# Patient Record
Sex: FEMALE | Race: WHITE | NOT HISPANIC OR LATINO | URBAN - METROPOLITAN AREA
[De-identification: names, ages, dates, MRNs, and addresses within clinical notes are randomized per-mention and may not be internally consistent; named-entity substitution may affect disease eponyms.]

---

## 2017-12-17 ENCOUNTER — EMERGENCY (EMERGENCY)
Facility: HOSPITAL | Age: 72
LOS: 1 days | Discharge: HOME | End: 2017-12-17
Attending: EMERGENCY MEDICINE | Admitting: EMERGENCY MEDICINE
Payer: COMMERCIAL

## 2017-12-17 VITALS
OXYGEN SATURATION: 93 % | RESPIRATION RATE: 18 BRPM | SYSTOLIC BLOOD PRESSURE: 134 MMHG | DIASTOLIC BLOOD PRESSURE: 65 MMHG | TEMPERATURE: 98 F | HEART RATE: 72 BPM

## 2017-12-17 VITALS
OXYGEN SATURATION: 98 % | RESPIRATION RATE: 18 BRPM | TEMPERATURE: 98 F | HEART RATE: 77 BPM | SYSTOLIC BLOOD PRESSURE: 123 MMHG | DIASTOLIC BLOOD PRESSURE: 58 MMHG

## 2017-12-17 DIAGNOSIS — Z90.2 ACQUIRED ABSENCE OF LUNG [PART OF]: Chronic | ICD-10-CM

## 2017-12-17 DIAGNOSIS — S49.92XA UNSPECIFIED INJURY OF LEFT SHOULDER AND UPPER ARM, INITIAL ENCOUNTER: Chronic | ICD-10-CM

## 2017-12-17 PROCEDURE — 73030 X-RAY EXAM OF SHOULDER: CPT

## 2017-12-17 PROCEDURE — 73080 X-RAY EXAM OF ELBOW: CPT

## 2017-12-17 PROCEDURE — 73030 X-RAY EXAM OF SHOULDER: CPT | Mod: 26,RT

## 2017-12-17 PROCEDURE — 73060 X-RAY EXAM OF HUMERUS: CPT

## 2017-12-17 PROCEDURE — 70450 CT HEAD/BRAIN W/O DYE: CPT | Mod: 26

## 2017-12-17 PROCEDURE — 99284 EMERGENCY DEPT VISIT MOD MDM: CPT | Mod: 25

## 2017-12-17 PROCEDURE — 73080 X-RAY EXAM OF ELBOW: CPT | Mod: 26,RT

## 2017-12-17 PROCEDURE — 70450 CT HEAD/BRAIN W/O DYE: CPT

## 2017-12-17 PROCEDURE — 96374 THER/PROPH/DIAG INJ IV PUSH: CPT

## 2017-12-17 PROCEDURE — 99285 EMERGENCY DEPT VISIT HI MDM: CPT

## 2017-12-17 PROCEDURE — 73060 X-RAY EXAM OF HUMERUS: CPT | Mod: 26,RT

## 2017-12-17 RX ORDER — MORPHINE SULFATE 50 MG/1
4 CAPSULE, EXTENDED RELEASE ORAL ONCE
Qty: 0 | Refills: 0 | Status: DISCONTINUED | OUTPATIENT
Start: 2017-12-17 | End: 2017-12-17

## 2017-12-17 RX ORDER — OXYCODONE HYDROCHLORIDE 5 MG/1
1 TABLET ORAL
Qty: 20 | Refills: 0
Start: 2017-12-17 | End: 2017-12-19

## 2017-12-17 RX ORDER — OXYCODONE HYDROCHLORIDE 5 MG/1
5 TABLET ORAL ONCE
Qty: 0 | Refills: 0 | Status: DISCONTINUED | OUTPATIENT
Start: 2017-12-17 | End: 2017-12-17

## 2017-12-17 RX ORDER — ACETAMINOPHEN 500 MG
650 TABLET ORAL ONCE
Qty: 0 | Refills: 0 | Status: COMPLETED | OUTPATIENT
Start: 2017-12-17 | End: 2017-12-17

## 2017-12-17 RX ADMIN — Medication 650 MILLIGRAM(S): at 09:56

## 2017-12-17 RX ADMIN — OXYCODONE HYDROCHLORIDE 5 MILLIGRAM(S): 5 TABLET ORAL at 13:06

## 2017-12-17 RX ADMIN — Medication 650 MILLIGRAM(S): at 13:06

## 2017-12-17 RX ADMIN — MORPHINE SULFATE 4 MILLIGRAM(S): 50 CAPSULE, EXTENDED RELEASE ORAL at 13:07

## 2017-12-17 RX ADMIN — OXYCODONE HYDROCHLORIDE 5 MILLIGRAM(S): 5 TABLET ORAL at 11:42

## 2017-12-17 RX ADMIN — OXYCODONE HYDROCHLORIDE 5 MILLIGRAM(S): 5 TABLET ORAL at 09:56

## 2017-12-17 RX ADMIN — OXYCODONE HYDROCHLORIDE 5 MILLIGRAM(S): 5 TABLET ORAL at 11:41
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
## 2017-12-17 NOTE — ED ADULT NURSE REASSESSMENT NOTE - NS ED NURSE REASSESS COMMENT FT1
Pt reports paresthesia in R lower arm. +pulses, +cap refill. RN reported to MD Fairchild. Patient transported to CT/X-ray at this time.
Patient returned from CT/Xray c/o 10/10 pain unchanged with medication. Medication provided to patient. Will continue to monitor.
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
## 2017-12-17 NOTE — ED PROVIDER NOTE - PHYSICAL EXAMINATION
Attn - alert, nad, small hematoma and abrasion r forehead at hairline, PERRl, neck/back/spine-, chest/ribs-, abdo-, pelvis-, hips-, able to SLR bilat without pain, knees-, lower leg-, left upper ext - neg. Right upper ext - clavicle -, tender R prox humerus, elbow - tender over olecranon without swelling or deformity, wrist-, hand-, + distal pulses, sensation & motor intact.  neuro - nonfocal. skin -abrasion R forehead and over L index mCP.

## 2017-12-17 NOTE — ED PROVIDER NOTE - CARE PLAN
Principal Discharge DX:	Contusion of scalp, initial encounter  Secondary Diagnosis:	Contusion of arm, right, initial encounter

## 2017-12-17 NOTE — ED ADULT NURSE NOTE - PSH
History of lobectomy of lung  L  lung  Injury of left shoulder, initial encounter  Surgical repair with plates

## 2017-12-19 PROBLEM — S42.294A OTHER CLOSED NONDISPLACED FRACTURE OF PROXIMAL END OF RIGHT HUMERUS, INITIAL ENCOUNTER: Status: ACTIVE | Noted: 2017-12-19

## 2017-12-20 ENCOUNTER — APPOINTMENT (OUTPATIENT)
Dept: ORTHOPEDIC SURGERY | Facility: CLINIC | Age: 72
End: 2017-12-20

## 2017-12-20 DIAGNOSIS — S42.294A OTHER NONDISPLACED FRACTURE OF UPPER END OF RIGHT HUMERUS, INITIAL ENCOUNTER FOR CLOSED FRACTURE: ICD-10-CM

## 2018-01-06 ENCOUNTER — EMERGENCY (EMERGENCY)
Facility: HOSPITAL | Age: 73
LOS: 1 days | Discharge: HOME | End: 2018-01-06
Attending: EMERGENCY MEDICINE | Admitting: EMERGENCY MEDICINE
Payer: COMMERCIAL

## 2018-01-06 VITALS
SYSTOLIC BLOOD PRESSURE: 122 MMHG | OXYGEN SATURATION: 97 % | RESPIRATION RATE: 18 BRPM | HEART RATE: 97 BPM | DIASTOLIC BLOOD PRESSURE: 69 MMHG | TEMPERATURE: 99 F

## 2018-01-06 VITALS
RESPIRATION RATE: 18 BRPM | HEART RATE: 105 BPM | OXYGEN SATURATION: 95 % | SYSTOLIC BLOOD PRESSURE: 118 MMHG | TEMPERATURE: 100 F | DIASTOLIC BLOOD PRESSURE: 66 MMHG

## 2018-01-06 DIAGNOSIS — Z90.2 ACQUIRED ABSENCE OF LUNG [PART OF]: Chronic | ICD-10-CM

## 2018-01-06 DIAGNOSIS — S49.92XA UNSPECIFIED INJURY OF LEFT SHOULDER AND UPPER ARM, INITIAL ENCOUNTER: Chronic | ICD-10-CM

## 2018-01-06 LAB
ALBUMIN SERPL ELPH-MCNC: 3.8 G/DL — SIGNIFICANT CHANGE UP (ref 3.3–5)
ALP SERPL-CCNC: 85 U/L — SIGNIFICANT CHANGE UP (ref 40–120)
ALT FLD-CCNC: 32 U/L RC — SIGNIFICANT CHANGE UP (ref 10–45)
ANION GAP SERPL CALC-SCNC: 12 MMOL/L — SIGNIFICANT CHANGE UP (ref 5–17)
AST SERPL-CCNC: 29 U/L — SIGNIFICANT CHANGE UP (ref 10–40)
BASOPHILS # BLD AUTO: 0.1 K/UL — SIGNIFICANT CHANGE UP (ref 0–0.2)
BASOPHILS NFR BLD AUTO: 0.7 % — SIGNIFICANT CHANGE UP (ref 0–2)
BILIRUB SERPL-MCNC: 0.5 MG/DL — SIGNIFICANT CHANGE UP (ref 0.2–1.2)
BUN SERPL-MCNC: 15 MG/DL — SIGNIFICANT CHANGE UP (ref 7–23)
CALCIUM SERPL-MCNC: 9.8 MG/DL — SIGNIFICANT CHANGE UP (ref 8.4–10.5)
CHLORIDE SERPL-SCNC: 99 MMOL/L — SIGNIFICANT CHANGE UP (ref 96–108)
CK MB CFR SERPL CALC: 1.5 NG/ML — SIGNIFICANT CHANGE UP (ref 0–3.8)
CK SERPL-CCNC: 36 U/L — SIGNIFICANT CHANGE UP (ref 25–170)
CO2 SERPL-SCNC: 27 MMOL/L — SIGNIFICANT CHANGE UP (ref 22–31)
CREAT SERPL-MCNC: 0.74 MG/DL — SIGNIFICANT CHANGE UP (ref 0.5–1.3)
EOSINOPHIL # BLD AUTO: 0.1 K/UL — SIGNIFICANT CHANGE UP (ref 0–0.5)
EOSINOPHIL NFR BLD AUTO: 0.9 % — SIGNIFICANT CHANGE UP (ref 0–6)
GAS PNL BLDV: SIGNIFICANT CHANGE UP
GLUCOSE SERPL-MCNC: 107 MG/DL — HIGH (ref 70–99)
HCT VFR BLD CALC: 36.1 % — SIGNIFICANT CHANGE UP (ref 34.5–45)
HGB BLD-MCNC: 11.9 G/DL — SIGNIFICANT CHANGE UP (ref 11.5–15.5)
LITHIUM SERPL-MCNC: 0.9 MMOL/L — SIGNIFICANT CHANGE UP (ref 0.6–1.2)
LYMPHOCYTES # BLD AUTO: 1 K/UL — SIGNIFICANT CHANGE UP (ref 1–3.3)
LYMPHOCYTES # BLD AUTO: 11 % — LOW (ref 13–44)
MCHC RBC-ENTMCNC: 32.9 GM/DL — SIGNIFICANT CHANGE UP (ref 32–36)
MCHC RBC-ENTMCNC: 33.2 PG — SIGNIFICANT CHANGE UP (ref 27–34)
MCV RBC AUTO: 101 FL — HIGH (ref 80–100)
MONOCYTES # BLD AUTO: 0.6 K/UL — SIGNIFICANT CHANGE UP (ref 0–0.9)
MONOCYTES NFR BLD AUTO: 7 % — SIGNIFICANT CHANGE UP (ref 2–14)
NEUTROPHILS # BLD AUTO: 7.1 K/UL — SIGNIFICANT CHANGE UP (ref 1.8–7.4)
NEUTROPHILS NFR BLD AUTO: 80.4 % — HIGH (ref 43–77)
NT-PROBNP SERPL-SCNC: 152 PG/ML — SIGNIFICANT CHANGE UP (ref 0–300)
PLATELET # BLD AUTO: 381 K/UL — SIGNIFICANT CHANGE UP (ref 150–400)
POTASSIUM SERPL-MCNC: 4.4 MMOL/L — SIGNIFICANT CHANGE UP (ref 3.5–5.3)
POTASSIUM SERPL-SCNC: 4.4 MMOL/L — SIGNIFICANT CHANGE UP (ref 3.5–5.3)
PROT SERPL-MCNC: 8.9 G/DL — HIGH (ref 6–8.3)
RAPID RVP RESULT: DETECTED
RBC # BLD: 3.58 M/UL — LOW (ref 3.8–5.2)
RBC # FLD: 13.4 % — SIGNIFICANT CHANGE UP (ref 10.3–14.5)
RSV RNA SPEC QL NAA+PROBE: DETECTED
SODIUM SERPL-SCNC: 138 MMOL/L — SIGNIFICANT CHANGE UP (ref 135–145)
TROPONIN T SERPL-MCNC: <0.01 NG/ML — SIGNIFICANT CHANGE UP (ref 0–0.06)
WBC # BLD: 8.9 K/UL — SIGNIFICANT CHANGE UP (ref 3.8–10.5)
WBC # FLD AUTO: 8.9 K/UL — SIGNIFICANT CHANGE UP (ref 3.8–10.5)

## 2018-01-06 PROCEDURE — 94640 AIRWAY INHALATION TREATMENT: CPT

## 2018-01-06 PROCEDURE — 85027 COMPLETE CBC AUTOMATED: CPT

## 2018-01-06 PROCEDURE — 71045 X-RAY EXAM CHEST 1 VIEW: CPT | Mod: 26

## 2018-01-06 PROCEDURE — 82803 BLOOD GASES ANY COMBINATION: CPT

## 2018-01-06 PROCEDURE — 87581 M.PNEUMON DNA AMP PROBE: CPT

## 2018-01-06 PROCEDURE — 82947 ASSAY GLUCOSE BLOOD QUANT: CPT

## 2018-01-06 PROCEDURE — 84484 ASSAY OF TROPONIN QUANT: CPT

## 2018-01-06 PROCEDURE — 84132 ASSAY OF SERUM POTASSIUM: CPT

## 2018-01-06 PROCEDURE — 82553 CREATINE MB FRACTION: CPT

## 2018-01-06 PROCEDURE — 84295 ASSAY OF SERUM SODIUM: CPT

## 2018-01-06 PROCEDURE — 83880 ASSAY OF NATRIURETIC PEPTIDE: CPT

## 2018-01-06 PROCEDURE — 82550 ASSAY OF CK (CPK): CPT

## 2018-01-06 PROCEDURE — 71046 X-RAY EXAM CHEST 2 VIEWS: CPT | Mod: 26

## 2018-01-06 PROCEDURE — 80053 COMPREHEN METABOLIC PANEL: CPT

## 2018-01-06 PROCEDURE — 71046 X-RAY EXAM CHEST 2 VIEWS: CPT

## 2018-01-06 PROCEDURE — 82330 ASSAY OF CALCIUM: CPT

## 2018-01-06 PROCEDURE — 85014 HEMATOCRIT: CPT

## 2018-01-06 PROCEDURE — 82435 ASSAY OF BLOOD CHLORIDE: CPT

## 2018-01-06 PROCEDURE — 87486 CHLMYD PNEUM DNA AMP PROBE: CPT

## 2018-01-06 PROCEDURE — 87633 RESP VIRUS 12-25 TARGETS: CPT

## 2018-01-06 PROCEDURE — 87798 DETECT AGENT NOS DNA AMP: CPT

## 2018-01-06 PROCEDURE — 71045 X-RAY EXAM CHEST 1 VIEW: CPT

## 2018-01-06 PROCEDURE — 99284 EMERGENCY DEPT VISIT MOD MDM: CPT | Mod: GC

## 2018-01-06 PROCEDURE — 99285 EMERGENCY DEPT VISIT HI MDM: CPT | Mod: 25

## 2018-01-06 PROCEDURE — 80178 ASSAY OF LITHIUM: CPT

## 2018-01-06 PROCEDURE — 83605 ASSAY OF LACTIC ACID: CPT

## 2018-01-06 PROCEDURE — 93005 ELECTROCARDIOGRAM TRACING: CPT

## 2018-01-06 RX ORDER — IPRATROPIUM/ALBUTEROL SULFATE 18-103MCG
3 AEROSOL WITH ADAPTER (GRAM) INHALATION
Qty: 0 | Refills: 0 | Status: COMPLETED | OUTPATIENT
Start: 2018-01-06 | End: 2018-01-06

## 2018-01-06 RX ADMIN — Medication 60 MILLIGRAM(S): at 12:44

## 2018-01-06 RX ADMIN — Medication 3 MILLILITER(S): at 13:06

## 2018-01-06 RX ADMIN — Medication 3 MILLILITER(S): at 13:35

## 2018-01-06 RX ADMIN — Medication 3 MILLILITER(S): at 12:44

## 2018-01-06 NOTE — ED ADULT NURSE NOTE - OBJECTIVE STATEMENT
72 year-old Female  with history of asthma,  and recent Right arm fracture due to a fall.  pt c/o shortness of breath and  clear productive cough.  Patient took Ventolin at home with minimal relief.  Pt denies recent travel, sick contacts, fevers, nausea/vomiting, chest pain, and urinary symptoms.  Respiration easy and non labored, pt appears anxious.  Right arm sling in place with ecchymosis noted to right upper arm.  (+) radial pulse and sensation intact, pt able to move her  right fingers.

## 2018-01-06 NOTE — ED PROVIDER NOTE - MEDICAL DECISION MAKING DETAILS
72 year-old F with PMH asthma who presented to the hospital with shortness of breath and cough. Will check CBC, CMP, VBG, cardiac enzymes, pro-BNP, CXR, EKG. Patient without wheezing, will not administer albuterol at this time. 72 year-old F with PMH asthma who presented to the hospital with shortness of breath and cough. Will check CBC, CMP, VBG, cardiac enzymes, pro-BNP, CXR, EKG. Patient

## 2018-01-06 NOTE — ED PROVIDER NOTE - PLAN OF CARE
Maintain treatment You were diagnosed with an asthma exacerbation, which improved. Please take Prednisone 40mg daily for 5 days and Ventolin as needed after discharge. Please follow up with your primary care doctor shortly after discharge.

## 2018-01-06 NOTE — ED PROVIDER NOTE - PROGRESS NOTE DETAILS
Patient greatly improved on re-examination. Improved air movement on auscultation. Explained to patient that she has respiratory syncytial virus which likely exacerbated her asthma. Will prescribe 40mg prednisone daily for 5 days. Explained to patient and family who verbalized understanding.

## 2018-01-06 NOTE — ED PROVIDER NOTE - CARE PLAN
Principal Discharge DX:	Asthma exacerbation  Goal:	Maintain treatment  Instructions for follow-up, activity and diet:	You were diagnosed with an asthma exacerbation, which improved. Please take Prednisone 40mg daily for 5 days and Ventolin as needed after discharge. Please follow up with your primary care doctor shortly after discharge.

## 2018-07-23 NOTE — ED PROVIDER NOTE - ATTENDING CONTRIBUTION TO CARE
July 24, 2018      Keegan Buchanan  [address] [place] [id number]        Dear ,    We are writing to inform you of your test results.    All of your labs were normal for you.     Resulted Orders   Basic metabolic panel   Result Value Ref Range    Sodium 141 133 - 144 mmol/L    Potassium 3.6 3.4 - 5.3 mmol/L    Chloride 106 94 - 109 mmol/L    Carbon Dioxide 25 20 - 32 mmol/L    Anion Gap 10 3 - 14 mmol/L    Glucose 81 70 - 99 mg/dL    Urea Nitrogen 14 7 - 30 mg/dL    Creatinine 0.96 0.66 - 1.25 mg/dL    GFR Estimate 79 >60 mL/min/1.7m2      Comment:      Non  GFR Calc    GFR Estimate If Black >90 >60 mL/min/1.7m2      Comment:       GFR Calc    Calcium 8.6 8.5 - 10.1 mg/dL       If you have any questions or concerns, please call the clinic at the number listed above.       Sincerely,        Fabio Stovall MD, MD                 Dr. Fierro : I have personally seen and examined this patient at the bedside. I have fully participated in the care of this patient. I have reviewed all pertinent clinical information, including history, physical exam, plan and the Resident's note and agree except as noted.     71 yo F with PMH of asthma, bipolar on lithium, recent hummerus fx in a sling,  L lung lobectomy due to non-hodgkins lymphona 15 years ago, notes recurrence of nodules 2 years ago - now being observed p/w cough/wheezing since yesterday. notes that feels like her typical asthma exacerbation. Patient took Ventolin at home with minimal relief.  no hx of intubations or admissions for asthma last used prednisone years ago. no hx of dvt/pe. notes that is always active. notes + chills last night. + chest tightness with wheezing. cp only when coughing  Denies f/n/v/cp//palpitations/abd.pain/d/c/dysuria/hematuria. no sick contacts/recent travel.    PE:  head; atraumatic normocephalic  eyes: perrla  Heart: rrr s1s2  lungs: poor inspiratory/expiratory effort mild diffuse wheezing  abd: soft, nt nd + bs no rebound/guarding no cva ttp  le: no swelling no calf ttp    -->most likely asthma exacerbation vs pna will fu cxr; labs ekg less likely acs - atypical presentation jeet check 1 trop--nebs steroids--reassess

## 2019-09-24 ENCOUNTER — INPATIENT (INPATIENT)
Facility: HOSPITAL | Age: 74
LOS: 9 days | Discharge: HOME | DRG: 563 | End: 2019-10-04
Attending: INTERNAL MEDICINE | Admitting: INTERNAL MEDICINE
Payer: COMMERCIAL

## 2019-09-24 VITALS
OXYGEN SATURATION: 99 % | DIASTOLIC BLOOD PRESSURE: 95 MMHG | SYSTOLIC BLOOD PRESSURE: 145 MMHG | HEART RATE: 94 BPM | RESPIRATION RATE: 19 BRPM | TEMPERATURE: 98 F

## 2019-09-24 DIAGNOSIS — Z90.2 ACQUIRED ABSENCE OF LUNG [PART OF]: Chronic | ICD-10-CM

## 2019-09-24 DIAGNOSIS — S49.92XA UNSPECIFIED INJURY OF LEFT SHOULDER AND UPPER ARM, INITIAL ENCOUNTER: Chronic | ICD-10-CM

## 2019-09-24 LAB
ALBUMIN SERPL ELPH-MCNC: 3 G/DL — LOW (ref 3.3–5)
ALP SERPL-CCNC: 58 U/L — SIGNIFICANT CHANGE UP (ref 40–120)
ALT FLD-CCNC: 10 U/L — SIGNIFICANT CHANGE UP (ref 10–45)
APPEARANCE UR: CLEAR — SIGNIFICANT CHANGE UP
APTT BLD: 35 SEC — SIGNIFICANT CHANGE UP (ref 27.5–36.3)
AST SERPL-CCNC: 14 U/L — SIGNIFICANT CHANGE UP (ref 10–40)
BACTERIA # UR AUTO: NEGATIVE — SIGNIFICANT CHANGE UP
BILIRUB SERPL-MCNC: 0.5 MG/DL — SIGNIFICANT CHANGE UP (ref 0.2–1.2)
BILIRUB UR-MCNC: NEGATIVE — SIGNIFICANT CHANGE UP
BUN SERPL-MCNC: 20 MG/DL — SIGNIFICANT CHANGE UP (ref 7–23)
CALCIUM SERPL-MCNC: 10 MG/DL — SIGNIFICANT CHANGE UP (ref 8.4–10.5)
CO2 SERPL-SCNC: 26 MMOL/L — SIGNIFICANT CHANGE UP (ref 22–31)
COLOR SPEC: SIGNIFICANT CHANGE UP
CREAT SERPL-MCNC: 0.8 MG/DL — SIGNIFICANT CHANGE UP (ref 0.5–1.3)
DIFF PNL FLD: NEGATIVE — SIGNIFICANT CHANGE UP
EOSINOPHIL NFR BLD AUTO: 1 % — SIGNIFICANT CHANGE UP (ref 0–6)
EPI CELLS # UR: 2 /HPF — SIGNIFICANT CHANGE UP
ETHANOL SERPL-MCNC: SIGNIFICANT CHANGE UP MG/DL (ref 0–10)
GLUCOSE SERPL-MCNC: 107 MG/DL — HIGH (ref 70–99)
GLUCOSE UR QL: NEGATIVE — SIGNIFICANT CHANGE UP
HCT VFR BLD CALC: 31.8 % — LOW (ref 34.5–45)
HGB BLD-MCNC: 10.1 G/DL — LOW (ref 11.5–15.5)
HYALINE CASTS # UR AUTO: 0 /LPF — SIGNIFICANT CHANGE UP (ref 0–2)
INR BLD: 1.18 RATIO — HIGH (ref 0.88–1.16)
KETONES UR-MCNC: NEGATIVE — SIGNIFICANT CHANGE UP
LACTATE BLDV-MCNC: 1.4 MMOL/L — SIGNIFICANT CHANGE UP (ref 0.7–2)
LEUKOCYTE ESTERASE UR-ACNC: ABNORMAL
LIDOCAIN IGE QN: 13 U/L — SIGNIFICANT CHANGE UP (ref 7–60)
LYMPHOCYTES # BLD AUTO: 32 % — SIGNIFICANT CHANGE UP (ref 13–44)
LYMPHOCYTES # BLD AUTO: SIGNIFICANT CHANGE UP (ref 1–3.3)
LYMPHOCYTES # SPEC AUTO: 9 % — HIGH (ref 0–0)
MCHC RBC-ENTMCNC: 30.4 PG — SIGNIFICANT CHANGE UP (ref 27–34)
MCHC RBC-ENTMCNC: 31.9 GM/DL — LOW (ref 32–36)
MCV RBC AUTO: 95.5 FL — SIGNIFICANT CHANGE UP (ref 80–100)
MONOCYTES # BLD AUTO: SIGNIFICANT CHANGE UP (ref 0–0.9)
MONOCYTES NFR BLD AUTO: 14 % — SIGNIFICANT CHANGE UP (ref 2–14)
NEUTROPHILS # BLD AUTO: SIGNIFICANT CHANGE UP (ref 1.8–7.4)
NEUTROPHILS NFR BLD AUTO: 41 % — LOW (ref 43–77)
NITRITE UR-MCNC: NEGATIVE — SIGNIFICANT CHANGE UP
PH UR: 8 — SIGNIFICANT CHANGE UP (ref 5–8)
PLAT MORPH BLD: NORMAL — SIGNIFICANT CHANGE UP
PLATELET # BLD AUTO: 252 K/UL — SIGNIFICANT CHANGE UP (ref 150–400)
PROT SERPL-MCNC: 10.6 G/DL — HIGH (ref 6–8.3)
PROT UR-MCNC: ABNORMAL
PROTHROM AB SERPL-ACNC: 13.6 SEC — HIGH (ref 10–12.9)
RBC # BLD: 3.33 M/UL — LOW (ref 3.8–5.2)
RBC # FLD: 13.5 % — SIGNIFICANT CHANGE UP (ref 10.3–14.5)
RBC BLD AUTO: NORMAL — SIGNIFICANT CHANGE UP
RBC CASTS # UR COMP ASSIST: 1 /HPF — SIGNIFICANT CHANGE UP (ref 0–4)
SP GR SPEC: 1.03 — HIGH (ref 1.01–1.02)
UROBILINOGEN FLD QL: NEGATIVE — SIGNIFICANT CHANGE UP
VARIANT LYMPHS # BLD: 3 % — SIGNIFICANT CHANGE UP (ref 0–6)
WBC # BLD: 9.9 K/UL — SIGNIFICANT CHANGE UP (ref 3.8–10.5)
WBC # FLD AUTO: 9.9 K/UL — SIGNIFICANT CHANGE UP (ref 3.8–10.5)
WBC UR QL: 5 /HPF — SIGNIFICANT CHANGE UP (ref 0–5)

## 2019-09-24 PROCEDURE — 73130 X-RAY EXAM OF HAND: CPT | Mod: 26,RT

## 2019-09-24 PROCEDURE — 99291 CRITICAL CARE FIRST HOUR: CPT

## 2019-09-24 PROCEDURE — 74177 CT ABD & PELVIS W/CONTRAST: CPT | Mod: 26

## 2019-09-24 PROCEDURE — 70450 CT HEAD/BRAIN W/O DYE: CPT | Mod: 26

## 2019-09-24 PROCEDURE — 73090 X-RAY EXAM OF FOREARM: CPT | Mod: 26,LT

## 2019-09-24 PROCEDURE — 72125 CT NECK SPINE W/O DYE: CPT | Mod: 26

## 2019-09-24 PROCEDURE — 73110 X-RAY EXAM OF WRIST: CPT | Mod: 26,RT

## 2019-09-24 PROCEDURE — 71260 CT THORAX DX C+: CPT | Mod: 26

## 2019-09-24 PROCEDURE — 73560 X-RAY EXAM OF KNEE 1 OR 2: CPT | Mod: 26,RT

## 2019-09-24 PROCEDURE — 73502 X-RAY EXAM HIP UNI 2-3 VIEWS: CPT | Mod: 26,RT

## 2019-09-24 PROCEDURE — 71045 X-RAY EXAM CHEST 1 VIEW: CPT | Mod: 26

## 2019-09-24 PROCEDURE — 93971 EXTREMITY STUDY: CPT | Mod: 26

## 2019-09-24 PROCEDURE — 73551 X-RAY EXAM OF FEMUR 1: CPT | Mod: 26,RT

## 2019-09-24 RX ORDER — SODIUM CHLORIDE 9 MG/ML
1000 INJECTION INTRAMUSCULAR; INTRAVENOUS; SUBCUTANEOUS ONCE
Refills: 0 | Status: DISCONTINUED | OUTPATIENT
Start: 2019-09-24 | End: 2019-09-24

## 2019-09-24 RX ORDER — SODIUM CHLORIDE 9 MG/ML
1000 INJECTION, SOLUTION INTRAVENOUS ONCE
Refills: 0 | Status: COMPLETED | OUTPATIENT
Start: 2019-09-24 | End: 2019-09-24

## 2019-09-24 RX ORDER — MORPHINE SULFATE 50 MG/1
2 CAPSULE, EXTENDED RELEASE ORAL ONCE
Refills: 0 | Status: DISCONTINUED | OUTPATIENT
Start: 2019-09-24 | End: 2019-09-24

## 2019-09-24 RX ORDER — OXYCODONE HYDROCHLORIDE 5 MG/1
5 TABLET ORAL ONCE
Refills: 0 | Status: DISCONTINUED | OUTPATIENT
Start: 2019-09-24 | End: 2019-09-24

## 2019-09-24 RX ADMIN — SODIUM CHLORIDE 1000 MILLILITER(S): 9 INJECTION, SOLUTION INTRAVENOUS at 19:05

## 2019-09-24 RX ADMIN — OXYCODONE HYDROCHLORIDE 5 MILLIGRAM(S): 5 TABLET ORAL at 22:19

## 2019-09-24 RX ADMIN — SODIUM CHLORIDE 1000 MILLILITER(S): 9 INJECTION, SOLUTION INTRAVENOUS at 19:13

## 2019-09-24 RX ADMIN — MORPHINE SULFATE 2 MILLIGRAM(S): 50 CAPSULE, EXTENDED RELEASE ORAL at 22:24

## 2019-09-24 RX ADMIN — MORPHINE SULFATE 2 MILLIGRAM(S): 50 CAPSULE, EXTENDED RELEASE ORAL at 21:02
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
## 2019-09-24 NOTE — ED PROVIDER NOTE - PROGRESS NOTE DETAILS
Attending note (Johnson): patient's US shows no DVT in R IJ. Ros[place] attempted to walk, unable, very unsteady; no specific pain in joint, but now unsafe discharge, will plan to admit to medicine for further management, PT/OT eval.  Additional imaging or evaluation as per inpatient team. Attending note (Johnson): patient's US shows no DVT in R IJ. Patietn attempted to walk, unable, very unsteady; no specific pain in joints of lower extremities, but now unsafe discharge, will plan to admit to medicine for further management, PT/OT eval.  Additional imaging or evaluation as per inpatient team.  Still having pain in the right hand; no fracture noted on x-ray (as per prelim read) however given pain will place in splint.
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
## 2019-09-24 NOTE — ED PROVIDER NOTE - NSFOLLOWUPCLINICS_GEN_ALL_ED_FT
[facility] General Internal Medicine  General Internal Medicine  [address] [id number]  Phone: [phone]  Fax:   Follow Up Time:
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
## 2019-09-24 NOTE — ED PROCEDURE NOTE - ATTENDING CONTRIBUTION TO CARE
I have participated in and supervised all key portions of the above procedures and agree with the above documentation. JERONIMO Ornelas MD
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
## 2019-09-24 NOTE — ED PROVIDER NOTE - PMH
Asthma    GERD (gastroesophageal reflux disease)    Hyperlipidemia    Manic depression Asthma    GERD (gastroesophageal reflux disease)    Hyperlipidemia    Lymphoma    Manic depression
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
## 2019-09-24 NOTE — ED PROVIDER NOTE - NSFOLLOWUPINSTRUCTIONS_ED_ALL_ED_FT
1- Tylenol as needed for pain  2- Follow up with your doctor or our medicine clinic [phone]  3- Ay worsening pain, numbness, weakness, or any other concerns return to ER immediately

## 2019-09-24 NOTE — ED PROVIDER NOTE - NS ED ROS FT
GENERAL: No fever or chills  EYES: no change in vision  HEENT: no trouble swallowing or speaking  CARDIAC: no chest pain or palpitations  PULMONARY: no cough or SOB  GI: no abdominal pain, nausea, vomiting, diarrhea, or constipation   : No changes in urination  SKIN: no rashes  NEURO: no headache, numbness, or weakness  MSK: right arm pain, right leg pain
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
## 2019-09-24 NOTE — ED PROVIDER NOTE - PATIENT PORTAL LINK FT
You can access the FollowMyHealth Patient Portal offered by [facility] by registering at the following website: [email or web address]. By joining [facility]’s FollowMyHealth portal, you will also be able to view your health information using other applications (apps) compatible with our system.

## 2019-09-24 NOTE — ED PROVIDER NOTE - NS ED MD DISPO DISCHARGE
Date of Service: 04/19/2017    CHIEF COMPLAINT:  The patient is a 34-year-old single male seen for problems with opiate dependence.  \"I have not relapsed.\"    CURRENT MEDICATIONS:  Suboxone 16 mg.  Seroquel 100 mg nightly.  Adderall 20 mg twice a day.    HISTORY OF PRESENT ILLNESS:  The patient was last seen about 7 weeks ago.  His pill count is unknown as he did not bring his bottle in.  His urine drug screen is positive for Buprenorphine and Amphetamine, as expected, and negative for opiates.  He states some days he is trying to get by with just 12 mg and is hoping to transition to that lower maintenance dosage in the next several weeks.  He continues to see Liza Earl every 2 weeks and feels that that is helpful, in addition to [employer] and other 12-step activities.  He is satisfied with the Seroquel and Adderall that he is taking for his ADD and mood disorder.  He thinks that the exposure to alcohol could be a challenge for him this summer as he used to be involved in a lot of sporting events that had alcohol involved.  We discussed ways he might try and reduce his exposure to high risk situations.    MENTAL STATUS EXAMINATION:  The patient is a well-developed and well nourished male who is fully oriented.  His speech is somewhat rapid, but is fluent.  His thoughts seem goal directed and his concentration and attention span are adequate.  Insight and judgment are okay and his memory for recent and remote is fine.  No SI or HI.    REVIEW OF SYSTEMS:  He denies any headaches or gastrointestinal complaints.    PSYCHOTHERAPY:  Twenty minutes of supportive therapy with motivational enhancement.  We reviewed the positive steps he is taking to stay in recovery and avoid narcotics.  Urged that he avoid high risk alcohol type situations and to consider getting a sponsor.    DIAGNOSIS:  Opiate use disorder, anxiety disorder, not otherwise specified, and attention deficit disorder.    ASSESSMENT AND PLAN:  The  patient's Suboxone seems to be helping with his cravings and blocks withdrawal symptoms, and we will continue #60, 8 mg with 1 refill.  He will also continue the Seroquel and Adderall from his primary care physician.  Return in about 2 months.      Dictated By: Virgil Alvarez MD  Signing Provider: Virgil Alvarez MD MD/eliecer ([id number])  DD: 04/19/2017 14:12:19 TD: 04/20/2017 10:29:29    Copy Sent To:    Home
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
## 2019-09-25 DIAGNOSIS — R26.81 UNSTEADINESS ON FEET: ICD-10-CM

## 2019-09-25 DIAGNOSIS — J45.909 UNSPECIFIED ASTHMA, UNCOMPLICATED: ICD-10-CM

## 2019-09-25 DIAGNOSIS — S69.91XA UNSPECIFIED INJURY OF RIGHT WRIST, HAND AND FINGER(S), INITIAL ENCOUNTER: ICD-10-CM

## 2019-09-25 DIAGNOSIS — F31.9 BIPOLAR DISORDER, UNSPECIFIED: ICD-10-CM

## 2019-09-25 PROBLEM — E78.5 HYPERLIPIDEMIA, UNSPECIFIED: Chronic | Status: ACTIVE | Noted: 2017-12-17

## 2019-09-25 PROBLEM — K21.9 GASTRO-ESOPHAGEAL REFLUX DISEASE WITHOUT ESOPHAGITIS: Chronic | Status: ACTIVE | Noted: 2017-12-17

## 2019-09-25 LAB
ANION GAP SERPL CALC-SCNC: 12 MMOL/L — SIGNIFICANT CHANGE UP (ref 5–17)
BLD GP AB SCN SERPL QL: NEGATIVE — SIGNIFICANT CHANGE UP
BUN SERPL-MCNC: 19 MG/DL — SIGNIFICANT CHANGE UP (ref 7–23)
CALCIUM SERPL-MCNC: 9.8 MG/DL — SIGNIFICANT CHANGE UP (ref 8.4–10.5)
CHLORIDE SERPL-SCNC: 103 MMOL/L — SIGNIFICANT CHANGE UP (ref 96–108)
CHLORIDE SERPL-SCNC: 99 MMOL/L — SIGNIFICANT CHANGE UP (ref 96–108)
CO2 SERPL-SCNC: 27 MMOL/L — SIGNIFICANT CHANGE UP (ref 22–31)
CREAT SERPL-MCNC: 0.82 MG/DL — SIGNIFICANT CHANGE UP (ref 0.5–1.3)
GLUCOSE SERPL-MCNC: 117 MG/DL — HIGH (ref 70–99)
HCT VFR BLD CALC: 28.1 % — LOW (ref 34.5–45)
HCT VFR BLD CALC: 29.1 % — LOW (ref 34.5–45)
HGB BLD-MCNC: 8.7 G/DL — LOW (ref 11.5–15.5)
HGB BLD-MCNC: 9.1 G/DL — LOW (ref 11.5–15.5)
MAGNESIUM SERPL-MCNC: 2.2 MG/DL — SIGNIFICANT CHANGE UP (ref 1.6–2.6)
MANUAL DIF COMMENT BLD-IMP: SIGNIFICANT CHANGE UP
MCHC RBC-ENTMCNC: 30.1 PG — SIGNIFICANT CHANGE UP (ref 27–34)
MCHC RBC-ENTMCNC: 30.1 PG — SIGNIFICANT CHANGE UP (ref 27–34)
MCHC RBC-ENTMCNC: 31 GM/DL — LOW (ref 32–36)
MCHC RBC-ENTMCNC: 31.3 GM/DL — LOW (ref 32–36)
MCV RBC AUTO: 96.2 FL — SIGNIFICANT CHANGE UP (ref 80–100)
MCV RBC AUTO: 97.2 FL — SIGNIFICANT CHANGE UP (ref 80–100)
PHOSPHATE SERPL-MCNC: 4.7 MG/DL — HIGH (ref 2.5–4.5)
PLATELET # BLD AUTO: 211 K/UL — SIGNIFICANT CHANGE UP (ref 150–400)
PLATELET # BLD AUTO: 214 K/UL — SIGNIFICANT CHANGE UP (ref 150–400)
POTASSIUM SERPL-MCNC: 4.1 MMOL/L — SIGNIFICANT CHANGE UP (ref 3.5–5.3)
POTASSIUM SERPL-MCNC: 4.2 MMOL/L — SIGNIFICANT CHANGE UP (ref 3.5–5.3)
POTASSIUM SERPL-SCNC: 4.1 MMOL/L — SIGNIFICANT CHANGE UP (ref 3.5–5.3)
POTASSIUM SERPL-SCNC: 4.2 MMOL/L — SIGNIFICANT CHANGE UP (ref 3.5–5.3)
RBC # BLD: 2.89 M/UL — LOW (ref 3.8–5.2)
RBC # BLD: 3.02 M/UL — LOW (ref 3.8–5.2)
RBC # FLD: 13.7 % — SIGNIFICANT CHANGE UP (ref 10.3–14.5)
RBC # FLD: 15.1 % — HIGH (ref 10.3–14.5)
RH IG SCN BLD-IMP: POSITIVE — SIGNIFICANT CHANGE UP
SODIUM SERPL-SCNC: 138 MMOL/L — SIGNIFICANT CHANGE UP (ref 135–145)
SODIUM SERPL-SCNC: 141 MMOL/L — SIGNIFICANT CHANGE UP (ref 135–145)
WBC # BLD: 7.8 K/UL — SIGNIFICANT CHANGE UP (ref 3.8–10.5)
WBC # BLD: 8.74 K/UL — SIGNIFICANT CHANGE UP (ref 3.8–10.5)
WBC # FLD AUTO: 7.8 K/UL — SIGNIFICANT CHANGE UP (ref 3.8–10.5)
WBC # FLD AUTO: 8.74 K/UL — SIGNIFICANT CHANGE UP (ref 3.8–10.5)

## 2019-09-25 PROCEDURE — 99223 1ST HOSP IP/OBS HIGH 75: CPT

## 2019-09-25 PROCEDURE — 76882 US LMTD JT/FCL EVL NVASC XTR: CPT | Mod: 26,RT

## 2019-09-25 RX ORDER — FLUTICASONE FUROATE AND VILANTEROL TRIFENATATE 100; 25 UG/1; UG/1
0 POWDER RESPIRATORY (INHALATION)
Qty: 0 | Refills: 0 | DISCHARGE

## 2019-09-25 RX ORDER — ACETAMINOPHEN 500 MG
650 TABLET ORAL EVERY 6 HOURS
Refills: 0 | Status: DISCONTINUED | OUTPATIENT
Start: 2019-09-25 | End: 2019-10-04

## 2019-09-25 RX ORDER — OLANZAPINE 15 MG/1
0 TABLET, FILM COATED ORAL
Qty: 0 | Refills: 0 | DISCHARGE

## 2019-09-25 RX ORDER — LITHIUM CARBONATE 300 MG/1
300 TABLET, EXTENDED RELEASE ORAL THREE TIMES A DAY
Refills: 0 | Status: DISCONTINUED | OUTPATIENT
Start: 2019-09-25 | End: 2019-10-04

## 2019-09-25 RX ORDER — OLANZAPINE 15 MG/1
2.5 TABLET, FILM COATED ORAL AT BEDTIME
Refills: 0 | Status: DISCONTINUED | OUTPATIENT
Start: 2019-09-25 | End: 2019-10-04

## 2019-09-25 RX ORDER — SODIUM CHLORIDE 9 MG/ML
500 INJECTION INTRAMUSCULAR; INTRAVENOUS; SUBCUTANEOUS ONCE
Refills: 0 | Status: COMPLETED | OUTPATIENT
Start: 2019-09-25 | End: 2019-09-25

## 2019-09-25 RX ORDER — ATORVASTATIN CALCIUM 80 MG/1
0 TABLET, FILM COATED ORAL
Qty: 0 | Refills: 0 | DISCHARGE

## 2019-09-25 RX ORDER — ALBUTEROL 90 UG/1
0 AEROSOL, METERED ORAL
Qty: 0 | Refills: 0 | DISCHARGE

## 2019-09-25 RX ORDER — DOCUSATE SODIUM 100 MG
100 CAPSULE ORAL THREE TIMES A DAY
Refills: 0 | Status: DISCONTINUED | OUTPATIENT
Start: 2019-09-25 | End: 2019-10-04

## 2019-09-25 RX ORDER — DIVALPROEX SODIUM 500 MG/1
0 TABLET, DELAYED RELEASE ORAL
Qty: 0 | Refills: 0 | DISCHARGE

## 2019-09-25 RX ORDER — FLUTICASONE PROPIONATE AND SALMETEROL 50; 250 UG/1; UG/1
0 POWDER ORAL; RESPIRATORY (INHALATION)
Qty: 0 | Refills: 0 | DISCHARGE

## 2019-09-25 RX ORDER — OXYCODONE HYDROCHLORIDE 5 MG/1
5 TABLET ORAL EVERY 6 HOURS
Refills: 0 | Status: DISCONTINUED | OUTPATIENT
Start: 2019-09-25 | End: 2019-09-30

## 2019-09-25 RX ORDER — LITHIUM CARBONATE 300 MG/1
300 TABLET, EXTENDED RELEASE ORAL
Qty: 0 | Refills: 0 | DISCHARGE

## 2019-09-25 RX ORDER — DIVALPROEX SODIUM 500 MG/1
250 TABLET, DELAYED RELEASE ORAL
Refills: 0 | Status: DISCONTINUED | OUTPATIENT
Start: 2019-09-25 | End: 2019-10-04

## 2019-09-25 RX ORDER — INFLUENZA VIRUS VACCINE 15; 15; 15; 15 UG/.5ML; UG/.5ML; UG/.5ML; UG/.5ML
0.5 SUSPENSION INTRAMUSCULAR ONCE
Refills: 0 | Status: COMPLETED | OUTPATIENT
Start: 2019-09-25 | End: 2019-09-25

## 2019-09-25 RX ORDER — SENNA PLUS 8.6 MG/1
2 TABLET ORAL AT BEDTIME
Refills: 0 | Status: DISCONTINUED | OUTPATIENT
Start: 2019-09-25 | End: 2019-09-27

## 2019-09-25 RX ORDER — ALBUTEROL 90 UG/1
2 AEROSOL, METERED ORAL EVERY 6 HOURS
Refills: 0 | Status: DISCONTINUED | OUTPATIENT
Start: 2019-09-25 | End: 2019-10-04

## 2019-09-25 RX ORDER — PANTOPRAZOLE SODIUM 20 MG/1
0 TABLET, DELAYED RELEASE ORAL
Qty: 0 | Refills: 0 | DISCHARGE

## 2019-09-25 RX ADMIN — OXYCODONE HYDROCHLORIDE 5 MILLIGRAM(S): 5 TABLET ORAL at 05:38

## 2019-09-25 RX ADMIN — Medication 100 MILLIGRAM(S): at 21:21

## 2019-09-25 RX ADMIN — DIVALPROEX SODIUM 250 MILLIGRAM(S): 500 TABLET, DELAYED RELEASE ORAL at 18:39

## 2019-09-25 RX ADMIN — LITHIUM CARBONATE 300 MILLIGRAM(S): 300 TABLET, EXTENDED RELEASE ORAL at 21:21

## 2019-09-25 RX ADMIN — Medication 100 MILLIGRAM(S): at 05:37

## 2019-09-25 RX ADMIN — LITHIUM CARBONATE 300 MILLIGRAM(S): 300 TABLET, EXTENDED RELEASE ORAL at 05:37

## 2019-09-25 RX ADMIN — LITHIUM CARBONATE 300 MILLIGRAM(S): 300 TABLET, EXTENDED RELEASE ORAL at 13:07

## 2019-09-25 RX ADMIN — OLANZAPINE 2.5 MILLIGRAM(S): 15 TABLET, FILM COATED ORAL at 21:21

## 2019-09-25 RX ADMIN — SODIUM CHLORIDE 500 MILLILITER(S): 9 INJECTION INTRAMUSCULAR; INTRAVENOUS; SUBCUTANEOUS at 13:07

## 2019-09-25 RX ADMIN — Medication 100 MILLIGRAM(S): at 13:08

## 2019-09-25 RX ADMIN — OXYCODONE HYDROCHLORIDE 5 MILLIGRAM(S): 5 TABLET ORAL at 06:08

## 2019-09-25 RX ADMIN — DIVALPROEX SODIUM 250 MILLIGRAM(S): 500 TABLET, DELAYED RELEASE ORAL at 05:37

## 2019-09-25 RX ADMIN — ALBUTEROL 2 PUFF(S): 90 AEROSOL, METERED ORAL at 05:38
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
## 2019-09-25 NOTE — H&P ADULT - PROBLEM SELECTOR PLAN 1
Patient's hand placed in splint by ED  Primary day team to f/u final reads of plain film to ensure no fraccture or dislocations  Ortho consult per primary team

## 2019-09-25 NOTE — H&P ADULT - NSICDXPASTSURGICALHX_GEN_ALL_CORE_FT
PAST SURGICAL HISTORY:  History of lobectomy of lung L  lung    Injury of left shoulder, initial encounter Surgical repair with plates
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
## 2019-09-25 NOTE — H&P ADULT - NSICDXPASTMEDICALHX_GEN_ALL_CORE_FT
PAST MEDICAL HISTORY:  Asthma     GERD (gastroesophageal reflux disease)     Hyperlipidemia     Lymphoma     Manic depression

## 2019-09-25 NOTE — H&P ADULT - ATTENDING COMMENTS
Dr. Jessica Thornton accepted patient's case from the ED and requested in house hospitalist team to complete admission. Patient was previously unknown to me. Patient was assigned to me by hospitalist in charge. My involvement in this case consisted only of the initial history, physical and management plan. Dr. Thornton/[facility] team to assume care in AM and thereafter. Case discussed in detail with overnight medicine NP/SYED Tyler [id number]

## 2019-09-25 NOTE — ED ADULT NURSE REASSESSMENT NOTE - NS ED NURSE REASSESS COMMENT FT1
Pt unable to ambulate with cane and 2 assist, pt states she feels uncomfortable bearing weight on R leg due to pain. MD Ornelas aware

## 2019-09-25 NOTE — H&P ADULT - NSHPREVIEWOFSYSTEMS_GEN_ALL_CORE
REVIEW OF SYSTEMS:  CONSTITUTIONAL: No fever, chills, sweats  EYES: No eye pain or visual disturbances  ENMT:  No tinnitus, vertigo; No sinus or throat pain  NECK: No pain or stiffness  RESPIRATORY: No cough, wheezing, or shortness of breath  CARDIOVASCULAR: No chest pain, palpitations, or dizziness  GASTROINTESTINAL: No abdominal pain. No nausea, vomiting, diarrhea or constipation. No melena or hematochezia.  GENITOURINARY: No dysuria.  NEUROLOGICAL: No headaches, loss of strength, or numbness  SKIN: See HPI   MUSCULOSKELETAL: See HPI  PSYCHIATRIC: See HPI  HEME/LYMPH: No easy bruising, or bleeding gums

## 2019-09-25 NOTE — CHART NOTE - NSCHARTNOTEFT_GEN_A_CORE
Peer Hiram radiology discrepancy noted to have right 5th metacarpal neck fx with volar angulation by 30 degrees, no dislocation. Pt admitted to hospital under Dr. Thornton's service, d/w Dr. Thornton. -Montserrat Gilliland PA-C

## 2019-09-25 NOTE — H&P ADULT - PROBLEM SELECTOR PLAN 2
Trauma consult reviewed and appreciated  CT of head/Cspine/Chest/Abd/Pelvis without report of acute injuries  Patient with right leg pain and unable to ambulate in ED 2/2 pain  Notable hematoma/ecchymosis on right mid-lateral thigh. no isolated swelling of knee or ankle nor specific joint pain.  F/U final reads of plain films: If pain persists or more isolated advanced imaging per Primary day team

## 2019-09-25 NOTE — H&P ADULT - NSHPLABSRESULTS_GEN_ALL_CORE
Personally reviewed labs and noted in detail below.    Reviewed imaging and noted in detail below: no appreciable fractures or dislocations on prelim review of xrays.    < from: CT Head No Cont (09.24.19 @ 19:20) >  IMPRESSION:     CT BRAIN:   No acute intracranial hemorrhage, mass effect or midline shift.   Age-appropriate involutional and ischemic gliotic changes without change   since 12/17/2017.    No displaced calvarial fracture or scalp hematoma.    CT CERVICAL SPINE:   No acute fracture or subluxation of the cervical spine. Chronic fracture   T2 vertebral body without bony retropulsion.  < end of copied text >    < from: CT Chest w/ IV Cont (09.24.19 @ 19:31) >  IMPRESSION:   Dense consolidation of the left lung with associated high attenuation   soft tissue in the left pleural space and mediastinum. Findings may be   secondary to lymphoma. Further evaluation with PET/CT is recommended.    Focal distention of the right internal jugular vein and questionable   filling defect although this may secondary to mixing artifact. A   dedicated ultrasound can performed for further evaluation.  < end of copied text >      < from: CT Abdomen and Pelvis w/ IV Cont (09.24.19 @ 19:31) >  ABDOMEN AND PELVIS:    LIVER: Within normal limits.  BILE DUCTS: Normal caliber.  GALLBLADDER: Within normal limits.  SPLEEN: Within normal limits.  PANCREAS: Within normal limits.  ADRENALS: Within normal limits.  KIDNEYS/URETERS: Within normal limits.    BLADDER: Within normal limits.  REPRODUCTIVE ORGANS: Uterus and adnexa within normal limits.    BOWEL: No bowel obstruction. Appendix not clearly visualized.. Moderate   hiatal hernia.  PERITONEUM: No ascites.  VESSELS: Atherosclerotic changes.  RETROPERITONEUM/LYMPH NODES: No lymphadenopathy.    ABDOMINAL WALL: Within normal limits.  BONES: Status post open reduction internal fixation of the left humerus.   Chronic fracture of the right humeral neck. Degenerative changes of the   spine.  < end of copied text >    < from: Xray Hand 3 Views, Right (09.24.19 @ 20:53) >  ******PRELIMINARY REPORT******        INTERPRETATION:  No acute fractures or dislocations.  < end of copied text >    < from: Xray Wrist 3 Views, Right (09.24.19 @ 20:53) >  ******PRELIMINARY REPORT******       INTERPRETATION:  No acute fractures or dislocations.  < end of copied text >    < from: Xray Forearm, Right (09.24.19 @ 20:54) >  ******PRELIMINARY REPORT******        INTERPRETATION: No acute fractures or dislocations.  < end of copied text >    < from: Xray Knee 1 or 2 Views, Right (09.24.19 @ 20:55) >  ******PRELIMINARY REPORT******        INTERPRETATION:  No acute fractures or dislocations.  < end of copied text >    < from: Xray Femur 1 View, Right (09.24.19 @ 20:55) >  ******PRELIMINARY REPORT******        INTERPRETATION:  No acute fractures or dislocations.  < end of copied text >    < from: Xray Hip 2-3 Views, Right (09.24.19 @ 20:54) >  ******PRELIMINARY REPORT******        INTERPRETATION:  No acute fractures or dislocations.  < end of copied text >

## 2019-09-25 NOTE — PATIENT PROFILE ADULT - DOES PATIENT HAVE ADVANCE DIRECTIVE
Thank you for letting us take care of you today. We hope all your questions were addressed. If a question was overlooked or something else comes to mind after you return home, please contact a member of your Care Team listed below. Please make sure you have a routine office visit set up to follow-up on [address]. Your Care Team at [place] [address] is Team #4  Mari Carranza MD (Faculty)  Pedro Merlin, MD (Sean Wiley MD (Resident)  Sukhi Tucker MD (Resident)  Momo Leigh MD (Resident)  Noreen Gaston MD (Resident)  Katerin Bartlett MD (Resident)  JESSI Nolen, [facility]  Abdullahi Rosairo, [facility] office)  [facility])  Abigail [facility] office)  Augustin Christopher, [address] ([address] [facility])  López Davison, Ph.D., (Behavioral Services)  Mirta Cabrera, [address] [place] [address] (Clinical Pharmacist)       Office phone number: [phone]    If you need to get in right away due to illness, please be advised we have \"Same Day\" appointments available Monday-Friday. Please call us at [phone] option #3 to schedule your \"Same Day\" appointment. no

## 2019-09-25 NOTE — CHART NOTE - NSCHARTNOTEFT_GEN_A_CORE
follow up- per d/w attending MD, pt with right 5th metacarpal neck fx - per ortho, hand surgery/plastics on call was called; Dr. Cano [phone] and per discussion, continue hand splint and OT consult for eval advised;  Jessie Noble(NP)  3 [facility], [phone]
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
## 2019-09-25 NOTE — H&P ADULT - PROBLEM SELECTOR PLAN 5
Clarify patient's regimen in AM with provider/family.    Per patient: takes Lithium 300 TID and Depakote 250 BID and Zyprexa 2.5 qHS  Called [facility] and spoke with Pharmacist Enrique Conde in June 2019 (90 day supply) was prescribed the following:  Lithium 300 2tabs BID, Depakote 500 mg 1 tab BID, and Zyprexa 5 mg 2 tabs qHS by Dr. Donis Rooney  Unclear if regimen has changed. Will continue with patient's said regimen for now

## 2019-09-25 NOTE — H&P ADULT - NSHPSOCIALHISTORY_GEN_ALL_CORE
Lives with  and son  Works in a school for special education  Denies tobacco use. Remote tobacco use when younger and second hand smoke exposure  Rare EtOH use  Denies illicit drug use

## 2019-09-26 LAB
ANION GAP SERPL CALC-SCNC: 7 MMOL/L — SIGNIFICANT CHANGE UP (ref 5–17)
ANISOCYTOSIS BLD QL: SLIGHT — SIGNIFICANT CHANGE UP
BASOPHILS # BLD AUTO: 0 K/UL — SIGNIFICANT CHANGE UP (ref 0–0.2)
BASOPHILS NFR BLD AUTO: 0 % — SIGNIFICANT CHANGE UP (ref 0–2)
BUN SERPL-MCNC: 13 MG/DL — SIGNIFICANT CHANGE UP (ref 7–23)
CALCIUM SERPL-MCNC: 10 MG/DL — SIGNIFICANT CHANGE UP (ref 8.4–10.5)
CHLORIDE SERPL-SCNC: 107 MMOL/L — SIGNIFICANT CHANGE UP (ref 96–108)
CO2 SERPL-SCNC: 27 MMOL/L — SIGNIFICANT CHANGE UP (ref 22–31)
CREAT SERPL-MCNC: 0.82 MG/DL — SIGNIFICANT CHANGE UP (ref 0.5–1.3)
CULTURE RESULTS: SIGNIFICANT CHANGE UP
EOSINOPHIL # BLD AUTO: 0.09 K/UL — SIGNIFICANT CHANGE UP (ref 0–0.5)
EOSINOPHIL NFR BLD AUTO: 1 % — SIGNIFICANT CHANGE UP (ref 0–6)
GLUCOSE SERPL-MCNC: 87 MG/DL — SIGNIFICANT CHANGE UP (ref 70–99)
HCT VFR BLD CALC: 29.3 % — LOW (ref 34.5–45)
HCV AB S/CO SERPL IA: 0.12 S/CO — SIGNIFICANT CHANGE UP (ref 0–0.99)
HCV AB SERPL-IMP: SIGNIFICANT CHANGE UP
HGB BLD-MCNC: 8.9 G/DL — LOW (ref 11.5–15.5)
HYPOCHROMIA BLD QL: SLIGHT — SIGNIFICANT CHANGE UP
LYMPHOCYTES # BLD AUTO: 2.27 K/UL — SIGNIFICANT CHANGE UP (ref 1–3.3)
LYMPHOCYTES # BLD AUTO: 26 % — SIGNIFICANT CHANGE UP (ref 13–44)
LYMPHOCYTES # SPEC AUTO: 20 % — HIGH (ref 0–0)
MANUAL SMEAR VERIFICATION: SIGNIFICANT CHANGE UP
MCHC RBC-ENTMCNC: 29.5 PG — SIGNIFICANT CHANGE UP (ref 27–34)
MCHC RBC-ENTMCNC: 30.4 GM/DL — LOW (ref 32–36)
MCV RBC AUTO: 97 FL — SIGNIFICANT CHANGE UP (ref 80–100)
MONOCYTES # BLD AUTO: 0.44 K/UL — SIGNIFICANT CHANGE UP (ref 0–0.9)
MONOCYTES NFR BLD AUTO: 5 % — SIGNIFICANT CHANGE UP (ref 2–14)
NEUTROPHILS # BLD AUTO: 4.2 K/UL — SIGNIFICANT CHANGE UP (ref 1.8–7.4)
NEUTROPHILS NFR BLD AUTO: 48 % — SIGNIFICANT CHANGE UP (ref 43–77)
PLAT MORPH BLD: NORMAL — SIGNIFICANT CHANGE UP
PLATELET # BLD AUTO: 214 K/UL — SIGNIFICANT CHANGE UP (ref 150–400)
POLYCHROMASIA BLD QL SMEAR: SLIGHT — SIGNIFICANT CHANGE UP
POTASSIUM SERPL-MCNC: 4.3 MMOL/L — SIGNIFICANT CHANGE UP (ref 3.5–5.3)
POTASSIUM SERPL-SCNC: 4.3 MMOL/L — SIGNIFICANT CHANGE UP (ref 3.5–5.3)
RBC # BLD: 3.02 M/UL — LOW (ref 3.8–5.2)
RBC # FLD: 15.2 % — HIGH (ref 10.3–14.5)
RBC BLD AUTO: ABNORMAL
ROULEAUX BLD QL SMEAR: PRESENT — SIGNIFICANT CHANGE UP
SODIUM SERPL-SCNC: 141 MMOL/L — SIGNIFICANT CHANGE UP (ref 135–145)
SPECIMEN SOURCE: SIGNIFICANT CHANGE UP
WBC # BLD: 11.3 K/UL — HIGH (ref 3.8–10.5)
WBC # FLD AUTO: 11.3 K/UL — HIGH (ref 3.8–10.5)

## 2019-09-26 RX ORDER — IPRATROPIUM/ALBUTEROL SULFATE 18-103MCG
3 AEROSOL WITH ADAPTER (GRAM) INHALATION EVERY 4 HOURS
Refills: 0 | Status: DISCONTINUED | OUTPATIENT
Start: 2019-09-26 | End: 2019-10-04

## 2019-09-26 RX ADMIN — Medication 100 MILLIGRAM(S): at 05:33

## 2019-09-26 RX ADMIN — ALBUTEROL 2 PUFF(S): 90 AEROSOL, METERED ORAL at 14:13

## 2019-09-26 RX ADMIN — Medication 100 MILLIGRAM(S): at 22:49

## 2019-09-26 RX ADMIN — OLANZAPINE 2.5 MILLIGRAM(S): 15 TABLET, FILM COATED ORAL at 22:49

## 2019-09-26 RX ADMIN — Medication 100 MILLIGRAM(S): at 13:21

## 2019-09-26 RX ADMIN — DIVALPROEX SODIUM 250 MILLIGRAM(S): 500 TABLET, DELAYED RELEASE ORAL at 18:06

## 2019-09-26 RX ADMIN — LITHIUM CARBONATE 300 MILLIGRAM(S): 300 TABLET, EXTENDED RELEASE ORAL at 22:50

## 2019-09-26 RX ADMIN — DIVALPROEX SODIUM 250 MILLIGRAM(S): 500 TABLET, DELAYED RELEASE ORAL at 05:33

## 2019-09-26 RX ADMIN — LITHIUM CARBONATE 300 MILLIGRAM(S): 300 TABLET, EXTENDED RELEASE ORAL at 13:21

## 2019-09-26 RX ADMIN — LITHIUM CARBONATE 300 MILLIGRAM(S): 300 TABLET, EXTENDED RELEASE ORAL at 05:33

## 2019-09-26 RX ADMIN — Medication 3 MILLILITER(S): at 16:30
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
## 2019-09-26 NOTE — PHYSICAL THERAPY INITIAL EVALUATION ADULT - ACTIVE RANGE OF MOTION EXAMINATION, REHAB EVAL
bilateral upper extremity Active ROM was WFL (within functional limits)/bilateral  lower extremity Active ROM was WFL (within functional limits)/R wrist N/A

## 2019-09-26 NOTE — PHYSICAL THERAPY INITIAL EVALUATION ADULT - ADDITIONAL COMMENTS
Pt lives in a house w/ steps to enter & steps to negotiate once inside. PTA pt (I) w/ all functional mobility & ADL w/o AD

## 2019-09-26 NOTE — PHYSICAL THERAPY INITIAL EVALUATION ADULT - PRECAUTIONS/LIMITATIONS, REHAB EVAL
hand surgeon recommends shart arm splint x4wks, no surgical intervention hand surgeon recommends shart arm splint x4wks, no surgical intervention/fall precautions

## 2019-09-26 NOTE — OCCUPATIONAL THERAPY INITIAL EVALUATION ADULT - ADDITIONAL COMMENTS
Pt reports that she lives in a , has 2steps then a landing and them additional few steps to enter. SHe will be staying o the first floor where there is a bathroom with a tub. PTA she was Independent in all adl;s and no dme in the home

## 2019-09-26 NOTE — OCCUPATIONAL THERAPY INITIAL EVALUATION ADULT - DIAGNOSIS, OT EVAL
Patient presents with decreased balance, strength, endurance, bed mobility and functional transfers impacting ability to perform ADLs and functional mobility

## 2019-09-26 NOTE — OCCUPATIONAL THERAPY INITIAL EVALUATION ADULT - PLANNED THERAPY INTERVENTIONS, OT EVAL
fine motor coordination training/transfer training/ADL retraining/balance training/bed mobility training

## 2019-09-26 NOTE — PHYSICAL THERAPY INITIAL EVALUATION ADULT - PLANNED THERAPY INTERVENTIONS, PT EVAL
transfer training/GOAL: pt will be able to negotiate a flight of steps (I) w/i 2wks/gait training/balance training/bed mobility training
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
## 2019-09-27 LAB
BASOPHILS # BLD AUTO: 0 K/UL — SIGNIFICANT CHANGE UP (ref 0–0.2)
BASOPHILS NFR BLD AUTO: 0 % — SIGNIFICANT CHANGE UP (ref 0–2)
EOSINOPHIL # BLD AUTO: 0.1 K/UL — SIGNIFICANT CHANGE UP (ref 0–0.5)
EOSINOPHIL NFR BLD AUTO: 0.9 % — SIGNIFICANT CHANGE UP (ref 0–6)
HCT VFR BLD CALC: 29.8 % — LOW (ref 34.5–45)
HGB BLD-MCNC: 9 G/DL — LOW (ref 11.5–15.5)
LYMPHOCYTES # BLD AUTO: 3.63 K/UL — HIGH (ref 1–3.3)
LYMPHOCYTES # BLD AUTO: 32.1 % — SIGNIFICANT CHANGE UP (ref 13–44)
LYMPHOCYTES # SPEC AUTO: 8.9 % — HIGH (ref 0–0)
MANUAL SMEAR VERIFICATION: SIGNIFICANT CHANGE UP
MCHC RBC-ENTMCNC: 29.5 PG — SIGNIFICANT CHANGE UP (ref 27–34)
MCHC RBC-ENTMCNC: 30.2 GM/DL — LOW (ref 32–36)
MCV RBC AUTO: 97.7 FL — SIGNIFICANT CHANGE UP (ref 80–100)
MONOCYTES # BLD AUTO: 0.61 K/UL — SIGNIFICANT CHANGE UP (ref 0–0.9)
MONOCYTES NFR BLD AUTO: 5.4 % — SIGNIFICANT CHANGE UP (ref 2–14)
NEUTROPHILS # BLD AUTO: 5.96 K/UL — SIGNIFICANT CHANGE UP (ref 1.8–7.4)
NEUTROPHILS NFR BLD AUTO: 52.7 % — SIGNIFICANT CHANGE UP (ref 43–77)
PLAT MORPH BLD: NORMAL — SIGNIFICANT CHANGE UP
PLATELET # BLD AUTO: 220 K/UL — SIGNIFICANT CHANGE UP (ref 150–400)
RBC # BLD: 3.05 M/UL — LOW (ref 3.8–5.2)
RBC # FLD: 15.2 % — HIGH (ref 10.3–14.5)
RBC BLD AUTO: SIGNIFICANT CHANGE UP
WBC # BLD: 11.3 K/UL — HIGH (ref 3.8–10.5)
WBC # FLD AUTO: 11.3 K/UL — HIGH (ref 3.8–10.5)

## 2019-09-27 RX ORDER — ENOXAPARIN SODIUM 100 MG/ML
40 INJECTION SUBCUTANEOUS DAILY
Refills: 0 | Status: DISCONTINUED | OUTPATIENT
Start: 2019-09-27 | End: 2019-10-04

## 2019-09-27 RX ORDER — SENNA PLUS 8.6 MG/1
2 TABLET ORAL AT BEDTIME
Refills: 0 | Status: DISCONTINUED | OUTPATIENT
Start: 2019-09-27 | End: 2019-10-04

## 2019-09-27 RX ORDER — POLYETHYLENE GLYCOL 3350 17 G/17G
17 POWDER, FOR SOLUTION ORAL DAILY
Refills: 0 | Status: DISCONTINUED | OUTPATIENT
Start: 2019-09-27 | End: 2019-10-04

## 2019-09-27 RX ADMIN — SENNA PLUS 2 TABLET(S): 8.6 TABLET ORAL at 22:05

## 2019-09-27 RX ADMIN — ENOXAPARIN SODIUM 40 MILLIGRAM(S): 100 INJECTION SUBCUTANEOUS at 17:31

## 2019-09-27 RX ADMIN — OLANZAPINE 2.5 MILLIGRAM(S): 15 TABLET, FILM COATED ORAL at 22:05

## 2019-09-27 RX ADMIN — Medication 650 MILLIGRAM(S): at 19:46

## 2019-09-27 RX ADMIN — LITHIUM CARBONATE 300 MILLIGRAM(S): 300 TABLET, EXTENDED RELEASE ORAL at 22:05

## 2019-09-27 RX ADMIN — DIVALPROEX SODIUM 250 MILLIGRAM(S): 500 TABLET, DELAYED RELEASE ORAL at 17:28

## 2019-09-27 RX ADMIN — Medication 100 MILLIGRAM(S): at 06:21

## 2019-09-27 RX ADMIN — Medication 650 MILLIGRAM(S): at 20:30

## 2019-09-27 RX ADMIN — Medication 100 MILLIGRAM(S): at 22:05

## 2019-09-27 RX ADMIN — POLYETHYLENE GLYCOL 3350 17 GRAM(S): 17 POWDER, FOR SOLUTION ORAL at 11:41

## 2019-09-27 RX ADMIN — LITHIUM CARBONATE 300 MILLIGRAM(S): 300 TABLET, EXTENDED RELEASE ORAL at 06:21

## 2019-09-27 RX ADMIN — LITHIUM CARBONATE 300 MILLIGRAM(S): 300 TABLET, EXTENDED RELEASE ORAL at 13:39

## 2019-09-27 RX ADMIN — Medication 100 MILLIGRAM(S): at 13:39

## 2019-09-27 RX ADMIN — DIVALPROEX SODIUM 250 MILLIGRAM(S): 500 TABLET, DELAYED RELEASE ORAL at 06:21

## 2019-09-27 RX ADMIN — ALBUTEROL 2 PUFF(S): 90 AEROSOL, METERED ORAL at 13:40

## 2019-09-28 LAB
ANISOCYTOSIS BLD QL: SLIGHT — SIGNIFICANT CHANGE UP
BASOPHILS # BLD AUTO: 0.09 K/UL — SIGNIFICANT CHANGE UP (ref 0–0.2)
BASOPHILS NFR BLD AUTO: 0.9 % — SIGNIFICANT CHANGE UP (ref 0–2)
EOSINOPHIL # BLD AUTO: 0.44 K/UL — SIGNIFICANT CHANGE UP (ref 0–0.5)
EOSINOPHIL NFR BLD AUTO: 4.3 % — SIGNIFICANT CHANGE UP (ref 0–6)
HCT VFR BLD CALC: 31.8 % — LOW (ref 34.5–45)
HGB BLD-MCNC: 9.3 G/DL — LOW (ref 11.5–15.5)
HYPOCHROMIA BLD QL: SLIGHT — SIGNIFICANT CHANGE UP
LYMPHOCYTES # BLD AUTO: 1.86 K/UL — SIGNIFICANT CHANGE UP (ref 1–3.3)
LYMPHOCYTES # BLD AUTO: 18.1 % — SIGNIFICANT CHANGE UP (ref 13–44)
LYMPHOCYTES # SPEC AUTO: 17.2 % — HIGH (ref 0–0)
MACROCYTES BLD QL: SLIGHT — SIGNIFICANT CHANGE UP
MANUAL SMEAR VERIFICATION: SIGNIFICANT CHANGE UP
MCHC RBC-ENTMCNC: 29.2 GM/DL — LOW (ref 32–36)
MCHC RBC-ENTMCNC: 29.5 PG — SIGNIFICANT CHANGE UP (ref 27–34)
MCV RBC AUTO: 101 FL — HIGH (ref 80–100)
MONOCYTES # BLD AUTO: 1.06 K/UL — HIGH (ref 0–0.9)
MONOCYTES NFR BLD AUTO: 10.3 % — SIGNIFICANT CHANGE UP (ref 2–14)
MYELOCYTES NFR BLD: 0.9 % — HIGH (ref 0–0)
NEUTROPHILS # BLD AUTO: 4.86 K/UL — SIGNIFICANT CHANGE UP (ref 1.8–7.4)
NEUTROPHILS NFR BLD AUTO: 47.4 % — SIGNIFICANT CHANGE UP (ref 43–77)
PLAT MORPH BLD: NORMAL — SIGNIFICANT CHANGE UP
PLATELET # BLD AUTO: 224 K/UL — SIGNIFICANT CHANGE UP (ref 150–400)
POLYCHROMASIA BLD QL SMEAR: SLIGHT — SIGNIFICANT CHANGE UP
RBC # BLD: 3.15 M/UL — LOW (ref 3.8–5.2)
RBC # FLD: 15.3 % — HIGH (ref 10.3–14.5)
RBC BLD AUTO: ABNORMAL
VARIANT LYMPHS # BLD: 0.9 % — SIGNIFICANT CHANGE UP (ref 0–6)
WBC # BLD: 10.25 K/UL — SIGNIFICANT CHANGE UP (ref 3.8–10.5)
WBC # FLD AUTO: 10.25 K/UL — SIGNIFICANT CHANGE UP (ref 3.8–10.5)

## 2019-09-28 RX ADMIN — OLANZAPINE 2.5 MILLIGRAM(S): 15 TABLET, FILM COATED ORAL at 22:11

## 2019-09-28 RX ADMIN — DIVALPROEX SODIUM 250 MILLIGRAM(S): 500 TABLET, DELAYED RELEASE ORAL at 05:17

## 2019-09-28 RX ADMIN — Medication 100 MILLIGRAM(S): at 05:17

## 2019-09-28 RX ADMIN — OXYCODONE HYDROCHLORIDE 5 MILLIGRAM(S): 5 TABLET ORAL at 22:11

## 2019-09-28 RX ADMIN — Medication 650 MILLIGRAM(S): at 18:00

## 2019-09-28 RX ADMIN — LITHIUM CARBONATE 300 MILLIGRAM(S): 300 TABLET, EXTENDED RELEASE ORAL at 13:47

## 2019-09-28 RX ADMIN — OXYCODONE HYDROCHLORIDE 5 MILLIGRAM(S): 5 TABLET ORAL at 22:50

## 2019-09-28 RX ADMIN — Medication 650 MILLIGRAM(S): at 17:24

## 2019-09-28 RX ADMIN — LITHIUM CARBONATE 300 MILLIGRAM(S): 300 TABLET, EXTENDED RELEASE ORAL at 22:11

## 2019-09-28 RX ADMIN — ENOXAPARIN SODIUM 40 MILLIGRAM(S): 100 INJECTION SUBCUTANEOUS at 12:10

## 2019-09-28 RX ADMIN — DIVALPROEX SODIUM 250 MILLIGRAM(S): 500 TABLET, DELAYED RELEASE ORAL at 17:22

## 2019-09-28 RX ADMIN — Medication 100 MILLIGRAM(S): at 22:11

## 2019-09-28 RX ADMIN — POLYETHYLENE GLYCOL 3350 17 GRAM(S): 17 POWDER, FOR SOLUTION ORAL at 12:09

## 2019-09-28 RX ADMIN — Medication 100 MILLIGRAM(S): at 13:47

## 2019-09-28 RX ADMIN — SENNA PLUS 2 TABLET(S): 8.6 TABLET ORAL at 22:11

## 2019-09-28 RX ADMIN — LITHIUM CARBONATE 300 MILLIGRAM(S): 300 TABLET, EXTENDED RELEASE ORAL at 05:17

## 2019-09-29 LAB
HCT VFR BLD CALC: 32.3 % — LOW (ref 34.5–45)
HGB BLD-MCNC: 9.3 G/DL — LOW (ref 11.5–15.5)
MCHC RBC-ENTMCNC: 28.8 GM/DL — LOW (ref 32–36)
MCHC RBC-ENTMCNC: 29 PG — SIGNIFICANT CHANGE UP (ref 27–34)
MCV RBC AUTO: 100.6 FL — HIGH (ref 80–100)
PLATELET # BLD AUTO: 231 K/UL — SIGNIFICANT CHANGE UP (ref 150–400)
RBC # BLD: 3.21 M/UL — LOW (ref 3.8–5.2)
RBC # FLD: 15 % — HIGH (ref 10.3–14.5)
WBC # BLD: 10.96 K/UL — HIGH (ref 3.8–10.5)
WBC # FLD AUTO: 10.96 K/UL — HIGH (ref 3.8–10.5)

## 2019-09-29 RX ADMIN — LITHIUM CARBONATE 300 MILLIGRAM(S): 300 TABLET, EXTENDED RELEASE ORAL at 13:04

## 2019-09-29 RX ADMIN — DIVALPROEX SODIUM 250 MILLIGRAM(S): 500 TABLET, DELAYED RELEASE ORAL at 17:27

## 2019-09-29 RX ADMIN — ENOXAPARIN SODIUM 40 MILLIGRAM(S): 100 INJECTION SUBCUTANEOUS at 11:43

## 2019-09-29 RX ADMIN — Medication 100 MILLIGRAM(S): at 13:04

## 2019-09-29 RX ADMIN — OLANZAPINE 2.5 MILLIGRAM(S): 15 TABLET, FILM COATED ORAL at 22:30

## 2019-09-29 RX ADMIN — DIVALPROEX SODIUM 250 MILLIGRAM(S): 500 TABLET, DELAYED RELEASE ORAL at 06:01

## 2019-09-29 RX ADMIN — LITHIUM CARBONATE 300 MILLIGRAM(S): 300 TABLET, EXTENDED RELEASE ORAL at 06:01

## 2019-09-29 RX ADMIN — Medication 100 MILLIGRAM(S): at 06:00

## 2019-09-29 RX ADMIN — LITHIUM CARBONATE 300 MILLIGRAM(S): 300 TABLET, EXTENDED RELEASE ORAL at 22:30

## 2019-09-29 RX ADMIN — POLYETHYLENE GLYCOL 3350 17 GRAM(S): 17 POWDER, FOR SOLUTION ORAL at 11:43

## 2019-09-30 ENCOUNTER — TRANSCRIPTION ENCOUNTER (OUTPATIENT)
Age: 74
End: 2019-09-30

## 2019-09-30 RX ORDER — OXYCODONE HYDROCHLORIDE 5 MG/1
5 TABLET ORAL EVERY 6 HOURS
Refills: 0 | Status: DISCONTINUED | OUTPATIENT
Start: 2019-09-30 | End: 2019-10-04

## 2019-09-30 RX ADMIN — Medication 3 MILLILITER(S): at 20:44

## 2019-09-30 RX ADMIN — LITHIUM CARBONATE 300 MILLIGRAM(S): 300 TABLET, EXTENDED RELEASE ORAL at 06:05

## 2019-09-30 RX ADMIN — LITHIUM CARBONATE 300 MILLIGRAM(S): 300 TABLET, EXTENDED RELEASE ORAL at 11:52

## 2019-09-30 RX ADMIN — ENOXAPARIN SODIUM 40 MILLIGRAM(S): 100 INJECTION SUBCUTANEOUS at 11:52

## 2019-09-30 RX ADMIN — LITHIUM CARBONATE 300 MILLIGRAM(S): 300 TABLET, EXTENDED RELEASE ORAL at 22:01

## 2019-09-30 RX ADMIN — Medication 650 MILLIGRAM(S): at 22:35

## 2019-09-30 RX ADMIN — POLYETHYLENE GLYCOL 3350 17 GRAM(S): 17 POWDER, FOR SOLUTION ORAL at 11:52

## 2019-09-30 RX ADMIN — DIVALPROEX SODIUM 250 MILLIGRAM(S): 500 TABLET, DELAYED RELEASE ORAL at 06:05

## 2019-09-30 RX ADMIN — Medication 100 MILLIGRAM(S): at 11:52

## 2019-09-30 RX ADMIN — Medication 650 MILLIGRAM(S): at 22:04

## 2019-09-30 RX ADMIN — Medication 3 MILLILITER(S): at 14:20

## 2019-09-30 RX ADMIN — SENNA PLUS 2 TABLET(S): 8.6 TABLET ORAL at 22:01

## 2019-09-30 RX ADMIN — DIVALPROEX SODIUM 250 MILLIGRAM(S): 500 TABLET, DELAYED RELEASE ORAL at 18:10

## 2019-09-30 RX ADMIN — OLANZAPINE 2.5 MILLIGRAM(S): 15 TABLET, FILM COATED ORAL at 22:01
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
## 2019-09-30 NOTE — DISCHARGE NOTE PROVIDER - CARE PROVIDER_API CALL
Javier Pettit)  Cardiovascular Disease; Internal Medicine  [address] [facility], [address]  [place] [address]  Phone: [phone]  Fax: [phone]  Follow Up Time:     Rubens Serrato)  Plastic Surgery  [address] [facility], [address]  [place] [address]  Phone: [phone]  Fax: [phone]  Follow Up Time: Javier Pettit)  Cardiovascular Disease; Internal Medicine  [address] [facility], [address]  [place] [address]  Phone: [phone]  Fax: [phone]  Follow Up Time:     Rubens Serrato)  Plastic Surgery  [address] [facility], [address]  [place] [address]  Phone: [phone]  Fax: [phone]  Follow Up Time: 1 month
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
## 2019-09-30 NOTE — DISCHARGE NOTE PROVIDER - PROVIDER TOKENS
PROVIDER:[TOKEN:[[id number]]],PROVIDER:[TOKEN:[[id number]]] PROVIDER:[TOKEN:[[id number]]],PROVIDER:[TOKEN:[[id number]],FOLLOWUP:[1 month]]

## 2019-10-01 RX ADMIN — LITHIUM CARBONATE 300 MILLIGRAM(S): 300 TABLET, EXTENDED RELEASE ORAL at 07:08

## 2019-10-01 RX ADMIN — ENOXAPARIN SODIUM 40 MILLIGRAM(S): 100 INJECTION SUBCUTANEOUS at 12:29

## 2019-10-01 RX ADMIN — SENNA PLUS 2 TABLET(S): 8.6 TABLET ORAL at 21:52

## 2019-10-01 RX ADMIN — Medication 100 MILLIGRAM(S): at 21:52

## 2019-10-01 RX ADMIN — DIVALPROEX SODIUM 250 MILLIGRAM(S): 500 TABLET, DELAYED RELEASE ORAL at 17:25

## 2019-10-01 RX ADMIN — LITHIUM CARBONATE 300 MILLIGRAM(S): 300 TABLET, EXTENDED RELEASE ORAL at 13:51

## 2019-10-01 RX ADMIN — Medication 100 MILLIGRAM(S): at 13:51

## 2019-10-01 RX ADMIN — OLANZAPINE 2.5 MILLIGRAM(S): 15 TABLET, FILM COATED ORAL at 21:52

## 2019-10-01 RX ADMIN — POLYETHYLENE GLYCOL 3350 17 GRAM(S): 17 POWDER, FOR SOLUTION ORAL at 12:29

## 2019-10-01 RX ADMIN — Medication 3 MILLILITER(S): at 07:14

## 2019-10-01 RX ADMIN — LITHIUM CARBONATE 300 MILLIGRAM(S): 300 TABLET, EXTENDED RELEASE ORAL at 21:52

## 2019-10-01 RX ADMIN — DIVALPROEX SODIUM 250 MILLIGRAM(S): 500 TABLET, DELAYED RELEASE ORAL at 07:09

## 2019-10-02 RX ADMIN — Medication 100 MILLIGRAM(S): at 22:03

## 2019-10-02 RX ADMIN — LITHIUM CARBONATE 300 MILLIGRAM(S): 300 TABLET, EXTENDED RELEASE ORAL at 05:28

## 2019-10-02 RX ADMIN — DIVALPROEX SODIUM 250 MILLIGRAM(S): 500 TABLET, DELAYED RELEASE ORAL at 17:43

## 2019-10-02 RX ADMIN — LITHIUM CARBONATE 300 MILLIGRAM(S): 300 TABLET, EXTENDED RELEASE ORAL at 22:03

## 2019-10-02 RX ADMIN — DIVALPROEX SODIUM 250 MILLIGRAM(S): 500 TABLET, DELAYED RELEASE ORAL at 05:28

## 2019-10-02 RX ADMIN — ENOXAPARIN SODIUM 40 MILLIGRAM(S): 100 INJECTION SUBCUTANEOUS at 12:14

## 2019-10-02 RX ADMIN — SENNA PLUS 2 TABLET(S): 8.6 TABLET ORAL at 22:03

## 2019-10-02 RX ADMIN — LITHIUM CARBONATE 300 MILLIGRAM(S): 300 TABLET, EXTENDED RELEASE ORAL at 13:19

## 2019-10-02 RX ADMIN — OLANZAPINE 2.5 MILLIGRAM(S): 15 TABLET, FILM COATED ORAL at 22:03

## 2019-10-02 RX ADMIN — Medication 100 MILLIGRAM(S): at 05:28

## 2019-10-02 RX ADMIN — Medication 100 MILLIGRAM(S): at 13:19

## 2019-10-03 RX ADMIN — POLYETHYLENE GLYCOL 3350 17 GRAM(S): 17 POWDER, FOR SOLUTION ORAL at 12:12

## 2019-10-03 RX ADMIN — Medication 3 MILLILITER(S): at 21:18

## 2019-10-03 RX ADMIN — LITHIUM CARBONATE 300 MILLIGRAM(S): 300 TABLET, EXTENDED RELEASE ORAL at 13:14

## 2019-10-03 RX ADMIN — ENOXAPARIN SODIUM 40 MILLIGRAM(S): 100 INJECTION SUBCUTANEOUS at 12:12

## 2019-10-03 RX ADMIN — DIVALPROEX SODIUM 250 MILLIGRAM(S): 500 TABLET, DELAYED RELEASE ORAL at 17:02

## 2019-10-03 RX ADMIN — OLANZAPINE 2.5 MILLIGRAM(S): 15 TABLET, FILM COATED ORAL at 21:16

## 2019-10-03 RX ADMIN — Medication 100 MILLIGRAM(S): at 05:12

## 2019-10-03 RX ADMIN — Medication 100 MILLIGRAM(S): at 13:14

## 2019-10-03 RX ADMIN — DIVALPROEX SODIUM 250 MILLIGRAM(S): 500 TABLET, DELAYED RELEASE ORAL at 05:12

## 2019-10-03 RX ADMIN — LITHIUM CARBONATE 300 MILLIGRAM(S): 300 TABLET, EXTENDED RELEASE ORAL at 21:16

## 2019-10-03 RX ADMIN — LITHIUM CARBONATE 300 MILLIGRAM(S): 300 TABLET, EXTENDED RELEASE ORAL at 05:12

## 2019-10-04 ENCOUNTER — TRANSCRIPTION ENCOUNTER (OUTPATIENT)
Age: 74
End: 2019-10-04

## 2019-10-04 VITALS
OXYGEN SATURATION: 92 % | TEMPERATURE: 98 F | RESPIRATION RATE: 18 BRPM | DIASTOLIC BLOOD PRESSURE: 74 MMHG | HEART RATE: 90 BPM | SYSTOLIC BLOOD PRESSURE: 123 MMHG

## 2019-10-04 PROCEDURE — 85730 THROMBOPLASTIN TIME PARTIAL: CPT

## 2019-10-04 PROCEDURE — 82435 ASSAY OF BLOOD CHLORIDE: CPT

## 2019-10-04 PROCEDURE — 97535 SELF CARE MNGMENT TRAINING: CPT

## 2019-10-04 PROCEDURE — 86803 HEPATITIS C AB TEST: CPT

## 2019-10-04 PROCEDURE — 81001 URINALYSIS AUTO W/SCOPE: CPT

## 2019-10-04 PROCEDURE — 86901 BLOOD TYPING SEROLOGIC RH(D): CPT

## 2019-10-04 PROCEDURE — 84132 ASSAY OF SERUM POTASSIUM: CPT

## 2019-10-04 PROCEDURE — 83690 ASSAY OF LIPASE: CPT

## 2019-10-04 PROCEDURE — 71045 X-RAY EXAM CHEST 1 VIEW: CPT

## 2019-10-04 PROCEDURE — 97166 OT EVAL MOD COMPLEX 45 MIN: CPT

## 2019-10-04 PROCEDURE — 97162 PT EVAL MOD COMPLEX 30 MIN: CPT

## 2019-10-04 PROCEDURE — 82947 ASSAY GLUCOSE BLOOD QUANT: CPT

## 2019-10-04 PROCEDURE — 83605 ASSAY OF LACTIC ACID: CPT

## 2019-10-04 PROCEDURE — 72125 CT NECK SPINE W/O DYE: CPT

## 2019-10-04 PROCEDURE — 85027 COMPLETE CBC AUTOMATED: CPT

## 2019-10-04 PROCEDURE — 85014 HEMATOCRIT: CPT

## 2019-10-04 PROCEDURE — 97116 GAIT TRAINING THERAPY: CPT

## 2019-10-04 PROCEDURE — 84295 ASSAY OF SERUM SODIUM: CPT

## 2019-10-04 PROCEDURE — 83735 ASSAY OF MAGNESIUM: CPT

## 2019-10-04 PROCEDURE — 80048 BASIC METABOLIC PNL TOTAL CA: CPT

## 2019-10-04 PROCEDURE — 94640 AIRWAY INHALATION TREATMENT: CPT

## 2019-10-04 PROCEDURE — 84100 ASSAY OF PHOSPHORUS: CPT

## 2019-10-04 PROCEDURE — 96374 THER/PROPH/DIAG INJ IV PUSH: CPT | Mod: XU

## 2019-10-04 PROCEDURE — 93971 EXTREMITY STUDY: CPT

## 2019-10-04 PROCEDURE — 86900 BLOOD TYPING SEROLOGIC ABO: CPT

## 2019-10-04 PROCEDURE — 85610 PROTHROMBIN TIME: CPT

## 2019-10-04 PROCEDURE — 71260 CT THORAX DX C+: CPT

## 2019-10-04 PROCEDURE — 74177 CT ABD & PELVIS W/CONTRAST: CPT

## 2019-10-04 PROCEDURE — 82330 ASSAY OF CALCIUM: CPT

## 2019-10-04 PROCEDURE — 73502 X-RAY EXAM HIP UNI 2-3 VIEWS: CPT

## 2019-10-04 PROCEDURE — 73090 X-RAY EXAM OF FOREARM: CPT

## 2019-10-04 PROCEDURE — 87086 URINE CULTURE/COLONY COUNT: CPT

## 2019-10-04 PROCEDURE — 73130 X-RAY EXAM OF HAND: CPT

## 2019-10-04 PROCEDURE — 97110 THERAPEUTIC EXERCISES: CPT

## 2019-10-04 PROCEDURE — 86850 RBC ANTIBODY SCREEN: CPT

## 2019-10-04 PROCEDURE — 76882 US LMTD JT/FCL EVL NVASC XTR: CPT

## 2019-10-04 PROCEDURE — 80053 COMPREHEN METABOLIC PANEL: CPT

## 2019-10-04 PROCEDURE — 73551 X-RAY EXAM OF FEMUR 1: CPT

## 2019-10-04 PROCEDURE — 70450 CT HEAD/BRAIN W/O DYE: CPT

## 2019-10-04 PROCEDURE — 80307 DRUG TEST PRSMV CHEM ANLYZR: CPT

## 2019-10-04 PROCEDURE — 99285 EMERGENCY DEPT VISIT HI MDM: CPT | Mod: 25

## 2019-10-04 PROCEDURE — 73560 X-RAY EXAM OF KNEE 1 OR 2: CPT

## 2019-10-04 PROCEDURE — 73110 X-RAY EXAM OF WRIST: CPT

## 2019-10-04 RX ORDER — DOCUSATE SODIUM 100 MG
1 CAPSULE ORAL
Qty: 0 | Refills: 0 | DISCHARGE
Start: 2019-10-04

## 2019-10-04 RX ORDER — POLYETHYLENE GLYCOL 3350 17 G/17G
17 POWDER, FOR SOLUTION ORAL
Qty: 0 | Refills: 0 | DISCHARGE
Start: 2019-10-04

## 2019-10-04 RX ORDER — OLANZAPINE 15 MG/1
1 TABLET, FILM COATED ORAL
Qty: 0 | Refills: 0 | DISCHARGE

## 2019-10-04 RX ORDER — OXYCODONE HYDROCHLORIDE 5 MG/1
1 TABLET ORAL
Qty: 0 | Refills: 0 | DISCHARGE
Start: 2019-10-04

## 2019-10-04 RX ORDER — LITHIUM CARBONATE 300 MG/1
1 TABLET, EXTENDED RELEASE ORAL
Qty: 0 | Refills: 0 | DISCHARGE

## 2019-10-04 RX ORDER — FLUTICASONE FUROATE AND VILANTEROL TRIFENATATE 100; 25 UG/1; UG/1
1 POWDER RESPIRATORY (INHALATION)
Qty: 0 | Refills: 0 | DISCHARGE

## 2019-10-04 RX ORDER — ENOXAPARIN SODIUM 100 MG/ML
30 INJECTION SUBCUTANEOUS
Qty: 0 | Refills: 0 | DISCHARGE
Start: 2019-10-04

## 2019-10-04 RX ORDER — ACETAMINOPHEN 500 MG
2 TABLET ORAL
Qty: 0 | Refills: 0 | DISCHARGE
Start: 2019-10-04

## 2019-10-04 RX ORDER — SENNA PLUS 8.6 MG/1
2 TABLET ORAL
Qty: 0 | Refills: 0 | DISCHARGE
Start: 2019-10-04

## 2019-10-04 RX ADMIN — Medication 100 MILLIGRAM(S): at 13:30

## 2019-10-04 RX ADMIN — DIVALPROEX SODIUM 250 MILLIGRAM(S): 500 TABLET, DELAYED RELEASE ORAL at 17:27

## 2019-10-04 RX ADMIN — POLYETHYLENE GLYCOL 3350 17 GRAM(S): 17 POWDER, FOR SOLUTION ORAL at 13:30

## 2019-10-04 RX ADMIN — DIVALPROEX SODIUM 250 MILLIGRAM(S): 500 TABLET, DELAYED RELEASE ORAL at 05:10

## 2019-10-04 RX ADMIN — ENOXAPARIN SODIUM 40 MILLIGRAM(S): 100 INJECTION SUBCUTANEOUS at 13:30

## 2019-10-04 RX ADMIN — LITHIUM CARBONATE 300 MILLIGRAM(S): 300 TABLET, EXTENDED RELEASE ORAL at 13:30

## 2019-10-04 RX ADMIN — LITHIUM CARBONATE 300 MILLIGRAM(S): 300 TABLET, EXTENDED RELEASE ORAL at 05:10

## 2019-10-04 NOTE — PROGRESS NOTE ADULT - PROBLEM SELECTOR PROBLEM 5
Bipolar disorder
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
## 2019-10-04 NOTE — PROGRESS NOTE ADULT - PROVIDER SPECIALTY LIST ADULT
Internal Medicine
Trauma Surgery
Trauma Surgery
Internal Medicine

## 2019-10-04 NOTE — PROGRESS NOTE ADULT - PROBLEM SELECTOR PROBLEM 1
Hand injury, right, initial encounter

## 2019-10-04 NOTE — PROGRESS NOTE ADULT - PROBLEM SELECTOR PLAN 4
Patient on Breo Ellipta (nonformulary) and Ventolin.

## 2019-10-04 NOTE — PROGRESS NOTE ADULT - PROBLEM SELECTOR PLAN 5
resume home meds
Clarify patient's regimen in AM with provider/family.    Per patient: takes Lithium 300 TID and Depakote 250 BID and Zyprexa 2.5 qHS  Called [facility] and spoke with Pharmacist Enrique Conde in June 2019 (90 day supply) was prescribed the following:  Lithium 300 2tabs BID, Depakote 500 mg 1 tab BID, and Zyprexa 5 mg 2 tabs qHS by Dr. Donis Rooney  Unclear if regimen has changed. Will continue with patient's said regimen for now
resume home meds

## 2019-10-04 NOTE — CHART NOTE - NSCHARTNOTEFT_GEN_A_CORE
follow up- pt is cleared by MD for discharge to rehab; discussed discharge medication/follow up.  Jessie Noble(NP)  [address] [facility], [phone]

## 2019-10-04 NOTE — PROGRESS NOTE ADULT - PROBLEM SELECTOR PLAN 1
Patient's hand placed in splint by ED  xray: cw fifth metacarpal fx  hand sx cs appreciated, no intervention, cont splint
Patient's hand placed in splint by ED  xray: cw fifth metacarpal fx  hand sx cs appreciated, no intervention, cont splint  Ortho consult per primary team
Patient's hand placed in splint by ED  xray: cw fifth metacarpal fx  hand sx cs appreciated, no intervention, cont splint 4 wks
Patient's hand placed in splint by ED  xray: cw fifth metacarpal fx  hand sx cs appreciated, no intervention, cont splint  Ortho consult per primary team
Patient's hand placed in splint by ED  xray: cw fifth metacarpal fx  hand sx cs appreciated, no intervention, cont splint

## 2019-10-04 NOTE — DISCHARGE NOTE NURSING/CASE MANAGEMENT/SOCIAL WORK - PATIENT PORTAL LINK FT
You can access the FollowMyHealth Patient Portal offered by [facility] by registering at the following website: [email or web address]. By joining [facility]’s FollowMyHealth portal, you will also be able to view your health information using other applications (apps) compatible with our system.

## 2019-10-04 NOTE — PROGRESS NOTE ADULT - PROBLEM SELECTOR PLAN 2
Trauma consult reviewed and appreciated  CT of head/Cspine/Chest/Abd/Pelvis without report of acute injuries  Patient with right leg pain and unable to ambulate in ED 2/2 pain  Notable hematoma/ecchymosis on right mid-lateral thigh.   monitor, h/h stable
Trauma consult reviewed and appreciated  CT of head/Cspine/Chest/Abd/Pelvis without report of acute injuries  Patient with right leg pain and unable to ambulate in ED 2/2 pain  monitor, h/h stable
Trauma consult reviewed and appreciated  CT of head/Cspine/Chest/Abd/Pelvis without report of acute injuries  Patient with right leg pain and unable to ambulate in ED 2/2 pain  Notable hematoma/ecchymosis on right mid-lateral thigh.   monitor, h/h stable
Trauma consult reviewed and appreciated  CT of head/Cspine/Chest/Abd/Pelvis without report of acute injuries  Patient with right leg pain and unable to ambulate in ED 2/2 pain  Notable hematoma/ecchymosis on right mid-lateral thigh.   monitor, h/h stable
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
## 2019-10-04 NOTE — PROGRESS NOTE ADULT - PROBLEM SELECTOR PLAN 3
Fall risk protocol  PT eval

## 2019-10-04 NOTE — PROGRESS NOTE ADULT - PROBLEM SELECTOR PROBLEM 3
Unsteady gait
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
## 2019-10-05 PROBLEM — C85.90 NON-HODGKIN LYMPHOMA, UNSPECIFIED, UNSPECIFIED SITE: Chronic | Status: ACTIVE | Noted: 2019-09-24

## 2020-01-19 ENCOUNTER — INPATIENT (INPATIENT)
Facility: HOSPITAL | Age: 75
LOS: 8 days | Discharge: HOME | DRG: 871 | End: 2020-01-28
Attending: INTERNAL MEDICINE | Admitting: INTERNAL MEDICINE
Payer: COMMERCIAL

## 2020-01-19 VITALS
SYSTOLIC BLOOD PRESSURE: 106 MMHG | RESPIRATION RATE: 28 BRPM | OXYGEN SATURATION: 80 % | WEIGHT: 154.98 LBS | DIASTOLIC BLOOD PRESSURE: 63 MMHG | TEMPERATURE: 101 F | HEART RATE: 127 BPM

## 2020-01-19 DIAGNOSIS — J18.9 PNEUMONIA, UNSPECIFIED ORGANISM: ICD-10-CM

## 2020-01-19 DIAGNOSIS — S49.92XA UNSPECIFIED INJURY OF LEFT SHOULDER AND UPPER ARM, INITIAL ENCOUNTER: Chronic | ICD-10-CM

## 2020-01-19 DIAGNOSIS — A41.9 SEPSIS, UNSPECIFIED ORGANISM: ICD-10-CM

## 2020-01-19 DIAGNOSIS — Z90.2 ACQUIRED ABSENCE OF LUNG [PART OF]: Chronic | ICD-10-CM

## 2020-01-19 DIAGNOSIS — C85.90 NON-HODGKIN LYMPHOMA, UNSPECIFIED, UNSPECIFIED SITE: ICD-10-CM

## 2020-01-19 DIAGNOSIS — R79.89 OTHER SPECIFIED ABNORMAL FINDINGS OF BLOOD CHEMISTRY: ICD-10-CM

## 2020-01-19 DIAGNOSIS — D64.9 ANEMIA, UNSPECIFIED: ICD-10-CM

## 2020-01-19 DIAGNOSIS — J96.01 ACUTE RESPIRATORY FAILURE WITH HYPOXIA: ICD-10-CM

## 2020-01-19 DIAGNOSIS — F31.9 BIPOLAR DISORDER, UNSPECIFIED: ICD-10-CM

## 2020-01-19 DIAGNOSIS — J45.909 UNSPECIFIED ASTHMA, UNCOMPLICATED: ICD-10-CM

## 2020-01-19 LAB
APTT BLD: 34.1 SEC — SIGNIFICANT CHANGE UP (ref 27.5–36.3)
BASOPHILS # BLD AUTO: 0 K/UL — SIGNIFICANT CHANGE UP (ref 0–0.2)
BASOPHILS NFR BLD AUTO: 0 % — SIGNIFICANT CHANGE UP (ref 0–2)
EOSINOPHIL # BLD AUTO: 0 K/UL — SIGNIFICANT CHANGE UP (ref 0–0.5)
EOSINOPHIL NFR BLD AUTO: 0 % — SIGNIFICANT CHANGE UP (ref 0–6)
GAS PNL BLDV: SIGNIFICANT CHANGE UP
GAS PNL BLDV: SIGNIFICANT CHANGE UP
HCT VFR BLD CALC: 32.5 % — LOW (ref 34.5–45)
HGB BLD-MCNC: 10 G/DL — LOW (ref 11.5–15.5)
INR BLD: 1.35 RATIO — HIGH (ref 0.88–1.16)
LYMPHOCYTES # BLD AUTO: 1.04 K/UL — SIGNIFICANT CHANGE UP (ref 1–3.3)
LYMPHOCYTES # BLD AUTO: 7 % — LOW (ref 13–44)
MCHC RBC-ENTMCNC: 29.3 PG — SIGNIFICANT CHANGE UP (ref 27–34)
MCHC RBC-ENTMCNC: 30.8 GM/DL — LOW (ref 32–36)
MCV RBC AUTO: 95.3 FL — SIGNIFICANT CHANGE UP (ref 80–100)
MONOCYTES # BLD AUTO: 0.89 K/UL — SIGNIFICANT CHANGE UP (ref 0–0.9)
MONOCYTES NFR BLD AUTO: 6 % — SIGNIFICANT CHANGE UP (ref 2–14)
NEUTROPHILS # BLD AUTO: 9.77 K/UL — HIGH (ref 1.8–7.4)
NEUTROPHILS NFR BLD AUTO: 58 % — SIGNIFICANT CHANGE UP (ref 43–77)
PLATELET # BLD AUTO: 192 K/UL — SIGNIFICANT CHANGE UP (ref 150–400)
PROTHROM AB SERPL-ACNC: 15.5 SEC — HIGH (ref 10–12.9)
RAPID RVP RESULT: SIGNIFICANT CHANGE UP
RAPID RVP RESULT: SIGNIFICANT CHANGE UP
RBC # BLD: 3.41 M/UL — LOW (ref 3.8–5.2)
RBC # FLD: 16.7 % — HIGH (ref 10.3–14.5)
WBC # BLD: 14.81 K/UL — HIGH (ref 3.8–10.5)
WBC # FLD AUTO: 14.81 K/UL — HIGH (ref 3.8–10.5)

## 2020-01-19 PROCEDURE — 99233 SBSQ HOSP IP/OBS HIGH 50: CPT | Mod: GC

## 2020-01-19 PROCEDURE — 71045 X-RAY EXAM CHEST 1 VIEW: CPT | Mod: 26

## 2020-01-19 PROCEDURE — 93308 TTE F-UP OR LMTD: CPT | Mod: 26

## 2020-01-19 PROCEDURE — 99223 1ST HOSP IP/OBS HIGH 75: CPT

## 2020-01-19 PROCEDURE — 99291 CRITICAL CARE FIRST HOUR: CPT

## 2020-01-19 RX ORDER — AZITHROMYCIN 500 MG/1
500 TABLET, FILM COATED ORAL ONCE
Refills: 0 | Status: COMPLETED | OUTPATIENT
Start: 2020-01-19 | End: 2020-01-19

## 2020-01-19 RX ORDER — IPRATROPIUM/ALBUTEROL SULFATE 18-103MCG
3 AEROSOL WITH ADAPTER (GRAM) INHALATION ONCE
Refills: 0 | Status: COMPLETED | OUTPATIENT
Start: 2020-01-19 | End: 2020-01-19

## 2020-01-19 RX ORDER — ACETAMINOPHEN 500 MG
650 TABLET ORAL EVERY 6 HOURS
Refills: 0 | Status: DISCONTINUED | OUTPATIENT
Start: 2020-01-19 | End: 2020-01-28

## 2020-01-19 RX ORDER — AZITHROMYCIN 500 MG/1
500 TABLET, FILM COATED ORAL EVERY 24 HOURS
Refills: 0 | Status: DISCONTINUED | OUTPATIENT
Start: 2020-01-19 | End: 2020-01-20

## 2020-01-19 RX ORDER — OLANZAPINE 15 MG/1
2.5 TABLET, FILM COATED ORAL AT BEDTIME
Refills: 0 | Status: DISCONTINUED | OUTPATIENT
Start: 2020-01-19 | End: 2020-01-28

## 2020-01-19 RX ORDER — BUDESONIDE AND FORMOTEROL FUMARATE DIHYDRATE 160; 4.5 UG/1; UG/1
2 AEROSOL RESPIRATORY (INHALATION)
Refills: 0 | Status: DISCONTINUED | OUTPATIENT
Start: 2020-01-19 | End: 2020-01-28

## 2020-01-19 RX ORDER — HYDROCORTISONE 20 MG
100 TABLET ORAL ONCE
Refills: 0 | Status: COMPLETED | OUTPATIENT
Start: 2020-01-19 | End: 2020-01-19

## 2020-01-19 RX ORDER — IPRATROPIUM/ALBUTEROL SULFATE 18-103MCG
3 AEROSOL WITH ADAPTER (GRAM) INHALATION EVERY 6 HOURS
Refills: 0 | Status: DISCONTINUED | OUTPATIENT
Start: 2020-01-19 | End: 2020-01-28

## 2020-01-19 RX ORDER — CEFTRIAXONE 500 MG/1
1000 INJECTION, POWDER, FOR SOLUTION INTRAMUSCULAR; INTRAVENOUS ONCE
Refills: 0 | Status: COMPLETED | OUTPATIENT
Start: 2020-01-19 | End: 2020-01-19

## 2020-01-19 RX ORDER — VANCOMYCIN HCL 1 G
1000 VIAL (EA) INTRAVENOUS EVERY 12 HOURS
Refills: 0 | Status: DISCONTINUED | OUTPATIENT
Start: 2020-01-19 | End: 2020-01-21

## 2020-01-19 RX ORDER — NOREPINEPHRINE BITARTRATE/D5W 8 MG/250ML
0.05 PLASTIC BAG, INJECTION (ML) INTRAVENOUS
Qty: 8 | Refills: 0 | Status: DISCONTINUED | OUTPATIENT
Start: 2020-01-19 | End: 2020-01-19

## 2020-01-19 RX ORDER — PIPERACILLIN AND TAZOBACTAM 4; .5 G/20ML; G/20ML
3.38 INJECTION, POWDER, LYOPHILIZED, FOR SOLUTION INTRAVENOUS ONCE
Refills: 0 | Status: COMPLETED | OUTPATIENT
Start: 2020-01-19 | End: 2020-01-19

## 2020-01-19 RX ORDER — ACETAMINOPHEN 500 MG
650 TABLET ORAL ONCE
Refills: 0 | Status: COMPLETED | OUTPATIENT
Start: 2020-01-19 | End: 2020-01-19

## 2020-01-19 RX ORDER — LITHIUM CARBONATE 300 MG/1
300 TABLET, EXTENDED RELEASE ORAL THREE TIMES A DAY
Refills: 0 | Status: DISCONTINUED | OUTPATIENT
Start: 2020-01-19 | End: 2020-01-28

## 2020-01-19 RX ORDER — PIPERACILLIN AND TAZOBACTAM 4; .5 G/20ML; G/20ML
3.38 INJECTION, POWDER, LYOPHILIZED, FOR SOLUTION INTRAVENOUS EVERY 8 HOURS
Refills: 0 | Status: DISCONTINUED | OUTPATIENT
Start: 2020-01-19 | End: 2020-01-27

## 2020-01-19 RX ORDER — SODIUM CHLORIDE 9 MG/ML
2000 INJECTION INTRAMUSCULAR; INTRAVENOUS; SUBCUTANEOUS ONCE
Refills: 0 | Status: COMPLETED | OUTPATIENT
Start: 2020-01-19 | End: 2020-01-19

## 2020-01-19 RX ORDER — DIVALPROEX SODIUM 500 MG/1
0 TABLET, DELAYED RELEASE ORAL
Qty: 0 | Refills: 0 | DISCHARGE

## 2020-01-19 RX ORDER — SODIUM CHLORIDE 9 MG/ML
500 INJECTION, SOLUTION INTRAVENOUS ONCE
Refills: 0 | Status: COMPLETED | OUTPATIENT
Start: 2020-01-19 | End: 2020-01-19

## 2020-01-19 RX ORDER — DIVALPROEX SODIUM 500 MG/1
500 TABLET, DELAYED RELEASE ORAL
Refills: 0 | Status: DISCONTINUED | OUTPATIENT
Start: 2020-01-19 | End: 2020-01-28

## 2020-01-19 RX ADMIN — DIVALPROEX SODIUM 500 MILLIGRAM(S): 500 TABLET, DELAYED RELEASE ORAL at 22:30

## 2020-01-19 RX ADMIN — Medication 650 MILLIGRAM(S): at 14:21

## 2020-01-19 RX ADMIN — Medication 3 MILLILITER(S): at 14:22

## 2020-01-19 RX ADMIN — LITHIUM CARBONATE 300 MILLIGRAM(S): 300 TABLET, EXTENDED RELEASE ORAL at 22:30

## 2020-01-19 RX ADMIN — SODIUM CHLORIDE 1000 MILLILITER(S): 9 INJECTION, SOLUTION INTRAVENOUS at 18:17

## 2020-01-19 RX ADMIN — OLANZAPINE 2.5 MILLIGRAM(S): 15 TABLET, FILM COATED ORAL at 22:30

## 2020-01-19 RX ADMIN — Medication 650 MILLIGRAM(S): at 21:33

## 2020-01-19 RX ADMIN — Medication 125 MILLIGRAM(S): at 16:12

## 2020-01-19 RX ADMIN — SODIUM CHLORIDE 2000 MILLILITER(S): 9 INJECTION INTRAMUSCULAR; INTRAVENOUS; SUBCUTANEOUS at 14:21

## 2020-01-19 RX ADMIN — CEFTRIAXONE 100 MILLIGRAM(S): 500 INJECTION, POWDER, FOR SOLUTION INTRAMUSCULAR; INTRAVENOUS at 16:12

## 2020-01-19 RX ADMIN — Medication 250 MILLIGRAM(S): at 21:36

## 2020-01-19 RX ADMIN — PIPERACILLIN AND TAZOBACTAM 200 GRAM(S): 4; .5 INJECTION, POWDER, LYOPHILIZED, FOR SOLUTION INTRAVENOUS at 18:17

## 2020-01-19 RX ADMIN — Medication 100 MILLIGRAM(S): at 18:18

## 2020-01-19 RX ADMIN — AZITHROMYCIN 250 MILLIGRAM(S): 500 TABLET, FILM COATED ORAL at 14:22
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
## 2020-01-19 NOTE — H&P ADULT - NSHPLABSRESULTS_GEN_ALL_CORE
Labs, imaging and EKG personally reviewed and interpreted by me. EKG sinus tachycardia 100s, , no acute ischemic changes.                           10.0   14.81 )-----------( 192      ( 19 Jan 2020 14:34 )             32.5     Blood Gas Venous - Lactate (01.19.20 @ 18:59)    Blood Gas Venous - Lactate: 2.1: Elevated lactate. Consider ordering follow-up lactate to trend. mmoL/L    Blood Gas Profile - Venous (01.19.20 @ 18:59)    pH, Venous: 7.37    pCO2, Venous: 48 mmHg    pO2, Venous: 44 mmHg    HCO3, Venous: 27 mmol/L    Base Excess, Venous: 1.7 mmol/L    Oxygen Saturation, Venous: 74 %    Total CO2, Venous: 28 mmol/L    Blood Gas Source Venous: Venous      PT/INR - ( 19 Jan 2020 14:34 )   PT: 15.5 sec;   INR: 1.35 ratio    PTT - ( 19 Jan 2020 14:34 )  PTT:34.1 sec    < from: Xray Chest 1 View AP/PA (01.19.20 @ 15:16) >  FINDINGS:   The size of the heart cannot be accurately assessed on this projection. The right lung is clear. Large left pleural effusion, superimposed pneumonia cannot be excluded. No pneumothorax. Partially visualized hardware along the left proximal humerus.  IMPRESSION:  Large left pleural effusion, superimposed pneumonia cannot be excluded.

## 2020-01-19 NOTE — ED PROVIDER NOTE - OBJECTIVE STATEMENT
73 yo F [place]Hx [place] [facility] p/w fevers, SOB x 1 day. Patient's family has been sick with viral URI, today she started feeling ill with SOB and fever. 73 yo F PMHx lymphoma (followed at [facility]) p/w fevers, SOB x 1 day. Patient's family has been sick with viral URI, today she started feeling ill with SOB and fever. She is not undergoing treatment for her lymphoma but states she has had recurrence of disease. No N/V, abd pain, dysuria.

## 2020-01-19 NOTE — H&P ADULT - PROBLEM SELECTOR PLAN 2
septic with fever, tachycardia with likely pulmonary source; lactate 2.1  s/p 2.5L IVF and ceftriaxone/azithro and zosyn   f/u culture data  c/w abx as above  f/u CT C/A/P   check urine legionella   viyals q4h  repeat lactate in AM  hold off on further IVF given B lines on POCUS febrile, tachycardic, elevated WBC and tachypneic on arrival, likely 2/2 PNA/respiratory illness   f/u urine and blood cultures  s/p 2.5 liters IVF, lactate 2.1 - f/u repeat lactate  hold off on further IVF 2/2 B lines noted on POCUS  f/u CT C/A/P febrile, tachycardic, elevated WBC and tachypneic on arrival, likely 2/2 PNA/respiratory illness   f/u urine and blood cultures; UA pending   s/p 2.5 liters IVF, lactate 2.1 - f/u repeat lactate  hold off on further IVF 2/2 B lines noted on POCUS  f/u CT C/A/P

## 2020-01-19 NOTE — CONSULT NOTE ADULT - SUBJECTIVE AND OBJECTIVE BOX
HPI:  74F with a MHx significant for lymphoma s/p pulmonary resection / lobectomy in 2000s (followed at [facility] [place] every 3- 6 months, never received chemo or RT per patient) initially presented on 1/19 with fevers, chills and SOB which she had been experiencing for 1-2 days. Patient's family has been sick with viral URI including her primary caretaker her son. She had a recent Hx of 'walking pneumonia' this past sumemr that required a 1-2 month hospital stay at [place]. 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 Hematology was consulted for a CBC that reported 20% blasts.    PAST MEDICAL & SURGICAL HISTORY:  Lymphoma  Asthma  Manic depression  Hyperlipidemia  GERD (gastroesophageal reflux disease)  Injury of left shoulder, initial encounter: Surgical repair with plates  History of lobectomy of lung: L  lung      Review of Systems:  Constitutional: [x] Fevers [ ] Weight changes [ ] Changes in appetite  HEENT: [ ] Visual Disturbances [x] Nasal congestion  CV: [ ] Chest pain [ ] Palpitations  Resp:  [ ] Cough [x] Shortness of breath  GI:  [ ] Nausea [ ] Vomiting [ ] Diarrhea [ ] Constipation [ ] Abd pain  : [ ] Dysuria [ ] Hematuria  Musculoskeletal: [ ] Myalgias / pain [x] Generalized Weakness [x] Edema  Skin: [ ] Rash [ ] Itch  Neurological:  [ ] Headache [ ] Dizziness [ ] Numbness  Psychiatric: [ ] Anxiety [ ] Depression [x] mood disorder  Hematologic/Lymphatic: [ ] Bleeding [ ] Enlarged Lymph Nodes  Allergic/Immunologic: [ ] Nasal discharge [ ] Hives  * Note all systems were reviewed as above; those not marked with an "x" are negative    MEDICATIONS  (STANDING):  hydrocortisone sodium succinate Injectable 100 milliGRAM(s) IV Push Once  lactated ringers Bolus 500 milliLiter(s) IV Bolus once  norepinephrine Infusion 0.05 MICROgram(s)/kG/Min (6.591 mL/Hr) IV Continuous <Continuous>  piperacillin/tazobactam IVPB. 3.375 Gram(s) IV Intermittent Once    MEDICATIONS  (PRN):      SOCIAL HISTORY: Lives with  and Son,  had recent stroke, Son is primary care taker  FAMILY HISTORY: Family history of gastric cancer      Vital Signs Last 24 Hrs  T(C): 37.4 (19 Jan 2020 16:10), Max: 38.7 (19 Jan 2020 14:10)  T(F): 99.4 (19 Jan 2020 16:10), Max: 101.7 (19 Jan 2020 14:10)  HR: 104 (19 Jan 2020 16:10) (104 - 127)  BP: 84/51 (19 Jan 2020 16:10) (84/51 - 123/74)  BP(mean): 62 (19 Jan 2020 16:10) (62 - 64)  RR: 26 (19 Jan 2020 16:10) (26 - 28)  SpO2: 92% (19 Jan 2020 16:10) (80% - 93%)    PHYSICAL EXAM:  Constitutional: well developed, on nasal canula O2  Eyes: Anicteric, erythema and scant discharge noted bilaterally  ENT: Oropharynx is unremarkable, no petechiae or masses  Neck: No anterior neck masses appreciated.   Pulmonary: blunted breath sounds in posterior left lung fields  Cardiac: RRR, no murmurs  Abdomen: Normoactive bowel sounds, soft and nontender, no hepatosplenomegaly or masses appreciated.  Lymphatic: No cervical, supraclavicular or axillary lymphadenopathy appreciated  MSK: mild lower extremity edema appreciated  Skin: normal appearance, no significant bruising or petechiae  Neurology: Grossly intact, AAOx3    LABS:                        10.0   14.81 )-----------( 192      ( 19 Jan 2020 14:34 )             32.5           PT/INR - ( 19 Jan 2020 14:34 )   PT: 15.5 sec;   INR: 1.35 ratio         PTT - ( 19 Jan 2020 14:34 )  PTT:34.1 sec        RADIOLOGIC Studies:  All recent studies reviewed if applicable - Relevant findings addressed in assessment.

## 2020-01-19 NOTE — PROGRESS NOTE ADULT - SUBJECTIVE AND OBJECTIVE BOX
CHIEF COMPLAINT: fevers/chills/ SOB   HPI:  74F with a MHx significant for lymphoma s/p pulmonary resection / lobectomy in 2000s (followed at [facility] [place] every 3- 6 months, never received chemo or RT per patient) initially presented on 1/19 with fevers, chills and SOB which she had been experiencing for 1-2 days. Patient's family has been sick with viral URI including her primary caretaker her son. She stated that her symptoms started this past Thursday (SOB/nausea/vomiting) and ever since then she has been progressively feeling worse.     In the ED she was found to be febrile to 101.7 and initially hypotensive to the 70s/40s. She received a 2L bolus of IVF and was given stress dose steroids as well as antibiotics. MICU consulted for hypotension and possible pressors.     PAST MEDICAL & SURGICAL HISTORY:  Lymphoma  Asthma  Manic depression  Hyperlipidemia  GERD (gastroesophageal reflux disease)  Injury of left shoulder, initial encounter: Surgical repair with plates  History of lobectomy of lung: L  lung      FAMILY HISTORY:  Family history of gastric cancer      SOCIAL HISTORY:    Allergies    latex (Rash)  No Known Drug Allergies    Intolerances        HOME MEDICATIONS:    REVIEW OF SYSTEMS:  Constitutional: +fevers/chills   HEENT: denies visual changes, hearing changes, rhinitis, odynophagia, or dysphagia  Cardiovascular: denies palpitations, chest pain, edema  Respiratory: denies SOB, wheezing  Gastrointestinal: +nausea/c emesis, decreased PO intake   : denies dysuria, hematuria  MSK: denies weakness, joint pain  Neuro: no numbness or tingling  Psych: no depression or anxiety  Skin: denies new rashes or masses    OBJECTIVE:  ICU Vital Signs Last 24 Hrs  T(C): 37.1 (19 Jan 2020 19:13), Max: 38.7 (19 Jan 2020 14:10)  T(F): 98.8 (19 Jan 2020 19:13), Max: 101.7 (19 Jan 2020 14:10)  HR: 83 (19 Jan 2020 19:13) (83 - 127)  BP: 97/64 (19 Jan 2020 19:13) (84/51 - 123/74)  BP(mean): 76 (19 Jan 2020 19:13) (62 - 76)  ABP: --  ABP(mean): --  RR: 22 (19 Jan 2020 19:13) (22 - 28)  SpO2: 93% (19 Jan 2020 19:13) (80% - 93%)        CAPILLARY BLOOD GLUCOSE          PHYSICAL EXAM:  Constitutional: well developed, on nasal canula O2  Eyes: Anicteric, erythema and scant discharge noted bilaterally  ENT: Oropharynx is unremarkable, no petechiae or masses  Neck: No anterior neck masses appreciated.   Pulmonary: blunted breath sounds in posterior left lung fields  Cardiac: RRR, no murmurs  Abdomen: Normoactive bowel sounds, soft and nontender, no hepatosplenomegaly or masses appreciated.  Lymphatic: No cervical, supraclavicular or axillary lymphadenopathy appreciated  MSK: mild lower extremity edema appreciated  Skin: normal appearance, no significant bruising or petechiae  Neurology: Grossly intact, AAOx3      LINES:     HOSPITAL MEDICATIONS:  Standing Meds:  norepinephrine Infusion 0.05 MICROgram(s)/kG/Min IV Continuous <Continuous>      PRN Meds:      LABS:                        10.0   14.81 )-----------( 192      ( 19 Jan 2020 14:34 )             32.5     Hgb Trend: 10.0<--        Creatinine Trend:   PT/INR - ( 19 Jan 2020 14:34 )   PT: 15.5 sec;   INR: 1.35 ratio         PTT - ( 19 Jan 2020 14:34 )  PTT:34.1 sec      Venous Blood Gas:  01-19 @ 18:59  7.37/48/44/27/74  VBG Lactate: 2.1      MICROBIOLOGY:     RADIOLOGY:  [ ] Reviewed and interpreted by me    EKG:

## 2020-01-19 NOTE — ED ADULT NURSE NOTE - OBJECTIVE STATEMENT
75 y/o female pmh LLL lobectomy, asthma, GERD, HLD, manic depression arrives to ED, c/o shortness of breath, nausea and vomiting since Thursday. Was feeling unwell today, reports having episode of vomiting yellow "bile-like" emesis. Arrives to ED tachycardic, tachypneic, febrile and short of breath in triage. Patient is a/ox4, room air sat in triage 80%, on 5L patient O2 sat between 91-93%. Patient febrile 101.7 orally. Patient states her son has been having same symptoms, no other sick contacts. BL IV 18g placed to AC, labs drawn, sent. Sinus tach on CM. No chest pain, abdomen pain, n/v/d. No urinary symptoms.

## 2020-01-19 NOTE — ED PROVIDER NOTE - NS ED ROS FT
GENERAL: +fevers, no chills, EYES: no change in vision, HEENT: no trouble swallowing or speaking, CARDIAC: no chest pain, PULMONARY: +cough, +SOB, GI: no abdominal pain, no nausea, no vomiting, no diarrhea or constipation, : No changes in urination, SKIN: no rashes, NEURO: no headache,  MSK: No joint pain ~Zaynab Rosario M.D. Resident

## 2020-01-19 NOTE — H&P ADULT - PROBLEM SELECTOR PLAN 4
pt with 20% blasts on CBC - heme/onc consulted, recs reviewed and appreciated  appears more like atypical lymphocytes rather than blasts  will need to obtain collateral from primary hematologist in AM (Real Crawford at [facility] [place] [place] [phone])  anemia work up - check hemolysis labs, iron studies, b12, folate pt with 20% blasts in CBC in setting of lymphoma, heme eval appreciated  appears to be atypical lymphocytes rather than blasts   will check flow cytometry from peripheral blood  monitor CBC

## 2020-01-19 NOTE — H&P ADULT - PROBLEM SELECTOR PLAN 3
pt hypoxic to 87% RA, likely 2/2 PNA/respiratory illness    now on HF NC at 40% FiO2, saturating well, not in resp distress   titrate O2 as needed   treat PNA as above   continuous pulse ox monitoring hypoxic on arrival requiring 5LNC, with worsening hypoxia now on HFNC at 40% FiO2  likely 2/2 sepsis/PNA and large effusion noted on CXR  will treat with abx as above  titrate O2 as needed, maintain sat >92%   pulmonary consult in AM for possible thoracentesis   continuous pulse ox monitoring

## 2020-01-19 NOTE — CONSULT NOTE ADULT - SUBJECTIVE AND OBJECTIVE BOX
CHIEF COMPLAINT: fevers/chills/ SOB   HPI:  74F with a MHx significant for lymphoma s/p pulmonary resection / lobectomy in 2000s (followed at [facility] [place] every 3- 6 months, never received chemo or RT per patient) initially presented on 1/19 with fevers, chills and SOB which she had been experiencing for 1-2 days. Patient's family has been sick with viral URI including her primary caretaker her son. She stated that her symptoms started this past Thursday (SOB/nausea/vomiting) and ever since then she has been progressively feeling worse.     In the ED she was found to be febrile to 101.7 and initially hypotensive to the 70s/40s. She received a 2L bolus of IVF and was given stress dose steroids as well as antibiotics. MICU consulted for hypotension and possible pressors.     PAST MEDICAL & SURGICAL HISTORY:  Lymphoma  Asthma  Manic depression  Hyperlipidemia  GERD (gastroesophageal reflux disease)  Injury of left shoulder, initial encounter: Surgical repair with plates  History of lobectomy of lung: L  lung      FAMILY HISTORY:  Family history of gastric cancer      SOCIAL HISTORY:    Allergies    latex (Rash)  No Known Drug Allergies    Intolerances        HOME MEDICATIONS:    REVIEW OF SYSTEMS:  Constitutional: +fevers/chills   HEENT: denies visual changes, hearing changes, rhinitis, odynophagia, or dysphagia  Cardiovascular: denies palpitations, chest pain, edema  Respiratory: denies SOB, wheezing  Gastrointestinal: +nausea/c emesis, decreased PO intake   : denies dysuria, hematuria  MSK: denies weakness, joint pain  Neuro: no numbness or tingling  Psych: no depression or anxiety  Skin: denies new rashes or masses    OBJECTIVE:  ICU Vital Signs Last 24 Hrs  T(C): 37.1 (19 Jan 2020 19:13), Max: 38.7 (19 Jan 2020 14:10)  T(F): 98.8 (19 Jan 2020 19:13), Max: 101.7 (19 Jan 2020 14:10)  HR: 83 (19 Jan 2020 19:13) (83 - 127)  BP: 97/64 (19 Jan 2020 19:13) (84/51 - 123/74)  BP(mean): 76 (19 Jan 2020 19:13) (62 - 76)  ABP: --  ABP(mean): --  RR: 22 (19 Jan 2020 19:13) (22 - 28)  SpO2: 93% (19 Jan 2020 19:13) (80% - 93%)        CAPILLARY BLOOD GLUCOSE          PHYSICAL EXAM:  Constitutional: well developed, on nasal canula O2  Eyes: Anicteric, erythema and scant discharge noted bilaterally  ENT: Oropharynx is unremarkable, no petechiae or masses  Neck: No anterior neck masses appreciated.   Pulmonary: blunted breath sounds in posterior left lung fields  Cardiac: RRR, no murmurs  Abdomen: Normoactive bowel sounds, soft and nontender, no hepatosplenomegaly or masses appreciated.  Lymphatic: No cervical, supraclavicular or axillary lymphadenopathy appreciated  MSK: mild lower extremity edema appreciated  Skin: normal appearance, no significant bruising or petechiae  Neurology: Grossly intact, AAOx3      LINES:     HOSPITAL MEDICATIONS:  Standing Meds:  norepinephrine Infusion 0.05 MICROgram(s)/kG/Min IV Continuous <Continuous>      PRN Meds:      LABS:                        10.0   14.81 )-----------( 192      ( 19 Jan 2020 14:34 )             32.5     Hgb Trend: 10.0<--        Creatinine Trend:   PT/INR - ( 19 Jan 2020 14:34 )   PT: 15.5 sec;   INR: 1.35 ratio         PTT - ( 19 Jan 2020 14:34 )  PTT:34.1 sec      Venous Blood Gas:  01-19 @ 18:59  7.37/48/44/27/74  VBG Lactate: 2.1      MICROBIOLOGY:     RADIOLOGY:  [ ] Reviewed and interpreted by me    EKG:        Assessment and Plan:   · Assessment		  74F with a MHx significant for lymphoma s/p pulmonary resection / lobectomy in 2000s (followed at [facility] [place] every 3- 6 months, never received chemo or RT per patient) initially presented on 1/19 with fevers, chills and SOB which she had been experiencing for 1-2 days. MICU consulted for hypotension likely in the setting of sepsis      Recommendations  s/p 2L Bolus, would hold of on any further fluids at this time, POCUS w/ B-lines although likely in the setting of known inflammation as opposed to fluid overload  Pleural effusion demonstrated on CXR expected given lumpectomy.  -continue with current management. Not a MICU candidate at this time. Please reconsult if clinical status changes

## 2020-01-19 NOTE — H&P ADULT - ATTENDING COMMENTS
Patient assigned to me by night hospitalist in charge for management and care for patient for this evening only. Care to be resumed by day hospitalist ([facility]) in the morning and thereafter.

## 2020-01-19 NOTE — H&P ADULT - PROBLEM SELECTOR PLAN 1
cough, SOB, septic in ED - likely PNA  CXR with large L pleural effusion, cannot r/u underlying PNA - f/u CT chest to further eval  treat broadly for now with vanco/zosyn, azithro - f/u vanco levels, monitor Cr closely   check urine legionella  given significant hypotension, will also treat with solumedrol 40mg IV BID for 5 days   Duoneb and supplemental O2 cough, SOB, septic in ED - likely PNA  CXR with large L pleural effusion, cannot r/u underlying PNA - f/u CT chest to further eval  treat broadly for now with vanco/zosyn, azithro - f/u vanco levels, monitor Cr closely   pulmonary consult for pleural effusion, ?empyema vs malignancy related, may require thoracentesis   check urine legionella  given significant hypotension, will also treat with solumedrol 40mg IV BID for 5 days   Duoneb and supplemental O2

## 2020-01-19 NOTE — ED ADULT TRIAGE NOTE - CHIEF COMPLAINT QUOTE
Short of breath, fever, wheezing since this am  Hx asthma (noncompliant with meds) and partial L lobectomy 10 yrs ago for Lymphoma

## 2020-01-19 NOTE — H&P ADULT - PROBLEM SELECTOR PLAN 7
c/w home medications - per pt report, on lithium 300 TID, Depakote 500 BID and zyprexa 2.5mg qhs c/w home medications - lithium 300 TID, Depakote 500 BID and zyprexa 2.5 qhs

## 2020-01-19 NOTE — ED PROVIDER NOTE - PROGRESS NOTE DETAILS
Attending Tennille Pettit: I received sign out. pt with fever increased wob. pt febrile, hypoxic. on my exam saturating 88%. will place on high flow. pt covered with abx. receiving IVF. ext wwp. no known h/o chf. Zaynab Rosario M.D. Resident: Pt with 21% blasts, hematology consulted, MICU consulted for persistent hypotension s/p 2 L IVF Attending Tennille Pettit: pt currently on high flow. SBP of 97. extremities wwp. pt seen by MICU as BP improving with MAP gtreater then 60 hold off on MICU at this time. if bp drops will need pressors

## 2020-01-19 NOTE — CONSULT NOTE ADULT - ASSESSMENT
74F with a MHx significant for lymphoma s/p pulmonary resection / lobectomy in 2000s (followed at [facility] [place] every 3- 6 months, never received chemo or RT per patient) initially presented on 1/19 with fevers, chills and SOB which she had been experiencing for 1-2 days. 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 Hematology was consulted for a CBC that reported 20% blasts.    Problem 1: Abnormal CBC; Hx of Lymphoma  - Hx of Lymphoma involving the lung, s/p resection > 10 yrs ago per hx, no other known therapy  - Presenting with URI signs and symptoms  - CBC with WBC 14.8 (ANC 9.77), Hb 10 (range 8.6-11.9 over last 2 years),   - Reviewed peripheral smear: reported blasts appear more like atypical lymphocytes rather than lymphoblasts, also not typical of the appearance of myeloblasts, rouleaux also noted  - At this time there is a low suspicion of an acute leukemia, however please send flow cytometry of peripheral blood to further characterize atypical cell population (green top tube)  - Please obtain records from primary hematologist is Real Crawford at [facility] [place] [phone] (office phone)  - Will follow      Discussed with Dr Sara Miller  Heme/Onc Fellow PGY5  [phone] 74F with a MHx significant for lymphoma s/p pulmonary resection / lobectomy in 2000s [place] [facility] [place], never received chemo or RT per patient) initially presented on 1/19 with fevers, chills and SOB which she had been experiencing for 1-2 days. 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 Hematology was consulted for a CBC that reported 20% blasts.    Problem 1: Abnormal CBC; Hx of Lymphoma  - Hx of Lymphoma involving the lung, s/p resection > 10 yrs ago per hx, no other known therapy  - Presenting with URI signs and symptoms  - CBC with WBC 14.8 (ANC 9.77), Hb 10 (range 8.6-11.9 over last 2 years),   - Reviewed peripheral smear: reported blasts appear more like atypical lymphocytes rather than lymphoblasts, also not typical of the appearance of myeloblasts, rouleaux also noted  - Please obtain records from primary hematologist is Real Crawford [place] [facility] [place] [phone] (office phone)  - At this time there is a low suspicion of an acute leukemia, however please send flow cytometry of peripheral blood to further characterize atypical cell population (green top tube)  - Source of anemia is unclear: please check LDH, iron studies, vit B12, folate, retic and haptoglobin  - Please check LDH  - Will follow      Discussed with Dr Sara Miller  Heme/Onc Fellow PGY5  [phone]

## 2020-01-19 NOTE — ED ADULT NURSE REASSESSMENT NOTE - NS ED NURSE REASSESS COMMENT FT1
Report received from MELONY Mar, Pt currently denying SOB. n/vomiting. Pt SPO2 96% on HiFlow NC. labs drawn and sent, VS stable. Vanco administered.

## 2020-01-19 NOTE — H&P ADULT - PROBLEM SELECTOR PLAN 9
1.  Name of PCP: Javier Pettit   2.  PCP Contacted on Admission: [ ] Y    [ ] N    3.  PCP contacted at Discharge: [ ] Y    [ ] N    [ ] N/A  4.  Post-Discharge Appointment Date and Location:  5.  Summary of Handoff given to PCP:

## 2020-01-19 NOTE — PROGRESS NOTE ADULT - ASSESSMENT
74F with a MHx significant for lymphoma s/p pulmonary resection / lobectomy in 2000s [place] [facility] [place], never received chemo or RT per patient) initially presented on 1/19 with fevers, chills and SOB which she had been experiencing for 1-2 days. MICU consulted for hypotension likely in the setting of sepsis      Recommendations  s/p 2L Bolus, would hold of on any further fluids at this time, POCUS w/ B-lines although likely in the setting of known inflammation as opposed to fluid overload  Pleural effusion demonstrated on CXR expected given lumpectomy.  -continue with current management. Not a MICU candidate at this time. Please reconsult if clinical status changes

## 2020-01-19 NOTE — CONSULT NOTE ADULT - ATTENDING COMMENTS
74F with a MHx significant for lymphoma s/p pulmonary resection / lobectomy in 2000s (followed at [facility] [place] every 3- 6 months, never received chemo or RT per patient) initially presented on 1/19 with fevers, chills and SOB which she had been experiencing for 1-2 days with nausea and vomiting for 3 days. UNclear etiology need but Blood pressure improved IN ER with current management.  Not  in need of pressors but given some Blines, and plump IVC would hold off further fluids.  - If bp is border line but patient is comfortable and asymptomatic would give midodrine 10 q8. But if maps decrease or she develops symptoms start pressors and reconsult.
This patient has a history of treated lymphoma of the lung; she received surgery and no chemotherapy. She has been under active care at [facility] for follow up of her blood cell counts which have been described as a lymphocytosis. She has had repeated pulmonary infections in the past and she is admitted for evaluation of new  pulmonary infection. Peripheral blood smear reviewed and it shows the presence of atypical lymphocytes. We will request peripheral blood flow cytometry

## 2020-01-19 NOTE — H&P ADULT - NSHPREVIEWOFSYSTEMS_GEN_ALL_CORE
REVIEW OF SYSTEMS:    CONSTITUTIONAL: +weakness, +fevers, no chills  EYES/ENT: No visual changes;  no throat pain   NECK: No pain or stiffness  RESPIRATORY: +cough, +wheezing, +shortness of breath  CARDIOVASCULAR: No chest pain or palpitations  GASTROINTESTINAL: +nausea, no vomiting, no abdominal pain, no BRBPR, no melena   GENITOURINARY: no hematuria, no dysuria  NEUROLOGICAL: no numbness, +headaches, no confusion   MUSCULOSKELETAL: no back pain, no weakness   SKIN: No rashes, or lesions   PSYCH: no anxiety, depression

## 2020-01-19 NOTE — ED PROVIDER NOTE - ATTENDING CONTRIBUTION TO CARE
Patient is a 75 yo F with history of hyperlipidemia, GERD, asthma, lymphoma s/p lobectomy of left lung in 2005, now with recurrent lymphoma since 9/2019 not on any treatment here for cough, shortness of breath since Thursday and fever today. She states her son is also sick. Denies chest pain, abdominal pain, nausea, vomiting, urinary symptoms.     VS noted  Gen: elderly, chronically ill  HEENT: EOMI, mmm  Lungs: rhonchi, scattered wheeze  CVS: tachycardic  Abd; Soft non tender, non distended   Ext: no edema  Skin: no rash  Neuro AAOx3 non focal clear speech  a/p: fever/ sob - concern for pneumonia vs flu vs viral syndrome - plan for sepsis work up/ cultures/ IVF/ Abx/ CXR and admit.   - Melanie BALLARD

## 2020-01-19 NOTE — H&P ADULT - NSHPPHYSICALEXAM_GEN_ALL_CORE
Vital Signs Last 24 Hrs  T(C): 37.1 (19 Jan 2020 19:13), Max: 38.7 (19 Jan 2020 14:10)  T(F): 98.8 (19 Jan 2020 19:13), Max: 101.7 (19 Jan 2020 14:10)  HR: 86 (19 Jan 2020 20:14) (83 - 127)  BP: 97/64 (19 Jan 2020 19:13) (84/51 - 123/74)  BP(mean): 76 (19 Jan 2020 19:13) (62 - 76)  RR: 24 (19 Jan 2020 20:14) (22 - 28)  SpO2: 93% (19 Jan 2020 20:14) (80% - 93%)    PHYSICAL EXAM:  GENERAL: NAD, ill appearing   HEAD:  Atraumatic, normocephalic  EYES: EOMI, conjunctiva and sclera clear  NECK: Supple, no JVD  CHEST/LUNG: crackles midway up L lung field, no wheezing  HEART: Regular rate and rhythm; no murmurs  ABDOMEN: Soft, nontender, nondistended; bowel sounds present  EXTREMITIES:  2+ Peripheral Pulses, no edema  PSYCH: calm affect, not anxious  NEUROLOGY: non-focal, AAOx3  SKIN: No rashes or lesions  MUSCULOSKELETAL: no back pain, moving all extremities

## 2020-01-19 NOTE — ED ADULT NURSE REASSESSMENT NOTE - NS ED NURSE REASSESS COMMENT FT1
MD aware of patient hypotension. No change in patient mental status. No chest pain, dizziness or lightheadedness noted. Awaiting interventions at this time, safety maintained.

## 2020-01-19 NOTE — H&P ADULT - HISTORY OF PRESENT ILLNESS
74F with PMH of lymphoma (dx 2005, s/p CARRIE lobectomy, relapsed in 2019 but not currently on chemo/RT, [place] [facility] [place] disorder, asthma p/w SOB and fever. Pt states she has been feeling unwell for the past 5 days with cough productive of yellow sputum and progressively worsening SOB and wheezing. Also endorses myalgias, mild headache and poor PO with nausea, but no vomiting, Denies nasal congestion or sore throat. On day of arrival, pt had subjective fevers for the first time and started feeling very SOB at both rest and exertion and felt like her lungs were tight, like her prior asthma attacks. Pt attempted to take nebulizers at home, but had difficulty with the equipment, and decided to come to ED. Pt denies any syncope, abdominal pain, no diarrhea, no blood in stool or urine, no dysuria, no rash, no LE swelling. 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

## 2020-01-19 NOTE — H&P ADULT - ASSESSMENT
74F with PMH of lymphoma (dx 2005, s/p CARRIE lobectomy, relapsed in 2019 but not currently on chemo/RT, being followed by heme every 3-6months at [facility] [place] bipolar disorder, asthma p/w SOB and fever, a/w sepsis 2/2 likely respiratory source.

## 2020-01-19 NOTE — ED PROVIDER NOTE - CLINICAL SUMMARY MEDICAL DECISION MAKING FREE TEXT BOX
75 yo F PMHx lymphoma (followed at [facility] [place]) p/w fevers, SOB x 1 day, febrile and tachycardic in ED, coarse breath sounds, likely PNA vs viral URI, will check labs, EKG, CXR, treat for sepsis, admit

## 2020-01-19 NOTE — H&P ADULT - PROBLEM SELECTOR PLAN 5
pt with relapsed lymphoma, unclear which type - not being actively treated at this time   heme/onc recs appreciated - will need to obtain collateral in AM unclear lymphoma type, per pt report, in relapse but not currently being treated   will need to obtain collateral from hematology

## 2020-01-19 NOTE — ED PROVIDER NOTE - PHYSICAL EXAMINATION
Gen: AAOx3, non-toxic  Head: NCAT  HEENT: EOMI, oral mucosa moist, normal conjunctiva  Lung: coarse breath sounds, inc WOB  CV: RRR, no murmurs, rubs or gallops  Abd: soft, NTND, no guarding  MSK: no visible deformities  Neuro: No focal sensory or motor deficits  Skin: Warm, well perfused, no rash  Psych: normal affect.   ~Zaynab Rosario M.D. Resident

## 2020-01-19 NOTE — H&P ADULT - PROBLEM SELECTOR PLAN 6
c/w Symbicort for now  kenyetta q6h standing Hb 10, appears chronic and at baseline, unclear etiology  normocytic currently   will check hemolysis labs, b12, folate, iron studies  c/t monitor

## 2020-01-20 DIAGNOSIS — J18.9 PNEUMONIA, UNSPECIFIED ORGANISM: ICD-10-CM

## 2020-01-20 DIAGNOSIS — J90 PLEURAL EFFUSION, NOT ELSEWHERE CLASSIFIED: ICD-10-CM

## 2020-01-20 LAB
ALBUMIN SERPL ELPH-MCNC: 2.6 G/DL — LOW (ref 3.3–5)
ALBUMIN SERPL ELPH-MCNC: 3 G/DL — LOW (ref 3.3–5)
ALP SERPL-CCNC: 48 U/L — SIGNIFICANT CHANGE UP (ref 40–120)
ALP SERPL-CCNC: 52 U/L — SIGNIFICANT CHANGE UP (ref 40–120)
ALT FLD-CCNC: 12 U/L — SIGNIFICANT CHANGE UP (ref 10–45)
ALT FLD-CCNC: 6 U/L — LOW (ref 10–45)
ANION GAP SERPL CALC-SCNC: 6 MMOL/L — SIGNIFICANT CHANGE UP (ref 5–17)
APPEARANCE UR: CLEAR — SIGNIFICANT CHANGE UP
AST SERPL-CCNC: 12 U/L — SIGNIFICANT CHANGE UP (ref 10–40)
AST SERPL-CCNC: 8 U/L — LOW (ref 10–40)
BILIRUB SERPL-MCNC: 0.5 MG/DL — SIGNIFICANT CHANGE UP (ref 0.2–1.2)
BILIRUB SERPL-MCNC: 0.6 MG/DL — SIGNIFICANT CHANGE UP (ref 0.2–1.2)
BILIRUB UR-MCNC: NEGATIVE — SIGNIFICANT CHANGE UP
BUN SERPL-MCNC: 19 MG/DL — SIGNIFICANT CHANGE UP (ref 7–23)
BUN SERPL-MCNC: 19 MG/DL — SIGNIFICANT CHANGE UP (ref 7–23)
CALCIUM SERPL-MCNC: 10.2 MG/DL — SIGNIFICANT CHANGE UP (ref 8.4–10.5)
CALCIUM SERPL-MCNC: 9.4 MG/DL — SIGNIFICANT CHANGE UP (ref 8.4–10.5)
CHLORIDE SERPL-SCNC: 103 MMOL/L — SIGNIFICANT CHANGE UP (ref 96–108)
CHLORIDE SERPL-SCNC: 108 MMOL/L — SIGNIFICANT CHANGE UP (ref 96–108)
CO2 SERPL-SCNC: 22 MMOL/L — SIGNIFICANT CHANGE UP (ref 22–31)
CO2 SERPL-SCNC: 28 MMOL/L — SIGNIFICANT CHANGE UP (ref 22–31)
COLOR SPEC: SIGNIFICANT CHANGE UP
CREAT SERPL-MCNC: 0.78 MG/DL — SIGNIFICANT CHANGE UP (ref 0.5–1.3)
CREAT SERPL-MCNC: 0.88 MG/DL — SIGNIFICANT CHANGE UP (ref 0.5–1.3)
DIFF PNL FLD: NEGATIVE — SIGNIFICANT CHANGE UP
GLUCOSE SERPL-MCNC: 193 MG/DL — HIGH (ref 70–99)
GLUCOSE SERPL-MCNC: 268 MG/DL — HIGH (ref 70–99)
GLUCOSE UR QL: NEGATIVE — SIGNIFICANT CHANGE UP
HAPTOGLOB SERPL-MCNC: 53 MG/DL — SIGNIFICANT CHANGE UP (ref 34–200)
HCT VFR BLD CALC: 30.6 % — LOW (ref 34.5–45)
HCT VFR BLD CALC: 31.5 % — LOW (ref 34.5–45)
HGB BLD-MCNC: 8.9 G/DL — LOW (ref 11.5–15.5)
HGB BLD-MCNC: 9.4 G/DL — LOW (ref 11.5–15.5)
KETONES UR-MCNC: NEGATIVE — SIGNIFICANT CHANGE UP
LACTATE BLDV-MCNC: 2.2 MMOL/L — HIGH (ref 0.7–2)
LACTATE SERPL-SCNC: 2 MMOL/L — SIGNIFICANT CHANGE UP (ref 0.7–2)
LEGIONELLA AG UR QL: NEGATIVE — SIGNIFICANT CHANGE UP
LEUKOCYTE ESTERASE UR-ACNC: NEGATIVE — SIGNIFICANT CHANGE UP
LITHIUM SERPL-MCNC: 0.6 MMOL/L — SIGNIFICANT CHANGE UP (ref 0.6–1.2)
MCHC RBC-ENTMCNC: 28.4 PG — SIGNIFICANT CHANGE UP (ref 27–34)
MCHC RBC-ENTMCNC: 28.8 PG — SIGNIFICANT CHANGE UP (ref 27–34)
MCHC RBC-ENTMCNC: 29.1 GM/DL — LOW (ref 32–36)
MCHC RBC-ENTMCNC: 29.8 GM/DL — LOW (ref 32–36)
MCV RBC AUTO: 96.6 FL — SIGNIFICANT CHANGE UP (ref 80–100)
MCV RBC AUTO: 97.8 FL — SIGNIFICANT CHANGE UP (ref 80–100)
MRSA PCR RESULT.: SIGNIFICANT CHANGE UP
NITRITE UR-MCNC: NEGATIVE — SIGNIFICANT CHANGE UP
NRBC # BLD: 0 /100 WBCS — SIGNIFICANT CHANGE UP (ref 0–0)
NRBC # BLD: 0 /100 WBCS — SIGNIFICANT CHANGE UP (ref 0–0)
PH UR: 6.5 — SIGNIFICANT CHANGE UP (ref 5–8)
PLATELET # BLD AUTO: 178 K/UL — SIGNIFICANT CHANGE UP (ref 150–400)
PLATELET # BLD AUTO: 181 K/UL — SIGNIFICANT CHANGE UP (ref 150–400)
POTASSIUM SERPL-MCNC: 4.2 MMOL/L — SIGNIFICANT CHANGE UP (ref 3.5–5.3)
POTASSIUM SERPL-MCNC: 4.3 MMOL/L — SIGNIFICANT CHANGE UP (ref 3.5–5.3)
POTASSIUM SERPL-SCNC: 4.2 MMOL/L — SIGNIFICANT CHANGE UP (ref 3.5–5.3)
POTASSIUM SERPL-SCNC: 4.3 MMOL/L — SIGNIFICANT CHANGE UP (ref 3.5–5.3)
PROT SERPL-MCNC: 12.4 G/DL — HIGH (ref 6–8.3)
PROT SERPL-MCNC: 13.8 G/DL — HIGH (ref 6–8.3)
PROT UR-MCNC: NEGATIVE — SIGNIFICANT CHANGE UP
RBC # BLD: 3.13 M/UL — LOW (ref 3.8–5.2)
RBC # BLD: 3.13 M/UL — LOW (ref 3.8–5.2)
RBC # BLD: 3.26 M/UL — LOW (ref 3.8–5.2)
RBC # FLD: 17 % — HIGH (ref 10.3–14.5)
RBC # FLD: 17.1 % — HIGH (ref 10.3–14.5)
RETICS #: 30.4 K/UL — SIGNIFICANT CHANGE UP (ref 25–125)
RETICS/RBC NFR: 1 % — SIGNIFICANT CHANGE UP (ref 0.5–2.5)
S AUREUS DNA NOSE QL NAA+PROBE: DETECTED
SODIUM SERPL-SCNC: 140 MMOL/L — SIGNIFICANT CHANGE UP (ref 135–145)
SODIUM SERPL-SCNC: 142 MMOL/L — SIGNIFICANT CHANGE UP (ref 135–145)
SP GR SPEC: 1.01 — LOW (ref 1.01–1.02)
UROBILINOGEN FLD QL: NEGATIVE — SIGNIFICANT CHANGE UP
WBC # BLD: 15.73 K/UL — HIGH (ref 3.8–10.5)
WBC # BLD: 16.44 K/UL — HIGH (ref 3.8–10.5)
WBC # FLD AUTO: 15.73 K/UL — HIGH (ref 3.8–10.5)
WBC # FLD AUTO: 16.44 K/UL — HIGH (ref 3.8–10.5)

## 2020-01-20 PROCEDURE — 71275 CT ANGIOGRAPHY CHEST: CPT | Mod: 26

## 2020-01-20 PROCEDURE — 74177 CT ABD & PELVIS W/CONTRAST: CPT | Mod: 26

## 2020-01-20 RX ORDER — SENNA PLUS 8.6 MG/1
2 TABLET ORAL AT BEDTIME
Refills: 0 | Status: COMPLETED | OUTPATIENT
Start: 2020-01-20 | End: 2020-01-20

## 2020-01-20 RX ADMIN — Medication 3 MILLILITER(S): at 23:23

## 2020-01-20 RX ADMIN — BUDESONIDE AND FORMOTEROL FUMARATE DIHYDRATE 2 PUFF(S): 160; 4.5 AEROSOL RESPIRATORY (INHALATION) at 17:22

## 2020-01-20 RX ADMIN — Medication 250 MILLIGRAM(S): at 11:26

## 2020-01-20 RX ADMIN — Medication 3 MILLILITER(S): at 17:22

## 2020-01-20 RX ADMIN — PIPERACILLIN AND TAZOBACTAM 25 GRAM(S): 4; .5 INJECTION, POWDER, LYOPHILIZED, FOR SOLUTION INTRAVENOUS at 01:12

## 2020-01-20 RX ADMIN — Medication 3 MILLILITER(S): at 01:08

## 2020-01-20 RX ADMIN — Medication 250 MILLIGRAM(S): at 21:26

## 2020-01-20 RX ADMIN — Medication 20 MILLIGRAM(S): at 16:21

## 2020-01-20 RX ADMIN — OLANZAPINE 2.5 MILLIGRAM(S): 15 TABLET, FILM COATED ORAL at 21:27

## 2020-01-20 RX ADMIN — DIVALPROEX SODIUM 500 MILLIGRAM(S): 500 TABLET, DELAYED RELEASE ORAL at 17:23

## 2020-01-20 RX ADMIN — Medication 3 MILLILITER(S): at 11:31

## 2020-01-20 RX ADMIN — DIVALPROEX SODIUM 500 MILLIGRAM(S): 500 TABLET, DELAYED RELEASE ORAL at 06:11

## 2020-01-20 RX ADMIN — Medication 20 MILLIGRAM(S): at 21:27

## 2020-01-20 RX ADMIN — LITHIUM CARBONATE 300 MILLIGRAM(S): 300 TABLET, EXTENDED RELEASE ORAL at 21:27

## 2020-01-20 RX ADMIN — Medication 40 MILLIGRAM(S): at 06:11

## 2020-01-20 RX ADMIN — SENNA PLUS 2 TABLET(S): 8.6 TABLET ORAL at 21:27

## 2020-01-20 RX ADMIN — LITHIUM CARBONATE 300 MILLIGRAM(S): 300 TABLET, EXTENDED RELEASE ORAL at 16:21

## 2020-01-20 RX ADMIN — BUDESONIDE AND FORMOTEROL FUMARATE DIHYDRATE 2 PUFF(S): 160; 4.5 AEROSOL RESPIRATORY (INHALATION) at 06:11

## 2020-01-20 RX ADMIN — LITHIUM CARBONATE 300 MILLIGRAM(S): 300 TABLET, EXTENDED RELEASE ORAL at 06:11

## 2020-01-20 RX ADMIN — PIPERACILLIN AND TAZOBACTAM 25 GRAM(S): 4; .5 INJECTION, POWDER, LYOPHILIZED, FOR SOLUTION INTRAVENOUS at 20:15

## 2020-01-20 RX ADMIN — Medication 3 MILLILITER(S): at 06:11

## 2020-01-20 RX ADMIN — PIPERACILLIN AND TAZOBACTAM 25 GRAM(S): 4; .5 INJECTION, POWDER, LYOPHILIZED, FOR SOLUTION INTRAVENOUS at 12:55

## 2020-01-20 NOTE — PHYSICAL THERAPY INITIAL EVALUATION ADULT - GENERAL OBSERVATIONS, REHAB EVAL
Pt received supine in bed, +primafit, +IVL, +highflow, A&Ox4, agreeable to physical therapy eval, [place] 40 min.

## 2020-01-20 NOTE — PHYSICAL THERAPY INITIAL EVALUATION ADULT - PLANNED THERAPY INTERVENTIONS, PT EVAL
strengthening/bed mobility training/GOAL: Pt will be able to Negotiate up/down 10 steps, independently, w/ unilateral rail/and appropriate assistive device, w/reciprocal/step-to gait pattern, in 2 weeks./transfer training/gait training

## 2020-01-20 NOTE — CONSULT NOTE ADULT - ASSESSMENT
74F with PMH of lymphoma (dx 2005, s/p CARRIE lobectomy, relapsed in 2019 but not currently on chemo/RT, being [place] by [place] every 3-6months [place] [facility] [place] bipolar disorder, asthma p/w SOB and fever and noted large left pleural effusion and concern for PNA

## 2020-01-20 NOTE — CONSULT NOTE ADULT - PROBLEM SELECTOR RECOMMENDATION 9
with neg RVP and low risk for legionella have stopped azithro in this context. Also ordered sputum and MRSA pcr to determine need for MRSA coverage as well as antipseudomonal coverage. Concur with continue Zosyn/Vanco until results available with target vanco troughs 15-20.  Noted large pleaural effusion so would involve pulm in case drainage required.  -will follow results of CT chest/abd /pelvis

## 2020-01-20 NOTE — PROGRESS NOTE ADULT - SUBJECTIVE AND OBJECTIVE BOX
INTERVAL HPI/OVERNIGHT EVENTS:  Patient S&E at bedside. No o/n events,     VITAL SIGNS:  T(F): 98.2 (20 @ 11:14)  HR: 847 (20 @ 11:14)  BP: 98/62 (20 @ 11:14)  RR: 19 (20 @ 11:14)  SpO2: 90% (20 @ 11:14)  Wt(kg): --    PHYSICAL EXAM:    Constitutional: NAD  Eyes: EOMI, sclera non-icteric  Neck: supple, no masses, no JVD  Respiratory: CTA b/l, good air entry b/l  Cardiovascular: RRR, no M/R/G  Gastrointestinal: soft, NTND, no masses palpable, + BS, no hepatosplenomegaly  Extremities: no c/c/e  Neurological: AAOx3      MEDICATIONS  (STANDING):  albuterol/ipratropium for Nebulization 3 milliLiter(s) Nebulizer every 6 hours  budesonide 160 MICROgram(s)/formoterol 4.5 MICROgram(s) Inhaler 2 Puff(s) Inhalation two times a day  diVALproex  milliGRAM(s) Oral two times a day  lithium 300 milliGRAM(s) Oral three times a day  methylPREDNISolone sodium succinate Injectable 20 milliGRAM(s) IV Push three times a day  OLANZapine 2.5 milliGRAM(s) Oral at bedtime  piperacillin/tazobactam IVPB.. 3.375 Gram(s) IV Intermittent every 8 hours  vancomycin  IVPB 1000 milliGRAM(s) IV Intermittent every 12 hours    MEDICATIONS  (PRN):  acetaminophen   Tablet .. 650 milliGRAM(s) Oral every 6 hours PRN Temp greater or equal to 38C (100.4F), Mild Pain (1 - 3)      Allergies    latex (Rash)  No Known Drug Allergies    Intolerances        LABS:                        9.4    15.73 )-----------( 181      ( 2020 11:41 )             31.5         142  |  108  |  19  ----------------------------<  193<H>  4.2   |  28  |  0.78    Ca    10.2      2020 11:41    TPro  12.4<H>  /  Alb  2.6<L>  /  TBili  0.6  /  DBili  x   /  AST  12  /  ALT  12  /  AlkPhos  48  -    PT/INR - ( 2020 14:34 )   PT: 15.5 sec;   INR: 1.35 ratio         PTT - ( 2020 14:34 )  PTT:34.1 sec  Urinalysis Basic - ( 2020 07:20 )    Color: Light Yellow / Appearance: Clear / S.009 / pH: x  Gluc: x / Ketone: Negative  / Bili: Negative / Urobili: Negative   Blood: x / Protein: Negative / Nitrite: Negative   Leuk Esterase: Negative / RBC: x / WBC x   Sq Epi: x / Non Sq Epi: x / Bacteria: x        RADIOLOGY & ADDITIONAL TESTS:  Studies reviewed.

## 2020-01-20 NOTE — PROGRESS NOTE ADULT - SUBJECTIVE AND OBJECTIVE BOX
Patient is a 74y old  Female who presents with a chief complaint of SOB, fever (2020 20:42)      SUBJECTIVE / OVERNIGHT EVENTS:    Patient seen and examined. states sob improved on hiflo. co dry cough. no sputum. no cp.      Vital Signs Last 24 Hrs  T(C): 36.2 (2020 05:46), Max: 38.7 (2020 14:10)  T(F): 97.2 (2020 05:46), Max: 101.7 (2020 14:10)  HR: 70 (2020 07:29) (70 - 127)  BP: 96/60 (2020 05:46) (84/51 - 123/74)  BP(mean): 76 (2020 19:13) (62 - 76)  RR: 18 (2020 07:29) (18 - 28)  SpO2: 95% (2020 07:29) (80% - 97%)  I&O's Summary    2020 07:01  -  2020 07:00  --------------------------------------------------------  IN: 0 mL / OUT: 200 mL / NET: -200 mL        PE:  GENERAL: NAD, AAOx3, hiflo  HEAD:  Atraumatic, Normocephalic  EYES: EOMI, PERRLA, conjunctiva and sclera clear  NECK: Supple, No JVD  CHEST/LUNG: decreased BS  HEART: Regular rate and rhythm; no murmur  ABDOMEN: Soft, Nontender, Nondistended; Bowel sounds present  EXTREMITIES:  2+ Peripheral Pulses, No clubbing, cyanosis, or edema  SKIN: No rashes or lesions  NEURO: No focal deficits    LABS:                        8.9    16.44 )-----------( 178      ( 2020 07:21 )             30.6     -19    140  |  103  |  19  ----------------------------<  268<H>  4.3   |  22  |  0.88    Ca    9.4      2020 23:10    TPro  13.8<H>  /  Alb  3.0<L>  /  TBili  0.5  /  DBili  x   /  AST  8<L>  /  ALT  6<L>  /  AlkPhos  52  -    PT/INR - ( 2020 14:34 )   PT: 15.5 sec;   INR: 1.35 ratio         PTT - ( 2020 14:34 )  PTT:34.1 sec  CAPILLARY BLOOD GLUCOSE            Urinalysis Basic - ( 2020 07:20 )    Color: Light Yellow / Appearance: Clear / S.009 / pH: x  Gluc: x / Ketone: Negative  / Bili: Negative / Urobili: Negative   Blood: x / Protein: Negative / Nitrite: Negative   Leuk Esterase: Negative / RBC: x / WBC x   Sq Epi: x / Non Sq Epi: x / Bacteria: x        RADIOLOGY & ADDITIONAL TESTS:    Imaging Personally Reviewed:  [x] YES  [ ] NO    Consultant(s) Notes Reviewed:  [x] YES  [ ] NO    MEDICATIONS  (STANDING):  albuterol/ipratropium for Nebulization 3 milliLiter(s) Nebulizer every 6 hours  azithromycin  IVPB 500 milliGRAM(s) IV Intermittent every 24 hours  budesonide 160 MICROgram(s)/formoterol 4.5 MICROgram(s) Inhaler 2 Puff(s) Inhalation two times a day  diVALproex  milliGRAM(s) Oral two times a day  lithium 300 milliGRAM(s) Oral three times a day  methylPREDNISolone sodium succinate Injectable 40 milliGRAM(s) IV Push two times a day  OLANZapine 2.5 milliGRAM(s) Oral at bedtime  piperacillin/tazobactam IVPB.. 3.375 Gram(s) IV Intermittent every 8 hours  vancomycin  IVPB 1000 milliGRAM(s) IV Intermittent every 12 hours    MEDICATIONS  (PRN):  acetaminophen   Tablet .. 650 milliGRAM(s) Oral every 6 hours PRN Temp greater or equal to 38C (100.4F), Mild Pain (1 - 3)      Care Discussed with Consultants/Other Providers [x] YES  [ ] NO    HEALTH ISSUES - PROBLEM Dx:  Asthma: Asthma  Bipolar disorder: Bipolar disorder  Anemia: Anemia  Lymphoma: Lymphoma  Abnormal CBC: Abnormal CBC  Acute respiratory failure with hypoxia: Acute respiratory failure with hypoxia  Sepsis: Sepsis  Community acquired pneumonia: Community acquired pneumonia

## 2020-01-20 NOTE — CONSULT NOTE ADULT - ASSESSMENT
Acute hypoxemic respiratory failure  Sepsis with hypotension  History of recurrent pulmonary lymphoma - path unknown at this time  CXR suggestive of large effusion with possibility of underlying atelectasis and/or consolidation. LIkely pulmonary source of sepsis - CT pending  Patient is currently not wheezing    REC    CT Chest pending  Will need therapeutic/diagnostic drainage if L effusion is confirmed  - likely pigtail US guided   Continue broad spectrum antibiotics for sepsis and pulmonary source in immunocompromised patient  Continue HiFlo cannula and titrate to sat > 89%  Will reduce dose of IV solumedrol and aim for rapid taper/DC if wheezing does not recurr  Continue duoneb qid
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
## 2020-01-20 NOTE — PROGRESS NOTE ADULT - ASSESSMENT
Problem 1 Lymphocytosis  The patient is seen in follow up from admission. She is now on 9 yane and a request for peripheral blood flow cytometry will be carried out to assess the abnormal lymphocytosis.  According to the patient her lymphoma may have returned and she is being observed by her hematologist oncologist at [facility].    Problem 2 Anemia  Possibly related to infection and prior lymphoma. She my have an anemia of chronic disease    Problem 2 Respiratory Infection  The patient has a community acquired pneumonia and she is responding to treatment as offered by Medicine team. Good oxygenation noted and she is on a tight fitting nasal canula to improve oxygenation

## 2020-01-20 NOTE — PHYSICAL THERAPY INITIAL EVALUATION ADULT - ADDITIONAL COMMENTS
Prior to admission pt reports being independent of all ADL's & functional mobility using SAC. Pt resides in house with spouse and son, 2 steps to enter, (+) UHR. Pt able to stay on first floor after d/c, family is modifying home. (+) driving. Pt owns grab bars, shower chair, commode and cane.

## 2020-01-20 NOTE — PROGRESS NOTE ADULT - PROBLEM SELECTOR PLAN 1
sepsis 2/2 pna  CXR with large L pleural effusion f/u CT chest to further eval  treat broadly for now with vanco/zosyn, azithro - f/u vanco levels, monitor Cr closely   pulmonary consult for pleural effusion, ?empyema vs malignancy related, may require thoracentesis   check urine legionella  given significant hypotension, will also treat with solumedrol 40mg IV BID for 5 days   Duoneb and supplemental O2

## 2020-01-20 NOTE — PROGRESS NOTE ADULT - ASSESSMENT
74F with PMH of lymphoma (dx 2005, s/p CARRIE lobectomy, relapsed in 2019 but not currently on chemo/RT, being followed by heme every 3-[facility]), bipolar disorder, asthma p/w SOB and fever, a/w sepsis 2/2 likely respiratory source.

## 2020-01-20 NOTE — PHYSICAL THERAPY INITIAL EVALUATION ADULT - PERTINENT HX OF CURRENT PROBLEM, REHAB EVAL
73 y/o F with PMH of lymphoma (dx 2005, s/p CARRIE lobectomy, relapsed in 2019 but not currently on chemo/RT, being followed by heme every 3-6months at [facility]), bipolar disorder, asthma. Pt p/w SOB and fever, a/w sepsis 2/2 likely respiratory source. US TTE: No significant  Pericardial Effusion. CXR: Large left pleural effusion, superimposed pneumonia cannot be excluded.

## 2020-01-21 DIAGNOSIS — D72.829 ELEVATED WHITE BLOOD CELL COUNT, UNSPECIFIED: ICD-10-CM

## 2020-01-21 LAB
ANION GAP SERPL CALC-SCNC: 6 MMOL/L — SIGNIFICANT CHANGE UP (ref 5–17)
BASOPHILS # BLD AUTO: 0.02 K/UL — SIGNIFICANT CHANGE UP (ref 0–0.2)
BASOPHILS NFR BLD AUTO: 0.1 % — SIGNIFICANT CHANGE UP (ref 0–2)
BUN SERPL-MCNC: 19 MG/DL — SIGNIFICANT CHANGE UP (ref 7–23)
CALCIUM SERPL-MCNC: 10 MG/DL — SIGNIFICANT CHANGE UP (ref 8.4–10.5)
CHLORIDE SERPL-SCNC: 107 MMOL/L — SIGNIFICANT CHANGE UP (ref 96–108)
CO2 SERPL-SCNC: 30 MMOL/L — SIGNIFICANT CHANGE UP (ref 22–31)
CREAT SERPL-MCNC: 0.76 MG/DL — SIGNIFICANT CHANGE UP (ref 0.5–1.3)
CULTURE RESULTS: NO GROWTH — SIGNIFICANT CHANGE UP
EOSINOPHIL # BLD AUTO: 0 K/UL — SIGNIFICANT CHANGE UP (ref 0–0.5)
EOSINOPHIL NFR BLD AUTO: 0 % — SIGNIFICANT CHANGE UP (ref 0–6)
GLUCOSE SERPL-MCNC: 113 MG/DL — HIGH (ref 70–99)
HCT VFR BLD CALC: 32.9 % — LOW (ref 34.5–45)
HGB BLD-MCNC: 9.4 G/DL — LOW (ref 11.5–15.5)
IMM GRANULOCYTES NFR BLD AUTO: 0.5 % — SIGNIFICANT CHANGE UP (ref 0–1.5)
LYMPHOCYTES # BLD AUTO: 13.2 % — SIGNIFICANT CHANGE UP (ref 13–44)
LYMPHOCYTES # BLD AUTO: 2.17 K/UL — SIGNIFICANT CHANGE UP (ref 1–3.3)
MCHC RBC-ENTMCNC: 28.5 PG — SIGNIFICANT CHANGE UP (ref 27–34)
MCHC RBC-ENTMCNC: 28.6 GM/DL — LOW (ref 32–36)
MCV RBC AUTO: 99.7 FL — SIGNIFICANT CHANGE UP (ref 80–100)
MONOCYTES # BLD AUTO: 3.75 K/UL — HIGH (ref 0–0.9)
MONOCYTES NFR BLD AUTO: 22.9 % — HIGH (ref 2–14)
NEUTROPHILS # BLD AUTO: 10.36 K/UL — HIGH (ref 1.8–7.4)
NEUTROPHILS NFR BLD AUTO: 63.3 % — SIGNIFICANT CHANGE UP (ref 43–77)
NRBC # BLD: 0 /100 WBCS — SIGNIFICANT CHANGE UP (ref 0–0)
PLATELET # BLD AUTO: 183 K/UL — SIGNIFICANT CHANGE UP (ref 150–400)
POTASSIUM SERPL-MCNC: 4.8 MMOL/L — SIGNIFICANT CHANGE UP (ref 3.5–5.3)
POTASSIUM SERPL-SCNC: 4.8 MMOL/L — SIGNIFICANT CHANGE UP (ref 3.5–5.3)
RBC # BLD: 3.3 M/UL — LOW (ref 3.8–5.2)
RBC # FLD: 17 % — HIGH (ref 10.3–14.5)
SODIUM SERPL-SCNC: 143 MMOL/L — SIGNIFICANT CHANGE UP (ref 135–145)
SPECIMEN SOURCE: SIGNIFICANT CHANGE UP
VANCOMYCIN TROUGH SERPL-MCNC: 21.8 UG/ML — HIGH (ref 10–20)
WBC # BLD: 16.38 K/UL — HIGH (ref 3.8–10.5)
WBC # FLD AUTO: 16.38 K/UL — HIGH (ref 3.8–10.5)

## 2020-01-21 PROCEDURE — 88189 FLOWCYTOMETRY/READ 16 & >: CPT

## 2020-01-21 RX ORDER — VANCOMYCIN HCL 1 G
750 VIAL (EA) INTRAVENOUS EVERY 12 HOURS
Refills: 0 | Status: DISCONTINUED | OUTPATIENT
Start: 2020-01-21 | End: 2020-01-21

## 2020-01-21 RX ADMIN — PIPERACILLIN AND TAZOBACTAM 25 GRAM(S): 4; .5 INJECTION, POWDER, LYOPHILIZED, FOR SOLUTION INTRAVENOUS at 22:58

## 2020-01-21 RX ADMIN — PIPERACILLIN AND TAZOBACTAM 25 GRAM(S): 4; .5 INJECTION, POWDER, LYOPHILIZED, FOR SOLUTION INTRAVENOUS at 11:22

## 2020-01-21 RX ADMIN — Medication 20 MILLIGRAM(S): at 13:13

## 2020-01-21 RX ADMIN — DIVALPROEX SODIUM 500 MILLIGRAM(S): 500 TABLET, DELAYED RELEASE ORAL at 18:30

## 2020-01-21 RX ADMIN — Medication 20 MILLIGRAM(S): at 22:58

## 2020-01-21 RX ADMIN — BUDESONIDE AND FORMOTEROL FUMARATE DIHYDRATE 2 PUFF(S): 160; 4.5 AEROSOL RESPIRATORY (INHALATION) at 05:21

## 2020-01-21 RX ADMIN — Medication 3 MILLILITER(S): at 05:21

## 2020-01-21 RX ADMIN — OLANZAPINE 2.5 MILLIGRAM(S): 15 TABLET, FILM COATED ORAL at 22:58

## 2020-01-21 RX ADMIN — PIPERACILLIN AND TAZOBACTAM 25 GRAM(S): 4; .5 INJECTION, POWDER, LYOPHILIZED, FOR SOLUTION INTRAVENOUS at 05:20

## 2020-01-21 RX ADMIN — LITHIUM CARBONATE 300 MILLIGRAM(S): 300 TABLET, EXTENDED RELEASE ORAL at 05:20

## 2020-01-21 RX ADMIN — Medication 3 MILLILITER(S): at 11:22

## 2020-01-21 RX ADMIN — Medication 20 MILLIGRAM(S): at 05:20

## 2020-01-21 RX ADMIN — LITHIUM CARBONATE 300 MILLIGRAM(S): 300 TABLET, EXTENDED RELEASE ORAL at 13:13

## 2020-01-21 RX ADMIN — Medication 3 MILLILITER(S): at 18:31

## 2020-01-21 RX ADMIN — DIVALPROEX SODIUM 500 MILLIGRAM(S): 500 TABLET, DELAYED RELEASE ORAL at 05:20

## 2020-01-21 RX ADMIN — LITHIUM CARBONATE 300 MILLIGRAM(S): 300 TABLET, EXTENDED RELEASE ORAL at 22:58

## 2020-01-21 RX ADMIN — BUDESONIDE AND FORMOTEROL FUMARATE DIHYDRATE 2 PUFF(S): 160; 4.5 AEROSOL RESPIRATORY (INHALATION) at 18:30

## 2020-01-21 NOTE — PROGRESS NOTE ADULT - SUBJECTIVE AND OBJECTIVE BOX
[facility], Division of Infectious Diseases  JAZMINE Méndez A. Lee  [phone]  Name: MANDI GASPAR  Age: 74y  Gender: Female  MRN: [id number]    Interval History--  Notes reviewed  states that she feels ok  dyspnea is not worse    Past Medical History--  Lymphoma  Asthma  Manic depression  Hyperlipidemia  GERD (gastroesophageal reflux disease)  Injury of left shoulder, initial encounter  History of lobectomy of lung      For details regarding the patient's social history, family history, and other miscellaneous elements, please refer the initial infectious diseases consultation and/or the admitting history and physical examination for this admission.    Allergies    latex (Rash)  No Known Drug Allergies    Intolerances        Medications--  Antibiotics:  piperacillin/tazobactam IVPB.. 3.375 Gram(s) IV Intermittent every 8 hours    Immunologic:    Other:  acetaminophen   Tablet .. PRN  albuterol/ipratropium for Nebulization  budesonide 160 MICROgram(s)/formoterol 4.5 MICROgram(s) Inhaler  diVALproex DR  lithium  methylPREDNISolone sodium succinate Injectable  OLANZapine      Review of Systems--  A 10-point review of systems was obtained.     Pertinent positives and negatives--  Constitutional: No fevers. No Chills. No Rigors.   Cardiovascular: No chest pain. No palpitations.  Respiratory: + shortness of breath. No cough.  Gastrointestinal: No nausea or vomiting. No diarrhea or constipation.   Psychiatric: Pleasant. Appropriate affect.    Review of systems otherwise negative except as previously noted.    Physical Examination--  Vital Signs: T(F): 98.5 (01-21-20 @ 16:48), Max: 98.5 (01-21-20 @ 16:48)  HR: 88 (01-21-20 @ 18:20)  BP: 107/67 (01-21-20 @ 16:48)  RR: 17 (01-21-20 @ 18:20)  SpO2: 95% (01-21-20 @ 18:20)  Wt(kg): --  General: Nontoxic-appearing Female in no acute distress.  HEENT: AT/NC. +High flow NC  Neck: Not rigid. No sense of mass.  Nodes: None palpable.  Lungs: Clear bilaterally without rales, wheezing or rhonchi  Heart: Regular rate and rhythm. No Murmur.  Abdomen: Bowel sounds present and normoactive. Soft. Nondistended. Nontender.   Back: No spinal tenderness. No costovertebral angle tenderness.   Extremities: No cyanosis or clubbing. trace edema.   Skin: Warm. Dry. Good turgor. No rash. No vasculitic stigmata.  Psychiatric: Appropriate affect and mood for situation.         Laboratory Studies--  CBC                        9.4    16.38 )-----------( 183      ( 21 Jan 2020 07:23 )             32.9       Chemistries  01-21    143  |  107  |  19  ----------------------------<  113<H>  4.8   |  30  |  0.76    Ca    10.0      21 Jan 2020 07:23    TPro  12.4<H>  /  Alb  2.6<L>  /  TBili  0.6  /  DBili  x   /  AST  12  /  ALT  12  /  AlkPhos  48  01-20      Culture Data    Culture - Urine (collected 20 Jan 2020 10:53)  Source: .Urine Clean Catch (Midstream)  Final Report (21 Jan 2020 08:18):    No growth    Culture - Blood (collected 19 Jan 2020 17:47)  Source: .Blood Blood-Peripheral  Preliminary Report (20 Jan 2020 18:01):    No growth to date.    Culture - Blood (collected 19 Jan 2020 17:47)  Source: .Blood Blood-Peripheral  Preliminary Report (20 Jan 2020 18:01):    No growth to date.          < from: CT Abdomen and Pelvis w/ IV Cont (01.20.20 @ 13:41) >    EXAM:  CT ABDOMEN AND PELVIS IC                            PROCEDURE DATE:  01/20/2020            INTERPRETATION:  CLINICAL INFORMATION: Abdominal distention.    COMPARISON: 9/24/2019    PROCEDURE:   CT of the Abdomen and Pelvis was performed with intravenous contrast.   Intravenous contrast: 90 ml Omnipaque 350. 10 ml discarded.  Oral contrast: None.  Sagittal and coronal reformats were performed.    FINDINGS:    LOWER CHEST: Consolidation/adenopathy within the left lung base, which is better visualized on CT chest from same day. Again it is probably extending into the left neuroforamen's of T10, T11 and T12.    LIVER: Normal in size and morphology. No focal lesions.  BILE DUCTS: Normal caliber.  GALLBLADDER: Contracted.  SPLEEN: Within normal limits.  PANCREAS: Within normal limits.  ADRENALS: Within normal limits.  KIDNEYS/URETERS: Symmetric enhancement. No hydronephrosis.    BLADDER: Distended.  REPRODUCTIVE ORGANS: Uterus and adnexa within normal limits.    BOWEL: No bowel obstruction.   PERITONEUM: No ascites.  VESSELS: Atherosclerotic changes.  RETROPERITONEUM/LYMPH NODES: No lymphadenopathy.    ABDOMINAL WALL: Within normal limits.  BONES: Degenerative changes.    IMPRESSION:     No acute CT evidence of abdominal pathology.    Consolidation/adenopathy within the left lung base is better visualized on CT chest from same day. Again it is probably extending into the left neural foramen is at T10, T11 and T12 levels. Further evaluation with MRI of the thoracic spine would beof benefit.        < end of copied text >    < from: CT Angio Chest w/ IV Cont (01.20.20 @ 13:41) >    EXAM:  CT ANGIO CHEST (W)AW IC                            PROCEDURE DATE:  01/20/2020            INTERPRETATION:  CLINICAL INFORMATION: Shortness of breath.    COMPARISON: CT chest, abdomen, and pelvis 9/24/2019.    PROCEDURE:   CT Angiography of the Chest was performed.  IV Contrast: 90 ml of Omnipaque 350 was injected intravenously. 10 ml were discarded.  Oral contrast: None.  Sagittal and coronal reformats were performed as well as 3D (MIP) reconstructions.    FINDINGS:    CHEST:     LUNGS AND LARGE AIRWAYS: The trachea is patent. The left mainstem bronchi is encased by the soft tissue hilar mass. Increasing dense consolidation within the left lingula and left lower lobe which is contiguous with soft tissue mass infiltrating the left hilum. Thick linear opacity representing atelectasis is noted in the right upper lobe.    PLEURA: Trace right pleural effusion.    VESSELS: No filling defects seen within the pulmonary vasculature.     HEART: Heart size is normal. No pericardial effusion.    MEDIASTINUM AND AUSTIN: 1.5 cm lymph node is present in the left internal mammary chain. Extensive adenopathy is noted within the left hilum which is surrounding the left pulmonary artery/left mainstem bronchus, left superior and inferior pulmonaryveins and portion of the descending thoracic aorta. It is also probably extending into the neuroforamen's of left T10, T11 and T12 levels.    CHEST WALL AND LOWER NECK: Within normal limits.    IMPRESSION:     There is no pulmonary embolus.     Largeleft hilar mass/adenopathy extending into the mediastinum and surrounding portion of the descending thoracic aorta as described above. The finding represents patient's known history of lymphoma.    The adenopathy is also probably extending into the left neuroforamen of T10, T11 and T12 levels. Further evaluation with MRI of the thoracic spine would be of benefit.    Left internal mammary lymph node.                      < end of copied text >      MRSA/MSSA PCR (01.20.20 @ 18:36)    MRSA PCR Result.: NotDetec: MRSA/MSSA PCR assay is a qualitative in vitro diagnostic test for the  direct detection and differentiation of methicillin-resistant  Staphylococcus aureus (MRSA) from Staphylococcus aureus (SA). The assay  detects DNA from nasal swabs in patients atrisk for nasal colonization.  It is not intended to diagnose MRSA or SA infections nor guide or monitor  treatment for MRSA/SA infections. A negative result does not preclude  nasal colonization. The assay is FDA-approved and its performance has  been established by [employer] [place] and the [facility], [place], [place].    Staph Aureus PCR Result: Detected

## 2020-01-21 NOTE — CHART NOTE - NSCHARTNOTEFT_GEN_A_CORE
In discussion with Dr. Kaufman (ID)- pt does not need to be on vancomyin as pt does not have MRSA.   Ben NP

## 2020-01-21 NOTE — PROGRESS NOTE ADULT - PROBLEM SELECTOR PLAN 1
hypoxic on arrival requiring 5LNC, with worsening hypoxia now on HFNC at 40% FiO2  likely 2/2 pulmonary involvement from lymphoma  appreciate pulm recs  continuous pulse ox monitoring  CT chest  Large left hilar mass/adenopathy extending into the mediastinum and surrounding portion of the descending thoracic aorta as described above. adenopathy is also probably extending into the left neuroforamen of T10, T11 and T12 levels. hypoxic on arrival requiring 5LNC, with worsening hypoxia now on HFNC at 40% FiO2  likely 2/2 pulmonary involvement from lymphoma  appreciate pulm recs  continuous pulse ox monitoring  CT chest  Large left hilar mass/adenopathy extending into the mediastinum and surrounding portion of the descending thoracic aorta as described above. adenopathy is also probably extending into the left neuroforamen of T10, T11 and T12 levels.  rapid taper solumedrol hypoxic on arrival requiring 5LNC, with worsening hypoxia now on HFNC at 40% FiO2  likely 2/2 pulmonary involvement from lymphoma  appreciate pulm recs  continuous pulse ox monitoring  CT chest  Large left hilar mass/adenopathy extending into the mediastinum and surrounding portion of the descending thoracic aorta as described above. adenopathy is also probably extending into the left neuroforamen of T10, T11 and T12 levels.  rapid taper solumedrol  discussed results of CT chest with patient - she does not want to fursue MRI thoracic spine here and does not want me to share results with her son and daughter. pt would like us to stabilize her so she can seek a second opinion at [place].

## 2020-01-21 NOTE — PROGRESS NOTE ADULT - ASSESSMENT
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 She ow has an atypical lymphocytes in peripheral blood and community acquired pneumonia. We are awaiting peripheral bllod flow cytometry for characterization of the lymphocytes which we anticipate may be monoclonal B cells.  I do ot feel that bronchoscopy or needle aspiration of mediastinal LN is necessary and may pose a risk for this frail patient with a disease characterized as having a benign clinical course. I discussed with her primary hematologist Dr Ritchie of [facility] regarding her case and his poster presentation at [place] 2019 of 116 cases of primary marginal zone lymphoma of the lung. Chemotherapy management has not been a mainstay of treatment effort. Dr Ritchie does not advise cytotoxic chemotherapy.  We would advise the use of steroid therapy in an effort to decrease lymphocyte number and activity Please begin prednisone 1mg/Kg  orally with PPI

## 2020-01-21 NOTE — PROGRESS NOTE ADULT - PROBLEM SELECTOR PLAN 2
cultures ntd  ID following  on empiric abx  given significant hypotension, will also treat with solumedrol 40mg IV BID for 5 days cultures ntd  ID following  on empiric abx

## 2020-01-21 NOTE — PROGRESS NOTE ADULT - SUBJECTIVE AND OBJECTIVE BOX
INTERVAL HPI/OVERNIGHT EVENTS:  Patient S&E at bedside. No o/n events,     VITAL SIGNS:  T(F): 98.1 (20 @ 14:33)  HR: 93 (20 @ 14:33)  BP: 112/72 (20 @ 14:33)  RR: 17 (20 @ 14:33)  SpO2: 97% (20 @ 11:42)  Wt(kg): --    PHYSICAL EXAM:    Constitutional: NAD  Eyes: EOMI, sclera non-icteric  Neck: supple, no masses, no JVD  Respiratory: CTA b/l, good air entry b/l  Cardiovascular: RRR, no M/R/G  Gastrointestinal: soft, NTND, no masses palpable, + BS, no hepatosplenomegaly  Extremities: no c/c/e  Neurological: AAOx3      MEDICATIONS  (STANDING):  albuterol/ipratropium for Nebulization 3 milliLiter(s) Nebulizer every 6 hours  budesonide 160 MICROgram(s)/formoterol 4.5 MICROgram(s) Inhaler 2 Puff(s) Inhalation two times a day  diVALproex  milliGRAM(s) Oral two times a day  lithium 300 milliGRAM(s) Oral three times a day  methylPREDNISolone sodium succinate Injectable 20 milliGRAM(s) IV Push three times a day  OLANZapine 2.5 milliGRAM(s) Oral at bedtime  piperacillin/tazobactam IVPB.. 3.375 Gram(s) IV Intermittent every 8 hours    MEDICATIONS  (PRN):  acetaminophen   Tablet .. 650 milliGRAM(s) Oral every 6 hours PRN Temp greater or equal to 38C (100.4F), Mild Pain (1 - 3)      Allergies    latex (Rash)  No Known Drug Allergies    Intolerances        LABS:                        9.4    16.38 )-----------( 183      ( 2020 07:23 )             32.9         143  |  107  |  19  ----------------------------<  113<H>  4.8   |  30  |  0.76    Ca    10.0      2020 07:23    TPro  12.4<H>  /  Alb  2.6<L>  /  TBili  0.6  /  DBili  x   /  AST  12  /  ALT  12  /  AlkPhos  48  -20      Urinalysis Basic - ( 2020 07:20 )    Color: Light Yellow / Appearance: Clear / S.009 / pH: x  Gluc: x / Ketone: Negative  / Bili: Negative / Urobili: Negative   Blood: x / Protein: Negative / Nitrite: Negative   Leuk Esterase: Negative / RBC: x / WBC x   Sq Epi: x / Non Sq Epi: x / Bacteria: x        RADIOLOGY & ADDITIONAL TESTS:  Studies reviewed.

## 2020-01-21 NOTE — PROGRESS NOTE ADULT - ASSESSMENT
74F with PMH of lymphoma (dx 2005, s/p CARRIE lobectomy, relapsed in 2019 but not currently on chemo/RT, [place] [facility] bipolar disorder, asthma p/w SOB and fever and noted large lefttumor and concern for post obstructive PNA

## 2020-01-21 NOTE — PROGRESS NOTE ADULT - ASSESSMENT
NHL - path pending [facility] contact and oncology f/u with bulky recurrence in mediastinum with extension into hemithorax: Tumor compressing L mainstem with subsequent post-obstructive pne;umonia in LLL and atelectasis. There is no pleural effusion on CT  ACute hypoxemeic repsiratory failure    REC    Continfue HiFlow and taper with transition to nasal cannula as tolerated  Antibiotics per ID: would favor coverage for post-obstructive process incl anaerobes and MSSA  Patient ideally would require urgent therapy for tumor bulk reduction to improve resp status if feasible  Discussed with Dr Ruiz, Oncology

## 2020-01-21 NOTE — PROGRESS NOTE ADULT - SUBJECTIVE AND OBJECTIVE BOX
Patient is a 74y old  Female who presents with a chief complaint of SOB, fever abnormal lymphocytes on perpheral blood smear (2020 12:43)      SUBJECTIVE / OVERNIGHT EVENTS:    Patient seen and examined.       Vital Signs Last 24 Hrs  T(C): 36.4 (2020 05:19), Max: 36.8 (2020 11:14)  T(F): 97.6 (2020 05:19), Max: 98.3 (2020 21:29)  HR: 70 (2020 05:51) (70 - 88)  BP: 103/67 (2020 05:19) (97/59 - 121/76)  BP(mean): --  RR: 18 (2020 05:51) (17 - 20)  SpO2: 94% (2020 05:51) (90% - 97%)  I&O's Summary    2020 07:01  -  2020 07:00  --------------------------------------------------------  IN: 1290 mL / OUT: 1350 mL / NET: -60 mL        PE:  GENERAL: NAD, AAOx3, hiflo  HEAD:  Atraumatic, Normocephalic  EYES: EOMI, PERRLA, conjunctiva and sclera clear  NECK: Supple, No JVD  CHEST/LUNG: decreased BS  HEART: Regular rate and rhythm; no murmur  ABDOMEN: Soft, Nontender, Nondistended; Bowel sounds present  EXTREMITIES:  2+ Peripheral Pulses, No clubbing, cyanosis, or edema  SKIN: No rashes or lesions  NEURO: No focal deficits    LABS:                        9.4    16.38 )-----------( 183      ( 2020 07:23 )             32.9     01-21    143  |  107  |  19  ----------------------------<  113<H>  4.8   |  30  |  0.76    Ca    10.0      2020 07:23    TPro  12.4<H>  /  Alb  2.6<L>  /  TBili  0.6  /  DBili  x   /  AST  12  /  ALT  12  /  AlkPhos  48  01-20    PT/INR - ( 2020 14:34 )   PT: 15.5 sec;   INR: 1.35 ratio         PTT - ( 2020 14:34 )  PTT:34.1 sec  CAPILLARY BLOOD GLUCOSE            Urinalysis Basic - ( 2020 07:20 )    Color: Light Yellow / Appearance: Clear / S.009 / pH: x  Gluc: x / Ketone: Negative  / Bili: Negative / Urobili: Negative   Blood: x / Protein: Negative / Nitrite: Negative   Leuk Esterase: Negative / RBC: x / WBC x   Sq Epi: x / Non Sq Epi: x / Bacteria: x        RADIOLOGY & ADDITIONAL TESTS:    Imaging Personally Reviewed:  [x] YES  [ ] NO    Consultant(s) Notes Reviewed:  [x] YES  [ ] NO    MEDICATIONS  (STANDING):  albuterol/ipratropium for Nebulization 3 milliLiter(s) Nebulizer every 6 hours  budesonide 160 MICROgram(s)/formoterol 4.5 MICROgram(s) Inhaler 2 Puff(s) Inhalation two times a day  diVALproex  milliGRAM(s) Oral two times a day  lithium 300 milliGRAM(s) Oral three times a day  methylPREDNISolone sodium succinate Injectable 20 milliGRAM(s) IV Push three times a day  OLANZapine 2.5 milliGRAM(s) Oral at bedtime  piperacillin/tazobactam IVPB.. 3.375 Gram(s) IV Intermittent every 8 hours  vancomycin  IVPB 1000 milliGRAM(s) IV Intermittent every 12 hours    MEDICATIONS  (PRN):  acetaminophen   Tablet .. 650 milliGRAM(s) Oral every 6 hours PRN Temp greater or equal to 38C (100.4F), Mild Pain (1 - 3)      Care Discussed with Consultants/Other Providers [x] YES  [ ] NO    HEALTH ISSUES - PROBLEM Dx:  Pleural effusion, left: Pleural effusion, left  Pneumonia: Pneumonia  Asthma: Asthma  Bipolar disorder: Bipolar disorder  Anemia: Anemia  Lymphoma: Lymphoma  Abnormal CBC: Abnormal CBC  Acute respiratory failure with hypoxia: Acute respiratory failure with hypoxia  Sepsis: Sepsis  Community acquired pneumonia: Community acquired pneumonia Patient is a 74y old  Female who presents with a chief complaint of SOB, fever abnormal lymphocytes on perpheral blood smear (2020 12:43)      SUBJECTIVE / OVERNIGHT EVENTS:    Patient seen and examined. pt states she feels like food is getting stuck in her chest. denies cp sob. still on hiflo.      Vital Signs Last 24 Hrs  T(C): 36.4 (2020 05:19), Max: 36.8 (2020 11:14)  T(F): 97.6 (2020 05:19), Max: 98.3 (2020 21:29)  HR: 70 (2020 05:51) (70 - 88)  BP: 103/67 (2020 05:19) (97/59 - 121/76)  BP(mean): --  RR: 18 (2020 05:51) (17 - 20)  SpO2: 94% (2020 05:51) (90% - 97%)  I&O's Summary    2020 07:01  -  2020 07:00  --------------------------------------------------------  IN: 1290 mL / OUT: 1350 mL / NET: -60 mL        PE:  GENERAL: NAD, AAOx3, hiflo  HEAD:  Atraumatic, Normocephalic  EYES: EOMI, PERRLA, conjunctiva and sclera clear  NECK: Supple, No JVD  CHEST/LUNG: decreased BS  HEART: Regular rate and rhythm; no murmur  ABDOMEN: Soft, Nontender, Nondistended; Bowel sounds present  EXTREMITIES:  2+ Peripheral Pulses, No clubbing, cyanosis, or edema  SKIN: No rashes or lesions  NEURO: No focal deficits    LABS:                        9.4    16.38 )-----------( 183      ( 2020 07:23 )             32.9     -    143  |  107  |  19  ----------------------------<  113<H>  4.8   |  30  |  0.76    Ca    10.0      2020 07:23    TPro  12.4<H>  /  Alb  2.6<L>  /  TBili  0.6  /  DBili  x   /  AST  12  /  ALT  12  /  AlkPhos  48  01-20    PT/INR - ( 2020 14:34 )   PT: 15.5 sec;   INR: 1.35 ratio         PTT - ( 2020 14:34 )  PTT:34.1 sec  CAPILLARY BLOOD GLUCOSE            Urinalysis Basic - ( 2020 07:20 )    Color: Light Yellow / Appearance: Clear / S.009 / pH: x  Gluc: x / Ketone: Negative  / Bili: Negative / Urobili: Negative   Blood: x / Protein: Negative / Nitrite: Negative   Leuk Esterase: Negative / RBC: x / WBC x   Sq Epi: x / Non Sq Epi: x / Bacteria: x        RADIOLOGY & ADDITIONAL TESTS:    Imaging Personally Reviewed:  [x] YES  [ ] NO    Consultant(s) Notes Reviewed:  [x] YES  [ ] NO    MEDICATIONS  (STANDING):  albuterol/ipratropium for Nebulization 3 milliLiter(s) Nebulizer every 6 hours  budesonide 160 MICROgram(s)/formoterol 4.5 MICROgram(s) Inhaler 2 Puff(s) Inhalation two times a day  diVALproex  milliGRAM(s) Oral two times a day  lithium 300 milliGRAM(s) Oral three times a day  methylPREDNISolone sodium succinate Injectable 20 milliGRAM(s) IV Push three times a day  OLANZapine 2.5 milliGRAM(s) Oral at bedtime  piperacillin/tazobactam IVPB.. 3.375 Gram(s) IV Intermittent every 8 hours  vancomycin  IVPB 1000 milliGRAM(s) IV Intermittent every 12 hours    MEDICATIONS  (PRN):  acetaminophen   Tablet .. 650 milliGRAM(s) Oral every 6 hours PRN Temp greater or equal to 38C (100.4F), Mild Pain (1 - 3)      Care Discussed with Consultants/Other Providers [x] YES  [ ] NO    HEALTH ISSUES - PROBLEM Dx:  Pleural effusion, left: Pleural effusion, left  Pneumonia: Pneumonia  Asthma: Asthma  Bipolar disorder: Bipolar disorder  Anemia: Anemia  Lymphoma: Lymphoma  Abnormal CBC: Abnormal CBC  Acute respiratory failure with hypoxia: Acute respiratory failure with hypoxia  Sepsis: Sepsis  Community acquired pneumonia: Community acquired pneumonia

## 2020-01-21 NOTE — PROGRESS NOTE ADULT - SUBJECTIVE AND OBJECTIVE BOX
Follow-up Pulm Progress Note  George Alba MD  [phone]    CT CHest reviewed: Bulky mediastinal tumor c/w lyphmona with compression of L main stem bronchus and LLL atelectasis with consolidation. Some aeration of CARRIE. NO Pleural effusion: opacity is tumor and atelectasis  Patient somewhat improved clinically: afebrile on Vanco/Zosyn  + MSSA PCR  FIO2 requirements on HiFlo decreased to 40% with O2 sat at bedside 94%  Appears to be breathing comfortably    Medications:  Vital Signs Last 24 Hrs  T(C): 36.3 (21 Jan 2020 11:42), Max: 36.8 (20 Jan 2020 21:29)  T(F): 97.4 (21 Jan 2020 11:42), Max: 98.3 (20 Jan 2020 21:29)  HR: 96 (21 Jan 2020 11:42) (70 - 96)  BP: 98/61 (21 Jan 2020 11:42) (97/59 - 121/76)  BP(mean): --  RR: 17 (21 Jan 2020 11:42) (17 - 19)  SpO2: 97% (21 Jan 2020 11:42) (92% - 97%)      01-20 @ 07:01  -  01-21 @ 07:00  --------------------------------------------------------  IN: 1290 mL / OUT: 1350 mL / NET: -60 mL        LABS:                        9.4    16.38 )-----------( 183      ( 21 Jan 2020 07:23 )             32.9     01-21    143  |  107  |  19  ----------------------------<  113<H>  4.8   |  30  |  0.76    Ca    10.0      21 Jan 2020 07:23    TPro  12.4<H>  /  Alb  2.6<L>  /  TBili  0.6  /  DBili  x   /  AST  12  /  ALT  12  /  AlkPhos  48  01-20      PT/INR - ( 19 Jan 2020 14:34 )   PT: 15.5 sec;   INR: 1.35 ratio         PTT - ( 19 Jan 2020 14:34 )  PTT:34.1 sec        CULTURES:  Culture Results:   No growth (01-20 @ 10:53)  Culture Results:   No growth to date. (01-19 @ 17:47)  Culture Results:   No growth to date. (01-19 @ 17:47)    Most recent blood culture -- 01-20 @ 10:53   -- -- .Urine Clean Catch (Midstream) 01-20 @ 10:53  Most recent blood culture -- 01-19 @ 17:47   -- -- .Blood Blood-Peripheral 01-19 @ 17:47      Physical Examination:  PULM:   CVS: Regular rate and rhythm, no murmurs, rubs, or gallops  ABD: Soft, non-tender  EXT:  No clubbing, cyanosis, or edema    RADIOLOGY REVIEWED  CXR:    CT chest:    TTE: Follow-up Pulm Progress Note  George Alba MD  [phone]    CT CHest reviewed: Bulky mediastinal tumor c/w lyphmona with compression of L main stem bronchus and LLL atelectasis with consolidation. Some aeration of CARRIE. NO Pleural effusion: opacity is tumor and atelectasis  Patient somewhat improved clinically: afebrile on Vanco/Zosyn  + MSSA PCR  FIO2 requirements on HiFlo decreased to 40% with O2 sat at bedside 94%  Appears to be breathing comfortably    Medications:  Vital Signs Last 24 Hrs  T(C): 36.3 (21 Jan 2020 11:42), Max: 36.8 (20 Jan 2020 21:29)  T(F): 97.4 (21 Jan 2020 11:42), Max: 98.3 (20 Jan 2020 21:29)  HR: 96 (21 Jan 2020 11:42) (70 - 96)  BP: 98/61 (21 Jan 2020 11:42) (97/59 - 121/76)  BP(mean): --  RR: 17 (21 Jan 2020 11:42) (17 - 19)  SpO2: 97% (21 Jan 2020 11:42) (92% - 97%)      01-20 @ 07:01  -  01-21 @ 07:00  --------------------------------------------------------  IN: 1290 mL / OUT: 1350 mL / NET: -60 mL        LABS:                        9.4    16.38 )-----------( 183      ( 21 Jan 2020 07:23 )             32.9     01-21    143  |  107  |  19  ----------------------------<  113<H>  4.8   |  30  |  0.76    Ca    10.0      21 Jan 2020 07:23    TPro  12.4<H>  /  Alb  2.6<L>  /  TBili  0.6  /  DBili  x   /  AST  12  /  ALT  12  /  AlkPhos  48  01-20      PT/INR - ( 19 Jan 2020 14:34 )   PT: 15.5 sec;   INR: 1.35 ratio         PTT - ( 19 Jan 2020 14:34 )  PTT:34.1 sec        CULTURES:  Culture Results:   No growth (01-20 @ 10:53)  Culture Results:   No growth to date. (01-19 @ 17:47)  Culture Results:   No growth to date. (01-19 @ 17:47)    Most recent blood culture -- 01-20 @ 10:53   -- -- .Urine Clean Catch (Midstream) 01-20 @ 10:53  Most recent blood culture -- 01-19 @ 17:47   -- -- .Blood Blood-Peripheral 01-19 @ 17:47      Physical Examination:  PULM: Dullness/diminshed BS L; R basilar crackles; no wheeze or rhonchi  CVS: Regular rate and rhythm, no murmurs, rubs, or gallops  ABD: Soft, non-tender  EXT:  No clubbing, cyanosis, or edema    RADIOLOGY REVIEWED  CXR:    CT chest:    TTE:

## 2020-01-21 NOTE — PROGRESS NOTE ADULT - ASSESSMENT
74F with PMH of lymphoma (dx 2005, s/p CARRIE lobectomy, relapsed in 2019 but not currently on chemo/RT, [place] [facility] [place] disorder, asthma p/w SOB and fever, a/w sepsis 2/2 likely respiratory source.

## 2020-01-22 DIAGNOSIS — K59.00 CONSTIPATION, UNSPECIFIED: ICD-10-CM

## 2020-01-22 LAB
ANION GAP SERPL CALC-SCNC: 8 MMOL/L — SIGNIFICANT CHANGE UP (ref 5–17)
BUN SERPL-MCNC: 18 MG/DL — SIGNIFICANT CHANGE UP (ref 7–23)
CALCIUM SERPL-MCNC: 10.2 MG/DL — SIGNIFICANT CHANGE UP (ref 8.4–10.5)
CHLORIDE SERPL-SCNC: 103 MMOL/L — SIGNIFICANT CHANGE UP (ref 96–108)
CO2 SERPL-SCNC: 31 MMOL/L — SIGNIFICANT CHANGE UP (ref 22–31)
CREAT SERPL-MCNC: 0.76 MG/DL — SIGNIFICANT CHANGE UP (ref 0.5–1.3)
GLUCOSE SERPL-MCNC: 103 MG/DL — HIGH (ref 70–99)
HCT VFR BLD CALC: 32.5 % — LOW (ref 34.5–45)
HGB BLD-MCNC: 9.5 G/DL — LOW (ref 11.5–15.5)
MCHC RBC-ENTMCNC: 28.9 PG — SIGNIFICANT CHANGE UP (ref 27–34)
MCHC RBC-ENTMCNC: 29.2 GM/DL — LOW (ref 32–36)
MCV RBC AUTO: 98.8 FL — SIGNIFICANT CHANGE UP (ref 80–100)
NRBC # BLD: 0 /100 WBCS — SIGNIFICANT CHANGE UP (ref 0–0)
PLATELET # BLD AUTO: 193 K/UL — SIGNIFICANT CHANGE UP (ref 150–400)
POTASSIUM SERPL-MCNC: 5 MMOL/L — SIGNIFICANT CHANGE UP (ref 3.5–5.3)
POTASSIUM SERPL-SCNC: 5 MMOL/L — SIGNIFICANT CHANGE UP (ref 3.5–5.3)
RBC # BLD: 3.29 M/UL — LOW (ref 3.8–5.2)
RBC # FLD: 16.9 % — HIGH (ref 10.3–14.5)
SODIUM SERPL-SCNC: 142 MMOL/L — SIGNIFICANT CHANGE UP (ref 135–145)
TM INTERPRETATION: SIGNIFICANT CHANGE UP
WBC # BLD: 12.26 K/UL — HIGH (ref 3.8–10.5)
WBC # FLD AUTO: 12.26 K/UL — HIGH (ref 3.8–10.5)

## 2020-01-22 PROCEDURE — 71045 X-RAY EXAM CHEST 1 VIEW: CPT | Mod: 26

## 2020-01-22 PROCEDURE — 99233 SBSQ HOSP IP/OBS HIGH 50: CPT | Mod: GC

## 2020-01-22 RX ORDER — LACTULOSE 10 G/15ML
20 SOLUTION ORAL ONCE
Refills: 0 | Status: COMPLETED | OUTPATIENT
Start: 2020-01-22 | End: 2020-01-22

## 2020-01-22 RX ADMIN — OLANZAPINE 2.5 MILLIGRAM(S): 15 TABLET, FILM COATED ORAL at 21:10

## 2020-01-22 RX ADMIN — PIPERACILLIN AND TAZOBACTAM 25 GRAM(S): 4; .5 INJECTION, POWDER, LYOPHILIZED, FOR SOLUTION INTRAVENOUS at 06:19

## 2020-01-22 RX ADMIN — Medication 3 MILLILITER(S): at 00:12

## 2020-01-22 RX ADMIN — LACTULOSE 20 GRAM(S): 10 SOLUTION ORAL at 15:58

## 2020-01-22 RX ADMIN — LITHIUM CARBONATE 300 MILLIGRAM(S): 300 TABLET, EXTENDED RELEASE ORAL at 06:19

## 2020-01-22 RX ADMIN — LITHIUM CARBONATE 300 MILLIGRAM(S): 300 TABLET, EXTENDED RELEASE ORAL at 21:10

## 2020-01-22 RX ADMIN — DIVALPROEX SODIUM 500 MILLIGRAM(S): 500 TABLET, DELAYED RELEASE ORAL at 06:19

## 2020-01-22 RX ADMIN — LITHIUM CARBONATE 300 MILLIGRAM(S): 300 TABLET, EXTENDED RELEASE ORAL at 13:34

## 2020-01-22 RX ADMIN — Medication 3 MILLILITER(S): at 06:18

## 2020-01-22 RX ADMIN — Medication 20 MILLIGRAM(S): at 21:09

## 2020-01-22 RX ADMIN — DIVALPROEX SODIUM 500 MILLIGRAM(S): 500 TABLET, DELAYED RELEASE ORAL at 17:47

## 2020-01-22 RX ADMIN — Medication 3 MILLILITER(S): at 17:47

## 2020-01-22 RX ADMIN — Medication 3 MILLILITER(S): at 11:30

## 2020-01-22 RX ADMIN — Medication 3 MILLILITER(S): at 23:42

## 2020-01-22 RX ADMIN — Medication 20 MILLIGRAM(S): at 06:19

## 2020-01-22 RX ADMIN — Medication 20 MILLIGRAM(S): at 13:34

## 2020-01-22 RX ADMIN — PIPERACILLIN AND TAZOBACTAM 25 GRAM(S): 4; .5 INJECTION, POWDER, LYOPHILIZED, FOR SOLUTION INTRAVENOUS at 21:10

## 2020-01-22 RX ADMIN — BUDESONIDE AND FORMOTEROL FUMARATE DIHYDRATE 2 PUFF(S): 160; 4.5 AEROSOL RESPIRATORY (INHALATION) at 06:19

## 2020-01-22 RX ADMIN — BUDESONIDE AND FORMOTEROL FUMARATE DIHYDRATE 2 PUFF(S): 160; 4.5 AEROSOL RESPIRATORY (INHALATION) at 17:47

## 2020-01-22 RX ADMIN — Medication 10 MILLIGRAM(S): at 00:11

## 2020-01-22 RX ADMIN — PIPERACILLIN AND TAZOBACTAM 25 GRAM(S): 4; .5 INJECTION, POWDER, LYOPHILIZED, FOR SOLUTION INTRAVENOUS at 13:34

## 2020-01-22 NOTE — PROGRESS NOTE ADULT - SUBJECTIVE AND OBJECTIVE BOX
Patient is a 74y old  Female who presents with a chief complaint of SOB, fever (22 Jan 2020 09:29)      SUBJECTIVE / OVERNIGHT EVENTS:    Patient seen and examined. states she feels better. denies cp sob. on hiflo.      Vital Signs Last 24 Hrs  T(C): 36.8 (22 Jan 2020 09:15), Max: 36.9 (21 Jan 2020 16:48)  T(F): 98.2 (22 Jan 2020 09:15), Max: 98.5 (21 Jan 2020 16:48)  HR: 74 (22 Jan 2020 09:27) (72 - 96)  BP: 101/69 (22 Jan 2020 09:15) (98/61 - 112/72)  BP(mean): --  RR: 20 (22 Jan 2020 09:27) (17 - 20)  SpO2: 95% (22 Jan 2020 09:27) (92% - 97%)  I&O's Summary    21 Jan 2020 07:01  -  22 Jan 2020 07:00  --------------------------------------------------------  IN: 1310 mL / OUT: 1000 mL / NET: 310 mL        PE:  GENERAL: NAD, AAOx3, hiflo  HEAD:  Atraumatic, Normocephalic  EYES: EOMI, PERRLA, conjunctiva and sclera clear  NECK: Supple, No JVD  CHEST/LUNG: decreased BS  HEART: Regular rate and rhythm; no murmur  ABDOMEN: Soft, Nontender, Nondistended; Bowel sounds present  EXTREMITIES:  2+ Peripheral Pulses, No clubbing, cyanosis, or edema  SKIN: No rashes or lesions  NEURO: No focal deficits    LABS:                        9.5    12.26 )-----------( 193      ( 22 Jan 2020 07:17 )             32.5     01-22    142  |  103  |  18  ----------------------------<  103<H>  5.0   |  31  |  0.76    Ca    10.2      22 Jan 2020 07:17    TPro  12.4<H>  /  Alb  2.6<L>  /  TBili  0.6  /  DBili  x   /  AST  12  /  ALT  12  /  AlkPhos  48  01-20      CAPILLARY BLOOD GLUCOSE                RADIOLOGY & ADDITIONAL TESTS:    Imaging Personally Reviewed:  [x] YES  [ ] NO    Consultant(s) Notes Reviewed:  [x] YES  [ ] NO    MEDICATIONS  (STANDING):  albuterol/ipratropium for Nebulization 3 milliLiter(s) Nebulizer every 6 hours  budesonide 160 MICROgram(s)/formoterol 4.5 MICROgram(s) Inhaler 2 Puff(s) Inhalation two times a day  diVALproex  milliGRAM(s) Oral two times a day  lithium 300 milliGRAM(s) Oral three times a day  methylPREDNISolone sodium succinate Injectable 20 milliGRAM(s) IV Push three times a day  OLANZapine 2.5 milliGRAM(s) Oral at bedtime  piperacillin/tazobactam IVPB.. 3.375 Gram(s) IV Intermittent every 8 hours    MEDICATIONS  (PRN):  acetaminophen   Tablet .. 650 milliGRAM(s) Oral every 6 hours PRN Temp greater or equal to 38C (100.4F), Mild Pain (1 - 3)      Care Discussed with Consultants/Other Providers [x] YES  [ ] NO    HEALTH ISSUES - PROBLEM Dx:  Leukocytosis: Leukocytosis  Pleural effusion, left: Pleural effusion, left  Pneumonia: Pneumonia  Asthma: Asthma  Bipolar disorder: Bipolar disorder  Anemia: Anemia  Lymphoma: Lymphoma  Abnormal CBC: Abnormal CBC  Acute respiratory failure with hypoxia: Acute respiratory failure with hypoxia  Sepsis: Sepsis  Community acquired pneumonia: Community acquired pneumonia

## 2020-01-22 NOTE — PROGRESS NOTE ADULT - SUBJECTIVE AND OBJECTIVE BOX
INTERVAL HPI/OVERNIGHT EVENTS:  Patient S&E at bedside. patient says her breathing is better and she is able to get up and sit in her chair .  VITAL SIGNS:  T(F): 98.3 (01-22-20 @ 14:00)  HR: 99 (01-22-20 @ 14:15)  BP: 108/63 (01-22-20 @ 14:00)  RR: 18 (01-22-20 @ 14:15)  SpO2: 94% (01-22-20 @ 14:15)  Wt(kg): --    PHYSICAL EXAM:    Constitutional: NAD  Eyes: EOMI, sclera non-icteric  Neck: supple, no masses, no JVD  Respiratory: wheezes in left lung.  Cardiovascular: RRR, no M/R/G  Gastrointestinal: soft, NTND, no masses palpable, + BS, no hepatosplenomegaly  Extremities: no c/c/e  Neurological: AAOx3      MEDICATIONS  (STANDING):  albuterol/ipratropium for Nebulization 3 milliLiter(s) Nebulizer every 6 hours  budesonide 160 MICROgram(s)/formoterol 4.5 MICROgram(s) Inhaler 2 Puff(s) Inhalation two times a day  diVALproex  milliGRAM(s) Oral two times a day  lithium 300 milliGRAM(s) Oral three times a day  methylPREDNISolone sodium succinate Injectable 20 milliGRAM(s) IV Push three times a day  OLANZapine 2.5 milliGRAM(s) Oral at bedtime  piperacillin/tazobactam IVPB.. 3.375 Gram(s) IV Intermittent every 8 hours    MEDICATIONS  (PRN):  acetaminophen   Tablet .. 650 milliGRAM(s) Oral every 6 hours PRN Temp greater or equal to 38C (100.4F), Mild Pain (1 - 3)      Allergies    latex (Rash)  No Known Drug Allergies    Intolerances        LABS:                        9.5    12.26 )-----------( 193      ( 22 Jan 2020 07:17 )             32.5     01-22    142  |  103  |  18  ----------------------------<  103<H>  5.0   |  31  |  0.76    Ca    10.2      22 Jan 2020 07:17            RADIOLOGY & ADDITIONAL TESTS:  Studies reviewed. INTERVAL HPI/OVERNIGHT EVENTS:  Patient S&E at bedside. patient says her breathing is better and she is able to get up and sit in her chair .  VITAL SIGNS:  T(F): 98.3 (01-22-20 @ 14:00)  HR: 99 (01-22-20 @ 14:15)  BP: 108/63 (01-22-20 @ 14:00)  RR: 18 (01-22-20 @ 14:15)  SpO2: 94% (01-22-20 @ 14:15)  Wt(kg): --    PHYSICAL EXAM:    Constitutional: NAD  Eyes: EOMI, sclera non-icteric  Neck: supple, no masses, no JVD  Respiratory: wheezes in left lung.  Cardiovascular: RRR, no M/R/G  Gastrointestinal: soft, NTND, no masses palpable, + BS, no hepatosplenomegaly  Extremities: no c/c/e  Neurological: AAOx3      MEDICATIONS  (STANDING):  albuterol/ipratropium for Nebulization 3 milliLiter(s) Nebulizer every 6 hours  budesonide 160 MICROgram(s)/formoterol 4.5 MICROgram(s) Inhaler 2 Puff(s) Inhalation two times a day  diVALproex  milliGRAM(s) Oral two times a day  lithium 300 milliGRAM(s) Oral three times a day  methylPREDNISolone sodium succinate Injectable 20 milliGRAM(s) IV Push three times a day  OLANZapine 2.5 milliGRAM(s) Oral at bedtime  piperacillin/tazobactam IVPB.. 3.375 Gram(s) IV Intermittent every 8 hours    MEDICATIONS  (PRN):  acetaminophen   Tablet .. 650 milliGRAM(s) Oral every 6 hours PRN Temp greater or equal to 38C (100.4F), Mild Pain (1 - 3)      Allergies    latex (Rash)  No Known Drug Allergies    Intolerances        LABS:                        9.5    12.26 )-----------( 193      ( 22 Jan 2020 07:17 )             32.5     01-22    142  |  103  |  18  ----------------------------<  103<H>  5.0   |  31  |  0.76    Ca    10.2      22 Jan 2020 07:17          < from: CT Angio Chest w/ IV Cont (01.20.20 @ 13:41) >  XAM:  CT ANGIO CHEST (W)AW IC                            PROCEDURE DATE:  01/20/2020            INTERPRETATION:  CLINICAL INFORMATION: Shortness of breath.    COMPARISON: CT chest, abdomen, and pelvis 9/24/2019.    PROCEDURE:   CT Angiography of the Chest was performed.  IV Contrast: 90 ml of Omnipaque 350 was injected intravenously. 10 ml were discarded.  Oral contrast: None.  Sagittal and coronal reformats were performed as well as 3D (MIP) reconstructions.    FINDINGS:    CHEST:     LUNGS AND LARGE AIRWAYS: The trachea is patent. The left mainstem bronchi is encased by the soft tissue hilar mass. Increasing dense consolidation within the left lingula and left lower lobe which is contiguous with soft tissue mass infiltrating the left hilum. Thick linear opacity representing atelectasis is noted in the right upper lobe.    PLEURA: Trace right pleural effusion.    VESSELS: No filling defects seen within the pulmonary vasculature.     HEART: Heart size is normal. No pericardial effusion.    MEDIASTINUM AND AUSTIN: 1.5 cm lymph node is present in the left internal mammary chain. Extensive adenopathy is noted within the left hilum which is surrounding the left pulmonary artery/left mainstem bronchus, left superior and inferior pulmonaryveins and portion of the descending thoracic aorta. It is also probably extending into the neuroforamen's of left T10, T11 and T12 levels.    CHEST WALL AND LOWER NECK: Within normal limits.    IMPRESSION:     There is no pulmonary embolus.     Largeleft hilar mass/adenopathy extending into the mediastinum and surrounding portion of the descending thoracic aorta as described above. The finding represents patient's known history of lymphoma.    The adenopathy is also probably extending into the left neuroforamen of T10, T11 and T12 levels. Further evaluation with MRI of the thoracic spine would be of benefit.    Left internal mammary lymph node.        < end of copied text >

## 2020-01-22 NOTE — PROGRESS NOTE ADULT - PROBLEM SELECTOR PLAN 2
On steroids as per heme for Rx of presumptive lymphoma  Need for any intervention re: elevated total protein as per Heme/Onc

## 2020-01-22 NOTE — PROGRESS NOTE ADULT - ASSESSMENT
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 ACute hypoxemeic repsiratory failure  1/22: FIO2 requirements decreased; subjectively improved    REC    Trial nasal canula: titrate to sat > 88%  Continue abx per ID  Solumedrol for airway obstruction and rx for lymphoma per Onc  ? additional chemo rx and dexamethasone - will address with oncology

## 2020-01-22 NOTE — PROGRESS NOTE ADULT - SUBJECTIVE AND OBJECTIVE BOX
Follow-up Pulm Progress Note  George Alba MD  [phone]    Clinically improved today: FIO2 decreased to 35% on HiFlo nasal cannula with O2 sat 97%  Subjectively less SOB  AFebrile of Zosyn for LLL postobstructive pneumonia    Vital Signs Last 24 Hrs  T(C): 36.8 (22 Jan 2020 14:00), Max: 36.9 (21 Jan 2020 16:48)  T(F): 98.3 (22 Jan 2020 14:00), Max: 98.5 (21 Jan 2020 16:48)  HR: 99 (22 Jan 2020 14:15) (72 - 99)  BP: 108/63 (22 Jan 2020 14:00) (99/57 - 108/73)  BP(mean): --  RR: 18 (22 Jan 2020 14:15) (17 - 20)  SpO2: 94% (22 Jan 2020 14:15) (93% - 96%)                        9.5    12.26 )-----------( 193      ( 22 Jan 2020 07:17 )             32.5     01-22    142  |  103  |  18  ----------------------------<  103<H>  5.0   |  31  |  0.76    Ca    10.2      22 Jan 2020 07:17        CULTURES:  Culture Results:   No growth (01-20 @ 10:53)  Culture Results:   No growth to date. (01-19 @ 17:47)  Culture Results:   No growth to date. (01-19 @ 17:47)    Most recent blood culture -- 01-20 @ 10:53   -- -- .Urine Clean Catch (Midstream) 01-20 @ 10:53  Most recent blood culture -- 01-19 @ 17:47   -- -- .Blood Blood-Peripheral 01-19 @ 17:47      Physical Examination:  PULM: Improved aeration left with expl roonchi/coasrse wheeze; R exlp rhonchi  CVS: Regular rate and rhythm, no murmurs, rubs, or gallops  ABD: Soft, non-tender  EXT:  No clubbing, cyanosis, or edema    RADIOLOGY REVIEWED  CXR:    CT chest:    TTE:

## 2020-01-22 NOTE — PROGRESS NOTE ADULT - ASSESSMENT
74F with PMH of lymphoma (dx 2005, s/p CARRIE lobectomy, relapsed in 2019 but not currently on chemo/RT, [place] [facility] [place] disorder, asthma p/w SOB and fever, a/w sepsis 2/2 likely respiratory source.

## 2020-01-22 NOTE — PROGRESS NOTE ADULT - PROBLEM SELECTOR PLAN 1
hypoxic on arrival requiring 5LNC, with worsening hypoxia now on HFNC at 40% FiO2  likely 2/2 pulmonary involvement from lymphoma  appreciate pulm recs  continuous pulse ox monitoring  CT chest Large left hilar mass/adenopathy extending into the mediastinum and surrounding portion of the descending thoracic aorta as described above. adenopathy is also probably extending into the left neuroforamen of T10, T11 and T12 levels.  cont high dose steroids  discussed results of CT chest with patient - she does not want to fursue MRI thoracic spine here and does not want me to share results with her son and daughter. pt would like us to stabilize her so she can seek a second opinion at [facility].  appreciate heme onc recs, no plans for biopsy or FNA, no plans for chemo

## 2020-01-22 NOTE — PROGRESS NOTE ADULT - SUBJECTIVE AND OBJECTIVE BOX
[facility], Division of Infectious Diseases  JAZMINE Méndez A. Lee  [phone]    Name: MANDI GASPAR  Age: 74y  Gender: Female  MRN: [id number]    Interval History--  Notes reviewed. Quiet night, slept well. Comfortable on high-flow O2. Ate all of her breakfast. No complaints at this time. Worried about he lymphoma diagnosis. "Is this the end of my life?"    Past Medical History--  Lymphoma  Asthma  Manic depression  Hyperlipidemia  GERD (gastroesophageal reflux disease)  Injury of left shoulder, initial encounter  History of lobectomy of lung      For details regarding the patient's social history, family history, and other miscellaneous elements, please refer the initial infectious diseases consultation and/or the admitting history and physical examination for this admission.    Allergies    latex (Rash)  No Known Drug Allergies    Intolerances        Medications--  Antibiotics:  piperacillin/tazobactam IVPB.. 3.375 Gram(s) IV Intermittent every 8 hours    Immunologic:    Other:  acetaminophen   Tablet .. PRN  albuterol/ipratropium for Nebulization  budesonide 160 MICROgram(s)/formoterol 4.5 MICROgram(s) Inhaler  diVALproex DR  lithium  methylPREDNISolone sodium succinate Injectable  OLANZapine      Review of Systems--  A 10-point review of systems was obtained.   Review of systems otherwise negative except as previously noted.    Physical Examination--  Vital Signs: T(F): 98.2 (01-22-20 @ 09:15), Max: 98.5 (01-21-20 @ 16:48)  HR: 80 (01-22-20 @ 09:15)  BP: 101/69 (01-22-20 @ 09:15)  RR: 18 (01-22-20 @ 09:15)  SpO2: 95% (01-22-20 @ 09:15)  Wt(kg): --  General: Nontoxic-appearing Female in no acute distress.  HEENT: AT/NC. Anicteric. Conjunctiva pink and moist. Oropharynx clear.  Neck: Not rigid. No sense of mass.  Nodes: None palpable.  Lungs:  Diminished breath sounds R, absent L.   Heart: Regular rate and rhythm. No Murmur. No rub. No gallop. No palpable thrill.  Abdomen: Bowel sounds present and normoactive. Soft. Nondistended. Nontender. No sense of mass. No organomegaly.  Extremities: No cyanosis or clubbing. No edema.   Skin: Warm. Dry. Good turgor. No rash. No vasculitic stigmata.  Psychiatric: Appropriate affect and mood for situation.       Laboratory Studies--  CBC                        9.5    12.26 )-----------( 193      ( 22 Jan 2020 07:17 )             32.5       Chemistries  01-22    142  |  103  |  18  ----------------------------<  103<H>  5.0   |  31  |  0.76    Ca    10.2      22 Jan 2020 07:17    TPro  12.4<H>  /  Alb  2.6<L>  /  TBili  0.6  /  DBili  x   /  AST  12  /  ALT  12  /  AlkPhos  48  01-20      Culture Data    Culture - Urine (collected 20 Jan 2020 10:53)  Source: .Urine Clean Catch (Midstream)  Final Report (21 Jan 2020 08:18):    No growth    Culture - Blood (collected 19 Jan 2020 17:47)  Source: .Blood Blood-Peripheral  Preliminary Report (20 Jan 2020 18:01):    No growth to date.    Culture - Blood (collected 19 Jan 2020 17:47)  Source: .Blood Blood-Peripheral  Preliminary Report (20 Jan 2020 18:01):    No growth to date.

## 2020-01-22 NOTE — PROGRESS NOTE ADULT - ASSESSMENT
74F with PMH of lymphoma (dx 2005, s/p CARRIE lobectomy, relapsed in 2019 but not currently on chemo/RT, being followed by heme every 3-6months at [facility] [place]), bipolar disorder, asthma p/w SOB and fever and noted large lefttumor and concern for post obstructive PNA  Now on steroids for concern of recurrent lymphoma.  Per Onc R:B of Bx not favorable at this time.  WBC down slightly  Marked elevation in protein/ A:G ratio, relation to lymphoma? No complaints concerning for hyperviscosity at present  Likely mild hypercalcemia for albumin

## 2020-01-22 NOTE — PROGRESS NOTE ADULT - ASSESSMENT
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 Real Cano . I also discussed her case with pulmonary attending, Dr. Alba. her lymphoma is bulky and causing significant respiratory compromise, with extension into left mainstem bronchus.   -please arrange transfer to [facility]  - Dr. 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 Real Cano . I also discussed her case with pulmonary attending, Dr. Alba. her lymphoma is bulky and causing significant respiratory compromise, with extension into left mainstem bronchus.   -please arrange transfer to [facility]  - please continue IV steroids  - Dr. Real Cano office phones are [phone], [phone] or [phone]     Miate Pinto DO  Hematology/Oncology Fellow  Pager [phone]  Weekdays after 5PM or weekends page hematology/oncology fellow on call

## 2020-01-22 NOTE — PROGRESS NOTE ADULT - PROBLEM SELECTOR PLAN 1
Cont zosyn  With component of obstruction may not resolve entirely until obstruction relieved. We'll see if there is improvement with steroids.

## 2020-01-23 ENCOUNTER — RESULT REVIEW (OUTPATIENT)
Age: 75
End: 2020-01-23

## 2020-01-23 LAB
BUN SERPL-MCNC: 20 MG/DL — SIGNIFICANT CHANGE UP (ref 7–23)
CALCIUM SERPL-MCNC: 10.6 MG/DL — HIGH (ref 8.4–10.5)
CHLORIDE SERPL-SCNC: 103 MMOL/L — SIGNIFICANT CHANGE UP (ref 96–108)
CO2 SERPL-SCNC: 35 MMOL/L — HIGH (ref 22–31)
CREAT SERPL-MCNC: 0.72 MG/DL — SIGNIFICANT CHANGE UP (ref 0.5–1.3)
GLUCOSE SERPL-MCNC: 107 MG/DL — HIGH (ref 70–99)
HCT VFR BLD CALC: 34.6 % — SIGNIFICANT CHANGE UP (ref 34.5–45)
HGB BLD-MCNC: 9.7 G/DL — LOW (ref 11.5–15.5)
MCHC RBC-ENTMCNC: 28 GM/DL — LOW (ref 32–36)
MCHC RBC-ENTMCNC: 28.2 PG — SIGNIFICANT CHANGE UP (ref 27–34)
MCV RBC AUTO: 100.6 FL — HIGH (ref 80–100)
NRBC # BLD: 0 /100 WBCS — SIGNIFICANT CHANGE UP (ref 0–0)
PLATELET # BLD AUTO: 200 K/UL — SIGNIFICANT CHANGE UP (ref 150–400)
POTASSIUM SERPL-MCNC: 4.7 MMOL/L — SIGNIFICANT CHANGE UP (ref 3.5–5.3)
POTASSIUM SERPL-SCNC: 4.7 MMOL/L — SIGNIFICANT CHANGE UP (ref 3.5–5.3)
RBC # BLD: 3.44 M/UL — LOW (ref 3.8–5.2)
RBC # FLD: 17 % — HIGH (ref 10.3–14.5)
SODIUM SERPL-SCNC: 140 MMOL/L — SIGNIFICANT CHANGE UP (ref 135–145)
WBC # BLD: 10.97 K/UL — HIGH (ref 3.8–10.5)
WBC # FLD AUTO: 10.97 K/UL — HIGH (ref 3.8–10.5)

## 2020-01-23 PROCEDURE — 38222 DX BONE MARROW BX & ASPIR: CPT | Mod: GC

## 2020-01-23 PROCEDURE — 88341 IMHCHEM/IMCYTCHM EA ADD ANTB: CPT | Mod: 26,59

## 2020-01-23 PROCEDURE — 88189 FLOWCYTOMETRY/READ 16 & >: CPT

## 2020-01-23 PROCEDURE — 88360 TUMOR IMMUNOHISTOCHEM/MANUAL: CPT | Mod: 26

## 2020-01-23 PROCEDURE — 88291 CYTO/MOLECULAR REPORT: CPT

## 2020-01-23 PROCEDURE — 99233 SBSQ HOSP IP/OBS HIGH 50: CPT | Mod: GC,25

## 2020-01-23 PROCEDURE — 88313 SPECIAL STAINS GROUP 2: CPT | Mod: 26

## 2020-01-23 PROCEDURE — 88342 IMHCHEM/IMCYTCHM 1ST ANTB: CPT | Mod: 26,59

## 2020-01-23 PROCEDURE — 85097 BONE MARROW INTERPRETATION: CPT

## 2020-01-23 PROCEDURE — 88305 TISSUE EXAM BY PATHOLOGIST: CPT | Mod: 26

## 2020-01-23 RX ORDER — POLYETHYLENE GLYCOL 3350 17 G/17G
17 POWDER, FOR SOLUTION ORAL EVERY 12 HOURS
Refills: 0 | Status: DISCONTINUED | OUTPATIENT
Start: 2020-01-23 | End: 2020-01-26

## 2020-01-23 RX ORDER — SENNA PLUS 8.6 MG/1
2 TABLET ORAL AT BEDTIME
Refills: 0 | Status: DISCONTINUED | OUTPATIENT
Start: 2020-01-23 | End: 2020-01-26

## 2020-01-23 RX ADMIN — Medication 20 MILLIGRAM(S): at 14:11

## 2020-01-23 RX ADMIN — SENNA PLUS 2 TABLET(S): 8.6 TABLET ORAL at 22:32

## 2020-01-23 RX ADMIN — LITHIUM CARBONATE 300 MILLIGRAM(S): 300 TABLET, EXTENDED RELEASE ORAL at 14:10

## 2020-01-23 RX ADMIN — PIPERACILLIN AND TAZOBACTAM 25 GRAM(S): 4; .5 INJECTION, POWDER, LYOPHILIZED, FOR SOLUTION INTRAVENOUS at 22:29

## 2020-01-23 RX ADMIN — LITHIUM CARBONATE 300 MILLIGRAM(S): 300 TABLET, EXTENDED RELEASE ORAL at 05:21

## 2020-01-23 RX ADMIN — PIPERACILLIN AND TAZOBACTAM 25 GRAM(S): 4; .5 INJECTION, POWDER, LYOPHILIZED, FOR SOLUTION INTRAVENOUS at 14:14

## 2020-01-23 RX ADMIN — PIPERACILLIN AND TAZOBACTAM 25 GRAM(S): 4; .5 INJECTION, POWDER, LYOPHILIZED, FOR SOLUTION INTRAVENOUS at 05:22

## 2020-01-23 RX ADMIN — DIVALPROEX SODIUM 500 MILLIGRAM(S): 500 TABLET, DELAYED RELEASE ORAL at 05:22

## 2020-01-23 RX ADMIN — Medication 20 MILLIGRAM(S): at 05:21

## 2020-01-23 RX ADMIN — BUDESONIDE AND FORMOTEROL FUMARATE DIHYDRATE 2 PUFF(S): 160; 4.5 AEROSOL RESPIRATORY (INHALATION) at 05:21

## 2020-01-23 RX ADMIN — Medication 3 MILLILITER(S): at 05:21

## 2020-01-23 RX ADMIN — OLANZAPINE 2.5 MILLIGRAM(S): 15 TABLET, FILM COATED ORAL at 22:29

## 2020-01-23 RX ADMIN — POLYETHYLENE GLYCOL 3350 17 GRAM(S): 17 POWDER, FOR SOLUTION ORAL at 17:27

## 2020-01-23 RX ADMIN — LITHIUM CARBONATE 300 MILLIGRAM(S): 300 TABLET, EXTENDED RELEASE ORAL at 22:29

## 2020-01-23 RX ADMIN — Medication 3 MILLILITER(S): at 17:27

## 2020-01-23 RX ADMIN — DIVALPROEX SODIUM 500 MILLIGRAM(S): 500 TABLET, DELAYED RELEASE ORAL at 17:27

## 2020-01-23 RX ADMIN — Medication 20 MILLIGRAM(S): at 22:30

## 2020-01-23 RX ADMIN — BUDESONIDE AND FORMOTEROL FUMARATE DIHYDRATE 2 PUFF(S): 160; 4.5 AEROSOL RESPIRATORY (INHALATION) at 17:27

## 2020-01-23 RX ADMIN — Medication 3 MILLILITER(S): at 12:13

## 2020-01-23 NOTE — CONSULT NOTE ADULT - SUBJECTIVE AND OBJECTIVE BOX
General Surgery Consult  Consulting surgical team: Thoracic Surgery  Consulting attending: Alexander Barrera    HPI:  74F with PMH of lymphoma (dx 2001, s/p CARRIE lobectomy, relapsed in 2019 but not currently on chemo/RT, being followed by dixie every 3-6months at [facility] [place]), bipolar disorder, asthma p/w SOB and fever. Pt states she has been feeling unwell for the past 5 days with cough productive of yellow sputum and progressively worsening SOB and wheezing. Also endorses myalgias, mild headache and poor PO with nausea, but no vomiting, Denies nasal congestion or sore throat. On day of arrival, pt had subjective fevers for the first time and started feeling very SOB at both rest and exertion and felt like her lungs were tight, like her prior asthma attacks. Pt attempted to take nebulizers at home, but had difficulty with the equipment, and decided to come to ED. Pt denies any syncope, abdominal pain, no diarrhea, no blood in stool or urine, no dysuria, no rash, no LE swelling. 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 (19 Jan 2020 20:42)    Patient was admitted and treated for pneumonia. A chest CT demonstrated a mediastinal mass concerning for recurrence of lymphoma. Today she underwent bone marrow biopsy and thoracic was consulted for lymph node biopsy. Currently breathing comfortably on room air. Denies SOB, swelling, swollen lymph nodes, fevers, chills.       PAST MEDICAL HISTORY:  Lymphoma  Asthma  Manic depression  Hyperlipidemia  GERD (gastroesophageal reflux disease)      PAST SURGICAL HISTORY:  Injury of left shoulder, initial encounter  History of lobectomy of lung      MEDICATIONS:  acetaminophen   Tablet .. 650 milliGRAM(s) Oral every 6 hours PRN  albuterol/ipratropium for Nebulization 3 milliLiter(s) Nebulizer every 6 hours  budesonide 160 MICROgram(s)/formoterol 4.5 MICROgram(s) Inhaler 2 Puff(s) Inhalation two times a day  diVALproex  milliGRAM(s) Oral two times a day  lithium 300 milliGRAM(s) Oral three times a day  methylPREDNISolone sodium succinate Injectable 20 milliGRAM(s) IV Push three times a day  OLANZapine 2.5 milliGRAM(s) Oral at bedtime  piperacillin/tazobactam IVPB.. 3.375 Gram(s) IV Intermittent every 8 hours  polyethylene glycol 3350 17 Gram(s) Oral every 12 hours  senna 2 Tablet(s) Oral at bedtime      ALLERGIES:  latex (Rash)  No Known Drug Allergies      VITALS & I/Os:  Vital Signs Last 24 Hrs  T(C): 36.2 (23 Jan 2020 13:41), Max: 37 (22 Jan 2020 21:27)  T(F): 97.2 (23 Jan 2020 13:41), Max: 98.6 (22 Jan 2020 21:27)  HR: 98 (23 Jan 2020 16:32) (73 - 100)  BP: 122/73 (23 Jan 2020 13:41) (101/64 - 126/70)  BP(mean): --  RR: 18 (23 Jan 2020 16:32) (17 - 18)  SpO2: 90% (23 Jan 2020 16:32) (90% - 98%)    I&O's Summary    22 Jan 2020 07:01  -  23 Jan 2020 07:00  --------------------------------------------------------  IN: 1310 mL / OUT: 1600 mL / NET: -290 mL    23 Jan 2020 07:01  -  23 Jan 2020 16:41  --------------------------------------------------------  IN: 240 mL / OUT: 300 mL / NET: -60 mL        PHYSICAL EXAM:  General: No acute distress  Respiratory: Nonlabored  Lymph nodes: No cervical or axillary lymphadenopathy  Abdominal: Soft, nondistended, nontender  Extremities: Warm    LABS:                        9.7    10.97 )-----------( 200      ( 23 Jan 2020 07:18 )             34.6     01-23    140  |  103  |  20  ----------------------------<  107<H>  4.7   |  35<H>  |  0.72    Ca    10.6<H>      23 Jan 2020 07:14      Lactate:      IMAGING:  < from: CT Angio Chest w/ IV Cont (01.20.20 @ 13:41) >  EXAM:  CT ANGIO CHEST (W)AW IC                            PROCEDURE DATE:  01/20/2020            INTERPRETATION:  CLINICAL INFORMATION: Shortness of breath.    COMPARISON: CT chest, abdomen, and pelvis 9/24/2019.    PROCEDURE:   CT Angiography of the Chest was performed.  IV Contrast: 90 ml of Omnipaque 350 was injected intravenously. 10 ml were discarded.  Oral contrast: None.  Sagittal and coronal reformats were performed as well as 3D (MIP) reconstructions.    FINDINGS:    CHEST:     LUNGS AND LARGE AIRWAYS: The trachea is patent. The left mainstem bronchi is encased by the soft tissue hilar mass. Increasing dense consolidation within the left lingula and left lower lobe which is contiguous with soft tissue mass infiltrating the left hilum. Thick linear opacity representing atelectasis is noted in the right upper lobe.    PLEURA: Trace right pleural effusion.    VESSELS: No filling defects seen within the pulmonary vasculature.     HEART: Heart size is normal. No pericardial effusion.    MEDIASTINUM AND AUSTIN: 1.5 cm lymph node is present in the left internal mammary chain. Extensive adenopathy is noted within the left hilum which is surrounding the left pulmonary artery/left mainstem bronchus, left superior and inferior pulmonaryveins and portion of the descending thoracic aorta. It is also probably extending into the neuroforamen's of left T10, T11 and T12 levels.    CHEST WALL AND LOWER NECK: Within normal limits.    IMPRESSION:     There is no pulmonary embolus.     Largeleft hilar mass/adenopathy extending into the mediastinum and surrounding portion of the descending thoracic aorta as described above. The finding represents patient's known history of lymphoma.    The adenopathy is also probably extending into the left neuroforamen of T10, T11 and T12 levels. Further evaluation with MRI of the thoracic spine would be of benefit.    Left internal mammary lymph node.    SABRA KOHLI MD,RADIOLOGY FELLOW  This document has been electronically signed.  MATT SUTHERLAND M.D., ATTENDING RADIOLOGIST  This document has been electronically signed. Jan 20 2020  2:20PM    < end of copied text >

## 2020-01-23 NOTE — PROGRESS NOTE ADULT - PROBLEM SELECTOR PLAN 1
appreciate pulm recs  continuous pulse ox monitoring  CT chest Large left hilar mass/adenopathy extending into the mediastinum and surrounding portion of the descending thoracic aorta as described above. adenopathy is also probably extending into the left neuroforamen of T10, T11 and T12 levels.  cont high dose steroids  shana Alba, no plans for transfer as dw patient's oncologist Dr. Crawford at [facility]  CTS consult for tissue sample

## 2020-01-23 NOTE — PROCEDURE NOTE - SUPERVISORY STATEMENT
I performed the bone marrow aspiration and biopsy with assistance of Dr Pinto. Good hemostasis after 15 minutes of site wound compression

## 2020-01-23 NOTE — PROGRESS NOTE ADULT - ASSESSMENT
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 ACute hypoxemeic repsiratory failure  1/22: FIO2 requirements decreased; subjectively improved    REC    Patient clinically stable on nasal oxgyen: appears stable for inter-hospital transport in EMS amulance with monitoring; howver would consider initiating chemo rx here prior to transfer in few days if feasbile. WIll address with Oncology.  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 ACute hypoxemeic repsiratory failure  1/22: FIO2 requirements decreased; subjectively improved    REC    Patient clinically stable on nasal oxgyen; however, [place] [facility] [place] free  Per una Bland of bed availability at this time - he currently favors biopsy for diagnosis and potential initiation of treatment at [facility]. Will communicate with oncology team and deferr. If biopsy dx required, would need EBUS or mediastinoscopy.  Will Continue current steroid dose and antibiotics

## 2020-01-23 NOTE — DIETITIAN INITIAL EVALUATION ADULT. - OTHER INFO
Patient seen for routine length of stay assessment.  Patient found resting in bed.  Weak, discouraged.  Patient reports that she does not have a healthy appetite and not having a taste for food.  Ordered pancakes but "doesn't like pancakes".  Reviewed food options available. Patient reports that she "cut out meat" in diet.  Also admits to a weight loss of approximately 15 pounds since September.  Reinforced importance of including foods that are calorie and protein dense to valentin off further weight loss and increase strength for healing.  Discussed calorie containing fluids and not to just drink water.  Receptive to chocolate health shakes as well as trying Ensure Enlive to supplement.  Supplements with multivitamin at home.  Denies any N/V, GI complaints.

## 2020-01-23 NOTE — DIETITIAN INITIAL EVALUATION ADULT. - PROBLEM SELECTOR PLAN 6
Hb 10, appears chronic and at baseline, unclear etiology  normocytic currently   will check hemolysis labs, b12, folate, iron studies  c/t monitor

## 2020-01-23 NOTE — DIETITIAN INITIAL EVALUATION ADULT. - PROBLEM SELECTOR PLAN 5
unclear lymphoma type, per pt report, in relapse but not currently being treated   will need to obtain collateral from hematology

## 2020-01-23 NOTE — CHART NOTE - NSCHARTNOTEFT_GEN_A_CORE
Upon Nutritional Assessment by the Registered Dietitian your patient was determined to meet criteria / has evidence of the following diagnosis/diagnoses:          [ ]  Mild Protein Calorie Malnutrition        [ ]  Moderate Protein Calorie Malnutrition        x] Severe Protein Calorie Malnutrition        [ ] Unspecified Protein Calorie Malnutrition        [ ] Underweight / BMI <19        [ ] Morbid Obesity / BMI > 40      Findings as based on:  [x ] Comprehensive nutrition assessment   [x ] Nutrition Focused Physical Exam  [x ] Other: Moderate muscle loss, eating <50% of Increased nutrient needs, wt loss 10% BW, catabolic      Nutrition Plan/Recommendations:    Regular diet  Ensure Enlive 8 ounces 2  multivitamin and Vit C        PROVIDER Section:     By signing this assessment you are acknowledging and agree with the diagnosis/diagnoses assigned by the Registered Dietitian    Comments:

## 2020-01-23 NOTE — PROGRESS NOTE ADULT - SUBJECTIVE AND OBJECTIVE BOX
INTERVAL HPI/OVERNIGHT EVENTS:  Patient S&E at bedside. patient  said her breathing is a lot better  VITAL SIGNS:  T(F): 98.5 (01-23-20 @ 17:05)  HR: 101 (01-23-20 @ 17:05)  BP: 112/70 (01-23-20 @ 17:05)  RR: 18 (01-23-20 @ 17:05)  SpO2: 94% (01-23-20 @ 17:05)  Wt(kg): --    PHYSICAL EXAM:    Constitutional: NAD  Eyes: EOMI, sclera non-icteric  Neck: supple, no masses, no JVD  Respiratory: CTA b/l, good air entry b/l  Cardiovascular: RRR, no M/R/G  Gastrointestinal: soft, NTND, no masses palpable, + BS, no hepatosplenomegaly  Extremities: no c/c/e  Neurological: AAOx3      MEDICATIONS  (STANDING):  albuterol/ipratropium for Nebulization 3 milliLiter(s) Nebulizer every 6 hours  budesonide 160 MICROgram(s)/formoterol 4.5 MICROgram(s) Inhaler 2 Puff(s) Inhalation two times a day  diVALproex  milliGRAM(s) Oral two times a day  lithium 300 milliGRAM(s) Oral three times a day  methylPREDNISolone sodium succinate Injectable 20 milliGRAM(s) IV Push three times a day  OLANZapine 2.5 milliGRAM(s) Oral at bedtime  piperacillin/tazobactam IVPB.. 3.375 Gram(s) IV Intermittent every 8 hours  polyethylene glycol 3350 17 Gram(s) Oral every 12 hours  senna 2 Tablet(s) Oral at bedtime    MEDICATIONS  (PRN):  acetaminophen   Tablet .. 650 milliGRAM(s) Oral every 6 hours PRN Temp greater or equal to 38C (100.4F), Mild Pain (1 - 3)      Allergies    latex (Rash)  No Known Drug Allergies    Intolerances        LABS:                        9.7    10.97 )-----------( 200      ( 23 Jan 2020 07:18 )             34.6     01-23    140  |  103  |  20  ----------------------------<  107<H>  4.7   |  35<H>  |  0.72    Ca    10.6<H>      23 Jan 2020 07:14          TM Interpretation:   Flow Cytometry Final Report  ________________________________________________________________________  Specimen: Peripheral Blood  Collected: 01/21/2020 6:00  Received: 01/21/2020 11:56  Processed: 01/21/2020 12:20  Reported: 01/22/2020 9:23  Accession #: [id number]  [id number]  ________________________________________________________________________  CLINICAL DATA: Rule out acute leukemia    ________________________________________________________________________  CD45/Side Scatter Differential  Granulocytes: 74 % ;    Lymphocytes: 18 % ;    Monocytes: 3 % ;    Dim CD45 and/or blast gate: 0 %  ;    Erythroid/debris: 2 %  ________________________________________________________________________  DIAGNOSIS:  Peripheral blood:       - Monotypic B-cells (12% of cells), positive for lambda, CD19, CD20 (bright), FMC-7; negative  CD5, CD10, CD23; 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  Please see interpretation.    INTERPRETATION:  MORPHOLOGY: No absolute lymphocytosis, increased medium-sized lymphocytes with small to moderate  cytoplasm, some show irregular nuclei.    IMMUNOPHENOTYPE: Monotypic B-cells (12% of cells), positive for lambda, CD19, CD20 (bright), FMC-7;  negative CD5, CD10, CD23. There is no increase of CD34, , or CD14 positive cells.    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  Suggest clinical correlation.    _____________________________________________________________________  Viability ................. 99 %    Results reported are based on an open gate.  CD45 .......... 95 %  CD2 ........... 7 %  CD3 ........... 5 %  CD5 ........... 5 %  CD7 ........... 5 %  CD4 ........... 3 %  CD8 ........... 3 %  CD16 .......... 75 %  CD56........... 2 %  CD10 .......... 77 %  CD19 .......... 13 %  CD20 .......... 12 %  CD22 .......... 14 %  kappa ......... 2 %  lambda ........ 13 %  CD14 .......... 2 %  CD64 .......... 75 %  HLA-DR ........ 16 %  CD34 .......... 0 %   ......... 0 %  CD13 .......... 76 %  CD15 .......... 72 %  CD33 .......... 73 %  CD38 .......... 85 %  CD41 .......... 1 %  CD61 .......... 1 %  Glycophorin A . 0 %   ......... 7 %      Values reported are based on the lymphocyte gate. (Bright CD45 positive; low side scatter, low  forward scatter).  19% of cells.  CD45 .......... 100 %  CD2 ........... 35 %  CD3 ........... 26 %  CD5 ........... 25 %  CD7 ........... 34 %  CD4 ........... 11 %  CD8 ........... 15 %  CD16 .......... 15 %  CD56 .......... 11 %  CD57 .......... 19 %  CD3-/CD16,56+ . 10 %  CD3+/CD16,56+ . 9 %  CD10 .......... 0 %  CD19 .......... 64 %  CD20 .......... 65 %  CD22 .......... 66 %  CD23 .......... 1 %  FMC-7 ......... 63 %  CD5/CD19 ...... 0 %  kappa ......... 8 %  lambda ........ 65 %  CD14 .......... 0 %  CD64 .......... 1 %  HLA-DR ........ 71 %  CD34 .......... 0 %   ......... 0 %  CD11b ......... 68 %  CD13 .......... 6 %  CD15 .......... 1 %  CD33 .......... 1 %  CD38 .......... 37 %   ......... 38 %          Verified By: Say Abarca M.D., M.D.  (Electronic Signature)    This test was developed and its performance characteristics determined by the Flow Cytometry  Laboratory at [facility]. It has not been cleared or approvedby the U.S. Food and Drug  Administration.  The FDA has determined that such clearance or approval is not necessary. This test is used for  clinical purposes. It should not be regarded as investigational or for research. This laboratory is  certifiedunder the Clinical Laboratory Improvement Amendment of 1988 ("CLIA") as qualified to  perform high complexity clinical testing. (01.21.20 @ 11:56)

## 2020-01-23 NOTE — PROGRESS NOTE ADULT - SUBJECTIVE AND OBJECTIVE BOX
Patient is a 74y old  Female who presents with a chief complaint of SOB, fever (23 Jan 2020 11:13)      SUBJECTIVE / OVERNIGHT EVENTS:    Patient seen and examined. denies cp sob.      Vital Signs Last 24 Hrs  T(C): 36.2 (23 Jan 2020 13:41), Max: 37 (22 Jan 2020 21:27)  T(F): 97.2 (23 Jan 2020 13:41), Max: 98.6 (22 Jan 2020 21:27)  HR: 100 (23 Jan 2020 13:41) (73 - 100)  BP: 122/73 (23 Jan 2020 13:41) (101/64 - 126/70)  BP(mean): --  RR: 17 (23 Jan 2020 13:41) (17 - 20)  SpO2: 93% (23 Jan 2020 13:41) (93% - 98%)  I&O's Summary    22 Jan 2020 07:01  -  23 Jan 2020 07:00  --------------------------------------------------------  IN: 1310 mL / OUT: 1600 mL / NET: -290 mL    23 Jan 2020 07:01  -  23 Jan 2020 14:43  --------------------------------------------------------  IN: 240 mL / OUT: 300 mL / NET: -60 mL        PE:  GENERAL: NAD, AAOx3  HEAD:  Atraumatic, Normocephalic  EYES: EOMI, PERRLA, conjunctiva and sclera clear  NECK: Supple, No JVD  CHEST/LUNG: CTABL, No wheeze  HEART: Regular rate and rhythm; + murmur  ABDOMEN: Soft, Nontender, Nondistended; Bowel sounds present  EXTREMITIES:  2+ Peripheral Pulses, No clubbing, cyanosis, or edema  SKIN: No rashes or lesions  NEURO: No focal deficits    LABS:                        9.7    10.97 )-----------( 200      ( 23 Jan 2020 07:18 )             34.6     01-23    140  |  103  |  20  ----------------------------<  107<H>  4.7   |  35<H>  |  0.72    Ca    10.6<H>      23 Jan 2020 07:14        CAPILLARY BLOOD GLUCOSE                RADIOLOGY & ADDITIONAL TESTS:    Imaging Personally Reviewed:  [x] YES  [ ] NO    Consultant(s) Notes Reviewed:  [x] YES  [ ] NO    MEDICATIONS  (STANDING):  albuterol/ipratropium for Nebulization 3 milliLiter(s) Nebulizer every 6 hours  budesonide 160 MICROgram(s)/formoterol 4.5 MICROgram(s) Inhaler 2 Puff(s) Inhalation two times a day  diVALproex  milliGRAM(s) Oral two times a day  lithium 300 milliGRAM(s) Oral three times a day  methylPREDNISolone sodium succinate Injectable 20 milliGRAM(s) IV Push three times a day  OLANZapine 2.5 milliGRAM(s) Oral at bedtime  piperacillin/tazobactam IVPB.. 3.375 Gram(s) IV Intermittent every 8 hours    MEDICATIONS  (PRN):  acetaminophen   Tablet .. 650 milliGRAM(s) Oral every 6 hours PRN Temp greater or equal to 38C (100.4F), Mild Pain (1 - 3)      Care Discussed with Consultants/Other Providers [x] YES  [ ] NO    HEALTH ISSUES - PROBLEM Dx:  Constipation, unspecified constipation type: Constipation, unspecified constipation type  Leukocytosis: Leukocytosis  Pleural effusion, left: Pleural effusion, left  Pneumonia: Pneumonia  Asthma: Asthma  Bipolar disorder: Bipolar disorder  Anemia: Anemia  Lymphoma: Lymphoma  Abnormal CBC: Abnormal CBC  Acute respiratory failure with hypoxia: Acute respiratory failure with hypoxia  Sepsis: Sepsis  Community acquired pneumonia: Community acquired pneumonia Patient is a 74y old  Female who presents with a chief complaint of SOB, fever (23 Jan 2020 11:13)      SUBJECTIVE / OVERNIGHT EVENTS:    Patient seen and examined. denies cp sob.      Vital Signs Last 24 Hrs  T(C): 36.2 (23 Jan 2020 13:41), Max: 37 (22 Jan 2020 21:27)  T(F): 97.2 (23 Jan 2020 13:41), Max: 98.6 (22 Jan 2020 21:27)  HR: 100 (23 Jan 2020 13:41) (73 - 100)  BP: 122/73 (23 Jan 2020 13:41) (101/64 - 126/70)  BP(mean): --  RR: 17 (23 Jan 2020 13:41) (17 - 20)  SpO2: 93% (23 Jan 2020 13:41) (93% - 98%)  I&O's Summary    22 Jan 2020 07:01  -  23 Jan 2020 07:00  --------------------------------------------------------  IN: 1310 mL / OUT: 1600 mL / NET: -290 mL    23 Jan 2020 07:01  -  23 Jan 2020 14:43  --------------------------------------------------------  IN: 240 mL / OUT: 300 mL / NET: -60 mL        PE:  GENERAL: NAD, AAOx3, hiflo  HEAD:  Atraumatic, Normocephalic  EYES: EOMI, PERRLA, conjunctiva and sclera clear  NECK: Supple, No JVD  CHEST/LUNG: decreased BS  HEART: Regular rate and rhythm; no murmur  ABDOMEN: Soft, Nontender, Nondistended; Bowel sounds present  EXTREMITIES:  2+ Peripheral Pulses, No clubbing, cyanosis, or edema  SKIN: No rashes or lesions  NEURO: No focal deficits    LABS:                        9.7    10.97 )-----------( 200      ( 23 Jan 2020 07:18 )             34.6     01-23    140  |  103  |  20  ----------------------------<  107<H>  4.7   |  35<H>  |  0.72    Ca    10.6<H>      23 Jan 2020 07:14        CAPILLARY BLOOD GLUCOSE                RADIOLOGY & ADDITIONAL TESTS:    Imaging Personally Reviewed:  [x] YES  [ ] NO    Consultant(s) Notes Reviewed:  [x] YES  [ ] NO    MEDICATIONS  (STANDING):  albuterol/ipratropium for Nebulization 3 milliLiter(s) Nebulizer every 6 hours  budesonide 160 MICROgram(s)/formoterol 4.5 MICROgram(s) Inhaler 2 Puff(s) Inhalation two times a day  diVALproex  milliGRAM(s) Oral two times a day  lithium 300 milliGRAM(s) Oral three times a day  methylPREDNISolone sodium succinate Injectable 20 milliGRAM(s) IV Push three times a day  OLANZapine 2.5 milliGRAM(s) Oral at bedtime  piperacillin/tazobactam IVPB.. 3.375 Gram(s) IV Intermittent every 8 hours    MEDICATIONS  (PRN):  acetaminophen   Tablet .. 650 milliGRAM(s) Oral every 6 hours PRN Temp greater or equal to 38C (100.4F), Mild Pain (1 - 3)      Care Discussed with Consultants/Other Providers [x] YES  [ ] NO    HEALTH ISSUES - PROBLEM Dx:  Constipation, unspecified constipation type: Constipation, unspecified constipation type  Leukocytosis: Leukocytosis  Pleural effusion, left: Pleural effusion, left  Pneumonia: Pneumonia  Asthma: Asthma  Bipolar disorder: Bipolar disorder  Anemia: Anemia  Lymphoma: Lymphoma  Abnormal CBC: Abnormal CBC  Acute respiratory failure with hypoxia: Acute respiratory failure with hypoxia  Sepsis: Sepsis  Community acquired pneumonia: Community acquired pneumonia

## 2020-01-23 NOTE — PROGRESS NOTE ADULT - NSHPATTENDINGPLANDISCUSS_GEN_ALL_CORE
Medical team, oncology Medical team, oncology inpatient, [facility] [place]

## 2020-01-23 NOTE — PROGRESS NOTE ADULT - ASSESSMENT
74F with PMH of lymphoma (dx 2005, s/p CARRIE lobectomy, relapsed in 2019 but not currently on chemo/RT, being followed by heme every 3-6months at [facility] [place]), bipolar disorder, asthma p/w SOB and fever, a/w sepsis 2/2 likely respiratory source.

## 2020-01-23 NOTE — DIETITIAN INITIAL EVALUATION ADULT. - PERTINENT MEDS FT
MEDICATIONS  (STANDING):  albuterol/ipratropium for Nebulization 3 milliLiter(s) Nebulizer every 6 hours  budesonide 160 MICROgram(s)/formoterol 4.5 MICROgram(s) Inhaler 2 Puff(s) Inhalation two times a day  diVALproex  milliGRAM(s) Oral two times a day  lithium 300 milliGRAM(s) Oral three times a day  methylPREDNISolone sodium succinate Injectable 20 milliGRAM(s) IV Push three times a day  OLANZapine 2.5 milliGRAM(s) Oral at bedtime  piperacillin/tazobactam IVPB.. 3.375 Gram(s) IV Intermittent every 8 hours    MEDICATIONS  (PRN):  acetaminophen   Tablet .. 650 milliGRAM(s) Oral every 6 hours PRN Temp greater or equal to 38C (100.4F), Mild Pain (1 - 3)

## 2020-01-23 NOTE — PROGRESS NOTE ADULT - SUBJECTIVE AND OBJECTIVE BOX
Follow-up Pulm Progress Note  George Alba MD  [phone]    Clinically improved: tolerating nasal oxygen at 4 liters/min  O2 sats 97% at rest  Breathing comfortably without dyspnea/tachypnea  f/u CXR 1/22 with dense L mid/basilar consolidation, clear L upper lung fields        Vital Signs Last 24 Hrs  T(C): 36.7 (23 Jan 2020 11:08), Max: 37 (22 Jan 2020 21:27)  T(F): 98.1 (23 Jan 2020 11:08), Max: 98.6 (22 Jan 2020 21:27)  HR: 97 (23 Jan 2020 11:08) (73 - 100)  BP: 125/71 (23 Jan 2020 11:08) (101/64 - 126/70)  BP(mean): --  RR: 18 (23 Jan 2020 11:08) (17 - 20)  SpO2: 94% (23 Jan 2020 11:08) (94% - 98%)                          9.7    10.97 )-----------( 200      ( 23 Jan 2020 07:18 )             34.6       01-23    140  |  103  |  20  ----------------------------<  107<H>  4.7   |  35<H>  |  0.72    Ca    10.6<H>      23 Jan 2020 07:14        CULTURES:  Culture Results:   No growth (01-20 @ 10:53)  Culture Results:   No growth to date. (01-19 @ 17:47)  Culture Results:   No growth to date. (01-19 @ 17:47)    Most recent blood culture -- 01-20 @ 10:53   -- -- .Urine Clean Catch (Midstream) 01-20 @ 10:53  Most recent blood culture -- 01-19 @ 17:47   -- -- .Blood Blood-Peripheral 01-19 @ 17:47      Physical Examination:  PULM: Coarse BS throught with improvement in rhonch/wheezing: good aeration  CVS: Regular rate and rhythm, no murmurs, rubs, or gallops  ABD: Soft, non-tender  EXT:  No clubbing, cyanosis, or edema    RADIOLOGY REVIEWED  CXR:    CT chest:    TTE:

## 2020-01-23 NOTE — DIETITIAN INITIAL EVALUATION ADULT. - ADD RECOMMEND
Regular diet, Add Ensure Enlive 8 ounces x2, multivitamin, Vit C, Monitor diet tolerance, po intake, GI tolerance, weight trends, labs, and skin integrity

## 2020-01-23 NOTE — CONSULT NOTE ADULT - ASSESSMENT
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 Cano at [facility] [place]), bipolar disorder, HLD, asthma who presented with SOB and admitted for pneumonia treatment however found to have a mediastinal mass concerning for lymphoma recurrence    - No clear target as CT scan does not clearly delineate collapsed lung vs. mediastinal mass  - Please obtain a PET CT to further guide target lymph node for biopsy  - If patient desires to be transferred to [facility] [place] [facility] for further care, would not delay transfer due to biopsy  - f/u heme/onc and pulm recs  - Care per primary team    d/w Dr. Holly Velazquez, PGY-2  Thoracic Surgery  Call [phone] with questions

## 2020-01-23 NOTE — DIETITIAN INITIAL EVALUATION ADULT. - PROBLEM SELECTOR PLAN 2
febrile, tachycardic, elevated WBC and tachypneic on arrival, likely 2/2 PNA/respiratory illness   f/u urine and blood cultures; UA pending   s/p 2.5 liters IVF, lactate 2.1 - f/u repeat lactate  hold off on further IVF 2/2 B lines noted on POCUS  f/u CT C/A/P

## 2020-01-23 NOTE — PROGRESS NOTE ADULT - ASSESSMENT
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 Course complicated by hypoxia     Marginal zone Lymphoma- with new bulky lymphadenopathy compromising respiratory status   - discussed with patient's primary hematologist at [place], Dr. Real Cano again today. per our discussion with Dr. Real Cano and Dr. Alba ,pulmonary attending, we are concerned that she could be transforming to a more aggressive large cell lymphoma.  We  decided as a group to forgo the transfer to [facility] and to obtain biopsy of lung tissue.  please consult CT surgery  s/p bone marrow biopsy done today at bedside we will try and expedite results  peripheral flow cytometry done showing:  Monotypic B-cells (12% of cells), positive for lambda, CD19, CD20 (bright), FMC-7; negative CD5, CD10, CD23; consistent with CD5 negative, CD10 negative B-cell lymphoma MORPHOLOGY: No absolute lymphocytosis, increased medium-sized lymphocytes with small to moderate cytoplasm, some show irregular nuclei. IMMUNOPHENOTYPE: Monotypic B-cells (12% of cells), positive for lambda, CD19, CD20 (bright), FMC-7; negative CD5, CD10, CD23. There is no increase of CD34, , or CD14 positive cells.  -- please continue IV steroids  - Dr. Real Cano office phones are [phone], [phone] or [phone]     Maite Pinto DO  Hematology/Oncology Fellow  Pager [phone]  Weekdays after 5PM or weekends page hematology/oncology fellow on call

## 2020-01-23 NOTE — DIETITIAN INITIAL EVALUATION ADULT. - PROBLEM SELECTOR PLAN 3
hypoxic on arrival requiring 5LNC, with worsening hypoxia now on HFNC at 40% FiO2  likely 2/2 sepsis/PNA and large effusion noted on CXR  will treat with abx as above  titrate O2 as needed, maintain sat >92%   pulmonary consult in AM for possible thoracentesis   continuous pulse ox monitoring

## 2020-01-23 NOTE — DIETITIAN INITIAL EVALUATION ADULT. - PROBLEM SELECTOR PLAN 4
pt with 20% blasts in CBC in setting of lymphoma, heme eval appreciated  appears to be atypical lymphocytes rather than blasts   will check flow cytometry from peripheral blood  monitor CBC

## 2020-01-23 NOTE — PROGRESS NOTE ADULT - ASSESSMENT
74F with PMH of lymphoma (dx 2005, s/p CARRIE lobectomy, relapsed in 2019 but not currently on chemo/RT, [place] [facility] [place] bipolar disorder, asthma p/w SOB and fever and noted large lefttumor and concern for post obstructive PNA  Now on steroids for concern of recurrent lymphoma.  Per Onc R:B of Bx not favorable at this time.  WBC down slightly  Marked elevation in protein/ A:G ratio, relation to lymphoma? No complaints concerning for hyperviscosity at present  Likely mild hypercalcemia for albumin  1/23 bone marrow bx

## 2020-01-23 NOTE — PROGRESS NOTE ADULT - SUBJECTIVE AND OBJECTIVE BOX
[facility], Division of Infectious Diseases  JAZMINE Méndez A. Lee  [phone]  Name: MANDI GASPAR  Age: 74y  Gender: Female  MRN: [id number]    Interval History--  Notes reviewed  feels better than yesterday    Past Medical History--  Lymphoma  Asthma  Manic depression  Hyperlipidemia  GERD (gastroesophageal reflux disease)  Injury of left shoulder, initial encounter  History of lobectomy of lung      For details regarding the patient's social history, family history, and other miscellaneous elements, please refer the initial infectious diseases consultation and/or the admitting history and physical examination for this admission.    Allergies    latex (Rash)  No Known Drug Allergies    Intolerances        Medications--  Antibiotics:  piperacillin/tazobactam IVPB.. 3.375 Gram(s) IV Intermittent every 8 hours    Immunologic:    Other:  acetaminophen   Tablet .. PRN  albuterol/ipratropium for Nebulization  budesonide 160 MICROgram(s)/formoterol 4.5 MICROgram(s) Inhaler  diVALproex DR  lithium  methylPREDNISolone sodium succinate Injectable  OLANZapine  polyethylene glycol 3350  senna      Review of Systems--  A 10-point review of systems was obtained.     Pertinent positives and negatives--  Constitutional: No fevers. No Chills. No Rigors.   Cardiovascular: No chest pain. No palpitations.  Respiratory: + shortness of breath. No cough.  Gastrointestinal: No nausea or vomiting. No diarrhea or constipation.   Psychiatric: Pleasant. Appropriate affect.    Review of systems otherwise negative except as previously noted.    Physical Examination--  Vital Signs: T(F): 98.5 (01-23-20 @ 17:05), Max: 98.6 (01-22-20 @ 21:27)  HR: 101 (01-23-20 @ 17:05)  BP: 112/70 (01-23-20 @ 17:05)  RR: 18 (01-23-20 @ 17:05)  SpO2: 94% (01-23-20 @ 17:05)  Wt(kg): --  General: Nontoxic-appearing Female in no acute distress.  HEENT: AT/NC. +NC  Nodes: None palpable.  Lungs: Clear bilaterally without rales, wheezing or rhonchi  Heart: Regular rate and rhythm. No Murmur. No rub.   Abdomen: Bowel sounds present and normoactive. Soft. Nondistended. Nontender.   Back: No spinal tenderness. No costovertebral angle tenderness.   Extremities: No cyanosis or clubbing. trace edema.   Skin: Warm. Dry. Good turgor. No rash. No vasculitic stigmata.  Psychiatric: Appropriate affect and mood for situation.         Laboratory Studies--  CBC                        9.7    10.97 )-----------( 200      ( 23 Jan 2020 07:18 )             34.6       Chemistries  01-23    140  |  103  |  20  ----------------------------<  107<H>  4.7   |  35<H>  |  0.72    Ca    10.6<H>      23 Jan 2020 07:14        Culture Data    Culture - Urine (collected 20 Jan 2020 10:53)  Source: .Urine Clean Catch (Midstream)  Final Report (21 Jan 2020 08:18):    No growth    Culture - Blood (collected 19 Jan 2020 17:47)  Source: .Blood Blood-Peripheral  Preliminary Report (20 Jan 2020 18:01):    No growth to date.    Culture - Blood (collected 19 Jan 2020 17:47)  Source: .Blood Blood-Peripheral  Preliminary Report (20 Jan 2020 18:01):    No growth to date.        < from: Xray Chest 1 View- PORTABLE-Urgent (01.22.20 @ 15:38) >    COMPARISON: Radiograph 1/19/2020.      FINDINGS:      Lines and Tubes: None.      Lungs: Right perihilar opacities are unchanged. The left mid and lower lung opacities are unchanged.      Pleura: Likely left pleural effusion.      Heart and Mediastinum: The heart is not well evaluated.      Skeletal: Unremarkable.        IMPRESSION:    1.  The left mid and lower lung opacities are unchanged.        < end of copied text >

## 2020-01-23 NOTE — DIETITIAN INITIAL EVALUATION ADULT. - PHYSICAL APPEARANCE
debilitated/Simple Nutrition exam completed as Patient weak but noted moderate weight loss to clavicle region and b/l temporal wasting.

## 2020-01-23 NOTE — DIETITIAN INITIAL EVALUATION ADULT. - PROBLEM SELECTOR PLAN 1
cough, SOB, septic in ED - likely PNA  CXR with large L pleural effusion, cannot r/u underlying PNA - f/u CT chest to further eval  treat broadly for now with vanco/zosyn, azithro - f/u vanco levels, monitor Cr closely   pulmonary consult for pleural effusion, ?empyema vs malignancy related, may require thoracentesis   check urine legionella  given significant hypotension, will also treat with solumedrol 40mg IV BID for 5 days   Duoneb and supplemental O2

## 2020-01-23 NOTE — DIETITIAN INITIAL EVALUATION ADULT. - ENERGY NEEDS
HT 63 inches,  pounds,  pounds, BMI 23.9,  pounds.  Dxd with CAP, Lymphoma to lung and now bulky, restrictive.  Skin intact.  No edema noted.

## 2020-01-23 NOTE — PROGRESS NOTE ADULT - PROBLEM SELECTOR PLAN 2
On steroids as per heme for Rx of presumptive lymphoma  Need for any intervention re: elevated total protein as per Heme/Onc  s/p bone marrow bx  and for lung bx

## 2020-01-24 DIAGNOSIS — R59.0 LOCALIZED ENLARGED LYMPH NODES: ICD-10-CM

## 2020-01-24 LAB
CULTURE RESULTS: SIGNIFICANT CHANGE UP
CULTURE RESULTS: SIGNIFICANT CHANGE UP
SPECIMEN SOURCE: SIGNIFICANT CHANGE UP
SPECIMEN SOURCE: SIGNIFICANT CHANGE UP
TM INTERPRETATION: SIGNIFICANT CHANGE UP

## 2020-01-24 PROCEDURE — 99232 SBSQ HOSP IP/OBS MODERATE 35: CPT

## 2020-01-24 PROCEDURE — 99232 SBSQ HOSP IP/OBS MODERATE 35: CPT | Mod: GC

## 2020-01-24 RX ADMIN — DIVALPROEX SODIUM 500 MILLIGRAM(S): 500 TABLET, DELAYED RELEASE ORAL at 05:41

## 2020-01-24 RX ADMIN — LITHIUM CARBONATE 300 MILLIGRAM(S): 300 TABLET, EXTENDED RELEASE ORAL at 05:41

## 2020-01-24 RX ADMIN — PIPERACILLIN AND TAZOBACTAM 25 GRAM(S): 4; .5 INJECTION, POWDER, LYOPHILIZED, FOR SOLUTION INTRAVENOUS at 14:52

## 2020-01-24 RX ADMIN — LITHIUM CARBONATE 300 MILLIGRAM(S): 300 TABLET, EXTENDED RELEASE ORAL at 14:52

## 2020-01-24 RX ADMIN — Medication 3 MILLILITER(S): at 11:55

## 2020-01-24 RX ADMIN — OLANZAPINE 2.5 MILLIGRAM(S): 15 TABLET, FILM COATED ORAL at 22:24

## 2020-01-24 RX ADMIN — Medication 30 MILLIGRAM(S): at 17:29

## 2020-01-24 RX ADMIN — LITHIUM CARBONATE 300 MILLIGRAM(S): 300 TABLET, EXTENDED RELEASE ORAL at 22:24

## 2020-01-24 RX ADMIN — Medication 3 MILLILITER(S): at 17:25

## 2020-01-24 RX ADMIN — BUDESONIDE AND FORMOTEROL FUMARATE DIHYDRATE 2 PUFF(S): 160; 4.5 AEROSOL RESPIRATORY (INHALATION) at 17:29

## 2020-01-24 RX ADMIN — Medication 3 MILLILITER(S): at 05:42

## 2020-01-24 RX ADMIN — POLYETHYLENE GLYCOL 3350 17 GRAM(S): 17 POWDER, FOR SOLUTION ORAL at 05:42

## 2020-01-24 RX ADMIN — Medication 20 MILLIGRAM(S): at 05:41

## 2020-01-24 RX ADMIN — Medication 3 MILLILITER(S): at 00:10

## 2020-01-24 RX ADMIN — PIPERACILLIN AND TAZOBACTAM 25 GRAM(S): 4; .5 INJECTION, POWDER, LYOPHILIZED, FOR SOLUTION INTRAVENOUS at 05:42

## 2020-01-24 RX ADMIN — DIVALPROEX SODIUM 500 MILLIGRAM(S): 500 TABLET, DELAYED RELEASE ORAL at 17:26

## 2020-01-24 RX ADMIN — PIPERACILLIN AND TAZOBACTAM 25 GRAM(S): 4; .5 INJECTION, POWDER, LYOPHILIZED, FOR SOLUTION INTRAVENOUS at 22:24

## 2020-01-24 RX ADMIN — BUDESONIDE AND FORMOTEROL FUMARATE DIHYDRATE 2 PUFF(S): 160; 4.5 AEROSOL RESPIRATORY (INHALATION) at 05:41

## 2020-01-24 RX ADMIN — Medication 3 MILLILITER(S): at 23:16

## 2020-01-24 NOTE — CHART NOTE - NSCHARTNOTEFT_GEN_A_CORE
Nutrition Follow Up Note  Patient seen for: Malnutrition follow up. Pt is a 74 year old female with marginal zone lymphoma admitted with sepsis, chart reviewed, events noted.     Source: Pt    Diet : Regular diet    Patient reports: No N+V, last BM today following laxitives     PO intake : Pt reports appetite and intake have remained suboptimal, pt stated she will try to eat but overall consuming <50% of meals, pt tried mighty shake-would prefer alternate flavor. Pt receptive to ensure enlive.         Daily Weight in k.2 (), Weight in k.2 () no new wt to trend  % Weight Change    Pertinent Medications: MEDICATIONS  (STANDING):  albuterol/ipratropium for Nebulization 3 milliLiter(s) Nebulizer every 6 hours  budesonide 160 MICROgram(s)/formoterol 4.5 MICROgram(s) Inhaler 2 Puff(s) Inhalation two times a day  diVALproex  milliGRAM(s) Oral two times a day  lithium 300 milliGRAM(s) Oral three times a day  OLANZapine 2.5 milliGRAM(s) Oral at bedtime  piperacillin/tazobactam IVPB.. 3.375 Gram(s) IV Intermittent every 8 hours  polyethylene glycol 3350 17 Gram(s) Oral every 12 hours  predniSONE   Tablet 30 milliGRAM(s) Oral two times a day  predniSONE   Tablet   Oral   senna 2 Tablet(s) Oral at bedtime    MEDICATIONS  (PRN):  acetaminophen   Tablet .. 650 milliGRAM(s) Oral every 6 hours PRN Temp greater or equal to 38C (100.4F), Mild Pain (1 - 3)    Pertinent Labs: reviewed   Finger Sticks: none to address      Skin per nursing documentation: free of pressure injury   Edema: none    Estimated Needs:   [x ] no change since previous assessment  [ ] recalculated:     Previous Nutrition Diagnosis: Severe malnutrition   Nutrition Diagnosis is: ongoing, addressed with mighty shake supplements     New Nutrition Diagnosis:  Related to:    As evidenced by:      Interventions:     Recommend  1) Continue regular diet, will continue to provide mighty shakes-increase frequency to 2 daily and will honor flavor preferences  2) Consider adding ensure enlive 1 daily (350 Kcal, 20g protein per 8 oz serving)   3) Continue to encourage po intake and obtain/honor food preferences as able     Monitoring and Evaluation:     Continue to monitor Nutritional intake, Tolerance to diet prescription, weights, labs, skin integrity    RD remains available upon request and will follow up per protocol Nutrition Follow Up Note  Patient seen for: Malnutrition follow up. Pt is a 74 year old female with marginal zone lymphoma admitted with sepsis, chart reviewed, events noted.     Source: Pt    Diet : Regular diet    Patient reports: No N+V, last BM today following laxitives     PO intake : Pt reports appetite and intake have remained suboptimal, pt stated she will try to eat but overall consuming <50% of meals, pt tried mighty shake-would prefer alternate flavor. Pt receptive to ensure enlive.         Daily Weight in k.2 (), Weight in k.2 () no new wt to trend  % Weight Change    Pertinent Medications: MEDICATIONS  (STANDING):  albuterol/ipratropium for Nebulization 3 milliLiter(s) Nebulizer every 6 hours  budesonide 160 MICROgram(s)/formoterol 4.5 MICROgram(s) Inhaler 2 Puff(s) Inhalation two times a day  diVALproex  milliGRAM(s) Oral two times a day  lithium 300 milliGRAM(s) Oral three times a day  OLANZapine 2.5 milliGRAM(s) Oral at bedtime  piperacillin/tazobactam IVPB.. 3.375 Gram(s) IV Intermittent every 8 hours  polyethylene glycol 3350 17 Gram(s) Oral every 12 hours  predniSONE   Tablet 30 milliGRAM(s) Oral two times a day  predniSONE   Tablet   Oral   senna 2 Tablet(s) Oral at bedtime    MEDICATIONS  (PRN):  acetaminophen   Tablet .. 650 milliGRAM(s) Oral every 6 hours PRN Temp greater or equal to 38C (100.4F), Mild Pain (1 - 3)    Pertinent Labs: reviewed   Finger Sticks: none to address      Skin per nursing documentation: free of pressure injury   Edema: none    Estimated Needs:   [x ] no change since previous assessment  [ ] recalculated:     Previous Nutrition Diagnosis: Severe malnutrition   Nutrition Diagnosis is: ongoing, addressed with mighty shake supplements, nutrition care plan in progress    New Nutrition Diagnosis:  Related to:    As evidenced by:      Interventions:     Recommend  1) Continue regular diet, will continue to provide mighty shakes-increase frequency to 2 daily and will honor flavor preferences  2) Consider adding ensure enlive 1 daily (350 Kcal, 20g protein per 8 oz serving)   3) Continue to encourage po intake and obtain/honor food preferences as able     Monitoring and Evaluation:     Continue to monitor Nutritional intake, Tolerance to diet prescription, weights, labs, skin integrity    RD remains available upon request and will follow up per protocol

## 2020-01-24 NOTE — PROGRESS NOTE ADULT - SUBJECTIVE AND OBJECTIVE BOX
Patient is a 74y old  Female who presents with a chief complaint of SOB, fever (2020 09:06)      Vital Signs Last 24 Hrs  T(C): 36.6 (20 @ 05:10), Max: 37.1 (20 @ 22:36)  T(F): 97.9 (20 @ 05:10), Max: 98.8 (20 @ 22:36)  HR: 77 (20 05:10) (77 - 101)  BP: 99/64 (20 @ 05:10) (93/59 - 125/71)  RR: 18 (20 @ 05:10) (17 - 18)  SpO2: 95% (20 @ 05:10) (90% - 98%)            20 @ 07:01  -  20 @ 07:00  --------------------------------------------------------  IN: 1020 mL / OUT: 720 mL / NET: 300 mL        Daily Weight in k.2 (2020 09:59)                          9.7    10.97 )-----------( 200      ( 2020 07:18 )             34.6     140  |  103  |  20  ----------------------------<  107<H>  4.7   |  35<H>  |  0.72            PHYSICAL EXAM  Neurology: A&Ox3, NAD  CV : RRR+S1S2  Lungs: Respirations non-labored, B/L BS CTA  Abdomen: Soft, NT/ND, +BSx4Q  Extremities: B/L LE warm, no edema, +PP             MEDICATIONS  acetaminophen   Tablet .. 650 milliGRAM(s) Oral every 6 hours PRN  albuterol/ipratropium for Nebulization 3 milliLiter(s) Nebulizer every 6 hours  budesonide 160 MICROgram(s)/formoterol 4.5 MICROgram(s) Inhaler 2 Puff(s) Inhalation two times a day  diVALproex  milliGRAM(s) Oral two times a day  lithium 300 milliGRAM(s) Oral three times a day  methylPREDNISolone sodium succinate Injectable 20 milliGRAM(s) IV Push three times a day  OLANZapine 2.5 milliGRAM(s) Oral at bedtime  piperacillin/tazobactam IVPB.. 3.375 Gram(s) IV Intermittent every 8 hours  polyethylene glycol 3350 17 Gram(s) Oral every 12 hours  senna 2 Tablet(s) Oral at bedtime      < from: CT Angio Chest w/ IV Cont (20 @ 13:41) >  There is no pulmonary embolus.     Largeleft hilar mass/adenopathy extending into the mediastinum and surrounding portion of the descending thoracic aorta as described above. The finding represents patient's known history of lymphoma.    The adenopathy is also probably extending into the left neuroforamen of T10, T11 and T12 levels. Further evaluation with MRI of the thoracic spine would be of benefit.    Left internal mammary lymph node.

## 2020-01-24 NOTE — PROGRESS NOTE ADULT - ASSESSMENT
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 Course complicated by hypoxia     Marginal zone Lymphoma- with new bulky lymphadenopathy compromising respiratory status   - discussed with patient's primary hematologist at [facility] [place], Dr. Real Cano again today. per our discussion with Dr. Real Cano and Dr. Alba ,pulmonary attending, we are concerned that she could be transforming to a more aggressive large cell lymphoma.   We  decided as a group to forgo the transfer to Health system and to obtain biopsy of lung tissue however  CT surgery recommending PET scan to evaluate for best possible area   s/p bone marrow biopsy done 1-23  peripheral flow cytometry done showing:  Monotypic B-cells (12% of cells), positive for lambda, CD19, CD20 (bright), FMC-7; negative CD5, CD10, CD23; consistent with CD5 negative, CD10 negative B-cell lymphoma MORPHOLOGY: No absolute lymphocytosis, increased medium-sized lymphocytes with small to moderate cytoplasm, some show irregular nuclei. IMMUNOPHENOTYPE: Monotypic B-cells (12% of cells), positive for lambda, CD19, CD20 (bright), FMC-7; negative CD5, CD10, CD23. There is no increase of CD34, , or CD14 positive cells.  -- please continue IV steroids and antibiotics  - Dr. Real Cano office phones are [phone], [phone] or [phone]     Maite Pinto DO  Hematology/Oncology Fellow  Pager [phone]  Weekdays after 5PM or weekends page hematology/oncology fellow on call

## 2020-01-24 NOTE — PROGRESS NOTE ADULT - SUBJECTIVE AND OBJECTIVE BOX
Follow-up Pulm Progress Note  George Alba MD  [phone]    Continued clinical improvement  Afebrile on Zosyn ;with near normalized WBC  Ambulating with walker in room  Patient adamently declines biopsy or further w/u or rx of lymphoma at [facility]    Vital Signs Last 24 Hrs  T(C): 36.8 (24 Jan 2020 10:21), Max: 37.1 (23 Jan 2020 22:36)  T(F): 98.2 (24 Jan 2020 10:21), Max: 98.8 (23 Jan 2020 22:36)  HR: 99 (24 Jan 2020 10:32) (77 - 103)  BP: 101/68 (24 Jan 2020 10:32) (92/52 - 122/73)  BP(mean): --  RR: 17 (24 Jan 2020 10:32) (17 - 18)  SpO2: 93% (24 Jan 2020 10:32) (90% - 95%)                       9.7    10.97 )-----------( 200      ( 23 Jan 2020 07:18 )             34.6     01-23    140  |  103  |  20  ----------------------------<  107<H>  4.7   |  35<H>  |  0.72    Ca    10.6<H>      23 Jan 2020 07:14      CULTURES:  Culture Results:   No growth (01-20 @ 10:53)  Culture Results:   No growth to date. (01-19 @ 17:47)  Culture Results:   No growth to date. (01-19 @ 17:47)    Most recent blood culture -- 01-20 @ 10:53   -- -- .Urine Clean Catch (Midstream) 01-20 @ 10:53  Most recent blood culture -- 01-19 @ 17:47   -- -- .Blood Blood-Peripheral 01-19 @ 17:47      Physical Examination:  PULM: Wheezing and rhonchi resolved; diminished L basilar BS  CVS: Regular rate and rhythm, no murmurs, rubs, or gallops  ABD: Soft, non-tender  EXT:  No clubbing, cyanosis, or edema    RADIOLOGY REVIEWED  CXR:    CT chest:    TTE:

## 2020-01-24 NOTE — PROGRESS NOTE ADULT - SUBJECTIVE AND OBJECTIVE BOX
INTERVAL HPI/OVERNIGHT EVENTS:  Patient S&E at bedside. No o/n events,     VITAL SIGNS:  T(F): 98.2 (01-24-20 @ 10:21)  HR: 99 (01-24-20 @ 10:32)  BP: 101/68 (01-24-20 @ 10:32)  RR: 17 (01-24-20 @ 10:32)  SpO2: 93% (01-24-20 @ 10:32)  Wt(kg): --    PHYSICAL EXAM:    Constitutional: NAD  Eyes: EOMI, sclera non-icteric  Neck: supple, no masses, no JVD  Respiratory: CTA b/l, good air entry b/l  Cardiovascular: RRR, no M/R/G  Gastrointestinal: soft, NTND, no masses palpable, + BS, no hepatosplenomegaly  Extremities: no c/c/e  Neurological: AAOx3      MEDICATIONS  (STANDING):  albuterol/ipratropium for Nebulization 3 milliLiter(s) Nebulizer every 6 hours  budesonide 160 MICROgram(s)/formoterol 4.5 MICROgram(s) Inhaler 2 Puff(s) Inhalation two times a day  diVALproex  milliGRAM(s) Oral two times a day  lithium 300 milliGRAM(s) Oral three times a day  OLANZapine 2.5 milliGRAM(s) Oral at bedtime  piperacillin/tazobactam IVPB.. 3.375 Gram(s) IV Intermittent every 8 hours  polyethylene glycol 3350 17 Gram(s) Oral every 12 hours  predniSONE   Tablet 30 milliGRAM(s) Oral two times a day  predniSONE   Tablet   Oral   senna 2 Tablet(s) Oral at bedtime    MEDICATIONS  (PRN):  acetaminophen   Tablet .. 650 milliGRAM(s) Oral every 6 hours PRN Temp greater or equal to 38C (100.4F), Mild Pain (1 - 3)      Allergies    latex (Rash)  No Known Drug Allergies    Intolerances        LABS:                        9.7    10.97 )-----------( 200      ( 23 Jan 2020 07:18 )             34.6     01-23    140  |  103  |  20  ----------------------------<  107<H>  4.7   |  35<H>  |  0.72    Ca    10.6<H>      23 Jan 2020 07:14            RADIOLOGY & ADDITIONAL TESTS:  Studies reviewed.

## 2020-01-24 NOTE — PROGRESS NOTE ADULT - ASSESSMENT
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 Cano at [facility]), bipolar disorder, HLD, asthma who presented with SOB and admitted for pneumonia treatment however found to have a mediastinal mass concerning for lymphoma recurrence on CTA Chest.  1/23 s/p bone marrow biopsy by Heme/Onc

## 2020-01-24 NOTE — PROGRESS NOTE ADULT - ASSESSMENT
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 ACute hypoxemeic repsiratory failure  1/22: FIO2 requirements decreased; subjectively improved    REC    Respiratory status now improved to level where transfer would be safe and feasbile  She has declined further w/u at [facility] and [place] [facility]  She said she was willing to stay thru weekend, which would be ideal, so IV antibiotics can be continued  Will need RA sats checked at rest and ambulating to assess need for home O2.  Could be transferred MON or potentially DC if outpatient office appt would be possible MON

## 2020-01-24 NOTE — PROGRESS NOTE ADULT - ASSESSMENT
74F with PMH of lymphoma (dx 2005, s/p CARRIE lobectomy, relapsed in 2019 but not currently on chemo/RT, being followed by heme every 3-6months at [facility]), bipolar disorder, asthma p/w SOB and fever, a/w sepsis 2/2 likely respiratory source.

## 2020-01-24 NOTE — PROGRESS NOTE ADULT - PROBLEM SELECTOR PLAN 1
appreciate pulm recs  continuous pulse ox monitoring  CT chest Large left hilar mass/adenopathy extending into the mediastinum and surrounding portion of the descending thoracic aorta as described above. adenopathy is also probably extending into the left neuroforamen of T10, T11 and T12 levels.  cont high dose steroids  shana Alba, no plans for transfer as shana [place] [facility]  CTS request PET scan - will try to obtain approval for PET from Dr. Remy  as discussed with patient, she does not want to pursue diagnosis and treatment here. she would like to be discharged so she can go to [place]. at this time patient remains acute and not safe for discharge. patient understands this. patient is alert and oriented and has capacity. She does not want me to speak with her family including her  and children. She does not want me to inform them about findings.

## 2020-01-24 NOTE — PROGRESS NOTE ADULT - ASSESSMENT
74F with PMH of lymphoma (dx 2005, s/p CARRIE lobectomy, relapsed in 2019 but not currently on chemo/RT, being [place] [facility] bipolar disorder, asthma p/w SOB and fever and noted large lefttumor and concern for post obstructive PNA  Now on steroids for concern of recurrent lymphoma.  WBC down slightly  Likely mild hypercalcemia for albumin  1/23 bone marrow bx

## 2020-01-24 NOTE — PROGRESS NOTE ADULT - SUBJECTIVE AND OBJECTIVE BOX
[facility], Division of Infectious Diseases  JAZMINE Méndez A. Lee  [phone]  Name: MANDI GASPAR  Age: 74y  Gender: Female  MRN: [id number]    Interval History--  Notes reviewed  no new complaints  states her breathing continues to improve    Past Medical History--  Lymphoma  Asthma  Manic depression  Hyperlipidemia  GERD (gastroesophageal reflux disease)  Injury of left shoulder, initial encounter  History of lobectomy of lung      For details regarding the patient's social history, family history, and other miscellaneous elements, please refer the initial infectious diseases consultation and/or the admitting history and physical examination for this admission.    Allergies    latex (Rash)  No Known Drug Allergies    Intolerances        Medications--  Antibiotics:  piperacillin/tazobactam IVPB.. 3.375 Gram(s) IV Intermittent every 8 hours    Immunologic:    Other:  acetaminophen   Tablet .. PRN  albuterol/ipratropium for Nebulization  budesonide 160 MICROgram(s)/formoterol 4.5 MICROgram(s) Inhaler  diVALproex DR  lithium  OLANZapine  polyethylene glycol 3350  predniSONE   Tablet  predniSONE   Tablet  senna      Review of Systems--  A 10-point review of systems was obtained.     Pertinent positives and negatives--  Constitutional: No fevers. No Chills. No Rigors.   Cardiovascular: No chest pain. No palpitations.  Respiratory: No shortness of breath. No cough.  Gastrointestinal: No nausea or vomiting. No diarrhea or constipation.   Psychiatric: no anxiety    Review of systems otherwise negative except as previously noted.    Physical Examination--  Vital Signs: T(F): 98.5 (01-24-20 @ 17:03), Max: 98.8 (01-23-20 @ 22:36)  HR: 127 (01-24-20 @ 17:23)  BP: 102/67 (01-24-20 @ 17:03)  RR: 19 (01-24-20 @ 17:23)  SpO2: 83% (01-24-20 @ 17:23)  Wt(kg): --  General: Nontoxic-appearing Female in no acute distress.  HEENT: AT/NC. . Anicteric. Conjunctiva pink and moist.   Neck: Not rigid. No sense of mass.  Nodes: None palpable.  Lungs: Clear bilaterally without rales, wheezing or rhonchi  Heart: Regular rate and rhythm. No Murmur. No rub.   Abdomen: Bowel sounds present and normoactive. Soft. Nondistended. Nontender.   Back: No spinal tenderness. No costovertebral angle tenderness.   Extremities: No cyanosis or clubbing. + edema.   Skin: Warm. Dry. Good turgor. No rash. No vasculitic stigmata.  Psychiatric: Appropriate affect and mood for situation.         Laboratory Studies--  CBC                        9.7    10.97 )-----------( 200      ( 23 Jan 2020 07:18 )             34.6       Chemistries  01-23    140  |  103  |  20  ----------------------------<  107<H>  4.7   |  35<H>  |  0.72    Ca    10.6<H>      23 Jan 2020 07:14        Culture Data    Culture - Urine (collected 20 Jan 2020 10:53)  Source: .Urine Clean Catch (Midstream)  Final Report (21 Jan 2020 08:18):    No growth    Culture - Blood (collected 19 Jan 2020 17:47)  Source: .Blood Blood-Peripheral  Preliminary Report (20 Jan 2020 18:01):    No growth to date.    Culture - Blood (collected 19 Jan 2020 17:47)  Source: .Blood Blood-Peripheral  Preliminary Report (20 Jan 2020 18:01):    No growth to date.        < from: Xray Chest 1 View- PORTABLE-Urgent (01.22.20 @ 15:38) >    EXAM:  XR CHEST PORTABLE URGENT 1V                            PROCEDURE DATE:  01/22/2020            INTERPRETATION:  XR CHEST URGENT    TECHNIQUE: One view of the chest, AP view, was obtained.    INDICATION: Lymphoma. Obstructive left mainstem bronchus. Difficulty breathing.      COMPARISON: Radiograph 1/19/2020.      FINDINGS:      Lines and Tubes: None.      Lungs: Right perihilar opacities are unchanged. The left mid and lower lung opacities are unchanged.      Pleura: Likely left pleural effusion.      Heart and Mediastinum: The heart is not well evaluated.      Skeletal: Unremarkable.        IMPRESSION:    1.  The left mid and lower lung opacities are unchanged.    < end of copied text >

## 2020-01-24 NOTE — PROGRESS NOTE ADULT - PROBLEM SELECTOR PLAN 1
-1/20 CTA Chest reviewed by Dr. Barrera - currently without clear targets for biopsy, mediastinal mass vs collapse lung  -Recommend obtaining a PET CT to further guide target lymph node for biopsy  -f/u heme/onc and pulm recs  -Continue care as per primary team -1/20 CTA Chest reviewed by Dr. Barrera - currently without clear targets for biopsy, mediastinal mass vs collapse lung  -Recommend obtaining a PET CT to further guide target lymph node for biopsy  -Patient currently requesting transfer to [facility] for further treatment with her oncologist Dr. Cano  -f/u heme/onc and pulm recs  -Continue care as per primary team

## 2020-01-24 NOTE — PROGRESS NOTE ADULT - SUBJECTIVE AND OBJECTIVE BOX
Patient is a 74y old  Female who presents with a chief complaint of SOB, fever (23 Jan 2020 19:14)      SUBJECTIVE / OVERNIGHT EVENTS:    Patient seen and examined. feels improved. doing well on 3L. no cp sob cough nvd.      Vital Signs Last 24 Hrs  T(C): 36.6 (24 Jan 2020 05:10), Max: 37.1 (23 Jan 2020 22:36)  T(F): 97.9 (24 Jan 2020 05:10), Max: 98.8 (23 Jan 2020 22:36)  HR: 77 (24 Jan 2020 05:10) (77 - 101)  BP: 99/64 (24 Jan 2020 05:10) (93/59 - 125/71)  BP(mean): --  RR: 18 (24 Jan 2020 05:10) (17 - 18)  SpO2: 95% (24 Jan 2020 05:10) (90% - 98%)  I&O's Summary    23 Jan 2020 07:01  -  24 Jan 2020 07:00  --------------------------------------------------------  IN: 1020 mL / OUT: 720 mL / NET: 300 mL    24 Jan 2020 07:01  -  24 Jan 2020 09:06  --------------------------------------------------------  IN: 100 mL / OUT: 0 mL / NET: 100 mL        PE:  GENERAL: NAD, AAOx3, frail  HEAD:  Atraumatic, Normocephalic  NECK: Supple, No JVD  CHEST/LUNG: Coarse bs  HEART: Regular rate and rhythm; no murmur  ABDOMEN: Soft, Nontender, Nondistended; Bowel sounds present  EXTREMITIES:  2+ Peripheral Pulses, No clubbing, cyanosis, or edema  SKIN: No rashes or lesions  NEURO: No focal deficits    LABS:                        9.7    10.97 )-----------( 200      ( 23 Jan 2020 07:18 )             34.6     01-23    140  |  103  |  20  ----------------------------<  107<H>  4.7   |  35<H>  |  0.72    Ca    10.6<H>      23 Jan 2020 07:14        CAPILLARY BLOOD GLUCOSE                RADIOLOGY & ADDITIONAL TESTS:    Imaging Personally Reviewed:  [x] YES  [ ] NO    Consultant(s) Notes Reviewed:  [x] YES  [ ] NO    MEDICATIONS  (STANDING):  albuterol/ipratropium for Nebulization 3 milliLiter(s) Nebulizer every 6 hours  budesonide 160 MICROgram(s)/formoterol 4.5 MICROgram(s) Inhaler 2 Puff(s) Inhalation two times a day  diVALproex  milliGRAM(s) Oral two times a day  lithium 300 milliGRAM(s) Oral three times a day  methylPREDNISolone sodium succinate Injectable 20 milliGRAM(s) IV Push three times a day  OLANZapine 2.5 milliGRAM(s) Oral at bedtime  piperacillin/tazobactam IVPB.. 3.375 Gram(s) IV Intermittent every 8 hours  polyethylene glycol 3350 17 Gram(s) Oral every 12 hours  senna 2 Tablet(s) Oral at bedtime    MEDICATIONS  (PRN):  acetaminophen   Tablet .. 650 milliGRAM(s) Oral every 6 hours PRN Temp greater or equal to 38C (100.4F), Mild Pain (1 - 3)      Care Discussed with Consultants/Other Providers [x] YES  [ ] NO    HEALTH ISSUES - PROBLEM Dx:  Constipation, unspecified constipation type: Constipation, unspecified constipation type  Leukocytosis: Leukocytosis  Pleural effusion, left: Pleural effusion, left  Pneumonia: Pneumonia  Asthma: Asthma  Bipolar disorder: Bipolar disorder  Anemia: Anemia  Lymphoma: Lymphoma  Abnormal CBC: Abnormal CBC  Acute respiratory failure with hypoxia: Acute respiratory failure with hypoxia  Sepsis: Sepsis  Community acquired pneumonia: Community acquired pneumonia

## 2020-01-25 LAB
ANION GAP SERPL CALC-SCNC: 7 MMOL/L — SIGNIFICANT CHANGE UP (ref 5–17)
BUN SERPL-MCNC: 28 MG/DL — HIGH (ref 7–23)
CALCIUM SERPL-MCNC: 11.8 MG/DL — HIGH (ref 8.4–10.5)
CHLORIDE SERPL-SCNC: 98 MMOL/L — SIGNIFICANT CHANGE UP (ref 96–108)
CO2 SERPL-SCNC: 34 MMOL/L — HIGH (ref 22–31)
CREAT SERPL-MCNC: 0.8 MG/DL — SIGNIFICANT CHANGE UP (ref 0.5–1.3)
GLUCOSE SERPL-MCNC: 103 MG/DL — HIGH (ref 70–99)
HCT VFR BLD CALC: 32 % — LOW (ref 34.5–45)
HGB BLD-MCNC: 9.3 G/DL — LOW (ref 11.5–15.5)
MCHC RBC-ENTMCNC: 28.5 PG — SIGNIFICANT CHANGE UP (ref 27–34)
MCHC RBC-ENTMCNC: 29.1 GM/DL — LOW (ref 32–36)
MCV RBC AUTO: 98.2 FL — SIGNIFICANT CHANGE UP (ref 80–100)
NRBC # BLD: 0 /100 WBCS — SIGNIFICANT CHANGE UP (ref 0–0)
PLATELET # BLD AUTO: 189 K/UL — SIGNIFICANT CHANGE UP (ref 150–400)
POTASSIUM SERPL-MCNC: 5.2 MMOL/L — SIGNIFICANT CHANGE UP (ref 3.5–5.3)
POTASSIUM SERPL-SCNC: 5.2 MMOL/L — SIGNIFICANT CHANGE UP (ref 3.5–5.3)
RBC # BLD: 3.26 M/UL — LOW (ref 3.8–5.2)
RBC # FLD: 16.7 % — HIGH (ref 10.3–14.5)
SODIUM SERPL-SCNC: 139 MMOL/L — SIGNIFICANT CHANGE UP (ref 135–145)
WBC # BLD: 11.62 K/UL — HIGH (ref 3.8–10.5)
WBC # FLD AUTO: 11.62 K/UL — HIGH (ref 3.8–10.5)

## 2020-01-25 PROCEDURE — 71045 X-RAY EXAM CHEST 1 VIEW: CPT | Mod: 26

## 2020-01-25 PROCEDURE — 93010 ELECTROCARDIOGRAM REPORT: CPT

## 2020-01-25 RX ADMIN — DIVALPROEX SODIUM 500 MILLIGRAM(S): 500 TABLET, DELAYED RELEASE ORAL at 18:18

## 2020-01-25 RX ADMIN — Medication 3 MILLILITER(S): at 12:35

## 2020-01-25 RX ADMIN — Medication 30 MILLIGRAM(S): at 18:17

## 2020-01-25 RX ADMIN — DIVALPROEX SODIUM 500 MILLIGRAM(S): 500 TABLET, DELAYED RELEASE ORAL at 05:32

## 2020-01-25 RX ADMIN — Medication 3 MILLILITER(S): at 23:04

## 2020-01-25 RX ADMIN — BUDESONIDE AND FORMOTEROL FUMARATE DIHYDRATE 2 PUFF(S): 160; 4.5 AEROSOL RESPIRATORY (INHALATION) at 05:31

## 2020-01-25 RX ADMIN — LITHIUM CARBONATE 300 MILLIGRAM(S): 300 TABLET, EXTENDED RELEASE ORAL at 21:47

## 2020-01-25 RX ADMIN — OLANZAPINE 2.5 MILLIGRAM(S): 15 TABLET, FILM COATED ORAL at 21:47

## 2020-01-25 RX ADMIN — Medication 30 MILLIGRAM(S): at 05:32

## 2020-01-25 RX ADMIN — LITHIUM CARBONATE 300 MILLIGRAM(S): 300 TABLET, EXTENDED RELEASE ORAL at 05:32

## 2020-01-25 RX ADMIN — Medication 3 MILLILITER(S): at 05:31

## 2020-01-25 RX ADMIN — BUDESONIDE AND FORMOTEROL FUMARATE DIHYDRATE 2 PUFF(S): 160; 4.5 AEROSOL RESPIRATORY (INHALATION) at 18:16

## 2020-01-25 RX ADMIN — PIPERACILLIN AND TAZOBACTAM 25 GRAM(S): 4; .5 INJECTION, POWDER, LYOPHILIZED, FOR SOLUTION INTRAVENOUS at 05:31

## 2020-01-25 RX ADMIN — Medication 3 MILLILITER(S): at 18:16

## 2020-01-25 RX ADMIN — LITHIUM CARBONATE 300 MILLIGRAM(S): 300 TABLET, EXTENDED RELEASE ORAL at 14:08

## 2020-01-25 RX ADMIN — PIPERACILLIN AND TAZOBACTAM 25 GRAM(S): 4; .5 INJECTION, POWDER, LYOPHILIZED, FOR SOLUTION INTRAVENOUS at 21:47

## 2020-01-25 RX ADMIN — PIPERACILLIN AND TAZOBACTAM 25 GRAM(S): 4; .5 INJECTION, POWDER, LYOPHILIZED, FOR SOLUTION INTRAVENOUS at 14:14

## 2020-01-25 NOTE — PROGRESS NOTE ADULT - SUBJECTIVE AND OBJECTIVE BOX
Follow-up Pulm Progress Note  George Alba MD  [phone]    AFebrile day #7 Chani  Feels well: ambulated without oxygen - post RA sat at bedside 90%  Says dyspnea resolved    Vital Signs Last 24 Hrs  T(C): 36.3 (25 Jan 2020 13:37), Max: 36.9 (24 Jan 2020 17:03)  T(F): 97.4 (25 Jan 2020 13:37), Max: 98.5 (24 Jan 2020 17:03)  HR: 117 (25 Jan 2020 14:23) (78 - 127)  BP: 101/59 (25 Jan 2020 13:37) (97/58 - 116/78)  BP(mean): --  RR: 20 (25 Jan 2020 14:23) (17 - 20)  SpO2: 89% (25 Jan 2020 14:23) (83% - 96%)                        9.3    11.62 )-----------( 189      ( 25 Jan 2020 06:55 )             32.0     01-25    139  |  98  |  28<H>  ----------------------------<  103<H>  5.2   |  34<H>  |  0.80    Ca    11.8<H>      25 Jan 2020 06:51      CULTURES:  Culture Results:   No growth (01-20 @ 10:53)  Culture Results:   No growth to date. (01-19 @ 17:47)  Culture Results:   No growth to date. (01-19 @ 17:47)    Most recent blood culture -- 01-20 @ 10:53   -- -- .Urine Clean Catch (Midstream) 01-20 @ 10:53  Most recent blood culture -- 01-19 @ 17:47   -- -- .Blood Blood-Peripheral 01-19 @ 17:47      Physical Examination:  PULM: Clear except  diminished L basilar BS  CVS: Regular rate and rhythm, no murmurs, rubs, or gallops  ABD: Soft, non-tender  EXT:  No clubbing, cyanosis, or edema    RADIOLOGY REVIEWED  CXR:    CT chest:    TTE:

## 2020-01-25 NOTE — CHART NOTE - NSCHARTNOTEFT_GEN_A_CORE
Patient is currently requesting to be discharged and pursue further treatment and workup with her oncologist Dr. Cano at [facility]. Thoracic Surgery to sign off at this time - please reconsult us ([phone]) if PET CT scan is completed as inpatient to guide target lymph node for biopsy. Otherwise patient can follow up with Dr. Barrera as outpatient at [facility] Thoracic Surgery office. Call [phone] to schedule an appointment.

## 2020-01-25 NOTE — PROGRESS NOTE ADULT - ASSESSMENT
74F with PMH of lymphoma (dx 2005, s/p CARRIE lobectomy, relapsed in 2019 but not currently on chemo/RT, being followed by heme every 3-6months [place] [facility] bipolar disorder, asthma p/w SOB and fever, a/w sepsis 2/2 likely respiratory source.

## 2020-01-25 NOTE — PROGRESS NOTE ADULT - ASSESSMENT
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 ACute hypoxemeic repsiratory failure  1/22: FIO2 requirements decreased; subjectively improved  1/25: Normal RA sat post walking. COmpleted 7 days Zosyn  REC    Complete Zosyn 1/26 - deferr to ID  DC planning for MON on short prednisone taper  Patient scheduled for outpatient f/u with Jerome at [facility] on Thurs  Monitor RA sats: patient does not appear to need home O2

## 2020-01-25 NOTE — PROGRESS NOTE ADULT - SUBJECTIVE AND OBJECTIVE BOX
Patient is a 74y old  Female who presents with a chief complaint of SOB, fever (24 Jan 2020 17:35)      SUBJECTIVE / OVERNIGHT EVENTS:    Patient seen and examined. feels okay. no cp sob. on 4L NC.      Vital Signs Last 24 Hrs  T(C): 36.7 (25 Jan 2020 05:12), Max: 37 (24 Jan 2020 13:41)  T(F): 98.1 (25 Jan 2020 05:12), Max: 98.6 (24 Jan 2020 13:41)  HR: 78 (25 Jan 2020 05:12) (78 - 127)  BP: 103/67 (25 Jan 2020 05:12) (92/52 - 116/78)  BP(mean): --  RR: 18 (25 Jan 2020 05:12) (17 - 19)  SpO2: 96% (25 Jan 2020 05:12) (83% - 96%)  I&O's Summary    24 Jan 2020 07:01  -  25 Jan 2020 07:00  --------------------------------------------------------  IN: 1300 mL / OUT: 220 mL / NET: 1080 mL        PE:  GENERAL: NAD, AAOx3, frail  HEAD:  Atraumatic, Normocephalic  NECK: Supple, No JVD  CHEST/LUNG: Coarse bs  HEART: Regular rate and rhythm; no murmur  ABDOMEN: Soft, Nontender, Nondistended; Bowel sounds present  EXTREMITIES:  2+ Peripheral Pulses, No clubbing, cyanosis, or edema  SKIN: No rashes or lesions  NEURO: No focal deficits      LABS:                        9.3    11.62 )-----------( 189      ( 25 Jan 2020 06:55 )             32.0     01-25    139  |  98  |  x   ----------------------------<  103<H>  5.2   |  x   |  x           CAPILLARY BLOOD GLUCOSE                RADIOLOGY & ADDITIONAL TESTS:    Imaging Personally Reviewed:  [x] YES  [ ] NO    Consultant(s) Notes Reviewed:  [x] YES  [ ] NO    MEDICATIONS  (STANDING):  albuterol/ipratropium for Nebulization 3 milliLiter(s) Nebulizer every 6 hours  budesonide 160 MICROgram(s)/formoterol 4.5 MICROgram(s) Inhaler 2 Puff(s) Inhalation two times a day  diVALproex  milliGRAM(s) Oral two times a day  lithium 300 milliGRAM(s) Oral three times a day  OLANZapine 2.5 milliGRAM(s) Oral at bedtime  piperacillin/tazobactam IVPB.. 3.375 Gram(s) IV Intermittent every 8 hours  polyethylene glycol 3350 17 Gram(s) Oral every 12 hours  predniSONE   Tablet 30 milliGRAM(s) Oral two times a day  predniSONE   Tablet   Oral   senna 2 Tablet(s) Oral at bedtime    MEDICATIONS  (PRN):  acetaminophen   Tablet .. 650 milliGRAM(s) Oral every 6 hours PRN Temp greater or equal to 38C (100.4F), Mild Pain (1 - 3)      Care Discussed with Consultants/Other Providers [x] YES  [ ] NO    HEALTH ISSUES - PROBLEM Dx:  Mediastinal adenopathy: Mediastinal adenopathy  Constipation, unspecified constipation type: Constipation, unspecified constipation type  Leukocytosis: Leukocytosis  Pleural effusion, left: Pleural effusion, left  Pneumonia: Pneumonia  Asthma: Asthma  Bipolar disorder: Bipolar disorder  Anemia: Anemia  Lymphoma: Lymphoma  Abnormal CBC: Abnormal CBC  Acute respiratory failure with hypoxia: Acute respiratory failure with hypoxia  Sepsis: Sepsis  Community acquired pneumonia: Community acquired pneumonia

## 2020-01-25 NOTE — PROGRESS NOTE ADULT - PROBLEM SELECTOR PLAN 1
appreciate pulm recs  continuous pulse ox monitoring  CT chest Large left hilar mass/adenopathy extending into the mediastinum likely bulky lymphoma  prednisone taper  shana Alba, no plans for transfer as shana patient's oncologist Dr. Crawford at [facility]  as discussed with patient, she does not want to pursue diagnosis and treatment here. she would like to be discharged so she can go to [place]. patient understands this. patient is alert and oriented and has capacity. She does not want me to speak with her family including her  and children. She does not want me to inform them about findings.  hopefully can taper oxygen and plan for DC early next week  fu CXR

## 2020-01-26 RX ADMIN — Medication 30 MILLIGRAM(S): at 06:13

## 2020-01-26 RX ADMIN — PIPERACILLIN AND TAZOBACTAM 25 GRAM(S): 4; .5 INJECTION, POWDER, LYOPHILIZED, FOR SOLUTION INTRAVENOUS at 21:12

## 2020-01-26 RX ADMIN — PIPERACILLIN AND TAZOBACTAM 25 GRAM(S): 4; .5 INJECTION, POWDER, LYOPHILIZED, FOR SOLUTION INTRAVENOUS at 13:26

## 2020-01-26 RX ADMIN — LITHIUM CARBONATE 300 MILLIGRAM(S): 300 TABLET, EXTENDED RELEASE ORAL at 21:12

## 2020-01-26 RX ADMIN — Medication 3 MILLILITER(S): at 06:13

## 2020-01-26 RX ADMIN — Medication 3 MILLILITER(S): at 18:29

## 2020-01-26 RX ADMIN — BUDESONIDE AND FORMOTEROL FUMARATE DIHYDRATE 2 PUFF(S): 160; 4.5 AEROSOL RESPIRATORY (INHALATION) at 06:13

## 2020-01-26 RX ADMIN — Medication 3 MILLILITER(S): at 13:22

## 2020-01-26 RX ADMIN — Medication 20 MILLIGRAM(S): at 18:29

## 2020-01-26 RX ADMIN — DIVALPROEX SODIUM 500 MILLIGRAM(S): 500 TABLET, DELAYED RELEASE ORAL at 06:13

## 2020-01-26 RX ADMIN — BUDESONIDE AND FORMOTEROL FUMARATE DIHYDRATE 2 PUFF(S): 160; 4.5 AEROSOL RESPIRATORY (INHALATION) at 18:29

## 2020-01-26 RX ADMIN — LITHIUM CARBONATE 300 MILLIGRAM(S): 300 TABLET, EXTENDED RELEASE ORAL at 06:13

## 2020-01-26 RX ADMIN — PIPERACILLIN AND TAZOBACTAM 25 GRAM(S): 4; .5 INJECTION, POWDER, LYOPHILIZED, FOR SOLUTION INTRAVENOUS at 06:14

## 2020-01-26 RX ADMIN — LITHIUM CARBONATE 300 MILLIGRAM(S): 300 TABLET, EXTENDED RELEASE ORAL at 13:22

## 2020-01-26 RX ADMIN — DIVALPROEX SODIUM 500 MILLIGRAM(S): 500 TABLET, DELAYED RELEASE ORAL at 18:28

## 2020-01-26 RX ADMIN — OLANZAPINE 2.5 MILLIGRAM(S): 15 TABLET, FILM COATED ORAL at 21:12

## 2020-01-26 NOTE — PROGRESS NOTE ADULT - SUBJECTIVE AND OBJECTIVE BOX
Follow-up Pulm Progress Note  George Alba MD  [phone]    AFebrile day #8 Zosyn  Stable clinically: feels well, OOB  Single RA sat yesterday of 85% - back on nasal O2    Vital Signs Last 24 Hrs  T(C): 36.8 (26 Jan 2020 08:43), Max: 37.3 (25 Jan 2020 14:44)  T(F): 98.2 (26 Jan 2020 08:43), Max: 99.1 (25 Jan 2020 14:44)  HR: 84 (26 Jan 2020 08:43) (84 - 117)  BP: 101/65 (26 Jan 2020 08:43) (100/63 - 112/64)  BP(mean): --  RR: 18 (26 Jan 2020 08:43) (17 - 20)  SpO2: 93% (26 Jan 2020 08:43) (85% - 99%)                        9.3    11.62 )-----------( 189      ( 25 Jan 2020 06:55 )             32.0       01-25    139  |  98  |  28<H>  ----------------------------<  103<H>  5.2   |  34<H>  |  0.80    Ca    11.8<H>      25 Jan 2020 06:51        CULTURES:  Culture Results:   No growth (01-20 @ 10:53)  Culture Results:   No growth to date. (01-19 @ 17:47)  Culture Results:   No growth to date. (01-19 @ 17:47)    Most recent blood culture -- 01-20 @ 10:53   -- -- .Urine Clean Catch (Midstream) 01-20 @ 10:53  Most recent blood culture -- 01-19 @ 17:47   -- -- .Blood Blood-Peripheral 01-19 @ 17:47      Physical Examination:  PULM: Clear except  diminished L basilar BS  CVS: Regular rate and rhythm, no murmurs, rubs, or gallops  ABD: Soft, non-tender  EXT:  No clubbing, cyanosis, or edema    RADIOLOGY REVIEWED  CXR:    CT chest:    TTE:

## 2020-01-26 NOTE — PROGRESS NOTE ADULT - SUBJECTIVE AND OBJECTIVE BOX
Patient is a 74y old  Female who presents with a chief complaint of SOB, fever (25 Jan 2020 14:43)      SUBJECTIVE / OVERNIGHT EVENTS:    Patient seen and examined. feels well. no cough. on 3L O2. ambulated in the hallway yesterday with rolling walker.      Vital Signs Last 24 Hrs  T(C): 36.7 (26 Jan 2020 06:09), Max: 37.3 (25 Jan 2020 14:44)  T(F): 98.1 (26 Jan 2020 06:09), Max: 99.1 (25 Jan 2020 14:44)  HR: 86 (26 Jan 2020 06:09) (86 - 117)  BP: 112/64 (26 Jan 2020 06:09) (97/58 - 112/64)  BP(mean): --  RR: 18 (26 Jan 2020 06:09) (17 - 20)  SpO2: 99% (26 Jan 2020 06:09) (85% - 99%)  I&O's Summary    25 Jan 2020 07:01  -  26 Jan 2020 07:00  --------------------------------------------------------  IN: 1120 mL / OUT: 0 mL / NET: 1120 mL        PE:  GENERAL: NAD, AAOx3  HEAD:  Atraumatic, Normocephalic  EYES: EOMI, PERRLA, conjunctiva and sclera clear  LUNGS: CTABL  HEART: Regular rate and rhythm no murmur  ABDOMEN: Soft, Nontender, Nondistended; Bowel sounds present  EXTREMITIES:  2+ Peripheral Pulses, No clubbing, cyanosis, or edema  SKIN: No rashes or lesions  NEURO: No focal deficits    LABS:                        9.3    11.62 )-----------( 189      ( 25 Jan 2020 06:55 )             32.0     01-25    139  |  98  |  28<H>  ----------------------------<  103<H>  5.2   |  34<H>  |  0.80    Ca    11.8<H>      25 Jan 2020 06:51        CAPILLARY BLOOD GLUCOSE                RADIOLOGY & ADDITIONAL TESTS:    Imaging Personally Reviewed:  [x] YES  [ ] NO    Consultant(s) Notes Reviewed:  [x] YES  [ ] NO    MEDICATIONS  (STANDING):  albuterol/ipratropium for Nebulization 3 milliLiter(s) Nebulizer every 6 hours  budesonide 160 MICROgram(s)/formoterol 4.5 MICROgram(s) Inhaler 2 Puff(s) Inhalation two times a day  diVALproex  milliGRAM(s) Oral two times a day  lithium 300 milliGRAM(s) Oral three times a day  OLANZapine 2.5 milliGRAM(s) Oral at bedtime  piperacillin/tazobactam IVPB.. 3.375 Gram(s) IV Intermittent every 8 hours  polyethylene glycol 3350 17 Gram(s) Oral every 12 hours  predniSONE   Tablet 20 milliGRAM(s) Oral two times a day  predniSONE   Tablet   Oral   senna 2 Tablet(s) Oral at bedtime    MEDICATIONS  (PRN):  acetaminophen   Tablet .. 650 milliGRAM(s) Oral every 6 hours PRN Temp greater or equal to 38C (100.4F), Mild Pain (1 - 3)      Care Discussed with Consultants/Other Providers [x] YES  [ ] NO    HEALTH ISSUES - PROBLEM Dx:  Mediastinal adenopathy: Mediastinal adenopathy  Constipation, unspecified constipation type: Constipation, unspecified constipation type  Leukocytosis: Leukocytosis  Pleural effusion, left: Pleural effusion, left  Pneumonia: Pneumonia  Asthma: Asthma  Bipolar disorder: Bipolar disorder  Anemia: Anemia  Lymphoma: Lymphoma  Abnormal CBC: Abnormal CBC  Acute respiratory failure with hypoxia: Acute respiratory failure with hypoxia  Sepsis: Sepsis  Community acquired pneumonia: Community acquired pneumonia Patient is a 74y old  Female who presents with a chief complaint of SOB, fever (25 Jan 2020 14:43)      SUBJECTIVE / OVERNIGHT EVENTS:    Patient seen and examined. feels well. no cough. on 3L O2. ambulated in the hallway yesterday with rolling walker. co diarrhea x 1 yesterday and today.      Vital Signs Last 24 Hrs  T(C): 36.7 (26 Jan 2020 06:09), Max: 37.3 (25 Jan 2020 14:44)  T(F): 98.1 (26 Jan 2020 06:09), Max: 99.1 (25 Jan 2020 14:44)  HR: 86 (26 Jan 2020 06:09) (86 - 117)  BP: 112/64 (26 Jan 2020 06:09) (97/58 - 112/64)  BP(mean): --  RR: 18 (26 Jan 2020 06:09) (17 - 20)  SpO2: 99% (26 Jan 2020 06:09) (85% - 99%)  I&O's Summary    25 Jan 2020 07:01  -  26 Jan 2020 07:00  --------------------------------------------------------  IN: 1120 mL / OUT: 0 mL / NET: 1120 mL        PE:  GENERAL: NAD, AAOx3  HEAD:  Atraumatic, Normocephalic  EYES: EOMI, PERRLA, conjunctiva and sclera clear  LUNGS: CTABL  HEART: Regular rate and rhythm no murmur  ABDOMEN: Soft, Nontender, Nondistended; Bowel sounds present  EXTREMITIES:  2+ Peripheral Pulses, No clubbing, cyanosis, or edema  SKIN: No rashes or lesions  NEURO: No focal deficits    LABS:                        9.3    11.62 )-----------( 189      ( 25 Jan 2020 06:55 )             32.0     01-25    139  |  98  |  28<H>  ----------------------------<  103<H>  5.2   |  34<H>  |  0.80    Ca    11.8<H>      25 Jan 2020 06:51        CAPILLARY BLOOD GLUCOSE                RADIOLOGY & ADDITIONAL TESTS:    Imaging Personally Reviewed:  [x] YES  [ ] NO    Consultant(s) Notes Reviewed:  [x] YES  [ ] NO    MEDICATIONS  (STANDING):  albuterol/ipratropium for Nebulization 3 milliLiter(s) Nebulizer every 6 hours  budesonide 160 MICROgram(s)/formoterol 4.5 MICROgram(s) Inhaler 2 Puff(s) Inhalation two times a day  diVALproex  milliGRAM(s) Oral two times a day  lithium 300 milliGRAM(s) Oral three times a day  OLANZapine 2.5 milliGRAM(s) Oral at bedtime  piperacillin/tazobactam IVPB.. 3.375 Gram(s) IV Intermittent every 8 hours  polyethylene glycol 3350 17 Gram(s) Oral every 12 hours  predniSONE   Tablet 20 milliGRAM(s) Oral two times a day  predniSONE   Tablet   Oral   senna 2 Tablet(s) Oral at bedtime    MEDICATIONS  (PRN):  acetaminophen   Tablet .. 650 milliGRAM(s) Oral every 6 hours PRN Temp greater or equal to 38C (100.4F), Mild Pain (1 - 3)      Care Discussed with Consultants/Other Providers [x] YES  [ ] NO    HEALTH ISSUES - PROBLEM Dx:  Mediastinal adenopathy: Mediastinal adenopathy  Constipation, unspecified constipation type: Constipation, unspecified constipation type  Leukocytosis: Leukocytosis  Pleural effusion, left: Pleural effusion, left  Pneumonia: Pneumonia  Asthma: Asthma  Bipolar disorder: Bipolar disorder  Anemia: Anemia  Lymphoma: Lymphoma  Abnormal CBC: Abnormal CBC  Acute respiratory failure with hypoxia: Acute respiratory failure with hypoxia  Sepsis: Sepsis  Community acquired pneumonia: Community acquired pneumonia

## 2020-01-26 NOTE — PROGRESS NOTE ADULT - ASSESSMENT
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 ACute hypoxemeic repsiratory failure  1/22: FIO2 requirements decreased; subjectively improved  1/25: Normal RA sat post walking. COmpleted 7 days Zosyn  REC    Consider DC Zosyn: Deferr to ID  DC planning for MON on prednisone taper  Patient scheduled for outpatient f/u with Jerome at [facility] on Thurs  Will re- assess need for home O2 MON am

## 2020-01-26 NOTE — PROGRESS NOTE ADULT - ASSESSMENT
74F with PMH of lymphoma (dx 2005, s/p CARRIE lobectomy, relapsed in 2019 but not currently on chemo/RT, being [place] [facility] bipolar disorder, asthma p/w SOB and fever, a/w sepsis 2/2 likely respiratory source.

## 2020-01-26 NOTE — PROGRESS NOTE ADULT - PROBLEM SELECTOR PLAN 1
appreciate pulm recs  continuous pulse ox monitoring  CT chest Large left hilar mass/adenopathy extending into the mediastinum likely bulky lymphoma  prednisone taper  pt does not want to pursue diagnosis and treatment here. she would like to be discharged and will immediately follow at [place].   CXR minimal improvement.  to check O2 sat on RA and with ambulation  anticipate DC tomorrow

## 2020-01-27 ENCOUNTER — TRANSCRIPTION ENCOUNTER (OUTPATIENT)
Age: 75
End: 2020-01-27

## 2020-01-27 RX ORDER — PAMIDRONATE DISODIUM 9 MG/ML
90 INJECTION, SOLUTION INTRAVENOUS ONCE
Refills: 0 | Status: COMPLETED | OUTPATIENT
Start: 2020-01-27 | End: 2020-01-27

## 2020-01-27 RX ADMIN — Medication 20 MILLIGRAM(S): at 05:04

## 2020-01-27 RX ADMIN — Medication 3 MILLILITER(S): at 18:12

## 2020-01-27 RX ADMIN — Medication 3 MILLILITER(S): at 23:02

## 2020-01-27 RX ADMIN — LITHIUM CARBONATE 300 MILLIGRAM(S): 300 TABLET, EXTENDED RELEASE ORAL at 21:52

## 2020-01-27 RX ADMIN — BUDESONIDE AND FORMOTEROL FUMARATE DIHYDRATE 2 PUFF(S): 160; 4.5 AEROSOL RESPIRATORY (INHALATION) at 18:13

## 2020-01-27 RX ADMIN — DIVALPROEX SODIUM 500 MILLIGRAM(S): 500 TABLET, DELAYED RELEASE ORAL at 18:11

## 2020-01-27 RX ADMIN — DIVALPROEX SODIUM 500 MILLIGRAM(S): 500 TABLET, DELAYED RELEASE ORAL at 05:04

## 2020-01-27 RX ADMIN — LITHIUM CARBONATE 300 MILLIGRAM(S): 300 TABLET, EXTENDED RELEASE ORAL at 05:04

## 2020-01-27 RX ADMIN — PIPERACILLIN AND TAZOBACTAM 25 GRAM(S): 4; .5 INJECTION, POWDER, LYOPHILIZED, FOR SOLUTION INTRAVENOUS at 05:03

## 2020-01-27 RX ADMIN — BUDESONIDE AND FORMOTEROL FUMARATE DIHYDRATE 2 PUFF(S): 160; 4.5 AEROSOL RESPIRATORY (INHALATION) at 05:04

## 2020-01-27 RX ADMIN — Medication 20 MILLIGRAM(S): at 18:11

## 2020-01-27 RX ADMIN — OLANZAPINE 2.5 MILLIGRAM(S): 15 TABLET, FILM COATED ORAL at 21:52

## 2020-01-27 RX ADMIN — Medication 3 MILLILITER(S): at 13:08

## 2020-01-27 RX ADMIN — PAMIDRONATE DISODIUM 65 MILLIGRAM(S): 9 INJECTION, SOLUTION INTRAVENOUS at 18:10

## 2020-01-27 RX ADMIN — Medication 3 MILLILITER(S): at 05:03

## 2020-01-27 NOTE — PROGRESS NOTE ADULT - SUBJECTIVE AND OBJECTIVE BOX
Follow-up Pulm Progress Note  George Alba MD  [phone]    AFebrile day #9 Zosyn  Stable clinically: feels well, OOB  RA sat at rest on RA: 92%; ambulating:       Vital Signs Last 24 Hrs  T(C): 36.3 (27 Jan 2020 09:30), Max: 36.9 (26 Jan 2020 14:24)  T(F): 97.4 (27 Jan 2020 09:30), Max: 98.5 (26 Jan 2020 14:24)  HR: 85 (27 Jan 2020 09:30) (78 - 106)  BP: 125/80 (27 Jan 2020 09:30) (101/65 - 125/80)  BP(mean): --  RR: 18 (27 Jan 2020 09:30) (17 - 18)  SpO2: 89% (27 Jan 2020 09:30) (89% - 95%)      CULTURES:  Culture Results:   No growth (01-20 @ 10:53)  Culture Results:   No growth to date. (01-19 @ 17:47)  Culture Results:   No growth to date. (01-19 @ 17:47)    Most recent blood culture -- 01-20 @ 10:53   -- -- .Urine Clean Catch (Midstream) 01-20 @ 10:53  Most recent blood culture -- 01-19 @ 17:47   -- -- .Blood Blood-Peripheral 01-19 @ 17:47      Physical Examination:  PULM: Clear except  diminished L basilar BS  CVS: Regular rate and rhythm, no murmurs, rubs, or gallops  ABD: Soft, non-tender  EXT:  No clubbing, cyanosis, or edema    RADIOLOGY REVIEWED  CXR:    CT chest:    TTE:

## 2020-01-27 NOTE — DISCHARGE NOTE PROVIDER - HOSPITAL COURSE
74F with PMH of lymphoma (dx 2005, s/p CARRIE lobectomy, relapsed in 2019 but not currently on chemo/RT, being [place] by [place] 3[place] [facility] bipolar disorder, asthma p/w SOB and fever, a/w sepsis 2/2 likely respiratory source.          Problem/Plan - 1:    ·  Problem: Acute respiratory failure with hypoxia.  Plan: appreciate pulm recs    continuous pulse ox monitoring    CT chest Large left hilar mass/adenopathy extending into the mediastinum likely bulky lymphoma    prednisone taper    pt does not want to pursue diagnosis and treatment here. she would like to be discharged and will immediately follow at [place].     CXR minimal improvement.    to check O2 sat on RA and with ambulation    anticipate DC tomorrow.          Problem/Plan - 2:    ·  Problem: Sepsis.  Plan: cultures NTD    appreciate ID recs    cont zosyn day 8.          Problem/Plan - 3:    ·  Problem: Abnormal CBC.  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          Problem/Plan - 4:    ·  Problem: Lymphoma.  Plan: primary hematologist is Real Crawford [place] [facility] [phone] (office phone)    will fu at [place] upon dc.          Problem/Plan - 5:    ·  Problem: Anemia.  Plan: cont to follow.          Problem/Plan - 6:    Problem: Bipolar disorder. Plan: c/w home medications - lithium 300 TID, Depakote 500 BID and zyprexa 2.5 qhs.         Problem/Plan - 7:    ·  Problem: Asthma.  Plan: c/w Symbicort     duonebs q6h standing.          Problem/Plan - 8:    ·  Problem: Constipation, unspecified constipation type.  Plan: now diarrhea, will stop miralax and senna.        Attending Attestation:     DISPO: anticipate DC planning monday, to decide if patient requires O2        Dr. Thornton will take over care tomorrow 1/27/2020.  Please contact with any questions or concerns [phone]. 74F with PMH of lymphoma (dx 2005, s/p CARRIE lobectomy, relapsed in 2019 but not currently on chemo/RT, being followed by heme every 3-6months at [facility]), bipolar disorder, asthma p/w SOB and fever, a/w sepsis 2/2 likely respiratory source    ·  Problem: Acute respiratory failure with hypoxia.  Plan: appreciate pulm recs    continuous pulse ox monitoring    CT chest Large left hilar mass/adenopathy extending into the mediastinum likely bulky lymphoma    prednisone taper    pt does not want to pursue diagnosis and treatment here. she would like to be discharged and will immediately follow at [place].     CXR minimal improvement.    to check O2 sat on RA and with ambulation    anticipate DC tomorrow.    Sepsis.  Plan: cultures NTD    cont zosyn day 8.      Abnormal CBC.  Plan: flow cytometry CD5 negative, CD10 negative B-cell lymphoma     BMB B cell lymphoma     Lymphoma.  f/u with primary hematologist is Real Crawford at [facility] [phone] (office phone)    will f/u at [place] upon dc.     : Anemia  monitor      Bipolar disorder. c/w home medications - lithium 300 TID, Depakote 500 BID and zyprexa 2.5 qhs.         Asthma.  c/w Symbicort     duonebs q6h standing.         Constipation, unspecified constipation resolved, stable for discharge per attending, cleared hematology. 74F with PMH of lymphoma (dx 2005, s/p CARRIE lobectomy, relapsed in 2019 but not currently on chemo/RT, being followed by heme every 3-6months at [facility]), bipolar disorder, asthma p/w SOB and fever, a/w sepsis 2/2 likely respiratory source    ·  Problem: Acute respiratory failure with hypoxia.  Plan: appreciate pulm recs    continuous pulse ox monitoring    CT chest Large left hilar mass/adenopathy extending into the mediastinum likely bulky lymphoma    prednisone taper    pt does not want to pursue diagnosis and treatment here. she would like to be discharged and will immediately follow at [place].     CXR minimal improvement.    to check O2 sat on RA and with ambulation    anticipate DC tomorrow.    Sepsis.  cultures NTD    cont zosyn day 8.      Abnormal CBC.  Plan: flow cytometry CD5 negative, CD10 negative B-cell lymphoma     BMB B cell lymphoma     Lymphoma.  f[place] with [place] [facility] [phone] (office phone)    will f/u at [place] upon dc.     : Anemia  monitor      Bipolar disorder. c/w home medications - lithium 300 TID, Depakote 500 BID and zyprexa 2.5 qhs.         Asthma.  c/w Symbicort     duonebs q6h standing.         Constipation, unspecified constipation resolved, stable for discharge per attending, cleared hematology.

## 2020-01-27 NOTE — PROGRESS NOTE ADULT - PROBLEM SELECTOR PLAN 4
primary hematologist is Real Crawford at [facility] [phone] (office phone)  will fu at [facility]
primary hematologist is Real Crawford at [facility] [phone] (office phone)
primary hematologist is Real Crawford at [facility] [phone] (office phone)
primary hematologist is Real Crawford at [facility] [phone] (office phone)  will fu at [facility]
pt with 20% blasts in CBC in setting of lymphoma, heme eval appreciated  appears to be atypical lymphocytes rather than blasts   will check flow cytometry from peripheral blood  monitor CBC
unclear lymphoma type, per pt report, in relapse but not currently being treated   fu flow cytometry  primary hematologist is Real Crawford at [facility] [phone] (office phone)
unclear lymphoma type, per pt report, in relapse but not currently being treated   fu flow cytometry  primary hematologist is Real Crawford at [facility] [phone] (office phone)
primary hematologist is Real Crawford at [facility] [phone] (office phone)

## 2020-01-27 NOTE — PROGRESS NOTE ADULT - PROBLEM SELECTOR PLAN 6
Hb 10, appears chronic and at baseline, unclear etiology  normocytic currently   will check hemolysis labs, b12, folate, iron studies  c/t monitor
c/w home medications - lithium 300 TID, Depakote 500 BID and zyprexa 2.5 qhs

## 2020-01-27 NOTE — PROGRESS NOTE ADULT - PROBLEM SELECTOR PLAN 1
appreciate pulm recs  continuous pulse ox monitoring  CT chest Large left hilar mass/adenopathy extending into the mediastinum likely bulky lymphoma  prednisone taper  pt does not want to pursue diagnosis and treatment here. she would like to be discharged and will immediately follow at [place].   CXR minimal improvement.  to check O2 sat on RA and with ambulation  anticipate DC tomorrow

## 2020-01-27 NOTE — DISCHARGE NOTE PROVIDER - NSDCMRMEDTOKEN_GEN_ALL_CORE_FT
Breo Ellipta 100 mcg-25 mcg/inh inhalation powder: 1 puff(s) inhaled once a day  Depakote 500 mg oral delayed release tablet: 1 tab(s) orally 2 times a day  lithium 300 mg oral capsule: 1 cap(s) orally 3 times a day  predniSONE 20 mg oral tablet: 1 tab(s) orally every 12 hours   Ventolin HFA 90 mcg/inh inhalation aerosol: 2 puff(s) inhaled 4 times a day, As Needed  ZyPREXA 2.5 mg oral tablet: 1 tab(s) orally once a day (at bedtime) Maricarmen Fernandez  (RN)  2018 04:56:43

## 2020-01-27 NOTE — PROGRESS NOTE ADULT - ASSESSMENT
74F with PMH of lymphoma (dx 2005, s/p CARRIE lobectomy, relapsed in 2019 but not currently on chemo/RT, being followed by heme every 3-6months at [facility]), bipolar disorder, asthma p/w SOB and fever, a/w sepsis 2/2 likely respiratory source.

## 2020-01-27 NOTE — PROGRESS NOTE ADULT - PROBLEM SELECTOR PLAN 5
Hb 10, appears chronic and at baseline  normocytic
Hb 10, appears chronic and at baseline  normocytic
cont to follow
unclear lymphoma type, per pt report, in relapse but not currently being treated   will need to obtain collateral from hematology  fu flow cytometry  primary hematologist is Real Crawford at [facility] [phone] (office phone)
Hb 10, appears chronic and at baseline  normocytic

## 2020-01-27 NOTE — PROGRESS NOTE ADULT - PROBLEM SELECTOR PROBLEM 2
Lymphoma
Sepsis

## 2020-01-27 NOTE — PROGRESS NOTE ADULT - PROBLEM SELECTOR PROBLEM 1
Acute respiratory failure with hypoxia
Community acquired pneumonia
Mediastinal adenopathy
Pneumonia
Acute respiratory failure with hypoxia

## 2020-01-27 NOTE — PROGRESS NOTE ADULT - ASSESSMENT
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 ACute hypoxemic repsiratory failure  1/22: FIO2 requirements decreased; subjectively improved  1/25: Normal RA sat post walking. COmpleted 7 days Zosyn    REC    DC antibiotics (d/w ID)  No need for home O2  Prednisone 20 mg po bid no taper until seen by outpatient oncology (Dr Cano) at [facility] on thurs

## 2020-01-27 NOTE — PROGRESS NOTE ADULT - SUBJECTIVE AND OBJECTIVE BOX
Patient is a 74y old  Female who presents with a chief complaint of SOB, fever (27 Jan 2020 13:44)      INTERVAL HPI/OVERNIGHT EVENTS:no new complaints, feels well      Vital Signs Last 24 Hrs  T(C): 36.7 (27 Jan 2020 21:17), Max: 36.9 (27 Jan 2020 16:30)  T(F): 98.1 (27 Jan 2020 21:17), Max: 98.5 (27 Jan 2020 16:30)  HR: 97 (27 Jan 2020 21:17) (78 - 97)  BP: 104/66 (27 Jan 2020 21:17) (100/64 - 125/80)  BP(mean): --  RR: 18 (27 Jan 2020 21:17) (17 - 18)  SpO2: 90% (27 Jan 2020 21:17) (89% - 95%)    acetaminophen   Tablet .. 650 milliGRAM(s) Oral every 6 hours PRN  albuterol/ipratropium for Nebulization 3 milliLiter(s) Nebulizer every 6 hours  budesonide 160 MICROgram(s)/formoterol 4.5 MICROgram(s) Inhaler 2 Puff(s) Inhalation two times a day  diVALproex  milliGRAM(s) Oral two times a day  lithium 300 milliGRAM(s) Oral three times a day  OLANZapine 2.5 milliGRAM(s) Oral at bedtime  predniSONE   Tablet 20 milliGRAM(s) Oral two times a day  predniSONE   Tablet   Oral       PHYSICAL EXAM:  GENERAL: NAD,   EYES: conjunctiva and sclera clear  ENMT: Moist mucous membranes  NECK: Supple, No JVD, Normal thyroid  NERVOUS SYSTEM:  Alert & Oriented X,   CHEST/LUNG: Clear to auscultation bilaterally; No rales, rhonchi, wheezing, or rubs  HEART: Regular rate and rhythm; No murmurs, rubs, or gallops  ABDOMEN: Soft, Nontender, Nondistended; Bowel sounds present  EXTREMITIES:  2+ Peripheral Pulses, No clubbing, cyanosis, or edema  LYMPH: No lymphadenopathy noted  SKIN: No rashes or lesions    Consultant(s) Notes Reviewed:  [x ] YES  [ ] NO  Care Discussed with Consultants/Other Providers [ x] YES  [ ] NO    LABS:              CAPILLARY BLOOD GLUCOSE                  RADIOLOGY & ADDITIONAL TESTS:    Imaging Personally Reviewed:  [x ] YES  [ ] NO

## 2020-01-27 NOTE — PROGRESS NOTE ADULT - PROBLEM SELECTOR PLAN 2
On steroids as per heme for Rx of presumptive lymphoma  s/p bone marrow bx  and for lung bx  pt requests further workup with her primary oncologist at [place]

## 2020-01-27 NOTE — PROGRESS NOTE ADULT - ASSESSMENT
74F with PMH of lymphoma (dx 2005, s/p CARRIE lobectomy, relapsed in 2019 but not currently on chemo/RT, being followed by heme every 3-6months [place] [facility] bipolar disorder, asthma p/w SOB and fever and noted large lefttumor and concern for post obstructive PNA  Now on steroids for concern of recurrent lymphoma.  WBC down slightly  Likely mild hypercalcemia for albumin  1/23 bone marrow bx

## 2020-01-27 NOTE — DISCHARGE NOTE PROVIDER - CARE PROVIDER_API CALL
Real resendiz  [address] [address]  Phone: [phone]  Fax: (   )    -  Follow Up Time: 1-3 days

## 2020-01-27 NOTE — PROGRESS NOTE ADULT - SUBJECTIVE AND OBJECTIVE BOX
[facility], Division of Infectious Diseases  JAZMINE Méndez A. Lee  [phone]  Name: MANDI GASPAR  Age: 74y  Gender: Female  MRN: [id number]    Interval History--  Notes reviewed  breathing ok.  Is a bit sad  cant describe how she feels, just wants to go home and go to a movie.    Past Medical History--  Lymphoma  Asthma  Manic depression  Hyperlipidemia  GERD (gastroesophageal reflux disease)  Injury of left shoulder, initial encounter  History of lobectomy of lung      For details regarding the patient's social history, family history, and other miscellaneous elements, please refer the initial infectious diseases consultation and/or the admitting history and physical examination for this admission.    Allergies    latex (Rash)  No Known Drug Allergies    Intolerances        Medications--  Antibiotics:  piperacillin/tazobactam IVPB.. 3.375 Gram(s) IV Intermittent every 8 hours    Immunologic:    Other:  acetaminophen   Tablet .. PRN  albuterol/ipratropium for Nebulization  budesonide 160 MICROgram(s)/formoterol 4.5 MICROgram(s) Inhaler  diVALproex DR  lithium  OLANZapine  predniSONE   Tablet  predniSONE   Tablet      Review of Systems--  A 10-point review of systems was obtained.     Pertinent positives and negatives--  Constitutional: No fevers. No Chills. No Rigors.   Cardiovascular: No chest pain. No palpitations.  Respiratory: No shortness of breath. No cough.  Gastrointestinal: No nausea or vomiting. No diarrhea or constipation.   Psychiatric: + depressed    Review of systems otherwise negative except as previously noted.    Physical Examination--  Vital Signs: T(F): 97.4 (01-27-20 @ 09:30), Max: 98.5 (01-26-20 @ 14:24)  HR: 85 (01-27-20 @ 09:30)  BP: 125/80 (01-27-20 @ 09:30)  RR: 18 (01-27-20 @ 09:30)  SpO2: 89% (01-27-20 @ 09:30)  Wt(kg): --  General: Nontoxic-appearing Female in no acute distress.  HEENT: AT/NC.. Anicteric. Conjunctiva pink and moist.   Neck: Not rigid. No sense of mass.  Nodes: None palpable.  Lungs: Clear bilaterally without rales, wheezing or rhonchi  Heart: Regular rate and rhythm. No Murmur.   Abdomen: Bowel sounds present and normoactive. Soft. Nondistended. Nontender.   Back: No spinal tenderness. No costovertebral angle tenderness.   Extremities: No cyanosis or clubbing. trace edema.   Skin: Warm. Dry. Good turgor. No rash. No vasculitic stigmata.  Psychiatric: Appropriate affect and mood for situation.         Laboratory Studies--  CBC      Chemistries          Culture Data

## 2020-01-27 NOTE — CHART NOTE - NSCHARTNOTEFT_GEN_A_CORE
HEMATOLOGY FELLOW NOTE    patient seen and examined at bedside. Maite Pinto DO  Hematology/Oncology Fellow  Pager [phone]  Weekdays after 5PM or weekends page hematology/oncology fellow on call HEMATOLOGY FELLOW NOTE    patient seen and examined at bedside. calcium up to 11.8. please repeat and if high give pamidronate x 1 and monitor tomorrow.    pending final bone marrow biopsy results as well     Maite Pinto, DO  Hematology/Oncology Fellow  Pager [phone]  Weekdays after 5PM or weekends page hematology/oncology fellow on call

## 2020-01-27 NOTE — PROGRESS NOTE ADULT - PROBLEM SELECTOR PROBLEM 6
Anemia
Bipolar disorder

## 2020-01-27 NOTE — DISCHARGE NOTE PROVIDER - NSDCCPCAREPLAN_GEN_ALL_CORE_FT
PRINCIPAL DISCHARGE DIAGNOSIS  Diagnosis: Pneumonia  Assessment and Plan of Treatment: Pneumonia resolved with antibiotic therapy  No shortness of breath and tolerating room air well  [place] [facility] [place]      SECONDARY DISCHARGE DIAGNOSES  Diagnosis: Mediastinal adenopathy  Assessment and Plan of Treatment: Mediastinal adenopathy  [place] [place]    Diagnosis: Bipolar disorder  Assessment and Plan of Treatment: Bipolar disorder  Continue Lithium, Depakote and Zyprexa    Diagnosis: Asthma  Assessment and Plan of Treatment: Asthma  continue Simbacort PRINCIPAL DISCHARGE DIAGNOSIS  Diagnosis: Pneumonia  Assessment and Plan of Treatment: Pneumonia resolved with antibiotic therapy  No shortness of breath and tolerating room air well  Pt will follow up at [facility] for oncologic care      SECONDARY DISCHARGE DIAGNOSES  Diagnosis: Mediastinal adenopathy  Assessment and Plan of Treatment: Mediastinal adenopathy  follow up at @[facility] as planned  Continue Prednisone until you see Oncologist on thursday for weaning plan    Diagnosis: Bipolar disorder  Assessment and Plan of Treatment: Bipolar disorder  Continue Lithium, Depakote and Zyprexa    Diagnosis: Asthma  Assessment and Plan of Treatment: Asthma  continue Simbacort

## 2020-01-27 NOTE — PROGRESS NOTE ADULT - PROBLEM SELECTOR PLAN 3
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
hypoxic on arrival requiring 5LNC, with worsening hypoxia now on HFNC at 40% FiO2  likely 2/2 sepsis/PNA and large effusion noted on CXR  will treat with abx as above  titrate O2 as needed, maintain sat >92%   pulmonary consult for possible thoracentesis   continuous pulse ox monitoring
monitor cbc  pt now on steroids
pt with 20% blasts in CBC in setting of lymphoma, heme eval appreciated  appears to be atypical lymphocytes rather than blasts   will check flow cytometry from peripheral blood  monitor CBC
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
monitor cbc  pt now on steroids
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

## 2020-01-27 NOTE — PROGRESS NOTE ADULT - ATTENDING COMMENTS
above note authored by attending physician
above note authored by attending physician
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 Real Cano and with [facility] pulmonary attending, Dr. Alba.   Her lymphoma is now bulky and causing significant respiratory compromise, with extension into left mainstem bronchus so after years of observation she may now need treatment. She is currently improving on antibiotics and steroids.  BM biopsy done for further evaluation of lymphoma and results are pending.  She needs a biopsy of the lung but if she continues to improve this could be done as an outpatient.  If she continues to improve she may be discharged to follow up with Dr. Cano at [facility].  Will follow with you.
Above noted. I spoke with Dr Ritchie who now has recommended a biopsy. We will proceed with bone marrow biopsy today. Clinical appearance of the patient is much improved since beginning of steroids. Overall condition improved since the day of admission. Peripheral blood flow cytometry shows a B cell clone that is compatible with circulating lymphoma cells from a low grade lymphoma
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 Real Cano and with [facility] pulmonary attending, Dr. Alba.   Her lymphoma is now bulky and causing significant respiratory compromise, with extension into left mainstem bronchus so after years of observation she may now need treatment. As per Dr. Alba she is not stable enough for discharge without improvement in her airway.  Continue IV steroids solumedrol 20 mg every 8 hours  Please arrange transfer to [facility] as per patient's wishes and her hematologist Dr. Jerome Cano office phones at [facility] are [phone], [phone] or [phone]   Dr. Mendieta at [facility] can be reached at [phone] if needed to help facilitate transfer.
Dr Kebede covering over weekend [phone]
Left message for Dr. Abarca.    Sae Kebede MD  [phone]
DISPO: anticipate DC planning monday, to decide if patient requires O2    Dr. Thornton will take over care tomorrow 1/27/2020.  Please contact with any questions or concerns [phone].
[facility]  [phone]
shana Alba

## 2020-01-27 NOTE — PROGRESS NOTE ADULT - PROBLEM SELECTOR PLAN 7
c/w Symbicort   duonebs q6h standing
c/w home medications - lithium 300 TID, Depakote 500 BID and zyprexa 2.5 qhs
c/w Symbicort   duonebs q6h standing

## 2020-01-27 NOTE — DISCHARGE NOTE PROVIDER - PROVIDER TOKENS
FREE:[LAST:[astrid],FIRST:[Real],PHONE:[phone]],FAX:[(   )    -],ADDRESS:[[address] [place] [address]],FOLLOWUP:[1-3 days]]

## 2020-01-27 NOTE — PROGRESS NOTE ADULT - PROBLEM SELECTOR PROBLEM 3
Abnormal CBC
Acute respiratory failure with hypoxia
Leukocytosis
Abnormal CBC

## 2020-01-28 ENCOUNTER — TRANSCRIPTION ENCOUNTER (OUTPATIENT)
Age: 75
End: 2020-01-28

## 2020-01-28 VITALS
DIASTOLIC BLOOD PRESSURE: 79 MMHG | RESPIRATION RATE: 18 BRPM | OXYGEN SATURATION: 93 % | HEART RATE: 96 BPM | SYSTOLIC BLOOD PRESSURE: 127 MMHG | TEMPERATURE: 98 F

## 2020-01-28 LAB
% ALBUMIN: 22.2 % — SIGNIFICANT CHANGE UP
% ALPHA 1: 2.7 % — SIGNIFICANT CHANGE UP
% ALPHA 2: 5.3 % — SIGNIFICANT CHANGE UP
% BETA: 9.5 % — SIGNIFICANT CHANGE UP
% GAMMA: 60.3 % — SIGNIFICANT CHANGE UP
% M SPIKE: 48.5 % — SIGNIFICANT CHANGE UP
ALBUMIN SERPL ELPH-MCNC: 2.6 G/DL — LOW (ref 3.6–5.5)
ALBUMIN/GLOB SERPL ELPH: 0.3 RATIO — SIGNIFICANT CHANGE UP
ALPHA1 GLOB SERPL ELPH-MCNC: 0.3 G/DL — SIGNIFICANT CHANGE UP (ref 0.1–0.4)
ALPHA2 GLOB SERPL ELPH-MCNC: 0.6 G/DL — SIGNIFICANT CHANGE UP (ref 0.5–1)
ANION GAP SERPL CALC-SCNC: 13 MMOL/L — SIGNIFICANT CHANGE UP (ref 5–17)
B-GLOBULIN SERPL ELPH-MCNC: 1.1 G/DL — HIGH (ref 0.5–1)
BUN SERPL-MCNC: 34 MG/DL — HIGH (ref 7–23)
CALCIUM SERPL-MCNC: 11.4 MG/DL — HIGH (ref 8.4–10.5)
CHLORIDE SERPL-SCNC: 96 MMOL/L — SIGNIFICANT CHANGE UP (ref 96–108)
CO2 SERPL-SCNC: 30 MMOL/L — SIGNIFICANT CHANGE UP (ref 22–31)
CREAT SERPL-MCNC: 0.84 MG/DL — SIGNIFICANT CHANGE UP (ref 0.5–1.3)
GAMMA GLOBULIN: 7.2 G/DL — HIGH (ref 0.6–1.6)
GLUCOSE SERPL-MCNC: 95 MG/DL — SIGNIFICANT CHANGE UP (ref 70–99)
HCT VFR BLD CALC: 30.4 % — LOW (ref 34.5–45)
HEMATOPATHOLOGY REPORT: SIGNIFICANT CHANGE UP
HGB BLD-MCNC: 8.8 G/DL — LOW (ref 11.5–15.5)
INTERPRETATION SERPL IFE-IMP: SIGNIFICANT CHANGE UP
M-SPIKE: 5.8 G/DL — HIGH (ref 0–0)
MCHC RBC-ENTMCNC: 28.2 PG — SIGNIFICANT CHANGE UP (ref 27–34)
MCHC RBC-ENTMCNC: 28.9 GM/DL — LOW (ref 32–36)
MCV RBC AUTO: 97.4 FL — SIGNIFICANT CHANGE UP (ref 80–100)
NRBC # BLD: 0 /100 WBCS — SIGNIFICANT CHANGE UP (ref 0–0)
PLATELET # BLD AUTO: 184 K/UL — SIGNIFICANT CHANGE UP (ref 150–400)
POTASSIUM SERPL-MCNC: 4.9 MMOL/L — SIGNIFICANT CHANGE UP (ref 3.5–5.3)
POTASSIUM SERPL-SCNC: 4.9 MMOL/L — SIGNIFICANT CHANGE UP (ref 3.5–5.3)
PROT PATTERN SERPL ELPH-IMP: SIGNIFICANT CHANGE UP
PROT SERPL-MCNC: 11.9 G/DL — HIGH (ref 6–8.3)
PROT SERPL-MCNC: 11.9 G/DL — HIGH (ref 6–8.3)
RBC # BLD: 3.12 M/UL — LOW (ref 3.8–5.2)
RBC # FLD: 16.8 % — HIGH (ref 10.3–14.5)
SODIUM SERPL-SCNC: 139 MMOL/L — SIGNIFICANT CHANGE UP (ref 135–145)
WBC # BLD: 12.69 K/UL — HIGH (ref 3.8–10.5)
WBC # FLD AUTO: 12.69 K/UL — HIGH (ref 3.8–10.5)

## 2020-01-28 PROCEDURE — 85730 THROMBOPLASTIN TIME PARTIAL: CPT

## 2020-01-28 PROCEDURE — 82310 ASSAY OF CALCIUM: CPT

## 2020-01-28 PROCEDURE — 88237 TISSUE CULTURE BONE MARROW: CPT

## 2020-01-28 PROCEDURE — 82607 VITAMIN B-12: CPT

## 2020-01-28 PROCEDURE — 97161 PT EVAL LOW COMPLEX 20 MIN: CPT

## 2020-01-28 PROCEDURE — 85610 PROTHROMBIN TIME: CPT

## 2020-01-28 PROCEDURE — 99285 EMERGENCY DEPT VISIT HI MDM: CPT | Mod: 25

## 2020-01-28 PROCEDURE — 93308 TTE F-UP OR LMTD: CPT

## 2020-01-28 PROCEDURE — 85097 BONE MARROW INTERPRETATION: CPT

## 2020-01-28 PROCEDURE — 87640 STAPH A DNA AMP PROBE: CPT

## 2020-01-28 PROCEDURE — 88305 TISSUE EXAM BY PATHOLOGIST: CPT

## 2020-01-28 PROCEDURE — 87581 M.PNEUMON DNA AMP PROBE: CPT

## 2020-01-28 PROCEDURE — 93005 ELECTROCARDIOGRAM TRACING: CPT

## 2020-01-28 PROCEDURE — 85045 AUTOMATED RETICULOCYTE COUNT: CPT

## 2020-01-28 PROCEDURE — 84165 PROTEIN E-PHORESIS SERUM: CPT

## 2020-01-28 PROCEDURE — 86334 IMMUNOFIX E-PHORESIS SERUM: CPT

## 2020-01-28 PROCEDURE — 80178 ASSAY OF LITHIUM: CPT

## 2020-01-28 PROCEDURE — 88271 CYTOGENETICS DNA PROBE: CPT

## 2020-01-28 PROCEDURE — 81003 URINALYSIS AUTO W/O SCOPE: CPT

## 2020-01-28 PROCEDURE — 82330 ASSAY OF CALCIUM: CPT

## 2020-01-28 PROCEDURE — 82803 BLOOD GASES ANY COMBINATION: CPT

## 2020-01-28 PROCEDURE — 88313 SPECIAL STAINS GROUP 2: CPT

## 2020-01-28 PROCEDURE — 88342 IMHCHEM/IMCYTCHM 1ST ANTB: CPT

## 2020-01-28 PROCEDURE — 96375 TX/PRO/DX INJ NEW DRUG ADDON: CPT | Mod: XU

## 2020-01-28 PROCEDURE — 88275 CYTOGENETICS 100-300: CPT

## 2020-01-28 PROCEDURE — 87486 CHLMYD PNEUM DNA AMP PROBE: CPT

## 2020-01-28 PROCEDURE — 84132 ASSAY OF SERUM POTASSIUM: CPT

## 2020-01-28 PROCEDURE — 80048 BASIC METABOLIC PNL TOTAL CA: CPT

## 2020-01-28 PROCEDURE — 83735 ASSAY OF MAGNESIUM: CPT

## 2020-01-28 PROCEDURE — 81261 IGH GENE REARRANGE AMP METH: CPT

## 2020-01-28 PROCEDURE — 87449 NOS EACH ORGANISM AG IA: CPT

## 2020-01-28 PROCEDURE — 88280 CHROMOSOME KARYOTYPE STUDY: CPT

## 2020-01-28 PROCEDURE — 83550 IRON BINDING TEST: CPT

## 2020-01-28 PROCEDURE — 87641 MR-STAPH DNA AMP PROBE: CPT

## 2020-01-28 PROCEDURE — 83010 ASSAY OF HAPTOGLOBIN QUANT: CPT

## 2020-01-28 PROCEDURE — 83615 LACTATE (LD) (LDH) ENZYME: CPT

## 2020-01-28 PROCEDURE — 71275 CT ANGIOGRAPHY CHEST: CPT

## 2020-01-28 PROCEDURE — 94640 AIRWAY INHALATION TREATMENT: CPT

## 2020-01-28 PROCEDURE — 82565 ASSAY OF CREATININE: CPT

## 2020-01-28 PROCEDURE — 85014 HEMATOCRIT: CPT

## 2020-01-28 PROCEDURE — 82947 ASSAY GLUCOSE BLOOD QUANT: CPT

## 2020-01-28 PROCEDURE — 88360 TUMOR IMMUNOHISTOCHEM/MANUAL: CPT

## 2020-01-28 PROCEDURE — 85027 COMPLETE CBC AUTOMATED: CPT

## 2020-01-28 PROCEDURE — 96374 THER/PROPH/DIAG INJ IV PUSH: CPT | Mod: XU

## 2020-01-28 PROCEDURE — 71045 X-RAY EXAM CHEST 1 VIEW: CPT

## 2020-01-28 PROCEDURE — 87633 RESP VIRUS 12-25 TARGETS: CPT

## 2020-01-28 PROCEDURE — 88285 CHROMOSOME COUNT ADDITIONAL: CPT

## 2020-01-28 PROCEDURE — 82784 ASSAY IGA/IGD/IGG/IGM EACH: CPT

## 2020-01-28 PROCEDURE — 88185 FLOWCYTOMETRY/TC ADD-ON: CPT

## 2020-01-28 PROCEDURE — 84100 ASSAY OF PHOSPHORUS: CPT

## 2020-01-28 PROCEDURE — 74177 CT ABD & PELVIS W/CONTRAST: CPT

## 2020-01-28 PROCEDURE — 87798 DETECT AGENT NOS DNA AMP: CPT

## 2020-01-28 PROCEDURE — 84155 ASSAY OF PROTEIN SERUM: CPT

## 2020-01-28 PROCEDURE — 36415 COLL VENOUS BLD VENIPUNCTURE: CPT

## 2020-01-28 PROCEDURE — 80202 ASSAY OF VANCOMYCIN: CPT

## 2020-01-28 PROCEDURE — 83605 ASSAY OF LACTIC ACID: CPT

## 2020-01-28 PROCEDURE — 88184 FLOWCYTOMETRY/ TC 1 MARKER: CPT

## 2020-01-28 PROCEDURE — 84295 ASSAY OF SERUM SODIUM: CPT

## 2020-01-28 PROCEDURE — 81264 IGK REARRANGEABN CLONAL POP: CPT

## 2020-01-28 PROCEDURE — 82746 ASSAY OF FOLIC ACID SERUM: CPT

## 2020-01-28 PROCEDURE — 87040 BLOOD CULTURE FOR BACTERIA: CPT

## 2020-01-28 PROCEDURE — 87086 URINE CULTURE/COLONY COUNT: CPT

## 2020-01-28 PROCEDURE — 80053 COMPREHEN METABOLIC PANEL: CPT

## 2020-01-28 PROCEDURE — 88341 IMHCHEM/IMCYTCHM EA ADD ANTB: CPT

## 2020-01-28 PROCEDURE — 83521 IG LIGHT CHAINS FREE EACH: CPT

## 2020-01-28 PROCEDURE — 82728 ASSAY OF FERRITIN: CPT

## 2020-01-28 PROCEDURE — 88264 CHROMOSOME ANALYSIS 20-25: CPT

## 2020-01-28 PROCEDURE — 87205 SMEAR GRAM STAIN: CPT

## 2020-01-28 PROCEDURE — 82435 ASSAY OF BLOOD CHLORIDE: CPT

## 2020-01-28 RX ORDER — SODIUM CHLORIDE 9 MG/ML
1000 INJECTION INTRAMUSCULAR; INTRAVENOUS; SUBCUTANEOUS
Refills: 0 | Status: DISCONTINUED | OUTPATIENT
Start: 2020-01-28 | End: 2020-01-28

## 2020-01-28 RX ADMIN — SODIUM CHLORIDE 60 MILLILITER(S): 9 INJECTION INTRAMUSCULAR; INTRAVENOUS; SUBCUTANEOUS at 11:58

## 2020-01-28 RX ADMIN — Medication 3 MILLILITER(S): at 17:45

## 2020-01-28 RX ADMIN — Medication 3 MILLILITER(S): at 11:45

## 2020-01-28 RX ADMIN — LITHIUM CARBONATE 300 MILLIGRAM(S): 300 TABLET, EXTENDED RELEASE ORAL at 05:28

## 2020-01-28 RX ADMIN — Medication 20 MILLIGRAM(S): at 17:46

## 2020-01-28 RX ADMIN — Medication 3 MILLILITER(S): at 05:28

## 2020-01-28 RX ADMIN — BUDESONIDE AND FORMOTEROL FUMARATE DIHYDRATE 2 PUFF(S): 160; 4.5 AEROSOL RESPIRATORY (INHALATION) at 05:29

## 2020-01-28 RX ADMIN — BUDESONIDE AND FORMOTEROL FUMARATE DIHYDRATE 2 PUFF(S): 160; 4.5 AEROSOL RESPIRATORY (INHALATION) at 17:45

## 2020-01-28 RX ADMIN — DIVALPROEX SODIUM 500 MILLIGRAM(S): 500 TABLET, DELAYED RELEASE ORAL at 17:46

## 2020-01-28 RX ADMIN — Medication 20 MILLIGRAM(S): at 05:28

## 2020-01-28 RX ADMIN — LITHIUM CARBONATE 300 MILLIGRAM(S): 300 TABLET, EXTENDED RELEASE ORAL at 17:44

## 2020-01-28 RX ADMIN — DIVALPROEX SODIUM 500 MILLIGRAM(S): 500 TABLET, DELAYED RELEASE ORAL at 05:28

## 2020-01-28 NOTE — PROGRESS NOTE ADULT - SUBJECTIVE AND OBJECTIVE BOX
Vital Signs Last 24 Hrs  T(C): 37.2 (28 Jan 2020 09:16), Max: 37.2 (28 Jan 2020 09:16)  T(F): 98.9 (28 Jan 2020 09:16), Max: 98.9 (28 Jan 2020 09:16)  HR: 101 (28 Jan 2020 09:16) (87 - 101)  BP: 113/74 (28 Jan 2020 09:16) (100/64 - 113/74)  BP(mean): --  RR: 18 (28 Jan 2020 09:16) (18 - 18)  SpO2: 90% (28 Jan 2020 09:16) (88% - 91%)                              8.8    12.69 )-----------( 184      ( 28 Jan 2020 07:00 )             30.4       01-28    139  |  96  |  34<H>  ----------------------------<  95  4.9   |  30  |  0.84    Ca    11.4<H>      28 Jan 2020 07:00    Follow-up Pulm Progress Note  George Alba MD  [phone]    AFebrile s/p course of Zosyn  Stable clinically: feels well, OOB  Stable respiratory status  CXR unchanged  Hypercalcemia noted          Physical Examination:  PULM: Clear except  diminished L basilar BS  CVS: Regular rate and rhythm, no murmurs, rubs, or gallops  ABD: Soft, non-tender  EXT:  No clubbing, cyanosis, or edema    RADIOLOGY REVIEWED  CXR:    CT chest:    TTE:

## 2020-01-28 NOTE — DISCHARGE NOTE NURSING/CASE MANAGEMENT/SOCIAL WORK - PATIENT PORTAL LINK FT
You can access the FollowMyHealth Patient Portal offered by [facility] by registering at the following website: [email or web address]. By joining [facility]’s FollowMyHealth portal, you will also be able to view your health information using other applications (apps) compatible with our system.

## 2020-01-28 NOTE — CHART NOTE - NSCHARTNOTEFT_GEN_A_CORE
HEMATOLOGY FELLOW NOTE    spoke with Dr. Cano, patient's hematologist at [facility] and discussed bone marrow biopsy results and SPEP/serum immunofixation. she has a IgM lambda band with M spike > 5gm. 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 she is on no oxygen, feels well, wants to go home. she has an appointment with Dr. Real Cano this thursday 1-.    please discharge her home with prednisone script, the dose she was taking here. If possible, per Dr. Cano, please send her records over to his office so he can review them as this will be helpful for her care.    Maite Pinto,   Hematology/Oncology Fellow  Pager [phone]  Weekdays after 5PM or weekends page hematology/oncology fellow on call

## 2020-01-28 NOTE — PROGRESS NOTE ADULT - ASSESSMENT
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 ACute hypoxemic repsiratory failure  1/22: FIO2 requirements decreased; subjectively improved  1/25: Normal RA sat post walking. COmpleted 7 days Zosyn  1/28: Hypercalcemia noted  REC    OBserve off antibiotics  No need for home O2  Prednisone 20 mg po bid no taper until seen by [place] oncology (Dr Cano[place] [facility] [place]  Hypercalcemia managment per primary team/heme-onc: of note patient does not want rx of lymphoma at [facility]  She is asymptomatic re Ca+

## 2020-01-28 NOTE — PROGRESS NOTE ADULT - REASON FOR ADMISSION
SOB, fever
Hypotension
SOB, fever
SOB, fever abnormal lymphocytes on perpheral blood smear
SOB, fever atypical lymphocytosis
SOB, fever

## 2020-01-29 LAB
IGA FLD-MCNC: 140 MG/DL — SIGNIFICANT CHANGE UP (ref 84–499)
IGG FLD-MCNC: 750 MG/DL — SIGNIFICANT CHANGE UP (ref 610–1660)
IGM SERPL-MCNC: HIGH MG/DL (ref 35–242)
KAPPA LC SER QL IFE: 1 MG/DL — SIGNIFICANT CHANGE UP (ref 0.33–1.94)
KAPPA/LAMBDA FREE LIGHT CHAIN RATIO, SERUM: 0.01 RATIO — LOW (ref 0.26–1.65)
LAMBDA LC SER QL IFE: 78.1 MG/DL — HIGH (ref 0.57–2.63)

## 2020-01-30 LAB — DNA PLOIDY SPEC FC-IMP: SIGNIFICANT CHANGE UP

## 2020-02-04 LAB
CHROM ANALY INTERPHASE BLD FISH-IMP: SIGNIFICANT CHANGE UP
CHROM ANALY INTERPHASE BLD FISH-IMP: SIGNIFICANT CHANGE UP
CHROM ANALY OVERALL INTERP SPEC-IMP: SIGNIFICANT CHANGE UP

## 2020-02-06 LAB — MISCELLANEOUS TEST NAME: SIGNIFICANT CHANGE UP

## 2020-12-15 NOTE — H&P ADULT - PROBLEM/PLAN-6
Outpatient Neurological Rehabilitation  MODIFIED BARIUM SWALLOW STUDY      Date: 12/15/2020     Name: Roxanne Hernandez   MRN: [id number]    Therapy Diagnosis: WFL oral and pharyngeal phases of the swallow    Physician: Yoni Hernandez MD  Physician Orders: SLP video swallow  Medical Diagnosis from Referral: Dysphagia, pharyngoesophageal phase, Aspiration of fluid causing abnormal reaction or later complication       Date of Evaluation:  12/15/2020    Time In:  1400  Time Out:  1435  Total Billable Time: 35     Procedure Min.   Swallow and Oral Function Evaluation   15   Fl Modified Barium Swallow Speech  20     Precautions: Standard    Subjective   Date of Onset: 10/16/2020    Past Medical History: Roxanne Hernandez  has a past medical history of Cervical cancer, Coronary artery disease, Diabetes mellitus, and Hypertension.  Roxanne Hernandez  has a past surgical history that includes Hysterectomy; Cholecystectomy; Coronary angiography (Left, 9/4/2019); Cardiac catheterization; Ureteroscopy (Left, 11/13/2019); Laser lithotripsy (Left, 11/13/2019); Ureteroscopic removal of ureteric calculus (Left, 11/13/2019); Cystoscopy (N/A, 11/13/2019); Joint replacement; Knee Arthroplasty (Right); A-V cardiac pacemaker insertion (N/A, 12/18/2019); Transcatheter aortic valve replacement (TAVR) (N/A, 12/17/2019); Carotid endarterectomy (Left, 7/31/2020); and Carotid endarterectomy (Right, 10/16/2020).    The patient is a 81 y.o. female who complains of changes in vocal quality since recent endarterectomy.  Pt reports no difficulty with recent meals and denies dysphagia/odynophagia with recent meals/PO meds.     The Pt gave the following history:   -Current diet at home: regular/thin  -Recommended diet from previous study: n/a  -Therapy received: n/a  -Neuro: Pt denied any neurological diagnoses.  -GI: Pt denied any GI diagnoses.    -Pulmonary: Pt denied any pulmonary diagnoses.   -Surgery: Pt reports recent endarterectomy s/p ~6 weeks ago.  "  -Cancer: Pt with hx of uterine cx - no radiation/chemo tx reported with treatment.     The following observations were made:   -Mental status: Alert and Cooperative  -Factors affecting performance: no difficulties participating in the study  -Feeding Method: independent in self-feeding  -Respiratory Status: room air    Medical Hx and Allergies:    Review of patient's allergies indicates:   Allergen Reactions    Azithromycin Swelling     All mycins    Statins-hmg-coa reductase inhibitors Other (See Comments)     Liver function  "It attacks my liver and makes me very sick"    Sulfa (sulfonamide antibiotics) Hives       Pain Scale:  0/10 on VAS currently.   Pain Location: No pain indicated t/o session    Objective     Modified Barium Swallow Study  A modified barium swallow study was ordered to objectively evaluate the safety and efficiency of the patients swallowing and to rule out aspiration.      The patient was seen in radiology seated in High Alberto's position in a video imaging chair for lateral views of the larynx and an A/P view. The study was conducted using Varibar thin liquid (IDDSI 0), Varibar nectar liquid (IDDSI 2), Varibar pudding (IDDSI 4), Peaches covered in Varibar powder (IDDSI 6/2) and solid coated in Varibar pudding (IDDSI 7). She tolerated the procedure well.     A cranial nerve examination revealed the following:  Cranial Nerve 5: Trigeminal Nerve  Motor Jaw Posture at rest: Closed  Mandible lateralization:   Teeth Clenched: WFL  Abnormal movement: absent Interpretation: WFL    Sensory Forehead: WFL  Cheek: WFL  Jaw: WFL  Facial Pain: Pain Scale: 0/10, Pain located: n/a Interpretation: WFL     Cranial Nerve 7: Facial Nerve  Motor Facial Symmetry: WFl  Wrinkle Forehead: WFL  Close eyes tightly: WFL  Labial Protrusion: WFL  Labial Retraction: WFL  Abnormal movement: absent Interpretation: WFL   Sensory Formal testing not completed. Pt denied any changes in taste      Cranial Nerves IX and X: " Glossopharyngeal and Vagus Nerves  Motor Palatal Symmetry (Rest): WNL  Palatal Symmetry (Movement): WNL  Cough: Perceptually strong  Voice Prior to PO intake: weak, breathy Interpretation: WFL     Cranial Nerve XII: Hypoglossal Nerve  Motor Tongue at rest: WNL  Lingual Protrusion: WNL  Lingual Protrusion against Resistance: WNL  Lingual Lateralization: WNL Interpretation: WFL     Other information:   Volitional Swallow: n/a   Mucosal Quality: No abnormal findings   Secretion Management: WFL   Dentition: Upper dentures and Edentulous lower      CONSISTENCIES ADMINISTERED:  Thin Liquids (IDDSI 0):   Mode and volume administered: 5ml x2, 10 ml x2, self-regulated cup sip x1, self-regulated straw sip x1, rapid consecutive cup sip x1, rapid consecutive straw sip x1   Oral Residue: none to mild    Vallecular Residue: none to trace    Pyriform Sinus Residue: none to trace    Rosenbeck's 8-Point Penetration-Aspiration Scale: (1) Material does not enter the airway   o Best: (1) Material does not enter the airway  o Worst: (2) Material enters the airway, remains above the vocal folds, and is ejected from the airway  - penetration occurred during the swallow with thin liquids via consecutive straw sips    Nectar Thick Liquids (IDDSI 2):   Mode and volume administered: 5ml x2, 10 ml x2   Oral Residue: none    Vallecular Residue: none    Pyriform Sinus Residue: none   Rosenbeck's 8-Point Penetration-Aspiration Scale: (1) Material does not enter the airway    Puree (IDDSI 4):   Mode and volume administered: 5ml x2, 10 ml x2   Oral Residue: none    Vallecular Residue: none    Pyriform Sinus Residue: none    Rosenbeck's 8-Point Penetration-Aspiration Scale: (1) Material does not enter the airway    Mixed Consistency Bolus (IDDSI 6 with IDDSI 2):   Mode and volume administered: 2 peaches in juice x2   Oral Residue: none   Vallecular Residue: none    Pyriform Sinus Residue: none    Rosenbeck's 8-Point  Penetration-Aspiration Scale: (1) Material does not enter the airway    Regular (IDDSI 7):   Mode and volume administered: 1/3 Rolanda edge in barium pudding x1   Oral Residue: none    Vallecular Residue: none   Pyriform Sinus Residue: none    Rosenbeck's 8-Point Penetration-Aspiration Scale: (1) Material does not enter the airway    Treatment   Treatment Time In: n/a  Treatment Time Out: n/a  Total Treatment Time: n/a  Pt educated regarding results and recommendations of the evaluation. See the recommendations section below.    Education: SLP role in care and Results of the study was discussed with the patient. Patient expressed understanding.     Assessment     Roxanne Hernandez is a 81 y.o. female referred for Modified Barium Swallow Study with a medical diagnosis of dysphagia, pharyngoesophageal phase, aspiration of fluid causing abnormal reaction or later complication   .     Oral Phase: Lip closure was complete with no labial escape.  Bolus prep and mastication was timely and efficient. Lingual motion was brisk for adequate bolus transport. There was MILD oral residue on self regulated cup sip which was reduced with dry swallow.. The swallow was initiated when the head of the bolus passed the ramus of the mandible.    Pharyngeal Phase:  The soft palate elevated for complete closure of the velopharyngeal port. Tongue base retraction was complete.Epiglottic inversion appeared to be complete. Anterior hyoid excursion was complete. Laryngeal elevation was adequate. There was transient penetration noted on CONSECUTIVE sips THIN liquid via STRAW.  There was no aspiration observed in this study.  Pharyngeal stripping wave appeared present and complete. The PES opening was complete.  There was no significant pharyngeal residue.    Esophageal Phase: On esophageal screen, pt noted with suspected distal retrograde were noted.    Dysphagia Outcome and Severity Scale (SAMIR): Level 6: WFL/ Modified  [place]    Impressions: WFL oral and pharyngeal phases of the swallowBoth swallow safety and swallow efficiency are preserved, Pt appears to be at low risk for aspiration related PNA 2/2 to good mobility. No further ST indicated at this time for swallow rehab.     Recommendations:      Consistency Recommendations: thin liquids (IDDSI 0) and regular consistencies (IDDSI 7).    Risk Management: use good oral hygiene , sit upright for all PO intake and increase physical mobility as tolerated   Specialist Referrals: ENT given pt with complaints of vocal quality following recent endarterectomy   Ancillary Tests: Consider N/a.   Therapy: Dysphagia therapy is not recommended at this time.   Follow-up exam: Follow up swallow study is not indicated at this time.    Please contact [facility] at [phone] if there are questions re: the above or if we can be of additional service to this patient.    Therapist's Name:   TRISTON Zacarias, CCC-SLP, CBIS  Speech-Language Pathology  12/15/2020      Date: 12/15/2020        DISPLAY PLAN FREE TEXT

## 2022-01-01 ENCOUNTER — INPATIENT (INPATIENT)
Facility: HOSPITAL | Age: 77
LOS: 29 days | End: 2022-09-28
Attending: STUDENT IN AN ORGANIZED HEALTH CARE EDUCATION/TRAINING PROGRAM | Admitting: STUDENT IN AN ORGANIZED HEALTH CARE EDUCATION/TRAINING PROGRAM
Payer: SELF-PAY

## 2022-01-01 VITALS — RESPIRATION RATE: 18 BRPM

## 2022-01-01 VITALS
SYSTOLIC BLOOD PRESSURE: 93 MMHG | HEIGHT: 63 IN | HEART RATE: 91 BPM | DIASTOLIC BLOOD PRESSURE: 60 MMHG | TEMPERATURE: 98 F | RESPIRATION RATE: 20 BRPM | OXYGEN SATURATION: 98 %

## 2022-01-01 DIAGNOSIS — M79.89 OTHER SPECIFIED SOFT TISSUE DISORDERS: ICD-10-CM

## 2022-01-01 DIAGNOSIS — S49.92XA UNSPECIFIED INJURY OF LEFT SHOULDER AND UPPER ARM, INITIAL ENCOUNTER: Chronic | ICD-10-CM

## 2022-01-01 DIAGNOSIS — C85.90 NON-HODGKIN LYMPHOMA, UNSPECIFIED, UNSPECIFIED SITE: ICD-10-CM

## 2022-01-01 DIAGNOSIS — I95.9 HYPOTENSION, UNSPECIFIED: ICD-10-CM

## 2022-01-01 DIAGNOSIS — Z87.81 PERSONAL HISTORY OF (HEALED) TRAUMATIC FRACTURE: ICD-10-CM

## 2022-01-01 DIAGNOSIS — G93.41 METABOLIC ENCEPHALOPATHY: ICD-10-CM

## 2022-01-01 DIAGNOSIS — J45.909 UNSPECIFIED ASTHMA, UNCOMPLICATED: ICD-10-CM

## 2022-01-01 DIAGNOSIS — E16.2 HYPOGLYCEMIA, UNSPECIFIED: ICD-10-CM

## 2022-01-01 DIAGNOSIS — N39.0 URINARY TRACT INFECTION, SITE NOT SPECIFIED: ICD-10-CM

## 2022-01-01 DIAGNOSIS — R41.82 ALTERED MENTAL STATUS, UNSPECIFIED: ICD-10-CM

## 2022-01-01 DIAGNOSIS — Z29.9 ENCOUNTER FOR PROPHYLACTIC MEASURES, UNSPECIFIED: ICD-10-CM

## 2022-01-01 DIAGNOSIS — E83.52 HYPERCALCEMIA: ICD-10-CM

## 2022-01-01 DIAGNOSIS — Z51.5 ENCOUNTER FOR PALLIATIVE CARE: ICD-10-CM

## 2022-01-01 DIAGNOSIS — F31.9 BIPOLAR DISORDER, UNSPECIFIED: ICD-10-CM

## 2022-01-01 DIAGNOSIS — E78.5 HYPERLIPIDEMIA, UNSPECIFIED: ICD-10-CM

## 2022-01-01 DIAGNOSIS — Z90.2 ACQUIRED ABSENCE OF LUNG [PART OF]: Chronic | ICD-10-CM

## 2022-01-01 DIAGNOSIS — N17.9 ACUTE KIDNEY FAILURE, UNSPECIFIED: ICD-10-CM

## 2022-01-01 DIAGNOSIS — T14.8XXA OTHER INJURY OF UNSPECIFIED BODY REGION, INITIAL ENCOUNTER: ICD-10-CM

## 2022-01-01 DIAGNOSIS — F32.9 MAJOR DEPRESSIVE DISORDER, SINGLE EPISODE, UNSPECIFIED: ICD-10-CM

## 2022-01-01 DIAGNOSIS — R41.0 DISORIENTATION, UNSPECIFIED: ICD-10-CM

## 2022-01-01 DIAGNOSIS — J96.02 ACUTE RESPIRATORY FAILURE WITH HYPERCAPNIA: ICD-10-CM

## 2022-01-01 LAB
% ALBUMIN: 26.3 % — SIGNIFICANT CHANGE UP
% ALBUMIN: 26.3 % — SIGNIFICANT CHANGE UP
% ALPHA 1: 3.3 % — SIGNIFICANT CHANGE UP
% ALPHA 1: 3.5 % — SIGNIFICANT CHANGE UP
% ALPHA 2: 6.6 % — SIGNIFICANT CHANGE UP
% ALPHA 2: 6.8 % — SIGNIFICANT CHANGE UP
% BETA: 7.5 % — SIGNIFICANT CHANGE UP
% BETA: 7.7 % — SIGNIFICANT CHANGE UP
% GAMMA: 56 % — SIGNIFICANT CHANGE UP
% GAMMA: 56.2 % — SIGNIFICANT CHANGE UP
% M SPIKE: 50 % — SIGNIFICANT CHANGE UP
% M SPIKE: 51.1 % — SIGNIFICANT CHANGE UP
-  AMIKACIN: SIGNIFICANT CHANGE UP
-  AMOXICILLIN/CLAVULANIC ACID: SIGNIFICANT CHANGE UP
-  AMPICILLIN/SULBACTAM: SIGNIFICANT CHANGE UP
-  AMPICILLIN: SIGNIFICANT CHANGE UP
-  AZTREONAM: SIGNIFICANT CHANGE UP
-  CEFAZOLIN: SIGNIFICANT CHANGE UP
-  CEFEPIME: SIGNIFICANT CHANGE UP
-  CEFOXITIN: SIGNIFICANT CHANGE UP
-  CEFTRIAXONE: SIGNIFICANT CHANGE UP
-  CIPROFLOXACIN: SIGNIFICANT CHANGE UP
-  ERTAPENEM: SIGNIFICANT CHANGE UP
-  GENTAMICIN: SIGNIFICANT CHANGE UP
-  IMIPENEM: SIGNIFICANT CHANGE UP
-  LEVOFLOXACIN: SIGNIFICANT CHANGE UP
-  MEROPENEM: SIGNIFICANT CHANGE UP
-  NITROFURANTOIN: SIGNIFICANT CHANGE UP
-  PIPERACILLIN/TAZOBACTAM: SIGNIFICANT CHANGE UP
-  TIGECYCLINE: SIGNIFICANT CHANGE UP
-  TOBRAMYCIN: SIGNIFICANT CHANGE UP
-  TRIMETHOPRIM/SULFAMETHOXAZOLE: SIGNIFICANT CHANGE UP
24R-OH-CALCIDIOL SERPL-MCNC: 18.4 NG/ML — LOW (ref 30–80)
A1C WITH ESTIMATED AVERAGE GLUCOSE RESULT: 4.5 % — SIGNIFICANT CHANGE UP (ref 4–5.6)
ALBUMIN SERPL ELPH-MCNC: 0.6 G/DL — LOW (ref 3.3–5)
ALBUMIN SERPL ELPH-MCNC: 0.9 G/DL — LOW (ref 3.3–5)
ALBUMIN SERPL ELPH-MCNC: 1.4 G/DL — LOW (ref 3.3–5)
ALBUMIN SERPL ELPH-MCNC: 2 G/DL — SIGNIFICANT CHANGE UP (ref 3.3–5)
ALBUMIN SERPL ELPH-MCNC: 2.6 G/DL — SIGNIFICANT CHANGE UP (ref 3.3–5)
ALBUMIN SERPL ELPH-MCNC: 2.7 G/DL — LOW (ref 3.3–5)
ALBUMIN SERPL ELPH-MCNC: 2.9 G/DL — LOW (ref 3.3–5)
ALBUMIN SERPL ELPH-MCNC: 3.1 G/DL — LOW (ref 3.3–5)
ALBUMIN SERPL ELPH-MCNC: 3.1 G/DL — LOW (ref 3.3–5)
ALBUMIN SERPL ELPH-MCNC: 3.2 G/DL — LOW (ref 3.3–5)
ALBUMIN SERPL ELPH-MCNC: 3.3 G/DL — SIGNIFICANT CHANGE UP (ref 3.3–5)
ALBUMIN SERPL ELPH-MCNC: 3.31 G/DL — SIGNIFICANT CHANGE UP (ref 3.3–4.4)
ALBUMIN SERPL ELPH-MCNC: 3.39 G/DL — SIGNIFICANT CHANGE UP (ref 3.3–4.4)
ALBUMIN SERPL ELPH-MCNC: 3.4 G/DL — SIGNIFICANT CHANGE UP (ref 3.3–5)
ALBUMIN SERPL ELPH-MCNC: 3.5 G/DL — SIGNIFICANT CHANGE UP (ref 3.3–5)
ALBUMIN SERPL ELPH-MCNC: 3.5 G/DL — SIGNIFICANT CHANGE UP (ref 3.3–5)
ALBUMIN/GLOB SERPL ELPH: 0.4 RATIO — SIGNIFICANT CHANGE UP
ALBUMIN/GLOB SERPL ELPH: 0.4 RATIO — SIGNIFICANT CHANGE UP
ALP SERPL-CCNC: 23 U/L — LOW (ref 40–120)
ALP SERPL-CCNC: 26 U/L — LOW (ref 40–120)
ALP SERPL-CCNC: 31 U/L — LOW (ref 40–120)
ALP SERPL-CCNC: 34 U/L — LOW (ref 40–120)
ALP SERPL-CCNC: 35 U/L — LOW (ref 40–120)
ALP SERPL-CCNC: 38 U/L — LOW (ref 40–120)
ALP SERPL-CCNC: 39 U/L — LOW (ref 40–120)
ALP SERPL-CCNC: 40 U/L — SIGNIFICANT CHANGE UP (ref 40–120)
ALP SERPL-CCNC: 42 U/L — SIGNIFICANT CHANGE UP (ref 40–120)
ALP SERPL-CCNC: 45 U/L — SIGNIFICANT CHANGE UP (ref 40–120)
ALP SERPL-CCNC: 50 U/L — SIGNIFICANT CHANGE UP (ref 40–120)
ALP SERPL-CCNC: 55 U/L — SIGNIFICANT CHANGE UP (ref 40–120)
ALP SERPL-CCNC: 60 U/L — SIGNIFICANT CHANGE UP (ref 40–120)
ALPHA1 GLOB SERPL ELPH-MCNC: 0.43 G/DL — HIGH (ref 0.1–0.3)
ALPHA1 GLOB SERPL ELPH-MCNC: 0.44 G/DL — HIGH (ref 0.1–0.3)
ALPHA2 GLOB SERPL ELPH-MCNC: 0.8 G/DL — SIGNIFICANT CHANGE UP (ref 0.6–1)
ALPHA2 GLOB SERPL ELPH-MCNC: 0.9 G/DL — SIGNIFICANT CHANGE UP (ref 0.6–1)
ALT FLD-CCNC: 11 U/L — SIGNIFICANT CHANGE UP (ref 4–33)
ALT FLD-CCNC: 11 U/L — SIGNIFICANT CHANGE UP (ref 4–33)
ALT FLD-CCNC: 12 U/L — SIGNIFICANT CHANGE UP (ref 4–33)
ALT FLD-CCNC: 13 U/L — SIGNIFICANT CHANGE UP (ref 4–33)
ALT FLD-CCNC: 15 U/L — SIGNIFICANT CHANGE UP (ref 4–33)
ALT FLD-CCNC: 16 U/L — SIGNIFICANT CHANGE UP (ref 4–33)
ALT FLD-CCNC: 20 U/L — SIGNIFICANT CHANGE UP (ref 4–33)
ALT FLD-CCNC: 24 U/L — SIGNIFICANT CHANGE UP (ref 4–33)
ALT FLD-CCNC: 26 U/L — SIGNIFICANT CHANGE UP (ref 4–33)
ALT FLD-CCNC: 5 U/L — SIGNIFICANT CHANGE UP (ref 4–33)
ALT FLD-CCNC: 8 U/L — SIGNIFICANT CHANGE UP (ref 4–33)
ALT FLD-CCNC: 9 U/L — SIGNIFICANT CHANGE UP (ref 4–33)
ALT FLD-CCNC: SIGNIFICANT CHANGE UP U/L (ref 4–33)
AMMONIA BLD-MCNC: 45 UMOL/L — SIGNIFICANT CHANGE UP (ref 11–55)
ANION GAP SERPL CALC-SCNC: 0 MMOL/L — LOW (ref 7–14)
ANION GAP SERPL CALC-SCNC: 10 MMOL/L — SIGNIFICANT CHANGE UP (ref 7–14)
ANION GAP SERPL CALC-SCNC: 11 MMOL/L — SIGNIFICANT CHANGE UP (ref 7–14)
ANION GAP SERPL CALC-SCNC: 12 MMOL/L — SIGNIFICANT CHANGE UP (ref 7–14)
ANION GAP SERPL CALC-SCNC: 2 MMOL/L — LOW (ref 7–14)
ANION GAP SERPL CALC-SCNC: 22 MMOL/L — HIGH (ref 7–14)
ANION GAP SERPL CALC-SCNC: 3 MMOL/L — LOW (ref 7–14)
ANION GAP SERPL CALC-SCNC: 3 MMOL/L — LOW (ref 7–14)
ANION GAP SERPL CALC-SCNC: 6 MMOL/L — LOW (ref 7–14)
ANION GAP SERPL CALC-SCNC: 7 MMOL/L — SIGNIFICANT CHANGE UP (ref 7–14)
ANION GAP SERPL CALC-SCNC: 7 MMOL/L — SIGNIFICANT CHANGE UP (ref 7–14)
ANION GAP SERPL CALC-SCNC: 8 MMOL/L — SIGNIFICANT CHANGE UP (ref 7–14)
ANION GAP SERPL CALC-SCNC: 8 MMOL/L — SIGNIFICANT CHANGE UP (ref 7–14)
ANION GAP SERPL CALC-SCNC: 9 MMOL/L — SIGNIFICANT CHANGE UP (ref 7–14)
ANISOCYTOSIS BLD QL: SIGNIFICANT CHANGE UP
ANISOCYTOSIS BLD QL: SIGNIFICANT CHANGE UP
ANISOCYTOSIS BLD QL: SLIGHT — SIGNIFICANT CHANGE UP
ANISOCYTOSIS BLD QL: SLIGHT — SIGNIFICANT CHANGE UP
APPEARANCE UR: ABNORMAL
APPEARANCE UR: CLEAR — SIGNIFICANT CHANGE UP
APTT BLD: 26.6 SEC — LOW (ref 27–36.3)
APTT BLD: 40.6 SEC — HIGH (ref 27–36.3)
APTT BLD: 46.1 SEC — HIGH (ref 27–36.3)
APTT BLD: 47.1 SEC — HIGH (ref 27–36.3)
APTT BLD: 49.5 SEC — HIGH (ref 27–36.3)
AST SERPL-CCNC: 10 U/L — SIGNIFICANT CHANGE UP (ref 4–32)
AST SERPL-CCNC: 11 U/L — SIGNIFICANT CHANGE UP (ref 4–32)
AST SERPL-CCNC: 13 U/L — SIGNIFICANT CHANGE UP (ref 4–32)
AST SERPL-CCNC: 14 U/L — SIGNIFICANT CHANGE UP (ref 4–32)
AST SERPL-CCNC: 17 U/L — SIGNIFICANT CHANGE UP (ref 4–32)
AST SERPL-CCNC: 18 U/L — SIGNIFICANT CHANGE UP (ref 4–32)
AST SERPL-CCNC: 19 U/L — SIGNIFICANT CHANGE UP (ref 4–32)
AST SERPL-CCNC: 20 U/L — SIGNIFICANT CHANGE UP (ref 4–32)
AST SERPL-CCNC: 20 U/L — SIGNIFICANT CHANGE UP (ref 4–32)
AST SERPL-CCNC: 21 U/L — SIGNIFICANT CHANGE UP (ref 4–32)
AST SERPL-CCNC: 22 U/L — SIGNIFICANT CHANGE UP (ref 4–32)
AST SERPL-CCNC: 24 U/L — SIGNIFICANT CHANGE UP (ref 4–32)
AST SERPL-CCNC: 28 U/L — SIGNIFICANT CHANGE UP (ref 4–32)
AST SERPL-CCNC: 30 U/L — SIGNIFICANT CHANGE UP (ref 4–32)
AST SERPL-CCNC: 36 U/L — HIGH (ref 4–32)
AST SERPL-CCNC: SIGNIFICANT CHANGE UP U/L (ref 4–32)
B-GLOBULIN SERPL ELPH-MCNC: 0.94 G/DL — SIGNIFICANT CHANGE UP (ref 0.6–1.1)
B-GLOBULIN SERPL ELPH-MCNC: 0.99 G/DL — SIGNIFICANT CHANGE UP (ref 0.6–1.1)
B2 MICROGLOB SERPL-MCNC: 10.4 MG/L — HIGH (ref 0.8–2.2)
BACTERIA # UR AUTO: ABNORMAL
BACTERIA # UR AUTO: NEGATIVE — SIGNIFICANT CHANGE UP
BASE EXCESS BLDA CALC-SCNC: 11 MMOL/L — HIGH (ref -2–3)
BASE EXCESS BLDA CALC-SCNC: 11.9 MMOL/L — HIGH (ref -2–3)
BASE EXCESS BLDA CALC-SCNC: 14.5 MMOL/L — HIGH (ref -2–3)
BASE EXCESS BLDA CALC-SCNC: 15.8 MMOL/L — HIGH (ref -2–3)
BASE EXCESS BLDA CALC-SCNC: 16.5 MMOL/L — HIGH (ref -2–3)
BASE EXCESS BLDA CALC-SCNC: 3.9 MMOL/L — HIGH (ref -2–3)
BASE EXCESS BLDA CALC-SCNC: 5.2 MMOL/L — HIGH (ref -2–3)
BASE EXCESS BLDA CALC-SCNC: 7.7 MMOL/L — HIGH (ref -2–3)
BASE EXCESS BLDA CALC-SCNC: 9.4 MMOL/L — HIGH (ref -2–3)
BASE EXCESS BLDV CALC-SCNC: 1.8 MMOL/L — SIGNIFICANT CHANGE UP (ref -2–3)
BASE EXCESS BLDV CALC-SCNC: 10.2 MMOL/L — HIGH (ref -2–3)
BASE EXCESS BLDV CALC-SCNC: 10.7 MMOL/L — HIGH (ref -2–3)
BASE EXCESS BLDV CALC-SCNC: 16.4 MMOL/L — HIGH (ref -2–3)
BASE EXCESS BLDV CALC-SCNC: 2.4 MMOL/L — SIGNIFICANT CHANGE UP (ref -2–3)
BASE EXCESS BLDV CALC-SCNC: 2.9 MMOL/L — SIGNIFICANT CHANGE UP (ref -2–3)
BASE EXCESS BLDV CALC-SCNC: 8.5 MMOL/L — HIGH (ref -2–3)
BASE EXCESS BLDV CALC-SCNC: 8.8 MMOL/L — HIGH (ref -2–3)
BASE EXCESS BLDV CALC-SCNC: 9 MMOL/L — HIGH (ref -2–3)
BASE EXCESS BLDV CALC-SCNC: 9.6 MMOL/L — HIGH (ref -2–3)
BASOPHILS # BLD AUTO: 0 K/UL — SIGNIFICANT CHANGE UP (ref 0–0.2)
BASOPHILS # BLD AUTO: 0.02 K/UL — SIGNIFICANT CHANGE UP (ref 0–0.2)
BASOPHILS # BLD AUTO: 0.03 K/UL — SIGNIFICANT CHANGE UP (ref 0–0.2)
BASOPHILS # BLD AUTO: 0.04 K/UL — SIGNIFICANT CHANGE UP (ref 0–0.2)
BASOPHILS # BLD AUTO: 0.06 K/UL — SIGNIFICANT CHANGE UP (ref 0–0.2)
BASOPHILS # BLD AUTO: 0.07 K/UL — SIGNIFICANT CHANGE UP (ref 0–0.2)
BASOPHILS NFR BLD AUTO: 0 % — SIGNIFICANT CHANGE UP (ref 0–2)
BASOPHILS NFR BLD AUTO: 0.3 % — SIGNIFICANT CHANGE UP (ref 0–2)
BASOPHILS NFR BLD AUTO: 0.3 % — SIGNIFICANT CHANGE UP (ref 0–2)
BASOPHILS NFR BLD AUTO: 0.4 % — SIGNIFICANT CHANGE UP (ref 0–2)
BASOPHILS NFR BLD AUTO: 0.4 % — SIGNIFICANT CHANGE UP (ref 0–2)
BASOPHILS NFR BLD AUTO: 0.5 % — SIGNIFICANT CHANGE UP (ref 0–2)
BASOPHILS NFR BLD AUTO: 0.5 % — SIGNIFICANT CHANGE UP (ref 0–2)
BASOPHILS NFR BLD AUTO: 0.7 % — SIGNIFICANT CHANGE UP (ref 0–2)
BASOPHILS NFR BLD AUTO: 0.9 % — SIGNIFICANT CHANGE UP (ref 0–2)
BASOPHILS NFR BLD AUTO: 1 % — SIGNIFICANT CHANGE UP (ref 0–2)
BILIRUB DIRECT SERPL-MCNC: 0.2 MG/DL — SIGNIFICANT CHANGE UP (ref 0–0.3)
BILIRUB DIRECT SERPL-MCNC: <0.2 MG/DL — SIGNIFICANT CHANGE UP (ref 0–0.3)
BILIRUB INDIRECT FLD-MCNC: 0.7 MG/DL — SIGNIFICANT CHANGE UP (ref 0–1)
BILIRUB SERPL-MCNC: 0.54 MG/DL — SIGNIFICANT CHANGE UP (ref 0.2–1.2)
BILIRUB SERPL-MCNC: 0.57 MG/DL — SIGNIFICANT CHANGE UP (ref 0.2–1.2)
BILIRUB SERPL-MCNC: 0.6 MG/DL — SIGNIFICANT CHANGE UP (ref 0.2–1.2)
BILIRUB SERPL-MCNC: 0.7 MG/DL — SIGNIFICANT CHANGE UP (ref 0.2–1.2)
BILIRUB SERPL-MCNC: 0.7 MG/DL — SIGNIFICANT CHANGE UP (ref 0.2–1.2)
BILIRUB SERPL-MCNC: 0.8 MG/DL — SIGNIFICANT CHANGE UP (ref 0.2–1.2)
BILIRUB SERPL-MCNC: 0.9 MG/DL — SIGNIFICANT CHANGE UP (ref 0.2–1.2)
BILIRUB SERPL-MCNC: 0.9 MG/DL — SIGNIFICANT CHANGE UP (ref 0.2–1.2)
BILIRUB SERPL-MCNC: 1.1 MG/DL — SIGNIFICANT CHANGE UP (ref 0.2–1.2)
BILIRUB SERPL-MCNC: SIGNIFICANT CHANGE UP MG/DL (ref 0.2–1.2)
BILIRUB UR-MCNC: NEGATIVE — SIGNIFICANT CHANGE UP
BILIRUB UR-MCNC: NEGATIVE — SIGNIFICANT CHANGE UP
BLD GP AB SCN SERPL QL: NEGATIVE — SIGNIFICANT CHANGE UP
BLD GP AB SCN SERPL QL: POSITIVE — SIGNIFICANT CHANGE UP
BLOCK/SPECIMEN ID: SIGNIFICANT CHANGE UP
BLOOD GAS ARTERIAL COMPREHENSIVE RESULT: SIGNIFICANT CHANGE UP
BLOOD GAS VENOUS COMPREHENSIVE RESULT: SIGNIFICANT CHANGE UP
BUN SERPL-MCNC: 11 MG/DL — SIGNIFICANT CHANGE UP (ref 7–23)
BUN SERPL-MCNC: 12 MG/DL — SIGNIFICANT CHANGE UP (ref 7–23)
BUN SERPL-MCNC: 13 MG/DL — SIGNIFICANT CHANGE UP (ref 7–23)
BUN SERPL-MCNC: 14 MG/DL — SIGNIFICANT CHANGE UP (ref 7–23)
BUN SERPL-MCNC: 15 MG/DL — SIGNIFICANT CHANGE UP (ref 7–23)
BUN SERPL-MCNC: 16 MG/DL — SIGNIFICANT CHANGE UP (ref 7–23)
BUN SERPL-MCNC: 16.4 MG/DL — SIGNIFICANT CHANGE UP (ref 7–23)
BUN SERPL-MCNC: 18 MG/DL — SIGNIFICANT CHANGE UP (ref 7–23)
BUN SERPL-MCNC: 19 MG/DL — SIGNIFICANT CHANGE UP (ref 7–23)
BUN SERPL-MCNC: 20 MG/DL — SIGNIFICANT CHANGE UP (ref 7–23)
BUN SERPL-MCNC: 20.8 MG/DL — SIGNIFICANT CHANGE UP (ref 7–23)
BUN SERPL-MCNC: 21 MG/DL — SIGNIFICANT CHANGE UP (ref 7–23)
BUN SERPL-MCNC: 22 MG/DL — SIGNIFICANT CHANGE UP (ref 7–23)
BUN SERPL-MCNC: 23 MG/DL — SIGNIFICANT CHANGE UP (ref 7–23)
BUN SERPL-MCNC: 23 MG/DL — SIGNIFICANT CHANGE UP (ref 7–23)
BUN SERPL-MCNC: 26 MG/DL — HIGH (ref 7–23)
BUN SERPL-MCNC: 26 MG/DL — HIGH (ref 7–23)
BUN SERPL-MCNC: 28 MG/DL — HIGH (ref 7–23)
BUN SERPL-MCNC: 29 MG/DL — HIGH (ref 7–23)
BUN SERPL-MCNC: 38 MG/DL — HIGH (ref 7–23)
BUN SERPL-MCNC: 42 MG/DL — HIGH (ref 7–23)
CA-I BLD-SCNC: 1.11 MMOL/L — LOW (ref 1.15–1.29)
CA-I BLD-SCNC: 1.24 MMOL/L — SIGNIFICANT CHANGE UP (ref 1.15–1.29)
CA-I BLD-SCNC: 1.43 MMOL/L — HIGH (ref 1.15–1.29)
CA-I SERPL-SCNC: 1.15 MMOL/L — SIGNIFICANT CHANGE UP (ref 1.15–1.33)
CA-I SERPL-SCNC: 1.28 MMOL/L — SIGNIFICANT CHANGE UP (ref 1.15–1.33)
CA-I SERPL-SCNC: 1.28 MMOL/L — SIGNIFICANT CHANGE UP (ref 1.15–1.33)
CA-I SERPL-SCNC: 1.31 MMOL/L — SIGNIFICANT CHANGE UP (ref 1.15–1.33)
CA-I SERPL-SCNC: 1.33 MMOL/L — SIGNIFICANT CHANGE UP (ref 1.15–1.33)
CA-I SERPL-SCNC: 1.42 MMOL/L — HIGH (ref 1.15–1.33)
CA-I SERPL-SCNC: 1.43 MMOL/L — HIGH (ref 1.15–1.33)
CALCIUM SERPL-MCNC: 10 MG/DL — SIGNIFICANT CHANGE UP (ref 8.4–10.5)
CALCIUM SERPL-MCNC: 10.1 MG/DL — SIGNIFICANT CHANGE UP (ref 8.4–10.5)
CALCIUM SERPL-MCNC: 10.2 MG/DL — SIGNIFICANT CHANGE UP (ref 8.4–10.5)
CALCIUM SERPL-MCNC: 10.3 MG/DL — SIGNIFICANT CHANGE UP (ref 8.4–10.5)
CALCIUM SERPL-MCNC: 11.1 MG/DL — HIGH (ref 8.4–10.5)
CALCIUM SERPL-MCNC: 11.7 MG/DL — HIGH (ref 8.4–10.5)
CALCIUM SERPL-MCNC: 7.2 MG/DL — LOW (ref 8.4–10.5)
CALCIUM SERPL-MCNC: 9.1 MG/DL — SIGNIFICANT CHANGE UP (ref 8.4–10.5)
CALCIUM SERPL-MCNC: 9.2 MG/DL — SIGNIFICANT CHANGE UP (ref 8.4–10.5)
CALCIUM SERPL-MCNC: 9.2 MG/DL — SIGNIFICANT CHANGE UP (ref 8.4–10.5)
CALCIUM SERPL-MCNC: 9.4 MG/DL — SIGNIFICANT CHANGE UP (ref 8.4–10.5)
CALCIUM SERPL-MCNC: 9.4 MG/DL — SIGNIFICANT CHANGE UP (ref 8.4–10.5)
CALCIUM SERPL-MCNC: 9.5 MG/DL — SIGNIFICANT CHANGE UP (ref 8.4–10.5)
CALCIUM SERPL-MCNC: 9.6 MG/DL — SIGNIFICANT CHANGE UP (ref 8.4–10.5)
CALCIUM SERPL-MCNC: 9.6 MG/DL — SIGNIFICANT CHANGE UP (ref 8.4–10.5)
CALCIUM SERPL-MCNC: 9.7 MG/DL — SIGNIFICANT CHANGE UP (ref 8.4–10.5)
CALCIUM SERPL-MCNC: 9.7 MG/DL — SIGNIFICANT CHANGE UP (ref 8.4–10.5)
CALCIUM SERPL-MCNC: 9.8 MG/DL — SIGNIFICANT CHANGE UP (ref 8.4–10.5)
CALCIUM SERPL-MCNC: 9.9 MG/DL — SIGNIFICANT CHANGE UP (ref 8.4–10.5)
CALCIUM SERPL-MCNC: SIGNIFICANT CHANGE UP MG/DL (ref 8.4–10.5)
CHLORIDE BLDV-SCNC: 100 MMOL/L — SIGNIFICANT CHANGE UP (ref 96–108)
CHLORIDE BLDV-SCNC: 101 MMOL/L — SIGNIFICANT CHANGE UP (ref 96–108)
CHLORIDE BLDV-SCNC: 102 MMOL/L — SIGNIFICANT CHANGE UP (ref 96–108)
CHLORIDE BLDV-SCNC: 104 MMOL/L — SIGNIFICANT CHANGE UP (ref 96–108)
CHLORIDE BLDV-SCNC: 105 MMOL/L — SIGNIFICANT CHANGE UP (ref 96–108)
CHLORIDE BLDV-SCNC: 96 MMOL/L — SIGNIFICANT CHANGE UP (ref 96–108)
CHLORIDE SERPL-SCNC: 100 MMOL/L — SIGNIFICANT CHANGE UP (ref 98–107)
CHLORIDE SERPL-SCNC: 101 MMOL/L — SIGNIFICANT CHANGE UP (ref 98–107)
CHLORIDE SERPL-SCNC: 101 MMOL/L — SIGNIFICANT CHANGE UP (ref 98–107)
CHLORIDE SERPL-SCNC: 102 MMOL/L — SIGNIFICANT CHANGE UP (ref 98–107)
CHLORIDE SERPL-SCNC: 103 MMOL/L — SIGNIFICANT CHANGE UP (ref 98–107)
CHLORIDE SERPL-SCNC: 104 MMOL/L — SIGNIFICANT CHANGE UP (ref 98–107)
CHLORIDE SERPL-SCNC: 105 MMOL/L — SIGNIFICANT CHANGE UP (ref 98–107)
CHLORIDE SERPL-SCNC: 105 MMOL/L — SIGNIFICANT CHANGE UP (ref 98–107)
CHLORIDE SERPL-SCNC: 106 MMOL/L — SIGNIFICANT CHANGE UP (ref 98–107)
CHLORIDE SERPL-SCNC: 106 MMOL/L — SIGNIFICANT CHANGE UP (ref 98–107)
CHLORIDE SERPL-SCNC: 88 MMOL/L — LOW (ref 98–107)
CHLORIDE SERPL-SCNC: 97 MMOL/L — LOW (ref 98–107)
CHLORIDE SERPL-SCNC: 98 MMOL/L — SIGNIFICANT CHANGE UP (ref 98–107)
CHLORIDE SERPL-SCNC: 98 MMOL/L — SIGNIFICANT CHANGE UP (ref 98–107)
CHLORIDE SERPL-SCNC: 99 MMOL/L — SIGNIFICANT CHANGE UP (ref 98–107)
CHOLEST SERPL-MCNC: 54 MG/DL — SIGNIFICANT CHANGE UP
CLINICAL INFO: SIGNIFICANT CHANGE UP
CLOSURE TME COLL+EPINEP BLD: 124 K/UL — LOW (ref 150–400)
CO2 BLDA-SCNC: 35 MMOL/L — HIGH (ref 19–24)
CO2 BLDA-SCNC: 37 MMOL/L — HIGH (ref 19–24)
CO2 BLDA-SCNC: 38 MMOL/L — HIGH (ref 19–24)
CO2 BLDA-SCNC: 39 MMOL/L — HIGH (ref 19–24)
CO2 BLDA-SCNC: 42 MMOL/L — HIGH (ref 19–24)
CO2 BLDA-SCNC: 43 MMOL/L — HIGH (ref 19–24)
CO2 BLDA-SCNC: 44 MMOL/L — HIGH (ref 19–24)
CO2 BLDA-SCNC: 44 MMOL/L — HIGH (ref 19–24)
CO2 BLDA-SCNC: 46 MMOL/L — HIGH (ref 19–24)
CO2 BLDV-SCNC: 29.7 MMOL/L — HIGH (ref 22–26)
CO2 BLDV-SCNC: 32.3 MMOL/L — HIGH (ref 22–26)
CO2 BLDV-SCNC: 34.1 MMOL/L — HIGH (ref 22–26)
CO2 BLDV-SCNC: 36.1 MMOL/L — HIGH (ref 22–26)
CO2 BLDV-SCNC: 36.9 MMOL/L — HIGH (ref 22–26)
CO2 BLDV-SCNC: 39.9 MMOL/L — HIGH (ref 22–26)
CO2 BLDV-SCNC: 39.9 MMOL/L — HIGH (ref 22–26)
CO2 BLDV-SCNC: 40.3 MMOL/L — HIGH (ref 22–26)
CO2 BLDV-SCNC: 40.5 MMOL/L — HIGH (ref 22–26)
CO2 BLDV-SCNC: 47 MMOL/L — HIGH (ref 22–26)
CO2 SERPL-SCNC: 20 MMOL/L — LOW (ref 22–31)
CO2 SERPL-SCNC: 21 MMOL/L — LOW (ref 22–31)
CO2 SERPL-SCNC: 24 MMOL/L — SIGNIFICANT CHANGE UP (ref 22–31)
CO2 SERPL-SCNC: 25 MMOL/L — SIGNIFICANT CHANGE UP (ref 22–31)
CO2 SERPL-SCNC: 27 MMOL/L — SIGNIFICANT CHANGE UP (ref 22–31)
CO2 SERPL-SCNC: 29 MMOL/L — SIGNIFICANT CHANGE UP (ref 22–31)
CO2 SERPL-SCNC: 30 MMOL/L — SIGNIFICANT CHANGE UP (ref 22–31)
CO2 SERPL-SCNC: 31 MMOL/L — SIGNIFICANT CHANGE UP (ref 22–31)
CO2 SERPL-SCNC: 32 MMOL/L — HIGH (ref 22–31)
CO2 SERPL-SCNC: 33 MMOL/L — HIGH (ref 22–31)
CO2 SERPL-SCNC: 34 MMOL/L — HIGH (ref 22–31)
CO2 SERPL-SCNC: 35 MMOL/L — HIGH (ref 22–31)
CO2 SERPL-SCNC: 37 MMOL/L — HIGH (ref 22–31)
CO2 SERPL-SCNC: 38 MMOL/L — HIGH (ref 22–31)
CO2 SERPL-SCNC: 39 MMOL/L — HIGH (ref 22–31)
COLOR SPEC: YELLOW — SIGNIFICANT CHANGE UP
COLOR SPEC: YELLOW — SIGNIFICANT CHANGE UP
CORTIS AM PEAK SERPL-MCNC: 12.4 UG/DL — SIGNIFICANT CHANGE UP (ref 6–18.4)
CORTIS AM PEAK SERPL-MCNC: 8.6 UG/DL — SIGNIFICANT CHANGE UP (ref 6–18.4)
CREAT SERPL-MCNC: 0.46 MG/DL — LOW (ref 0.5–1.3)
CREAT SERPL-MCNC: 0.49 MG/DL — LOW (ref 0.5–1.3)
CREAT SERPL-MCNC: 0.53 MG/DL — SIGNIFICANT CHANGE UP (ref 0.5–1.3)
CREAT SERPL-MCNC: 0.55 MG/DL — SIGNIFICANT CHANGE UP (ref 0.5–1.3)
CREAT SERPL-MCNC: 0.56 MG/DL — SIGNIFICANT CHANGE UP (ref 0.5–1.3)
CREAT SERPL-MCNC: 0.58 MG/DL — SIGNIFICANT CHANGE UP (ref 0.5–1.3)
CREAT SERPL-MCNC: 0.59 MG/DL — SIGNIFICANT CHANGE UP (ref 0.5–1.3)
CREAT SERPL-MCNC: 0.6 MG/DL — SIGNIFICANT CHANGE UP (ref 0.5–1.3)
CREAT SERPL-MCNC: 0.61 MG/DL — SIGNIFICANT CHANGE UP (ref 0.5–1.3)
CREAT SERPL-MCNC: 0.61 MG/DL — SIGNIFICANT CHANGE UP (ref 0.5–1.3)
CREAT SERPL-MCNC: 0.62 MG/DL — SIGNIFICANT CHANGE UP (ref 0.5–1.3)
CREAT SERPL-MCNC: 0.63 MG/DL — SIGNIFICANT CHANGE UP (ref 0.5–1.3)
CREAT SERPL-MCNC: 0.64 MG/DL — SIGNIFICANT CHANGE UP (ref 0.5–1.3)
CREAT SERPL-MCNC: 0.66 MG/DL — SIGNIFICANT CHANGE UP (ref 0.5–1.3)
CREAT SERPL-MCNC: 0.67 MG/DL — SIGNIFICANT CHANGE UP (ref 0.5–1.3)
CREAT SERPL-MCNC: 0.72 MG/DL — SIGNIFICANT CHANGE UP (ref 0.5–1.3)
CREAT SERPL-MCNC: 0.75 MG/DL — SIGNIFICANT CHANGE UP (ref 0.5–1.3)
CREAT SERPL-MCNC: 0.77 MG/DL — SIGNIFICANT CHANGE UP (ref 0.5–1.3)
CREAT SERPL-MCNC: 0.8 MG/DL — SIGNIFICANT CHANGE UP (ref 0.5–1.3)
CREAT SERPL-MCNC: 0.81 MG/DL — SIGNIFICANT CHANGE UP (ref 0.5–1.3)
CREAT SERPL-MCNC: 0.98 MG/DL — SIGNIFICANT CHANGE UP (ref 0.5–1.3)
CREAT SERPL-MCNC: 1.02 MG/DL — SIGNIFICANT CHANGE UP (ref 0.5–1.3)
CREAT SERPL-MCNC: 1.04 MG/DL — SIGNIFICANT CHANGE UP (ref 0.5–1.3)
CREAT SERPL-MCNC: 1.11 MG/DL — SIGNIFICANT CHANGE UP (ref 0.5–1.3)
CREAT SERPL-MCNC: 1.14 MG/DL — SIGNIFICANT CHANGE UP (ref 0.5–1.3)
CREAT SERPL-MCNC: 1.38 MG/DL — HIGH (ref 0.5–1.3)
CREAT SERPL-MCNC: 1.56 MG/DL — HIGH (ref 0.5–1.3)
CULTURE RESULTS: SIGNIFICANT CHANGE UP
CULTURE RESULTS: SIGNIFICANT CHANGE UP
D DIMER BLD IA.RAPID-MCNC: 190 NG/ML DDU — SIGNIFICANT CHANGE UP
D DIMER BLD IA.RAPID-MCNC: 200 NG/ML DDU — SIGNIFICANT CHANGE UP
D DIMER BLD IA.RAPID-MCNC: <150 NG/ML DDU — SIGNIFICANT CHANGE UP
DIFF PNL FLD: NEGATIVE — SIGNIFICANT CHANGE UP
DIFF PNL FLD: NEGATIVE — SIGNIFICANT CHANGE UP
EGFR: 34 ML/MIN/1.73M2 — LOW
EGFR: 40 ML/MIN/1.73M2 — LOW
EGFR: 52 ML/MIN/1.73M2 — LOW
EGFR: 56 ML/MIN/1.73M2 — LOW
EGFR: 57 ML/MIN/1.73M2 — LOW
EGFR: 60 ML/MIN/1.73M2 — SIGNIFICANT CHANGE UP
EGFR: 75 ML/MIN/1.73M2 — SIGNIFICANT CHANGE UP
EGFR: 80 ML/MIN/1.73M2 — SIGNIFICANT CHANGE UP
EGFR: 82 ML/MIN/1.73M2 — SIGNIFICANT CHANGE UP
EGFR: 87 ML/MIN/1.73M2 — SIGNIFICANT CHANGE UP
EGFR: 91 ML/MIN/1.73M2 — SIGNIFICANT CHANGE UP
EGFR: 91 ML/MIN/1.73M2 — SIGNIFICANT CHANGE UP
EGFR: 92 ML/MIN/1.73M2 — SIGNIFICANT CHANGE UP
EGFR: 93 ML/MIN/1.73M2 — SIGNIFICANT CHANGE UP
EGFR: 94 ML/MIN/1.73M2 — SIGNIFICANT CHANGE UP
EGFR: 95 ML/MIN/1.73M2 — SIGNIFICANT CHANGE UP
EGFR: 95 ML/MIN/1.73M2 — SIGNIFICANT CHANGE UP
EGFR: 96 ML/MIN/1.73M2 — SIGNIFICANT CHANGE UP
EGFR: 98 ML/MIN/1.73M2 — SIGNIFICANT CHANGE UP
EGFR: 99 ML/MIN/1.73M2 — SIGNIFICANT CHANGE UP
EOSINOPHIL # BLD AUTO: 0 K/UL — SIGNIFICANT CHANGE UP (ref 0–0.5)
EOSINOPHIL # BLD AUTO: 0.06 K/UL — SIGNIFICANT CHANGE UP (ref 0–0.5)
EOSINOPHIL # BLD AUTO: 0.08 K/UL — SIGNIFICANT CHANGE UP (ref 0–0.5)
EOSINOPHIL # BLD AUTO: 0.1 K/UL — SIGNIFICANT CHANGE UP (ref 0–0.5)
EOSINOPHIL # BLD AUTO: 0.13 K/UL — SIGNIFICANT CHANGE UP (ref 0–0.5)
EOSINOPHIL # BLD AUTO: 0.13 K/UL — SIGNIFICANT CHANGE UP (ref 0–0.5)
EOSINOPHIL # BLD AUTO: 0.14 K/UL — SIGNIFICANT CHANGE UP (ref 0–0.5)
EOSINOPHIL # BLD AUTO: 0.17 K/UL — SIGNIFICANT CHANGE UP (ref 0–0.5)
EOSINOPHIL # BLD AUTO: 0.22 K/UL — SIGNIFICANT CHANGE UP (ref 0–0.5)
EOSINOPHIL # BLD AUTO: 0.36 K/UL — SIGNIFICANT CHANGE UP (ref 0–0.5)
EOSINOPHIL NFR BLD AUTO: 0 % — SIGNIFICANT CHANGE UP (ref 0–6)
EOSINOPHIL NFR BLD AUTO: 1 % — SIGNIFICANT CHANGE UP (ref 0–6)
EOSINOPHIL NFR BLD AUTO: 1.2 % — SIGNIFICANT CHANGE UP (ref 0–6)
EOSINOPHIL NFR BLD AUTO: 1.3 % — SIGNIFICANT CHANGE UP (ref 0–6)
EOSINOPHIL NFR BLD AUTO: 1.4 % — SIGNIFICANT CHANGE UP (ref 0–6)
EOSINOPHIL NFR BLD AUTO: 1.7 % — SIGNIFICANT CHANGE UP (ref 0–6)
EOSINOPHIL NFR BLD AUTO: 2.2 % — SIGNIFICANT CHANGE UP (ref 0–6)
EOSINOPHIL NFR BLD AUTO: 2.2 % — SIGNIFICANT CHANGE UP (ref 0–6)
EOSINOPHIL NFR BLD AUTO: 2.3 % — SIGNIFICANT CHANGE UP (ref 0–6)
EOSINOPHIL NFR BLD AUTO: 2.6 % — SIGNIFICANT CHANGE UP (ref 0–6)
EOSINOPHIL NFR BLD AUTO: 2.7 % — SIGNIFICANT CHANGE UP (ref 0–6)
EOSINOPHIL NFR BLD AUTO: 3.1 % — SIGNIFICANT CHANGE UP (ref 0–6)
EPI CELLS # UR: 1 /HPF — SIGNIFICANT CHANGE UP (ref 0–5)
EPI CELLS # UR: 2 /HPF — SIGNIFICANT CHANGE UP (ref 0–5)
ESTIMATED AVERAGE GLUCOSE: 82 — SIGNIFICANT CHANGE UP
FERRITIN SERPL-MCNC: 32 NG/ML — SIGNIFICANT CHANGE UP (ref 15–150)
FIBRINOGEN AG PPP IA-MCNC: 149 MG/DL — LOW (ref 233–496)
FIBRINOGEN PPP-MCNC: 177 MG/DL — LOW (ref 330–520)
FIBRINOGEN PPP-MCNC: 183 MG/DL — LOW (ref 330–520)
FLUAV AG NPH QL: SIGNIFICANT CHANGE UP
FLUBV AG NPH QL: SIGNIFICANT CHANGE UP
FOLATE SERPL-MCNC: 4.9 NG/ML — SIGNIFICANT CHANGE UP (ref 3.1–17.5)
FOLATE SERPL-MCNC: 5.4 NG/ML — SIGNIFICANT CHANGE UP (ref 3.1–17.5)
FOLATE SERPL-MCNC: SIGNIFICANT CHANGE UP NG/ML (ref 3.1–17.5)
GAMMA GLOBULIN: 7.08 G/DL — HIGH (ref 0.7–1.7)
GAMMA GLOBULIN: 7.22 G/DL — HIGH (ref 0.7–1.7)
GAMMA INTERFERON BACKGROUND BLD IA-ACNC: 0.02 IU/ML — SIGNIFICANT CHANGE UP
GAS PNL BLDA: SIGNIFICANT CHANGE UP
GAS PNL BLDA: SIGNIFICANT CHANGE UP
GAS PNL BLDV: 131 MMOL/L — LOW (ref 136–145)
GAS PNL BLDV: 133 MMOL/L — LOW (ref 136–145)
GAS PNL BLDV: 138 MMOL/L — SIGNIFICANT CHANGE UP (ref 136–145)
GAS PNL BLDV: 139 MMOL/L — SIGNIFICANT CHANGE UP (ref 136–145)
GAS PNL BLDV: 139 MMOL/L — SIGNIFICANT CHANGE UP (ref 136–145)
GAS PNL BLDV: 140 MMOL/L — SIGNIFICANT CHANGE UP (ref 136–145)
GAS PNL BLDV: 141 MMOL/L — SIGNIFICANT CHANGE UP (ref 136–145)
GAS PNL BLDV: 144 MMOL/L — SIGNIFICANT CHANGE UP (ref 136–145)
GAS PNL BLDV: SIGNIFICANT CHANGE UP
GLUCOSE BLDC GLUCOMTR-MCNC: 100 MG/DL — HIGH (ref 70–99)
GLUCOSE BLDC GLUCOMTR-MCNC: 101 MG/DL — HIGH (ref 70–99)
GLUCOSE BLDC GLUCOMTR-MCNC: 103 MG/DL — HIGH (ref 70–99)
GLUCOSE BLDC GLUCOMTR-MCNC: 104 MG/DL — HIGH (ref 70–99)
GLUCOSE BLDC GLUCOMTR-MCNC: 104 MG/DL — HIGH (ref 70–99)
GLUCOSE BLDC GLUCOMTR-MCNC: 105 MG/DL — HIGH (ref 70–99)
GLUCOSE BLDC GLUCOMTR-MCNC: 106 MG/DL — HIGH (ref 70–99)
GLUCOSE BLDC GLUCOMTR-MCNC: 108 MG/DL — HIGH (ref 70–99)
GLUCOSE BLDC GLUCOMTR-MCNC: 108 MG/DL — HIGH (ref 70–99)
GLUCOSE BLDC GLUCOMTR-MCNC: 109 MG/DL — HIGH (ref 70–99)
GLUCOSE BLDC GLUCOMTR-MCNC: 109 MG/DL — HIGH (ref 70–99)
GLUCOSE BLDC GLUCOMTR-MCNC: 112 MG/DL — HIGH (ref 70–99)
GLUCOSE BLDC GLUCOMTR-MCNC: 115 MG/DL — HIGH (ref 70–99)
GLUCOSE BLDC GLUCOMTR-MCNC: 118 MG/DL — HIGH (ref 70–99)
GLUCOSE BLDC GLUCOMTR-MCNC: 119 MG/DL — HIGH (ref 70–99)
GLUCOSE BLDC GLUCOMTR-MCNC: 120 MG/DL — HIGH (ref 70–99)
GLUCOSE BLDC GLUCOMTR-MCNC: 122 MG/DL — HIGH (ref 70–99)
GLUCOSE BLDC GLUCOMTR-MCNC: 123 MG/DL — HIGH (ref 70–99)
GLUCOSE BLDC GLUCOMTR-MCNC: 124 MG/DL — HIGH (ref 70–99)
GLUCOSE BLDC GLUCOMTR-MCNC: 125 MG/DL — HIGH (ref 70–99)
GLUCOSE BLDC GLUCOMTR-MCNC: 126 MG/DL — HIGH (ref 70–99)
GLUCOSE BLDC GLUCOMTR-MCNC: 127 MG/DL — HIGH (ref 70–99)
GLUCOSE BLDC GLUCOMTR-MCNC: 128 MG/DL — HIGH (ref 70–99)
GLUCOSE BLDC GLUCOMTR-MCNC: 131 MG/DL — HIGH (ref 70–99)
GLUCOSE BLDC GLUCOMTR-MCNC: 131 MG/DL — HIGH (ref 70–99)
GLUCOSE BLDC GLUCOMTR-MCNC: 132 MG/DL — HIGH (ref 70–99)
GLUCOSE BLDC GLUCOMTR-MCNC: 133 MG/DL — HIGH (ref 70–99)
GLUCOSE BLDC GLUCOMTR-MCNC: 136 MG/DL — HIGH (ref 70–99)
GLUCOSE BLDC GLUCOMTR-MCNC: 136 MG/DL — HIGH (ref 70–99)
GLUCOSE BLDC GLUCOMTR-MCNC: 140 MG/DL — HIGH (ref 70–99)
GLUCOSE BLDC GLUCOMTR-MCNC: 147 MG/DL — HIGH (ref 70–99)
GLUCOSE BLDC GLUCOMTR-MCNC: 149 MG/DL — HIGH (ref 70–99)
GLUCOSE BLDC GLUCOMTR-MCNC: 151 MG/DL — HIGH (ref 70–99)
GLUCOSE BLDC GLUCOMTR-MCNC: 158 MG/DL — HIGH (ref 70–99)
GLUCOSE BLDC GLUCOMTR-MCNC: 162 MG/DL — HIGH (ref 70–99)
GLUCOSE BLDC GLUCOMTR-MCNC: 178 MG/DL — HIGH (ref 70–99)
GLUCOSE BLDC GLUCOMTR-MCNC: 191 MG/DL — HIGH (ref 70–99)
GLUCOSE BLDC GLUCOMTR-MCNC: 192 MG/DL — HIGH (ref 70–99)
GLUCOSE BLDC GLUCOMTR-MCNC: 205 MG/DL — HIGH (ref 70–99)
GLUCOSE BLDC GLUCOMTR-MCNC: 265 MG/DL — HIGH (ref 70–99)
GLUCOSE BLDC GLUCOMTR-MCNC: 266 MG/DL — HIGH (ref 70–99)
GLUCOSE BLDC GLUCOMTR-MCNC: 56 MG/DL — LOW (ref 70–99)
GLUCOSE BLDC GLUCOMTR-MCNC: 58 MG/DL — LOW (ref 70–99)
GLUCOSE BLDC GLUCOMTR-MCNC: 59 MG/DL — LOW (ref 70–99)
GLUCOSE BLDC GLUCOMTR-MCNC: 59 MG/DL — LOW (ref 70–99)
GLUCOSE BLDC GLUCOMTR-MCNC: 67 MG/DL — LOW (ref 70–99)
GLUCOSE BLDC GLUCOMTR-MCNC: 68 MG/DL — LOW (ref 70–99)
GLUCOSE BLDC GLUCOMTR-MCNC: 68 MG/DL — LOW (ref 70–99)
GLUCOSE BLDC GLUCOMTR-MCNC: 70 MG/DL — SIGNIFICANT CHANGE UP (ref 70–99)
GLUCOSE BLDC GLUCOMTR-MCNC: 72 MG/DL — SIGNIFICANT CHANGE UP (ref 70–99)
GLUCOSE BLDC GLUCOMTR-MCNC: 72 MG/DL — SIGNIFICANT CHANGE UP (ref 70–99)
GLUCOSE BLDC GLUCOMTR-MCNC: 74 MG/DL — SIGNIFICANT CHANGE UP (ref 70–99)
GLUCOSE BLDC GLUCOMTR-MCNC: 76 MG/DL — SIGNIFICANT CHANGE UP (ref 70–99)
GLUCOSE BLDC GLUCOMTR-MCNC: 76 MG/DL — SIGNIFICANT CHANGE UP (ref 70–99)
GLUCOSE BLDC GLUCOMTR-MCNC: 77 MG/DL — SIGNIFICANT CHANGE UP (ref 70–99)
GLUCOSE BLDC GLUCOMTR-MCNC: 78 MG/DL — SIGNIFICANT CHANGE UP (ref 70–99)
GLUCOSE BLDC GLUCOMTR-MCNC: 81 MG/DL — SIGNIFICANT CHANGE UP (ref 70–99)
GLUCOSE BLDC GLUCOMTR-MCNC: 81 MG/DL — SIGNIFICANT CHANGE UP (ref 70–99)
GLUCOSE BLDC GLUCOMTR-MCNC: 82 MG/DL — SIGNIFICANT CHANGE UP (ref 70–99)
GLUCOSE BLDC GLUCOMTR-MCNC: 83 MG/DL — SIGNIFICANT CHANGE UP (ref 70–99)
GLUCOSE BLDC GLUCOMTR-MCNC: 84 MG/DL — SIGNIFICANT CHANGE UP (ref 70–99)
GLUCOSE BLDC GLUCOMTR-MCNC: 85 MG/DL — SIGNIFICANT CHANGE UP (ref 70–99)
GLUCOSE BLDC GLUCOMTR-MCNC: 86 MG/DL — SIGNIFICANT CHANGE UP (ref 70–99)
GLUCOSE BLDC GLUCOMTR-MCNC: 87 MG/DL — SIGNIFICANT CHANGE UP (ref 70–99)
GLUCOSE BLDC GLUCOMTR-MCNC: 87 MG/DL — SIGNIFICANT CHANGE UP (ref 70–99)
GLUCOSE BLDC GLUCOMTR-MCNC: 88 MG/DL — SIGNIFICANT CHANGE UP (ref 70–99)
GLUCOSE BLDC GLUCOMTR-MCNC: 89 MG/DL — SIGNIFICANT CHANGE UP (ref 70–99)
GLUCOSE BLDC GLUCOMTR-MCNC: 89 MG/DL — SIGNIFICANT CHANGE UP (ref 70–99)
GLUCOSE BLDC GLUCOMTR-MCNC: 90 MG/DL — SIGNIFICANT CHANGE UP (ref 70–99)
GLUCOSE BLDC GLUCOMTR-MCNC: 90 MG/DL — SIGNIFICANT CHANGE UP (ref 70–99)
GLUCOSE BLDC GLUCOMTR-MCNC: 91 MG/DL — SIGNIFICANT CHANGE UP (ref 70–99)
GLUCOSE BLDC GLUCOMTR-MCNC: 92 MG/DL — SIGNIFICANT CHANGE UP (ref 70–99)
GLUCOSE BLDC GLUCOMTR-MCNC: 93 MG/DL — SIGNIFICANT CHANGE UP (ref 70–99)
GLUCOSE BLDC GLUCOMTR-MCNC: 94 MG/DL — SIGNIFICANT CHANGE UP (ref 70–99)
GLUCOSE BLDC GLUCOMTR-MCNC: 94 MG/DL — SIGNIFICANT CHANGE UP (ref 70–99)
GLUCOSE BLDC GLUCOMTR-MCNC: 95 MG/DL — SIGNIFICANT CHANGE UP (ref 70–99)
GLUCOSE BLDC GLUCOMTR-MCNC: 96 MG/DL — SIGNIFICANT CHANGE UP (ref 70–99)
GLUCOSE BLDC GLUCOMTR-MCNC: 96 MG/DL — SIGNIFICANT CHANGE UP (ref 70–99)
GLUCOSE BLDC GLUCOMTR-MCNC: 97 MG/DL — SIGNIFICANT CHANGE UP (ref 70–99)
GLUCOSE BLDC GLUCOMTR-MCNC: 98 MG/DL — SIGNIFICANT CHANGE UP (ref 70–99)
GLUCOSE BLDC GLUCOMTR-MCNC: 99 MG/DL — SIGNIFICANT CHANGE UP (ref 70–99)
GLUCOSE BLDV-MCNC: 101 MG/DL — HIGH (ref 70–99)
GLUCOSE BLDV-MCNC: 116 MG/DL — HIGH (ref 70–99)
GLUCOSE BLDV-MCNC: 81 MG/DL — SIGNIFICANT CHANGE UP (ref 70–99)
GLUCOSE BLDV-MCNC: 86 MG/DL — SIGNIFICANT CHANGE UP (ref 70–99)
GLUCOSE BLDV-MCNC: 89 MG/DL — SIGNIFICANT CHANGE UP (ref 70–99)
GLUCOSE BLDV-MCNC: 89 MG/DL — SIGNIFICANT CHANGE UP (ref 70–99)
GLUCOSE BLDV-MCNC: 93 MG/DL — SIGNIFICANT CHANGE UP (ref 70–99)
GLUCOSE BLDV-MCNC: >685 MG/DL — CRITICAL HIGH (ref 70–99)
GLUCOSE SERPL-MCNC: 100 MG/DL — HIGH (ref 70–99)
GLUCOSE SERPL-MCNC: 100 MG/DL — HIGH (ref 70–99)
GLUCOSE SERPL-MCNC: 104 MG/DL — HIGH (ref 70–99)
GLUCOSE SERPL-MCNC: 105 MG/DL — HIGH (ref 70–99)
GLUCOSE SERPL-MCNC: 106 MG/DL — HIGH (ref 70–99)
GLUCOSE SERPL-MCNC: 106 MG/DL — HIGH (ref 70–99)
GLUCOSE SERPL-MCNC: 107 MG/DL — HIGH (ref 70–99)
GLUCOSE SERPL-MCNC: 110 MG/DL — HIGH (ref 70–99)
GLUCOSE SERPL-MCNC: 119 MG/DL — HIGH (ref 70–99)
GLUCOSE SERPL-MCNC: 146 MG/DL — HIGH (ref 70–99)
GLUCOSE SERPL-MCNC: 71 MG/DL — SIGNIFICANT CHANGE UP (ref 70–99)
GLUCOSE SERPL-MCNC: 73 MG/DL — SIGNIFICANT CHANGE UP (ref 70–99)
GLUCOSE SERPL-MCNC: 73 MG/DL — SIGNIFICANT CHANGE UP (ref 70–99)
GLUCOSE SERPL-MCNC: 77 MG/DL — SIGNIFICANT CHANGE UP (ref 70–99)
GLUCOSE SERPL-MCNC: 79 MG/DL — SIGNIFICANT CHANGE UP (ref 70–99)
GLUCOSE SERPL-MCNC: 80 MG/DL — SIGNIFICANT CHANGE UP (ref 70–99)
GLUCOSE SERPL-MCNC: 84 MG/DL — SIGNIFICANT CHANGE UP (ref 70–99)
GLUCOSE SERPL-MCNC: 85 MG/DL — SIGNIFICANT CHANGE UP (ref 70–99)
GLUCOSE SERPL-MCNC: 85 MG/DL — SIGNIFICANT CHANGE UP (ref 70–99)
GLUCOSE SERPL-MCNC: 87 MG/DL — SIGNIFICANT CHANGE UP (ref 70–99)
GLUCOSE SERPL-MCNC: 89 MG/DL — SIGNIFICANT CHANGE UP (ref 70–99)
GLUCOSE SERPL-MCNC: 90 MG/DL — SIGNIFICANT CHANGE UP (ref 70–99)
GLUCOSE SERPL-MCNC: 92 MG/DL — SIGNIFICANT CHANGE UP (ref 70–99)
GLUCOSE SERPL-MCNC: 92 MG/DL — SIGNIFICANT CHANGE UP (ref 70–99)
GLUCOSE SERPL-MCNC: 93 MG/DL — SIGNIFICANT CHANGE UP (ref 70–99)
GLUCOSE SERPL-MCNC: 939 MG/DL — CRITICAL HIGH (ref 70–99)
GLUCOSE SERPL-MCNC: 94 MG/DL — SIGNIFICANT CHANGE UP (ref 70–99)
GLUCOSE SERPL-MCNC: 99 MG/DL — SIGNIFICANT CHANGE UP (ref 70–99)
GLUCOSE UR QL: NEGATIVE — SIGNIFICANT CHANGE UP
GLUCOSE UR QL: NEGATIVE — SIGNIFICANT CHANGE UP
HAPTOGLOB SERPL-MCNC: 21 MG/DL — LOW (ref 34–200)
HAPTOGLOB SERPL-MCNC: <20 MG/DL — LOW (ref 34–200)
HAV IGM SER-ACNC: SIGNIFICANT CHANGE UP
HBV CORE IGM SER-ACNC: SIGNIFICANT CHANGE UP
HBV SURFACE AG SER-ACNC: SIGNIFICANT CHANGE UP
HCO3 BLDA-SCNC: 33 MMOL/L — HIGH (ref 21–28)
HCO3 BLDA-SCNC: 35 MMOL/L — HIGH (ref 21–28)
HCO3 BLDA-SCNC: 36 MMOL/L — HIGH (ref 21–28)
HCO3 BLDA-SCNC: 37 MMOL/L — HIGH (ref 21–28)
HCO3 BLDA-SCNC: 40 MMOL/L — HIGH (ref 21–28)
HCO3 BLDA-SCNC: 41 MMOL/L — HIGH (ref 21–28)
HCO3 BLDA-SCNC: 42 MMOL/L — HIGH (ref 21–28)
HCO3 BLDA-SCNC: 43 MMOL/L — HIGH (ref 21–28)
HCO3 BLDA-SCNC: 44 MMOL/L — HIGH (ref 21–28)
HCO3 BLDV-SCNC: 28 MMOL/L — SIGNIFICANT CHANGE UP (ref 22–29)
HCO3 BLDV-SCNC: 30 MMOL/L — HIGH (ref 22–29)
HCO3 BLDV-SCNC: 32 MMOL/L — HIGH (ref 22–29)
HCO3 BLDV-SCNC: 34 MMOL/L — HIGH (ref 22–29)
HCO3 BLDV-SCNC: 35 MMOL/L — HIGH (ref 22–29)
HCO3 BLDV-SCNC: 38 MMOL/L — HIGH (ref 22–29)
HCO3 BLDV-SCNC: 45 MMOL/L — CRITICAL HIGH (ref 22–29)
HCT VFR BLD CALC: 25.6 % — LOW (ref 34.5–45)
HCT VFR BLD CALC: 28 % — LOW (ref 34.5–45)
HCT VFR BLD CALC: 28.2 % — LOW (ref 34.5–45)
HCT VFR BLD CALC: 28.8 % — LOW (ref 34.5–45)
HCT VFR BLD CALC: 29.2 % — LOW (ref 34.5–45)
HCT VFR BLD CALC: 29.2 % — LOW (ref 34.5–45)
HCT VFR BLD CALC: 29.4 % — LOW (ref 34.5–45)
HCT VFR BLD CALC: 29.8 % — LOW (ref 34.5–45)
HCT VFR BLD CALC: 30.1 % — LOW (ref 34.5–45)
HCT VFR BLD CALC: 30.5 % — LOW (ref 34.5–45)
HCT VFR BLD CALC: 30.6 % — LOW (ref 34.5–45)
HCT VFR BLD CALC: 30.8 % — LOW (ref 34.5–45)
HCT VFR BLD CALC: 31.2 % — LOW (ref 34.5–45)
HCT VFR BLD CALC: 31.3 % — LOW (ref 34.5–45)
HCT VFR BLD CALC: 31.4 % — LOW (ref 34.5–45)
HCT VFR BLD CALC: 31.5 % — LOW (ref 34.5–45)
HCT VFR BLD CALC: 31.8 % — LOW (ref 34.5–45)
HCT VFR BLD CALC: 31.8 % — LOW (ref 34.5–45)
HCT VFR BLD CALC: 32.1 % — LOW (ref 34.5–45)
HCT VFR BLD CALC: 32.1 % — LOW (ref 34.5–45)
HCT VFR BLD CALC: 32.4 % — LOW (ref 34.5–45)
HCT VFR BLD CALC: 32.5 % — LOW (ref 34.5–45)
HCT VFR BLD CALC: 32.5 % — LOW (ref 34.5–45)
HCT VFR BLD CALC: 32.9 % — LOW (ref 34.5–45)
HCT VFR BLD CALC: 32.9 % — LOW (ref 34.5–45)
HCT VFR BLD CALC: 33.3 % — LOW (ref 34.5–45)
HCT VFR BLD CALC: 34.8 % — SIGNIFICANT CHANGE UP (ref 34.5–45)
HCT VFR BLDA CALC: 26 % — LOW (ref 34.5–46.5)
HCT VFR BLDA CALC: 27 % — LOW (ref 34.5–46.5)
HCT VFR BLDA CALC: 27 % — LOW (ref 34.5–46.5)
HCT VFR BLDA CALC: 28 % — LOW (ref 34.5–46.5)
HCT VFR BLDA CALC: 28 % — LOW (ref 34.5–46.5)
HCT VFR BLDA CALC: 29 % — LOW (ref 34.5–46.5)
HCV AB S/CO SERPL IA: 0.05 S/CO — SIGNIFICANT CHANGE UP (ref 0–0.99)
HCV AB SERPL-IMP: SIGNIFICANT CHANGE UP
HDLC SERPL-MCNC: 21 MG/DL — LOW
HEPARIN-PF4 AB RESULT: <0.6 U/ML — SIGNIFICANT CHANGE UP (ref 0–0.9)
HGB BLD CALC-MCNC: 8.6 G/DL — LOW (ref 11.5–15.5)
HGB BLD CALC-MCNC: 9.1 G/DL — LOW (ref 11.5–15.5)
HGB BLD CALC-MCNC: 9.1 G/DL — LOW (ref 11.5–15.5)
HGB BLD CALC-MCNC: 9.2 G/DL — LOW (ref 11.5–15.5)
HGB BLD CALC-MCNC: 9.4 G/DL — LOW (ref 11.5–15.5)
HGB BLD CALC-MCNC: 9.5 G/DL — LOW (ref 11.5–15.5)
HGB BLD CALC-MCNC: 9.7 G/DL — LOW (ref 11.5–15.5)
HGB BLD CALC-MCNC: 9.7 G/DL — LOW (ref 11.5–15.5)
HGB BLD-MCNC: 10.2 G/DL — LOW (ref 11.5–15.5)
HGB BLD-MCNC: 7 G/DL — CRITICAL LOW (ref 11.5–15.5)
HGB BLD-MCNC: 8.2 G/DL — LOW (ref 11.5–15.5)
HGB BLD-MCNC: 8.3 G/DL — LOW (ref 11.5–15.5)
HGB BLD-MCNC: 8.3 G/DL — LOW (ref 11.5–15.5)
HGB BLD-MCNC: 8.4 G/DL — LOW (ref 11.5–15.5)
HGB BLD-MCNC: 8.6 G/DL — LOW (ref 11.5–15.5)
HGB BLD-MCNC: 8.7 G/DL — LOW (ref 11.5–15.5)
HGB BLD-MCNC: 8.8 G/DL — LOW (ref 11.5–15.5)
HGB BLD-MCNC: 8.9 G/DL — LOW (ref 11.5–15.5)
HGB BLD-MCNC: 9 G/DL — LOW (ref 11.5–15.5)
HGB BLD-MCNC: 9.2 G/DL — LOW (ref 11.5–15.5)
HGB BLD-MCNC: 9.3 G/DL — LOW (ref 11.5–15.5)
HGB BLD-MCNC: 9.4 G/DL — LOW (ref 11.5–15.5)
HGB BLD-MCNC: 9.4 G/DL — LOW (ref 11.5–15.5)
HGB BLD-MCNC: 9.8 G/DL — LOW (ref 11.5–15.5)
HYALINE CASTS # UR AUTO: 9 /LPF — HIGH (ref 0–7)
IANC: 12.05 K/UL — HIGH (ref 1.8–7.4)
IANC: 3.74 K/UL — SIGNIFICANT CHANGE UP (ref 1.8–7.4)
IANC: 3.89 K/UL — SIGNIFICANT CHANGE UP (ref 1.8–7.4)
IANC: 3.9 K/UL — SIGNIFICANT CHANGE UP (ref 1.8–7.4)
IANC: 4.11 K/UL — SIGNIFICANT CHANGE UP (ref 1.8–7.4)
IANC: 4.29 K/UL — SIGNIFICANT CHANGE UP (ref 1.8–7.4)
IANC: 4.56 K/UL — SIGNIFICANT CHANGE UP (ref 1.8–7.4)
IANC: 4.6 K/UL — SIGNIFICANT CHANGE UP (ref 1.8–7.4)
IANC: 4.62 K/UL — SIGNIFICANT CHANGE UP (ref 1.8–7.4)
IANC: 4.83 K/UL — SIGNIFICANT CHANGE UP (ref 1.8–7.4)
IANC: 5 K/UL — SIGNIFICANT CHANGE UP (ref 1.8–7.4)
IANC: 5.46 K/UL — SIGNIFICANT CHANGE UP (ref 1.8–7.4)
IGA FLD-MCNC: 12 MG/DL — LOW (ref 70–400)
IGA FLD-MCNC: 13 MG/DL — LOW (ref 70–400)
IGA FLD-MCNC: 15 MG/DL — LOW (ref 70–400)
IGA FLD-MCNC: 17 MG/DL — LOW (ref 70–400)
IGA FLD-MCNC: 18 MG/DL — LOW (ref 70–400)
IGA FLD-MCNC: 19 MG/DL — LOW (ref 70–400)
IGA FLD-MCNC: 23 MG/DL — LOW (ref 84–499)
IGA FLD-MCNC: <10 MG/DL — LOW (ref 70–400)
IGA FLD-MCNC: SIGNIFICANT CHANGE UP MG/DL (ref 84–499)
IGG FLD-MCNC: 109 MG/DL — LOW (ref 700–1600)
IGG FLD-MCNC: 140 MG/DL — LOW (ref 700–1600)
IGG FLD-MCNC: 164 MG/DL — LOW (ref 700–1600)
IGG FLD-MCNC: 177 MG/DL — LOW (ref 700–1600)
IGG FLD-MCNC: 181 MG/DL — LOW (ref 700–1600)
IGG FLD-MCNC: 238 MG/DL — LOW (ref 700–1600)
IGG FLD-MCNC: 307 MG/DL — LOW (ref 610–1660)
IGG FLD-MCNC: 83 MG/DL — LOW (ref 700–1600)
IGG FLD-MCNC: SIGNIFICANT CHANGE UP MG/DL (ref 610–1660)
IGM SERPL-MCNC: 2848 MG/DL — HIGH (ref 40–230)
IGM SERPL-MCNC: 4781 MG/DL — HIGH (ref 40–230)
IGM SERPL-MCNC: 5192 MG/DL — HIGH (ref 40–230)
IGM SERPL-MCNC: 7356 MG/DL — HIGH (ref 35–242)
IGM SERPL-MCNC: >5850 MG/DL — HIGH (ref 40–230)
IGM SERPL-MCNC: >5850 MG/DL — SIGNIFICANT CHANGE UP (ref 40–230)
IGM SERPL-MCNC: SIGNIFICANT CHANGE UP MG/DL (ref 35–242)
IMM GRANULOCYTES NFR BLD AUTO: 0.3 % — SIGNIFICANT CHANGE UP (ref 0–1.5)
IMM GRANULOCYTES NFR BLD AUTO: 0.4 % — SIGNIFICANT CHANGE UP (ref 0–1.5)
IMM GRANULOCYTES NFR BLD AUTO: 0.5 % — SIGNIFICANT CHANGE UP (ref 0–0.9)
IMM GRANULOCYTES NFR BLD AUTO: 0.6 % — SIGNIFICANT CHANGE UP (ref 0–1.5)
IMM GRANULOCYTES NFR BLD AUTO: 0.7 % — SIGNIFICANT CHANGE UP (ref 0–0.9)
IMM GRANULOCYTES NFR BLD AUTO: 0.9 % — SIGNIFICANT CHANGE UP (ref 0–1.5)
INR BLD: 1.27 RATIO — HIGH (ref 0.88–1.16)
INR BLD: 1.34 RATIO — HIGH (ref 0.88–1.16)
INR BLD: 1.35 RATIO — HIGH (ref 0.88–1.16)
INR BLD: 1.35 RATIO — HIGH (ref 0.88–1.16)
INR BLD: 1.55 RATIO — HIGH (ref 0.88–1.16)
INTERPRETATION SERPL IFE-IMP: SIGNIFICANT CHANGE UP
IRON SATN MFR SERPL: 17 % — SIGNIFICANT CHANGE UP (ref 14–50)
IRON SATN MFR SERPL: 42 UG/DL — SIGNIFICANT CHANGE UP (ref 30–160)
KAPPA LC SER QL IFE: 0.64 MG/DL — SIGNIFICANT CHANGE UP (ref 0.33–1.94)
KAPPA/LAMBDA FREE LIGHT CHAIN RATIO, SERUM: 0.01 RATIO — LOW (ref 0.26–1.65)
KETONES UR-MCNC: NEGATIVE — SIGNIFICANT CHANGE UP
KETONES UR-MCNC: NEGATIVE — SIGNIFICANT CHANGE UP
LACTATE BLDV-MCNC: 1.2 MMOL/L — SIGNIFICANT CHANGE UP (ref 0.5–2)
LACTATE BLDV-MCNC: 1.3 MMOL/L — SIGNIFICANT CHANGE UP (ref 0.5–2)
LACTATE BLDV-MCNC: 1.3 MMOL/L — SIGNIFICANT CHANGE UP (ref 0.5–2)
LACTATE BLDV-MCNC: 1.4 MMOL/L — SIGNIFICANT CHANGE UP (ref 0.5–2)
LACTATE BLDV-MCNC: 1.5 MMOL/L — SIGNIFICANT CHANGE UP (ref 0.5–2)
LACTATE BLDV-MCNC: 1.6 MMOL/L — SIGNIFICANT CHANGE UP (ref 0.5–2)
LAMBDA LC SER QL IFE: 62.95 MG/DL — HIGH (ref 0.57–2.63)
LAMBDA LC SER QL IFE: 70.98 MG/DL — HIGH (ref 0.57–2.63)
LAMBDA LC SER QL IFE: 70.98 MG/DL — HIGH (ref 0.57–2.63)
LDH SERPL L TO P-CCNC: 195 U/L — SIGNIFICANT CHANGE UP (ref 135–225)
LDH SERPL L TO P-CCNC: 328 U/L — HIGH (ref 135–225)
LDH SERPL L TO P-CCNC: 340 U/L — HIGH (ref 135–225)
LDH SERPL L TO P-CCNC: 446 U/L — HIGH (ref 135–225)
LEUKOCYTE ESTERASE UR-ACNC: ABNORMAL
LEUKOCYTE ESTERASE UR-ACNC: NEGATIVE — SIGNIFICANT CHANGE UP
LIPID PNL WITH DIRECT LDL SERPL: 27 MG/DL — SIGNIFICANT CHANGE UP
LITHIUM SERPL-MCNC: 1.5 MMOL/L — HIGH (ref 0.6–1.2)
LYMPHOCYTES # BLD AUTO: 0.65 K/UL — LOW (ref 1–3.3)
LYMPHOCYTES # BLD AUTO: 0.7 K/UL — LOW (ref 1–3.3)
LYMPHOCYTES # BLD AUTO: 0.72 K/UL — LOW (ref 1–3.3)
LYMPHOCYTES # BLD AUTO: 0.73 K/UL — LOW (ref 1–3.3)
LYMPHOCYTES # BLD AUTO: 0.77 K/UL — LOW (ref 1–3.3)
LYMPHOCYTES # BLD AUTO: 0.95 K/UL — LOW (ref 1–3.3)
LYMPHOCYTES # BLD AUTO: 0.96 K/UL — LOW (ref 1–3.3)
LYMPHOCYTES # BLD AUTO: 0.97 K/UL — LOW (ref 1–3.3)
LYMPHOCYTES # BLD AUTO: 1.13 K/UL — SIGNIFICANT CHANGE UP (ref 1–3.3)
LYMPHOCYTES # BLD AUTO: 1.22 K/UL — SIGNIFICANT CHANGE UP (ref 1–3.3)
LYMPHOCYTES # BLD AUTO: 1.25 K/UL — SIGNIFICANT CHANGE UP (ref 1–3.3)
LYMPHOCYTES # BLD AUTO: 1.46 K/UL — SIGNIFICANT CHANGE UP (ref 1–3.3)
LYMPHOCYTES # BLD AUTO: 10 % — LOW (ref 13–44)
LYMPHOCYTES # BLD AUTO: 11.3 % — LOW (ref 13–44)
LYMPHOCYTES # BLD AUTO: 11.6 % — LOW (ref 13–44)
LYMPHOCYTES # BLD AUTO: 12.3 % — LOW (ref 13–44)
LYMPHOCYTES # BLD AUTO: 12.5 % — LOW (ref 13–44)
LYMPHOCYTES # BLD AUTO: 15.4 % — SIGNIFICANT CHANGE UP (ref 13–44)
LYMPHOCYTES # BLD AUTO: 17 % — SIGNIFICANT CHANGE UP (ref 13–44)
LYMPHOCYTES # BLD AUTO: 19.1 % — SIGNIFICANT CHANGE UP (ref 13–44)
LYMPHOCYTES # BLD AUTO: 20.8 % — SIGNIFICANT CHANGE UP (ref 13–44)
LYMPHOCYTES # BLD AUTO: 20.9 % — SIGNIFICANT CHANGE UP (ref 13–44)
LYMPHOCYTES # BLD AUTO: 21.7 % — SIGNIFICANT CHANGE UP (ref 13–44)
LYMPHOCYTES # BLD AUTO: 7 % — LOW (ref 13–44)
Lab: SIGNIFICANT CHANGE UP
M TB IFN-G BLD-IMP: NEGATIVE — SIGNIFICANT CHANGE UP
M TB IFN-G CD4+ BCKGRND COR BLD-ACNC: 0 IU/ML — SIGNIFICANT CHANGE UP
M TB IFN-G CD4+CD8+ BCKGRND COR BLD-ACNC: 0.01 IU/ML — SIGNIFICANT CHANGE UP
M-SPIKE: 6.3 G/DL — SIGNIFICANT CHANGE UP
M-SPIKE: 6.6 G/DL — SIGNIFICANT CHANGE UP
MACROCYTES BLD QL: SIGNIFICANT CHANGE UP
MACROCYTES BLD QL: SIGNIFICANT CHANGE UP
MACROCYTES BLD QL: SLIGHT — SIGNIFICANT CHANGE UP
MACROCYTES BLD QL: SLIGHT — SIGNIFICANT CHANGE UP
MAGNESIUM SERPL-MCNC: 1.4 MG/DL — LOW (ref 1.6–2.6)
MAGNESIUM SERPL-MCNC: 1.6 MG/DL — SIGNIFICANT CHANGE UP (ref 1.6–2.6)
MAGNESIUM SERPL-MCNC: 1.7 MG/DL — SIGNIFICANT CHANGE UP (ref 1.6–2.6)
MAGNESIUM SERPL-MCNC: 1.8 MG/DL — SIGNIFICANT CHANGE UP (ref 1.6–2.6)
MAGNESIUM SERPL-MCNC: 1.9 MG/DL — SIGNIFICANT CHANGE UP (ref 1.6–2.6)
MAGNESIUM SERPL-MCNC: 2 MG/DL — SIGNIFICANT CHANGE UP (ref 1.6–2.6)
MAGNESIUM SERPL-MCNC: 2.2 MG/DL — SIGNIFICANT CHANGE UP (ref 1.6–2.6)
MAGNESIUM SERPL-MCNC: 2.4 MG/DL — SIGNIFICANT CHANGE UP (ref 1.6–2.6)
MAGNESIUM SERPL-MCNC: 2.4 MG/DL — SIGNIFICANT CHANGE UP (ref 1.6–2.6)
MAGNESIUM SERPL-MCNC: 2.7 MG/DL — HIGH (ref 1.6–2.6)
MAGNESIUM SERPL-MCNC: SIGNIFICANT CHANGE UP MG/DL (ref 1.6–2.6)
MANUAL SMEAR VERIFICATION: SIGNIFICANT CHANGE UP
MCHC RBC-ENTMCNC: 26.9 GM/DL — LOW (ref 32–36)
MCHC RBC-ENTMCNC: 27.3 GM/DL — LOW (ref 32–36)
MCHC RBC-ENTMCNC: 27.4 GM/DL — LOW (ref 32–36)
MCHC RBC-ENTMCNC: 27.4 GM/DL — LOW (ref 32–36)
MCHC RBC-ENTMCNC: 27.7 GM/DL — LOW (ref 32–36)
MCHC RBC-ENTMCNC: 27.8 GM/DL — LOW (ref 32–36)
MCHC RBC-ENTMCNC: 27.8 PG — SIGNIFICANT CHANGE UP (ref 27–34)
MCHC RBC-ENTMCNC: 28.2 GM/DL — LOW (ref 32–36)
MCHC RBC-ENTMCNC: 28.2 GM/DL — LOW (ref 32–36)
MCHC RBC-ENTMCNC: 28.3 GM/DL — LOW (ref 32–36)
MCHC RBC-ENTMCNC: 28.3 PG — SIGNIFICANT CHANGE UP (ref 27–34)
MCHC RBC-ENTMCNC: 28.3 PG — SIGNIFICANT CHANGE UP (ref 27–34)
MCHC RBC-ENTMCNC: 28.5 GM/DL — LOW (ref 32–36)
MCHC RBC-ENTMCNC: 28.5 GM/DL — LOW (ref 32–36)
MCHC RBC-ENTMCNC: 28.6 PG — SIGNIFICANT CHANGE UP (ref 27–34)
MCHC RBC-ENTMCNC: 28.6 PG — SIGNIFICANT CHANGE UP (ref 27–34)
MCHC RBC-ENTMCNC: 28.7 PG — SIGNIFICANT CHANGE UP (ref 27–34)
MCHC RBC-ENTMCNC: 28.7 PG — SIGNIFICANT CHANGE UP (ref 27–34)
MCHC RBC-ENTMCNC: 28.8 GM/DL — LOW (ref 32–36)
MCHC RBC-ENTMCNC: 28.8 GM/DL — LOW (ref 32–36)
MCHC RBC-ENTMCNC: 28.8 PG — SIGNIFICANT CHANGE UP (ref 27–34)
MCHC RBC-ENTMCNC: 28.9 GM/DL — LOW (ref 32–36)
MCHC RBC-ENTMCNC: 28.9 GM/DL — LOW (ref 32–36)
MCHC RBC-ENTMCNC: 28.9 PG — SIGNIFICANT CHANGE UP (ref 27–34)
MCHC RBC-ENTMCNC: 29 GM/DL — LOW (ref 32–36)
MCHC RBC-ENTMCNC: 29 PG — SIGNIFICANT CHANGE UP (ref 27–34)
MCHC RBC-ENTMCNC: 29.1 PG — SIGNIFICANT CHANGE UP (ref 27–34)
MCHC RBC-ENTMCNC: 29.2 GM/DL — LOW (ref 32–36)
MCHC RBC-ENTMCNC: 29.2 GM/DL — LOW (ref 32–36)
MCHC RBC-ENTMCNC: 29.2 PG — SIGNIFICANT CHANGE UP (ref 27–34)
MCHC RBC-ENTMCNC: 29.3 GM/DL — LOW (ref 32–36)
MCHC RBC-ENTMCNC: 29.3 PG — SIGNIFICANT CHANGE UP (ref 27–34)
MCHC RBC-ENTMCNC: 29.4 PG — SIGNIFICANT CHANGE UP (ref 27–34)
MCHC RBC-ENTMCNC: 29.4 PG — SIGNIFICANT CHANGE UP (ref 27–34)
MCHC RBC-ENTMCNC: 29.5 PG — SIGNIFICANT CHANGE UP (ref 27–34)
MCHC RBC-ENTMCNC: 29.5 PG — SIGNIFICANT CHANGE UP (ref 27–34)
MCHC RBC-ENTMCNC: 29.6 GM/DL — LOW (ref 32–36)
MCHC RBC-ENTMCNC: 29.6 PG — SIGNIFICANT CHANGE UP (ref 27–34)
MCHC RBC-ENTMCNC: 29.8 GM/DL — LOW (ref 32–36)
MCV RBC AUTO: 100 FL — SIGNIFICANT CHANGE UP (ref 80–100)
MCV RBC AUTO: 100.3 FL — HIGH (ref 80–100)
MCV RBC AUTO: 100.6 FL — HIGH (ref 80–100)
MCV RBC AUTO: 100.6 FL — HIGH (ref 80–100)
MCV RBC AUTO: 101 FL — HIGH (ref 80–100)
MCV RBC AUTO: 101.1 FL — HIGH (ref 80–100)
MCV RBC AUTO: 101.6 FL — HIGH (ref 80–100)
MCV RBC AUTO: 101.7 FL — HIGH (ref 80–100)
MCV RBC AUTO: 101.9 FL — HIGH (ref 80–100)
MCV RBC AUTO: 101.9 FL — HIGH (ref 80–100)
MCV RBC AUTO: 102 FL — HIGH (ref 80–100)
MCV RBC AUTO: 102 FL — HIGH (ref 80–100)
MCV RBC AUTO: 102.1 FL — HIGH (ref 80–100)
MCV RBC AUTO: 102.5 FL — HIGH (ref 80–100)
MCV RBC AUTO: 102.5 FL — HIGH (ref 80–100)
MCV RBC AUTO: 102.6 FL — HIGH (ref 80–100)
MCV RBC AUTO: 103.3 FL — HIGH (ref 80–100)
MCV RBC AUTO: 103.5 FL — HIGH (ref 80–100)
MCV RBC AUTO: 103.6 FL — HIGH (ref 80–100)
MCV RBC AUTO: 104.8 FL — HIGH (ref 80–100)
MCV RBC AUTO: 108 FL — HIGH (ref 80–100)
MCV RBC AUTO: 97.7 FL — SIGNIFICANT CHANGE UP (ref 80–100)
MCV RBC AUTO: 97.7 FL — SIGNIFICANT CHANGE UP (ref 80–100)
MCV RBC AUTO: 98.2 FL — SIGNIFICANT CHANGE UP (ref 80–100)
MCV RBC AUTO: 98.9 FL — SIGNIFICANT CHANGE UP (ref 80–100)
MCV RBC AUTO: 98.9 FL — SIGNIFICANT CHANGE UP (ref 80–100)
MCV RBC AUTO: 99.7 FL — SIGNIFICANT CHANGE UP (ref 80–100)
METAMYELOCYTES # FLD: 1.8 % — HIGH (ref 0–1)
METHOD TYPE: SIGNIFICANT CHANGE UP
MONOCYTES # BLD AUTO: 0.31 K/UL — SIGNIFICANT CHANGE UP (ref 0–0.9)
MONOCYTES # BLD AUTO: 0.36 K/UL — SIGNIFICANT CHANGE UP (ref 0–0.9)
MONOCYTES # BLD AUTO: 0.39 K/UL — SIGNIFICANT CHANGE UP (ref 0–0.9)
MONOCYTES # BLD AUTO: 0.41 K/UL — SIGNIFICANT CHANGE UP (ref 0–0.9)
MONOCYTES # BLD AUTO: 0.42 K/UL — SIGNIFICANT CHANGE UP (ref 0–0.9)
MONOCYTES # BLD AUTO: 0.44 K/UL — SIGNIFICANT CHANGE UP (ref 0–0.9)
MONOCYTES # BLD AUTO: 0.45 K/UL — SIGNIFICANT CHANGE UP (ref 0–0.9)
MONOCYTES # BLD AUTO: 0.46 K/UL — SIGNIFICANT CHANGE UP (ref 0–0.9)
MONOCYTES # BLD AUTO: 0.52 K/UL — SIGNIFICANT CHANGE UP (ref 0–0.9)
MONOCYTES # BLD AUTO: 0.52 K/UL — SIGNIFICANT CHANGE UP (ref 0–0.9)
MONOCYTES # BLD AUTO: 0.6 K/UL — SIGNIFICANT CHANGE UP (ref 0–0.9)
MONOCYTES # BLD AUTO: 0.64 K/UL — SIGNIFICANT CHANGE UP (ref 0–0.9)
MONOCYTES NFR BLD AUTO: 2.6 % — SIGNIFICANT CHANGE UP (ref 2–14)
MONOCYTES NFR BLD AUTO: 5.2 % — SIGNIFICANT CHANGE UP (ref 2–14)
MONOCYTES NFR BLD AUTO: 6.2 % — SIGNIFICANT CHANGE UP (ref 2–14)
MONOCYTES NFR BLD AUTO: 6.2 % — SIGNIFICANT CHANGE UP (ref 2–14)
MONOCYTES NFR BLD AUTO: 7 % — SIGNIFICANT CHANGE UP (ref 2–14)
MONOCYTES NFR BLD AUTO: 7.3 % — SIGNIFICANT CHANGE UP (ref 2–14)
MONOCYTES NFR BLD AUTO: 7.4 % — SIGNIFICANT CHANGE UP (ref 2–14)
MONOCYTES NFR BLD AUTO: 8.2 % — SIGNIFICANT CHANGE UP (ref 2–14)
MONOCYTES NFR BLD AUTO: 9 % — SIGNIFICANT CHANGE UP (ref 2–14)
MONOCYTES NFR BLD AUTO: 9.1 % — SIGNIFICANT CHANGE UP (ref 2–14)
MONOCYTES NFR BLD AUTO: 9.2 % — SIGNIFICANT CHANGE UP (ref 2–14)
MONOCYTES NFR BLD AUTO: 9.3 % — SIGNIFICANT CHANGE UP (ref 2–14)
MYELOCYTES NFR BLD: 0.9 % — HIGH (ref 0–0)
NARRATIVE DIAGNOSTIC REPORT-IMP: SIGNIFICANT CHANGE UP
NEUTROPHILS # BLD AUTO: 11.58 K/UL — HIGH (ref 1.8–7.4)
NEUTROPHILS # BLD AUTO: 3.74 K/UL — SIGNIFICANT CHANGE UP (ref 1.8–7.4)
NEUTROPHILS # BLD AUTO: 4.01 K/UL — SIGNIFICANT CHANGE UP (ref 1.8–7.4)
NEUTROPHILS # BLD AUTO: 4.11 K/UL — SIGNIFICANT CHANGE UP (ref 1.8–7.4)
NEUTROPHILS # BLD AUTO: 4.13 K/UL — SIGNIFICANT CHANGE UP (ref 1.8–7.4)
NEUTROPHILS # BLD AUTO: 4.29 K/UL — SIGNIFICANT CHANGE UP (ref 1.8–7.4)
NEUTROPHILS # BLD AUTO: 4.56 K/UL — SIGNIFICANT CHANGE UP (ref 1.8–7.4)
NEUTROPHILS # BLD AUTO: 4.6 K/UL — SIGNIFICANT CHANGE UP (ref 1.8–7.4)
NEUTROPHILS # BLD AUTO: 4.62 K/UL — SIGNIFICANT CHANGE UP (ref 1.8–7.4)
NEUTROPHILS # BLD AUTO: 4.71 K/UL — SIGNIFICANT CHANGE UP (ref 1.8–7.4)
NEUTROPHILS # BLD AUTO: 5 K/UL — SIGNIFICANT CHANGE UP (ref 1.8–7.4)
NEUTROPHILS # BLD AUTO: 5.46 K/UL — SIGNIFICANT CHANGE UP (ref 1.8–7.4)
NEUTROPHILS NFR BLD AUTO: 65.7 % — SIGNIFICANT CHANGE UP (ref 43–77)
NEUTROPHILS NFR BLD AUTO: 66.1 % — SIGNIFICANT CHANGE UP (ref 43–77)
NEUTROPHILS NFR BLD AUTO: 66.5 % — SIGNIFICANT CHANGE UP (ref 43–77)
NEUTROPHILS NFR BLD AUTO: 68.8 % — SIGNIFICANT CHANGE UP (ref 43–77)
NEUTROPHILS NFR BLD AUTO: 70 % — SIGNIFICANT CHANGE UP (ref 43–77)
NEUTROPHILS NFR BLD AUTO: 75 % — SIGNIFICANT CHANGE UP (ref 43–77)
NEUTROPHILS NFR BLD AUTO: 75.2 % — SIGNIFICANT CHANGE UP (ref 43–77)
NEUTROPHILS NFR BLD AUTO: 76.2 % — SIGNIFICANT CHANGE UP (ref 43–77)
NEUTROPHILS NFR BLD AUTO: 77.3 % — HIGH (ref 43–77)
NEUTROPHILS NFR BLD AUTO: 77.9 % — HIGH (ref 43–77)
NEUTROPHILS NFR BLD AUTO: 80 % — HIGH (ref 43–77)
NEUTROPHILS NFR BLD AUTO: 80.3 % — HIGH (ref 43–77)
NEUTS BAND # BLD: 4.3 % — SIGNIFICANT CHANGE UP (ref 0–6)
NITRITE UR-MCNC: NEGATIVE — SIGNIFICANT CHANGE UP
NITRITE UR-MCNC: POSITIVE
NON HDL CHOLESTEROL: 33 MG/DL — SIGNIFICANT CHANGE UP
NRBC # BLD: 0 /100 WBCS — SIGNIFICANT CHANGE UP (ref 0–0)
NRBC # BLD: 0 /100 — SIGNIFICANT CHANGE UP (ref 0–0)
NRBC # BLD: 1 /100 — HIGH (ref 0–0)
NRBC # FLD: 0 K/UL — SIGNIFICANT CHANGE UP (ref 0–0)
NT-PROBNP SERPL-SCNC: 6447 PG/ML — HIGH
ORGANISM # SPEC MICROSCOPIC CNT: SIGNIFICANT CHANGE UP
ORGANISM # SPEC MICROSCOPIC CNT: SIGNIFICANT CHANGE UP
OSMOLALITY UR: 616 MOSM/KG — SIGNIFICANT CHANGE UP (ref 50–1200)
PCO2 BLDA: 52 MMHG — HIGH (ref 32–35)
PCO2 BLDA: 60 MMHG — HIGH (ref 32–35)
PCO2 BLDA: 65 MMHG — HIGH (ref 32–35)
PCO2 BLDA: 65 MMHG — HIGH (ref 32–35)
PCO2 BLDA: 70 MMHG — CRITICAL HIGH (ref 32–35)
PCO2 BLDA: 70 MMHG — CRITICAL HIGH (ref 32–35)
PCO2 BLDA: 73 MMHG — CRITICAL HIGH (ref 32–35)
PCO2 BLDA: 74 MMHG — CRITICAL HIGH (ref 32–35)
PCO2 BLDA: 83 MMHG — CRITICAL HIGH (ref 32–35)
PCO2 BLDV: 52 MMHG — HIGH (ref 39–42)
PCO2 BLDV: 52 MMHG — HIGH (ref 39–42)
PCO2 BLDV: 58 MMHG — HIGH (ref 39–42)
PCO2 BLDV: 65 MMHG — HIGH (ref 39–42)
PCO2 BLDV: 66 MMHG — HIGH (ref 39–42)
PCO2 BLDV: 70 MMHG — HIGH (ref 39–42)
PCO2 BLDV: 76 MMHG — HIGH (ref 39–42)
PCO2 BLDV: 77 MMHG — HIGH (ref 39–42)
PCO2 BLDV: 78 MMHG — HIGH (ref 39–42)
PCO2 BLDV: 79 MMHG — HIGH (ref 39–42)
PF4 HEPARIN CMPLX AB SER-ACNC: NEGATIVE — SIGNIFICANT CHANGE UP
PH BLDA: 7.24 — LOW (ref 7.35–7.45)
PH BLDA: 7.28 — LOW (ref 7.35–7.45)
PH BLDA: 7.31 — LOW (ref 7.35–7.45)
PH BLDA: 7.35 — SIGNIFICANT CHANGE UP (ref 7.35–7.45)
PH BLDA: 7.36 — SIGNIFICANT CHANGE UP (ref 7.35–7.45)
PH BLDA: 7.42 — SIGNIFICANT CHANGE UP (ref 7.35–7.45)
PH BLDA: 7.44 — SIGNIFICANT CHANGE UP (ref 7.35–7.45)
PH BLDA: 7.44 — SIGNIFICANT CHANGE UP (ref 7.35–7.45)
PH BLDA: 7.46 — HIGH (ref 7.35–7.45)
PH BLDV: 7.23 — LOW (ref 7.32–7.43)
PH BLDV: 7.27 — LOW (ref 7.32–7.43)
PH BLDV: 7.29 — LOW (ref 7.32–7.43)
PH BLDV: 7.3 — LOW (ref 7.32–7.43)
PH BLDV: 7.34 — SIGNIFICANT CHANGE UP (ref 7.32–7.43)
PH BLDV: 7.34 — SIGNIFICANT CHANGE UP (ref 7.32–7.43)
PH BLDV: 7.36 — SIGNIFICANT CHANGE UP (ref 7.32–7.43)
PH BLDV: 7.38 — SIGNIFICANT CHANGE UP (ref 7.32–7.43)
PH BLDV: 7.39 — SIGNIFICANT CHANGE UP (ref 7.32–7.43)
PH BLDV: 7.43 — SIGNIFICANT CHANGE UP (ref 7.32–7.43)
PH UR: 6.5 — SIGNIFICANT CHANGE UP (ref 5–8)
PH UR: 8 — SIGNIFICANT CHANGE UP (ref 5–8)
PHOSPHATE SERPL-MCNC: 1.6 MG/DL — LOW (ref 2.5–4.5)
PHOSPHATE SERPL-MCNC: 1.6 MG/DL — SIGNIFICANT CHANGE UP (ref 2.5–4.5)
PHOSPHATE SERPL-MCNC: 1.7 MG/DL — LOW (ref 2.5–4.5)
PHOSPHATE SERPL-MCNC: 1.7 MG/DL — LOW (ref 2.5–4.5)
PHOSPHATE SERPL-MCNC: 2.2 MG/DL — LOW (ref 2.5–4.5)
PHOSPHATE SERPL-MCNC: 2.5 MG/DL — SIGNIFICANT CHANGE UP (ref 2.5–4.5)
PHOSPHATE SERPL-MCNC: 2.6 MG/DL — SIGNIFICANT CHANGE UP (ref 2.5–4.5)
PHOSPHATE SERPL-MCNC: 2.7 MG/DL — SIGNIFICANT CHANGE UP (ref 2.5–4.5)
PHOSPHATE SERPL-MCNC: 2.8 MG/DL — SIGNIFICANT CHANGE UP (ref 2.5–4.5)
PHOSPHATE SERPL-MCNC: 2.9 MG/DL — SIGNIFICANT CHANGE UP (ref 2.5–4.5)
PHOSPHATE SERPL-MCNC: 2.9 MG/DL — SIGNIFICANT CHANGE UP (ref 2.5–4.5)
PHOSPHATE SERPL-MCNC: 3.1 MG/DL — SIGNIFICANT CHANGE UP (ref 2.5–4.5)
PHOSPHATE SERPL-MCNC: 3.1 MG/DL — SIGNIFICANT CHANGE UP (ref 2.5–4.5)
PHOSPHATE SERPL-MCNC: 3.2 MG/DL — SIGNIFICANT CHANGE UP (ref 2.5–4.5)
PHOSPHATE SERPL-MCNC: 3.3 MG/DL — SIGNIFICANT CHANGE UP (ref 2.5–4.5)
PHOSPHATE SERPL-MCNC: 3.3 MG/DL — SIGNIFICANT CHANGE UP (ref 2.5–4.5)
PHOSPHATE SERPL-MCNC: 3.8 MG/DL — SIGNIFICANT CHANGE UP (ref 2.5–4.5)
PHOSPHATE SERPL-MCNC: 3.9 MG/DL — SIGNIFICANT CHANGE UP (ref 2.5–4.5)
PHOSPHATE SERPL-MCNC: SIGNIFICANT CHANGE UP MG/DL (ref 2.5–4.5)
PLAT MORPH BLD: NORMAL — SIGNIFICANT CHANGE UP
PLATELET # BLD AUTO: 107 K/UL — LOW (ref 150–400)
PLATELET # BLD AUTO: 114 K/UL — LOW (ref 150–400)
PLATELET # BLD AUTO: 121 K/UL — LOW (ref 150–400)
PLATELET # BLD AUTO: 122 K/UL — LOW (ref 150–400)
PLATELET # BLD AUTO: 124 K/UL — LOW (ref 150–400)
PLATELET # BLD AUTO: 126 K/UL — LOW (ref 150–400)
PLATELET # BLD AUTO: 137 K/UL — LOW (ref 150–400)
PLATELET # BLD AUTO: 143 K/UL — LOW (ref 150–400)
PLATELET # BLD AUTO: 145 K/UL — LOW (ref 150–400)
PLATELET # BLD AUTO: 145 K/UL — LOW (ref 150–400)
PLATELET # BLD AUTO: 147 K/UL — LOW (ref 150–400)
PLATELET # BLD AUTO: 150 K/UL — SIGNIFICANT CHANGE UP (ref 150–400)
PLATELET # BLD AUTO: 152 K/UL — SIGNIFICANT CHANGE UP (ref 150–400)
PLATELET # BLD AUTO: 153 K/UL — SIGNIFICANT CHANGE UP (ref 150–400)
PLATELET # BLD AUTO: 156 K/UL — SIGNIFICANT CHANGE UP (ref 150–400)
PLATELET # BLD AUTO: 156 K/UL — SIGNIFICANT CHANGE UP (ref 150–400)
PLATELET # BLD AUTO: 159 K/UL — SIGNIFICANT CHANGE UP (ref 150–400)
PLATELET # BLD AUTO: 160 K/UL — SIGNIFICANT CHANGE UP (ref 150–400)
PLATELET # BLD AUTO: 161 K/UL — SIGNIFICANT CHANGE UP (ref 150–400)
PLATELET # BLD AUTO: 162 K/UL — SIGNIFICANT CHANGE UP (ref 150–400)
PLATELET # BLD AUTO: 165 K/UL — SIGNIFICANT CHANGE UP (ref 150–400)
PLATELET # BLD AUTO: 165 K/UL — SIGNIFICANT CHANGE UP (ref 150–400)
PLATELET # BLD AUTO: 166 K/UL — SIGNIFICANT CHANGE UP (ref 150–400)
PLATELET # BLD AUTO: 42 K/UL — LOW (ref 150–400)
PLATELET # BLD AUTO: 42 K/UL — LOW (ref 150–400)
PLATELET COUNT - ESTIMATE: ABNORMAL
PLATELET COUNT - ESTIMATE: ABNORMAL
PLATELET COUNT - ESTIMATE: NORMAL — SIGNIFICANT CHANGE UP
PLATELET COUNT - ESTIMATE: NORMAL — SIGNIFICANT CHANGE UP
PO2 BLDA: 118 MMHG — HIGH (ref 83–108)
PO2 BLDA: 125 MMHG — HIGH (ref 83–108)
PO2 BLDA: 142 MMHG — HIGH (ref 83–108)
PO2 BLDA: 166 MMHG — HIGH (ref 83–108)
PO2 BLDA: 39 MMHG — CRITICAL LOW (ref 83–108)
PO2 BLDA: 62 MMHG — LOW (ref 83–108)
PO2 BLDA: 67 MMHG — LOW (ref 83–108)
PO2 BLDA: 77 MMHG — LOW (ref 83–108)
PO2 BLDA: 80 MMHG — LOW (ref 83–108)
PO2 BLDV: 111 MMHG — SIGNIFICANT CHANGE UP
PO2 BLDV: 117 MMHG — SIGNIFICANT CHANGE UP
PO2 BLDV: 149 MMHG — SIGNIFICANT CHANGE UP
PO2 BLDV: 28 MMHG — SIGNIFICANT CHANGE UP
PO2 BLDV: 32 MMHG — SIGNIFICANT CHANGE UP
PO2 BLDV: 32 MMHG — SIGNIFICANT CHANGE UP
PO2 BLDV: 36 MMHG — SIGNIFICANT CHANGE UP
PO2 BLDV: 44 MMHG — SIGNIFICANT CHANGE UP
PO2 BLDV: 57 MMHG — SIGNIFICANT CHANGE UP
PO2 BLDV: 72 MMHG — SIGNIFICANT CHANGE UP
POIKILOCYTOSIS BLD QL AUTO: SIGNIFICANT CHANGE UP
POLYCHROMASIA BLD QL SMEAR: SIGNIFICANT CHANGE UP
POLYCHROMASIA BLD QL SMEAR: SLIGHT — SIGNIFICANT CHANGE UP
POTASSIUM BLDV-SCNC: 3.3 MMOL/L — LOW (ref 3.5–5.1)
POTASSIUM BLDV-SCNC: 3.8 MMOL/L — SIGNIFICANT CHANGE UP (ref 3.5–5.1)
POTASSIUM BLDV-SCNC: 4 MMOL/L — SIGNIFICANT CHANGE UP (ref 3.5–5.1)
POTASSIUM BLDV-SCNC: 4.1 MMOL/L — SIGNIFICANT CHANGE UP (ref 3.5–5.1)
POTASSIUM BLDV-SCNC: 4.3 MMOL/L — SIGNIFICANT CHANGE UP (ref 3.5–5.1)
POTASSIUM BLDV-SCNC: 4.6 MMOL/L — SIGNIFICANT CHANGE UP (ref 3.5–5.1)
POTASSIUM BLDV-SCNC: 4.8 MMOL/L — SIGNIFICANT CHANGE UP (ref 3.5–5.1)
POTASSIUM BLDV-SCNC: SIGNIFICANT CHANGE UP MMOL/L (ref 3.5–5.1)
POTASSIUM SERPL-MCNC: 3.4 MMOL/L — LOW (ref 3.5–5.3)
POTASSIUM SERPL-MCNC: 4 MMOL/L — SIGNIFICANT CHANGE UP (ref 3.5–5.3)
POTASSIUM SERPL-MCNC: 4.1 MMOL/L — SIGNIFICANT CHANGE UP (ref 3.5–5.3)
POTASSIUM SERPL-MCNC: 4.2 MMOL/L — SIGNIFICANT CHANGE UP (ref 3.5–5.3)
POTASSIUM SERPL-MCNC: 4.3 MMOL/L — SIGNIFICANT CHANGE UP (ref 3.5–5.3)
POTASSIUM SERPL-MCNC: 4.4 MMOL/L — SIGNIFICANT CHANGE UP (ref 3.5–5.3)
POTASSIUM SERPL-MCNC: 4.4 MMOL/L — SIGNIFICANT CHANGE UP (ref 3.5–5.3)
POTASSIUM SERPL-MCNC: 4.5 MMOL/L — SIGNIFICANT CHANGE UP (ref 3.5–5.3)
POTASSIUM SERPL-MCNC: 4.6 MMOL/L — SIGNIFICANT CHANGE UP (ref 3.5–5.3)
POTASSIUM SERPL-MCNC: 4.7 MMOL/L — SIGNIFICANT CHANGE UP (ref 3.5–5.3)
POTASSIUM SERPL-MCNC: 4.8 MMOL/L — SIGNIFICANT CHANGE UP (ref 3.5–5.3)
POTASSIUM SERPL-MCNC: 4.9 MMOL/L — SIGNIFICANT CHANGE UP (ref 3.5–5.3)
POTASSIUM SERPL-MCNC: 4.9 MMOL/L — SIGNIFICANT CHANGE UP (ref 3.5–5.3)
POTASSIUM SERPL-MCNC: 5 MMOL/L — SIGNIFICANT CHANGE UP (ref 3.5–5.3)
POTASSIUM SERPL-MCNC: 5 MMOL/L — SIGNIFICANT CHANGE UP (ref 3.5–5.3)
POTASSIUM SERPL-MCNC: 5.2 MMOL/L — SIGNIFICANT CHANGE UP (ref 3.5–5.3)
POTASSIUM SERPL-MCNC: 5.4 MMOL/L — HIGH (ref 3.5–5.3)
POTASSIUM SERPL-MCNC: 5.7 MMOL/L — HIGH (ref 3.5–5.3)
POTASSIUM SERPL-SCNC: 3.4 MMOL/L — LOW (ref 3.5–5.3)
POTASSIUM SERPL-SCNC: 4 MMOL/L — SIGNIFICANT CHANGE UP (ref 3.5–5.3)
POTASSIUM SERPL-SCNC: 4.1 MMOL/L — SIGNIFICANT CHANGE UP (ref 3.5–5.3)
POTASSIUM SERPL-SCNC: 4.2 MMOL/L — SIGNIFICANT CHANGE UP (ref 3.5–5.3)
POTASSIUM SERPL-SCNC: 4.3 MMOL/L — SIGNIFICANT CHANGE UP (ref 3.5–5.3)
POTASSIUM SERPL-SCNC: 4.4 MMOL/L — SIGNIFICANT CHANGE UP (ref 3.5–5.3)
POTASSIUM SERPL-SCNC: 4.4 MMOL/L — SIGNIFICANT CHANGE UP (ref 3.5–5.3)
POTASSIUM SERPL-SCNC: 4.5 MMOL/L — SIGNIFICANT CHANGE UP (ref 3.5–5.3)
POTASSIUM SERPL-SCNC: 4.6 MMOL/L — SIGNIFICANT CHANGE UP (ref 3.5–5.3)
POTASSIUM SERPL-SCNC: 4.7 MMOL/L — SIGNIFICANT CHANGE UP (ref 3.5–5.3)
POTASSIUM SERPL-SCNC: 4.8 MMOL/L — SIGNIFICANT CHANGE UP (ref 3.5–5.3)
POTASSIUM SERPL-SCNC: 4.9 MMOL/L — SIGNIFICANT CHANGE UP (ref 3.5–5.3)
POTASSIUM SERPL-SCNC: 4.9 MMOL/L — SIGNIFICANT CHANGE UP (ref 3.5–5.3)
POTASSIUM SERPL-SCNC: 5 MMOL/L — SIGNIFICANT CHANGE UP (ref 3.5–5.3)
POTASSIUM SERPL-SCNC: 5 MMOL/L — SIGNIFICANT CHANGE UP (ref 3.5–5.3)
POTASSIUM SERPL-SCNC: 5.2 MMOL/L — SIGNIFICANT CHANGE UP (ref 3.5–5.3)
POTASSIUM SERPL-SCNC: 5.4 MMOL/L — HIGH (ref 3.5–5.3)
POTASSIUM SERPL-SCNC: 5.7 MMOL/L — HIGH (ref 3.5–5.3)
PROCALCITONIN SERPL-MCNC: 0.06 NG/ML — SIGNIFICANT CHANGE UP (ref 0.02–0.1)
PROCALCITONIN SERPL-MCNC: 0.15 NG/ML — HIGH (ref 0.02–0.1)
PROT PATTERN SERPL ELPH-IMP: SIGNIFICANT CHANGE UP
PROT PATTERN SERPL ELPH-IMP: SIGNIFICANT CHANGE UP
PROT SERPL-MCNC: 10 G/DL — HIGH (ref 6–8.3)
PROT SERPL-MCNC: 10 G/DL — HIGH (ref 6–8.3)
PROT SERPL-MCNC: 10.4 G/DL — HIGH (ref 6–8.3)
PROT SERPL-MCNC: 10.5 G/DL — HIGH (ref 6–8.3)
PROT SERPL-MCNC: 11.4 G/DL — HIGH (ref 6–8.3)
PROT SERPL-MCNC: 11.6 G/DL — HIGH (ref 6–8.3)
PROT SERPL-MCNC: 12 G/DL — SIGNIFICANT CHANGE UP (ref 6–8.3)
PROT SERPL-MCNC: 12.6 G/DL — HIGH (ref 6–8.3)
PROT SERPL-MCNC: 12.6 G/DL — SIGNIFICANT CHANGE UP
PROT SERPL-MCNC: 12.9 G/DL — HIGH (ref 6–8.3)
PROT SERPL-MCNC: 12.9 G/DL — HIGH (ref 6–8.3)
PROT SERPL-MCNC: 12.9 G/DL — SIGNIFICANT CHANGE UP
PROT SERPL-MCNC: 13.5 G/DL — HIGH (ref 6–8.3)
PROT SERPL-MCNC: 7.8 G/DL — SIGNIFICANT CHANGE UP (ref 6–8.3)
PROT SERPL-MCNC: 8.5 G/DL — HIGH (ref 6–8.3)
PROT SERPL-MCNC: 8.6 G/DL — HIGH (ref 6–8.3)
PROT SERPL-MCNC: 8.9 G/DL — HIGH (ref 6–8.3)
PROT SERPL-MCNC: 9.3 G/DL — HIGH (ref 6–8.3)
PROT SERPL-MCNC: 9.5 G/DL — HIGH (ref 6–8.3)
PROT SERPL-MCNC: 9.8 G/DL — HIGH (ref 6–8.3)
PROT SERPL-MCNC: 9.8 G/DL — HIGH (ref 6–8.3)
PROT SERPL-MCNC: SIGNIFICANT CHANGE UP G/DL (ref 6–8.3)
PROT UR-MCNC: ABNORMAL
PROT UR-MCNC: ABNORMAL
PROTHROM AB SERPL-ACNC: 14.8 SEC — HIGH (ref 10.5–13.4)
PROTHROM AB SERPL-ACNC: 15.6 SEC — HIGH (ref 10.5–13.4)
PROTHROM AB SERPL-ACNC: 15.7 SEC — HIGH (ref 10.5–13.4)
PROTHROM AB SERPL-ACNC: 15.7 SEC — HIGH (ref 10.5–13.4)
PROTHROM AB SERPL-ACNC: 18.1 SEC — HIGH (ref 10.5–13.4)
PTH RELATED PROT SERPL-MCNC: <2 PMOL/L — SIGNIFICANT CHANGE UP
PTH-INTACT FLD-MCNC: 25 PG/ML — SIGNIFICANT CHANGE UP (ref 15–65)
QUANT TB PLUS MITOGEN MINUS NIL: 4.39 IU/ML — SIGNIFICANT CHANGE UP
RBC # BLD: 2.37 M/UL — LOW (ref 3.8–5.2)
RBC # BLD: 2.83 M/UL — LOW (ref 3.8–5.2)
RBC # BLD: 2.85 M/UL — LOW (ref 3.8–5.2)
RBC # BLD: 2.85 M/UL — LOW (ref 3.8–5.2)
RBC # BLD: 2.88 M/UL — LOW (ref 3.8–5.2)
RBC # BLD: 2.89 M/UL — LOW (ref 3.8–5.2)
RBC # BLD: 2.93 M/UL — LOW (ref 3.8–5.2)
RBC # BLD: 2.99 M/UL — LOW (ref 3.8–5.2)
RBC # BLD: 3 M/UL — LOW (ref 3.8–5.2)
RBC # BLD: 3.02 M/UL — LOW (ref 3.8–5.2)
RBC # BLD: 3.04 M/UL — LOW (ref 3.8–5.2)
RBC # BLD: 3.06 M/UL — LOW (ref 3.8–5.2)
RBC # BLD: 3.07 M/UL — LOW (ref 3.8–5.2)
RBC # BLD: 3.07 M/UL — LOW (ref 3.8–5.2)
RBC # BLD: 3.08 M/UL — LOW (ref 3.8–5.2)
RBC # BLD: 3.08 M/UL — LOW (ref 3.8–5.2)
RBC # BLD: 3.09 M/UL — LOW (ref 3.8–5.2)
RBC # BLD: 3.1 M/UL — LOW (ref 3.8–5.2)
RBC # BLD: 3.14 M/UL — LOW (ref 3.8–5.2)
RBC # BLD: 3.16 M/UL — LOW (ref 3.8–5.2)
RBC # BLD: 3.16 M/UL — LOW (ref 3.8–5.2)
RBC # BLD: 3.18 M/UL — LOW (ref 3.8–5.2)
RBC # BLD: 3.19 M/UL — LOW (ref 3.8–5.2)
RBC # BLD: 3.21 M/UL — LOW (ref 3.8–5.2)
RBC # BLD: 3.25 M/UL — LOW (ref 3.8–5.2)
RBC # BLD: 3.35 M/UL — LOW (ref 3.8–5.2)
RBC # BLD: 3.49 M/UL — LOW (ref 3.8–5.2)
RBC # FLD: 19.8 % — HIGH (ref 10.3–14.5)
RBC # FLD: 19.9 % — HIGH (ref 10.3–14.5)
RBC # FLD: 20.1 % — HIGH (ref 10.3–14.5)
RBC # FLD: 20.2 % — HIGH (ref 10.3–14.5)
RBC # FLD: 20.3 % — HIGH (ref 10.3–14.5)
RBC # FLD: 20.4 % — HIGH (ref 10.3–14.5)
RBC # FLD: 20.4 % — HIGH (ref 10.3–14.5)
RBC # FLD: 20.5 % — HIGH (ref 10.3–14.5)
RBC # FLD: 20.6 % — HIGH (ref 10.3–14.5)
RBC # FLD: 21.9 % — HIGH (ref 10.3–14.5)
RBC BLD AUTO: ABNORMAL
RBC CASTS # UR COMP ASSIST: 3 /HPF — SIGNIFICANT CHANGE UP (ref 0–4)
RBC CASTS # UR COMP ASSIST: 3 /HPF — SIGNIFICANT CHANGE UP (ref 0–4)
RH IG SCN BLD-IMP: POSITIVE — SIGNIFICANT CHANGE UP
RH IG SCN BLD-IMP: POSITIVE — SIGNIFICANT CHANGE UP
ROULEAUX BLD QL SMEAR: PRESENT
RSV RNA NPH QL NAA+NON-PROBE: SIGNIFICANT CHANGE UP
SAO2 % BLDA: 69.5 % — LOW (ref 94–98)
SAO2 % BLDA: 94 % — SIGNIFICANT CHANGE UP (ref 94–98)
SAO2 % BLDA: 94.2 % — SIGNIFICANT CHANGE UP (ref 94–98)
SAO2 % BLDA: 96.6 % — SIGNIFICANT CHANGE UP (ref 94–98)
SAO2 % BLDA: 98.4 % — HIGH (ref 94–98)
SAO2 % BLDA: 99.1 % — HIGH (ref 94–98)
SAO2 % BLDA: 99.5 % — HIGH (ref 94–98)
SAO2 % BLDA: 99.6 % — HIGH (ref 94–98)
SAO2 % BLDA: 99.7 % — HIGH (ref 94–98)
SAO2 % BLDV: 37.8 % — SIGNIFICANT CHANGE UP
SAO2 % BLDV: 52.6 % — SIGNIFICANT CHANGE UP
SAO2 % BLDV: 53.8 % — SIGNIFICANT CHANGE UP
SAO2 % BLDV: 63.9 % — SIGNIFICANT CHANGE UP
SAO2 % BLDV: 72.7 % — SIGNIFICANT CHANGE UP
SAO2 % BLDV: 89.2 % — SIGNIFICANT CHANGE UP
SAO2 % BLDV: 96.7 % — SIGNIFICANT CHANGE UP
SAO2 % BLDV: 99.2 % — SIGNIFICANT CHANGE UP
SAO2 % BLDV: 99.3 % — SIGNIFICANT CHANGE UP
SAO2 % BLDV: 99.6 % — SIGNIFICANT CHANGE UP
SARS-COV-2 RNA SPEC QL NAA+PROBE: SIGNIFICANT CHANGE UP
SODIUM SERPL-SCNC: 127 MMOL/L — LOW (ref 135–145)
SODIUM SERPL-SCNC: 130 MMOL/L — LOW (ref 135–145)
SODIUM SERPL-SCNC: 136 MMOL/L — SIGNIFICANT CHANGE UP (ref 135–145)
SODIUM SERPL-SCNC: 137 MMOL/L — SIGNIFICANT CHANGE UP (ref 135–145)
SODIUM SERPL-SCNC: 138 MMOL/L — SIGNIFICANT CHANGE UP (ref 135–145)
SODIUM SERPL-SCNC: 138 MMOL/L — SIGNIFICANT CHANGE UP (ref 135–145)
SODIUM SERPL-SCNC: 139 MMOL/L — SIGNIFICANT CHANGE UP (ref 135–145)
SODIUM SERPL-SCNC: 140 MMOL/L — SIGNIFICANT CHANGE UP (ref 135–145)
SODIUM SERPL-SCNC: 141 MMOL/L — SIGNIFICANT CHANGE UP (ref 135–145)
SODIUM SERPL-SCNC: 142 MMOL/L — SIGNIFICANT CHANGE UP (ref 135–145)
SODIUM SERPL-SCNC: 142 MMOL/L — SIGNIFICANT CHANGE UP (ref 135–145)
SODIUM SERPL-SCNC: 144 MMOL/L — SIGNIFICANT CHANGE UP (ref 135–145)
SODIUM SERPL-SCNC: 145 MMOL/L — SIGNIFICANT CHANGE UP (ref 135–145)
SODIUM SERPL-SCNC: 147 MMOL/L — HIGH (ref 135–145)
SODIUM UR-SCNC: 110 MMOL/L — SIGNIFICANT CHANGE UP
SP GR SPEC: 1.01 — SIGNIFICANT CHANGE UP (ref 1.01–1.05)
SP GR SPEC: 1.02 — SIGNIFICANT CHANGE UP (ref 1.01–1.05)
SPECIMEN SOURCE.: SIGNIFICANT CHANGE UP
SPECIMEN SOURCE: SIGNIFICANT CHANGE UP
T4 FREE SERPL-MCNC: 1.1 NG/DL — SIGNIFICANT CHANGE UP (ref 0.9–1.8)
TIBC SERPL-MCNC: 245 UG/DL — SIGNIFICANT CHANGE UP (ref 220–430)
TM INTERPRETATION: SIGNIFICANT CHANGE UP
TRIGL SERPL-MCNC: 30 MG/DL — SIGNIFICANT CHANGE UP
TROPONIN T, HIGH SENSITIVITY RESULT: 35 NG/L — SIGNIFICANT CHANGE UP
TSH SERPL-MCNC: 1.42 UIU/ML — SIGNIFICANT CHANGE UP (ref 0.27–4.2)
TSH SERPL-MCNC: 3.06 UIU/ML — SIGNIFICANT CHANGE UP (ref 0.27–4.2)
UIBC SERPL-MCNC: 203 UG/DL — SIGNIFICANT CHANGE UP (ref 110–370)
URATE SERPL-MCNC: 6.1 MG/DL — SIGNIFICANT CHANGE UP (ref 2.5–7)
URATE SERPL-MCNC: 6.7 MG/DL — SIGNIFICANT CHANGE UP (ref 2.5–7)
UROBILINOGEN FLD QL: ABNORMAL
UROBILINOGEN FLD QL: SIGNIFICANT CHANGE UP
VALPROATE SERPL-MCNC: 5.2 UG/ML — LOW (ref 50–100)
VARIANT LYMPHS # BLD: 1.8 % — SIGNIFICANT CHANGE UP (ref 0–6)
VARIANT LYMPHS # BLD: 2.6 % — SIGNIFICANT CHANGE UP (ref 0–6)
VARIANT LYMPHS # BLD: 2.6 % — SIGNIFICANT CHANGE UP (ref 0–6)
VARIANT LYMPHS # BLD: 3 % — SIGNIFICANT CHANGE UP (ref 0–6)
VISC SER: 2.1 — SIGNIFICANT CHANGE UP (ref 1.4–2.1)
VISC SER: 3 — HIGH (ref 1.4–2.1)
VISC SER: 3.2 — HIGH (ref 1.4–2.1)
VISC SER: 4.9 — HIGH (ref 1.4–2.1)
VISC SER: 7.6 — HIGH (ref 1.4–2.1)
VIT B12 SERPL-MCNC: 395 PG/ML — SIGNIFICANT CHANGE UP (ref 200–900)
VIT B12 SERPL-MCNC: 797 PG/ML — SIGNIFICANT CHANGE UP (ref 200–900)
VIT B12 SERPL-MCNC: SIGNIFICANT CHANGE UP PG/ML (ref 200–900)
VIT D25+D1,25 OH+D1,25 PNL SERPL-MCNC: 37.1 PG/ML — SIGNIFICANT CHANGE UP (ref 19.9–79.3)
VIT D25+D1,25 OH+D1,25 PNL SERPL-MCNC: 49.7 PG/ML — SIGNIFICANT CHANGE UP (ref 19.9–79.3)
WBC # BLD: 13.74 K/UL — HIGH (ref 3.8–10.5)
WBC # BLD: 4.89 K/UL — SIGNIFICANT CHANGE UP (ref 3.8–10.5)
WBC # BLD: 4.99 K/UL — SIGNIFICANT CHANGE UP (ref 3.8–10.5)
WBC # BLD: 5.24 K/UL — SIGNIFICANT CHANGE UP (ref 3.8–10.5)
WBC # BLD: 5.57 K/UL — SIGNIFICANT CHANGE UP (ref 3.8–10.5)
WBC # BLD: 5.62 K/UL — SIGNIFICANT CHANGE UP (ref 3.8–10.5)
WBC # BLD: 5.63 K/UL — SIGNIFICANT CHANGE UP (ref 3.8–10.5)
WBC # BLD: 5.63 K/UL — SIGNIFICANT CHANGE UP (ref 3.8–10.5)
WBC # BLD: 5.73 K/UL — SIGNIFICANT CHANGE UP (ref 3.8–10.5)
WBC # BLD: 5.83 K/UL — SIGNIFICANT CHANGE UP (ref 3.8–10.5)
WBC # BLD: 5.86 K/UL — SIGNIFICANT CHANGE UP (ref 3.8–10.5)
WBC # BLD: 5.93 K/UL — SIGNIFICANT CHANGE UP (ref 3.8–10.5)
WBC # BLD: 5.94 K/UL — SIGNIFICANT CHANGE UP (ref 3.8–10.5)
WBC # BLD: 5.97 K/UL — SIGNIFICANT CHANGE UP (ref 3.8–10.5)
WBC # BLD: 6.07 K/UL — SIGNIFICANT CHANGE UP (ref 3.8–10.5)
WBC # BLD: 6.15 K/UL — SIGNIFICANT CHANGE UP (ref 3.8–10.5)
WBC # BLD: 6.17 K/UL — SIGNIFICANT CHANGE UP (ref 3.8–10.5)
WBC # BLD: 6.28 K/UL — SIGNIFICANT CHANGE UP (ref 3.8–10.5)
WBC # BLD: 6.34 K/UL — SIGNIFICANT CHANGE UP (ref 3.8–10.5)
WBC # BLD: 6.47 K/UL — SIGNIFICANT CHANGE UP (ref 3.8–10.5)
WBC # BLD: 6.53 K/UL — SIGNIFICANT CHANGE UP (ref 3.8–10.5)
WBC # BLD: 6.65 K/UL — SIGNIFICANT CHANGE UP (ref 3.8–10.5)
WBC # BLD: 6.68 K/UL — SIGNIFICANT CHANGE UP (ref 3.8–10.5)
WBC # BLD: 6.8 K/UL — SIGNIFICANT CHANGE UP (ref 3.8–10.5)
WBC # BLD: 7.01 K/UL — SIGNIFICANT CHANGE UP (ref 3.8–10.5)
WBC # BLD: 7.34 K/UL — SIGNIFICANT CHANGE UP (ref 3.8–10.5)
WBC # BLD: 8.29 K/UL — SIGNIFICANT CHANGE UP (ref 3.8–10.5)
WBC # FLD AUTO: 13.74 K/UL — HIGH (ref 3.8–10.5)
WBC # FLD AUTO: 4.89 K/UL — SIGNIFICANT CHANGE UP (ref 3.8–10.5)
WBC # FLD AUTO: 4.99 K/UL — SIGNIFICANT CHANGE UP (ref 3.8–10.5)
WBC # FLD AUTO: 5.24 K/UL — SIGNIFICANT CHANGE UP (ref 3.8–10.5)
WBC # FLD AUTO: 5.57 K/UL — SIGNIFICANT CHANGE UP (ref 3.8–10.5)
WBC # FLD AUTO: 5.62 K/UL — SIGNIFICANT CHANGE UP (ref 3.8–10.5)
WBC # FLD AUTO: 5.63 K/UL — SIGNIFICANT CHANGE UP (ref 3.8–10.5)
WBC # FLD AUTO: 5.63 K/UL — SIGNIFICANT CHANGE UP (ref 3.8–10.5)
WBC # FLD AUTO: 5.73 K/UL — SIGNIFICANT CHANGE UP (ref 3.8–10.5)
WBC # FLD AUTO: 5.83 K/UL — SIGNIFICANT CHANGE UP (ref 3.8–10.5)
WBC # FLD AUTO: 5.86 K/UL — SIGNIFICANT CHANGE UP (ref 3.8–10.5)
WBC # FLD AUTO: 5.93 K/UL — SIGNIFICANT CHANGE UP (ref 3.8–10.5)
WBC # FLD AUTO: 5.94 K/UL — SIGNIFICANT CHANGE UP (ref 3.8–10.5)
WBC # FLD AUTO: 5.97 K/UL — SIGNIFICANT CHANGE UP (ref 3.8–10.5)
WBC # FLD AUTO: 6.07 K/UL — SIGNIFICANT CHANGE UP (ref 3.8–10.5)
WBC # FLD AUTO: 6.15 K/UL — SIGNIFICANT CHANGE UP (ref 3.8–10.5)
WBC # FLD AUTO: 6.17 K/UL — SIGNIFICANT CHANGE UP (ref 3.8–10.5)
WBC # FLD AUTO: 6.28 K/UL — SIGNIFICANT CHANGE UP (ref 3.8–10.5)
WBC # FLD AUTO: 6.34 K/UL — SIGNIFICANT CHANGE UP (ref 3.8–10.5)
WBC # FLD AUTO: 6.47 K/UL — SIGNIFICANT CHANGE UP (ref 3.8–10.5)
WBC # FLD AUTO: 6.53 K/UL — SIGNIFICANT CHANGE UP (ref 3.8–10.5)
WBC # FLD AUTO: 6.65 K/UL — SIGNIFICANT CHANGE UP (ref 3.8–10.5)
WBC # FLD AUTO: 6.68 K/UL — SIGNIFICANT CHANGE UP (ref 3.8–10.5)
WBC # FLD AUTO: 6.8 K/UL — SIGNIFICANT CHANGE UP (ref 3.8–10.5)
WBC # FLD AUTO: 7.01 K/UL — SIGNIFICANT CHANGE UP (ref 3.8–10.5)
WBC # FLD AUTO: 7.34 K/UL — SIGNIFICANT CHANGE UP (ref 3.8–10.5)
WBC # FLD AUTO: 8.29 K/UL — SIGNIFICANT CHANGE UP (ref 3.8–10.5)
WBC UR QL: 0 /HPF — SIGNIFICANT CHANGE UP (ref 0–5)
WBC UR QL: 34 /HPF — HIGH (ref 0–5)

## 2022-01-01 PROCEDURE — 99233 SBSQ HOSP IP/OBS HIGH 50: CPT

## 2022-01-01 PROCEDURE — 99233 SBSQ HOSP IP/OBS HIGH 50: CPT | Mod: GC

## 2022-01-01 PROCEDURE — 99358 PROLONG SERVICE W/O CONTACT: CPT | Mod: NC

## 2022-01-01 PROCEDURE — 76937 US GUIDE VASCULAR ACCESS: CPT | Mod: 26

## 2022-01-01 PROCEDURE — 99497 ADVNCD CARE PLAN 30 MIN: CPT | Mod: 25

## 2022-01-01 PROCEDURE — 99232 SBSQ HOSP IP/OBS MODERATE 35: CPT | Mod: GC

## 2022-01-01 PROCEDURE — 99222 1ST HOSP IP/OBS MODERATE 55: CPT | Mod: FS

## 2022-01-01 PROCEDURE — 71045 X-RAY EXAM CHEST 1 VIEW: CPT | Mod: 26

## 2022-01-01 PROCEDURE — 99233 SBSQ HOSP IP/OBS HIGH 50: CPT | Mod: 25,GC

## 2022-01-01 PROCEDURE — 70450 CT HEAD/BRAIN W/O DYE: CPT | Mod: 26,MA

## 2022-01-01 PROCEDURE — 99239 HOSP IP/OBS DSCHRG MGMT >30: CPT

## 2022-01-01 PROCEDURE — 71275 CT ANGIOGRAPHY CHEST: CPT | Mod: 26

## 2022-01-01 PROCEDURE — 99232 SBSQ HOSP IP/OBS MODERATE 35: CPT

## 2022-01-01 PROCEDURE — 99233 SBSQ HOSP IP/OBS HIGH 50: CPT | Mod: FS

## 2022-01-01 PROCEDURE — 99252 IP/OBS CONSLTJ NEW/EST SF 35: CPT | Mod: 25

## 2022-01-01 PROCEDURE — 72125 CT NECK SPINE W/O DYE: CPT | Mod: 26,MA

## 2022-01-01 PROCEDURE — 36514 APHERESIS PLASMA: CPT

## 2022-01-01 PROCEDURE — 99285 EMERGENCY DEPT VISIT HI MDM: CPT | Mod: 25

## 2022-01-01 PROCEDURE — 73590 X-RAY EXAM OF LOWER LEG: CPT | Mod: 26,LT

## 2022-01-01 PROCEDURE — 77001 FLUOROGUIDE FOR VEIN DEVICE: CPT | Mod: 26,GC

## 2022-01-01 PROCEDURE — 93010 ELECTROCARDIOGRAM REPORT: CPT

## 2022-01-01 PROCEDURE — 86334 IMMUNOFIX E-PHORESIS SERUM: CPT | Mod: 26

## 2022-01-01 PROCEDURE — 99223 1ST HOSP IP/OBS HIGH 75: CPT | Mod: GC

## 2022-01-01 PROCEDURE — 71250 CT THORAX DX C-: CPT | Mod: 26,MA

## 2022-01-01 PROCEDURE — 73120 X-RAY EXAM OF HAND: CPT | Mod: 26,LT

## 2022-01-01 PROCEDURE — 99231 SBSQ HOSP IP/OBS SF/LOW 25: CPT | Mod: 25

## 2022-01-01 PROCEDURE — 84165 PROTEIN E-PHORESIS SERUM: CPT | Mod: 26

## 2022-01-01 PROCEDURE — 93306 TTE W/DOPPLER COMPLETE: CPT | Mod: 26

## 2022-01-01 PROCEDURE — 88189 FLOWCYTOMETRY/READ 16 & >: CPT

## 2022-01-01 PROCEDURE — 99223 1ST HOSP IP/OBS HIGH 75: CPT

## 2022-01-01 PROCEDURE — 99223 1ST HOSP IP/OBS HIGH 75: CPT | Mod: FS

## 2022-01-01 PROCEDURE — 73610 X-RAY EXAM OF ANKLE: CPT | Mod: 26,LT

## 2022-01-01 PROCEDURE — 99291 CRITICAL CARE FIRST HOUR: CPT

## 2022-01-01 PROCEDURE — 93971 EXTREMITY STUDY: CPT | Mod: 26

## 2022-01-01 PROCEDURE — 93931 UPPER EXTREMITY STUDY: CPT | Mod: 26,LT

## 2022-01-01 PROCEDURE — 36556 INSERT NON-TUNNEL CV CATH: CPT

## 2022-01-01 RX ORDER — SODIUM CHLORIDE 9 MG/ML
250 INJECTION, SOLUTION INTRAVENOUS
Refills: 0 | Status: COMPLETED | OUTPATIENT
Start: 2022-01-01 | End: 2022-01-01

## 2022-01-01 RX ORDER — ACETAZOLAMIDE 250 MG/1
250 TABLET ORAL ONCE
Refills: 0 | Status: COMPLETED | OUTPATIENT
Start: 2022-01-01 | End: 2022-01-01

## 2022-01-01 RX ORDER — DEXTROSE 50 % IN WATER 50 %
25 SYRINGE (ML) INTRAVENOUS ONCE
Refills: 0 | Status: COMPLETED | OUTPATIENT
Start: 2022-01-01 | End: 2022-01-01

## 2022-01-01 RX ORDER — MIDODRINE HYDROCHLORIDE 2.5 MG/1
10 TABLET ORAL EVERY 8 HOURS
Refills: 0 | Status: DISCONTINUED | OUTPATIENT
Start: 2022-01-01 | End: 2022-01-01

## 2022-01-01 RX ORDER — DIPHENHYDRAMINE HCL 50 MG
25 CAPSULE ORAL ONCE
Refills: 0 | Status: COMPLETED | OUTPATIENT
Start: 2022-01-01 | End: 2022-01-01

## 2022-01-01 RX ORDER — BENDAMUSTINE HYDROCHLORIDE 100 MG/20ML
113 INJECTION, POWDER, LYOPHILIZED, FOR SOLUTION INTRAVENOUS ONCE
Refills: 0 | Status: COMPLETED | OUTPATIENT
Start: 2022-01-01 | End: 2022-01-01

## 2022-01-01 RX ORDER — ALBUMIN HUMAN 25 %
2500 VIAL (ML) INTRAVENOUS ONCE
Refills: 0 | Status: DISCONTINUED | OUTPATIENT
Start: 2022-01-01 | End: 2022-01-01

## 2022-01-01 RX ORDER — AZITHROMYCIN 500 MG/1
500 TABLET, FILM COATED ORAL ONCE
Refills: 0 | Status: COMPLETED | OUTPATIENT
Start: 2022-01-01 | End: 2022-01-01

## 2022-01-01 RX ORDER — HALOPERIDOL DECANOATE 100 MG/ML
1 INJECTION INTRAMUSCULAR ONCE
Refills: 0 | Status: DISCONTINUED | OUTPATIENT
Start: 2022-01-01 | End: 2022-01-01

## 2022-01-01 RX ORDER — SODIUM CHLORIDE 9 MG/ML
1000 INJECTION, SOLUTION INTRAVENOUS
Refills: 0 | Status: DISCONTINUED | OUTPATIENT
Start: 2022-01-01 | End: 2022-01-01

## 2022-01-01 RX ORDER — DIPHENHYDRAMINE HCL 50 MG
25 CAPSULE ORAL ONCE
Refills: 0 | Status: DISCONTINUED | OUTPATIENT
Start: 2022-01-01 | End: 2022-01-01

## 2022-01-01 RX ORDER — ONDANSETRON 8 MG/1
8 TABLET, FILM COATED ORAL ONCE
Refills: 0 | Status: COMPLETED | OUTPATIENT
Start: 2022-01-01 | End: 2022-01-01

## 2022-01-01 RX ORDER — CALCIUM GLUCONATE 100 MG/ML
2 VIAL (ML) INTRAVENOUS ONCE
Refills: 0 | Status: DISCONTINUED | OUTPATIENT
Start: 2022-01-01 | End: 2022-01-01

## 2022-01-01 RX ORDER — DRONABINOL 2.5 MG
2.5 CAPSULE ORAL
Refills: 0 | Status: DISCONTINUED | OUTPATIENT
Start: 2022-01-01 | End: 2022-01-01

## 2022-01-01 RX ORDER — LITHIUM CARBONATE 300 MG/1
1 TABLET, EXTENDED RELEASE ORAL
Qty: 0 | Refills: 0 | DISCHARGE

## 2022-01-01 RX ORDER — OLANZAPINE 15 MG/1
1 TABLET, FILM COATED ORAL
Qty: 0 | Refills: 0 | DISCHARGE

## 2022-01-01 RX ORDER — SODIUM CHLORIDE 9 MG/ML
1000 INJECTION INTRAMUSCULAR; INTRAVENOUS; SUBCUTANEOUS ONCE
Refills: 0 | Status: COMPLETED | OUTPATIENT
Start: 2022-01-01 | End: 2022-01-01

## 2022-01-01 RX ORDER — MEGESTROL ACETATE 40 MG/ML
40 SUSPENSION ORAL DAILY
Refills: 0 | Status: DISCONTINUED | OUTPATIENT
Start: 2022-01-01 | End: 2022-01-01

## 2022-01-01 RX ORDER — DEXTROSE 50 % IN WATER 50 %
12.5 SYRINGE (ML) INTRAVENOUS ONCE
Refills: 0 | Status: DISCONTINUED | OUTPATIENT
Start: 2022-01-01 | End: 2022-01-01

## 2022-01-01 RX ORDER — DIVALPROEX SODIUM 500 MG/1
2 TABLET, DELAYED RELEASE ORAL
Qty: 0 | Refills: 0 | DISCHARGE

## 2022-01-01 RX ORDER — FUROSEMIDE 40 MG
40 TABLET ORAL ONCE
Refills: 0 | Status: COMPLETED | OUTPATIENT
Start: 2022-01-01 | End: 2022-01-01

## 2022-01-01 RX ORDER — SODIUM CHLORIDE 9 MG/ML
250 INJECTION, SOLUTION INTRAVENOUS
Refills: 0 | Status: DISCONTINUED | OUTPATIENT
Start: 2022-01-01 | End: 2022-01-01

## 2022-01-01 RX ORDER — FAMOTIDINE 10 MG/ML
40 INJECTION INTRAVENOUS ONCE
Refills: 0 | Status: COMPLETED | OUTPATIENT
Start: 2022-01-01 | End: 2022-01-01

## 2022-01-01 RX ORDER — MAGNESIUM SULFATE 500 MG/ML
1 VIAL (ML) INJECTION ONCE
Refills: 0 | Status: COMPLETED | OUTPATIENT
Start: 2022-01-01 | End: 2022-01-01

## 2022-01-01 RX ORDER — OLANZAPINE 15 MG/1
2.5 TABLET, FILM COATED ORAL ONCE
Refills: 0 | Status: COMPLETED | OUTPATIENT
Start: 2022-01-01 | End: 2022-01-01

## 2022-01-01 RX ORDER — DEXTROSE 50 % IN WATER 50 %
25 SYRINGE (ML) INTRAVENOUS ONCE
Refills: 0 | Status: DISCONTINUED | OUTPATIENT
Start: 2022-01-01 | End: 2022-01-01

## 2022-01-01 RX ORDER — CHLORHEXIDINE GLUCONATE 213 G/1000ML
1 SOLUTION TOPICAL DAILY
Refills: 0 | Status: DISCONTINUED | OUTPATIENT
Start: 2022-01-01 | End: 2022-01-01

## 2022-01-01 RX ORDER — DRONABINOL 2.5 MG
5 CAPSULE ORAL
Refills: 0 | Status: DISCONTINUED | OUTPATIENT
Start: 2022-01-01 | End: 2022-01-01

## 2022-01-01 RX ORDER — DEXTROSE 50 % IN WATER 50 %
12.5 SYRINGE (ML) INTRAVENOUS ONCE
Refills: 0 | Status: COMPLETED | OUTPATIENT
Start: 2022-01-01 | End: 2022-01-01

## 2022-01-01 RX ORDER — IPRATROPIUM/ALBUTEROL SULFATE 18-103MCG
3 AEROSOL WITH ADAPTER (GRAM) INHALATION ONCE
Refills: 0 | Status: COMPLETED | OUTPATIENT
Start: 2022-01-01 | End: 2022-01-01

## 2022-01-01 RX ORDER — PAMIDRONATE DISODIUM 9 MG/ML
60 INJECTION, SOLUTION INTRAVENOUS ONCE
Refills: 0 | Status: DISCONTINUED | OUTPATIENT
Start: 2022-01-01 | End: 2022-01-01

## 2022-01-01 RX ORDER — TETANUS TOXOID, REDUCED DIPHTHERIA TOXOID AND ACELLULAR PERTUSSIS VACCINE, ADSORBED 5; 2.5; 8; 8; 2.5 [IU]/.5ML; [IU]/.5ML; UG/.5ML; UG/.5ML; UG/.5ML
0.5 SUSPENSION INTRAMUSCULAR ONCE
Refills: 0 | Status: COMPLETED | OUTPATIENT
Start: 2022-01-01 | End: 2022-01-01

## 2022-01-01 RX ORDER — HALOPERIDOL DECANOATE 100 MG/ML
2 INJECTION INTRAMUSCULAR ONCE
Refills: 0 | Status: COMPLETED | OUTPATIENT
Start: 2022-01-01 | End: 2022-01-01

## 2022-01-01 RX ORDER — CALCITONIN SALMON 200 [IU]/ML
240 INJECTION, SOLUTION INTRAMUSCULAR EVERY 12 HOURS
Refills: 0 | Status: COMPLETED | OUTPATIENT
Start: 2022-01-01 | End: 2022-01-01

## 2022-01-01 RX ORDER — ERGOCALCIFEROL 1.25 MG/1
50000 CAPSULE ORAL
Refills: 0 | Status: DISCONTINUED | OUTPATIENT
Start: 2022-01-01 | End: 2022-01-01

## 2022-01-01 RX ORDER — CHOLECALCIFEROL (VITAMIN D3) 125 MCG
1000 CAPSULE ORAL DAILY
Refills: 0 | Status: DISCONTINUED | OUTPATIENT
Start: 2022-01-01 | End: 2022-01-01

## 2022-01-01 RX ORDER — SODIUM CHLORIDE 9 MG/ML
500 INJECTION INTRAMUSCULAR; INTRAVENOUS; SUBCUTANEOUS ONCE
Refills: 0 | Status: COMPLETED | OUTPATIENT
Start: 2022-01-01 | End: 2022-01-01

## 2022-01-01 RX ORDER — DEXTROSE 50 % IN WATER 50 %
50 SYRINGE (ML) INTRAVENOUS ONCE
Refills: 0 | Status: COMPLETED | OUTPATIENT
Start: 2022-01-01 | End: 2022-01-01

## 2022-01-01 RX ORDER — IPRATROPIUM/ALBUTEROL SULFATE 18-103MCG
3 AEROSOL WITH ADAPTER (GRAM) INHALATION EVERY 12 HOURS
Refills: 0 | Status: DISCONTINUED | OUTPATIENT
Start: 2022-01-01 | End: 2022-01-01

## 2022-01-01 RX ORDER — MIRTAZAPINE 45 MG/1
7.5 TABLET, ORALLY DISINTEGRATING ORAL
Refills: 0 | Status: DISCONTINUED | OUTPATIENT
Start: 2022-01-01 | End: 2022-01-01

## 2022-01-01 RX ORDER — SODIUM,POTASSIUM PHOSPHATES 278-250MG
1 POWDER IN PACKET (EA) ORAL ONCE
Refills: 0 | Status: COMPLETED | OUTPATIENT
Start: 2022-01-01 | End: 2022-01-01

## 2022-01-01 RX ORDER — ACYCLOVIR SODIUM 500 MG
400 VIAL (EA) INTRAVENOUS
Refills: 0 | Status: DISCONTINUED | OUTPATIENT
Start: 2022-01-01 | End: 2022-01-01

## 2022-01-01 RX ORDER — SODIUM CHLORIDE 9 MG/ML
1000 INJECTION INTRAMUSCULAR; INTRAVENOUS; SUBCUTANEOUS
Refills: 0 | Status: DISCONTINUED | OUTPATIENT
Start: 2022-01-01 | End: 2022-01-01

## 2022-01-01 RX ORDER — THIAMINE MONONITRATE (VIT B1) 100 MG
500 TABLET ORAL THREE TIMES A DAY
Refills: 0 | Status: DISCONTINUED | OUTPATIENT
Start: 2022-01-01 | End: 2022-01-01

## 2022-01-01 RX ORDER — CEFTRIAXONE 500 MG/1
1000 INJECTION, POWDER, FOR SOLUTION INTRAMUSCULAR; INTRAVENOUS ONCE
Refills: 0 | Status: COMPLETED | OUTPATIENT
Start: 2022-01-01 | End: 2022-01-01

## 2022-01-01 RX ORDER — PIPERACILLIN AND TAZOBACTAM 4; .5 G/20ML; G/20ML
3.38 INJECTION, POWDER, LYOPHILIZED, FOR SOLUTION INTRAVENOUS EVERY 8 HOURS
Refills: 0 | Status: DISCONTINUED | OUTPATIENT
Start: 2022-01-01 | End: 2022-01-01

## 2022-01-01 RX ORDER — SODIUM CHLORIDE 9 MG/ML
250 INJECTION, SOLUTION INTRAVENOUS ONCE
Refills: 0 | Status: COMPLETED | OUTPATIENT
Start: 2022-01-01 | End: 2022-01-01

## 2022-01-01 RX ORDER — CALCITONIN SALMON 200 [IU]/ML
240 INJECTION, SOLUTION INTRAMUSCULAR ONCE
Refills: 0 | Status: DISCONTINUED | OUTPATIENT
Start: 2022-01-01 | End: 2022-01-01

## 2022-01-01 RX ORDER — LEVALBUTEROL 1.25 MG/.5ML
0.63 SOLUTION, CONCENTRATE RESPIRATORY (INHALATION) EVERY 12 HOURS
Refills: 0 | Status: DISCONTINUED | OUTPATIENT
Start: 2022-01-01 | End: 2022-01-01

## 2022-01-01 RX ORDER — SODIUM CHLORIDE 9 MG/ML
250 INJECTION INTRAMUSCULAR; INTRAVENOUS; SUBCUTANEOUS ONCE
Refills: 0 | Status: COMPLETED | OUTPATIENT
Start: 2022-01-01 | End: 2022-01-01

## 2022-01-01 RX ORDER — DIPHENHYDRAMINE HCL 50 MG
50 CAPSULE ORAL ONCE
Refills: 0 | Status: COMPLETED | OUTPATIENT
Start: 2022-01-01 | End: 2022-01-01

## 2022-01-01 RX ORDER — SODIUM CHLORIDE 9 MG/ML
1000 INJECTION, SOLUTION INTRAVENOUS ONCE
Refills: 0 | Status: COMPLETED | OUTPATIENT
Start: 2022-01-01 | End: 2022-01-01

## 2022-01-01 RX ORDER — HALOPERIDOL DECANOATE 100 MG/ML
1 INJECTION INTRAMUSCULAR ONCE
Refills: 0 | Status: COMPLETED | OUTPATIENT
Start: 2022-01-01 | End: 2022-01-01

## 2022-01-01 RX ORDER — FUROSEMIDE 40 MG
40 TABLET ORAL DAILY
Refills: 0 | Status: DISCONTINUED | OUTPATIENT
Start: 2022-01-01 | End: 2022-01-01

## 2022-01-01 RX ORDER — LANOLIN ALCOHOL/MO/W.PET/CERES
3 CREAM (GRAM) TOPICAL AT BEDTIME
Refills: 0 | Status: DISCONTINUED | OUTPATIENT
Start: 2022-01-01 | End: 2022-01-01

## 2022-01-01 RX ORDER — FAMOTIDINE 10 MG/ML
40 INJECTION INTRAVENOUS ONCE
Refills: 0 | Status: DISCONTINUED | OUTPATIENT
Start: 2022-01-01 | End: 2022-01-01

## 2022-01-01 RX ORDER — FILGRASTIM 480MCG/1.6
300 VIAL (ML) INJECTION DAILY
Refills: 0 | Status: COMPLETED | OUTPATIENT
Start: 2022-01-01 | End: 2022-01-01

## 2022-01-01 RX ORDER — ONDANSETRON 8 MG/1
4 TABLET, FILM COATED ORAL EVERY 8 HOURS
Refills: 0 | Status: DISCONTINUED | OUTPATIENT
Start: 2022-01-01 | End: 2022-01-01

## 2022-01-01 RX ORDER — POTASSIUM PHOSPHATE, MONOBASIC POTASSIUM PHOSPHATE, DIBASIC 236; 224 MG/ML; MG/ML
15 INJECTION, SOLUTION INTRAVENOUS ONCE
Refills: 0 | Status: COMPLETED | OUTPATIENT
Start: 2022-01-01 | End: 2022-01-01

## 2022-01-01 RX ORDER — FUROSEMIDE 40 MG
40 TABLET ORAL ONCE
Refills: 0 | Status: DISCONTINUED | OUTPATIENT
Start: 2022-01-01 | End: 2022-01-01

## 2022-01-01 RX ORDER — ROBINUL 0.2 MG/ML
0.4 INJECTION INTRAMUSCULAR; INTRAVENOUS
Refills: 0 | Status: DISCONTINUED | OUTPATIENT
Start: 2022-01-01 | End: 2022-01-01

## 2022-01-01 RX ORDER — MAGNESIUM OXIDE 400 MG ORAL TABLET 241.3 MG
400 TABLET ORAL
Refills: 0 | Status: COMPLETED | OUTPATIENT
Start: 2022-01-01 | End: 2022-01-01

## 2022-01-01 RX ORDER — DEXAMETHASONE 0.5 MG/5ML
8 ELIXIR ORAL ONCE
Refills: 0 | Status: COMPLETED | OUTPATIENT
Start: 2022-01-01 | End: 2022-01-01

## 2022-01-01 RX ORDER — POTASSIUM PHOSPHATE, MONOBASIC POTASSIUM PHOSPHATE, DIBASIC 236; 224 MG/ML; MG/ML
30 INJECTION, SOLUTION INTRAVENOUS ONCE
Refills: 0 | Status: COMPLETED | OUTPATIENT
Start: 2022-01-01 | End: 2022-01-01

## 2022-01-01 RX ORDER — ALBUTEROL 90 UG/1
2 AEROSOL, METERED ORAL EVERY 6 HOURS
Refills: 0 | Status: DISCONTINUED | OUTPATIENT
Start: 2022-01-01 | End: 2022-01-01

## 2022-01-01 RX ORDER — BUDESONIDE AND FORMOTEROL FUMARATE DIHYDRATE 160; 4.5 UG/1; UG/1
2 AEROSOL RESPIRATORY (INHALATION)
Refills: 0 | Status: DISCONTINUED | OUTPATIENT
Start: 2022-01-01 | End: 2022-01-01

## 2022-01-01 RX ORDER — ACETAMINOPHEN 500 MG
650 TABLET ORAL EVERY 6 HOURS
Refills: 0 | Status: DISCONTINUED | OUTPATIENT
Start: 2022-01-01 | End: 2022-01-01

## 2022-01-01 RX ORDER — OLANZAPINE 15 MG/1
2.5 TABLET, FILM COATED ORAL AT BEDTIME
Refills: 0 | Status: DISCONTINUED | OUTPATIENT
Start: 2022-01-01 | End: 2022-01-01

## 2022-01-01 RX ORDER — ONDANSETRON 8 MG/1
8 TABLET, FILM COATED ORAL ONCE
Refills: 0 | Status: DISCONTINUED | OUTPATIENT
Start: 2022-01-01 | End: 2022-01-01

## 2022-01-01 RX ORDER — DEXTROSE 50 % IN WATER 50 %
15 SYRINGE (ML) INTRAVENOUS ONCE
Refills: 0 | Status: DISCONTINUED | OUTPATIENT
Start: 2022-01-01 | End: 2022-01-01

## 2022-01-01 RX ORDER — ALBUMIN HUMAN 25 %
250 VIAL (ML) INTRAVENOUS ONCE
Refills: 0 | Status: COMPLETED | OUTPATIENT
Start: 2022-01-01 | End: 2022-01-01

## 2022-01-01 RX ORDER — SODIUM,POTASSIUM PHOSPHATES 278-250MG
1 POWDER IN PACKET (EA) ORAL THREE TIMES A DAY
Refills: 0 | Status: COMPLETED | OUTPATIENT
Start: 2022-01-01 | End: 2022-01-01

## 2022-01-01 RX ORDER — BENDAMUSTINE HYDROCHLORIDE 100 MG/20ML
113 INJECTION, POWDER, LYOPHILIZED, FOR SOLUTION INTRAVENOUS ONCE
Refills: 0 | Status: DISCONTINUED | OUTPATIENT
Start: 2022-01-01 | End: 2022-01-01

## 2022-01-01 RX ORDER — IPRATROPIUM/ALBUTEROL SULFATE 18-103MCG
3 AEROSOL WITH ADAPTER (GRAM) INHALATION EVERY 6 HOURS
Refills: 0 | Status: COMPLETED | OUTPATIENT
Start: 2022-01-01 | End: 2022-01-01

## 2022-01-01 RX ORDER — FUROSEMIDE 40 MG
20 TABLET ORAL ONCE
Refills: 0 | Status: COMPLETED | OUTPATIENT
Start: 2022-01-01 | End: 2022-01-01

## 2022-01-01 RX ORDER — ALBUTEROL 90 UG/1
2 AEROSOL, METERED ORAL
Qty: 0 | Refills: 0 | DISCHARGE

## 2022-01-01 RX ORDER — CEFTRIAXONE 500 MG/1
1000 INJECTION, POWDER, FOR SOLUTION INTRAMUSCULAR; INTRAVENOUS EVERY 24 HOURS
Refills: 0 | Status: COMPLETED | OUTPATIENT
Start: 2022-01-01 | End: 2022-01-01

## 2022-01-01 RX ORDER — MIDODRINE HYDROCHLORIDE 2.5 MG/1
5 TABLET ORAL ONCE
Refills: 0 | Status: COMPLETED | OUTPATIENT
Start: 2022-01-01 | End: 2022-01-01

## 2022-01-01 RX ORDER — OLANZAPINE 15 MG/1
2.5 TABLET, FILM COATED ORAL EVERY 6 HOURS
Refills: 0 | Status: DISCONTINUED | OUTPATIENT
Start: 2022-01-01 | End: 2022-01-01

## 2022-01-01 RX ORDER — DIVALPROEX SODIUM 500 MG/1
1 TABLET, DELAYED RELEASE ORAL
Qty: 0 | Refills: 0 | DISCHARGE

## 2022-01-01 RX ORDER — OLANZAPINE 15 MG/1
3.75 TABLET, FILM COATED ORAL AT BEDTIME
Refills: 0 | Status: DISCONTINUED | OUTPATIENT
Start: 2022-01-01 | End: 2022-01-01

## 2022-01-01 RX ORDER — OLANZAPINE 15 MG/1
1.25 TABLET, FILM COATED ORAL ONCE
Refills: 0 | Status: COMPLETED | OUTPATIENT
Start: 2022-01-01 | End: 2022-01-01

## 2022-01-01 RX ORDER — HALOPERIDOL DECANOATE 100 MG/ML
1 INJECTION INTRAMUSCULAR
Refills: 0 | Status: DISCONTINUED | OUTPATIENT
Start: 2022-01-01 | End: 2022-01-01

## 2022-01-01 RX ORDER — PAMIDRONATE DISODIUM 9 MG/ML
60 INJECTION, SOLUTION INTRAVENOUS ONCE
Refills: 0 | Status: COMPLETED | OUTPATIENT
Start: 2022-01-01 | End: 2022-01-01

## 2022-01-01 RX ORDER — FLUTICASONE FUROATE AND VILANTEROL TRIFENATATE 100; 25 UG/1; UG/1
1 POWDER RESPIRATORY (INHALATION)
Qty: 0 | Refills: 0 | DISCHARGE

## 2022-01-01 RX ORDER — PIPERACILLIN AND TAZOBACTAM 4; .5 G/20ML; G/20ML
3.38 INJECTION, POWDER, LYOPHILIZED, FOR SOLUTION INTRAVENOUS ONCE
Refills: 0 | Status: COMPLETED | OUTPATIENT
Start: 2022-01-01 | End: 2022-01-01

## 2022-01-01 RX ORDER — GLUCAGON INJECTION, SOLUTION 0.5 MG/.1ML
1 INJECTION, SOLUTION SUBCUTANEOUS ONCE
Refills: 0 | Status: DISCONTINUED | OUTPATIENT
Start: 2022-01-01 | End: 2022-01-01

## 2022-01-01 RX ORDER — HYDROMORPHONE HYDROCHLORIDE 2 MG/ML
0.2 INJECTION INTRAMUSCULAR; INTRAVENOUS; SUBCUTANEOUS EVERY 4 HOURS
Refills: 0 | Status: DISCONTINUED | OUTPATIENT
Start: 2022-01-01 | End: 2022-01-01

## 2022-01-01 RX ORDER — HEPARIN SODIUM 5000 [USP'U]/ML
5000 INJECTION INTRAVENOUS; SUBCUTANEOUS EVERY 12 HOURS
Refills: 0 | Status: DISCONTINUED | OUTPATIENT
Start: 2022-01-01 | End: 2022-01-01

## 2022-01-01 RX ORDER — DEXAMETHASONE 0.5 MG/5ML
8 ELIXIR ORAL ONCE
Refills: 0 | Status: DISCONTINUED | OUTPATIENT
Start: 2022-01-01 | End: 2022-01-01

## 2022-01-01 RX ORDER — FILGRASTIM 480MCG/1.6
480 VIAL (ML) INJECTION EVERY 24 HOURS
Refills: 0 | Status: DISCONTINUED | OUTPATIENT
Start: 2022-01-01 | End: 2022-01-01

## 2022-01-01 RX ORDER — PIPERACILLIN AND TAZOBACTAM 4; .5 G/20ML; G/20ML
3.38 INJECTION, POWDER, LYOPHILIZED, FOR SOLUTION INTRAVENOUS ONCE
Refills: 0 | Status: DISCONTINUED | OUTPATIENT
Start: 2022-01-01 | End: 2022-01-01

## 2022-01-01 RX ADMIN — Medication 105 MILLIGRAM(S): at 13:24

## 2022-01-01 RX ADMIN — Medication 105 MILLIGRAM(S): at 21:39

## 2022-01-01 RX ADMIN — Medication 3 MILLILITER(S): at 08:38

## 2022-01-01 RX ADMIN — BUDESONIDE AND FORMOTEROL FUMARATE DIHYDRATE 2 PUFF(S): 160; 4.5 AEROSOL RESPIRATORY (INHALATION) at 08:14

## 2022-01-01 RX ADMIN — LEVALBUTEROL 0.63 MILLIGRAM(S): 1.25 SOLUTION, CONCENTRATE RESPIRATORY (INHALATION) at 09:00

## 2022-01-01 RX ADMIN — Medication 3 MILLILITER(S): at 19:56

## 2022-01-01 RX ADMIN — Medication 25 MILLILITER(S): at 12:06

## 2022-01-01 RX ADMIN — BENDAMUSTINE HYDROCHLORIDE 113 MILLIGRAM(S): 100 INJECTION, POWDER, LYOPHILIZED, FOR SOLUTION INTRAVENOUS at 12:36

## 2022-01-01 RX ADMIN — HEPARIN SODIUM 5000 UNIT(S): 5000 INJECTION INTRAVENOUS; SUBCUTANEOUS at 05:58

## 2022-01-01 RX ADMIN — SODIUM CHLORIDE 1000 MILLILITER(S): 9 INJECTION, SOLUTION INTRAVENOUS at 18:13

## 2022-01-01 RX ADMIN — HEPARIN SODIUM 5000 UNIT(S): 5000 INJECTION INTRAVENOUS; SUBCUTANEOUS at 18:36

## 2022-01-01 RX ADMIN — OLANZAPINE 2.5 MILLIGRAM(S): 15 TABLET, FILM COATED ORAL at 21:40

## 2022-01-01 RX ADMIN — MIRTAZAPINE 7.5 MILLIGRAM(S): 45 TABLET, ORALLY DISINTEGRATING ORAL at 21:57

## 2022-01-01 RX ADMIN — MIDODRINE HYDROCHLORIDE 10 MILLIGRAM(S): 2.5 TABLET ORAL at 20:14

## 2022-01-01 RX ADMIN — HEPARIN SODIUM 5000 UNIT(S): 5000 INJECTION INTRAVENOUS; SUBCUTANEOUS at 05:08

## 2022-01-01 RX ADMIN — ACETAZOLAMIDE 250 MILLIGRAM(S): 250 TABLET ORAL at 00:42

## 2022-01-01 RX ADMIN — Medication 3 MILLILITER(S): at 21:14

## 2022-01-01 RX ADMIN — BUDESONIDE AND FORMOTEROL FUMARATE DIHYDRATE 2 PUFF(S): 160; 4.5 AEROSOL RESPIRATORY (INHALATION) at 20:59

## 2022-01-01 RX ADMIN — Medication 105 MILLIGRAM(S): at 12:50

## 2022-01-01 RX ADMIN — SODIUM CHLORIDE 60 MILLILITER(S): 9 INJECTION, SOLUTION INTRAVENOUS at 12:39

## 2022-01-01 RX ADMIN — Medication 105 MILLIGRAM(S): at 05:19

## 2022-01-01 RX ADMIN — SODIUM CHLORIDE 80 MILLILITER(S): 9 INJECTION, SOLUTION INTRAVENOUS at 09:32

## 2022-01-01 RX ADMIN — LEVALBUTEROL 0.63 MILLIGRAM(S): 1.25 SOLUTION, CONCENTRATE RESPIRATORY (INHALATION) at 23:46

## 2022-01-01 RX ADMIN — BUDESONIDE AND FORMOTEROL FUMARATE DIHYDRATE 2 PUFF(S): 160; 4.5 AEROSOL RESPIRATORY (INHALATION) at 09:11

## 2022-01-01 RX ADMIN — HEPARIN SODIUM 5000 UNIT(S): 5000 INJECTION INTRAVENOUS; SUBCUTANEOUS at 18:00

## 2022-01-01 RX ADMIN — HALOPERIDOL DECANOATE 1 MILLIGRAM(S): 100 INJECTION INTRAMUSCULAR at 11:45

## 2022-01-01 RX ADMIN — MIDODRINE HYDROCHLORIDE 10 MILLIGRAM(S): 2.5 TABLET ORAL at 13:02

## 2022-01-01 RX ADMIN — BUDESONIDE AND FORMOTEROL FUMARATE DIHYDRATE 2 PUFF(S): 160; 4.5 AEROSOL RESPIRATORY (INHALATION) at 10:09

## 2022-01-01 RX ADMIN — HEPARIN SODIUM 5000 UNIT(S): 5000 INJECTION INTRAVENOUS; SUBCUTANEOUS at 05:12

## 2022-01-01 RX ADMIN — HEPARIN SODIUM 5000 UNIT(S): 5000 INJECTION INTRAVENOUS; SUBCUTANEOUS at 05:21

## 2022-01-01 RX ADMIN — Medication 3 MILLILITER(S): at 08:02

## 2022-01-01 RX ADMIN — HEPARIN SODIUM 5000 UNIT(S): 5000 INJECTION INTRAVENOUS; SUBCUTANEOUS at 06:33

## 2022-01-01 RX ADMIN — Medication 1 TABLET(S): at 12:24

## 2022-01-01 RX ADMIN — HEPARIN SODIUM 5000 UNIT(S): 5000 INJECTION INTRAVENOUS; SUBCUTANEOUS at 17:50

## 2022-01-01 RX ADMIN — LEVALBUTEROL 0.63 MILLIGRAM(S): 1.25 SOLUTION, CONCENTRATE RESPIRATORY (INHALATION) at 21:01

## 2022-01-01 RX ADMIN — SODIUM CHLORIDE 75 MILLILITER(S): 9 INJECTION, SOLUTION INTRAVENOUS at 10:24

## 2022-01-01 RX ADMIN — Medication 3 MILLILITER(S): at 19:47

## 2022-01-01 RX ADMIN — Medication 3 MILLILITER(S): at 07:36

## 2022-01-01 RX ADMIN — CHLORHEXIDINE GLUCONATE 1 APPLICATION(S): 213 SOLUTION TOPICAL at 15:17

## 2022-01-01 RX ADMIN — HEPARIN SODIUM 5000 UNIT(S): 5000 INJECTION INTRAVENOUS; SUBCUTANEOUS at 06:00

## 2022-01-01 RX ADMIN — Medication 400 MILLIGRAM(S): at 21:58

## 2022-01-01 RX ADMIN — SODIUM CHLORIDE 60 MILLILITER(S): 9 INJECTION, SOLUTION INTRAVENOUS at 01:47

## 2022-01-01 RX ADMIN — BUDESONIDE AND FORMOTEROL FUMARATE DIHYDRATE 2 PUFF(S): 160; 4.5 AEROSOL RESPIRATORY (INHALATION) at 21:54

## 2022-01-01 RX ADMIN — ONDANSETRON 8 MILLIGRAM(S): 8 TABLET, FILM COATED ORAL at 11:36

## 2022-01-01 RX ADMIN — SODIUM CHLORIDE 250 MILLILITER(S): 9 INJECTION INTRAMUSCULAR; INTRAVENOUS; SUBCUTANEOUS at 20:49

## 2022-01-01 RX ADMIN — MIRTAZAPINE 7.5 MILLIGRAM(S): 45 TABLET, ORALLY DISINTEGRATING ORAL at 22:41

## 2022-01-01 RX ADMIN — SODIUM CHLORIDE 60 MILLILITER(S): 9 INJECTION, SOLUTION INTRAVENOUS at 08:42

## 2022-01-01 RX ADMIN — POTASSIUM PHOSPHATE, MONOBASIC POTASSIUM PHOSPHATE, DIBASIC 83.33 MILLIMOLE(S): 236; 224 INJECTION, SOLUTION INTRAVENOUS at 09:28

## 2022-01-01 RX ADMIN — MIDODRINE HYDROCHLORIDE 10 MILLIGRAM(S): 2.5 TABLET ORAL at 08:54

## 2022-01-01 RX ADMIN — CEFTRIAXONE 100 MILLIGRAM(S): 500 INJECTION, POWDER, FOR SOLUTION INTRAMUSCULAR; INTRAVENOUS at 13:55

## 2022-01-01 RX ADMIN — Medication 3 MILLILITER(S): at 10:47

## 2022-01-01 RX ADMIN — MIDODRINE HYDROCHLORIDE 10 MILLIGRAM(S): 2.5 TABLET ORAL at 05:59

## 2022-01-01 RX ADMIN — Medication 3 MILLILITER(S): at 21:06

## 2022-01-01 RX ADMIN — Medication 3 MILLILITER(S): at 07:34

## 2022-01-01 RX ADMIN — Medication 105 MILLIGRAM(S): at 13:37

## 2022-01-01 RX ADMIN — HYDROMORPHONE HYDROCHLORIDE 0.2 MILLIGRAM(S): 2 INJECTION INTRAMUSCULAR; INTRAVENOUS; SUBCUTANEOUS at 12:39

## 2022-01-01 RX ADMIN — BUDESONIDE AND FORMOTEROL FUMARATE DIHYDRATE 2 PUFF(S): 160; 4.5 AEROSOL RESPIRATORY (INHALATION) at 21:00

## 2022-01-01 RX ADMIN — Medication 3 MILLIGRAM(S): at 21:43

## 2022-01-01 RX ADMIN — MIRTAZAPINE 7.5 MILLIGRAM(S): 45 TABLET, ORALLY DISINTEGRATING ORAL at 21:32

## 2022-01-01 RX ADMIN — Medication 1 TABLET(S): at 16:32

## 2022-01-01 RX ADMIN — HYDROMORPHONE HYDROCHLORIDE 0.2 MILLIGRAM(S): 2 INJECTION INTRAMUSCULAR; INTRAVENOUS; SUBCUTANEOUS at 06:23

## 2022-01-01 RX ADMIN — Medication 400 MILLIGRAM(S): at 17:21

## 2022-01-01 RX ADMIN — BUDESONIDE AND FORMOTEROL FUMARATE DIHYDRATE 2 PUFF(S): 160; 4.5 AEROSOL RESPIRATORY (INHALATION) at 11:59

## 2022-01-01 RX ADMIN — Medication 105 MILLIGRAM(S): at 23:35

## 2022-01-01 RX ADMIN — Medication 1000 UNIT(S): at 12:23

## 2022-01-01 RX ADMIN — BUDESONIDE AND FORMOTEROL FUMARATE DIHYDRATE 2 PUFF(S): 160; 4.5 AEROSOL RESPIRATORY (INHALATION) at 09:53

## 2022-01-01 RX ADMIN — Medication 1000 UNIT(S): at 12:40

## 2022-01-01 RX ADMIN — Medication 1000 UNIT(S): at 12:56

## 2022-01-01 RX ADMIN — HYDROMORPHONE HYDROCHLORIDE 0.2 MILLIGRAM(S): 2 INJECTION INTRAMUSCULAR; INTRAVENOUS; SUBCUTANEOUS at 21:30

## 2022-01-01 RX ADMIN — HEPARIN SODIUM 5000 UNIT(S): 5000 INJECTION INTRAVENOUS; SUBCUTANEOUS at 17:13

## 2022-01-01 RX ADMIN — CHLORHEXIDINE GLUCONATE 1 APPLICATION(S): 213 SOLUTION TOPICAL at 10:48

## 2022-01-01 RX ADMIN — Medication 300 MICROGRAM(S): at 21:09

## 2022-01-01 RX ADMIN — PIPERACILLIN AND TAZOBACTAM 200 GRAM(S): 4; .5 INJECTION, POWDER, LYOPHILIZED, FOR SOLUTION INTRAVENOUS at 18:12

## 2022-01-01 RX ADMIN — Medication 1000 UNIT(S): at 11:59

## 2022-01-01 RX ADMIN — HEPARIN SODIUM 5000 UNIT(S): 5000 INJECTION INTRAVENOUS; SUBCUTANEOUS at 17:35

## 2022-01-01 RX ADMIN — SODIUM CHLORIDE 60 MILLILITER(S): 9 INJECTION, SOLUTION INTRAVENOUS at 05:55

## 2022-01-01 RX ADMIN — Medication 400 MILLIGRAM(S): at 17:44

## 2022-01-01 RX ADMIN — HYDROMORPHONE HYDROCHLORIDE 0.2 MILLIGRAM(S): 2 INJECTION INTRAMUSCULAR; INTRAVENOUS; SUBCUTANEOUS at 21:45

## 2022-01-01 RX ADMIN — Medication 400 MILLIGRAM(S): at 05:13

## 2022-01-01 RX ADMIN — BUDESONIDE AND FORMOTEROL FUMARATE DIHYDRATE 2 PUFF(S): 160; 4.5 AEROSOL RESPIRATORY (INHALATION) at 10:48

## 2022-01-01 RX ADMIN — Medication 8 MILLIGRAM(S): at 17:48

## 2022-01-01 RX ADMIN — SODIUM CHLORIDE 250 MILLILITER(S): 9 INJECTION, SOLUTION INTRAVENOUS at 09:58

## 2022-01-01 RX ADMIN — BUDESONIDE AND FORMOTEROL FUMARATE DIHYDRATE 2 PUFF(S): 160; 4.5 AEROSOL RESPIRATORY (INHALATION) at 21:31

## 2022-01-01 RX ADMIN — MIDODRINE HYDROCHLORIDE 10 MILLIGRAM(S): 2.5 TABLET ORAL at 21:12

## 2022-01-01 RX ADMIN — MIRTAZAPINE 7.5 MILLIGRAM(S): 45 TABLET, ORALLY DISINTEGRATING ORAL at 22:31

## 2022-01-01 RX ADMIN — SODIUM CHLORIDE 80 MILLILITER(S): 9 INJECTION, SOLUTION INTRAVENOUS at 17:59

## 2022-01-01 RX ADMIN — Medication 105 MILLIGRAM(S): at 21:45

## 2022-01-01 RX ADMIN — Medication 3 MILLILITER(S): at 16:02

## 2022-01-01 RX ADMIN — CHLORHEXIDINE GLUCONATE 1 APPLICATION(S): 213 SOLUTION TOPICAL at 11:41

## 2022-01-01 RX ADMIN — HEPARIN SODIUM 5000 UNIT(S): 5000 INJECTION INTRAVENOUS; SUBCUTANEOUS at 05:49

## 2022-01-01 RX ADMIN — BUDESONIDE AND FORMOTEROL FUMARATE DIHYDRATE 2 PUFF(S): 160; 4.5 AEROSOL RESPIRATORY (INHALATION) at 21:30

## 2022-01-01 RX ADMIN — HEPARIN SODIUM 5000 UNIT(S): 5000 INJECTION INTRAVENOUS; SUBCUTANEOUS at 06:21

## 2022-01-01 RX ADMIN — SODIUM CHLORIDE 80 MILLILITER(S): 9 INJECTION, SOLUTION INTRAVENOUS at 21:12

## 2022-01-01 RX ADMIN — BUDESONIDE AND FORMOTEROL FUMARATE DIHYDRATE 2 PUFF(S): 160; 4.5 AEROSOL RESPIRATORY (INHALATION) at 21:46

## 2022-01-01 RX ADMIN — Medication 3 MILLILITER(S): at 22:55

## 2022-01-01 RX ADMIN — SODIUM CHLORIDE 1000 MILLILITER(S): 9 INJECTION, SOLUTION INTRAVENOUS at 23:58

## 2022-01-01 RX ADMIN — SODIUM CHLORIDE 500 MILLILITER(S): 9 INJECTION, SOLUTION INTRAVENOUS at 23:18

## 2022-01-01 RX ADMIN — Medication 300 MICROGRAM(S): at 12:39

## 2022-01-01 RX ADMIN — Medication 3 MILLILITER(S): at 10:03

## 2022-01-01 RX ADMIN — Medication 40 MILLIGRAM(S): at 16:19

## 2022-01-01 RX ADMIN — MIDODRINE HYDROCHLORIDE 10 MILLIGRAM(S): 2.5 TABLET ORAL at 05:42

## 2022-01-01 RX ADMIN — HYDROMORPHONE HYDROCHLORIDE 0.2 MILLIGRAM(S): 2 INJECTION INTRAMUSCULAR; INTRAVENOUS; SUBCUTANEOUS at 16:45

## 2022-01-01 RX ADMIN — FAMOTIDINE 40 MILLIGRAM(S): 10 INJECTION INTRAVENOUS at 11:37

## 2022-01-01 RX ADMIN — LEVALBUTEROL 0.63 MILLIGRAM(S): 1.25 SOLUTION, CONCENTRATE RESPIRATORY (INHALATION) at 21:31

## 2022-01-01 RX ADMIN — SODIUM CHLORIDE 60 MILLILITER(S): 9 INJECTION, SOLUTION INTRAVENOUS at 23:03

## 2022-01-01 RX ADMIN — Medication 1000 UNIT(S): at 17:47

## 2022-01-01 RX ADMIN — Medication 3 MILLILITER(S): at 20:50

## 2022-01-01 RX ADMIN — BUDESONIDE AND FORMOTEROL FUMARATE DIHYDRATE 2 PUFF(S): 160; 4.5 AEROSOL RESPIRATORY (INHALATION) at 09:33

## 2022-01-01 RX ADMIN — ROBINUL 0.4 MILLIGRAM(S): 0.2 INJECTION INTRAMUSCULAR; INTRAVENOUS at 12:26

## 2022-01-01 RX ADMIN — SODIUM CHLORIDE 500 MILLILITER(S): 9 INJECTION INTRAMUSCULAR; INTRAVENOUS; SUBCUTANEOUS at 00:58

## 2022-01-01 RX ADMIN — Medication 105 MILLIGRAM(S): at 21:11

## 2022-01-01 RX ADMIN — BUDESONIDE AND FORMOTEROL FUMARATE DIHYDRATE 2 PUFF(S): 160; 4.5 AEROSOL RESPIRATORY (INHALATION) at 21:12

## 2022-01-01 RX ADMIN — Medication 400 MILLIGRAM(S): at 06:33

## 2022-01-01 RX ADMIN — Medication 400 MILLIGRAM(S): at 06:00

## 2022-01-01 RX ADMIN — Medication 3 MILLILITER(S): at 09:09

## 2022-01-01 RX ADMIN — Medication 1 TABLET(S): at 12:23

## 2022-01-01 RX ADMIN — Medication 1000 UNIT(S): at 12:13

## 2022-01-01 RX ADMIN — Medication 1 TABLET(S): at 12:20

## 2022-01-01 RX ADMIN — LEVALBUTEROL 0.63 MILLIGRAM(S): 1.25 SOLUTION, CONCENTRATE RESPIRATORY (INHALATION) at 08:51

## 2022-01-01 RX ADMIN — Medication 5 MILLIGRAM(S): at 05:19

## 2022-01-01 RX ADMIN — LEVALBUTEROL 0.63 MILLIGRAM(S): 1.25 SOLUTION, CONCENTRATE RESPIRATORY (INHALATION) at 09:33

## 2022-01-01 RX ADMIN — BUDESONIDE AND FORMOTEROL FUMARATE DIHYDRATE 2 PUFF(S): 160; 4.5 AEROSOL RESPIRATORY (INHALATION) at 09:07

## 2022-01-01 RX ADMIN — HEPARIN SODIUM 5000 UNIT(S): 5000 INJECTION INTRAVENOUS; SUBCUTANEOUS at 17:47

## 2022-01-01 RX ADMIN — Medication 105 MILLIGRAM(S): at 05:37

## 2022-01-01 RX ADMIN — Medication 105 MILLIGRAM(S): at 13:42

## 2022-01-01 RX ADMIN — CEFTRIAXONE 100 MILLIGRAM(S): 500 INJECTION, POWDER, FOR SOLUTION INTRAMUSCULAR; INTRAVENOUS at 00:39

## 2022-01-01 RX ADMIN — HEPARIN SODIUM 5000 UNIT(S): 5000 INJECTION INTRAVENOUS; SUBCUTANEOUS at 18:04

## 2022-01-01 RX ADMIN — CHLORHEXIDINE GLUCONATE 1 APPLICATION(S): 213 SOLUTION TOPICAL at 12:54

## 2022-01-01 RX ADMIN — Medication 105 MILLIGRAM(S): at 05:20

## 2022-01-01 RX ADMIN — Medication 105 MILLIGRAM(S): at 21:43

## 2022-01-01 RX ADMIN — SODIUM CHLORIDE 1000 MILLILITER(S): 9 INJECTION INTRAMUSCULAR; INTRAVENOUS; SUBCUTANEOUS at 09:30

## 2022-01-01 RX ADMIN — Medication 1 TABLET(S): at 12:56

## 2022-01-01 RX ADMIN — LEVALBUTEROL 0.63 MILLIGRAM(S): 1.25 SOLUTION, CONCENTRATE RESPIRATORY (INHALATION) at 10:19

## 2022-01-01 RX ADMIN — LEVALBUTEROL 0.63 MILLIGRAM(S): 1.25 SOLUTION, CONCENTRATE RESPIRATORY (INHALATION) at 20:02

## 2022-01-01 RX ADMIN — Medication 3 MILLILITER(S): at 19:57

## 2022-01-01 RX ADMIN — Medication 20 MILLIGRAM(S): at 18:03

## 2022-01-01 RX ADMIN — Medication 100 GRAM(S): at 11:58

## 2022-01-01 RX ADMIN — HEPARIN SODIUM 5000 UNIT(S): 5000 INJECTION INTRAVENOUS; SUBCUTANEOUS at 05:13

## 2022-01-01 RX ADMIN — HEPARIN SODIUM 5000 UNIT(S): 5000 INJECTION INTRAVENOUS; SUBCUTANEOUS at 05:42

## 2022-01-01 RX ADMIN — Medication 25 MILLIGRAM(S): at 11:38

## 2022-01-01 RX ADMIN — HEPARIN SODIUM 5000 UNIT(S): 5000 INJECTION INTRAVENOUS; SUBCUTANEOUS at 05:19

## 2022-01-01 RX ADMIN — CALCITONIN SALMON 240 INTERNATIONAL UNIT(S): 200 INJECTION, SOLUTION INTRAMUSCULAR at 06:45

## 2022-01-01 RX ADMIN — BUDESONIDE AND FORMOTEROL FUMARATE DIHYDRATE 2 PUFF(S): 160; 4.5 AEROSOL RESPIRATORY (INHALATION) at 21:40

## 2022-01-01 RX ADMIN — Medication 3 MILLILITER(S): at 09:58

## 2022-01-01 RX ADMIN — Medication 105 MILLIGRAM(S): at 05:48

## 2022-01-01 RX ADMIN — PIPERACILLIN AND TAZOBACTAM 25 GRAM(S): 4; .5 INJECTION, POWDER, LYOPHILIZED, FOR SOLUTION INTRAVENOUS at 21:12

## 2022-01-01 RX ADMIN — Medication 3 MILLILITER(S): at 21:02

## 2022-01-01 RX ADMIN — Medication 20 MILLIGRAM(S): at 05:48

## 2022-01-01 RX ADMIN — MIDODRINE HYDROCHLORIDE 10 MILLIGRAM(S): 2.5 TABLET ORAL at 13:20

## 2022-01-01 RX ADMIN — Medication 50 MILLILITER(S): at 22:29

## 2022-01-01 RX ADMIN — CHLORHEXIDINE GLUCONATE 1 APPLICATION(S): 213 SOLUTION TOPICAL at 12:13

## 2022-01-01 RX ADMIN — Medication 1 PACKET(S): at 21:44

## 2022-01-01 RX ADMIN — HEPARIN SODIUM 5000 UNIT(S): 5000 INJECTION INTRAVENOUS; SUBCUTANEOUS at 17:34

## 2022-01-01 RX ADMIN — Medication 1 TABLET(S): at 17:46

## 2022-01-01 RX ADMIN — Medication 3 MILLILITER(S): at 09:22

## 2022-01-01 RX ADMIN — BUDESONIDE AND FORMOTEROL FUMARATE DIHYDRATE 2 PUFF(S): 160; 4.5 AEROSOL RESPIRATORY (INHALATION) at 10:03

## 2022-01-01 RX ADMIN — Medication 3 MILLIGRAM(S): at 22:32

## 2022-01-01 RX ADMIN — HALOPERIDOL DECANOATE 2 MILLIGRAM(S): 100 INJECTION INTRAMUSCULAR at 12:40

## 2022-01-01 RX ADMIN — MAGNESIUM OXIDE 400 MG ORAL TABLET 400 MILLIGRAM(S): 241.3 TABLET ORAL at 09:07

## 2022-01-01 RX ADMIN — Medication 1 TABLET(S): at 11:59

## 2022-01-01 RX ADMIN — MIDODRINE HYDROCHLORIDE 10 MILLIGRAM(S): 2.5 TABLET ORAL at 23:03

## 2022-01-01 RX ADMIN — Medication 3 MILLILITER(S): at 04:04

## 2022-01-01 RX ADMIN — HYDROMORPHONE HYDROCHLORIDE 0.2 MILLIGRAM(S): 2 INJECTION INTRAMUSCULAR; INTRAVENOUS; SUBCUTANEOUS at 00:15

## 2022-01-01 RX ADMIN — Medication 400 MILLIGRAM(S): at 18:35

## 2022-01-01 RX ADMIN — MIDODRINE HYDROCHLORIDE 10 MILLIGRAM(S): 2.5 TABLET ORAL at 22:39

## 2022-01-01 RX ADMIN — Medication 3 MILLILITER(S): at 08:06

## 2022-01-01 RX ADMIN — SODIUM CHLORIDE 1000 MILLILITER(S): 9 INJECTION INTRAMUSCULAR; INTRAVENOUS; SUBCUTANEOUS at 05:45

## 2022-01-01 RX ADMIN — HEPARIN SODIUM 5000 UNIT(S): 5000 INJECTION INTRAVENOUS; SUBCUTANEOUS at 05:36

## 2022-01-01 RX ADMIN — MIRTAZAPINE 7.5 MILLIGRAM(S): 45 TABLET, ORALLY DISINTEGRATING ORAL at 20:59

## 2022-01-01 RX ADMIN — Medication 105 MILLIGRAM(S): at 13:26

## 2022-01-01 RX ADMIN — Medication 3 MILLILITER(S): at 22:05

## 2022-01-01 RX ADMIN — CHLORHEXIDINE GLUCONATE 1 APPLICATION(S): 213 SOLUTION TOPICAL at 17:47

## 2022-01-01 RX ADMIN — SODIUM CHLORIDE 1000 MILLILITER(S): 9 INJECTION, SOLUTION INTRAVENOUS at 23:11

## 2022-01-01 RX ADMIN — LEVALBUTEROL 0.63 MILLIGRAM(S): 1.25 SOLUTION, CONCENTRATE RESPIRATORY (INHALATION) at 08:03

## 2022-01-01 RX ADMIN — Medication 300 MICROGRAM(S): at 13:52

## 2022-01-01 RX ADMIN — Medication 3 MILLILITER(S): at 08:17

## 2022-01-01 RX ADMIN — Medication 105 MILLIGRAM(S): at 13:13

## 2022-01-01 RX ADMIN — Medication 1000 UNIT(S): at 17:22

## 2022-01-01 RX ADMIN — BUDESONIDE AND FORMOTEROL FUMARATE DIHYDRATE 2 PUFF(S): 160; 4.5 AEROSOL RESPIRATORY (INHALATION) at 11:28

## 2022-01-01 RX ADMIN — Medication 650 MILLIGRAM(S): at 17:17

## 2022-01-01 RX ADMIN — SODIUM CHLORIDE 250 MILLILITER(S): 9 INJECTION, SOLUTION INTRAVENOUS at 14:06

## 2022-01-01 RX ADMIN — LEVALBUTEROL 0.63 MILLIGRAM(S): 1.25 SOLUTION, CONCENTRATE RESPIRATORY (INHALATION) at 20:11

## 2022-01-01 RX ADMIN — Medication 50 MILLILITER(S): at 08:03

## 2022-01-01 RX ADMIN — SODIUM CHLORIDE 60 MILLILITER(S): 9 INJECTION, SOLUTION INTRAVENOUS at 22:45

## 2022-01-01 RX ADMIN — LEVALBUTEROL 0.63 MILLIGRAM(S): 1.25 SOLUTION, CONCENTRATE RESPIRATORY (INHALATION) at 21:20

## 2022-01-01 RX ADMIN — HYDROMORPHONE HYDROCHLORIDE 0.2 MILLIGRAM(S): 2 INJECTION INTRAMUSCULAR; INTRAVENOUS; SUBCUTANEOUS at 06:00

## 2022-01-01 RX ADMIN — HEPARIN SODIUM 5000 UNIT(S): 5000 INJECTION INTRAVENOUS; SUBCUTANEOUS at 17:07

## 2022-01-01 RX ADMIN — Medication 1000 UNIT(S): at 11:58

## 2022-01-01 RX ADMIN — Medication 3 MILLILITER(S): at 08:14

## 2022-01-01 RX ADMIN — SODIUM CHLORIDE 60 MILLILITER(S): 9 INJECTION, SOLUTION INTRAVENOUS at 08:05

## 2022-01-01 RX ADMIN — OLANZAPINE 1.25 MILLIGRAM(S): 15 TABLET, FILM COATED ORAL at 17:44

## 2022-01-01 RX ADMIN — HEPARIN SODIUM 5000 UNIT(S): 5000 INJECTION INTRAVENOUS; SUBCUTANEOUS at 06:59

## 2022-01-01 RX ADMIN — Medication 105 MILLIGRAM(S): at 06:00

## 2022-01-01 RX ADMIN — Medication 650 MILLIGRAM(S): at 16:17

## 2022-01-01 RX ADMIN — Medication 105 MILLIGRAM(S): at 22:42

## 2022-01-01 RX ADMIN — BUDESONIDE AND FORMOTEROL FUMARATE DIHYDRATE 2 PUFF(S): 160; 4.5 AEROSOL RESPIRATORY (INHALATION) at 00:37

## 2022-01-01 RX ADMIN — BUDESONIDE AND FORMOTEROL FUMARATE DIHYDRATE 2 PUFF(S): 160; 4.5 AEROSOL RESPIRATORY (INHALATION) at 12:11

## 2022-01-01 RX ADMIN — Medication 105 MILLIGRAM(S): at 21:49

## 2022-01-01 RX ADMIN — LEVALBUTEROL 0.63 MILLIGRAM(S): 1.25 SOLUTION, CONCENTRATE RESPIRATORY (INHALATION) at 23:18

## 2022-01-01 RX ADMIN — BUDESONIDE AND FORMOTEROL FUMARATE DIHYDRATE 2 PUFF(S): 160; 4.5 AEROSOL RESPIRATORY (INHALATION) at 23:15

## 2022-01-01 RX ADMIN — Medication 1 TABLET(S): at 21:40

## 2022-01-01 RX ADMIN — MIRTAZAPINE 7.5 MILLIGRAM(S): 45 TABLET, ORALLY DISINTEGRATING ORAL at 00:37

## 2022-01-01 RX ADMIN — Medication 1 TABLET(S): at 12:12

## 2022-01-01 RX ADMIN — HYDROMORPHONE HYDROCHLORIDE 0.2 MILLIGRAM(S): 2 INJECTION INTRAMUSCULAR; INTRAVENOUS; SUBCUTANEOUS at 05:47

## 2022-01-01 RX ADMIN — Medication 50 MILLILITER(S): at 16:42

## 2022-01-01 RX ADMIN — Medication 3 MILLILITER(S): at 03:49

## 2022-01-01 RX ADMIN — MIRTAZAPINE 7.5 MILLIGRAM(S): 45 TABLET, ORALLY DISINTEGRATING ORAL at 21:05

## 2022-01-01 RX ADMIN — CHLORHEXIDINE GLUCONATE 1 APPLICATION(S): 213 SOLUTION TOPICAL at 12:18

## 2022-01-01 RX ADMIN — BUDESONIDE AND FORMOTEROL FUMARATE DIHYDRATE 2 PUFF(S): 160; 4.5 AEROSOL RESPIRATORY (INHALATION) at 21:04

## 2022-01-01 RX ADMIN — HEPARIN SODIUM 5000 UNIT(S): 5000 INJECTION INTRAVENOUS; SUBCUTANEOUS at 16:49

## 2022-01-01 RX ADMIN — Medication 105 MILLIGRAM(S): at 21:32

## 2022-01-01 RX ADMIN — Medication 1 TABLET(S): at 13:21

## 2022-01-01 RX ADMIN — MIDODRINE HYDROCHLORIDE 10 MILLIGRAM(S): 2.5 TABLET ORAL at 06:34

## 2022-01-01 RX ADMIN — SODIUM CHLORIDE 1000 MILLILITER(S): 9 INJECTION INTRAMUSCULAR; INTRAVENOUS; SUBCUTANEOUS at 22:04

## 2022-01-01 RX ADMIN — HEPARIN SODIUM 5000 UNIT(S): 5000 INJECTION INTRAVENOUS; SUBCUTANEOUS at 06:57

## 2022-01-01 RX ADMIN — Medication 1 PACKET(S): at 13:22

## 2022-01-01 RX ADMIN — HEPARIN SODIUM 5000 UNIT(S): 5000 INJECTION INTRAVENOUS; SUBCUTANEOUS at 17:36

## 2022-01-01 RX ADMIN — OLANZAPINE 2.5 MILLIGRAM(S): 15 TABLET, FILM COATED ORAL at 04:50

## 2022-01-01 RX ADMIN — BUDESONIDE AND FORMOTEROL FUMARATE DIHYDRATE 2 PUFF(S): 160; 4.5 AEROSOL RESPIRATORY (INHALATION) at 23:00

## 2022-01-01 RX ADMIN — HEPARIN SODIUM 5000 UNIT(S): 5000 INJECTION INTRAVENOUS; SUBCUTANEOUS at 17:56

## 2022-01-01 RX ADMIN — CEFTRIAXONE 100 MILLIGRAM(S): 500 INJECTION, POWDER, FOR SOLUTION INTRAMUSCULAR; INTRAVENOUS at 12:49

## 2022-01-01 RX ADMIN — MIDODRINE HYDROCHLORIDE 10 MILLIGRAM(S): 2.5 TABLET ORAL at 22:31

## 2022-01-01 RX ADMIN — OLANZAPINE 2.5 MILLIGRAM(S): 15 TABLET, FILM COATED ORAL at 21:46

## 2022-01-01 RX ADMIN — Medication 105 MILLIGRAM(S): at 05:55

## 2022-01-01 RX ADMIN — Medication 125 MILLILITER(S): at 06:15

## 2022-01-01 RX ADMIN — OLANZAPINE 2.5 MILLIGRAM(S): 15 TABLET, FILM COATED ORAL at 21:43

## 2022-01-01 RX ADMIN — Medication 0.5 MILLIGRAM(S): at 18:42

## 2022-01-01 RX ADMIN — BUDESONIDE AND FORMOTEROL FUMARATE DIHYDRATE 2 PUFF(S): 160; 4.5 AEROSOL RESPIRATORY (INHALATION) at 09:02

## 2022-01-01 RX ADMIN — BUDESONIDE AND FORMOTEROL FUMARATE DIHYDRATE 2 PUFF(S): 160; 4.5 AEROSOL RESPIRATORY (INHALATION) at 09:14

## 2022-01-01 RX ADMIN — Medication 1 TABLET(S): at 17:22

## 2022-01-01 RX ADMIN — BENDAMUSTINE HYDROCHLORIDE 113 MILLIGRAM(S): 100 INJECTION, POWDER, LYOPHILIZED, FOR SOLUTION INTRAVENOUS at 18:05

## 2022-01-01 RX ADMIN — AZITHROMYCIN 255 MILLIGRAM(S): 500 TABLET, FILM COATED ORAL at 01:48

## 2022-01-01 RX ADMIN — OLANZAPINE 2.5 MILLIGRAM(S): 15 TABLET, FILM COATED ORAL at 21:12

## 2022-01-01 RX ADMIN — Medication 3 MILLILITER(S): at 21:01

## 2022-01-01 RX ADMIN — LEVALBUTEROL 0.63 MILLIGRAM(S): 1.25 SOLUTION, CONCENTRATE RESPIRATORY (INHALATION) at 11:10

## 2022-01-01 RX ADMIN — Medication 1000 UNIT(S): at 12:20

## 2022-01-01 RX ADMIN — HYDROMORPHONE HYDROCHLORIDE 0.2 MILLIGRAM(S): 2 INJECTION INTRAMUSCULAR; INTRAVENOUS; SUBCUTANEOUS at 14:51

## 2022-01-01 RX ADMIN — Medication 105 MILLIGRAM(S): at 15:17

## 2022-01-01 RX ADMIN — HEPARIN SODIUM 5000 UNIT(S): 5000 INJECTION INTRAVENOUS; SUBCUTANEOUS at 17:22

## 2022-01-01 RX ADMIN — HEPARIN SODIUM 5000 UNIT(S): 5000 INJECTION INTRAVENOUS; SUBCUTANEOUS at 18:10

## 2022-01-01 RX ADMIN — LEVALBUTEROL 0.63 MILLIGRAM(S): 1.25 SOLUTION, CONCENTRATE RESPIRATORY (INHALATION) at 09:31

## 2022-01-01 RX ADMIN — LEVALBUTEROL 0.63 MILLIGRAM(S): 1.25 SOLUTION, CONCENTRATE RESPIRATORY (INHALATION) at 10:59

## 2022-01-01 RX ADMIN — CHLORHEXIDINE GLUCONATE 1 APPLICATION(S): 213 SOLUTION TOPICAL at 12:12

## 2022-01-01 RX ADMIN — HEPARIN SODIUM 5000 UNIT(S): 5000 INJECTION INTRAVENOUS; SUBCUTANEOUS at 05:55

## 2022-01-01 RX ADMIN — HEPARIN SODIUM 5000 UNIT(S): 5000 INJECTION INTRAVENOUS; SUBCUTANEOUS at 17:14

## 2022-01-01 RX ADMIN — Medication 400 MILLIGRAM(S): at 17:13

## 2022-01-01 RX ADMIN — HEPARIN SODIUM 5000 UNIT(S): 5000 INJECTION INTRAVENOUS; SUBCUTANEOUS at 06:24

## 2022-01-01 RX ADMIN — Medication 3 MILLILITER(S): at 23:25

## 2022-01-01 RX ADMIN — PIPERACILLIN AND TAZOBACTAM 25 GRAM(S): 4; .5 INJECTION, POWDER, LYOPHILIZED, FOR SOLUTION INTRAVENOUS at 22:46

## 2022-01-01 RX ADMIN — BUDESONIDE AND FORMOTEROL FUMARATE DIHYDRATE 2 PUFF(S): 160; 4.5 AEROSOL RESPIRATORY (INHALATION) at 09:41

## 2022-01-01 RX ADMIN — SODIUM CHLORIDE 80 MILLILITER(S): 9 INJECTION, SOLUTION INTRAVENOUS at 16:42

## 2022-01-01 RX ADMIN — MIDODRINE HYDROCHLORIDE 5 MILLIGRAM(S): 2.5 TABLET ORAL at 11:21

## 2022-01-01 RX ADMIN — CHLORHEXIDINE GLUCONATE 1 APPLICATION(S): 213 SOLUTION TOPICAL at 12:53

## 2022-01-01 RX ADMIN — FAMOTIDINE 40 MILLIGRAM(S): 10 INJECTION INTRAVENOUS at 17:48

## 2022-01-01 RX ADMIN — BUDESONIDE AND FORMOTEROL FUMARATE DIHYDRATE 2 PUFF(S): 160; 4.5 AEROSOL RESPIRATORY (INHALATION) at 10:34

## 2022-01-01 RX ADMIN — Medication 1 TABLET(S): at 12:40

## 2022-01-01 RX ADMIN — Medication 105 MILLIGRAM(S): at 13:21

## 2022-01-01 RX ADMIN — OLANZAPINE 2.5 MILLIGRAM(S): 15 TABLET, FILM COATED ORAL at 21:38

## 2022-01-01 RX ADMIN — Medication 400 MILLIGRAM(S): at 05:42

## 2022-01-01 RX ADMIN — HEPARIN SODIUM 5000 UNIT(S): 5000 INJECTION INTRAVENOUS; SUBCUTANEOUS at 05:17

## 2022-01-01 RX ADMIN — SODIUM CHLORIDE 75 MILLILITER(S): 9 INJECTION, SOLUTION INTRAVENOUS at 10:55

## 2022-01-01 RX ADMIN — Medication 1 PACKET(S): at 06:05

## 2022-01-01 RX ADMIN — Medication 3 MILLILITER(S): at 08:05

## 2022-01-01 RX ADMIN — SODIUM CHLORIDE 60 MILLILITER(S): 9 INJECTION, SOLUTION INTRAVENOUS at 03:49

## 2022-01-01 RX ADMIN — Medication 300 MICROGRAM(S): at 23:30

## 2022-01-01 RX ADMIN — MIRTAZAPINE 7.5 MILLIGRAM(S): 45 TABLET, ORALLY DISINTEGRATING ORAL at 19:58

## 2022-01-01 RX ADMIN — CALCITONIN SALMON 240 INTERNATIONAL UNIT(S): 200 INJECTION, SOLUTION INTRAMUSCULAR at 17:33

## 2022-01-01 RX ADMIN — BUDESONIDE AND FORMOTEROL FUMARATE DIHYDRATE 2 PUFF(S): 160; 4.5 AEROSOL RESPIRATORY (INHALATION) at 09:21

## 2022-01-01 RX ADMIN — SODIUM CHLORIDE 75 MILLILITER(S): 9 INJECTION INTRAMUSCULAR; INTRAVENOUS; SUBCUTANEOUS at 13:55

## 2022-01-01 RX ADMIN — HEPARIN SODIUM 5000 UNIT(S): 5000 INJECTION INTRAVENOUS; SUBCUTANEOUS at 16:32

## 2022-01-01 RX ADMIN — Medication 1 TABLET(S): at 12:07

## 2022-01-01 RX ADMIN — MIRTAZAPINE 7.5 MILLIGRAM(S): 45 TABLET, ORALLY DISINTEGRATING ORAL at 20:40

## 2022-01-01 RX ADMIN — Medication 400 MILLIGRAM(S): at 18:00

## 2022-01-01 RX ADMIN — Medication 3 MILLILITER(S): at 22:32

## 2022-01-01 RX ADMIN — MIRTAZAPINE 7.5 MILLIGRAM(S): 45 TABLET, ORALLY DISINTEGRATING ORAL at 23:03

## 2022-01-01 RX ADMIN — LEVALBUTEROL 0.63 MILLIGRAM(S): 1.25 SOLUTION, CONCENTRATE RESPIRATORY (INHALATION) at 08:59

## 2022-01-01 RX ADMIN — ONDANSETRON 8 MILLIGRAM(S): 8 TABLET, FILM COATED ORAL at 17:47

## 2022-01-01 RX ADMIN — HALOPERIDOL DECANOATE 1 MILLIGRAM(S): 100 INJECTION INTRAMUSCULAR at 16:26

## 2022-01-01 RX ADMIN — CHLORHEXIDINE GLUCONATE 1 APPLICATION(S): 213 SOLUTION TOPICAL at 12:16

## 2022-01-01 RX ADMIN — BUDESONIDE AND FORMOTEROL FUMARATE DIHYDRATE 2 PUFF(S): 160; 4.5 AEROSOL RESPIRATORY (INHALATION) at 09:12

## 2022-01-01 RX ADMIN — Medication 400 MILLIGRAM(S): at 07:05

## 2022-01-01 RX ADMIN — MIDODRINE HYDROCHLORIDE 10 MILLIGRAM(S): 2.5 TABLET ORAL at 13:33

## 2022-01-01 RX ADMIN — Medication 3 MILLILITER(S): at 09:49

## 2022-01-01 RX ADMIN — HYDROMORPHONE HYDROCHLORIDE 0.2 MILLIGRAM(S): 2 INJECTION INTRAMUSCULAR; INTRAVENOUS; SUBCUTANEOUS at 12:23

## 2022-01-01 RX ADMIN — CEFTRIAXONE 100 MILLIGRAM(S): 500 INJECTION, POWDER, FOR SOLUTION INTRAMUSCULAR; INTRAVENOUS at 11:43

## 2022-01-01 RX ADMIN — SODIUM CHLORIDE 75 MILLILITER(S): 9 INJECTION, SOLUTION INTRAVENOUS at 17:16

## 2022-01-01 RX ADMIN — MIDODRINE HYDROCHLORIDE 10 MILLIGRAM(S): 2.5 TABLET ORAL at 21:00

## 2022-01-01 RX ADMIN — MIDODRINE HYDROCHLORIDE 10 MILLIGRAM(S): 2.5 TABLET ORAL at 14:35

## 2022-01-01 RX ADMIN — SODIUM CHLORIDE 500 MILLILITER(S): 9 INJECTION INTRAMUSCULAR; INTRAVENOUS; SUBCUTANEOUS at 23:15

## 2022-01-01 RX ADMIN — Medication 3 MILLILITER(S): at 07:06

## 2022-01-01 RX ADMIN — HEPARIN SODIUM 5000 UNIT(S): 5000 INJECTION INTRAVENOUS; SUBCUTANEOUS at 17:21

## 2022-01-01 RX ADMIN — CHLORHEXIDINE GLUCONATE 1 APPLICATION(S): 213 SOLUTION TOPICAL at 12:24

## 2022-01-01 RX ADMIN — Medication 1000 UNIT(S): at 12:08

## 2022-01-01 RX ADMIN — MIDODRINE HYDROCHLORIDE 10 MILLIGRAM(S): 2.5 TABLET ORAL at 17:07

## 2022-01-01 RX ADMIN — Medication 1000 UNIT(S): at 13:21

## 2022-01-01 RX ADMIN — Medication 3 MILLIGRAM(S): at 21:46

## 2022-01-01 RX ADMIN — OLANZAPINE 2.5 MILLIGRAM(S): 15 TABLET, FILM COATED ORAL at 20:51

## 2022-01-01 RX ADMIN — Medication 1000 UNIT(S): at 12:24

## 2022-01-01 RX ADMIN — HEPARIN SODIUM 5000 UNIT(S): 5000 INJECTION INTRAVENOUS; SUBCUTANEOUS at 17:01

## 2022-01-01 RX ADMIN — Medication 3 MILLILITER(S): at 20:10

## 2022-01-01 RX ADMIN — Medication 2.5 MILLIGRAM(S): at 05:12

## 2022-01-01 RX ADMIN — Medication 2.5 MILLIGRAM(S): at 16:32

## 2022-01-01 RX ADMIN — PAMIDRONATE DISODIUM 64.17 MILLIGRAM(S): 9 INJECTION, SOLUTION INTRAVENOUS at 17:36

## 2022-01-01 RX ADMIN — HYDROMORPHONE HYDROCHLORIDE 0.2 MILLIGRAM(S): 2 INJECTION INTRAMUSCULAR; INTRAVENOUS; SUBCUTANEOUS at 19:43

## 2022-01-01 RX ADMIN — TETANUS TOXOID, REDUCED DIPHTHERIA TOXOID AND ACELLULAR PERTUSSIS VACCINE, ADSORBED 0.5 MILLILITER(S): 5; 2.5; 8; 8; 2.5 SUSPENSION INTRAMUSCULAR at 23:10

## 2022-01-01 RX ADMIN — MAGNESIUM OXIDE 400 MG ORAL TABLET 400 MILLIGRAM(S): 241.3 TABLET ORAL at 12:08

## 2022-01-01 RX ADMIN — Medication 3 MILLILITER(S): at 09:06

## 2022-01-01 RX ADMIN — HEPARIN SODIUM 5000 UNIT(S): 5000 INJECTION INTRAVENOUS; SUBCUTANEOUS at 18:31

## 2022-01-01 RX ADMIN — HEPARIN SODIUM 5000 UNIT(S): 5000 INJECTION INTRAVENOUS; SUBCUTANEOUS at 18:18

## 2022-01-01 RX ADMIN — Medication 400 MILLIGRAM(S): at 08:54

## 2022-01-01 RX ADMIN — MIRTAZAPINE 7.5 MILLIGRAM(S): 45 TABLET, ORALLY DISINTEGRATING ORAL at 21:12

## 2022-01-01 RX ADMIN — HYDROMORPHONE HYDROCHLORIDE 0.2 MILLIGRAM(S): 2 INJECTION INTRAMUSCULAR; INTRAVENOUS; SUBCUTANEOUS at 23:00

## 2022-01-01 RX ADMIN — Medication 1000 UNIT(S): at 12:12

## 2022-01-01 RX ADMIN — LEVALBUTEROL 0.63 MILLIGRAM(S): 1.25 SOLUTION, CONCENTRATE RESPIRATORY (INHALATION) at 22:34

## 2022-01-01 RX ADMIN — MIDODRINE HYDROCHLORIDE 10 MILLIGRAM(S): 2.5 TABLET ORAL at 13:16

## 2022-01-01 RX ADMIN — MIDODRINE HYDROCHLORIDE 10 MILLIGRAM(S): 2.5 TABLET ORAL at 05:40

## 2022-01-01 RX ADMIN — Medication 105 MILLIGRAM(S): at 15:24

## 2022-01-01 RX ADMIN — Medication 105 MILLIGRAM(S): at 22:17

## 2022-01-01 RX ADMIN — Medication 400 MILLIGRAM(S): at 17:10

## 2022-01-01 RX ADMIN — CALCITONIN SALMON 240 INTERNATIONAL UNIT(S): 200 INJECTION, SOLUTION INTRAMUSCULAR at 22:17

## 2022-01-01 RX ADMIN — HEPARIN SODIUM 5000 UNIT(S): 5000 INJECTION INTRAVENOUS; SUBCUTANEOUS at 04:54

## 2022-01-01 RX ADMIN — CHLORHEXIDINE GLUCONATE 1 APPLICATION(S): 213 SOLUTION TOPICAL at 13:26

## 2022-01-01 RX ADMIN — HEPARIN SODIUM 5000 UNIT(S): 5000 INJECTION INTRAVENOUS; SUBCUTANEOUS at 07:05

## 2022-01-01 RX ADMIN — OLANZAPINE 1.25 MILLIGRAM(S): 15 TABLET, FILM COATED ORAL at 13:40

## 2022-01-01 RX ADMIN — HEPARIN SODIUM 5000 UNIT(S): 5000 INJECTION INTRAVENOUS; SUBCUTANEOUS at 17:17

## 2022-01-01 RX ADMIN — CHLORHEXIDINE GLUCONATE 1 APPLICATION(S): 213 SOLUTION TOPICAL at 11:28

## 2022-01-01 RX ADMIN — HYDROMORPHONE HYDROCHLORIDE 0.2 MILLIGRAM(S): 2 INJECTION INTRAMUSCULAR; INTRAVENOUS; SUBCUTANEOUS at 20:36

## 2022-01-01 RX ADMIN — HEPARIN SODIUM 5000 UNIT(S): 5000 INJECTION INTRAVENOUS; SUBCUTANEOUS at 17:10

## 2022-01-01 RX ADMIN — MIRTAZAPINE 7.5 MILLIGRAM(S): 45 TABLET, ORALLY DISINTEGRATING ORAL at 20:14

## 2022-01-01 RX ADMIN — CHLORHEXIDINE GLUCONATE 1 APPLICATION(S): 213 SOLUTION TOPICAL at 12:27

## 2022-01-01 RX ADMIN — Medication 400 MILLIGRAM(S): at 17:07

## 2022-01-01 RX ADMIN — PIPERACILLIN AND TAZOBACTAM 25 GRAM(S): 4; .5 INJECTION, POWDER, LYOPHILIZED, FOR SOLUTION INTRAVENOUS at 14:35

## 2022-01-01 RX ADMIN — BUDESONIDE AND FORMOTEROL FUMARATE DIHYDRATE 2 PUFF(S): 160; 4.5 AEROSOL RESPIRATORY (INHALATION) at 09:46

## 2022-01-01 RX ADMIN — Medication 3 MILLILITER(S): at 09:23

## 2022-01-01 RX ADMIN — BUDESONIDE AND FORMOTEROL FUMARATE DIHYDRATE 2 PUFF(S): 160; 4.5 AEROSOL RESPIRATORY (INHALATION) at 21:49

## 2022-01-01 RX ADMIN — SODIUM CHLORIDE 80 MILLILITER(S): 9 INJECTION, SOLUTION INTRAVENOUS at 20:00

## 2022-01-01 RX ADMIN — BUDESONIDE AND FORMOTEROL FUMARATE DIHYDRATE 2 PUFF(S): 160; 4.5 AEROSOL RESPIRATORY (INHALATION) at 09:38

## 2022-01-01 RX ADMIN — Medication 400 MILLIGRAM(S): at 07:01

## 2022-01-01 RX ADMIN — POTASSIUM PHOSPHATE, MONOBASIC POTASSIUM PHOSPHATE, DIBASIC 62.5 MILLIMOLE(S): 236; 224 INJECTION, SOLUTION INTRAVENOUS at 12:36

## 2022-01-01 RX ADMIN — HEPARIN SODIUM 5000 UNIT(S): 5000 INJECTION INTRAVENOUS; SUBCUTANEOUS at 17:44

## 2022-01-01 RX ADMIN — BUDESONIDE AND FORMOTEROL FUMARATE DIHYDRATE 2 PUFF(S): 160; 4.5 AEROSOL RESPIRATORY (INHALATION) at 22:37

## 2022-01-01 RX ADMIN — CALCITONIN SALMON 240 INTERNATIONAL UNIT(S): 200 INJECTION, SOLUTION INTRAMUSCULAR at 05:20

## 2022-01-01 RX ADMIN — CHLORHEXIDINE GLUCONATE 1 APPLICATION(S): 213 SOLUTION TOPICAL at 12:39

## 2022-01-01 RX ADMIN — Medication 105 MILLIGRAM(S): at 05:12

## 2022-01-01 RX ADMIN — Medication 8 MILLIGRAM(S): at 11:37

## 2022-01-01 RX ADMIN — SODIUM CHLORIDE 80 MILLILITER(S): 9 INJECTION, SOLUTION INTRAVENOUS at 21:58

## 2022-01-01 RX ADMIN — HYDROMORPHONE HYDROCHLORIDE 0.2 MILLIGRAM(S): 2 INJECTION INTRAMUSCULAR; INTRAVENOUS; SUBCUTANEOUS at 16:31

## 2022-01-01 RX ADMIN — HEPARIN SODIUM 5000 UNIT(S): 5000 INJECTION INTRAVENOUS; SUBCUTANEOUS at 05:15

## 2022-01-01 RX ADMIN — SODIUM CHLORIDE 75 MILLILITER(S): 9 INJECTION, SOLUTION INTRAVENOUS at 11:58

## 2022-01-01 RX ADMIN — CHLORHEXIDINE GLUCONATE 1 APPLICATION(S): 213 SOLUTION TOPICAL at 12:23

## 2022-01-01 RX ADMIN — Medication 50 MILLIGRAM(S): at 17:51

## 2022-01-01 RX ADMIN — Medication 12.5 GRAM(S): at 02:23

## 2022-08-29 NOTE — ED PROVIDER NOTE - NS ED ROS FT
CONST: no fevers, no chills  ENT: no sore throat  CV: no chest pain, no leg swelling  RESP: no shortness of breath, no cough  ABD: no abdominal pain, no nausea, no vomiting, no diarrhea  : no dysuria, no flank pain, no hematuria  MSK: no back pain, +extremity pain  SKIN:  no rash

## 2022-08-29 NOTE — ED ADULT NURSE NOTE - NSIMPLEMENTINTERV_GEN_ALL_ED
Implemented All Fall Risk Interventions:  [place] to call system. Call bell, personal items and telephone within reach. Instruct patient to call for assistance. Room bathroom lighting operational. Non-slip footwear when patient is off stretcher. Physically safe environment: no spills, clutter or unnecessary equipment. Stretcher in lowest position, wheels locked, appropriate side rails in place. Provide visual cue, wrist band, yellow gown, etc. Monitor gait and stability. Monitor for mental status changes and reorient to person, place, and time. Review medications for side effects contributing to fall risk. Reinforce activity limits and safety measures with patient and family.

## 2022-08-29 NOTE — ED PROVIDER NOTE - CLINICAL SUMMARY MEDICAL DECISION MAKING FREE TEXT BOX
77F PMH lymphoma (dx 2005, s/p CARRIE lobectomy, relapsed in 2019 but not currently on chemo/RT, being [place] every 3[place] [facility] [place] bipolar disorder, asthma (on O2 PRN at home, unknown how many L) p/w hallucinations and fatigue. Hypoxic on RA, rest of VSS in ED. Rhonchi b/l, abd NT, abrasions noticed on LLE  Will eval for infectious vs. cardiac vs. metabolic etiology of symptoms. Will also eval for traumatic sequelae with CT's and xrays  Plan: cardiac labs, CT head/c-spine, cxr, xr LLE, reassess symptoms

## 2022-08-29 NOTE — ED PROVIDER NOTE - PROGRESS NOTE DETAILS
Clifton BALLARD (PGY-3)  cxr showing opacities and possible R humerus head fx--pt's daughter states pt had fx there 5 yrs ago. Pt has FROM of R shoulder and has no TTP at that joint. ordered for CT chest to eval opacities Clifton BALLARD (PGY-3)  CT showing slight progression of her lymphoma. pt resting comfrotably on 3L NC. s/p dose of abx. spoke with hospitlaist who accepted admission; requesting tele bed at this time pending rpt bmp for K. if K is nml on rpt bmp can take off tele bed. hosptialist does not want continuous pulse ox bed at this time Clifton BALLARD (PGY-3)  bmp resulted w/ normal K. per hospitalist's recs, will change from tele to medicine bed

## 2022-08-29 NOTE — ED ADULT TRIAGE NOTE - CHIEF COMPLAINT QUOTE
pt brought in by daughter for auditory hallucinations X 3 days and fall yesterday. Denies LOC, head trauma, AC use. Spo2 84% on RA, placed on NC. Denies chest pain. Per daughter pt has been depressed lately. Pmhx: Bipolar, Lymphoma, asthma pt brought in by daughter for auditory hallucinations and AMS X 3 days, with fall yesterday(A&Ox4 currently). Denies LOC, head trauma, AC use. Spo2 84% on RA, placed on NC. Denies chest pain. Per daughter pt has been depressed lately. Pmhx: Bipolar, Lymphoma, asthma

## 2022-08-29 NOTE — ED ADULT NURSE NOTE - OBJECTIVE STATEMENT
pt received in room 23. pt is AxOx2 and ambulatory at baseline. pt comes in with daughter, daughter states pt had an unwitnessed fall yesterday, pt denies LOC and no signs of head trauma. pt has PMH of bipolar and depression and daughter states pt stopped taking medications and has been hallucinating at home and has been getting worse. pt has abrasions to LLE, bilateral 1+ pitting edema, blood in left nostril. pt lives with  and son. pt denies chest pain, sob, n/v/d. pt is on 3L NC and sating 100%, RR are even and unlabored. pt is NSR on cardiac monitor. 20g in the right AC, labs drawn and sent. medicated as ordered. daughter at bedside. Will continue to monitor.

## 2022-08-29 NOTE — ED PROVIDER NOTE - OBJECTIVE STATEMENT
77F PMH lymphoma (dx 2005, s/p CARRIE lobectomy, relapsed in 2019 but not currently on chemo/RT, being followed by heme every 3-6months at [facility] [place]), bipolar disorder, asthma (on O2 PRN at home, unknown how many L) brought to ED by daughter who states pt has been hallucinating, being more depressed. Pt lives with  and son; they are hoarders and there is concern for their well being at home per daughter. Pt was noted ot have O2  sat 80's in triage, placed on 4L NC, currently on 3L. Has abrasions to LLE distally and pt states she may have fallen at home; she does not remember. No f/c, cp, SOB, abd pain. Does have distal LLE pain. Stopped taking her psych meds (lithium and divalproex) few months ago; daughter states pt has been depressed and getting worse for few months now

## 2022-08-29 NOTE — ED PROVIDER NOTE - PHYSICAL EXAMINATION
Physical Exam:  Gen: A&Ox2, awake alert   HEENT: normal conjunctiva, oral mucosa moist  Lung: rhonchi b/l, on 3L NC, no resp distress, speaking in full sentences  CV: RRR  Abd: soft, NT, ND, no guarding, no rigidity, no rebound tenderness  MSK: no visible deformities  Skin: Warm, well perfused, abrasions to distal LLE, no leg swelling  Psych: normal affect, calm  ~Jasper Lazo MD (PGY-3)

## 2022-08-29 NOTE — ED PROVIDER NOTE - ATTENDING CONTRIBUTION TO CARE
I have personally performed a face to face medical and diagnostic evaluation of the patient. I have discussed with and reviewed the Resident's note and agree with the History, ROS, Physical Exam and MDM unless otherwise indicated. A brief summary of my personal evaluation and impression can be found below.    77y F w/ PMHx lymphoma (dx 2005, s/p CARRIE lobectomy, relapsed in 2019 but not currently on chemo/RT, [place] [facility] [place] bipolar disorder, asthma (on O2 PRN at home, unknown how many L) brought to ED by daughter who states pt has been hallucinating, being more depressed. Pt lives with  and son; they are hoarders and there is concern for their well being at home per daughter. Pt was noted ot have O2  sat 80's in triage, placed on 4L NC, currently on 3L. Has abrasions to LLE distally and pt states she may have fallen at home; she does not remember. No f/c, cp, SOB, abd pain. Does have distal LLE pain. Stopped taking her psych meds (lithium and divalproex) few months ago; daughter states pt has been depressed and getting worse for few months now.     On exam: decreased lung sounds to L mid and lower field, no respiratory distress, head NCAT, no facial bony tenderness, EOMI, no chest wall tenderness, no crepitus, abd soft, NT, no midline spinal tenderness, +abrasion and ecchymosis to LLE, FROM of all extremities, A&Ox4 on exam, neuro intact. Unknown last tetanus.    Xrs, CTs, labs, tetanus, placed on NC, TBA for FTT pending CTs/Xrs for findings of fxs, will require geriatric psych on floor after admission.

## 2022-08-29 NOTE — ED ADULT NURSE NOTE - CHIEF COMPLAINT QUOTE
pt brought in by daughter for auditory hallucinations and AMS X 3 days, with fall yesterday(A&Ox4 currently). Denies LOC, head trauma, AC use. Spo2 84% on RA, placed on NC. Denies chest pain. Per daughter pt has been depressed lately. Pmhx: Bipolar, Lymphoma, asthma
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
## 2022-08-30 NOTE — BH CONSULTATION LIAISON ASSESSMENT NOTE - HPI (INCLUDE ILLNESS QUALITY, SEVERITY, DURATION, TIMING, CONTEXT, MODIFYING FACTORS, ASSOCIATED SIGNS AND SYMPTOMS)
77 year old female with PPH of bipolar d/o, PMH of lymphoma dx 2005, s/p CARRIE lobectomy, relapsed in 2019 but not currently on chemo/RT, asthma admitted for FTT, depression, confusion. Psych c/s for the same.     Patient calm, difficult to understand due to incomplete phonation, AOx2-3 (gets date slightly wrong), poor understanding of why she is here though admits to depression and anhedonia. Denies suicidality/intent or plan and denies homicidality/intent or plan. Poor attention. Denies AH/VH, delusions or paranoia. Denies decreased need for sleep, increased energy, racing thoughts, irritability euphoria or grandiosity currently.     Spoke to son - concern for worsenign depression since Feb 2022, has been n/c with meds (zyprexa, depakote, lithium), had psych admission 40y ago but not recently, has had 30lb weight loss in past 6mo. Denies acute safety concerns for SI/HI but feels patient has not been able to care for herself.     Awaiting callback from daughter.

## 2022-08-30 NOTE — BH CONSULTATION LIAISON ASSESSMENT NOTE - MSE UNSTRUCTURED FT
Mental Status Exam:  German: well groomed, fair hygiene     Behavior: calm, cooperative, no psychomotor retardation/agitation  Motor: no tremors, EPS, or rigidity  Gait: did not assess, pt in bed  Speech: mumbling, incomplete phonation at times,   Mood: depressed  Affect: depressed, congruent  Thought process: perseverative  Thought Content: denies paranoia, delusions   Perception: denies AH/VH  SI: denies  HI: denies  Insight: poor  Judgment: poor    Cognitive Exam:  Orientation: AOx3  Recall: poor  Attention: poor  Abstraction: poor

## 2022-08-30 NOTE — H&P ADULT - COMMENTS
Patient is confused and only answers appropriately at times. She denies pain, shortness of breath or chest pain.  She is not a reliable historian and repeatedly asking to leave stating that she "just came here to visit" and promises she will "come back tomorrow"

## 2022-08-30 NOTE — CONSULT NOTE ADULT - SUBJECTIVE AND OBJECTIVE BOX
DO NOT FOLLOW FINALIZED RECOMMENDATIONS UNTIL ATTENDING ATTESTATION/SIGNATURE    NOTE IN PROGRESS/INCOMPLETE****      HPI:  77 year old female with PMH of lymphoma (dx 2005, s/p CARRIE lobectomy, relapsed in 2019 but not currently on chemo/RT, being followed by heme every 3-6months at [facility] [place] bipolar disorder, asthma (on O2 PRN at home, unknown how many L) brought to ED by daughter who states pt has been hallucinating, being more depressed.  Patient unable to provide much history, daughter Alissa states that patient was able to care for herself, manage home finances and attend doctor appointments independently until recently, she states that she believes her mom stopped taking her psychiatric medications in February of 2022 and has not followed up with any of her physicians. She has noticed recent weight loss (unsure how much) and change in voice.  Patient denies chest pain, shortness of breath or any discomfort, she is pleasantly confused. Daughter states that within the last year the patient has received treatment for her lymphoma however is unsure of the specifics, the patient was supposed to follow up in February however has not. (30 Aug 2022 10:47)    Corrected calcium level of 13.6. Pt received 1L LR bolus yesterday night in the ED. Pt currently on 75 cc/hr of NS. PTH, ionized calcium, Vitamin D ordered for the AM. Serum Pro BNP of 6447.       PAST MEDICAL & SURGICAL HISTORY:  GERD (gastroesophageal reflux disease)      Hyperlipidemia      Manic depression      Asthma      Lymphoma      History of lobectomy of lung  L  lung      Injury of left shoulder, initial encounter  Surgical repair with plates          FAMILY HISTORY:  Family history of gastric cancer        Social History:    Breo Ellipta 100 mcg-25 mcg/inh inhalation powder: 1 puff(s) inhaled once a day  Depakote 500 mg oral delayed release tablet: 1 tab(s) orally 2 times a day  lithium 300 mg oral capsule: 1 cap(s) orally 3 times a day  predniSONE 20 mg oral tablet: 1 tab(s) orally every 12 hours   Ventolin HFA 90 mcg/inh inhalation aerosol: 2 puff(s) inhaled 4 times a day, As Needed  ZyPREXA 2.5 mg oral tablet: 1 tab(s) orally once a day (at bedtime)      MEDICATIONS  (STANDING):  haloperidol    Injectable 1 milliGRAM(s) IV Push once  heparin   Injectable 5000 Unit(s) SubCutaneous every 12 hours  pamidronate IVPB 60 milliGRAM(s) IV Intermittent once  sodium chloride 0.9%. 1000 milliLiter(s) (75 mL/Hr) IV Continuous <Continuous>    MEDICATIONS  (PRN):  acetaminophen     Tablet .. 650 milliGRAM(s) Oral every 6 hours PRN Temp greater or equal to 38C (100.4F), Mild Pain (1 - 3)  aluminum hydroxide/magnesium hydroxide/simethicone Suspension 30 milliLiter(s) Oral every 4 hours PRN Dyspepsia  melatonin 3 milliGRAM(s) Oral at bedtime PRN Insomnia  ondansetron Injectable 4 milliGRAM(s) IV Push every 8 hours PRN Nausea and/or Vomiting      Allergies    latex (Rash)  No Known Drug Allergies    Intolerances      Review of Systems:  Constitutional: No fever  Eyes: No blurry vision  Neuro: No tremors  HEENT: No pain  Cardiovascular: No chest pain, palpitations  Respiratory: No SOB, no cough  GI: No nausea, vomiting, abdominal pain  : No dysuria  Skin: no rash  Psych: no depression  Endocrine: no polyuria, polydipsia  Hem/lymph: no swelling  Osteoporosis: no fractures    ALL OTHER SYSTEMS REVIEWED AND NEGATIVE    UNABLE TO OBTAIN    PHYSICAL EXAM:  VITALS: T(C): 36.2 (08-30-22 @ 12:27)  T(F): 97.2 (08-30-22 @ 12:27), Max: 98.7 (08-29-22 @ 23:28)  HR: 82 (08-30-22 @ 12:27) (73 - 97)  BP: 115/60 (08-30-22 @ 12:27) (93/60 - 125/80)  RR:  (19 - 22)  SpO2:  (94% - 100%)  Wt(kg): --  GENERAL: NAD, well-groomed, well-developed  EYES: No proptosis, no lid lag, anicteric  HEENT:  Atraumatic, Normocephalic, moist mucous membranes  THYROID: Normal size, no palpable nodules  RESPIRATORY: Clear to auscultation bilaterally; No rales, rhonchi, wheezing  CARDIOVASCULAR: Regular rate and rhythm; No murmurs; no peripheral edema  GI: Soft, nontender, non distended, normal bowel sounds  SKIN: Dry, intact, No rashes or lesions  MUSCULOSKELETAL: Full range of motion, normal strength  NEURO: sensation intact, extraocular movements intact, no tremor  PSYCH: Alert and oriented x 3, normal affect, normal mood  CUSHING'S SIGNS: no striae      CAPILLARY BLOOD GLUCOSE                                8.8    5.73  )-----------( 153      ( 29 Aug 2022 23:08 )             30.1       08-30    138  |  102  |  38<H>  ----------------------------<  107<H>  5.2   |  27  |  1.38<H>    eGFR: 40<L>    Ca    11.1<H>      08-30  Mg     2.70     08-29  Phos  3.9     08-29    TPro  13.5<H>  /  Alb  0.9<L>  /  TBili  0.57  /  DBili  x   /  AST  36<H>  /  ALT  24  /  AlkPhos  45  08-29      Thyroid Function Tests:  08-29 @ 22:52 TSH 3.06 FreeT4 -- T3 -- Anti TPO -- Anti Thyroglobulin Ab -- TSI --              Radiology:              DO NOT FOLLOW FINALIZED RECOMMENDATIONS UNTIL ATTENDING ATTESTATION/SIGNATURE    NOTE IN PROGRESS/INCOMPLETE****      HPI:  77 year old female with PMH of lymphoma (dx 2005, s/p CARRIE lobectomy, relapsed in 2019 but not currently on chemo/RT, being [place] every 3[place] [facility] [place] bipolar disorder, asthma (on O2 PRN at home, unknown how many L) brought to ED by daughter who states pt has been hallucinating, being more depressed.  Patient unable to provide much history, daughter Alissa states that patient was able to care for herself, manage home finances and attend doctor appointments independently until recently, she states that she believes her mom stopped taking her psychiatric medications in February of 2022 and has not followed up with any of her physicians. She has noticed recent weight loss (unsure how much) and change in voice.  Patient denies chest pain, shortness of breath or any discomfort, she is pleasantly confused. Daughter states that within the last year the patient has received treatment for her lymphoma however is unsure of the specifics, the patient was supposed to follow up in February however has not. (30 Aug 2022 10:47)    Corrected calcium level of 13.6. Pt received 1L LR bolus yesterday night in the ED. Pt currently on 75 cc/hr of NS. PTH, ionized calcium, Vitamin D ordered for the AM. Serum Pro BNP of 6447.       PAST MEDICAL & SURGICAL HISTORY:  GERD (gastroesophageal reflux disease)      Hyperlipidemia      Manic depression      Asthma      Lymphoma      History of lobectomy of lung  L  lung      Injury of left shoulder, initial encounter  Surgical repair with plates          FAMILY HISTORY:  Family history of gastric cancer        Social History:    Breo Ellipta 100 mcg-25 mcg/inh inhalation powder: 1 puff(s) inhaled once a day  Depakote 500 mg oral delayed release tablet: 1 tab(s) orally 2 times a day  lithium 300 mg oral capsule: 1 cap(s) orally 3 times a day  predniSONE 20 mg oral tablet: 1 tab(s) orally every 12 hours   Ventolin HFA 90 mcg/inh inhalation aerosol: 2 puff(s) inhaled 4 times a day, As Needed  ZyPREXA 2.5 mg oral tablet: 1 tab(s) orally once a day (at bedtime)      MEDICATIONS  (STANDING):  haloperidol    Injectable 1 milliGRAM(s) IV Push once  heparin   Injectable 5000 Unit(s) SubCutaneous every 12 hours  pamidronate IVPB 60 milliGRAM(s) IV Intermittent once  sodium chloride 0.9%. 1000 milliLiter(s) (75 mL/Hr) IV Continuous <Continuous>    MEDICATIONS  (PRN):  acetaminophen     Tablet .. 650 milliGRAM(s) Oral every 6 hours PRN Temp greater or equal to 38C (100.4F), Mild Pain (1 - 3)  aluminum hydroxide/magnesium hydroxide/simethicone Suspension 30 milliLiter(s) Oral every 4 hours PRN Dyspepsia  melatonin 3 milliGRAM(s) Oral at bedtime PRN Insomnia  ondansetron Injectable 4 milliGRAM(s) IV Push every 8 hours PRN Nausea and/or Vomiting      Allergies    latex (Rash)  No Known Drug Allergies    Intolerances      Review of Systems: Limited at this time due to patient's mental status.   Constitutional: No fever  Eyes: No blurry vision  Neuro: No tremors  HEENT: No pain  Cardiovascular: No chest pain, palpitations  Respiratory: No SOB, no cough  GI: No nausea, vomiting, abdominal pain  : No dysuria  Skin: no rash  Psych: + depression, hallucinations per history.   Endocrine: no polyuria, polydipsia  Hem/lymph: no swelling  Osteoporosis: no fractures    ALL OTHER SYSTEMS REVIEWED AND NEGATIVE    UNABLE TO OBTAIN    PHYSICAL EXAM:  VITALS: T(C): 36.2 (08-30-22 @ 12:27)  T(F): 97.2 (08-30-22 @ 12:27), Max: 98.7 (08-29-22 @ 23:28)  HR: 82 (08-30-22 @ 12:27) (73 - 97)  BP: 115/60 (08-30-22 @ 12:27) (93/60 - 125/80)  RR:  (19 - 22)  SpO2:  (94% - 100%)  Wt(kg): --  GENERAL: NAD, talkative and responsive, but difficult to understand.   EYES: No proptosis, no lid lag, anicteric  HEENT:  Atraumatic, Normocephalic, moist mucous membranes  THYROID: Normal size, no palpable nodules, no neck masses.   RESPIRATORY: Clear to auscultation bilaterally; No rales, rhonchi, wheezing  CARDIOVASCULAR: Regular rate and rhythm; No murmurs; no peripheral edema  GI: Soft, nontender, non distended, normal bowel sounds  SKIN: Dry, intact, No rashes or lesions. Diffuse senile purpura, ecchymosis in the b/l lower extremities.   MUSCULOSKELETAL: Full range of motion, normal strength. Exam limited due to mental status.   NEURO: sensation intact, extraocular movements intact, no tremor  PSYCH: Alert and oriented x 2, confused mood, bizarre affect.   CUSHING'S SIGNS: no striae      CAPILLARY BLOOD GLUCOSE                                8.8    5.73  )-----------( 153      ( 29 Aug 2022 23:08 )             30.1       08-30    138  |  102  |  38<H>  ----------------------------<  107<H>  5.2   |  27  |  1.38<H>    eGFR: 40<L>    Ca    11.1<H>      08-30  Mg     2.70     08-29  Phos  3.9     08-29    TPro  13.5<H>  /  Alb  0.9<L>  /  TBili  0.57  /  DBili  x   /  AST  36<H>  /  ALT  24  /  AlkPhos  45  08-29      Thyroid Function Tests:  08-29 @ 22:52 TSH 3.06 FreeT4 -- T3 -- Anti TPO -- Anti Thyroglobulin Ab -- TSI --              Radiology:              DO NOT FOLLOW FINALIZED RECOMMENDATIONS UNTIL ATTENDING ATTESTATION/SIGNATURE    NOTE IN PROGRESS/INCOMPLETE****      HPI:  77 year old female with PMH of lymphoma (dx 2005, s/p CARRIE lobectomy, relapsed in 2019 but not currently on chemo/RT, being followed by heme every 3-6months at [facility] [place]), bipolar disorder, asthma (on O2 PRN at home, unknown how many L) brought to ED by daughter who states pt has been hallucinating, being more depressed.  Patient unable to provide much history, daughter Alissa states that patient was able to care for herself, manage home finances and attend doctor appointments independently until recently, she states that she believes her mom stopped taking her psychiatric medications in February of 2022 and has not followed up with any of her physicians. She has noticed recent weight loss (unsure how much) and change in voice.  Patient denies chest pain, shortness of breath or any discomfort, she is pleasantly confused. Daughter states that within the last year the patient has received treatment for her lymphoma however is unsure of the specifics, the patient was supposed to follow up in February however has not. (30 Aug 2022 10:47)    Corrected calcium level of 13.6. Pt received 1L LR bolus yesterday night in the ED. Pt currently on 75 cc/hr of NS. PTH, ionized calcium, Vitamin D ordered for the AM. Serum Pro BNP of 6447.       PAST MEDICAL & SURGICAL HISTORY:  GERD (gastroesophageal reflux disease)      Hyperlipidemia      Manic depression      Asthma      Lymphoma      History of lobectomy of lung  L  lung      Injury of left shoulder, initial encounter  Surgical repair with plates          FAMILY HISTORY:  Family history of gastric cancer        Social History:    Breo Ellipta 100 mcg-25 mcg/inh inhalation powder: 1 puff(s) inhaled once a day  Depakote 500 mg oral delayed release tablet: 1 tab(s) orally 2 times a day  lithium 300 mg oral capsule: 1 cap(s) orally 3 times a day  predniSONE 20 mg oral tablet: 1 tab(s) orally every 12 hours   Ventolin HFA 90 mcg/inh inhalation aerosol: 2 puff(s) inhaled 4 times a day, As Needed  ZyPREXA 2.5 mg oral tablet: 1 tab(s) orally once a day (at bedtime)      MEDICATIONS  (STANDING):  haloperidol    Injectable 1 milliGRAM(s) IV Push once  heparin   Injectable 5000 Unit(s) SubCutaneous every 12 hours  pamidronate IVPB 60 milliGRAM(s) IV Intermittent once  sodium chloride 0.9%. 1000 milliLiter(s) (75 mL/Hr) IV Continuous <Continuous>    MEDICATIONS  (PRN):  acetaminophen     Tablet .. 650 milliGRAM(s) Oral every 6 hours PRN Temp greater or equal to 38C (100.4F), Mild Pain (1 - 3)  aluminum hydroxide/magnesium hydroxide/simethicone Suspension 30 milliLiter(s) Oral every 4 hours PRN Dyspepsia  melatonin 3 milliGRAM(s) Oral at bedtime PRN Insomnia  ondansetron Injectable 4 milliGRAM(s) IV Push every 8 hours PRN Nausea and/or Vomiting      Allergies    latex (Rash)  No Known Drug Allergies    Intolerances      Review of Systems: Limited at this time due to patient's mental status.   Constitutional: No fever  Eyes: No blurry vision  Neuro: +tremors   HEENT: No pain  Cardiovascular: No chest pain, palpitations  Respiratory: No SOB, no cough  GI: No nausea, vomiting, abdominal pain  : No dysuria  Skin: no rash  Psych: + depression, hallucinations per history.   Endocrine: no polyuria, polydipsia  Hem/lymph: no swelling  Osteoporosis: no fractures    ALL OTHER SYSTEMS REVIEWED AND NEGATIVE    UNABLE TO OBTAIN    PHYSICAL EXAM:  VITALS: T(C): 36.2 (08-30-22 @ 12:27)  T(F): 97.2 (08-30-22 @ 12:27), Max: 98.7 (08-29-22 @ 23:28)  HR: 82 (08-30-22 @ 12:27) (73 - 97)  BP: 115/60 (08-30-22 @ 12:27) (93/60 - 125/80)  RR:  (19 - 22)  SpO2:  (94% - 100%)  Wt(kg): --  GENERAL: NAD, talkative and responsive, but difficult to understand.   EYES: No proptosis, no lid lag, anicteric  HEENT:  Atraumatic, Normocephalic, moist mucous membranes  THYROID: Normal size, no palpable nodules, no neck masses.   RESPIRATORY: Clear to auscultation bilaterally; No rales, rhonchi, wheezing  CARDIOVASCULAR: Regular rate and rhythm; No murmurs; no peripheral edema  GI: Soft, nontender, non distended, normal bowel sounds  SKIN: Dry, intact, No rashes or lesions. Diffuse senile purpura, ecchymosis in the b/l lower extremities.   MUSCULOSKELETAL: Full range of motion, normal strength. Exam limited due to mental status.   NEURO: sensation intact, extraocular movements intact, mild tremors with motion in the b/l upper extremity.   PSYCH: Alert and oriented x 2, confused mood, bizarre affect.   CUSHING'S SIGNS: no striae      CAPILLARY BLOOD GLUCOSE                                8.8    5.73  )-----------( 153      ( 29 Aug 2022 23:08 )             30.1       08-30    138  |  102  |  38<H>  ----------------------------<  107<H>  5.2   |  27  |  1.38<H>    eGFR: 40<L>    Ca    11.1<H>      08-30  Mg     2.70     08-29  Phos  3.9     08-29    TPro  13.5<H>  /  Alb  0.9<L>  /  TBili  0.57  /  DBili  x   /  AST  36<H>  /  ALT  24  /  AlkPhos  45  08-29      Thyroid Function Tests:  08-29 @ 22:52 TSH 3.06 FreeT4 -- T3 -- Anti TPO -- Anti Thyroglobulin Ab -- TSI --              Radiology:              DO NOT FOLLOW FINALIZED RECOMMENDATIONS UNTIL ATTENDING ATTESTATION/SIGNATURE    NOTE IN PROGRESS/INCOMPLETE****      HPI:  77 year old female with PMH of lymphoma (dx 2005, s/p CARRIE lobectomy, relapsed in 2019 but not currently on chemo/RT, being followed by [place] every 3-6months [place] [facility] [place] bipolar disorder, asthma (on O2 PRN at home, unknown how many L) brought to ED by daughter who states pt has been hallucinating, being more depressed.  Patient unable to provide much history, daughter Alissa states that patient was able to care for herself, manage home finances and attend doctor appointments independently until recently, she states that she believes her mom stopped taking her psychiatric medications in February of 2022 and has not followed up with any of her physicians. She has noticed recent weight loss (unsure how much) and change in voice.  Patient denies chest pain, shortness of breath or any discomfort, she is pleasantly confused. Daughter states that within the last year the patient has received treatment for her lymphoma however is unsure of the specifics, the patient was supposed to follow up in February however has not. (30 Aug 2022 10:47)   Patient of Dr. 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 Pt also had labs during admission at [facility] 01/2020 showing IgM lambda monoclonal gammopathy, with biopsy of a lung mass that revealed progression of lymphoma, treated with Rituxan-Bendamustine, completed 08/27/2020. Her last visit at [facility] [place] was 07/08/2021 and was under surveillance with recommended follow-ups every 4 months, but has not returned since.   Endocrinology consulted for hypercalcemia with Corrected calcium level of 13.6, pt's with albumin of 0.9 on admission. Pt received 1L LR bolus yesterday (8/29) night in the ED. Pt currently on 75 cc/hr of NS, on 4L NC maintaining saturations of 94-95%.   Pt also with Serum Pro BNP of 6447.       PAST MEDICAL & SURGICAL HISTORY:  GERD (gastroesophageal reflux disease)      Hyperlipidemia      Manic depression      Asthma      Lymphoma      History of lobectomy of lung  L  lung      Injury of left shoulder, initial encounter  Surgical repair with plates          FAMILY HISTORY:  Family history of gastric cancer        Social History:    Breo Ellipta 100 mcg-25 mcg/inh inhalation powder: 1 puff(s) inhaled once a day  Depakote 500 mg oral delayed release tablet: 1 tab(s) orally 2 times a day  lithium 300 mg oral capsule: 1 cap(s) orally 3 times a day  predniSONE 20 mg oral tablet: 1 tab(s) orally every 12 hours   Ventolin HFA 90 mcg/inh inhalation aerosol: 2 puff(s) inhaled 4 times a day, As Needed  ZyPREXA 2.5 mg oral tablet: 1 tab(s) orally once a day (at bedtime)      MEDICATIONS  (STANDING):  haloperidol    Injectable 1 milliGRAM(s) IV Push once  heparin   Injectable 5000 Unit(s) SubCutaneous every 12 hours  pamidronate IVPB 60 milliGRAM(s) IV Intermittent once  sodium chloride 0.9%. 1000 milliLiter(s) (75 mL/Hr) IV Continuous <Continuous>    MEDICATIONS  (PRN):  acetaminophen     Tablet .. 650 milliGRAM(s) Oral every 6 hours PRN Temp greater or equal to 38C (100.4F), Mild Pain (1 - 3)  aluminum hydroxide/magnesium hydroxide/simethicone Suspension 30 milliLiter(s) Oral every 4 hours PRN Dyspepsia  melatonin 3 milliGRAM(s) Oral at bedtime PRN Insomnia  ondansetron Injectable 4 milliGRAM(s) IV Push every 8 hours PRN Nausea and/or Vomiting      Allergies    latex (Rash)  No Known Drug Allergies    Intolerances      Review of Systems: Limited at this time due to patient's mental status.   Constitutional: No fever  Eyes: No blurry vision  Neuro: +tremors   HEENT: No pain  Cardiovascular: No chest pain, palpitations  Respiratory: No SOB, no cough  GI: No nausea, vomiting, abdominal pain  : No dysuria  Skin: no rash  Psych: + depression, hallucinations per history.   Endocrine: no polyuria, polydipsia  Hem/lymph: no swelling  Osteoporosis: no fractures    ALL OTHER SYSTEMS REVIEWED AND NEGATIVE    UNABLE TO OBTAIN    PHYSICAL EXAM:  VITALS: T(C): 36.2 (08-30-22 @ 12:27)  T(F): 97.2 (08-30-22 @ 12:27), Max: 98.7 (08-29-22 @ 23:28)  HR: 82 (08-30-22 @ 12:27) (73 - 97)  BP: 115/60 (08-30-22 @ 12:27) (93/60 - 125/80)  RR:  (19 - 22)  SpO2:  (94% - 100%)  Wt(kg): --  GENERAL: NAD, talkative and responsive, but difficult to understand.   EYES: No proptosis, no lid lag, anicteric  HEENT:  Atraumatic, Normocephalic, moist mucous membranes  THYROID: Normal size, no palpable nodules, no neck masses.   RESPIRATORY: Clear to auscultation bilaterally; No rales, rhonchi, wheezing  CARDIOVASCULAR: Regular rate and rhythm; No murmurs; no peripheral edema  GI: Soft, nontender, non distended, normal bowel sounds  SKIN: Dry, intact, No rashes or lesions. Diffuse senile purpura, ecchymosis in the b/l lower extremities.   MUSCULOSKELETAL: Full range of motion, normal strength. Exam limited due to mental status.   NEURO: sensation intact, extraocular movements intact, mild tremors with motion in the b/l upper extremity.   PSYCH: Alert and oriented x 2, confused mood, bizarre affect.   CUSHING'S SIGNS: no striae      CAPILLARY BLOOD GLUCOSE                                8.8    5.73  )-----------( 153      ( 29 Aug 2022 23:08 )             30.1       08-30    138  |  102  |  38<H>  ----------------------------<  107<H>  5.2   |  27  |  1.38<H>    eGFR: 40<L>    Ca    11.1<H>      08-30  Mg     2.70     08-29  Phos  3.9     08-29    TPro  13.5<H>  /  Alb  0.9<L>  /  TBili  0.57  /  DBili  x   /  AST  36<H>  /  ALT  24  /  AlkPhos  45  08-29      Thyroid Function Tests:  08-29 @ 22:52 TSH 3.06 FreeT4 -- T3 -- Anti TPO -- Anti Thyroglobulin Ab -- TSI --              Radiology:                HPI:  77 year old female with PMH of lymphoma (dx 2005, s/p CARRIE lobectomy, relapsed in 2019 but not currently on chemo/RT, being followed by heme every 3-6months at [facility] [place]), bipolar disorder, asthma (on O2 PRN at home, unknown how many L) brought to ED by daughter who states pt has been hallucinating, being more depressed.  Patient unable to provide much history, daughter Alissa states that patient was able to care for herself, manage home finances and attend doctor appointments independently until recently, she states that she believes her mom stopped taking her psychiatric medications in February of 2022 and has not followed up with any of her physicians. She has noticed recent weight loss (unsure how much) and change in voice.  Patient denies chest pain, shortness of breath or any discomfort, she is pleasantly confused. Daughter states that within the last year the patient has received treatment for her lymphoma however is unsure of the specifics, the patient was supposed to follow up in February however has not. (30 Aug 2022 10:47)   [place] [facility] [place] lymphoma of the lung s/p resection in 2005. Pt also had labs during admission at [facility] 01/2020 showing IgM lambda monoclonal gammopathy, with biopsy of a lung mass that revealed progression of lymphoma, treated with Rituxan-Bendamustine, completed 08/27/2020. [place] [facility] [place] and was under surveillance with recommended follow-ups every 4 months, but has not returned since.   Endocrinology consulted for hypercalcemia with Corrected calcium level of 14.2, pt's with albumin of 0.9 on admission. Pt received 1L LR bolus yesterday (8/29) night in the ED. Pt currently on 75 cc/hr of NS, on 4L NC maintaining saturations of 94-95%.   Pt also with Serum Pro BNP of 6447.     ROS limited at this time due to patient's mental status but listed below.       PAST MEDICAL & SURGICAL HISTORY:  GERD (gastroesophageal reflux disease)      Hyperlipidemia      Manic depression      Asthma      Lymphoma      History of lobectomy of lung  L  lung      Injury of left shoulder, initial encounter  Surgical repair with plates          FAMILY HISTORY:  Family history of gastric cancer        Social History: Lives with  who has dementia and adult son, daughter involved in care who feels that the home living situation is unsafe and that her parents are hoarders. Daughter has notified [employer]PS of unsafe living conditions.  Never smoker, no ETOH use, no illicit drug use.     Outpatient Medications:   Breo Ellipta 100 mcg-25 mcg/inh inhalation powder: 1 puff(s) inhaled once a day  Depakote 500 mg oral delayed release tablet: 1 tab(s) orally 2 times a day  lithium 300 mg oral capsule: 1 cap(s) orally 3 times a day  Ventolin HFA 90 mcg/inh inhalation aerosol: 2 puff(s) inhaled 4 times a day, As Needed  ZyPREXA 2.5 mg oral tablet: 1 tab(s) orally once a day (at bedtime)      MEDICATIONS  (STANDING):  haloperidol    Injectable 1 milliGRAM(s) IV Push once  heparin   Injectable 5000 Unit(s) SubCutaneous every 12 hours  pamidronate IVPB 60 milliGRAM(s) IV Intermittent once  sodium chloride 0.9%. 1000 milliLiter(s) (75 mL/Hr) IV Continuous <Continuous>    MEDICATIONS  (PRN):  acetaminophen     Tablet .. 650 milliGRAM(s) Oral every 6 hours PRN Temp greater or equal to 38C (100.4F), Mild Pain (1 - 3)  aluminum hydroxide/magnesium hydroxide/simethicone Suspension 30 milliLiter(s) Oral every 4 hours PRN Dyspepsia  melatonin 3 milliGRAM(s) Oral at bedtime PRN Insomnia  ondansetron Injectable 4 milliGRAM(s) IV Push every 8 hours PRN Nausea and/or Vomiting      Allergies    latex (Rash)  No Known Drug Allergies    Intolerances      Review of Systems: Limited at this time due to patient's mental status.   Constitutional: + weight loss, no fevers, chills,   Eyes: No blurry vision, changes to vision, floaters   Neuro: +tremors   HEENT: No pain, no sore throat  Cardiovascular: No chest pain, palpitations  Respiratory: No SOB, no cough  GI: No nausea, vomiting, abdominal pain, no constipation   : No dysuria, no incontinence, no urinary urgency   Skin: no rash, + bruising, no open skin lesions, no dried skin   Psych: + depression, hallucinations per history.   Endocrine: no polyuria, polydipsia, no heat or cold intolerance   Hem/lymph: no swelling, no excessive bleeding,   Osteoporosis: +fracture    ALL OTHER SYSTEMS REVIEWED AND NEGATIVE        PHYSICAL EXAM:  VITALS: T(C): 36.2 (08-30-22 @ 12:27)  T(F): 97.2 (08-30-22 @ 12:27), Max: 98.7 (08-29-22 @ 23:28)  HR: 82 (08-30-22 @ 12:27) (73 - 97)  BP: 115/60 (08-30-22 @ 12:27) (93/60 - 125/80)  RR:  (19 - 22)  SpO2:  (94% - 100%)  Wt(kg): --  GENERAL: NAD, talkative and responsive, but difficult to understand, pt with poor insight into situation and health.   EYES: No proptosis, no lid lag, anicteric  HEENT:  Atraumatic, Normocephalic, moist mucous membranes  THYROID: Normal size, no palpable nodules, no neck masses.   RESPIRATORY: Clear to auscultation bilaterally; No rales, rhonchi, wheezing  CARDIOVASCULAR: Regular rate and rhythm; No murmurs; no peripheral edema  GI: Soft, nontender, non distended, normal bowel sounds  SKIN: Dry, intact, No rashes or lesions. Diffuse senile purpura, ecchymosis in the b/l lower extremities.   MUSCULOSKELETAL: Exam limited due to mental status.   NEURO: sensation intact, extraocular movements intact, mild tremors with motion in the b/l upper extremity.   PSYCH: Alert and oriented x 1, confused mood, bizarre affect.   CUSHING'S SIGNS: no striae, no dorsocervical fat pad, no moon facies.       CAPILLARY BLOOD GLUCOSE                                8.8    5.73  )-----------( 153      ( 29 Aug 2022 23:08 )             30.1       08-30    138  |  102  |  38<H>  ----------------------------<  107<H>  5.2   |  27  |  1.38<H>    eGFR: 40<L>    Ca    11.1<H>      08-30  Mg     2.70     08-29  Phos  3.9     08-29    TPro  13.5<H>  /  Alb  0.9<L>  /  TBili  0.57  /  DBili  x   /  AST  36<H>  /  ALT  24  /  AlkPhos  45  08-29      Thyroid Function Tests:  08-29 @ 22:52 TSH 3.06 FreeT4 -- T3 -- Anti TPO -- Anti Thyroglobulin Ab -- TSI --              Radiology:                HPI:  77 year old female with PMH of lymphoma (dx 2005, s/p CARRIE lobectomy, relapsed in 2019 but not currently on chemo/RT, [place] [facility] [place] bipolar disorder, asthma (on O2 PRN at home, unknown how many L) brought to ED by daughter who states pt has been hallucinating, being more depressed.  Patient unable to provide much history, daughter Alissa states that patient was able to care for herself, manage home finances and attend doctor appointments independently until recently, she states that she believes her mom stopped taking her psychiatric medications in February of 2022 and has not followed up with any of her physicians. She has noticed recent weight loss (unsure how much) and change in voice.  Patient denies chest pain, shortness of breath or any discomfort, she is pleasantly confused. Daughter states that within the last year the patient has received treatment for her lymphoma however is unsure of the specifics, the patient was supposed to follow up in February however has not. (30 Aug 2022 10:47)   Patient of Dr. 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 Pt also had labs during admission at [facility] 01/2020 showing IgM lambda monoclonal gammopathy, with biopsy of a lung mass that revealed progression of lymphoma, treated with Rituxan-Bendamustine, completed 08/27/2020. Her last visit at [facility] was 07/08/2021 and was under surveillance with recommended follow-ups every 4 months, but has not returned since.   Pt denies history of constipation, abdominal pains, kidney stones. Patient with perseverations and bizarre affect,  but denying depression and hallucinations. Pt denying any recent falls or fracture but demonstrating poor insight into health and situation at this time.   Endocrinology consulted for hypercalcemia with Corrected calcium level of 14.2, pt's with albumin of 0.9 on admission. Pt received 1L LR bolus yesterday (8/29) night in the ED. Pt currently on 75 cc/hr of NS, on 4L NC maintaining saturations of 94-95%.   Pt also with Serum Pro BNP of 6447.       ROS limited at this time due to patient's mental status but listed below.       PAST MEDICAL & SURGICAL HISTORY:  GERD (gastroesophageal reflux disease)      Hyperlipidemia      Manic depression      Asthma      Lymphoma      History of lobectomy of lung  L  lung      Injury of left shoulder, initial encounter  Surgical repair with plates          FAMILY HISTORY:  Family history of gastric cancer        Social History: Lives with  who has dementia and adult son, daughter involved in care who feels that the home living situation is unsafe and that her parents are hoarders. Daughter has notified APS of unsafe living conditions.  Never smoker, no ETOH use, no illicit drug use.     Outpatient Medications:   Breo Ellipta 100 mcg-25 mcg/inh inhalation powder: 1 puff(s) inhaled once a day  Depakote 500 mg oral delayed release tablet: 1 tab(s) orally 2 times a day  lithium 300 mg oral capsule: 1 cap(s) orally 3 times a day  Ventolin HFA 90 mcg/inh inhalation aerosol: 2 puff(s) inhaled 4 times a day, As Needed  ZyPREXA 2.5 mg oral tablet: 1 tab(s) orally once a day (at bedtime)      MEDICATIONS  (STANDING):  haloperidol    Injectable 1 milliGRAM(s) IV Push once  heparin   Injectable 5000 Unit(s) SubCutaneous every 12 hours  pamidronate IVPB 60 milliGRAM(s) IV Intermittent once  sodium chloride 0.9%. 1000 milliLiter(s) (75 mL/Hr) IV Continuous <Continuous>    MEDICATIONS  (PRN):  acetaminophen     Tablet .. 650 milliGRAM(s) Oral every 6 hours PRN Temp greater or equal to 38C (100.4F), Mild Pain (1 - 3)  aluminum hydroxide/magnesium hydroxide/simethicone Suspension 30 milliLiter(s) Oral every 4 hours PRN Dyspepsia  melatonin 3 milliGRAM(s) Oral at bedtime PRN Insomnia  ondansetron Injectable 4 milliGRAM(s) IV Push every 8 hours PRN Nausea and/or Vomiting      Allergies    latex (Rash)  No Known Drug Allergies    Intolerances      Review of Systems: Limited at this time due to patient's mental status.   Constitutional: + weight loss, no fevers, chills,   Eyes: No blurry vision, changes to vision, floaters   Neuro: +tremors   HEENT: No pain, no sore throat  Cardiovascular: No chest pain, palpitations  Respiratory: No SOB, no cough  GI: No nausea, vomiting, abdominal pain, no constipation   : No dysuria, no incontinence, no urinary urgency   Skin: no rash, + bruising, no open skin lesions, no dried skin   Psych: + depression, hallucinations per history.   Endocrine: no polyuria, polydipsia, no heat or cold intolerance   Hem/lymph: no swelling, no excessive bleeding,   Osteoporosis: +fracture    ALL OTHER SYSTEMS REVIEWED AND NEGATIVE        PHYSICAL EXAM:  VITALS: T(C): 36.2 (08-30-22 @ 12:27)  T(F): 97.2 (08-30-22 @ 12:27), Max: 98.7 (08-29-22 @ 23:28)  HR: 82 (08-30-22 @ 12:27) (73 - 97)  BP: 115/60 (08-30-22 @ 12:27) (93/60 - 125/80)  RR:  (19 - 22)  SpO2:  (94% - 100%)  Wt(kg): --  GENERAL: NAD, talkative and responsive, but difficult to understand, pt with poor insight into situation and health.   EYES: No proptosis, no lid lag, anicteric  HEENT:  Atraumatic, Normocephalic, moist mucous membranes  THYROID: Normal size, no palpable nodules, no neck masses.   RESPIRATORY: Clear to auscultation bilaterally; No rales, rhonchi, wheezing  CARDIOVASCULAR: Regular rate and rhythm; No murmurs; no peripheral edema  GI: Soft, nontender, non distended, normal bowel sounds  SKIN: Dry, intact, No rashes or lesions. Diffuse senile purpura, ecchymosis in the b/l lower extremities.   MUSCULOSKELETAL: Exam limited due to mental status.   NEURO: sensation intact, extraocular movements intact, mild tremors with motion in the b/l upper extremity.   PSYCH: Alert and oriented x 1, confused mood, bizarre affect.   CUSHING'S SIGNS: no striae, no dorsocervical fat pad, no moon facies.       CAPILLARY BLOOD GLUCOSE                                8.8    5.73  )-----------( 153      ( 29 Aug 2022 23:08 )             30.1       08-30    138  |  102  |  38<H>  ----------------------------<  107<H>  5.2   |  27  |  1.38<H>    eGFR: 40<L>    Ca    11.1<H>      08-30  Mg     2.70     08-29  Phos  3.9     08-29    TPro  13.5<H>  /  Alb  0.9<L>  /  TBili  0.57  /  DBili  x   /  AST  36<H>  /  ALT  24  /  AlkPhos  45  08-29      Thyroid Function Tests:  08-29 @ 22:52 TSH 3.06 FreeT4 -- T3 -- Anti TPO -- Anti Thyroglobulin Ab -- TSI --              Radiology:

## 2022-08-30 NOTE — CONSULT NOTE ADULT - ASSESSMENT
77 year old female with PMH of HLD, lymphoma (dx 2005, s/p CARRIE lobectomy, relapsed in 2019 but not currently on chemo/RT, [place] [facility] [place] disorder, asthma, found to have hypercalcemia.     #Hypercalcemia  - c/w fluids, obtain PTH, ionized calcium, Vitamin D levels  - calcitonin initial dose of 4 units/kg for an initial dose of 240 units, followed by repeat serum calcium in 4-6 hours. If pt responds to calcitonin, calcitonin therapy can be repeated q12 hours for a total duration of 24 to 48 hours. If unsatisfactory response to inital dose, dose may be increased to 8 units/kg every 6 to 12 hours (total duration of therapy 24 to 48 hours).   - pamidronate 60 mg IV over 24 hours (given recent EMILY).     #Hyperlipidemia  - history of hyperlipidemia per EMR  - check lipid panel   77 year old female with PMH of HLD, lymphoma (dx 2005, s/p CARRIE lobectomy, relapsed in 2019 but not currently on chemo/RT, being followed by [place] every 3-6months [place] [facility] [place] bipolar disorder, asthma, found to have hypercalcemia.     #Hypercalcemia  - c/w fluids, in the absence of edema and can titrate up NS to maintain urine output of 100/150 ml/hour, consider light diuresis.   - obtain PTH, ionized calcium, Vitamin D levels  - calcitonin initial dose of 4 units/kg for an initial dose of 240 units, followed by repeat serum calcium in 4-6 hours. If pt responds to calcitonin, calcitonin therapy can be repeated q12 hours for a total duration of 24 to 48 hours. If unsatisfactory response to initial  dose, dose may be increased to 8 units/kg every 6 to 12 hours (total duration of therapy 24 to 48 hours).   - pamidronate 60 mg IV over 24 hours (given recent EMILY).     #Hyperlipidemia  - history of hyperlipidemia per EMR  - check lipid panel   77 year old female with PMH of HLD, lymphoma (dx 2005, s/p CARRIE lobectomy, relapsed in 2019 but not currently on chemo/RT, [place] [facility] [place] disorder, asthma, found to have hypercalcemia.     #Hypercalcemia  - c/w fluids, in the absence of edema and can titrate up NS to maintain urine output of 100/150 ml/hour, consider light diuresis. Continue to hold Lithium,   - obtain PTH, Parathyroid related peptide, ionized calcium, Vitamin D levels  - calcitonin initial dose of 4 units/kg for an initial dose of 240 units, followed by repeat serum calcium in 4-6 hours. If pt responds to calcitonin, calcitonin therapy can be repeated q12 hours for a total duration of 24 to 48 hours. If unsatisfactory response to initial  dose, dose may be increased to 8 units/kg every 6 to 12 hours (total duration of therapy 24 to 48 hours).   - pamidronate 60 mg IV over 24 hours (given recent EMILY), can consider denosumab therapy if pt remains refractory.     #Hyperlipidemia  - history of hyperlipidemia per EMR  - check lipid panel, home medication of zyprexa.    77 year old female with PMH of HLD, lymphoma (dx 2005, s/p CARRIE lobectomy, relapsed in 2019 but not currently on chemo/RT, being followed by heme every 3-6months at [facility] [place] bipolar disorder, asthma, who presented to the hospital for AMS, found to have hypercalcemia.     #Hypercalcemia  - c/w fluids, in the absence of edema and can titrate up NS to maintain urine output of 100/150 ml/hour, consider light diuresis. Continue to hold Lithium. Encourage PO intake if possible. Avoid medications or supplements that will increase serum Ca.   - obtain PTH, Parathyroid related peptide, ionized calcium, Vitamin D levels  - calcitonin initial dose of 4 units/kg for an initial dose of 240 units, followed by repeat serum calcium in 4-6 hours. If pt responds to calcitonin, calcitonin therapy can be repeated q12 hours for a total duration of 24 to 48 hours. If limited response  to initial  dose, dose may be increased to 8 units/kg every 6 to 12 hours (total duration of therapy 24 to 48 hours).   - pamidronate 60 mg IV over 24 hours (given recent EMILY), can consider denosumab therapy if pt remains refractory.   - repeat EKG.     #Hyperlipidemia  - history of hyperlipidemia per EMR  - check lipid panel, home medication of zyprexa.     - rest of care per primary team.    77 year old female with PMH of HLD, lymphoma (dx 2005, s/p CARRIE lobectomy, relapsed in 2019 but not currently on chemo/RT, being followed by heme every 3-6months at [facility] [place] bipolar disorder, asthma, who presented to the hospital for AMS, found to have hypercalcemia.     #Hypercalcemia, likely malignancy-related vs medication induced vs in the setting of potential multiple myeloma transformation   - c/w NS 75 cc/hr, can uptitrate fluid rate as patient tolerates. Continue to hold Lithium. Encourage PO intake if possible. Avoid medications or supplements that will increase serum Ca.   - obtain PTH, Parathyroid related peptide, Vitamin D levels (25-hydroxy, and 1,25-hydroxy levels), serum immunofixation assay for MM, continue to trend serum Ca and albumin via CMP.   - calcitonin 240 units, 4 doses q12h. follow with repeat serum Calcium levels.   - pamidronate 60 mg IV over 24 hours (given recent EMILY),     #history of fracture in the setting of likely osteoporosis;.   - Imaging findings of Old right humeral neck fracture. Left proximal humeral fracture s/p ORIF  - f/u outpatient bone-density testing.       #Hyperlipidemia  - history of hyperlipidemia   - check lipid panel, home medication history of zyprexa.     - rest of care per primary team.    77 year old female with PMH of HLD, lymphoma (dx 2005, s/p CARRIE lobectomy, relapsed in 2019 but not currently on chemo/RT, being [place] by [place] every 3[place] [facility] [place] bipolar disorder, asthma, who presented to the hospital for AMS, found to have hypercalcemia.     #Hypercalcemia, likely malignancy-related vs medication induced vs in the setting of potential multiple myeloma transformation   - c/w NS 75 cc/hr, can uptitrate fluid rate as patient tolerates. Continue to hold Lithium. Encourage PO intake if possible. Avoid medications or supplements that will increase serum Ca.   - obtain PTH, Parathyroid related peptide, Vitamin D levels (25-hydroxy, and 1,25-hydroxy levels), serum immunofixation assay for MM, continue to trend serum Ca and albumin via CMP.   - calcitonin 240 units, 4 doses q12h. follow with repeat serum Calcium levels.   - pamidronate 60 mg IV over 4 hours (given recent EMILY),     #history of fracture in the setting of likely osteoporosis;.   - Imaging findings of Old right humeral neck fracture. Left proximal humeral fracture s/p ORIF  - f/u outpatient bone-density testing.       #Hyperlipidemia  - history of hyperlipidemia   - check lipid panel, home medication history of zyprexa.     - rest of care per primary team.    77 year old female with PMH of HLD, lymphoma (dx 2005, s/p CARRIE lobectomy, relapsed in 2019 but not currently on chemo/RT, being followed by [place] every 3-6months [place] [facility] [place] bipolar disorder, asthma, who presented to the hospital for AMS, found to have hypercalcemia.     #Hypercalcemia, likely malignancy-related vs medication induced vs in the setting of potential multiple myeloma transformation   - c/w NS 75 cc/hr, can uptitrate fluid rate as patient tolerates. Continue to hold Lithium. Encourage PO intake if possible. Avoid medications or supplements that will increase serum Ca.   - obtain PTH, Parathyroid related peptide, Vitamin D levels (25-hydroxy, and 1,25-hydroxy levels), serum immunofixation assay for MM, continue to trend serum Ca and albumin via CMP.   - calcitonin 240 units, 4 doses q12h. follow with repeat serum Calcium levels.   - pamidronate 60 mg IV (given recent EMILY),     #history of fracture in the setting of likely osteoporosis;.   - Imaging findings of Old right humeral neck fracture. Left proximal humeral fracture s/p ORIF  - f/u outpatient bone-density testing.       #Hyperlipidemia  - history of hyperlipidemia   - check lipid panel, home medication history of zyprexa.     - rest of care per primary team.    77 year old female with PMH of HLD, lymphoma (dx 2005, s/p CARRIE lobectomy, relapsed in 2019 but not currently on chemo/RT, [place] [facility] [place] disorder, asthma, who presented to the hospital for AMS, found to have hypercalcemia.     #Hypercalcemia, likely malignancy-related vs medication induced vs in the setting of potential multiple myeloma transformation   - c/w NS 75 cc/hr, can uptitrate fluid rate as patient tolerates. Continue to hold Lithium. Encourage PO intake if possible. Avoid medications or supplements that will increase serum Ca.   - obtain PTH, Parathyroid related peptide, Vitamin D levels (25-hydroxy, and 1,25-hydroxy levels), serum immunofixation assay for MM, continue to trend serum Ca and albumin via CMP.   - calcitonin 240 units, 4 doses q12h. follow with repeat serum Calcium levels.   - pamidronate 60 mg IV x 1 (given recent EMILY),     #history of fracture in the setting of likely osteoporosis;.   - Imaging findings of Old right humeral neck fracture. Left proximal humeral fracture s/p ORIF  - f/u outpatient bone-density testing.       #Hyperlipidemia  - history of hyperlipidemia   - check lipid panel, home medication history of zyprexa.     - rest of care per primary team.

## 2022-08-30 NOTE — CONSULT NOTE ADULT - NS ATTEND AMEND GEN_ALL_CORE FT
pt following up with [facility] [place] for lymphoma of lung with no recent follow up. appears getting worse. will ask [facility] [place] to compare [place] and make further reccs. additionally protein is very high. will do myeloma work up

## 2022-08-30 NOTE — H&P ADULT - HISTORY OF PRESENT ILLNESS
77 year old female with PMH of lymphoma (dx 2005, s/p CARRIE lobectomy, relapsed in 2019 but not currently on chemo/RT, being [place] by [place] every 3-[place]months [place] [facility] [place] bipolar disorder, asthma (on O2 PRN at home, unknown how many L) brought to ED by daughter who states pt has been hallucinating, being more depressed.  Patient unable to provide much history, daughter Alissa states that patient was able to care for herself, manage home finances and attend doctor appointments independently until recently, she states that she believes her mom stopped taking her psychiatric medications in February of 2022 and has not followed up with any of her physicians. She has noticed recent weight loss (unsure how much) and change in voice.  Patient denies chest pain, shortness of breath or any discomfort, she is pleasantly confused. Daughter states that within the last year the patient has received treatment for her lymphoma however is unsure of the specifics, the patient was supposed to follow up in February however has not.

## 2022-08-30 NOTE — H&P ADULT - NS ATTEND AMEND GEN_ALL_CORE FT
Briefly this is a 78 y/o F with hx of lymphoma (s/p CARRIE lobectomy, not currently on chemo/RT), bipolar d/o (reportedly not on medications), asthma (on PRN oxygen at home), presenting to the ED due to hallucinations and worsening depression per family.     Patient seen and examined at bedside. Currently cooperative and pleasant however confused. Patient states that she feels “better.” Currently unaware of her home medications but states that she does use oxygen at home but is unclear how much. Exam notable for grossly diminished air movement, but no wheezing or crackles. Ecchymosis present over LLE.     Labs notable for elevated K to 5.7 (hemolyzed) now down to 5.2 on repeat. Patient also with hypercalcemia to 11.1 (however corrected to 13.6 due to low albumin). Also with elevated Cr to 1.38 (previous value of 0.8 noted back in 2020, however more recent collateral may be required if Cr plateaus). Patient also with grossly positive UA with large LE, positive nitrite and elevated WBCs. Lithium level also noted to be elevated to 1.5. CTH/C-spine without any acute findings. CT chest with slightly increased, small pericardial effusion. Xray of tib/fib without any acute fracture.     #UTI  Currently patient mainly presenting with confusion and encephalopathy. Most likely due to ongoing UTI vs hyperCa now s/p cef/azithro in the ED. Will c/w ceftriaxone for now and follow up urine cultures. Will monitor for improvement of MS with treatment of infection.  #Hypercalcemia  Check PTH, ionized calcium, Vitamin D levels, and begin bisphosphonate. Begin NS at 75 cc/hr however caution as patient with elevated pBNP. D/C fluids and utilize Lasix if patient with worsening respiratory status. Will require endocrinology evaluation.  #EMILY  Cr improved from 1.56 initially to 1.38 after 1L LR. Will c/w NS for now and encourage PO intake. Most likely with hypovolemia in setting of ongoing infection and poor nutritional status (albumin 0.9).  #Bipolar D/O  Hold home lithium for now as levels appear to be elevated. Will consult  for medication management.  #Hypoxia  Will c/w O2 supplementation, wean as tolerated. Low suspicion, however, if patient does not have improvement in O2, may require further evaluation for CTA to r/o PE.    Otherwise, patient also with APS case ongoing due to unsanitary conditions at home. Will require SW/CM for safe placement once medically stable for d/c. Patient currently without capacity and with poor insight into her medical conditions.     Case discussed with ACP

## 2022-08-30 NOTE — H&P ADULT - NSHPSOCIALHISTORY_GEN_ALL_CORE
Lives with  who has dementia and adult son, daughter involved in care who feels that the home living situation is unsafe and that her parents are hoarders.  Daughter has notified APS of unsafe living conditions.

## 2022-08-30 NOTE — CHART NOTE - NSCHARTNOTEFT_GEN_A_CORE
ACP NIGHT MEDICINE COVERAGE.    Notified by RN, patient desatted to 86-88% while on 4LNC, now increased to 5LNC and maintaining at 92%. Per RN, whenever patient pulls off NC she desats (lowest to 74%) however as soon as NC put back in place she responds well and has SpO2 of 92-95%. Pt seen and examined, patient pleasantly confused, A&Ox2 (name and place) sitting up in bed, NAD and on 5LNC. Bedside O2 92-94%. Pulmonary exam: Right Lung Field- CTAB, Left Lung field-- diminished breath sounds, no wheezes/crackles/rhonchi. Pt with hx of Lymphoma and confluent soft tissue seen on CT Chest (8/30) encasing Left Lung Field. Will continue with 5LNC and monitor O2 demand and respiratory status.     Vital Signs Last 24 Hrs  T(C): 36.6 (30 Aug 2022 16:28), Max: 37.1 (29 Aug 2022 23:28)  T(F): 97.8 (30 Aug 2022 16:28), Max: 98.7 (29 Aug 2022 23:28)  HR: 86 (30 Aug 2022 16:28) (73 - 97)  BP: 117/84 (30 Aug 2022 16:28) (93/60 - 125/80)  BP(mean): 96 (30 Aug 2022 06:28) (96 - 96)  RR: 19 (30 Aug 2022 16:28) (19 - 22)  SpO2: 92% (30 Aug 2022 16:28) (92% - 100%)    Parameters below as of 30 Aug 2022 16:28  Patient On (Oxygen Delivery Method): nasal cannula  O2 Flow (L/min): 4    Leandra Amador PA  Medicine [id number]

## 2022-08-30 NOTE — SWALLOW BEDSIDE ASSESSMENT ADULT - ASR SWALLOW RECOMMEND DIAG
objective testing is NOT indicated given no overt s/sx of impaired airway protection and recent chest imaging indicative of lymphoma only

## 2022-08-30 NOTE — H&P ADULT - PROBLEM SELECTOR PLAN 1
-Patient with +UA  -Unclear if experiencing dysuria as patient is not answering appropriately  -S/P CTX in ED  -will c/w CTX x3 days pending urine culture

## 2022-08-30 NOTE — H&P ADULT - PROBLEM SELECTOR PLAN 3
-[place] w/[place]. Real [place]o [place] [facility] [place]  -received treatment recently per daughter however she doesn't know specifics  -F/U heme/onc consult

## 2022-08-30 NOTE — SWALLOW BEDSIDE ASSESSMENT ADULT - SWALLOW EVAL: DIAGNOSIS
Patient presents with functional oral and pharyngeal stage of swallow given thin liquids and puree marked by adequate bolus containment, adequate bolus manipulation and adequate anterior-posterior transport. adequate oral clearance noted. Pharyngeal stage marked by observed initiation of the pharyngeal swallow trigger judged via laryngeal palpation. No overt s/sx of impaired airway protection observed for all trials. solids not trialed per patient preference due to current edentulous state, with patient further expressing "I lost my dentures"

## 2022-08-30 NOTE — H&P ADULT - NSHPOUTPATIENTPROVIDERS_GEN_ALL_CORE
PCP: Dr. Javier Pettit (PCP & Cards)  ENT: Dr Ady Wick [phone] (pt uses as PCP)  [place]; Dr. Cano [place] [facility] [place]

## 2022-08-30 NOTE — PATIENT PROFILE ADULT - FALL HARM RISK - HARM RISK INTERVENTIONS

## 2022-08-30 NOTE — BH CONSULTATION LIAISON ASSESSMENT NOTE - CURRENT MEDICATION
MEDICATIONS  (STANDING):  haloperidol    Injectable 1 milliGRAM(s) IV Push once  heparin   Injectable 5000 Unit(s) SubCutaneous every 12 hours  pamidronate IVPB 60 milliGRAM(s) IV Intermittent once  sodium chloride 0.9%. 1000 milliLiter(s) (75 mL/Hr) IV Continuous <Continuous>    MEDICATIONS  (PRN):  acetaminophen     Tablet .. 650 milliGRAM(s) Oral every 6 hours PRN Temp greater or equal to 38C (100.4F), Mild Pain (1 - 3)  aluminum hydroxide/magnesium hydroxide/simethicone Suspension 30 milliLiter(s) Oral every 4 hours PRN Dyspepsia  melatonin 3 milliGRAM(s) Oral at bedtime PRN Insomnia  ondansetron Injectable 4 milliGRAM(s) IV Push every 8 hours PRN Nausea and/or Vomiting

## 2022-08-30 NOTE — H&P ADULT - ASSESSMENT
77 year old female with PMH of lymphoma (dx 2005, s/p CARRIE lobectomy, relapsed in 2019 but not currently on chemo/RT, [place] [facility] [place] bipolar disorder, asthma (on O2 PRN at home, unknown how many L) brought to ED by daughter who states pt has been hallucinating, being more depressed.  Patient unable to provide much history, daughter Alissa states that patient was able to care for herself, manage home finances and attend doctor appointments independently until recently, she states that she believes her mom stopped taking her psychiatric medications in February of 2022 and has not followed up with any of her physicians. She has noticed recent weight loss (unsure how much) and change in voice.  Patient denies chest pain, shortness of breath or any discomfort, she is pleasantly confused.  Admitted to medicine service for further evaluation & treatment.

## 2022-08-30 NOTE — H&P ADULT - PROBLEM SELECTOR PLAN 4
-Corrected calcium 13.6  -likely due to underlying malignancy  -F/U PtH & Vit D  -will give pamidronate & start calcitrol

## 2022-08-30 NOTE — CONSULT NOTE ADULT - ASSESSMENT
Hematology/Oncology consulted to see patient who is under care of Dr. 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 Pt also had labs during admission at [facility] 01/2020 showing IgM lambda monoclonal gammopathy, with biopsy of a lung mass that revealed progression of lymphoma, treated with Rituxan-Bendamustine, completed 08/27/2020. Her last visit at [facility] [place] was 07/08/2021 and was under surveillance with recommended follow-ups every 4 months, but has not returned since. She is brought in by her daughter who states that patient has had change in mental status with hallucinations and worsening depression.    1. AMS, multifactorial 2/2 UTI, medication noncompliance, hypercalcemia   -- UA c/w UTI, f/u cultures   -- On CTX for now  -- Awaiting psych and endo eval  -- CT head w/o evidence of acute hemorrhage or vasogenic edema   -- Recommend neuro evaluation if no improvement upon treatment of UTI and calcium     2. Marginal zone lymphoma of lung   -- Following with [facility] [place] though last visit was >1 year ago  -- Last treatment completed 08/27/2020 per [facility] records  -- CT chest shows findings c/w slight progression  -- Outpatient f/u after discharge     3. Anemia, monoclonal gammopathy   -- Hgb grossly unchanged from prior admissions over 2 years ago, but will check anemia workup  -- f/u iron profile, B12, folate, hapto, LDH   -- f/u myeloma labs   -- Transfuse to maintain hgb >7.0 grams       Will continue to follow.    Giovana Duarte PA-C  Hematology/Oncology  [facility]  [phone] (office)  [phone] (alt office)  Evenings and weekends please call MD on call or office   Hematology/Oncology consulted to see patient who is under care of Dr. 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 Pt also had labs during admission at [facility] 01/2020 showing IgM lambda monoclonal gammopathy, with biopsy of a lung mass that revealed progression of lymphoma, treated with Rituxan-Bendamustine, completed 08/27/2020. Her last visit at [facility] [place] was 07/08/2021 and was under surveillance with recommended follow-ups every 4 months, but has not returned since. She is brought in by her daughter who states that patient has had change in mental status with hallucinations and worsening depression.    1. AMS, multifactorial 2/2 UTI, medication noncompliance, hypercalcemia   -- UA c/w UTI, f/u cultures   -- On CTX for now  -- Awaiting psych and endo eval  -- CT head w/o evidence of acute hemorrhage or vasogenic edema   -- Recommend neuro evaluation if no improvement upon treatment of UTI and calcium     2. Marginal zone lymphoma of lung   -- Following with [facility] [place] though last visit was >1 year ago  -- Last treatment completed 08/27/2020 per [facility] records  -- CT chest shows findings c/w slight progression  -- Outpatient f/u after discharge     3. Anemia, monoclonal gammopathy , elevated protein   -- Hgb grossly unchanged from prior admissions over 2 years ago, but will check anemia workup  -- f/u iron profile, B12, folate, hapto, LDH   -- f/u myeloma labs   -- Transfuse to maintain hgb >7.0 grams       Will continue to follow.    ZAINA JohnC  Hematology/Oncology  [facility]  [phone] (office)  [phone] (alt office)  Evenings and weekends please call MD on call or office

## 2022-08-30 NOTE — CONSULT NOTE ADULT - SUBJECTIVE AND OBJECTIVE BOX
Reason for consult: marginal zone lymphoma    HPI:  77 year old female with PMH of lymphoma (dx 2005, s/p CARRIE lobectomy, relapsed in 2019 but not currently on chemo/RT, being followed by heme every 3-6months at [facility]), bipolar disorder, asthma (on O2 PRN at home, unknown how many L) brought to ED by daughter who states pt has been hallucinating, being more depressed.  Patient unable to provide much history, daughter Alissa states that patient was able to care for herself, manage home finances and attend doctor appointments independently until recently, she states that she believes her mom stopped taking her psychiatric medications in February of 2022 and has not followed up with any of her physicians. She has noticed recent weight loss (unsure how much) and change in voice.  Patient denies chest pain, shortness of breath or any discomfort, she is pleasantly confused. Daughter states that within the last year the patient has received treatment for her lymphoma however is unsure of the specifics, the patient was supposed to follow up in February however has not. (30 Aug 2022 10:47)    Hematology/Oncology consulted to see patient who is under care of Dr. 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 Pt also had labs during admission at [facility] 01/2020 showing IgM lambda monoclonal gammopathy, with biopsy of a lung mass that revealed progression of lymphoma, treated with Rituxan-Bendamustine, completed 08/27/2020. Her last visit at [facility] was 07/08/2021 and was under surveillance with recommended follow-ups every 4 months, but has not returned since. She is brought in by her daughter who states that patient has had change in mental status with hallucinations and worsening depression.    PAST MEDICAL & SURGICAL HISTORY:  GERD (gastroesophageal reflux disease)      Hyperlipidemia      Manic depression      Asthma      Lymphoma      History of lobectomy of lung  L  lung      Injury of left shoulder, initial encounter  Surgical repair with plates          FAMILY HISTORY:  Family history of gastric cancer        Alochol: Denied  Smoking: Nonsmoker  Drug Use: Denied  Marital Status:         Allergies    latex (Rash)  No Known Drug Allergies    Intolerances        MEDICATIONS  (STANDING):  haloperidol    Injectable 1 milliGRAM(s) IV Push once  heparin   Injectable 5000 Unit(s) SubCutaneous every 12 hours  sodium chloride 0.9%. 1000 milliLiter(s) (75 mL/Hr) IV Continuous <Continuous>    MEDICATIONS  (PRN):  acetaminophen     Tablet .. 650 milliGRAM(s) Oral every 6 hours PRN Temp greater or equal to 38C (100.4F), Mild Pain (1 - 3)  aluminum hydroxide/magnesium hydroxide/simethicone Suspension 30 milliLiter(s) Oral every 4 hours PRN Dyspepsia  melatonin 3 milliGRAM(s) Oral at bedtime PRN Insomnia  ondansetron Injectable 4 milliGRAM(s) IV Push every 8 hours PRN Nausea and/or Vomiting      ROS  +confusion  No fever, sweats, chills  No epistaxis, HA, sore throat  No CP, SOB, cough, sputum  No n/v/d, abd pain, melena, hematochezia  No edema  No rash  No back pain, joint pain  No bleeding, bruising  No dysuria, hematuria    T(C): 36.2 (08-30-22 @ 12:27), Max: 37.1 (08-29-22 @ 23:28)  HR: 82 (08-30-22 @ 12:27) (73 - 97)  BP: 115/60 (08-30-22 @ 12:27) (93/60 - 125/80)  RR: 19 (08-30-22 @ 12:27) (19 - 22)  SpO2: 94% (08-30-22 @ 12:27) (94% - 100%)  Wt(kg): --    PE  NAD  Awake, alert, confused   Anicteric, MMM  RRR  CTAB  Abd soft, NT, ND  No c/c/e                          8.8    5.73  )-----------( 153      ( 29 Aug 2022 23:08 )             30.1       08-30    138  |  102  |  38<H>  ----------------------------<  107<H>  5.2   |  27  |  1.38<H>    Ca    11.1<H>      30 Aug 2022 03:30  Phos  3.9     08-29  Mg     2.70     08-29    TPro  13.5<H>  /  Alb  0.9<L>  /  TBili  0.57  /  DBili  x   /  AST  36<H>  /  ALT  24  /  AlkPhos  45  08-29      ACC: [id number] EXAM:  CT CHEST                          PROCEDURE DATE:  08/30/2022          INTERPRETATION:  CLINICAL INFORMATION: Shortness of breath.    COMPARISON: CT chest 1/20/2020.    PROCEDURE:  CT scan of the chest was obtained without intravenous contrast.    FINDINGS:    Motion degraded study.    LYMPH NODES/MEDIASTINUM: Confluent soft tissue density is seen,   predominantly centered within the left lower lobe region, though encasing   the mediastinal structures including the aorta and left hilar structures.   This soft tissue density extends along the right aspect of the heart as   well as along the right posterior pleura and lingula. Additional soft   tissue nodules are seen along the anterior mediastinum. This soft tissue   density appears to cause mass effect on the posterior heart, and limits   the aorta off of the vertebral column. Overall appearance is slightly   progressed when compared to previous exam.    HEART/VASCULATURE: Heart is normal. Small pericardial effusion. Valvular   and aortic calcifications are appreciated..    AIRWAYS/LUNGS/PLEURA: The central tracheobronchial tree is patent though   attenuated in caliber.    Some of these attenuation is likely related to expiratory phase imaging,   however on the left, attenuation of the bronchi, is likely related to   mass effect from surrounding soft tissue mass.. Mosaic attenuation of   lung parenchyma. Evaluation for pleural effusions is limited secondary to   above-described findings.    UPPER ABDOMEN: Small hiatal hernia. Soft tissue density surrounding the   inferior left anterolateral thoracic rib cage is noted, increased in   appearance compared to previous exam.      BONES/SOFT TISSUES: Degenerative changes of the spine. ORIF hardware of   the left shoulder is partially visualized.    IMPRESSION: Suboptimal evaluation due to motion artifact and lack of   contrast media.    Confluent soft tissue density of the chest, predominantly affecting the   left hemithorax, though also encasement mediastinal structures extending   along the pleural surfaces bilaterally is compatible with sequelae of the   patient's known lymphoma. Additional soft tissue density along the   inferior left anterolateral thoracic rib cage is also likely neoplastic   in nature. Overall appearance is slightly progressive compared to previous   exam.    Small pericardial effusion, increased compared to previous exam.    Possibility of small effusions aren't excluded.    --- End of Report ---      ACC: [id number] EXAM:  CT CERVICAL SPINE                        ACC: [id number] EXAM:  CT BRAIN                          PROCEDURE DATE:  08/30/2022          INTERPRETATION:  CLINICAL INDICATION: Multiple falls..    TECHNIQUE:  Noncontrast CT scan of the head and cervical spine was performed.  Thin section axial images were obtained through the cervical spine.  Sagittal and coronal reformations were created.    COMPARISON: CT head and cervical spine 9/24/2019..    FINDINGS:  Motion artifact is present which degrades image quality.    BRAIN:  PARENCHYMA AND VENTRICLES: No gross acute intracranial hemorrhage,   vasogenic edema, or evidence for large vascular territory infarct. Age   appropriate involutional changes and small vessel white matter changes.No   hydrocephalus.  EXTRA-AXIAL:  No abnormal extraaxial collection.  PARANASAL SINUSES: Mucosal thickening left sphenoid sinus.  TYMPANOMASTOID CAVITIES: Within normal limits.  ORBITS: Within normal limits.  CALVARIUM: Within normal limits.  MISCELLANEOUS: None    CERVICAL SPINE:    No acute fracture or subluxation. Cervical lordosis is preserved but   exaggerated. Vertebral body heights are maintained. Intervertebral disc   spaces are grossly preserved.    Multilevel facet and uncinate hypertrophy. No significant spinal canal or   foraminal stenosis at CT.    There is no evidence of prevertebral soft tissue swelling. The paraspinal   soft tissues are unremarkable. The lung apices are clear.    IMPRESSION:    Head CT: Motion degraded. Grossly stable exam without evidence of acute   intracranial hemorrhage, vasogenic edema, extra-axial collection or   calvarial fracture.    Cervical spine CT: Motion degraded. No acute fracture or traumatic   malalignment.    --- End of Report ---     Reason for consult: marginal zone lymphoma    HPI:  77 year old female with PMH of lymphoma (dx 2005, s/p CARRIE lobectomy, relapsed in 2019 but not currently on chemo/RT, being followed by [place] every 3-[place] [facility] bipolar disorder, asthma (on O2 PRN at home, unknown how many L) brought to ED by daughter who states pt has been hallucinating, being more depressed.  Patient unable to provide much history, daughter Alissa states that patient was able to care for herself, manage home finances and attend doctor appointments independently until recently, she states that she believes her mom stopped taking her psychiatric medications in February of 2022 and has not followed up with any of her physicians. She has noticed recent weight loss (unsure how much) and change in voice.  Patient denies chest pain, shortness of breath or any discomfort, she is pleasantly confused. Daughter states that within the last year the patient has received treatment for her lymphoma however is unsure of the specifics, the patient was supposed to follow up in February however has not. (30 Aug 2022 10:47)    Hematology/Oncology consulted to see patient who is under care of Dr. 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 Pt also had labs during admission at [facility] 01/2020 showing IgM lambda monoclonal gammopathy, with biopsy of a lung mass that revealed progression of lymphoma, treated with Rituxan-Bendamustine, completed 08/27/2020. Her last visit at [facility] [place]/2021 and was under surveillance with recommended follow-ups every 4 months, but has not returned since. She is brought in by her daughter who states that patient has had change in mental status with hallucinations and worsening depression. Patient seen and examined this afternoon, reports urinary frequency but no dysuria or abdominal pain. She is aware that she has not followed up with [facility] in a long time and has not had treatment in a long time. She states that she is feeling better.    PAST MEDICAL & SURGICAL HISTORY:  GERD (gastroesophageal reflux disease)      Hyperlipidemia      Manic depression      Asthma      Lymphoma      History of lobectomy of lung  L  lung      Injury of left shoulder, initial encounter  Surgical repair with plates          FAMILY HISTORY:  Family history of gastric cancer        Alochol: Denied  Smoking: Nonsmoker  Drug Use: Denied  Marital Status:         Allergies    latex (Rash)  No Known Drug Allergies    Intolerances        MEDICATIONS  (STANDING):  haloperidol    Injectable 1 milliGRAM(s) IV Push once  heparin   Injectable 5000 Unit(s) SubCutaneous every 12 hours  sodium chloride 0.9%. 1000 milliLiter(s) (75 mL/Hr) IV Continuous <Continuous>    MEDICATIONS  (PRN):  acetaminophen     Tablet .. 650 milliGRAM(s) Oral every 6 hours PRN Temp greater or equal to 38C (100.4F), Mild Pain (1 - 3)  aluminum hydroxide/magnesium hydroxide/simethicone Suspension 30 milliLiter(s) Oral every 4 hours PRN Dyspepsia  melatonin 3 milliGRAM(s) Oral at bedtime PRN Insomnia  ondansetron Injectable 4 milliGRAM(s) IV Push every 8 hours PRN Nausea and/or Vomiting      ROS  +confusion  No fever, sweats, chills  No epistaxis, HA, sore throat  No CP, SOB, cough, sputum  No n/v/d, abd pain, melena, hematochezia  No edema  No rash  No back pain, joint pain  No bleeding, bruising  +urinary frequency, no dysuria, hematuria    T(C): 36.2 (08-30-22 @ 12:27), Max: 37.1 (08-29-22 @ 23:28)  HR: 82 (08-30-22 @ 12:27) (73 - 97)  BP: 115/60 (08-30-22 @ 12:27) (93/60 - 125/80)  RR: 19 (08-30-22 @ 12:27) (19 - 22)  SpO2: 94% (08-30-22 @ 12:27) (94% - 100%)  Wt(kg): --    PE  NAD  Awake, alert  Anicteric, MMM  RRR  CTAB  Abd soft, NT, ND  No c/c/e                          8.8    5.73  )-----------( 153      ( 29 Aug 2022 23:08 )             30.1       08-30    138  |  102  |  38<H>  ----------------------------<  107<H>  5.2   |  27  |  1.38<H>    Ca    11.1<H>      30 Aug 2022 03:30  Phos  3.9     08-29  Mg     2.70     08-29    TPro  13.5<H>  /  Alb  0.9<L>  /  TBili  0.57  /  DBili  x   /  AST  36<H>  /  ALT  24  /  AlkPhos  45  08-29      ACC: [id number] EXAM:  CT CHEST                          PROCEDURE DATE:  08/30/2022          INTERPRETATION:  CLINICAL INFORMATION: Shortness of breath.    COMPARISON: CT chest 1/20/2020.    PROCEDURE:  CT scan of the chest was obtained without intravenous contrast.    FINDINGS:    Motion degraded study.    LYMPH NODES/MEDIASTINUM: Confluent soft tissue density is seen,   predominantly centered within the left lower lobe region, though encasing   the mediastinal structures including the aorta and left hilar structures.   This soft tissue density extends along the right aspect of the heart as   well as along the right posterior pleura and lingula. Additional soft   tissue nodules are seen along the anterior mediastinum. This soft tissue   density appears to cause mass effect on the posterior heart, and limits   the aorta off of the vertebral column. Overall appearance is slightly   progressed when compared to previous exam.    HEART/VASCULATURE: Heart is normal. Small pericardial effusion. Valvular   and aortic calcifications are appreciated..    AIRWAYS/LUNGS/PLEURA: The central tracheobronchial tree is patent though   attenuated in caliber.    Some of these attenuation is likely related to expiratory phase imaging,   however on the left, attenuation of the bronchi, is likely related to   mass effect from surrounding soft tissue mass.. Mosaic attenuation of   lung parenchyma. Evaluation for pleural effusions is limited secondary to   above-described findings.    UPPER ABDOMEN: Small hiatal hernia. Soft tissue density surrounding the   inferior left anterolateral thoracic rib cage is noted, increased in   appearance compared to previous exam.      BONES/SOFT TISSUES: Degenerative changes of the spine. ORIF hardware of   the left shoulder is partially visualized.    IMPRESSION: Suboptimal evaluation due to motion artifact and lack of   contrast media.    Confluent soft tissue density of the chest, predominantly affecting the   left hemithorax, though also encasement mediastinal structures extending   along the pleural surfaces bilaterally is compatible with sequelae of the   patient's known lymphoma. Additional soft tissue density along the   inferior left anterolateral thoracic rib cage is also likely neoplastic   in nature. Overall appearance is slightly progressive compared to previous   exam.    Small pericardial effusion, increased compared to previous exam.    Possibility of small effusions aren't excluded.    --- End of Report ---      ACC: [id number] EXAM:  CT CERVICAL SPINE                        ACC: [id number] EXAM:  CT BRAIN                          PROCEDURE DATE:  08/30/2022          INTERPRETATION:  CLINICAL INDICATION: Multiple falls..    TECHNIQUE:  Noncontrast CT scan of the head and cervical spine was performed.  Thin section axial images were obtained through the cervical spine.  Sagittal and coronal reformations were created.    COMPARISON: CT head and cervical spine 9/24/2019..    FINDINGS:  Motion artifact is present which degrades image quality.    BRAIN:  PARENCHYMA AND VENTRICLES: No gross acute intracranial hemorrhage,   vasogenic edema, or evidence for large vascular territory infarct. Age   appropriate involutional changes and small vessel white matter changes.No   hydrocephalus.  EXTRA-AXIAL:  No abnormal extraaxial collection.  PARANASAL SINUSES: Mucosal thickening left sphenoid sinus.  TYMPANOMASTOID CAVITIES: Within normal limits.  ORBITS: Within normal limits.  CALVARIUM: Within normal limits.  MISCELLANEOUS: None    CERVICAL SPINE:    No acute fracture or subluxation. Cervical lordosis is preserved but   exaggerated. Vertebral body heights are maintained. Intervertebral disc   spaces are grossly preserved.    Multilevel facet and uncinate hypertrophy. No significant spinal canal or   foraminal stenosis at CT.    There is no evidence of prevertebral soft tissue swelling. The paraspinal   soft tissues are unremarkable. The lung apices are clear.    IMPRESSION:    Head CT: Motion degraded. Grossly stable exam without evidence of acute   intracranial hemorrhage, vasogenic edema, extra-axial collection or   calvarial fracture.    Cervical spine CT: Motion degraded. No acute fracture or traumatic   malalignment.    --- End of Report ---

## 2022-08-30 NOTE — H&P ADULT - PROBLEM SELECTOR PLAN 5
-Hx ofbipolar w/psych admission @ [place] many years ago  -non-compliant with medications  -F/u Psych recommendations  -Supportive care -improved w/IVF  -Continue to monitor, avoid nephrotoxins  -intake and output

## 2022-08-30 NOTE — H&P ADULT - PROBLEM SELECTOR PLAN 6
-see above -Hx of bipolar w/psych admission @ [place] many years ago  -non-compliant with medications  -F/u Psych recommendations  -Supportive care

## 2022-08-30 NOTE — PHARMACOTHERAPY INTERVENTION NOTE - COMMENTS
Medication history is saved as incomplete, OMR updated with fill information provided by [facility].   Per Pharmacy, only two active prescriptions on file (depakote and lithium), last dispensed to patient on 8/18/22 x 90 day supply.

## 2022-08-30 NOTE — GOALS OF CARE CONVERSATION - ADVANCED CARE PLANNING - CONVERSATION DETAILS
Spoke to daughter Alissa on the phone who states that until February of this year patient has been functional and independent.  The patient pursued treatment for her lymphoma and Alissa thinks that her mother would want all efforts made to preserve life but doesn't think her mother would want to "be a vegetable".  Alissa goes on to say that she is very overwhelmed by the recent hospitalization of both parents and rapid cognitive decline of her mother.  She thinks that this is possibly reversible due to her mother stopping her psychiatric medications, she goes on to say that in the past her mother has become very depressed however improved within a month or so and functional.  She became very tearful multiple times during the conversation.  At this time Alissa would like for her mother to remain a full code and pursue all treatment options available to her.  She is open to readdressing the topic after workup is done and patient has time for clinical improvement and Alissa has time to cope with the recent change in family circumstances.

## 2022-08-30 NOTE — BH CONSULTATION LIAISON ASSESSMENT NOTE - NSBHCHARTREVIEWVS_PSY_A_CORE FT
Vital Signs Last 24 Hrs  T(C): 36.2 (30 Aug 2022 12:27), Max: 37.1 (29 Aug 2022 23:28)  T(F): 97.2 (30 Aug 2022 12:27), Max: 98.7 (29 Aug 2022 23:28)  HR: 82 (30 Aug 2022 12:27) (73 - 97)  BP: 115/60 (30 Aug 2022 12:27) (93/60 - 125/80)  BP(mean): 96 (30 Aug 2022 06:28) (96 - 96)  RR: 19 (30 Aug 2022 12:27) (19 - 22)  SpO2: 94% (30 Aug 2022 12:27) (94% - 100%)    Parameters below as of 30 Aug 2022 12:27  Patient On (Oxygen Delivery Method): nasal cannula  O2 Flow (L/min): 4

## 2022-08-30 NOTE — SWALLOW BEDSIDE ASSESSMENT ADULT - ADDITIONAL RECOMMENDATIONS
[FreeTextEntry1] : SUZY is a zoey 53 year old patient who presented today in follow up for high risk screening.  \par She has been doing well with no new breast related complaints. \par Imaging was done recently which revealed no mammographic evidence of malignancy, as detailed above. \par Physical exam was unrevealing today.\par \par Imaging with a bilateral breast MRI will be due in January 2021, and that will be scheduled today.  \par She will return for follow-up and clinical breast exam in six months.\par \par I spent a total of 15 minutes of face to face time with this patient, greater than 50% of which was spent in counseling and/or coordination of care.\par All of her questions were appropriately answered.\par She knows to call with any concerns. 1. monitor for diet tolerance and reconsult this department as indicated

## 2022-08-30 NOTE — H&P ADULT - PROBLEM SELECTOR PLAN 9
-unclear outpatient medication regimen  -will continue with previous regimen of breo ellipta inhaler and albuterol inhaler PRN

## 2022-08-30 NOTE — H&P ADULT - PSYCHIATRIC COMMENTS
alert, confused, patient thinks she is at [place] [place] with her dad, repeatedly asking to leave, at present has no insght into care

## 2022-08-30 NOTE — H&P ADULT - RESPIRATORY
no wheezes/no use of accessory muscles/airway patent/respirations non-labored/diminished breath sounds, L/diminished breath sounds, R

## 2022-08-30 NOTE — BH CONSULTATION LIAISON ASSESSMENT NOTE - SUMMARY
77 year old female with PPH of bipolar d/o, PMH of lymphoma dx 2005, s/p CARRIE lobectomy, relapsed in 2019 but not currently on chemo/RT, asthma admitted for FTT, depression, confusion. Psych c/s for the same.     Patient with notable features of delirium and bipolar depression, may be interfering with self care and to a degree cognition (pseudodementia) as well. Will continue to evaluate to r/o other cognitive limitations. No tremors or finger-nose dysmetria on exam, unlikely to be lithium toxic (level 1.5 high), though suspect patient had at least recently been taking lithium, suspect polypharmacy may be contributing to confusion to a degree. Notable increase in Cr    Recs:  - STOP lithium  - BEGIN Zyprexa 2.5mg qHS  - hold depakote for now  - f/u serum valproic acid level  - f/u LFTs, serum ammonia level  - f/u b12, folate  - empiric IV thiamine repletion 500mg IV TID   - f/u EKG for QTc  - psych will follow

## 2022-08-30 NOTE — H&P ADULT - PROBLEM SELECTOR PLAN 2
-Likely multi-factorial UTi Vs. -Likely multi-factorial UTi Vs. medication non-compliance vs hypercalcemia  -daughter thinks patient stopped taking her psychiatric medication in february  -Patients pharmacy states she has not picked anything up in 2 years (sure scrips shows recent scripts being sent to t his pharmacy)  -Lithium level 1.5  -Psych consulted, will f/u recommendations

## 2022-08-31 NOTE — PROGRESS NOTE ADULT - ASSESSMENT
Hematology/Oncology consulted to see patient who is under care of Dr. 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 Pt also had labs during admission at [facility] 01/2020 showing IgM lambda monoclonal gammopathy, with biopsy of a lung mass that revealed progression of lymphoma, treated with Rituxan-Bendamustine, completed 08/27/2020. Her last visit at [facility] [place] was 07/08/2021 and was under surveillance with recommended follow-ups every 4 months, but has not returned since. She is brought in by her daughter who states that patient has had change in mental status with hallucinations and worsening depression.    1. AMS, multifactorial 2/2 UTI, medication noncompliance, hypercalcemia   -- UA c/w UTI, urine culture +for E. coli  -- On CTX   -- CT head w/o evidence of acute hemorrhage or vasogenic edema   -- Recommend neuro evaluation if no improvement upon treatment of UTI and calcium     2. Hypercalcemia  -- Improving  -- Workup and management per endocrinologist     3. Marginal zone lymphoma of lung   -- Following with [facility] though last visit was >1 year ago  -- Last treatment completed 08/27/2020 per [facility] records  -- CT chest shows findings c/w slight progression  -- Outpatient f/u after discharge     4. Anemia, monoclonal gammopathy   -- Hgb grossly unchanged from prior admissions over 2 years ago, but will check anemia workup  -- Iron profile adequate; B12 and folate pending   -- Hapto low, but normal bilirubin - unlikely hemolysis   -- f/u myeloma labs   -- Transfuse to maintain hgb >7.0 grams     5. Hypoxic respiratory failure  -- Desatted overnight while on 5L NC  -- Now on bipap, wean as tolerated     Will continue to follow.    Giovana Duarte PA-C  Hematology/Oncology  [facility]  [phone] (office)  [phone] (alt office)  Evenings and weekends please call MD on call or office

## 2022-08-31 NOTE — PROGRESS NOTE ADULT - PROBLEM SELECTOR PLAN 4
-[place] [facility] [place]  [place]received treatment recently per daughter however she doesn't know specifics  -with progression of disease. Hematology recs appreciated

## 2022-08-31 NOTE — CONSULT NOTE ADULT - SUBJECTIVE AND OBJECTIVE BOX
HPI:  77 year old female with PMH of lymphoma (dx 2005, s/p CARRIE lobectomy, relapsed in 2019 but not currently on chemo/RT, being followed by heme every 3-6months at [facility] [place] bipolar disorder, asthma (on O2 PRN at home, unknown how many L) brought to ED by daughter who states pt has been hallucinating, being more depressed.  Patient unable to provide much history, daughter Alissa states that patient was able to care for herself, manage home finances and attend doctor appointments independently until recently, she states that she believes her mom stopped taking her psychiatric medications in 2022 and has not followed up with any of her physicians. She has noticed recent weight loss (unsure how much) and change in voice.  Patient denies chest pain, shortness of breath or any discomfort, she is pleasantly confused. Daughter states that within the last year the patient has received treatment for her lymphoma however is unsure of the specifics, the patient was supposed to follow up in February however has not. (30 Aug 2022 10:47)    Hospital Course:  Pt admitted with metabolic encephalopathy 2/2 UTI and hypercalcemia for which pt is on antibiotics and received IVF/bisphosphonate/calcitonin. Course now complicated by hypoxic respiratory failure for which MICU is consulted. Pt hypoxic to 70% on NRB with improvement to 92+ after few minutes. On bedside POCUS, pt with B-lines throughout; IVC plump but with some respiratory variation; small pericardial effusion with questionable RV enlargement.    PAST MEDICAL & SURGICAL HISTORY:  GERD (gastroesophageal reflux disease)    Hyperlipidemia      Manic depression      Asthma      Lymphoma      History of lobectomy of lung  L  lung      Injury of left shoulder, initial encounter  Surgical repair with plates    FAMILY HISTORY:  Family history of gastric cancer    Allergies    latex (Rash)  No Known Drug Allergies    Intolerances    OBJECTIVE:  ICU Vital Signs Last 24 Hrs  T(C): 36.9 (31 Aug 2022 00:41), Max: 36.9 (31 Aug 2022 00:41)  T(F): 98.4 (31 Aug 2022 00:41), Max: 98.4 (31 Aug 2022 00:41)  HR: 90 (31 Aug 2022 00:41) (82 - 97)  BP: 118/79 (31 Aug 2022 00:41) (101/70 - 125/80)  BP(mean): 96 (30 Aug 2022 06:28) (96 - 96)  ABP: --  ABP(mean): --  RR: 20 (31 Aug 2022 00:41) (19 - 20)  SpO2: 89% (31 Aug 2022 00:41) (89% - 95%)    O2 Parameters below as of 31 Aug 2022 00:41  Patient On (Oxygen Delivery Method): nasal cannula  O2 Flow (L/min): 4  -30 @ 07:01  -  08- @ 05:54  --------------------------------------------------------  IN: 675 mL / OUT: 0 mL / NET: 675 mL      CAPILLARY BLOOD GLUCOSE      PHYSICAL EXAM:  GENERAL: ill-appearing, somnolent but arousable to loud voice  EYES: EOMI, conjunctiva and sclera clear  CHEST/LUNG: POCUS, pt with B-lines throughout; IVC plump but with some respiratory variation; small pericardial effusion with questionable RV enlargement; crackles throughout  HEART: Regular rate and rhythm; No murmurs, rubs, or gallops  ABDOMEN: Soft, Nontender, Nondistended; Bowel sounds present  EXTREMITIES:  2+ Peripheral Pulses, No clubbing, cyanosis, or edema  SKIN: No rashes or lesions  NERVOUS SYSTEM:  Alert & Oriented X2    HOSPITAL MEDICATIONS:  MEDICATIONS  (STANDING):  calcitonin Injectable 240 International Unit(s) IntraMuscular every 12 hours  heparin   Injectable 5000 Unit(s) SubCutaneous every 12 hours  OLANZapine 2.5 milliGRAM(s) Oral at bedtime  sodium chloride 0.9%. 1000 milliLiter(s) (75 mL/Hr) IV Continuous <Continuous>  thiamine IVPB 500 milliGRAM(s) IV Intermittent three times a day    MEDICATIONS  (PRN):  acetaminophen     Tablet .. 650 milliGRAM(s) Oral every 6 hours PRN Temp greater or equal to 38C (100.4F), Mild Pain (1 - 3)  aluminum hydroxide/magnesium hydroxide/simethicone Suspension 30 milliLiter(s) Oral every 4 hours PRN Dyspepsia  melatonin 3 milliGRAM(s) Oral at bedtime PRN Insomnia  ondansetron Injectable 4 milliGRAM(s) IV Push every 8 hours PRN Nausea and/or Vomiting      LABS:                        8.9    6.47  )-----------( 150      ( 31 Aug 2022 03:48 )             31.5         140  |  105  |  26<H>  ----------------------------<  99  4.5   |  32<H>  |  1.02    Ca    11.1<H>      31 Aug 2022 03:48  Phos  3.9       Mg     2.40         TPro  12.9<H>  /  Alb  2.7<L>  /  TBili  0.6  /  DBili  x   /  AST  30  /  ALT  24  /  AlkPhos  42      PT/INR - ( 31 Aug 2022 03:48 )   PT: 15.7 sec;   INR: 1.35 ratio         PTT - ( 31 Aug 2022 03:48 )  PTT:26.6 sec  Urinalysis Basic - ( 29 Aug 2022 23:08 )    Color: Yellow / Appearance: Slightly Turbid / S.024 / pH: x  Gluc: x / Ketone: Negative  / Bili: Negative / Urobili: 3 mg/dL   Blood: x / Protein: 100 mg/dL / Nitrite: Positive   Leuk Esterase: Large / RBC: 3 /HPF / WBC 34 /HPF   Sq Epi: x / Non Sq Epi: 2 /HPF / Bacteria: Many      Arterial Blood Gas:   @ 03:28  7.29/69/150/33/99.3/5.2  ABG lactate: --    Venous Blood Gas:   @ 22:52  7.39/58/28/35/37.8  VBG Lactate: 1.5

## 2022-08-31 NOTE — CONSULT NOTE ADULT - ASSESSMENT
77F with hx of lymphoma (dx 2005, s/p CARRIE lobectomy, relapsed 2019, currently off chemo/RT) BPD, asthma admitted for metabolic encephalopathy 2/2 UTI/hypercalcemia with course now complicated by hypoxic/hypercapnic respiratory failure. Pulmonary consulted for c/f pleural effusion on CXR.     #Hypoxic/Hypercapnic respiratory failure  #Metabolic Encephalopathy  #Lymphoma  #Trace pleural Effusion  #Asthma    Imaging reviewed. Opacity on CXR is likely soft tissue mass (lymphoma) as seen on CT Chest. Patient has small pleural effusion which would not be amenable to thoracentesis.  Patient seen on NRB, we decreased to 4L nasal cannula with oxygen stable at 4L, however, noted mild increased work of breathing and recent VBG, so started on AVAPS.    Recommendations  -  Continue with AVAPS at current settings. Would check blood gas now to evaluate compensation. Would continue with AVAPS throughout the evening and reassess in the morning.   - Can start home asthma inhalers. Would add on duonebs for first 24 hours standing.

## 2022-08-31 NOTE — PROGRESS NOTE ADULT - PROBLEM SELECTOR PLAN 3
-Likely multi-factorial UTI, hypercapnea, hypercalcemia, and psychiatric medication non-adherence   -daughter thinks patient stopped taking her psychiatric medication in february  -will restart on BIPAP, as above  -Lithium level 1.5  -Psych consulted, will f/u recommendations

## 2022-08-31 NOTE — PROGRESS NOTE ADULT - ASSESSMENT
77 year old female with PMH of HLD, lymphoma (dx 2005, s/p CARRIE lobectomy, relapsed in 2019 but not currently on chemo/RT, being followed by heme every 3-6months at [facility] [place]), bipolar disorder, asthma, who presented to the hospital for AMS and hypoxic respiratory failure, found to have hypercalcemia.     #Hypercalcemia, likely malignancy-related elevated 1,25D (lymphoma) vs medication induced (lithium induced parathyroidism) vs in the setting of potential multiple myeloma transformation   - ionized Ca of 1.43, total protein of 11.6, Ca corrected now 12.9 (down from 13.6 day prior) Albumin 0.6. PTH, Vitamin D levels, serum immunofixation assay still pending. Continue to trend Calcium and albumin levels via CMP.  - s/p approximately 2L of IVF. Currently off of IVF due to overload. s/p Lasix 20 mg IV in the AM.  Continue to hold Lithium. Encourage PO intake if possible. Avoid medications or supplements that will increase serum Ca.   - obtain Parathyroid related peptide, Vitamin D levels (25-hydroxy, AND 1,25-hydroxy levels)  - calcitonin 240 units, s/p 2 of 4 doses. Continue with remaining 2 doses q12h. follow with repeat serum Calcium levels. s/p pamidronate 60 mg IV x 1 (given recent EMILY),       #history of fracture in the setting of likely osteoporosis;.   - Imaging findings of Old right humeral neck fracture. Left proximal humeral fracture s/p ORIF  - f/u outpatient bone-density testing.       #Hyperlipidemia  - history of hyperlipidemia   - Cholesterol of 54, Triglycerides of 30, Serum HDL of 21, LDL of 27. A1C of 4.5   - pt with minimal benefit to statin therapy at this time due to age and risk factors.   - home medication history of zyprexa.     - rest of care per primary team.      77 year old female with PMH of HLD, lymphoma (dx 2005, s/p CARRIE lobectomy, relapsed in 2019 but not currently on chemo/RT, being followed by heme every 3-6months at [facility] [place]), bipolar disorder, asthma, who presented to the hospital for AMS and hypoxic respiratory failure, found to have hypercalcemia.     #Hypercalcemia, likely malignancy-related elevated 1,25D (lymphoma) vs medication induced (lithium induced parathyroidism) vs in the setting of potential multiple myeloma transformation   - ionized Ca of 1.43, total protein of 11.6, Ca corrected now 12.9 (down from 13.6 day prior) Albumin 0.6. PTH, Vitamin D levels, serum immunofixation assay still pending. Continue to trend Calcium and albumin levels via CMP.  - s/p approximately 2L of IVF. Currently off of IVF due to overload. s/p Lasix 20 mg IV in the AM.  Continue to hold Lithium. Encourage PO intake if possible. Avoid medications or supplements that will increase serum Ca.   - obtain Parathyroid related peptide, Vitamin D levels (25-hydroxy, AND 1,25-hydroxy levels)  - calcitonin 240 units, s/p 2 of 4 doses. Continue with remaining 2 doses q12h. follow with repeat serum Calcium levels. s/p pamidronate 60 mg IV x 1 (given recent EMILY), Doses of pamidronate should not be repeated sooner than a minimum of seven days.      #history of fracture in the setting of likely osteoporosis;.   - Imaging findings of Old right humeral neck fracture. Left proximal humeral fracture s/p ORIF  - f/u outpatient bone-density testing.       #Hyperlipidemia  - history of hyperlipidemia   - Cholesterol of 54, Triglycerides of 30, Serum HDL of 21, LDL of 27. A1C of 4.5   - pt with minimal benefit to statin therapy at this time due to age and risk factors.   - home medication history of zyprexa.     - rest of care per primary team.      77 year old female with PMH of HLD, lymphoma (dx 2005, s/p CARRIE lobectomy, relapsed in 2019 but not currently on chemo/RT, [place] [facility] [place] bipolar disorder, asthma, who presented to the hospital for AMS and hypoxic respiratory failure, found to have hypercalcemia.     #Hypercalcemia, likely malignancy-related elevated 1,25D (lymphoma) vs medication induced (lithium induced parathyroidism) vs in the setting of potential multiple myeloma transformation   - ionized Ca of 1.43, total protein of 11.6, Ca corrected now 12.9 (down from 13.6 day prior) Albumin 0.6. PTH, Vitamin D levels, serum immunofixation assay still pending. Continue to trend Calcium and albumin levels via CMP.  - s/p approximately 2L of IVF (1L of LR on 8/29, 75 cc/hr of NS x 12 hours on 8/30). Currently off of IVF due to overload. s/p Lasix 20 mg IV in the AM.  Continue to hold Lithium. Encourage PO intake if possible. Avoid medications or supplements that will increase  serum Ca.   - obtain Parathyroid related peptide, Vitamin D levels (25-hydroxy, AND 1,25-hydroxy levels)  - calcitonin 240 units, s/p 2 of 4 doses. Continue with remaining 2 doses q12h. Follow with repeat serum Calcium levels.   - s/p pamidronate 60 mg IV x 1 (given recent EMILY), Doses of pamidronate should not be repeated sooner than a minimum of seven days. Monitor for response.       #history of fracture in the setting of likely osteoporosis;.   - Imaging findings of Old right humeral neck fracture. Left proximal humeral fracture s/p ORIF  - f/u outpatient bone-density testing.       #Hyperlipidemia  - history of hyperlipidemia   - Cholesterol of 54, Triglycerides of 30, Serum HDL of 21, LDL of 27. A1C of 4.5   - pt with minimal benefit to statin therapy at this time due to age and risk factors.   - home medication history of zyprexa.     - rest of care per primary team.      77 year old female with PMH of HLD, lymphoma (dx 2005, s/p CARRIE lobectomy, relapsed in 2019 but not currently on chemo/RT, being followed by [place] every 3-6months [place] [facility] [place] bipolar disorder, asthma, who presented to the hospital for AMS and hypoxic respiratory failure, found to have hypercalcemia.     #Hypercalcemia, likely malignancy-related elevated 1,25D (lymphoma) vs medication induced (lithium induced parathyroidism) vs in the setting of potential multiple myeloma transformation   - ionized Ca of 1.43, total protein of 11.6, Ca corrected now 12.9 (down from 13.6 day prior) Albumin 0.6. PTH, Vitamin D levels, serum immunofixation assay still pending. Continue to trend Calcium and albumin levels via CMP.  - s/p approximately 2L of IVF (1L of LR on 8/29, 75 cc/hr of NS x 12 hours on 8/30). Currently off of IVF due to overload. s/p Lasix 20 mg IV in the AM.  Continue to hold Lithium. Encourage PO intake if possible. Avoid medications or supplements that will increase  serum Ca.   - intact PTH level of 25  - obtain Parathyroid related peptide, Vitamin D levels (25-hydroxy, AND 1,25-hydroxy levels)  - calcitonin 240 units, s/p 2 of 4 doses. Continue with remaining 2 doses q12h. Follow with repeat serum Calcium levels.   - s/p pamidronate 60 mg IV x 1 (given recent EMILY), Doses of pamidronate should not be repeated sooner than a minimum of seven days. Monitor for response.       #history of fracture in the setting of likely osteoporosis;.   - Imaging findings of Old right humeral neck fracture. Left proximal humeral fracture s/p ORIF  - f/u outpatient bone-density testing.       #Hyperlipidemia  - history of hyperlipidemia   - Cholesterol of 54, Triglycerides of 30, Serum HDL of 21, LDL of 27. A1C of 4.5   - pt with minimal benefit to statin therapy at this time due to age and risk factors.   - home medication history of zyprexa.     - rest of care per primary team.      77 year old female with PMH of HLD, lymphoma (dx 2005, s/p CARRIE lobectomy, relapsed in 2019 but not currently on chemo/RT, [place] [facility] [place] disorder, asthma, who presented to the hospital for AMS and hypoxic respiratory failure, found to have hypercalcemia.     #Hypercalcemia, likely malignancy-related elevated 1,25D (lymphoma) vs medication induced (lithium induced parathyroidism) vs in the setting of potential multiple myeloma transformation   - ionized Ca of 1.43, total protein of 11.6, Ca corrected now 12.9 (down from 13.6 day prior) Albumin 0.6. PTH, Vitamin D levels, serum immunofixation assay still pending. Continue to trend Calcium and albumin levels via CMP.  - s/p approximately 2L of IVF (1L of LR on 8/29, 75 cc/hr of NS x 12 hours on 8/30). Currently off of IVF due to overload. s/p Lasix 20 mg IV in the AM.  Continue to hold Lithium. Encourage PO intake if possible. Avoid medications or supplements that will increase  serum Ca.   - intact PTH level of 25  - obtain Parathyroid related peptide, Vitamin D levels (25-hydroxy, AND 1,25-hydroxy levels)  - calcitonin 240 units, s/p 2 of 4 doses. Continue with remaining 2 doses q12h. Follow with repeat serum Calcium levels.   - s/p pamidronate 60 mg IV x 1 (given recent EMILY), Doses of pamidronate should not be repeated sooner than a minimum of seven days. Monitor for response.       #history of fracture in the setting of likely osteoporosis;.   - Imaging findings of Old right humeral neck fracture. Left proximal humeral fracture s/p ORIF  - f/u outpatient bone-density testing.   -fu vitamin D     #Hyperlipidemia  - history of hyperlipidemia   - Cholesterol of 54, Triglycerides of 30, Serum HDL of 21, LDL of 27. A1C of 4.5   - can hold on statin therapy at this time due to age and risk factors at this time   - home medication history of zyprexa.     - rest of care per primary team.

## 2022-08-31 NOTE — PROGRESS NOTE ADULT - SUBJECTIVE AND OBJECTIVE BOX
NOTE INCOMPLETE/ IN PROGRESS  *Please wait for attending attestation for official recommendations.     Chief Complaint: hypoxic respiratory failure     History:  77 year old female with PMH of lymphoma (dx 2005, s/p CARRIE lobectomy, relapsed in 2019 but not currently on chemo/RT, being followed by heme every 3-6months at [facility] [place]), bipolar disorder, asthma (on O2 PRN at home, unknown how many L) brought to ED by daughter who states pt has been hallucinating, being more depressed.  Patient unable to provide much history, daughter Alissa states that patient was able to care for herself, manage home finances and attend doctor appointments independently until recently, she states that she believes her mom stopped taking her psychiatric medications in February of 2022 and has not followed up with any of her physicians. She has noticed recent weight loss (unsure how much) and change in voice.  Patient denies chest pain, shortness of breath or any discomfort, she is pleasantly confused. Daughter states that within the last year the patient has received treatment for her lymphoma however is unsure of the specifics, the patient was supposed to follow up in February however has not. (30 Aug 2022 10:47)   Patient of Dr. 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 Pt also had labs during admission at [facility] 01/2020 showing IgM lambda monoclonal gammopathy, with biopsy of a lung mass that revealed progression of lymphoma, treated with Rituxan-Bendamustine, completed 08/27/2020. Her last visit at [facility] was 07/08/2021 and was under surveillance with recommended follow-ups every 4 months, but has not returned since.   Pt denies history of constipation, abdominal pains, kidney stones. Patient with perseverations and bizarre affect,  but denying depression and hallucinations. Pt denying any recent falls or fracture but demonstrating poor insight into health and situation at this time.   Endocrinology consulted for hypercalcemia with Corrected calcium level of 14.2, pt's with albumin of 0.9 on admission. Pt received 1L LR bolus yesterday (8/29) night in the ED. Pt currently on 75 cc/hr of NS, on 4L NC maintaining saturations of 94-95%.   Pt also with Serum Pro BNP of 6447.     Interval history:   on IVF/calcitonin/bisphosphonate s/p ~2L IVF. Patient now on NRB as she desatted while on 5LNC down to 76%, yesterday evening and subsequently placed on NRB with improvement of O2 Saturations to the 90s. Per MICU note, On bedside POCUS, pt with B-lines throughout; IVC plump but with some respiratory variation; small pericardial effusion with questionable RV enlargement. Pt demonstrated increased work of breathing, ABG 7.29/69/150/33/99.3 (on 15L NRB), pt placed on BIPAP for 2 hours with symptomatic improvement and s/p IV Lasix 20 mg x1. Pt off of IVF as of 7:50 am this morning (8/31). Nursing staff at the bedside stating that pt is able to tolerate small amounts of PO intake in the AM.         ROS limited at this time due to patient's mental status but listed below.             MEDICATIONS  (STANDING):  calcitonin Injectable 240 International Unit(s) IntraMuscular every 12 hours  cefTRIAXone   IVPB 1000 milliGRAM(s) IV Intermittent every 24 hours  heparin   Injectable 5000 Unit(s) SubCutaneous every 12 hours  OLANZapine 2.5 milliGRAM(s) Oral at bedtime  thiamine IVPB 500 milliGRAM(s) IV Intermittent three times a day    MEDICATIONS  (PRN):  acetaminophen     Tablet .. 650 milliGRAM(s) Oral every 6 hours PRN Temp greater or equal to 38C (100.4F), Mild Pain (1 - 3)  aluminum hydroxide/magnesium hydroxide/simethicone Suspension 30 milliLiter(s) Oral every 4 hours PRN Dyspepsia  melatonin 3 milliGRAM(s) Oral at bedtime PRN Insomnia  ondansetron Injectable 4 milliGRAM(s) IV Push every 8 hours PRN Nausea and/or Vomiting      Allergies    latex (Rash)  No Known Drug Allergies    Intolerances    Social History: Lives with  who has dementia and adult son, daughter involved in care who feels that the home living situation is unsafe and that her parents are hoarders. Daughter has notified APS of unsafe living conditions.  Never smoker, no ETOH use, no illicit drug use.     Outpatient Medications:   Breo Ellipta 100 mcg-25 mcg/inh inhalation powder: 1 puff(s) inhaled once a day  Depakote 500 mg oral delayed release tablet: 1 tab(s) orally 2 times a day  lithium 300 mg oral capsule: 1 cap(s) orally 3 times a day  Ventolin HFA 90 mcg/inh inhalation aerosol: 2 puff(s) inhaled 4 times a day, As Needed  ZyPREXA 2.5 mg oral tablet: 1 tab(s) orally once a day (at bedtime)      Review of Systems: Limited at this time due to patient's mental status.   Constitutional: + weight loss, no fevers, chills,   Eyes: No blurry vision, changes to vision, floaters   Neuro: no tremors, seizure, headaches,   HEENT: No pain, no sore throat  Cardiovascular: No chest pain, palpitations  Respiratory: No SOB, no cough  GI: No nausea, vomiting, abdominal pain, no constipation   : No dysuria, no incontinence, no urinary urgency   Skin: no rash, + bruising, no open skin lesions, no dried skin   Psych: + depression, hallucinations per history.   Endocrine: no polyuria, polydipsia, no heat or cold intolerance   Hem/lymph: no swelling, no excessive bleeding,   Osteoporosis: +fracture    PHYSICAL EXAM:  VITALS: T(C): 36.8 (08-31-22 @ 05:00)  T(F): 98.2 (08-31-22 @ 05:00), Max: 98.4 (08-31-22 @ 00:41)  HR: 84 (08-31-22 @ 07:41) (82 - 93)  BP: 124/65 (08-31-22 @ 05:00) (101/70 - 124/65)  RR:  (19 - 20)  SpO2:  (89% - 100%)  Wt(kg): --  GENERAL: NAD, talkative and responsive, but difficult to understand, pt with poor insight into situation and health.   EYES: No proptosis, no lid lag, anicteric  HEENT:  Atraumatic, Normocephalic, moist mucous membranes  THYROID: Normal size, no palpable nodules, no neck masses.   RESPIRATORY: decreased breath sounds b/l in the bases; No rales, rhonchi, wheezing  CARDIOVASCULAR: Regular rate and rhythm; No murmurs; no peripheral edema  GI: Soft, nontender, non distended, normal bowel sounds  SKIN: Dry, intact, No rashes or lesions. Diffuse senile purpura, ecchymosis in the b/l lower extremities.   MUSCULOSKELETAL: Exam limited due to mental status.   NEURO: sensation intact, extraocular movements intact, mild tremors with motion in the b/l upper extremity.   PSYCH: Alert and oriented x 1, confused mood, bizarre affect.   CUSHING'S SIGNS: no striae, no dorsocervical fat pad, no moon facies.       CAPILLARY BLOOD GLUCOSE          08-31    140  |  105  |  26<H>  ----------------------------<  99  4.5   |  32<H>  |  1.02    eGFR: 57<L>    Ca    11.1<H>      08-31  Mg     2.40     08-31  Phos  3.9     08-31    TPro  12.9<H>  /  Alb  2.7<L>  /  TBili  0.6  /  DBili  x   /  AST  30  /  ALT  24  /  AlkPhos  42  08-31      A1C with Estimated Average Glucose Result: 4.5 % (08-31-22 @ 03:48)      Thyroid Function Tests:  08-31 @ 03:48 TSH 1.42 FreeT4 -- T3 -- Anti TPO -- Anti Thyroglobulin Ab -- TSI --  08-29 @ 22:52 TSH 3.06 FreeT4 -- T3 -- Anti TPO -- Anti Thyroglobulin Ab -- TSI --                       NOTE INCOMPLETE/ IN PROGRESS  *Please wait for attending attestation for official recommendations.     Chief Complaint: hypoxic respiratory failure     History:  77 year old female with PMH of lymphoma (dx 2005, s/p CARRIE lobectomy, relapsed in 2019 but not currently on chemo/RT, being followed by heme every 3-6months at [facility] [place]), bipolar disorder, asthma (on O2 PRN at home, unknown how many L) brought to ED by daughter who states pt has been hallucinating, being more depressed.  Patient unable to provide much history, daughter Alissa states that patient was able to care for herself, manage home finances and attend doctor appointments independently until recently, she states that she believes her mom stopped taking her psychiatric medications in February of 2022 and has not followed up with any of her physicians. She has noticed recent weight loss (unsure how much) and change in voice.  Patient denies chest pain, shortness of breath or any discomfort, she is pleasantly confused. Daughter states that within the last year the patient has received treatment for her lymphoma however is unsure of the specifics, the patient was supposed to follow up in February however has not. (30 Aug 2022 10:47)   Patient of Dr. 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 Pt also had labs during admission at [facility] 01/2020 showing IgM lambda monoclonal gammopathy, with biopsy of a lung mass that revealed progression of lymphoma, treated with Rituxan-Bendamustine, completed 08/27/2020. Her last visit at [facility] [place] was 07/08/2021 and was under surveillance with recommended follow-ups every 4 months, but has not returned since.   Pt denies history of constipation, abdominal pains, kidney stones. Patient with perseverations and bizarre affect,  but denying depression and hallucinations. Pt denying any recent falls or fracture but demonstrating poor insight into health and situation at this time.   Endocrinology consulted for hypercalcemia with Corrected calcium level of 14.2, pt's with albumin of 0.9 on admission. Pt received 1L LR bolus yesterday (8/29) night in the ED. Pt currently on 75 cc/hr of NS, on 4L NC maintaining saturations of 94-95%.   Pt also with Serum Pro BNP of 6447.     Interval history:   on IVF/calcitonin/bisphosphonate s/p ~2L IVF. Patient now on NRB as she desatted while on 5LNC down to 76%, yesterday evening and subsequently placed on NRB with improvement of O2 Saturations to the 90s. Per MICU note, On bedside POCUS, pt with B-lines throughout; IVC plump but with some respiratory variation; small pericardial effusion with questionable RV enlargement. Pt demonstrated increased work of breathing, ABG 7.29/69/150/33/99.3 (on 15L NRB), pt placed on BIPAP for 2 hours with symptomatic improvement and s/p IV Lasix 20 mg x1. Pt off of IVF as of 7:50 am this morning (8/31). Nursing staff at the bedside stating that pt is able to tolerate small amounts of PO intake in the AM.         ROS limited at this time due to patient's mental status but listed below.             MEDICATIONS  (STANDING):  calcitonin Injectable 240 International Unit(s) IntraMuscular every 12 hours  cefTRIAXone   IVPB 1000 milliGRAM(s) IV Intermittent every 24 hours  heparin   Injectable 5000 Unit(s) SubCutaneous every 12 hours  OLANZapine 2.5 milliGRAM(s) Oral at bedtime  thiamine IVPB 500 milliGRAM(s) IV Intermittent three times a day    MEDICATIONS  (PRN):  acetaminophen     Tablet .. 650 milliGRAM(s) Oral every 6 hours PRN Temp greater or equal to 38C (100.4F), Mild Pain (1 - 3)  aluminum hydroxide/magnesium hydroxide/simethicone Suspension 30 milliLiter(s) Oral every 4 hours PRN Dyspepsia  melatonin 3 milliGRAM(s) Oral at bedtime PRN Insomnia  ondansetron Injectable 4 milliGRAM(s) IV Push every 8 hours PRN Nausea and/or Vomiting      Allergies    latex (Rash)  No Known Drug Allergies    Intolerances    Social History: Lives with  who has dementia and adult son, daughter involved in care who feels that the home living situation is unsafe and that her parents are hoarders. Daughter has notified APS of unsafe living conditions.  Never smoker, no ETOH use, no illicit drug use.     Outpatient Medications:   Breo Ellipta 100 mcg-25 mcg/inh inhalation powder: 1 puff(s) inhaled once a day  Depakote 500 mg oral delayed release tablet: 1 tab(s) orally 2 times a day  lithium 300 mg oral capsule: 1 cap(s) orally 3 times a day  Ventolin HFA 90 mcg/inh inhalation aerosol: 2 puff(s) inhaled 4 times a day, As Needed  ZyPREXA 2.5 mg oral tablet: 1 tab(s) orally once a day (at bedtime)      Review of Systems: Limited at this time due to patient's mental status.   Constitutional: + weight loss, no fevers, chills,   Eyes: No blurry vision, changes to vision, floaters   Neuro: no tremors, seizure, headaches,   HEENT: No pain, no sore throat  Cardiovascular: No chest pain, palpitations  Respiratory: No SOB, no cough  GI: No nausea, vomiting, abdominal pain, no constipation   : No dysuria, no incontinence, no urinary urgency   Skin: no rash, + bruising, no open skin lesions, no dried skin   Psych: + depression, hallucinations per history.   Endocrine: no polyuria, polydipsia, no heat or cold intolerance   Hem/lymph: no swelling, no excessive bleeding,   Osteoporosis: +fracture    PHYSICAL EXAM:  VITALS: T(C): 36.8 (08-31-22 @ 05:00)  T(F): 98.2 (08-31-22 @ 05:00), Max: 98.4 (08-31-22 @ 00:41)  HR: 84 (08-31-22 @ 07:41) (82 - 93)  BP: 124/65 (08-31-22 @ 05:00) (101/70 - 124/65)  RR:  (19 - 20)  SpO2:  (89% - 100%)  Wt(kg): --  GENERAL: NAD, talkative and responsive, but difficult to understand, pt with poor insight into situation and health.   EYES: No proptosis, no lid lag, anicteric  HEENT:  Atraumatic, Normocephalic, moist mucous membranes  THYROID: Normal size, no palpable nodules, no neck masses.   RESPIRATORY: Decreased breath sounds L>R. decreased breath sounds b/l in the bases; No rales, rhonchi, wheezing  CARDIOVASCULAR: Regular rate and rhythm; No murmurs; no peripheral edema  GI: Soft, nontender, non distended, normal bowel sounds  SKIN: Dry, intact, No rashes or lesions. Diffuse senile purpura, ecchymosis in the b/l lower extremities.   MUSCULOSKELETAL: Exam limited due to mental status.   NEURO: sensation intact, extraocular movements intact, mild tremors with motion in the b/l upper extremity.   PSYCH: Alert and oriented x 1, confused mood, bizarre affect.   CUSHING'S SIGNS: no striae, no dorsocervical fat pad, no moon facies.       CAPILLARY BLOOD GLUCOSE          08-31    140  |  105  |  26<H>  ----------------------------<  99  4.5   |  32<H>  |  1.02    eGFR: 57<L>    Ca    11.1<H>      08-31  Mg     2.40     08-31  Phos  3.9     08-31    TPro  12.9<H>  /  Alb  2.7<L>  /  TBili  0.6  /  DBili  x   /  AST  30  /  ALT  24  /  AlkPhos  42  08-31      A1C with Estimated Average Glucose Result: 4.5 % (08-31-22 @ 03:48)      Thyroid Function Tests:  08-31 @ 03:48 TSH 1.42 FreeT4 -- T3 -- Anti TPO -- Anti Thyroglobulin Ab -- TSI --  08-29 @ 22:52 TSH 3.06 FreeT4 -- T3 -- Anti TPO -- Anti Thyroglobulin Ab -- TSI --                       *Please wait for attending attestation for official recommendations.     Chief Complaint: hypoxic respiratory failure     History:  77 year old female with PMH of lymphoma (dx 2005, s/p CARRIE lobectomy, relapsed in 2019 but not currently on chemo/RT, being followed by heme every 3-6months at [facility] [place]), bipolar disorder, asthma (on O2 PRN at home, unknown how many L) brought to ED by daughter who states pt has been hallucinating, being more depressed.  Patient unable to provide much history, daughter Alissa states that patient was able to care for herself, manage home finances and attend doctor appointments independently until recently, she states that she believes her mom stopped taking her psychiatric medications in February of 2022 and has not followed up with any of her physicians. She has noticed recent weight loss (unsure how much) and change in voice.  Patient denies chest pain, shortness of breath or any discomfort, she is pleasantly confused. Daughter states that within the last year the patient has received treatment for her lymphoma however is unsure of the specifics, the patient was supposed to follow up in February however has not. (30 Aug 2022 10:47)   Patient of Dr. 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 Pt also had labs during admission at [facility] 01/2020 showing IgM lambda monoclonal gammopathy, with biopsy of a lung mass that revealed progression of lymphoma, treated with Rituxan-Bendamustine, completed 08/27/2020. Her last visit at [facility] was 07/08/2021 and was under surveillance with recommended follow-ups every 4 months, but has not returned since.   Pt denies history of constipation, abdominal pains, kidney stones. Patient with perseverations and bizarre affect,  but denying depression and hallucinations. Pt denying any recent falls or fracture but demonstrating poor insight into health and situation at this time.   Endocrinology consulted for hypercalcemia with Corrected calcium level of 14.2, pt's with albumin of 0.9 on admission. Pt received 1L LR bolus yesterday (8/29) night in the ED. Pt currently on 75 cc/hr of NS, on 4L NC maintaining saturations of 94-95%.   Pt also with Serum Pro BNP of 6447.     Interval history:   on IVF/calcitonin/bisphosphonate s/p ~2L IVF. Patient now on NRB as she desatted while on 5LNC down to 76%, yesterday evening and subsequently placed on NRB with improvement of O2 Saturations to the 90s. Per MICU note, On bedside POCUS, pt with B-lines throughout; IVC plump but with some respiratory variation; small pericardial effusion with questionable RV enlargement. Pt demonstrated increased work of breathing, ABG 7.29/69/150/33/99.3 (on 15L NRB), pt placed on BIPAP for 2 hours with symptomatic improvement and s/p IV Lasix 20 mg x1. Pt off of IVF as of 7:50 am this morning (8/31). Nursing staff at the bedside stating that pt is able to tolerate small amounts of PO intake in the AM.         ROS limited at this time due to patient's mental status but listed below.             MEDICATIONS  (STANDING):  calcitonin Injectable 240 International Unit(s) IntraMuscular every 12 hours  cefTRIAXone   IVPB 1000 milliGRAM(s) IV Intermittent every 24 hours  heparin   Injectable 5000 Unit(s) SubCutaneous every 12 hours  OLANZapine 2.5 milliGRAM(s) Oral at bedtime  thiamine IVPB 500 milliGRAM(s) IV Intermittent three times a day    MEDICATIONS  (PRN):  acetaminophen     Tablet .. 650 milliGRAM(s) Oral every 6 hours PRN Temp greater or equal to 38C (100.4F), Mild Pain (1 - 3)  aluminum hydroxide/magnesium hydroxide/simethicone Suspension 30 milliLiter(s) Oral every 4 hours PRN Dyspepsia  melatonin 3 milliGRAM(s) Oral at bedtime PRN Insomnia  ondansetron Injectable 4 milliGRAM(s) IV Push every 8 hours PRN Nausea and/or Vomiting      Allergies    latex (Rash)  No Known Drug Allergies    Intolerances    Social History: Lives with  who has dementia and adult son, daughter involved in care who feels that the home living situation is unsafe and that her parents are hoarders. Daughter has notified APS of unsafe living conditions.  Never smoker, no ETOH use, no illicit drug use.     Outpatient Medications:   Breo Ellipta 100 mcg-25 mcg/inh inhalation powder: 1 puff(s) inhaled once a day  Depakote 500 mg oral delayed release tablet: 1 tab(s) orally 2 times a day  lithium 300 mg oral capsule: 1 cap(s) orally 3 times a day  Ventolin HFA 90 mcg/inh inhalation aerosol: 2 puff(s) inhaled 4 times a day, As Needed  ZyPREXA 2.5 mg oral tablet: 1 tab(s) orally once a day (at bedtime)      Review of Systems: Limited at this time due to patient's mental status.   Constitutional: + weight loss, no fevers, chills,   Eyes: No blurry vision, changes to vision, floaters   Neuro: no tremors, seizure, headaches,   HEENT: No pain, no sore throat  Cardiovascular: No chest pain, palpitations  Respiratory: No SOB, no cough  GI: No nausea, vomiting, abdominal pain, no constipation   : No dysuria, no incontinence, no urinary urgency   Skin: no rash, + bruising, no open skin lesions, no dried skin   Psych: + depression, hallucinations per history.   Endocrine: no polyuria, polydipsia, no heat or cold intolerance   Hem/lymph: no swelling, no excessive bleeding,   Osteoporosis: +fracture    PHYSICAL EXAM:  VITALS: T(C): 36.8 (08-31-22 @ 05:00)  T(F): 98.2 (08-31-22 @ 05:00), Max: 98.4 (08-31-22 @ 00:41)  HR: 84 (08-31-22 @ 07:41) (82 - 93)  BP: 124/65 (08-31-22 @ 05:00) (101/70 - 124/65)  RR:  (19 - 20)  SpO2:  (89% - 100%)  Wt(kg): --  GENERAL: NAD, talkative and responsive, but difficult to understand, pt with poor insight into situation and health.   EYES: No proptosis, no lid lag, anicteric  HEENT:  Atraumatic, Normocephalic, moist mucous membranes  THYROID: Normal size, no palpable nodules, no neck masses.   RESPIRATORY: Decreased breath sounds L>R. decreased breath sounds b/l in the bases; No rales, rhonchi, wheezing  CARDIOVASCULAR: Regular rate and rhythm; No murmurs; no peripheral edema  GI: Soft, nontender, non distended, normal bowel sounds  SKIN: Dry, intact, No rashes or lesions. Diffuse senile purpura, ecchymosis in the b/l lower extremities.   MUSCULOSKELETAL: Exam limited due to mental status.   NEURO: sensation intact, extraocular movements intact, mild tremors with motion in the b/l upper extremity.   PSYCH: Alert and oriented x 1, confused mood, bizarre affect.   CUSHING'S SIGNS: no striae, no dorsocervical fat pad, no moon facies.       CAPILLARY BLOOD GLUCOSE          08-31    140  |  105  |  26<H>  ----------------------------<  99  4.5   |  32<H>  |  1.02    eGFR: 57<L>    Ca    11.1<H>      08-31  Mg     2.40     08-31  Phos  3.9     08-31    TPro  12.9<H>  /  Alb  2.7<L>  /  TBili  0.6  /  DBili  x   /  AST  30  /  ALT  24  /  AlkPhos  42  08-31      A1C with Estimated Average Glucose Result: 4.5 % (08-31-22 @ 03:48)      Thyroid Function Tests:  08-31 @ 03:48 TSH 1.42 FreeT4 -- T3 -- Anti TPO -- Anti Thyroglobulin Ab -- TSI --  08-29 @ 22:52 TSH 3.06 FreeT4 -- T3 -- Anti TPO -- Anti Thyroglobulin Ab -- TSI --

## 2022-08-31 NOTE — PROGRESS NOTE ADULT - PROBLEM SELECTOR PLAN 11
-Heparin SQ    -Dispo issues:  -Patient with APS case ongoing due to unsanitary conditions at home. Will require SW/CM for safe placement once medically stable for d/c. Patient currently without capacity and with poor insight into her medical conditions.

## 2022-08-31 NOTE — PHYSICAL THERAPY INITIAL EVALUATION ADULT - MANUAL MUSCLE TESTING RESULTS, REHAB EVAL
Upper and lower extremities grossly assessed.  Bilaterally, pt presents with a 3/5 on MMT score./grossly assessed due to

## 2022-08-31 NOTE — CHART NOTE - NSCHARTNOTEFT_GEN_A_CORE
Patient with large left pleural effusion on CXR. Patient previously seen by Dr. Alba on prior admission. Per discussion with Dr. Alba, he no longer rounds at [facility]. House pulmonology consulted for diagnostic/therapeutic thoracentesis.

## 2022-08-31 NOTE — PROGRESS NOTE ADULT - PROBLEM SELECTOR PLAN 1
-also with hypoxemic respiratory failure  -pt with worsening encephalopathy, pCO2 increased to 77, although on VBG instead of ABG, as prior. Per chart review, pt usually is chronic retainer with pCO2s of 50s  -given worsening encephalopathy and increasing bicarb, will re-initiate BIPAP  -also with L L sided pleural effusion; pulm consulted for possible thoracentesis  -IVF d/jhon ON due to concern for pulmonary edema leading to hypoxia  -repeat VBG in 6 hours after BIPAP. Will likely require to keep BIPAP ON 18 Discharged

## 2022-08-31 NOTE — PROGRESS NOTE ADULT - ATTENDING COMMENTS
77F with hypercalcemia, history of lithium use, lymphoma, history of humeral fracture on x ray, very low albumin, corrected calcium 13.6 today. Send workup as outlined differential includes elevated 1,25D (lymphoma), multiple myeloma, primary hyperparathyroid from lithium, other malignancy mechanisms.    s/p  pamidronate 60mg IV x1, complete calcitonin total 4 doses.   unable to give IVF due to fluid overload and resp status   please send labs as outlined in resident note to help assess etiology

## 2022-08-31 NOTE — PROGRESS NOTE ADULT - SUBJECTIVE AND OBJECTIVE BOX
PROGRESS NOTE:   Authored by Dr. Nakita Hartman MD, pager [phone]     Patient is a 77y old  Female who presents with a chief complaint of Hypoxic respiratory failure (31 Aug 2022 12:01)      SUBJECTIVE / OVERNIGHT EVENTS:  Patient is lying in bed. Currently on NRB. Awake and mumbles unintelligibly. Cooperates with exam. Is able to follow basic commands.     ADDITIONAL REVIEW OF SYSTEMS:  Unable to obtain due to encephalopathy    MEDICATIONS  (STANDING):  calcitonin Injectable 240 International Unit(s) IntraMuscular every 12 hours  cefTRIAXone   IVPB 1000 milliGRAM(s) IV Intermittent every 24 hours  heparin   Injectable 5000 Unit(s) SubCutaneous every 12 hours  OLANZapine 2.5 milliGRAM(s) Oral at bedtime  thiamine IVPB 500 milliGRAM(s) IV Intermittent three times a day    MEDICATIONS  (PRN):  acetaminophen     Tablet .. 650 milliGRAM(s) Oral every 6 hours PRN Temp greater or equal to 38C (100.4F), Mild Pain (1 - 3)  aluminum hydroxide/magnesium hydroxide/simethicone Suspension 30 milliLiter(s) Oral every 4 hours PRN Dyspepsia  melatonin 3 milliGRAM(s) Oral at bedtime PRN Insomnia  ondansetron Injectable 4 milliGRAM(s) IV Push every 8 hours PRN Nausea and/or Vomiting      CAPILLARY BLOOD GLUCOSE        I&O's Summary    30 Aug 2022 07:01  -  31 Aug 2022 07:00  --------------------------------------------------------  IN: 675 mL / OUT: 0 mL / NET: 675 mL        PHYSICAL EXAM:  Vital Signs Last 24 Hrs  T(C): 36.6 (31 Aug 2022 13:00), Max: 36.9 (31 Aug 2022 00:41)  T(F): 97.9 (31 Aug 2022 13:00), Max: 98.4 (31 Aug 2022 00:41)  HR: 91 (31 Aug 2022 13:00) (84 - 93)  BP: 100/65 (31 Aug 2022 13:00) (100/65 - 124/65)  BP(mean): --  RR: 20 (31 Aug 2022 13:00) (19 - 20)  SpO2: 100% (31 Aug 2022 13:00) (89% - 100%)    Parameters below as of 31 Aug 2022 13:00  Patient On (Oxygen Delivery Method): mask, nonrebreather        CONSTITUTIONAL: Unkempt elderly woman in NAD  RESPIRATORY: Normal respiratory effort; diminished breath sounds over L lung fields  CARDIOVASCULAR: Regular rate and rhythm, normal S1 and S2, no murmur/rub/gallop; No lower extremity edema; Peripheral pulses are 2+ bilaterally  ABDOMEN: Nontender to palpation, normoactive bowel sounds, no rebound/guarding; No hepatosplenomegaly  MUSCULOSKELETAL: no clubbing or cyanosis of digits; no joint swelling or tenderness to palpation  PSYCH: Somnolent but easily arousable    LABS:                        8.3    6.34  )-----------( 161      ( 31 Aug 2022 14:20 )             29.4     08-31    141  |  105  |  x   ----------------------------<  104<H>  4.5   |  34<H>  |  x     Ca    10.2      31 Aug 2022 05:52  Phos  2.6     08-31  Mg     2.20     08-31    TPro  11.6<H>  /  Alb  0.6<L>  /  TBili  0.6  /  DBili  x   /  AST  24  /  ALT  26  /  AlkPhos  40  08-31    PT/INR - ( 31 Aug 2022 03:48 )   PT: 15.7 sec;   INR: 1.35 ratio         PTT - ( 31 Aug 2022 03:48 )  PTT:26.6 sec      Urinalysis Basic - ( 29 Aug 2022 23:08 )    Color: Yellow / Appearance: Slightly Turbid / S.024 / pH: x  Gluc: x / Ketone: Negative  / Bili: Negative / Urobili: 3 mg/dL   Blood: x / Protein: 100 mg/dL / Nitrite: Positive   Leuk Esterase: Large / RBC: 3 /HPF / WBC 34 /HPF   Sq Epi: x / Non Sq Epi: 2 /HPF / Bacteria: Many        Culture - Urine (collected 29 Aug 2022 23:08)  Source: Clean Catch Clean Catch (Midstream)  Preliminary Report (31 Aug 2022 00:14):    >100,000 CFU/ml Escherichia coli    c    RADIOLOGY & ADDITIONAL TESTS:  Results Reviewed:   Imaging Personally Reviewed:  Electrocardiogram Personally Reviewed:    CXR   FINDINGS:  Near complete opacification of left lung with sparing of the apex at   least in part due to large left pleural effusion in the setting of   lymphoma.  No focal consolidation right lung. Decreased vascular markings throughout   right lung.  The heart size is not well evaluated on this projection.  Redemonstration of old right humeral neck fracture. Status post left   humeral head ORIF.    IMPRESSION:  Near complete opacification of leftlung with sparing of the apex at   least in part due to large left pleural effusion in the setting of   lymphoma.      COORDINATION OF CARE:  Care Discussed with Consultants/Other Providers [Y/N]:  Prior or Outpatient Records Reviewed [Y/N]:

## 2022-08-31 NOTE — PROGRESS NOTE ADULT - ASSESSMENT
77 year old female with PMH of lymphoma (dx 2005, s/p CARRIE lobectomy, relapsed in 2019 but not currently on chemo/RT, being followed by heme every 3-6months at [facility] bipolar disorder, asthma (on O2 PRN at home, unknown how many L) brought to ED by daughter who states pt has been hallucinating, being more depressed.  Patient unable to provide much history, daughter Alissa states that patient was able to care for herself, manage home finances and attend doctor appointments independently until recently, she states that she believes her mom stopped taking her psychiatric medications in February of 2022 and has not followed up with any of her physicians. She has noticed recent weight loss (unsure how much) and change in voice.   Admitted to medicine service for further evaluation & treatment. Course c/b acute hypercapnic and hypoxic respiratory failure requiring BIPAP. CXR with large L sided pleural effusion.

## 2022-08-31 NOTE — CHART NOTE - NSCHARTNOTEFT_GEN_A_CORE
[facility] [employer].    Notified by RN, patient now on NRB as she desatted while on 5LNC down to 76% and lingered for a few minutes despite increasing NC from 5L to 6L.   Pt seen and examined at bedside, upon arrival patient seen laying down hunched forward with NRB in place at 10LPM, bedside  with SpO2 of 93%. Pt seen with increased WOB and accessory muscle use. Pt alert and oriented to name and place (same as prior exam earlier in shift), although asking if she could leave and noted to continue trying to pull at mask. Pulmonary exam with diminished breath sounds in left lung field AND now right lung field with limited air movement, noted some mild wheezing bilaterally. Cardiovascular exam RRR, +s1+s2, no m/g/r. Extremities warm to touch. Radial Pulses 2+ bilaterally. Lower extremities without any pitting edema. Per chart review CT Chest performed yesterday (8/30) showing opacification of Left lung field suspect 2/2 Lymphoma and small pericardial effusion. Echo also performed showing normal LVSF (EF 65%) confirm small trace pericardial effusion. Repeat CXR ordered. Duoneb ordered and given. ABG and AM labs performed at bedside with assistance of RN and sent down.     Vital Signs Last 24 Hrs  T(C): 36.7 (30 Aug 2022 20:34), Max: 36.7 (30 Aug 2022 06:28)  T(F): 98.1 (30 Aug 2022 20:34), Max: 98.1 (30 Aug 2022 20:34)  HR: 93 (30 Aug 2022 20:34) (82 - 97)  BP: 112/74 (30 Aug 2022 20:41) (101/70 - 125/80)  RR: 20 (30 Aug 2022 20:34) (19 - 20)  SpO2: 98% (31 Aug 2022 04:18 ) (92% - 95%)    Parameters below as of 31 Aug 2022 04:18  Patient On (Oxygen Delivery Method): NRB  O2 Flow (L/min): 15L      [ ] CXR pending   [ ] BiPAP ordered for increased WOB  [ ] MICU c/s, awaiting recs   [ ] f/u ABG and AM Labs (CBC, PT/PTT/INR, BMP, NH3 (sent on ice), Valproic Acid Level, Gold tubes)    Leandra Amador PA  Medicine [id number] [facility] [employer]OVER[employer].    Notified by RN, patient now on NRB as she desatted while on 5LNC down to 76% and lingered for a few minutes despite increasing NC from 5L to 6L.   Pt seen and examined at bedside, upon arrival patient seen laying down hunched forward with NRB in place at 10LPM, bedside  with SpO2 of 93%. Pt seen with increased WOB and accessory muscle use. Pt alert and oriented to name and place (same as prior exam earlier in shift), although asking if she could leave and noted to continue trying to pull at mask. Pulmonary exam with diminished breath sounds in left lung field AND now right lung field with limited air movement, noted some mild wheezing bilaterally. Cardiovascular exam RRR, +s1+s2, no m/g/r. Extremities warm to touch. Radial Pulses 2+ bilaterally. Lower extremities without any pitting edema. Per chart review CT Chest performed yesterday (8/30) showing opacification of Left lung field suspect 2/2 Lymphoma and small pericardial effusion. Echo also performed showing normal LVSF (EF 65%) confirm small trace pericardial effusion. Repeat CXR ordered. Duoneb ordered and given. ABG and AM labs performed at bedside with assistance of RN and sent down.     Vital Signs Last 24 Hrs  T(C): 36.7 (30 Aug 2022 20:34), Max: 36.7 (30 Aug 2022 06:28)  T(F): 98.1 (30 Aug 2022 20:34), Max: 98.1 (30 Aug 2022 20:34)  HR: 93 (30 Aug 2022 20:34) (82 - 97)  BP: 112/74 (30 Aug 2022 20:41) (101/70 - 125/80)  RR: 20 (30 Aug 2022 20:34) (19 - 20)  SpO2: 98% (31 Aug 2022 04:18 ) (92% - 95%)    Parameters below as of 31 Aug 2022 04:18  Patient On (Oxygen Delivery Method): NRB  O2 Flow (L/min): 15L      [ ] CXR pending   [ ] BiPAP ordered for increased WOB  [ ] MICU c/s, awaiting recs.  [ ] f/u ABG and AM Labs (CBC, PT/PTT/INR, BMP, NH3 (sent on ice), Valproic Acid Level, Gold tubes)    Leandra Amador PA  Medicine [id number] [facility] N[employer] MEDICINE COVERAGE.    Notified by RN, patient now on NRB as she desatted while on 5LNC down to 76% and lingered for a few minutes despite increasing NC from 5L to 6L.   Pt seen and examined at bedside, upon arrival patient seen laying down hunched forward with NRB in place at 10LPM, bedside  with SpO2 of 93%. Pt seen with increased WOB and accessory muscle use. Pt alert and oriented to name and place (same as prior exam earlier in shift), although asking if she could leave and noted to continue trying to pull at mask. Pulmonary exam with diminished breath sounds in left lung field AND now right lung field with limited air movement, noted some mild wheezing bilaterally. Cardiovascular exam RRR, +s1+s2, no m/g/r. Extremities warm to touch. Radial Pulses 2+ bilaterally. Lower extremities without any pitting edema. Per chart review CT Chest performed yesterday (8/30) showing opacification of Left lung field suspect 2/2 Lymphoma and small pericardial effusion. Echo also performed showing normal LVSF (EF 65%) confirm small trace pericardial effusion. Repeat CXR ordered. Duoneb ordered and given. ABG and AM labs performed at bedside with assistance of RN and sent down.     Vital Signs Last 24 Hrs  T(C): 36.7 (30 Aug 2022 20:34), Max: 36.7 (30 Aug 2022 06:28)  T(F): 98.1 (30 Aug 2022 20:34), Max: 98.1 (30 Aug 2022 20:34)  HR: 93 (30 Aug 2022 20:34) (82 - 97)  BP: 112/74 (30 Aug 2022 20:41) (101/70 - 125/80)  RR: 20 (30 Aug 2022 20:34) (19 - 20)  SpO2: 98% (31 Aug 2022 04:18 ) (92% - 95%)    Parameters below as of 31 Aug 2022 04:18  Patient On (Oxygen Delivery Method): NRB  O2 Flow (L/min): 15L      [ ] CXR pending   [ ] BiPAP ordered for increased WOB  [ ] MICU c/s, awaiting recs.  [ ] f/u ABG and AM Labs (CBC, PT/PTT/INR, BMP, NH3 (sent on ice), Valproic Acid Level, Gold tubes)    Leandra LYNCH  [facility] [id number]    ADDENDUM 08/31/2022 04:45  MICU seen at bedside with patient, agrees with plan above. Recommends 20mg IVP Lasix (RN aware) and to withhold further IVF at this time. AM Labs and ABG personally reviewed. BiPAP placed by RT at this time. Attempted to call daughter, Alissa Abernathy @  [phone]. Voicemail left, will try to call back in a few minutes to give update.                           8.9    6.47  )-----------( 150      ( 31 Aug 2022 03:48 )             31.5    **CHEM**    PT/INR - ( 31 Aug 2022 03:48 )   PT: 15.7 sec;   INR: 1.35 ratio    PTT - ( 31 Aug 2022 03:48 )  PTT:26.6 sec    VBG - ( 29 Aug 2022 22:52 )  pH: 7.39  /  pCO2: 58    /  pO2: 28    / HCO3: 35    / Base Excess: 8.8   /  SvO2: 37.8  / Lactate: 1.5      ABG - ( 31 Aug 2022 03:28 )  pH: 7.29  /  pCO2: 69    /  pO2: 150   / HCO3: 33    / Base Excess: 5.2   /  SaO2: 99.3  / Lactate: x        Leandra LYNCH  [facility] [id number] [facility] [employer] MEDIC[employer] COVER[employer].    Notified by RN, patient now on NRB as she desatted while on 5LNC down to 76% and lingered for a few minutes despite increasing NC from 5L to 6L.   Pt seen and examined at bedside, upon arrival patient seen laying down hunched forward with NRB in place at 10LPM, bedside  with SpO2 of 93%. Pt seen with increased WOB and accessory muscle use. Pt alert and oriented to name and place (same as prior exam earlier in shift), although asking if she could leave and noted to continue trying to pull at mask. Pulmonary exam with diminished breath sounds in left lung field AND now right lung field with limited air movement, noted some mild wheezing bilaterally. Cardiovascular exam RRR, +s1+s2, no m/g/r. Extremities warm to touch. Radial Pulses 2+ bilaterally. Lower extremities without any pitting edema. Per chart review CT Chest performed yesterday (8/30) showing opacification of Left lung field suspect 2/2 Lymphoma and small pericardial effusion. Echo also performed showing normal LVSF (EF 65%) confirm small trace pericardial effusion. Repeat CXR ordered. Duoneb ordered and given. ABG and AM labs performed at bedside with assistance of RN and sent down.     Vital Signs Last 24 Hrs  T(C): 36.7 (30 Aug 2022 20:34), Max: 36.7 (30 Aug 2022 06:28)  T(F): 98.1 (30 Aug 2022 20:34), Max: 98.1 (30 Aug 2022 20:34)  HR: 93 (30 Aug 2022 20:34) (82 - 97)  BP: 112/74 (30 Aug 2022 20:41) (101/70 - 125/80)  RR: 20 (30 Aug 2022 20:34) (19 - 20)  SpO2: 98% (31 Aug 2022 04:18 ) (92% - 95%)    Parameters below as of 31 Aug 2022 04:18  Patient On (Oxygen Delivery Method): NRB  O2 Flow (L/min): 15L      [ ] CXR pending   [ ] BiPAP ordered for increased WOB  [ ] MICU c/s, awaiting recs.  [ ] f/u ABG and AM Labs (CBC, PT/PTT/INR, BMP, NH3 (sent on ice), Valproic Acid Level, Gold tubes)    Leandra LYNCH  Medicine [id number]    ADDENDUM 08/31/2022 04:45  MICU seen at bedside with patient, agrees with plan above. Recommends 20mg IVP Lasix (RN aware) and to withhold further IVF at this time. AM Labs and ABG personally reviewed. BiPAP placed by RT at this time. Attempted to call daughter, Alissa Abernathy @  [phone] x2 to give update on mothers condition. Voicemail left x2, will try again.                           8.9    6.47  )-----------( 150      ( 31 Aug 2022 03:48 )             31.5    **CHEM**    PT/INR - ( 31 Aug 2022 03:48 )   PT: 15.7 sec;   INR: 1.35 ratio    PTT - ( 31 Aug 2022 03:48 )  PTT:26.6 sec    VBG - ( 29 Aug 2022 22:52 )  pH: 7.39  /  pCO2: 58    /  pO2: 28    / HCO3: 35    / Base Excess: 8.8   /  SvO2: 37.8  / Lactate: 1.5      ABG - ( 31 Aug 2022 03:28 )  pH: 7.29  /  pCO2: 69    /  pO2: 150   / HCO3: 33    / Base Excess: 5.2   /  SaO2: 99.3  / Lactate: x        Leandra LYNCH  [facility] [id number] [facility] [employer] MEDICINE COVERAGE.    Notified by RN, patient now on NRB as she desatted while on 5LNC down to 76% and lingered for a few minutes despite increasing NC from 5L to 6L.   Pt seen and examined at bedside, upon arrival patient seen laying down hunched forward with NRB in place at 10LPM, bedside  with SpO2 of 93%. Pt seen with increased WOB and accessory muscle use. Pt alert and oriented to name and place (same as prior exam earlier in shift), although asking if she could leave and noted to continue trying to pull at mask. Pulmonary exam with diminished breath sounds in left lung field AND now right lung field with limited air movement, noted some mild wheezing bilaterally. Cardiovascular exam RRR, +s1+s2, no m/g/r. Extremities warm to touch. Radial Pulses 2+ bilaterally. Lower extremities without any pitting edema. Per chart review CT Chest performed yesterday (8/30) showing opacification of Left lung field suspect 2/2 Lymphoma and small pericardial effusion. Echo also performed showing normal LVSF (EF 65%) confirm small trace pericardial effusion. Repeat CXR ordered. Duoneb ordered and given. ABG and AM labs performed at bedside with assistance of RN and sent down.     Vital Signs Last 24 Hrs  T(C): 36.7 (30 Aug 2022 20:34), Max: 36.7 (30 Aug 2022 06:28)  T(F): 98.1 (30 Aug 2022 20:34), Max: 98.1 (30 Aug 2022 20:34)  HR: 93 (30 Aug 2022 20:34) (82 - 97)  BP: 112/74 (30 Aug 2022 20:41) (101/70 - 125/80)  RR: 20 (30 Aug 2022 20:34) (19 - 20)  SpO2: 98% (31 Aug 2022 04:18 ) (92% - 95%)    Parameters below as of 31 Aug 2022 04:18  Patient On (Oxygen Delivery Method): NRB  O2 Flow (L/min): 15L      [ ] CXR pending   [ ] BiPAP ordered for increased WOB  [ ] MICU c/s, awaiting recs.  [ ] f/u ABG and AM Labs (CBC, PT/PTT/INR, BMP, NH3 (sent on ice), Valproic Acid Level, Gold tubes)    Leandra LYNCH  [facility] [id number]    ADDENDUM 08/31/2022 04:45  MICU seen at bedside with patient, agrees with plan above. Recommends 20mg IVP Lasix (RN aware) and to withhold further IVF at this time. AM Labs and ABG personally reviewed. BiPAP placed by RT at this time. Attempted to call Alissa rios @  [phone] x2 and at [phone] x2 to give update on mothers condition, however no answer. Voicemail left x2 at cell number, will try again. Pts soniGuseppe was NOT* called in setting that Alissa rios was concerned for parents living situation (both live with son) and filed an APS report recently. Will clarify HCP in AM.                           8.9    6.47  )-----------( 150      ( 31 Aug 2022 03:48 )             31.5    **CHEM**    PT/INR - ( 31 Aug 2022 03:48 )   PT: 15.7 sec;   INR: 1.35 ratio    PTT - ( 31 Aug 2022 03:48 )  PTT:26.6 sec    VBG - ( 29 Aug 2022 22:52 )  pH: 7.39  /  pCO2: 58    /  pO2: 28    / HCO3: 35    / Base Excess: 8.8   /  SvO2: 37.8  / Lactate: 1.5      ABG - ( 31 Aug 2022 03:28 )  pH: 7.29  /  pCO2: 69    /  pO2: 150   / HCO3: 33    / Base Excess: 5.2   /  SaO2: 99.3  / Lactate: x        [ ] c/w BiPAP  [ ] f/u rpt ABG 1hr post biPAP  [ ] will attempt to call Alissa rios again to give update and have GOC discussion    Leandra Amador [facility] [id number]

## 2022-08-31 NOTE — PROGRESS NOTE ADULT - SUBJECTIVE AND OBJECTIVE BOX
Patient is a 77y old  Female who presents with a chief complaint of Hypoxic respiratory failure (31 Aug 2022 09:50)    Patient seen this morning. Desatted overnight on 5L NC, now on bipap.     MEDICATIONS  (STANDING):  calcitonin Injectable 240 International Unit(s) IntraMuscular every 12 hours  cefTRIAXone   IVPB 1000 milliGRAM(s) IV Intermittent every 24 hours  heparin   Injectable 5000 Unit(s) SubCutaneous every 12 hours  OLANZapine 2.5 milliGRAM(s) Oral at bedtime  thiamine IVPB 500 milliGRAM(s) IV Intermittent three times a day    MEDICATIONS  (PRN):  acetaminophen     Tablet .. 650 milliGRAM(s) Oral every 6 hours PRN Temp greater or equal to 38C (100.4F), Mild Pain (1 - 3)  aluminum hydroxide/magnesium hydroxide/simethicone Suspension 30 milliLiter(s) Oral every 4 hours PRN Dyspepsia  melatonin 3 milliGRAM(s) Oral at bedtime PRN Insomnia  ondansetron Injectable 4 milliGRAM(s) IV Push every 8 hours PRN Nausea and/or Vomiting        Vital Signs Last 24 Hrs  T(C): 36.8 (31 Aug 2022 05:00), Max: 36.9 (31 Aug 2022 00:41)  T(F): 98.2 (31 Aug 2022 05:00), Max: 98.4 (31 Aug 2022 00:41)  HR: 84 (31 Aug 2022 07:41) (82 - 93)  BP: 124/65 (31 Aug 2022 05:00) (101/70 - 124/65)  BP(mean): --  RR: 20 (31 Aug 2022 05:00) (19 - 20)  SpO2: 100% (31 Aug 2022 07:41) (89% - 100%)    Parameters below as of 31 Aug 2022 05:00  Patient On (Oxygen Delivery Method): BiPAP/CPAP        PE  Pt appears anxious   Awake, alert  Anicteric, MMM  RRR  CTAB  Abd soft, NT, ND  No c/c/e  No rash grossly                          8.9    6.47  )-----------( 150      ( 31 Aug 2022 03:48 )             31.5       08-31    130<L>  |  88<L>  |  23  ----------------------------<  84  4.4   |  20<L>  |  0.98    Ca    10.2      31 Aug 2022 05:52  Phos  2.6     08-31  Mg     2.20     08-31    TPro  11.6<H>  /  Alb  0.6<L>  /  TBili  0.6  /  DBili  x   /  AST  24  /  ALT  26  /  AlkPhos  40  08-31      Patient name: MANDI GASPAR  YOB: 1945   Age: 77 (F)   MR#: [id number]  Study Date: 8/30/2022  Location: [id number]: Lauren Finkelstein, [facility]  Study quality: Technically Fair  Referring Physician: Giana Mejia MD  Blood Pressure: 115/60 mmHg  Height: 160 cm  Weight: 60 kg  BSA: 1.6 m2  ------------------------------------------------------------------------  PROCEDURE: Transthoracic echocardiogram with 2-D, M-Mode  and complete spectral and color flow Doppler.  INDICATION: Dyspnea, unspecified (R06.00), Pericardial  effusion (noninflammatory) (I31.3)  ------------------------------------------------------------------------  DIMENSIONS:  Dimensions:     Normal Values:  LA:     2.5 cm    2.0 - 4.0 cm  Ao:     3.0 cm    2.0 - 3.8 cm  SEPTUM: 0.7 cm    0.6 - 1.2 cm  PWT:    0.7 cm    0.6 - 1.1 cm  LVIDd:  3.7 cm    3.0 - 5.6 cm  LVIDs:  2.4 cm    1.8 - 4.0 cm  Derived Variables:  LVMI: 42 g/m2  RWT: 0.37  Fractional short: 35 %  Ejection Fraction (Modified Albrecht Rule): 65 %  ------------------------------------------------------------------------  OBSERVATIONS:  Mitral Valve: Mitral annular calcification, otherwise  normal mitral valve. Minimal mitral regurgitation.  Aortic Root: Normal aortic root.  Aortic Valve: Calcified trileaflet aortic valve with normal  opening.  Left Atrium: Normal left atrium.  LA volume index = 25  cc/m2.  Left Ventricle: Normal left ventricular systolic function.  No segmental wall motion abnormalities. Normal left  ventricular internal dimensions and wall thicknesses. Mild  diastolic dysfunction (Stage I).  Right Heart: Normal right atrium. Normal right ventricular  size and function. Normal tricuspid valve.  Mild tricuspid  regurgitation. Normal pulmonic valve.  Minimal pulmonic  regurgitation.  Pericardium/PleuraSmall circumferential pericardial  effusion.  Hemodynamic: Estimated right ventricular systolic pressure  equals 52 mm Hg, assuming right atrial pressure equals 10  mm Hg, consistent with moderate pulmonary hypertension.  ------------------------------------------------------------------------  CONCLUSIONS:  1. Mitral annular calcification, otherwise normal mitral  valve. Minimal mitral regurgitation.  2. Normal left ventricular internal dimensions and wall  thicknesses.  3. Normal left ventricular systolic function. No segmental  wall motion abnormalities.  4. Mild diastolic dysfunction (Stage I).  5. Normal right ventricular size and function.  6. Estimated right ventricular systolic pressure equals 52  mm Hg, assuming right atrial pressure equals 10 mm Hg,  consistent with moderate pulmonary hypertension.  7. Small circumferential pericardial effusion.  ------------------------------------------------------------------------

## 2022-08-31 NOTE — CONSULT NOTE ADULT - ASSESSMENT
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  - exam: somnolent but arousable to voice; increased work of breathing  - ABG 7.29/69/150/33/99.3 (on 15L NRB)  - serum bicarb slowly rising 32 from 24 on admission  #hypercalcemia on IVF/calcitonin/bisphosphonate  - s/p ~2L IVF  #UTI with metabolic encephalopathy  - on ceftriaxone    Recommendations  - lasix 20mg IVP  - start BiPAP for hypercapnia; wean as able  - stop standing IVF  - low threshold for CTA Chest if no improvement given RV enlargement  - will reassess after BiPAP and diuresis    Update: pt reassessed ~2 hours after BiPAP and lasix; pt more awake and breathing more comfortably.    Pt does not require MICU level of intervention or monitoring at this time. Please re-consult as clinically indicated.

## 2022-08-31 NOTE — PHYSICAL THERAPY INITIAL EVALUATION ADULT - ADDITIONAL COMMENTS
Pt giving limited hx in regards to prior level of function.  Pt states she lives in a house and ambulates with a walker.  Per H&P, " Lives with  who has dementia and adult son, daughter involved in care who feels that the home living situation is unsafe and that her parents are hoarders.  Daughter has notified APS of unsafe living conditions."

## 2022-08-31 NOTE — CONSULT NOTE ADULT - SUBJECTIVE AND OBJECTIVE BOX
HPI:    77 year old female with PMH of lymphoma (dx , s/p CARRIE lobectomy, relapsed in 2019 but not currently on chemo/RT, being followed by heme every 3-6months at [facility] [place]), bipolar disorder, asthma (on O2 PRN at home, unknown how many L) brought to ED by daughter who states pt has been hallucinating, being more depressed.  Patient unable to provide much history, daughter Alissa states that patient was able to care for herself, manage home finances and attend doctor appointments independently until recently, she states that she believes her mom stopped taking her psychiatric medications in 2022 and has not followed up with any of her physicians. She has noticed recent weight loss (unsure how much) and change in voice.  Patient denies chest pain, shortness of breath or any discomfort, she is pleasantly confused. Daughter states that within the last year the patient has received treatment for her lymphoma however is unsure of the specifics, the patient was supposed to follow up in February however has not. (30 Aug 2022 10:47)    Hospital Course:  Pt admitted with metabolic encephalopathy 2/2 UTI and hypercalcemia for which pt is on antibiotics and received IVF/bisphosphonate/calcitonin. Course now complicated by hypoxic respiratory failure for which MICU was consulted.    Pulmonary called because patient received CXR that appeared to be a pleural effusion. Patient seen and examined at bedside, not responding, breathing quickly.   PAST MEDICAL & SURGICAL HISTORY:  GERD (gastroesophageal reflux disease)      Hyperlipidemia      Manic depression      Asthma      Lymphoma      History of lobectomy of lung  L  lung      Injury of left shoulder, initial encounter  Surgical repair with plates          FAMILY HISTORY:  Family history of gastric cancer        SOCIAL HISTORY:  Smoking: __ packs x ___ years  EtOH Use:  Marital Status:  Occupation:  Exposures:  Recent Travel:    Allergies    latex (Rash)  No Known Drug Allergies    Intolerances        HOME MEDICATIONS:    REVIEW OF SYSTEMS:  unable to assess   [ ] All other systems negative  [ ] Unable to assess ROS because ________    OBJECTIVE:  ICU Vital Signs Last 24 Hrs  T(C): 36.6 (31 Aug 2022 13:00), Max: 36.9 (31 Aug 2022 00:41)  T(F): 97.9 (31 Aug 2022 13:00), Max: 98.4 (31 Aug 2022 00:41)  HR: 86 (31 Aug 2022 15:39) (84 - 93)  BP: 100/65 (31 Aug 2022 13:00) (100/65 - 124/65)  BP(mean): --  ABP: --  ABP(mean): --  RR: 20 (31 Aug 2022 13:00) (20 - 20)  SpO2: 98% (31 Aug 2022 15:39) (89% - 100%)    O2 Parameters below as of 31 Aug 2022 13:00  Patient On (Oxygen Delivery Method): mask, nonrebreather              - @ 07:01  -   @ 07:00  --------------------------------------------------------  IN: 675 mL / OUT: 0 mL / NET: 675 mL      CAPILLARY BLOOD GLUCOSE      POCT Blood Glucose.: 128 mg/dL (31 Aug 2022 16:17)      PHYSICAL EXAM:  General: Awake, alert, oriented X 0   HEENT: Atraumatic, normocephalic.                  No tonsillar or pharyngeal exudates.  Lymph Nodes: No palpable lymphadenopathy  Neck: No JVD. No carotid bruit.   Respiratory: decreased breath sounds L > R, tachypneic   Cardiovascular: S1 S2 normal. No murmurs, rubs or gallops.   Abdomen: Soft, non-tender, non-distended. No organomegaly.  Extremities: Warm to touch. Peripheral pulse palpable. No pedal edema.   Skin: No rashes or skin lesions  Neurological: Non-focal  Psychiatry: Appropriate mood and affect.    HOSPITAL MEDICATIONS:  MEDICATIONS  (STANDING):  calcitonin Injectable 240 International Unit(s) IntraMuscular every 12 hours  cefTRIAXone   IVPB 1000 milliGRAM(s) IV Intermittent every 24 hours  heparin   Injectable 5000 Unit(s) SubCutaneous every 12 hours  OLANZapine 2.5 milliGRAM(s) Oral at bedtime  thiamine IVPB 500 milliGRAM(s) IV Intermittent three times a day    MEDICATIONS  (PRN):  acetaminophen     Tablet .. 650 milliGRAM(s) Oral every 6 hours PRN Temp greater or equal to 38C (100.4F), Mild Pain (1 - 3)  aluminum hydroxide/magnesium hydroxide/simethicone Suspension 30 milliLiter(s) Oral every 4 hours PRN Dyspepsia  melatonin 3 milliGRAM(s) Oral at bedtime PRN Insomnia  ondansetron Injectable 4 milliGRAM(s) IV Push every 8 hours PRN Nausea and/or Vomiting      LABS:                        8.3    6.34  )-----------( 161      ( 31 Aug 2022 14:20 )             29.4         141  |  105  |  x   ----------------------------<  104<H>  4.5   |  34<H>  |  x     Ca    10.2      31 Aug 2022 05:52  Phos  2.6       Mg     2.20         TPro  x   /  Alb  x   /  TBili  x   /  DBili  x   /  AST  x   /  ALT  x   /  AlkPhos  34<L>      PT/INR - ( 31 Aug 2022 03:48 )   PT: 15.7 sec;   INR: 1.35 ratio         PTT - ( 31 Aug 2022 03:48 )  PTT:26.6 sec  Urinalysis Basic - ( 29 Aug 2022 23:08 )    Color: Yellow / Appearance: Slightly Turbid / S.024 / pH: x  Gluc: x / Ketone: Negative  / Bili: Negative / Urobili: 3 mg/dL   Blood: x / Protein: 100 mg/dL / Nitrite: Positive   Leuk Esterase: Large / RBC: 3 /HPF / WBC 34 /HPF   Sq Epi: x / Non Sq Epi: 2 /HPF / Bacteria: Many      Arterial Blood Gas:   @ 05:52  7.35/59/142/33/99.6/5.9  ABG lactate: --  Arterial Blood Gas:   @ 03:28  7.29/69/150/33/99.3/5.2  ABG lactate: --    Venous Blood Gas:   @ 13:40  7.30/77/117/38/99.6  VBG Lactate: 1.3  Venous Blood Gas:   @ 22:52  7.39/58/28/35/37.8  VBG Lactate: 1.5      MICROBIOLOGY:     RADIOLOGY:  [ ] Reviewed and interpreted by me    Point of Care Ultrasound Findings;    PFT:    EKG:    < from: CT Chest No Cont (22 @ 03:23) >  IMPRESSION: Suboptimal evaluation due to motion artifact and lack of   contrast media.    Confluent soft tissue density of the chest, predominantly affecting the   left hemithorax, though also encasement mediastinal structures extending   along the pleural surfaces bilaterally is compatible with sequelae of the   patient's known lymphoma. Additional soft tissue density along the   inferior left anterolateral thoracic rib cage is also likely neoplastic   in nature.Overall appearance is slightly progressive compared to previous   exam.    Small pericardial effusion, increased compared to previous exam.    Possibility of small effusions aren't excluded.    < end of copied text >

## 2022-08-31 NOTE — PROGRESS NOTE ADULT - PROBLEM SELECTOR PLAN 7
-Hx of bipolar w/psych admission @ [facility] many years ago  -non-compliant with medications  -F/u Psych recommendations  -Supportive care

## 2022-08-31 NOTE — PHYSICAL THERAPY INITIAL EVALUATION ADULT - PERTINENT HX OF CURRENT PROBLEM, REHAB EVAL
77 year old female with PMH of lymphoma (dx 2005, s/p CARRIE lobectomy, relapsed in 2019 but not currently on chemo/RT, being followed by heme every 3-6months at [facility]), bipolar disorder, asthma (on O2 PRN at home, unknown how many L) brought to ED by daughter who states pt has been hallucinating, being more depressed.  Patient unable to provide much history,

## 2022-08-31 NOTE — PROGRESS NOTE ADULT - PROBLEM SELECTOR PLAN 5
-Corrected calcium 13.6 upon admission  -likely due to underlying malignancy, lymphoma vs new MM. F/u vitamin D 25 and 1, 25, PTHRP, MM labs. PTH is inappropriately normal  -s/p pamidronate on 8/30; will monitor for approximately 72 hour to evaluate for improvement  -calcitonin x 4 doses  -s/p IVF; d/john ON given concern for volume overload and worsening hypoxia

## 2022-08-31 NOTE — PROGRESS NOTE ADULT - PROBLEM SELECTOR PLAN 2
-Patient with +UA. Urine cx growing E coli. Sensitivities pending  -Unclear if experiencing dysuria as patient is not answering appropriately  -S/P CTX in ED  -will c/w CTX x3 days for treatment of acute cystitis

## 2022-09-01 NOTE — PROGRESS NOTE ADULT - PROBLEM SELECTOR PLAN 1
-Likely multi-factorial: UTI, hypercapnia, hypercalcemia, psychiatric disorder,  and now with greater concern for hyperviscosity syndrome given inability to even obtain immunoglobulin panel due to degree of hyperviscosity  -serum viscosity level sent and pending  -spoke to hematology; they recommend MICU eval for emergent apheresis. Recs to follow  -daughter thinks patient stopped taking her psychiatric medication in february  -continue of AVAPS, as below

## 2022-09-01 NOTE — CHART NOTE - NSCHARTNOTEFT_GEN_A_CORE
[employer] ACP COVERAGE    S/w MICU - case was discussed between ICU and heme/onc attg - no emergent need for plasmapheresis overnight unless any significant change in clinical status overnight  Will optimize for likely tomorrow  IR consult placed for Shiley access   T&S and Coags added to AM labs  Will need to contact [facility] blood bank [phone] for consult for plasmapheresis    Will monitor closely  MENDEZ Lawrence PA-C  [id number]

## 2022-09-01 NOTE — CONSULT NOTE ADULT - SUBJECTIVE AND OBJECTIVE BOX
CHIEF COMPLAINT:    HPI:77 year old female with PMH of lymphoma (dx 2005, s/p CARRIE lobectomy, relapsed in 2019 but not currently on chemo/RT, being followed by heme every 3-6months [place] [facility] [place] bipolar disorder, asthma (on O2 PRN at home, unknown how many L) brought to ED by daughter who states pt has been hallucinating, being more depressed.  Patient unable to provide much history, daughter Alissa states that patient was able to care for herself, manage home finances and attend doctor appointments independently until recently, she states that she believes her mom stopped taking her psychiatric medications in February of 2022 and has not followed up with any of her physicians. She has noticed recent weight loss (unsure how much) and change in voice.  Patient denies chest pain, shortness of breath or any discomfort, she is pleasantly confused. Daughter states that within the last year the patient has received treatment for her lymphoma however is unsure of the specifics, the patient was supposed to follow up in February however has not.      MICU consulted for evaluation for hyperviscosity syndrome. Patient during interview awakens to verbal and physical stimuli. Patient states that she is having some respiratory distress but otherwise denies any pain, nausea, or vomiting. Denies any suprapubic pain or flank pain.     PAST MEDICAL & SURGICAL HISTORY:  GERD (gastroesophageal reflux disease)      Hyperlipidemia      Manic depression      Asthma      Lymphoma      History of lobectomy of lung  L  lung      Injury of left shoulder, initial encounter  Surgical repair with plates          FAMILY HISTORY:  Family history of gastric cancer        Allergies    latex (Rash)  No Known Drug Allergies    Intolerances        HOME MEDICATIONS:    Unable to fully assess ROS due to AMS.     OBJECTIVE:  ICU Vital Signs Last 24 Hrs  T(C): 37.1 (01 Sep 2022 16:30), Max: 37.1 (01 Sep 2022 16:30)  T(F): 98.7 (01 Sep 2022 16:30), Max: 98.7 (01 Sep 2022 16:30)  HR: 93 (01 Sep 2022 16:30) (87 - 103)  BP: 121/63 (01 Sep 2022 16:30) (91/53 - 121/63)  BP(mean): --  ABP: --  ABP(mean): --  RR: 20 (01 Sep 2022 16:30) (20 - 20)  SpO2: 100% (01 Sep 2022 16:30) (95% - 100%)    O2 Parameters below as of 01 Sep 2022 16:30  Patient On (Oxygen Delivery Method): mask, nonrebreather          Mode: standby    CAPILLARY BLOOD GLUCOSE      POCT Blood Glucose.: 93 mg/dL (01 Sep 2022 17:07)      PHYSICAL EXAM:  General: lethargic, awakens to physical and verbal stimuli.   HEENT: PERRLA  Neck: Supple  Respiratory: CTAB  Cardiovascular: regular rate and rhythm   Abdomen: soft, non-tender, no suprapubic pain.   Extremities: no pitting edema.   Skin: no obvious rashes  Neurological: unable to assess due to lethargy.       HOSPITAL MEDICATIONS:  MEDICATIONS  (STANDING):  budesonide  80 MICROgram(s)/formoterol 4.5 MICROgram(s) Inhaler 2 Puff(s) Inhalation two times a day  cefTRIAXone   IVPB 1000 milliGRAM(s) IV Intermittent every 24 hours  heparin   Injectable 5000 Unit(s) SubCutaneous every 12 hours  OLANZapine 2.5 milliGRAM(s) Oral at bedtime  thiamine IVPB 500 milliGRAM(s) IV Intermittent three times a day    MEDICATIONS  (PRN):  acetaminophen     Tablet .. 650 milliGRAM(s) Oral every 6 hours PRN Temp greater or equal to 38C (100.4F), Mild Pain (1 - 3)  ALBUTerol    90 MICROgram(s) HFA Inhaler 2 Puff(s) Inhalation every 6 hours PRN Shortness of Breath and/or Wheezing  aluminum hydroxide/magnesium hydroxide/simethicone Suspension 30 milliLiter(s) Oral every 4 hours PRN Dyspepsia  melatonin 3 milliGRAM(s) Oral at bedtime PRN Insomnia  ondansetron Injectable 4 milliGRAM(s) IV Push every 8 hours PRN Nausea and/or Vomiting      LABS:                        8.4    6.65  )-----------( 156      ( 01 Sep 2022 05:36 )             31.2     09-01    141  |  106  |  18  ----------------------------<  87  4.7   |  35<H>  |  1.04    Ca    9.8      01 Sep 2022 05:36  Phos  2.8     09-01  Mg     2.00     09-01    TPro  11.4<H>  /  Alb  1.4<L>  /  TBili  0.6  /  DBili  x   /  AST  22  /  ALT  24  /  AlkPhos  40  09-01    PT/INR - ( 31 Aug 2022 03:48 )   PT: 15.7 sec;   INR: 1.35 ratio         PTT - ( 31 Aug 2022 03:48 )  PTT:26.6 sec    Arterial Blood Gas:  09-01 @ 11:12  7.35/69/128/38/98.7/10.8  ABG lactate: --  Arterial Blood Gas:  08-31 @ 17:41  7.36/66/151/37/99.4/10.2  ABG lactate: --  Arterial Blood Gas:  08-31 @ 05:52  7.35/59/142/33/99.6/5.9  ABG lactate: --  Arterial Blood Gas:  08-31 @ 03:28  7.29/69/150/33/99.3/5.2  ABG lactate: --    Venous Blood Gas:  09-01 @ 05:36  7.29/78/57/38/89.2  VBG Lactate: 1.4  Venous Blood Gas:  08-31 @ 13:40  7.30/77/117/38/99.6  VBG Lactate: 1.3      MICROBIOLOGY:

## 2022-09-01 NOTE — PROGRESS NOTE ADULT - SUBJECTIVE AND OBJECTIVE BOX
CHIEF COMPLAINT  Respiratory Failure    HISTORY OF PRESENT ILLNESS  MANDI GASPAR is a 77y Female who presents with a chief complaint of respiratory failure.    No acute events.     REVIEW OF SYSTEMS  Unable - clinical status    PHYSICAL EXAM  T(C): 36.7 (09-01-22 @ 12:30), Max: 36.8 (08-31-22 @ 20:16)  HR: 97 (09-01-22 @ 12:30) (87 - 99)  BP: 95/57 (09-01-22 @ 12:30) (91/53 - 101/68)  RR: 20 (09-01-22 @ 12:30) (20 - 20)  SpO2: 98% (09-01-22 @ 12:30) (97% - 100%)  Constitutional: awake, in no acute distress  Eyes: PERRL, EOMI  HEENT: normocephalic, atraumatic  Neck: supple, non-tender  Cardiovascular: normal perfusion, tachycardic  Respiratory: normal respiratory efforts; no increased use of accessory muscles  Gastrointestinal: soft, non-tender  Musculoskeletal: normal range of motion, no deformities noted  Skin: warm, dry    LABORATORY DATA                        8.4    6.65  )-----------( 156      ( 01 Sep 2022 05:36 )             31.2     09-01    141  |  106  |  18  ----------------------------<  87  4.7   |  35<H>  |  1.04    Ca    9.8      01 Sep 2022 05:36  Phos  2.8     09-01  Mg     2.00     09-01    TPro  11.4<H>  /  Alb  1.4<L>  /  TBili  0.6  /  DBili  x   /  AST  22  /  ALT  24  /  AlkPhos  40  09-01    RADIOLOGY REVIEW  IMPRESSION: Suboptimal evaluation due to motion artifact and lack of   contrast media.    Confluent soft tissue density of the chest, predominantly affecting the   left hemithorax, though also encasement mediastinal structures extending   along the pleural surfaces bilaterally is compatible with sequelae of the   patient's known lymphoma. Additional soft tissue density along the   inferior left anterolateral thoracic rib cage is also likely neoplastic   in nature.Overall appearance is slightly progressive compared to previous   exam.    Small pericardial effusion, increased compared to previous exam.    Possibility of small effusions aren't excluded.     CHIEF COMPLAINT  Respiratory Failure    HISTORY OF PRESENT ILLNESS  MANDI GASPAR is a 77y Female who presents with a chief complaint of respiratory failure.    No acute events.     REVIEW OF SYSTEMS  Unable - clinical status    PHYSICAL EXAM  T(C): 36.7 (09-01-22 @ 12:30), Max: 36.8 (08-31-22 @ 20:16)  HR: 97 (09-01-22 @ 12:30) (87 - 99)  BP: 95/57 (09-01-22 @ 12:30) (91/53 - 101/68)  RR: 20 (09-01-22 @ 12:30) (20 - 20)  SpO2: 98% (09-01-22 @ 12:30) (97% - 100%)  Constitutional: somnolent, in no acute distress  Eyes: PERRL, EOMI  HEENT: normocephalic, atraumatic  Neck: supple, non-tender  Cardiovascular: normal perfusion, tachycardic  Respiratory: normal respiratory efforts; no increased use of accessory muscles  Gastrointestinal: soft, non-tender  Musculoskeletal: normal range of motion, no deformities noted  Skin: warm, dry    LABORATORY DATA                        8.4    6.65  )-----------( 156      ( 01 Sep 2022 05:36 )             31.2     09-01    141  |  106  |  18  ----------------------------<  87  4.7   |  35<H>  |  1.04    Ca    9.8      01 Sep 2022 05:36  Phos  2.8     09-01  Mg     2.00     09-01    TPro  11.4<H>  /  Alb  1.4<L>  /  TBili  0.6  /  DBili  x   /  AST  22  /  ALT  24  /  AlkPhos  40  09-01    RADIOLOGY REVIEW  IMPRESSION: Suboptimal evaluation due to motion artifact and lack of   contrast media.    Confluent soft tissue density of the chest, predominantly affecting the   left hemithorax, though also encasement mediastinal structures extending   along the pleural surfaces bilaterally is compatible with sequelae of the   patient's known lymphoma. Additional soft tissue density along the   inferior left anterolateral thoracic rib cage is also likely neoplastic   in nature.Overall appearance is slightly progressive compared to previous   exam.    Small pericardial effusion, increased compared to previous exam.    Possibility of small effusions aren't excluded.

## 2022-09-01 NOTE — PROGRESS NOTE ADULT - SUBJECTIVE AND OBJECTIVE BOX
NOTE INCOMPLETE/ IN PROGRESS  *Please wait for attending attestation for official recommendations.     Chief Complaint: hypoxic respiratory failure     History:  77 year old female with PMH of lymphoma (dx 2005, s/p CARRIE lobectomy, relapsed in 2019 but not currently on chemo/RT, being followed by heme every 3-6months at [facility] [place]), bipolar disorder, asthma (on O2 PRN at home, unknown how many L) brought to ED by daughter who states pt has been hallucinating, being more depressed.  Patient unable to provide much history, daughter Alissa states that patient was able to care for herself, manage home finances and attend doctor appointments independently until recently, she states that she believes her mom stopped taking her psychiatric medications in February of 2022 and has not followed up with any of her physicians. She has noticed recent weight loss (unsure how much) and change in voice.  Patient denies chest pain, shortness of breath or any discomfort, she is pleasantly confused. Daughter states that within the last year the patient has received treatment for her lymphoma however is unsure of the specifics, the patient was supposed to follow up in February however has not. (30 Aug 2022 10:47)   Patient of Dr. 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 Pt also had labs during admission at [facility] 01/2020 showing IgM lambda monoclonal gammopathy, with biopsy of a lung mass that revealed progression of lymphoma, treated with Rituxan-Bendamustine, completed 08/27/2020. Her last visit at [facility] was 07/08/2021 and was under surveillance with recommended follow-ups every 4 months, but has not returned since.   Pt denies history of constipation, abdominal pains, kidney stones. Patient with perseverations and bizarre affect,  but denying depression and hallucinations. Pt denying any recent falls or fracture but demonstrating poor insight into health and situation at this time.   Endocrinology consulted for hypercalcemia with Corrected calcium level of 14.2, pt's with albumin of 0.9 on admission. Pt received 1L LR bolus yesterday (8/29) night in the ED. Pt currently on 75 cc/hr of NS, on 4L NC maintaining saturations of 94-95%.   Pt also with Serum Pro BNP of 6447.     Interval history:   on IVF/calcitonin/bisphosphonate s/p ~2L IVF. Patient now on NRB as she desatted while on 5LNC down to 76%, yesterday evening and subsequently placed on NRB with improvement of O2 Saturations to the 90s. Per MICU note, On bedside POCUS, pt with B-lines throughout; IVC plump but with some respiratory variation; small pericardial effusion with questionable RV enlargement. Pt demonstrated increased work of breathing, ABG 7.29/69/150/33/99.3 (on 15L NRB), pt placed on BIPAP for 2 hours with symptomatic improvement and s/p IV Lasix 20 mg x1. Pt off of IVF as of 7:50 am this morning (8/31). Nursing staff at the bedside stating that pt is able to tolerate small amounts of PO intake in the AM.     As of 9/1/22, Patient's Calcium corrected is 11.9, down from Ca corrected of 12.9 on 8/31/22, and a Ca corrected of 13.6 on 8/30/22. Pt is s/p IV pamidronate 60 mg x 1 and IV calcitonin 240 units x 4. Patient is currently off of IV fluids due to concerns of overload but is s/p approximately 1.9L of IVF throughout current hospital course.         ROS limited at this time due to patient's mental status but listed below.       MEDICATIONS  (STANDING):  albuterol/ipratropium for Nebulization 3 milliLiter(s) Nebulizer every 6 hours  budesonide  80 MICROgram(s)/formoterol 4.5 MICROgram(s) Inhaler 2 Puff(s) Inhalation two times a day  cefTRIAXone   IVPB 1000 milliGRAM(s) IV Intermittent every 24 hours  heparin   Injectable 5000 Unit(s) SubCutaneous every 12 hours  OLANZapine 2.5 milliGRAM(s) Oral at bedtime  thiamine IVPB 500 milliGRAM(s) IV Intermittent three times a day    MEDICATIONS  (PRN):  acetaminophen     Tablet .. 650 milliGRAM(s) Oral every 6 hours PRN Temp greater or equal to 38C (100.4F), Mild Pain (1 - 3)  ALBUTerol    90 MICROgram(s) HFA Inhaler 2 Puff(s) Inhalation every 6 hours PRN Shortness of Breath and/or Wheezing  aluminum hydroxide/magnesium hydroxide/simethicone Suspension 30 milliLiter(s) Oral every 4 hours PRN Dyspepsia  melatonin 3 milliGRAM(s) Oral at bedtime PRN Insomnia  ondansetron Injectable 4 milliGRAM(s) IV Push every 8 hours PRN Nausea and/or Vomiting      Allergies    latex (Rash)  No Known Drug Allergies    Intolerances      Review of Systems: Limited at this time due to patient's mental status.   Constitutional: + weight loss, no fevers, chills,   Eyes: No blurry vision, changes to vision, floaters   Neuro: no tremors, seizure, headaches,   HEENT: No pain, no sore throat  Cardiovascular: No chest pain, palpitations  Respiratory: No SOB, no cough  GI: No nausea, vomiting, abdominal pain, no constipation   : No dysuria, no incontinence, no urinary urgency   Skin: no rash, + bruising, no open skin lesions, no dried skin   Psych: + depression, hallucinations per history.   Endocrine: no polyuria, polydipsia, no heat or cold intolerance   Hem/lymph: no swelling, no excessive bleeding,   Osteoporosis: +fracture per imaging       ALL OTHER SYSTEMS REVIEWED AND NEGATIVE        PHYSICAL EXAM:  VITALS: T(C): 36.4 (09-01-22 @ 08:30)  T(F): 97.6 (09-01-22 @ 08:30), Max: 98.3 (08-31-22 @ 20:16)  HR: 96 (09-01-22 @ 09:15) (86 - 99)  BP: 91/53 (09-01-22 @ 08:30) (91/53 - 101/68)  RR:  (20 - 20)  SpO2:  (97% - 100%)  Wt(kg): --  GENERAL: NAD, talkative and responsive, but difficult to understand, pt with poor insight into situation and health.   EYES: No proptosis, no lid lag, anicteric  HEENT:  Atraumatic, Normocephalic, moist mucous membranes  THYROID: Normal size, no palpable nodules, no neck masses.   RESPIRATORY: Decreased breath sounds L>R. decreased breath sounds b/l in the bases; No rales, rhonchi, wheezing  CARDIOVASCULAR: Regular rate and rhythm; No murmurs; no peripheral edema  GI: Soft, nontender, non distended, normal bowel sounds  SKIN: Dry, intact, No rashes or lesions. Diffuse senile purpura, ecchymosis in the b/l lower extremities.   MUSCULOSKELETAL: Exam limited due to mental status.   NEURO: sensation intact, extraocular movements intact, mild tremors with motion in the b/l upper extremity.   PSYCH: Alert and oriented x 1, confused mood, bizarre affect.   CUSHING'S SIGNS: no striae, no dorsocervical fat pad, no moon facies.               CAPILLARY BLOOD GLUCOSE      POCT Blood Glucose.: 97 mg/dL (01 Sep 2022 05:36)  POCT Blood Glucose.: 133 mg/dL (31 Aug 2022 22:42)  POCT Blood Glucose.: 128 mg/dL (31 Aug 2022 16:17)      09-01    141  |  106  |  18  ----------------------------<  87  4.7   |  35<H>  |  1.04    eGFR: 56<L>    Ca    9.8      09-01  Mg     2.00     09-01  Phos  2.8     09-01    TPro  11.4<H>  /  Alb  1.4<L>  /  TBili  0.6  /  DBili  x   /  AST  22  /  ALT  24  /  AlkPhos  40  09-01      A1C with Estimated Average Glucose Result: 4.5 % (08-31-22 @ 03:48)      Thyroid Function Tests:  08-31 @ 03:48 TSH 1.42 FreeT4 -- T3 -- Anti TPO -- Anti Thyroglobulin Ab -- TSI --  08-29 @ 22:52 TSH 3.06 FreeT4 -- T3 -- Anti TPO -- Anti Thyroglobulin Ab -- TSI --                       *Please wait for attending attestation for official recommendations.     Chief Complaint: hypoxic respiratory failure     History:  77 year old female with PMH of lymphoma (dx 2005, s/p CARRIE lobectomy, relapsed in 2019 but not currently on chemo/RT, being followed by heme every 3-[facility]), bipolar disorder, asthma (on O2 PRN at home, unknown how many L) brought to ED by daughter who states pt has been hallucinating, being more depressed.  Patient unable to provide much history, daughter Alissa states that patient was able to care for herself, manage home finances and attend doctor appointments independently until recently, she states that she believes her mom stopped taking her psychiatric medications in February of 2022 and has not followed up with any of her physicians. She has noticed recent weight loss (unsure how much) and change in voice.  Patient denies chest pain, shortness of breath or any discomfort, she is pleasantly confused. Daughter states that within the last year the patient has received treatment for her lymphoma however is unsure of the specifics, the patient was supposed to follow up in February however has not. (30 Aug 2022 10:47)   Patient of Dr. 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 Pt also had labs during admission at [facility] 01/2020 showing IgM lambda monoclonal gammopathy, with biopsy of a lung mass that revealed progression of lymphoma, treated with Rituxan-Bendamustine, completed 08/27/2020. Her last visit at [facility] was 07/08/2021 and was under surveillance with recommended follow-ups every 4 months, but has not returned since.   Pt denies history of constipation, abdominal pains, kidney stones. Patient with perseverations and bizarre affect,  but denying depression and hallucinations. Pt denying any recent falls or fracture but demonstrating poor insight into health and situation at this time.   Endocrinology consulted for hypercalcemia with Corrected calcium level of 14.2, pt's with albumin of 0.9 on admission. Pt received 1L LR bolus yesterday (8/29) night in the ED. Pt currently on 75 cc/hr of NS, on 4L NC maintaining saturations of 94-95%.   Pt also with Serum Pro BNP of 6447.     Interval history:   on IVF/calcitonin/bisphosphonate s/p ~2L IVF. Patient now on NRB as she desatted while on 5LNC down to 76%, yesterday evening and subsequently placed on NRB with improvement of O2 Saturations to the 90s. Per MICU note, On bedside POCUS, pt with B-lines throughout; IVC plump but with some respiratory variation; small pericardial effusion with questionable RV enlargement. Pt demonstrated increased work of breathing, ABG 7.29/69/150/33/99.3 (on 15L NRB), pt placed on BIPAP for 2 hours with symptomatic improvement and s/p IV Lasix 20 mg x1. Pt off of IVF as of 7:50 am this morning (8/31). Nursing staff at the bedside stating that pt is able to tolerate small amounts of PO intake in the AM.     As of 9/1/22, Patient's Calcium corrected is 11.9 (9/1/22), down from Ca corrected of 12.9 on 8/31/22, and a Ca corrected of 13.6 on 8/30/22. Pt is s/p IV pamidronate 60 mg x 1 and IV calcitonin 240 units x 4. Patient is currently off of IV fluids due to concerns of overload but is s/p approximately 1.9L of IVF throughout current hospital course.         ROS limited at this time due to patient's mental status but listed below.       MEDICATIONS  (STANDING):  albuterol/ipratropium for Nebulization 3 milliLiter(s) Nebulizer every 6 hours  budesonide  80 MICROgram(s)/formoterol 4.5 MICROgram(s) Inhaler 2 Puff(s) Inhalation two times a day  cefTRIAXone   IVPB 1000 milliGRAM(s) IV Intermittent every 24 hours  heparin   Injectable 5000 Unit(s) SubCutaneous every 12 hours  OLANZapine 2.5 milliGRAM(s) Oral at bedtime  thiamine IVPB 500 milliGRAM(s) IV Intermittent three times a day    MEDICATIONS  (PRN):  acetaminophen     Tablet .. 650 milliGRAM(s) Oral every 6 hours PRN Temp greater or equal to 38C (100.4F), Mild Pain (1 - 3)  ALBUTerol    90 MICROgram(s) HFA Inhaler 2 Puff(s) Inhalation every 6 hours PRN Shortness of Breath and/or Wheezing  aluminum hydroxide/magnesium hydroxide/simethicone Suspension 30 milliLiter(s) Oral every 4 hours PRN Dyspepsia  melatonin 3 milliGRAM(s) Oral at bedtime PRN Insomnia  ondansetron Injectable 4 milliGRAM(s) IV Push every 8 hours PRN Nausea and/or Vomiting      Allergies    latex (Rash)  No Known Drug Allergies    Intolerances      Review of Systems: Limited at this time due to patient's mental status.   Constitutional: + weight loss, no fevers, chills,   Eyes: No blurry vision, changes to vision, floaters   Neuro: no tremors, seizure, headaches,   HEENT: No pain, no sore throat, no pain with swallowing   Cardiovascular: No chest pain, palpitations  Respiratory: No SOB, no cough, no increased sputum production   GI: No nausea, vomiting, abdominal pain, no constipation   : No dysuria, no incontinence, no urinary urgency   Skin: no rash, + bruising, no open skin lesions, no dried skin   Psych: + depression, hallucinations per history.   Endocrine: no polyuria, polydipsia, no heat or cold intolerance   Hem/lymph: no swelling, no excessive bleeding,   Osteoporosis: +fracture per imaging       ALL OTHER SYSTEMS REVIEWED AND NEGATIVE        PHYSICAL EXAM:  VITALS: T(C): 36.4 (09-01-22 @ 08:30)  T(F): 97.6 (09-01-22 @ 08:30), Max: 98.3 (08-31-22 @ 20:16)  HR: 96 (09-01-22 @ 09:15) (86 - 99)  BP: 91/53 (09-01-22 @ 08:30) (91/53 - 101/68)  RR:  (20 - 20)  SpO2:  (97% - 100%)  Wt(kg): --  GENERAL: NAD, talkative and responsive, but difficult to understand, pt with poor insight into situation and health.   EYES: No proptosis, no lid lag, anicteric  HEENT:  Atraumatic, Normocephalic, moist mucous membranes  THYROID: Normal size, no palpable nodules, no neck masses.   RESPIRATORY: Decreased breath sounds L>R. decreased breath sounds b/l in the bases; No rales, rhonchi, wheezing  CARDIOVASCULAR: Regular rate and rhythm; No murmurs; no peripheral edema  GI: Soft, nontender, non distended, normal bowel sounds  SKIN: Dry, intact, No rashes or lesions. Diffuse senile purpura, ecchymosis in the b/l lower extremities.   MUSCULOSKELETAL: Exam limited due to mental status.   NEURO: sensation intact, extraocular movements intact, mild tremors with motion in the b/l upper extremity.   PSYCH: Alert and oriented x 1, confused mood, bizarre affect.   CUSHING'S SIGNS: no striae, no dorsocervical fat pad, no moon facies.               CAPILLARY BLOOD GLUCOSE      POCT Blood Glucose.: 97 mg/dL (01 Sep 2022 05:36)  POCT Blood Glucose.: 133 mg/dL (31 Aug 2022 22:42)  POCT Blood Glucose.: 128 mg/dL (31 Aug 2022 16:17)      09-01    141  |  106  |  18  ----------------------------<  87  4.7   |  35<H>  |  1.04    eGFR: 56<L>    Ca    9.8      09-01  Mg     2.00     09-01  Phos  2.8     09-01    TPro  11.4<H>  /  Alb  1.4<L>  /  TBili  0.6  /  DBili  x   /  AST  22  /  ALT  24  /  AlkPhos  40  09-01      A1C with Estimated Average Glucose Result: 4.5 % (08-31-22 @ 03:48)      Thyroid Function Tests:  08-31 @ 03:48 TSH 1.42 FreeT4 -- T3 -- Anti TPO -- Anti Thyroglobulin Ab -- TSI --  08-29 @ 22:52 TSH 3.06 FreeT4 -- T3 -- Anti TPO -- Anti Thyroglobulin Ab -- TSI --

## 2022-09-01 NOTE — PROGRESS NOTE ADULT - PROBLEM SELECTOR PLAN 5
-[place] w/[place]. Real [place]o [place] [facility] [place]  -received treatment recently per daughter however she doesn't know specifics  -with progression of disease  -hematology to speak to [facility] regarding if further treatment is suggested

## 2022-09-01 NOTE — PROGRESS NOTE ADULT - ATTENDING COMMENTS
77 year old female with history of lymphoma with acute on chronic hypoxic/hypercapnic respiratory failure.   Patient has intermittent periods of worsening hypercapnia.  Latest ABG shows compensated hypercapnia.   Can give breaks off bilevel but will need to address the metabolic encephalopathy which I don't think can be explained by her pCO2.  Continue bilevel at night and as needed during day.  Prognosis guarded.

## 2022-09-01 NOTE — PROGRESS NOTE ADULT - SUBJECTIVE AND OBJECTIVE BOX
PROGRESS NOTE:   Authored by Dr. Nakita Hartman MD, pager [phone]     Patient is a 77y old  Female who presents with a chief complaint of Hypoxic respiratory failure (01 Sep 2022 16:03)      SUBJECTIVE / OVERNIGHT EVENTS:  Patient lethargic. Withdraws to pain and attempts to pull off AVAPS mask, but does not otherwise follow commands or open her eyes.     ADDITIONAL REVIEW OF SYSTEMS:  Unable to obtain due to lethargy    MEDICATIONS  (STANDING):  albuterol/ipratropium for Nebulization 3 milliLiter(s) Nebulizer every 6 hours  budesonide  80 MICROgram(s)/formoterol 4.5 MICROgram(s) Inhaler 2 Puff(s) Inhalation two times a day  cefTRIAXone   IVPB 1000 milliGRAM(s) IV Intermittent every 24 hours  heparin   Injectable 5000 Unit(s) SubCutaneous every 12 hours  OLANZapine 2.5 milliGRAM(s) Oral at bedtime  thiamine IVPB 500 milliGRAM(s) IV Intermittent three times a day    MEDICATIONS  (PRN):  acetaminophen     Tablet .. 650 milliGRAM(s) Oral every 6 hours PRN Temp greater or equal to 38C (100.4F), Mild Pain (1 - 3)  ALBUTerol    90 MICROgram(s) HFA Inhaler 2 Puff(s) Inhalation every 6 hours PRN Shortness of Breath and/or Wheezing  aluminum hydroxide/magnesium hydroxide/simethicone Suspension 30 milliLiter(s) Oral every 4 hours PRN Dyspepsia  melatonin 3 milliGRAM(s) Oral at bedtime PRN Insomnia  ondansetron Injectable 4 milliGRAM(s) IV Push every 8 hours PRN Nausea and/or Vomiting      CAPILLARY BLOOD GLUCOSE      POCT Blood Glucose.: 84 mg/dL (01 Sep 2022 11:39)  POCT Blood Glucose.: 97 mg/dL (01 Sep 2022 05:36)  POCT Blood Glucose.: 133 mg/dL (31 Aug 2022 22:42)    I&O's Summary      PHYSICAL EXAM:  Vital Signs Last 24 Hrs  T(C): 36.7 (01 Sep 2022 12:30), Max: 36.8 (31 Aug 2022 20:16)  T(F): 98 (01 Sep 2022 12:30), Max: 98.3 (31 Aug 2022 20:16)  HR: 99 (01 Sep 2022 16:14) (87 - 103)  BP: 95/57 (01 Sep 2022 12:30) (91/53 - 101/68)  BP(mean): --  RR: 20 (01 Sep 2022 12:30) (20 - 20)  SpO2: 98% (01 Sep 2022 16:14) (95% - 100%)    Parameters below as of 01 Sep 2022 16:13  Patient On (Oxygen Delivery Method): mask, nonrebreather        CONSTITUTIONAL: Frail, elderly woman. Unkempt  RESPIRATORY: Normal respiratory effort; diminished breath sounds over L lung  CARDIOVASCULAR: Regular rate and rhythm, normal S1 and S2, no murmur/rub/gallop; No lower extremity edema; Peripheral pulses are 2+ bilaterally  ABDOMEN: Nontender to palpation, normoactive bowel sounds, no rebound/guarding; No hepatosplenomegaly  MUSCULOSKELETAL: no clubbing or cyanosis of digits; no joint swelling or tenderness to palpation  PSYCH: Lethargic    LABS:                        8.4    6.65  )-----------( 156      ( 01 Sep 2022 05:36 )             31.2     09-01    141  |  106  |  18  ----------------------------<  87  4.7   |  35<H>  |  1.04    Ca    9.8      01 Sep 2022 05:36  Phos  2.8     09-01  Mg     2.00     09-01    TPro  11.4<H>  /  Alb  1.4<L>  /  TBili  0.6  /  DBili  x   /  AST  22  /  ALT  24  /  AlkPhos  40  09-01    PT/INR - ( 31 Aug 2022 03:48 )   PT: 15.7 sec;   INR: 1.35 ratio         PTT - ( 31 Aug 2022 03:48 )  PTT:26.6 sec          Culture - Urine (collected 29 Aug 2022 23:08)  Source: Clean Catch Clean Catch (Midstream)  Final Report (31 Aug 2022 23:48):    >100,000 CFU/ml Escherichia coli  Organism: Escherichia coli (31 Aug 2022 23:48)  Organism: Escherichia coli (31 Aug 2022 23:48)        RADIOLOGY & ADDITIONAL TESTS:  Results Reviewed:   Imaging Personally Reviewed:  Electrocardiogram Personally Reviewed:    COORDINATION OF CARE:  Care Discussed with Consultants/Other Providers [Y/N]:  Prior or Outpatient Records Reviewed [Y/N]:

## 2022-09-01 NOTE — PROGRESS NOTE ADULT - SUBJECTIVE AND OBJECTIVE BOX
CHIEF COMPLAINT:    Interval Events:    [facility]       OBJECTIVE:  ICU Vital Signs Last 24 Hrs  T(C): 36.8 (01 Sep 2022 00:20), Max: 36.8 (31 Aug 2022 20:16)  T(F): 98.2 (01 Sep 2022 00:20), Max: 98.3 (31 Aug 2022 20:16)  HR: 98 (01 Sep 2022 04:04) (84 - 99)  BP: 98/67 (01 Sep 2022 00:20) (94/69 - 111/66)  BP(mean): --  ABP: --  ABP(mean): --  RR: 20 (01 Sep 2022 00:20) (20 - 20)  SpO2: 99% (01 Sep 2022 04:04) (97% - 100%)    O2 Parameters below as of 01 Sep 2022 04:04  Patient On (Oxygen Delivery Method): BiPAP/CPAP              CAPILLARY BLOOD GLUCOSE      POCT Blood Glucose.: 97 mg/dL (01 Sep 2022 05:36)      General: Awake, alert, oriented X 0   HEENT: Atraumatic, normocephalic.                  No tonsillar or pharyngeal exudates.  Lymph Nodes: No palpable lymphadenopathy  Neck: No JVD. No carotid bruit.   Respiratory: decreased breath sounds L > R, tachypneic   Cardiovascular: S1 S2 normal. No murmurs, rubs or gallops.   Abdomen: Soft, non-tender, non-distended. No organomegaly.  Extremities: Warm to touch. Peripheral pulse palpable. No pedal edema.   Skin: No rashes or skin lesions  Neurological: Non-focal  Psychiatry: Appropriate mood and affect.    HOSPITAL MEDICATIONS:  MEDICATIONS  (STANDING):  albuterol/ipratropium for Nebulization 3 milliLiter(s) Nebulizer every 6 hours  budesonide  80 MICROgram(s)/formoterol 4.5 MICROgram(s) Inhaler 2 Puff(s) Inhalation two times a day  cefTRIAXone   IVPB 1000 milliGRAM(s) IV Intermittent every 24 hours  heparin   Injectable 5000 Unit(s) SubCutaneous every 12 hours  OLANZapine 2.5 milliGRAM(s) Oral at bedtime  thiamine IVPB 500 milliGRAM(s) IV Intermittent three times a day    MEDICATIONS  (PRN):  acetaminophen     Tablet .. 650 milliGRAM(s) Oral every 6 hours PRN Temp greater or equal to 38C (100.4F), Mild Pain (1 - 3)  ALBUTerol    90 MICROgram(s) HFA Inhaler 2 Puff(s) Inhalation every 6 hours PRN Shortness of Breath and/or Wheezing  aluminum hydroxide/magnesium hydroxide/simethicone Suspension 30 milliLiter(s) Oral every 4 hours PRN Dyspepsia  melatonin 3 milliGRAM(s) Oral at bedtime PRN Insomnia  ondansetron Injectable 4 milliGRAM(s) IV Push every 8 hours PRN Nausea and/or Vomiting      LABS:                        8.4    6.65  )-----------( 156      ( 01 Sep 2022 05:36 )             31.2     08-31    141  |  105  |  20.8  ----------------------------<  104<H>  4.5   |  34<H>  |  1.14    Ca    TNP      31 Aug 2022 14:20  Phos  TNP     08-31  Mg     TNP     08-31    TPro  12.6<H>  /  Alb  2.6  /  TBili  TNP  /  DBili  x   /  AST  TNP  /  ALT  TNP  /  AlkPhos  34<L>  08-31    PT/INR - ( 31 Aug 2022 03:48 )   PT: 15.7 sec;   INR: 1.35 ratio         PTT - ( 31 Aug 2022 03:48 )  PTT:26.6 sec    Arterial Blood Gas:  08-31 @ 17:41  7.36/66/151/37/99.4/10.2  ABG lactate: --  Arterial Blood Gas:  08-31 @ 05:52  7.35/59/142/33/99.6/5.9  ABG lactate: --  Arterial Blood Gas:  08-31 @ 03:28  7.29/69/150/33/99.3/5.2  ABG lactate: --    Venous Blood Gas:  09-01 @ 05:36  7.29/78/57/38/89.2  VBG Lactate: 1.4  Venous Blood Gas:  08-31 @ 13:40  7.30/77/117/38/99.6  VBG Lactate: 1.3      MICROBIOLOGY:     RADIOLOGY:  [ ] Reviewed and interpreted by me    Point of Care Ultrasound Findings:    PFT:    EKG:

## 2022-09-01 NOTE — PROGRESS NOTE ADULT - PROBLEM SELECTOR PLAN 4
-Corrected calcium 13.6 upon admission  -likely due to underlying malignancy, lymphoma vs new MM. F/u vitamin D 25 and 1, 25, PTHRP, MM labs. PTH is inappropriately normal  -s/p pamidronate on 8/30; will monitor for approximately 72 hour to evaluate for improvement  -calcitonin x 4 doses  -unfortunately, will hold off fluids for now given pulmonary edema that developed with IVF upon admission, which later required need for Lasix IV and AVAPS

## 2022-09-01 NOTE — PROGRESS NOTE ADULT - ATTENDING COMMENTS
77F with hypercalcemia, history of lithium use, lymphoma, history of humeral fracture on x ray, low albumin. Follow up workup as outlined differential includes elevated 1,25D (lymphoma), multiple myeloma, primary hyperparathyroid from lithium, other malignancy mechanisms. PTH resulted 25 unlikely primary hyperpara. S/p pamidronate 60mg IV x1 8/30 may take 72 hours for full effect. Off IVF now. Requiring Avaps. Will follow.    Thais Tovar MD  Division of Endocrinology  Pager: [phone]    If after 6PM or before 9AM, or on weekends/holidays, please call endocrine answering service for assistance ([phone]).  For nonurgent matters email [email or web address] for [email or web address].

## 2022-09-01 NOTE — PROGRESS NOTE ADULT - ASSESSMENT
MANDI GASPAR is a 77y Female who presents with a chief complaint of respiratory failure.    Marginal Zone Lymphoma  - Patient treated previously at [facility]; was on rituximab + bendamustine last in August 2020, and since then has been on surveillance, but lost to follow-up since July 2021.  - CT chest concerning for slight progression of disease.  - Unable to obtain IgM level due to hyperviscosity. Noted elevated lambda light chain compared to kappa similar to values in early 2020. Consider evaluation for plasmapheresis given altered mental status which can be due to hyperviscosity.     Altered Mental Status  - Likely multifactorial with hyperviscosity of blood, hypercalcemia, urinary tract infection.  - On ceftriaxone.   - Endocrinology following; pamidronate and calcitonin administered. Continue to monitor calcium levels closely.    Will continue to follow.    Alexander Peraza MD  Hematology/Oncology  O: [phone] MANDI GASPAR is a 77y Female who presents with a chief complaint of respiratory failure.    Marginal Zone Lymphoma  - Patient treated previously at [facility]; was on rituximab + bendamustine last in August 2020, and since then has been on surveillance, but lost to follow-up since July 2021.  - CT chest concerning for slight progression of disease.  - Unable to obtain IgM level due to hyperviscosity. Noted elevated lambda light chain compared to kappa similar to values in early 2020. Recommend evaluation for plasmapheresis given altered mental status which can be due to hyperviscosity.   - Will continue discussion with [facility].   - Depending on goals of care as well, may need additional therapy such as repeat rituximab in addition to plasmapheresis if patient has improvement on plasmapheresis.    Altered Mental Status  - Likely multifactorial with hyperviscosity of blood, hypercalcemia, urinary tract infection.  - On ceftriaxone.   - Endocrinology following; pamidronate and calcitonin administered. Continue to monitor calcium levels closely.    Will continue to follow.    Alexander Peraza MD  Hematology/Oncology  O: [phone]

## 2022-09-01 NOTE — PROGRESS NOTE ADULT - ASSESSMENT
77 year old female with PMH of lymphoma (dx 2005, s/p CARRIE lobectomy, relapsed in 2019 but not currently on chemo/RT, being followed by heme every 3-6months at [facility] [place]), bipolar disorder, asthma (on O2 PRN at home, unknown how many L) brought to ED by daughter who states pt has been hallucinating, being more depressed.  Patient unable to provide much history, daughter Alissa states that patient was able to care for herself, manage home finances and attend doctor appointments independently until recently, she states that she believes her mom stopped taking her psychiatric medications in February of 2022 and has not followed up with any of her physicians. She has noticed recent weight loss (unsure how much) and change in voice.   Admitted to medicine service for further evaluation & treatment. Course c/b acute hypercapnic and hypoxic respiratory failure requiring BIPAP and AVAPS, on which she remains. Pt is persistently lethargic; now with concern for hyperviscosity syndrome given inability to even obtain immunoglobulin panel due to serum viscosity.

## 2022-09-01 NOTE — PROGRESS NOTE ADULT - PROBLEM SELECTOR PLAN 9
-Heparin SQ    -Dispo issues:  -Patient with APS case ongoing due to unsanitary conditions at home. Will require SW/CM for safe placement once medically stable for d/c. Patient currently without capacity and with poor insight into her medical conditions.    Will need MICU evaluation for emergent apheresis.
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
## 2022-09-01 NOTE — CONSULT NOTE ADULT - ASSESSMENT
77 year old female with PMH of lymphoma (dx 2005, s/p CARRIE lobectomy, relapsed in 2019 but not currently on chemo/RT, being followed by heme every 3-6months at [facility] [place]), bipolar disorder, asthma (on O2 PRN at home, unknown how many L) brought to ED by daughter who states pt has been hallucinating, being more depressed.  Patient unable to provide much history, daughter Alissa states that patient was able to care for herself, manage home finances and attend doctor appointments independently until recently, she states that she believes her mom stopped taking her psychiatric medications in February of 2022 and has not followed up with any of her physicians. She has noticed recent weight loss (unsure how much) and change in voice.   Admitted to medicine service for further evaluation & treatment. Course c/b acute hypercapnic and hypoxic respiratory failure requiring BIPAP and AVAPS, on which she remains. Pt is persistently lethargic; now with concern for hyperviscosity syndrome given inability to even obtain immunoglobulin panel due to serum viscosity.       #Evaluation for Hyperviscosity syndrome:  - Agree with serum viscosity, serum IgG, SPEP for evaluation.   - Discussed with hematology attending Dr. Peraza regarding patient's case. Would recommend primary team discuss with blood bank regarding need for urgent apheresis.  - If needing urgent apheresis would recommend reconsulting MICU.     #Respiratory distress:  - Agree with pulmonary consult and evaluation.   - Consider transitioning patient to venti mask/NC from NRB as SpO2 goal for patient should be from 88-93%, avoid hyperoxia i/s/o hypercapnic respiratory distress.   - Consider switching patient's MDI inhalers from PRN to standing BID so patient does not have to ask for them as frequently.   - Currently protecting airway on NRB and bilevel ventilation.       Currently patient without any ICU needs. Please reconsult PRN for any change in symptoms.

## 2022-09-01 NOTE — PROGRESS NOTE ADULT - PROBLEM SELECTOR PLAN 7
-Hx of bipolar w/psych admission @ [place] many years ago  -non-compliant with medications  -F/u Psych recommendations  -Supportive care

## 2022-09-01 NOTE — PROGRESS NOTE ADULT - PROBLEM SELECTOR PLAN 2
-also with hypoxemic respiratory failure  -pCO2 once again improved to 66; yet no improvement in mental status  -continue on AVAPS  -CXR with concern for L pleural effusion, however, as per CT, changes are largely attributed to pt's lymphoma. No need for thora, as per pulm  -IVF d/john ON due to concern for pulmonary edema leading to hypoxia

## 2022-09-01 NOTE — PROGRESS NOTE ADULT - ASSESSMENT
77 year old female with PMH of HLD, lymphoma (dx 2005, s/p CARRIE lobectomy, relapsed in 2019 but not currently on chemo/RT, being followed by heme every 3-6months at [facility] [place]), bipolar disorder, asthma, who presented to the hospital for AMS and hypoxic respiratory failure, found to have hypercalcemia.     #Hypercalcemia, likely malignancy-related elevated 1,25D (lymphoma) vs medication induced (lithium induced parathyroidism) vs in the setting of potential multiple myeloma transformation   - total protein of 11.4, Ca corrected now 11.9 (down from 12.9 day prior) Albumin 1.4. Continue to trend Calcium and albumin levels via CMP.  - s/p approximately 1.9 L of IVF (1L of LR on 8/29, 75 cc/hr of NS x 12 hours on 8/30). Currently off of IVF due to overload. s/p Lasix 20 mg IV in the AM (8/31/22).  Continue to hold Lithium. Encourage PO intake if possible. Avoid medications or supplements that will increase serum Ca.   - intact PTH level of 25, Vitamin D 25-hydroxy of 18.4 (low). Still waiting for results on PTH-related peptide, Vitamin D 1,25-hydroxy level, serum immunofixation assay.   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   - s/p 4 doses of IV Calcitonin. Follow with repeat serum Calcium levels.   - s/p pamidronate 60 mg IV x 1 (given recent EMILY), Doses of pamidronate should not be repeated sooner than a minimum of seven days. Monitor for response.       #history of fracture in the setting of likely osteoporosis;.   - Imaging findings of Old right humeral neck fracture. Left proximal humeral fracture s/p ORIF  - f/u outpatient bone-density testing.   -fu vitamin D     #Hyperlipidemia  - history of hyperlipidemia   - Cholesterol of 54, Triglycerides of 30, Serum HDL of 21, LDL of 27. A1C of 4.5   - can hold on statin therapy at this time due to age and risk factors at this time   - home medication history of zyprexa.     - rest of care per primary team.  77 year old female with PMH of HLD, lymphoma (dx 2005, s/p CARRIE lobectomy, relapsed in 2019 but not currently on chemo/RT, being followed by heme every 3-6months at [facility] [place] [place]), bipolar disorder, asthma, who presented to the hospital for AMS and hypoxic respiratory failure, found to have hypercalcemia.     #Hypercalcemia, likely malignancy-related elevated 1,25D (lymphoma) vs medication induced (lithium induced parathyroidism) vs in the setting of potential multiple myeloma transformation   - total protein of 11.4, Ca corrected now 11.9 (down from 12.9 day prior) Albumin 1.4. Continue to trend Calcium and albumin levels via CMP.  - s/p approximately 1.9 L of IVF (1L of LR on 8/29, 75 cc/hr of NS x 12 hours on 8/30). Currently off of IVF due to overload. s/p Lasix 20 mg IV in the AM (8/31/22).  Continue to hold Lithium. Encourage PO intake if possible. Avoid medications or supplements that will increase serum Ca.   - intact PTH level of 25, Vitamin D 25-hydroxy of 18.4 (low). Still waiting for results on PTH-related peptide, Vitamin D 1,25-hydroxy level, serum immunofixation assay.   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   - s/p 4 doses of IV Calcitonin. Follow with repeat serum Calcium levels.   - s/p pamidronate 60 mg IV x 1 (given recent EMILY), Doses of pamidronate should not be repeated sooner than a minimum of seven days. Monitor for response.       #history of fracture in the setting of likely osteoporosis;.   - Imaging findings of Old right humeral neck fracture. Left proximal humeral fracture s/p ORIF  - f/u outpatient bone-density testing.   - 25-hydroxy Vitamin D level of 18.4 (low), f/u 1,25 hydroxy level. If hypercalcemia resolves and is monitored closely on outpatient basis, patient may benefit from future Vitamin D supplementation.     #Hyperlipidemia  - history of hyperlipidemia   - Cholesterol of 54, Triglycerides of 30, Serum HDL of 21, LDL of 27. A1C of 4.5   - can hold on statin therapy at this time due to age and risk factors at this time   - home medication history of zyprexa.     - rest of care per primary team.  77 year old female with PMH of HLD, lymphoma (dx 2005, s/p CARRIE lobectomy, relapsed in 2019 but not currently on chemo/RT, being followed by heme every 3-6months at [facility] [place]), bipolar disorder, asthma, who presented to the hospital for AMS and hypoxic respiratory failure, found to have hypercalcemia.     #Hypercalcemia, likely malignancy-related elevated 1,25D (lymphoma) vs medication induced (lithium induced parathyroidism) vs in the setting of potential multiple myeloma transformation   - total protein of 11.4, Ca corrected now 11.9 (down from 12.9 day prior) Albumin 1.4. Continue to trend Calcium and albumin levels via CMP.  - s/p approximately 1.9 L of IVF (1L of LR on 8/29, 75 cc/hr of NS x 12 hours on 8/30). Currently off of IVF due to overload. s/p Lasix 20 mg IV in the AM (8/31/22).  Continue to hold Lithium. Encourage PO intake if possible. Avoid medications or supplements that will increase serum Ca.   - intact PTH level of 25, Vitamin D 25-hydroxy of 18.4 (low). Still waiting for results on PTH-related peptide, Vitamin D 1,25-hydroxy level, serum immunofixation assay.   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   - s/p 4 doses of IV Calcitonin. Follow with repeat serum Calcium levels.   - s/p pamidronate 60 mg IV x 1 (given recent EMILY), Doses of pamidronate should not be repeated sooner than a minimum of seven days. Monitor for response (peak effect of pamidronate may be seen at the 72 hour jose).        #history of fracture in the setting of likely osteoporosis;.   - Imaging findings of Old right humeral neck fracture. Left proximal humeral fracture s/p ORIF  - f/u outpatient bone-density testing.   - 25-hydroxy Vitamin D level of 18.4 (low), f/u 1,25 hydroxy level. Patient may benefit from future Vitamin D supplementation pending results from the rest of workup.     #Hyperlipidemia  - history of hyperlipidemia   - Cholesterol of 54, Triglycerides of 30, Serum HDL of 21, LDL of 27. A1C of 4.5   - can hold on statin therapy at this time due to age and risk factors at this time   - home medication history of zyprexa.     - rest of care per primary team.

## 2022-09-02 NOTE — CONSULT NOTE ADULT - ASSESSMENT
Interventional Radiology    Evaluate for Procedure: Non-tunneled central catheter    HPI:   77 year old female with PMH of lymphoma (dx 2005, s/p CARRIE lobectomy, relapsed in 2019 but not currently on chemo/RT, [place] [facility] [place] [facility] [place] disorder, asthma (on O2 PRN) brought to ED for encephalopathy. Course c/b acute hypercapnic and hypoxic respiratory failure requiring BIPAP and AVAPS. Pt is persistently lethargic; now with concern for hyperviscosity syndrome given inability to even obtain immunoglobulin panel due to serum viscosity. IR consulted for non-tunneled central catheter placement for plasmaphoresis.       Allergies: latex (Rash)    Medications (Abx/Cardiac/Anticoagulation/Blood Products)    cefTRIAXone   IVPB: 100 mL/Hr IV Intermittent (09-01 @ 11:43)  heparin   Injectable: 5000 Unit(s) SubCutaneous (09-02 @ 05:19)    Data:    T(C): 36.9  HR: 104  BP: 119/68  RR: 19  SpO2: 99%    -WBC 5.93 / HgB 8.7 / Hct 31.3 / Plt 166  -Na 141 / Cl 104 / BUN -- / Glucose 80  -K 4.5 / CO2 34 / Cr --  -ALT -- / Alk Phos -- / T.Bili --  -INR 1.27 / PTT 40.6      Radiology:   Imaging reviewed     Assessment/Plan:   77 year old female with PMH of lymphoma (dx 2005, s/p CARRIE lobectomy, relapsed in 2019 but not currently on chemo/RT, [place] [facility] [place] [facility] [place] disorder, asthma (on O2 PRN) brought to ED for encephalopathy. Course c/b acute hypercapnic and hypoxic respiratory failure requiring BIPAP and AVAPS. Pt is persistently lethargic; now with concern for hyperviscosity syndrome given inability to even obtain immunoglobulin panel due to serum viscosity. IR consulted for non-tunneled central catheter placement for plasmaphoresis.     -- IR will plan to perform non-tunneled central catheter placement for plasmaphoresis 9/2  -- Keep patient NPO  -- hold a.m. anticoagulation on 9/2  -- please place IR procedure request order under Dr. Garrett

## 2022-09-02 NOTE — PROGRESS NOTE ADULT - PROBLEM SELECTOR PLAN 4
-Corrected calcium 13.6 upon admission  -likely due to underlying malignancy, lymphoma vs new MM. F/u vitamin D 25 and 1, 25, PTHRP, MM labs. PTH is inappropriately normal  -s/p pamidronate on 8/30; now s/p 72 hours with Ca improved to approximately 11.8.   -calcitonin x 4 doses  -unfortunately, will hold off fluids for now given pulmonary edema that developed with IVF upon admission, which later required need for Lasix IV and AVAPS  -will speak to endocrine regarding need for Xgeva given ongoing hypercalcemia

## 2022-09-02 NOTE — PROGRESS NOTE ADULT - ASSESSMENT
77F with hx of lymphoma (dx 2005, s/p CARRIE lobectomy, relapsed 2019, currently off chemo/RT) BPD, asthma admitted for metabolic encephalopathy 2/2 UTI/hypercalcemia with course now complicated by hypoxic/hypercapnic respiratory failure. Pulmonary consulted for c/f pleural effusion on CXR.     #Hypoxic/Hypercapnic respiratory failure  #Metabolic Encephalopathy  #Lymphoma  #MGUS   #Trace pleural Effusion  #Asthma  #Hyperviscosity Syndrome     Imaging reviewed. Opacity on CXR is likely soft tissue mass (lymphoma) as seen on CT Chest. Patient has small pleural effusion which would not be amenable to thoracentesis.    #Recommendations  - Can continue with AVAPS, and give breaks as tolerated. When off AVAPs can continue with oxygen nasal cannula. Wean oxygen to >89%   - Can continue with standing nebulizer treatments.   - Hematology recommendations noted, plan for plasmapharesis.  77F with hx of lymphoma (dx 2005, s/p CARRIE lobectomy, relapsed 2019, currently off chemo/RT) BPD, asthma admitted for metabolic encephalopathy 2/2 UTI/hypercalcemia with course now complicated by hypoxic/hypercapnic respiratory failure. Pulmonary consulted for c/f pleural effusion on CXR.     #Hypoxic/Hypercapnic respiratory failure  #Metabolic Encephalopathy  #Lymphoma  #MGUS   #Trace pleural Effusion  #Asthma  #Hyperviscosity Syndrome     Imaging reviewed. Opacity on CXR is likely soft tissue mass (lymphoma) as seen on CT Chest. Patient has small pleural effusion which would not be amenable to thoracentesis.    #Recommendations  - Can continue with AVAPS at night When off AVAPs can continue with oxygen NC or If saturating <92% on 6L NC, can trial HFNC  - Can continue with standing nebulizer treatments.   - Hematology recommendations noted, plan for plasmapharesis.

## 2022-09-02 NOTE — CONSULT NOTE ADULT - SUBJECTIVE AND OBJECTIVE BOX
Patient is a 77y old  Female who presents with a chief complaint of Hypoxic respiratory failure (02 Sep 2022 14:28)    HPI:  77 year old female with PMH of lymphoma (dx 2005, s/p CARRIE lobectomy, relapsed in 2019 but not currently on chemo/RT, being followed by heme every 3-6months at [facility] [place] bipolar disorder, asthma (on O2 PRN at home, unknown how many L) brought to ED by daughter who states pt has been hallucinating, being more depressed.   Currently on O2 NC for shortness of breath. Worsening of mental status concerning for hyperviscosity (increased Lambda and IgM couldnt be tested due to hyperviscus specimen)  CXR pleural effusion + and no acute changes in head CT.  Blood bank consulted for PLEX trial.      (02 Sep 2022 11:39)        PAST MEDICAL & SURGICAL HISTORY:  GERD (gastroesophageal reflux disease)  Hyperlipidemia  Asthma  Injury of left shoulder, initial encounter  Surgical repair with plates    MEDICATIONS  (STANDING): Reviewed, no redosing needed with PLEX    Vital Signs Last 24 Hrs  T(C): 37 (02 Sep 2022 20:01), Max: 37 (01 Sep 2022 22:05)  T(F): 98.6 (02 Sep 2022 20:01), Max: 98.6 (01 Sep 2022 22:05)  HR: 97 (02 Sep 2022 20:01) (82 - 105)  BP: 78/42 (02 Sep 2022 20:01) (78/42 - 151/89)  BP(mean): 91 (02 Sep 2022 12:15) (88 - 91)  RR: 17 (02 Sep 2022 20:01) (14 - 20)  SpO2: 100% (02 Sep 2022 20:01) (97% - 100%)    Parameters below as of 02 Sep 2022 20:01  Patient On (Oxygen Delivery Method): nasal cannula  O2 Flow (L/min): 6      Daily Height in cm: 160 (02 Sep 2022 11:25)                            8.7    5.93  )-----------( 166      ( 02 Sep 2022 07:01 )             31.3       Hematocrit: 31.3 % (09-02 @ 07:01)  Hematocrit: 31.2 % (09-01 @ 05:36)  Hematocrit: 29.4 % (08-31 @ 14:20)  Hematocrit: 31.5 % (08-31 @ 03:48)    09-02    141  |  104  |  16.4  ----------------------------<  80  4.5   |  34<H>  |  0.8    Ca    10.2      02 Sep 2022 07:01  Phos  1.6     09-02  Mg     1.8     09-02    TPro  12  /  Alb  2.0  /  TBili  0.54  /  DBili  <0.2  /  AST  22  /  ALT  20  /  AlkPhos  40  09-02      Bilirubin Direct, Serum: <0.2 mg/dL (09-02 @ 07:01)  Lactate Dehydrogenase, Serum: 195 U/L (08-31 @ 03:48)  Haptoglobin, Serum: 21 mg/dL (08-31 @ 03:48)  Ferritin, Serum: 32 ng/mL (08-31 @ 03:48)      PT/INR - ( 02 Sep 2022 07:01 )   PT: 14.8 sec;   INR: 1.27 ratio    PTT - ( 02 Sep 2022 07:01 )  PTT:40.6 sec

## 2022-09-02 NOTE — PROGRESS NOTE ADULT - ATTENDING COMMENTS
77 year old female with history of lymphoma with acute on chronic hypoxic/hypercapnic respiratory failure.   Significantly improved mental status.  Continue AVAPS during night and during periods of sleep and oxygen supplementation during the day, can consider HFNC.  Medical management per primary team.

## 2022-09-02 NOTE — PROGRESS NOTE ADULT - SUBJECTIVE AND OBJECTIVE BOX
CHIEF COMPLAINT:    Interval Events:    Pt seen and examined at bedside. Evaluated by MICU overnight for hyperviscosity syndrome     REVIEW OF SYSTEMS:  unable to assess     OBJECTIVE:  ICU Vital Signs Last 24 Hrs  T(C): 37 (01 Sep 2022 22:05), Max: 37.1 (01 Sep 2022 16:30)  T(F): 98.6 (01 Sep 2022 22:05), Max: 98.7 (01 Sep 2022 16:30)  HR: 87 (02 Sep 2022 03:59) (83 - 103)  BP: 120/68 (01 Sep 2022 22:05) (91/53 - 121/63)  BP(mean): --  ABP: --  ABP(mean): --  RR: 19 (01 Sep 2022 22:05) (19 - 20)  SpO2: 98% (02 Sep 2022 03:59) (95% - 100%)    O2 Parameters below as of 01 Sep 2022 22:05  Patient On (Oxygen Delivery Method): mask, nonrebreather          Mode: standby    CAPILLARY BLOOD GLUCOSE      POCT Blood Glucose.: 98 mg/dL (02 Sep 2022 01:20)        General: Awake, alert, oriented X 0   HEENT: Atraumatic, normocephalic.                  No tonsillar or pharyngeal exudates.  Lymph Nodes: No palpable lymphadenopathy  Neck: No JVD. No carotid bruit.   Respiratory: decreased breath sounds L > R,   Cardiovascular: S1 S2 normal. No murmurs, rubs or gallops.   Abdomen: Soft, non-tender, non-distended. No organomegaly.  Extremities: Warm to touch. Peripheral pulse palpable. No pedal edema.   Neurological: Non-focal      HOSPITAL MEDICATIONS:  MEDICATIONS  (STANDING):  albuterol/ipratropium for Nebulization 3 milliLiter(s) Nebulizer every 12 hours  budesonide  80 MICROgram(s)/formoterol 4.5 MICROgram(s) Inhaler 2 Puff(s) Inhalation two times a day  cefTRIAXone   IVPB 1000 milliGRAM(s) IV Intermittent every 24 hours  heparin   Injectable 5000 Unit(s) SubCutaneous every 12 hours  OLANZapine 2.5 milliGRAM(s) Oral at bedtime  thiamine IVPB 500 milliGRAM(s) IV Intermittent three times a day    MEDICATIONS  (PRN):  acetaminophen     Tablet .. 650 milliGRAM(s) Oral every 6 hours PRN Temp greater or equal to 38C (100.4F), Mild Pain (1 - 3)  ALBUTerol    90 MICROgram(s) HFA Inhaler 2 Puff(s) Inhalation every 6 hours PRN Shortness of Breath and/or Wheezing  aluminum hydroxide/magnesium hydroxide/simethicone Suspension 30 milliLiter(s) Oral every 4 hours PRN Dyspepsia  melatonin 3 milliGRAM(s) Oral at bedtime PRN Insomnia  ondansetron Injectable 4 milliGRAM(s) IV Push every 8 hours PRN Nausea and/or Vomiting      LABS:                        8.4    6.65  )-----------( 156      ( 01 Sep 2022 05:36 )             31.2     09-01    141  |  106  |  18  ----------------------------<  87  4.7   |  35<H>  |  1.04    Ca    9.8      01 Sep 2022 05:36  Phos  2.8     09-01  Mg     2.00     09-01    TPro  11.4<H>  /  Alb  1.4<L>  /  TBili  0.6  /  DBili  x   /  AST  22  /  ALT  24  /  AlkPhos  40  09-01        Arterial Blood Gas:  09-01 @ 11:12  7.35/69/128/38/98.7/10.8  ABG lactate: --  Arterial Blood Gas:  08-31 @ 17:41  7.36/66/151/37/99.4/10.2  ABG lactate: --    Venous Blood Gas:  09-01 @ 05:36  7.29/78/57/38/89.2  VBG Lactate: 1.4  Venous Blood Gas:  08-31 @ 13:40  7.30/77/117/38/99.6  VBG Lactate: 1.3      MICROBIOLOGY:     RADIOLOGY:  [ ] Reviewed and interpreted by me    Point of Care Ultrasound Findings:    PFT:    EKG:

## 2022-09-02 NOTE — PRE PROCEDURE NOTE - HISTORY OF PRESENT ILLNESS
Interventional Radiology  Pre-Procedure Note    This is a 77y  Female  presenting for central venous catheter insertion    HPI:  77 year old female with PMH of lymphoma (dx 2005, s/p CARRIE lobectomy, relapsed in 2019 but not currently on chemo/RT, [place] [facility] [place] bipolar disorder, asthma (on O2 PRN at home, unknown how many L) brought to ED by daughter who states pt has been hallucinating, being more depressed.  Patient unable to provide much history, daughter Alissa states that patient was able to care for herself, manage home finances and attend doctor appointments independently until recently, she states that she believes her mom stopped taking her psychiatric medications in February of 2022 and has not followed up with any of her physicians. She has noticed recent weight loss (unsure how much) and change in voice.  Patient denies chest pain, shortness of breath or any discomfort, she is pleasantly confused. Daughter states that within the last year the patient has received treatment for her lymphoma however is unsure of the specifics, the patient was supposed to follow up in February however has not. (30 Aug 2022 10:47)      PAST MEDICAL & SURGICAL HISTORY:  GERD (gastroesophageal reflux disease)      Hyperlipidemia      Manic depression      Asthma      Lymphoma      History of lobectomy of lung  L  lung      Injury of left shoulder, initial encounter  Surgical repair with plates          Social History:     FAMILY HISTORY:  Family history of gastric cancer        Allergies: latex (Rash)  No Known Drug Allergies      Current Medications: acetaminophen     Tablet .. 650 milliGRAM(s) Oral every 6 hours PRN  ALBUTerol    90 MICROgram(s) HFA Inhaler 2 Puff(s) Inhalation every 6 hours PRN  albuterol/ipratropium for Nebulization 3 milliLiter(s) Nebulizer every 12 hours  aluminum hydroxide/magnesium hydroxide/simethicone Suspension 30 milliLiter(s) Oral every 4 hours PRN  budesonide  80 MICROgram(s)/formoterol 4.5 MICROgram(s) Inhaler 2 Puff(s) Inhalation two times a day  cefTRIAXone   IVPB 1000 milliGRAM(s) IV Intermittent every 24 hours  heparin   Injectable 5000 Unit(s) SubCutaneous every 12 hours  melatonin 3 milliGRAM(s) Oral at bedtime PRN  OLANZapine 2.5 milliGRAM(s) Oral at bedtime  ondansetron Injectable 4 milliGRAM(s) IV Push every 8 hours PRN  thiamine IVPB 500 milliGRAM(s) IV Intermittent three times a day      Labs:                         8.7    5.93  )-----------( 166      ( 02 Sep 2022 07:01 )             31.3       09-02    141  |  104  |  16.4  ----------------------------<  80  4.5   |  34<H>  |  0.8    Ca    10.2      02 Sep 2022 07:01  Phos  1.6     09-02  Mg     1.8     09-02    TPro  12  /  Alb  2.0  /  TBili  0.54  /  DBili  <0.2  /  AST  22  /  ALT  20  /  AlkPhos  40  09-02      HCG:       Assessment/Plan:   This is a 77y Female  presents with hyperviscosity  Patient presents to IR for non tunneled central venous catheter insertion for plasmaphoresis.  Procedure/ risks/ benefits/ goals/ alternatives were explained. All questions answered. Informed content obtained from patient. Consent placed in chart.

## 2022-09-02 NOTE — CHART NOTE - NSCHARTNOTEFT_GEN_A_CORE
PRE-INTERVENTIONAL RADIOLOGY PROCEDURE NOTE      Patient Age: 77    Patient Gender: F    Procedure: shiley catheter    Diagnosis/Indication:  Hyperviscosity syndrome    Interventional Radiology Attending Physician:  Dr Garrett    Ordering Attending Physician:  Dr Majano    Pertinent Medical History:    Pertinent labs:                      8.7    5.93  )-----------( 166      ( 02 Sep 2022 07:01 )             31.3       09-02    141  |  104  |  16.4  ----------------------------<  80  4.5   |  34<H>  |  0.8    Ca    10.2      02 Sep 2022 07:01  Phos  1.6     09-02  Mg     1.8     09-02    TPro  12  /  Alb  2.0  /  TBili  0.54  /  DBili  x   /  AST  11  /  ALT  20  /  AlkPhos  40  09-02      PT/INR - ( 02 Sep 2022 07:01 )   PT: 14.8 sec;   INR: 1.27 ratio         PTT - ( 02 Sep 2022 07:01 )  PTT:40.6 sec        Patient and Family Aware ? Yes    Jenny Saenz

## 2022-09-02 NOTE — PROGRESS NOTE ADULT - ASSESSMENT
77 year old female with PMH of lymphoma (dx 2005, s/p CARRIE lobectomy, relapsed in 2019 but not currently on chemo/RT, being followed by heme every 3-6months at [facility] [place] bipolar disorder, asthma (on O2 PRN at home, unknown how many L) brought to ED by daughter who states pt has been hallucinating, being more depressed.  Patient unable to provide much history, daughter Alissa states that patient was able to care for herself, manage home finances and attend doctor appointments independently until recently, she states that she believes her mom stopped taking her psychiatric medications in February of 2022 and has not followed up with any of her physicians. She has noticed recent weight loss (unsure how much) and change in voice.   Admitted to medicine service for further evaluation & treatment. Course c/b acute hypercapnic and hypoxic respiratory failure requiring BIPAP and AVAPS, on which she remains.  Now with concern for hyperviscosity syndrome given inability to even obtain immunoglobulin panel due to serum viscosity. Mental status is improving; pt is now alert although still confused.

## 2022-09-02 NOTE — PROGRESS NOTE ADULT - PROBLEM SELECTOR PLAN 2
-also with hypoxemic respiratory failure  -pCO2 improving  -will place on AVAPS intermittently as needed; currently saturating well on 6L NC  -CXR with concern for L pleural effusion, however, as per CT, changes are largely attributed to pt's lymphoma. No need for thora, as per pulm  -IVF d/john ON due to concern for pulmonary edema leading to hypoxia

## 2022-09-02 NOTE — PROGRESS NOTE ADULT - ASSESSMENT
MANDI GASPAR is a 77y Female who presents with a chief complaint of respiratory failure.    Marginal Zone Lymphoma  - Patient treated previously at [facility]; was on rituximab + bendamustine last in August 2020, and since then has been on surveillance, but lost to follow-up since July 2021.  - CT chest concerning for slight progression of disease.  - Unable to obtain IgM level due to hyperviscosity. Noted elevated lambda light chain compared to kappa similar to values in early 2020.  - Will continue discussion with [facility].   - Depending on goals of care as well, may need additional therapy such as repeat rituximab in addition to plasmapheresis if patient has improvement on plasmapheresis.    Altered Mental Status  - Likely multifactorial with hyperviscosity of blood, hypercalcemia, urinary tract infection.  - Recommending plasmapheresis for hyperviscosity. IR for Shiley placement today   - F/u immunoglobulins panel, serum viscosity   - On ceftriaxone for UTI  - Endocrinology following; pamidronate and calcitonin administered. Continue to monitor calcium levels closely.    Hypoxia  - On AVAPS with breaks using nasal cannula as tolerated  - Pulmonology following     Will continue to follow.    Giovana Duarte PA-C  Hematology/Oncology  [facility]  [phone] (office)  [phone] (alt office)  Evenings and weekends please call MD on call or office   MANDI GASPAR is a 77y Female who presents with a chief complaint of respiratory failure.    Marginal Zone Lymphoma  - Patient treated previously at [facility]; was on rituximab + bendamustine last in August 2020, and since then has been on surveillance, but lost to follow-up since July 2021.  - CT chest concerning for slight progression of disease.  - Unable to obtain IgM level due to hyperviscosity. Noted elevated lambda light chain compared to kappa similar to values in early 2020.  - Immunofixation with IgM lambda monoclonal protein as seen previously during admission in 2020. M-spike was elevated at the time, but bone marrow biopsy did not show multiple myeloma; may consider repeating to assess for progression. SPEP pending.   - Will continue discussion with [facility].   - Depending on goals of care as well, may need additional therapy such as repeat rituximab in addition to plasmapheresis if patient has improvement on plasmapheresis.    Altered Mental Status  - Likely multifactorial with hyperviscosity of blood, hypercalcemia, urinary tract infection.  - Recommending plasmapheresis for hyperviscosity. IR for Shiley placement today   - F/u immunoglobulins panel, serum viscosity   - On ceftriaxone for UTI  - Endocrinology following; pamidronate and calcitonin administered. Continue to monitor calcium levels closely.    Hypoxia  - On AVAPS with breaks using nasal cannula as tolerated  - Pulmonology following     Will continue to follow.    Giovana Duarte PA-C  Hematology/Oncology  [facility]  [phone] (office)  [phone] (alt office)  Evenings and weekends please call MD on call or office   MANDI GASPAR is a 77y Female who presents with a chief complaint of respiratory failure.    Marginal Zone Lymphoma  - Patient treated previously at [facility]; was on rituximab + bendamustine last in August 2020, and since then has been on surveillance, but lost to follow-up since July 2021.  - CT chest concerning for slight progression of disease.  - Unable to obtain IgM level due to hyperviscosity. Noted elevated lambda light chain compared to kappa similar to values in early 2020.  - Immunofixation with IgM lambda monoclonal protein as seen previously during admission in 2020. M-spike was elevated at the time, but bone marrow biopsy did not show multiple myeloma; may consider repeating after plasmapheresis to assess for progression. SPEP pending.   - Will continue discussion with [facility].   - Depending on goals of care as well, may need additional therapy such as repeat rituximab in addition to plasmapheresis if patient has improvement on plasmapheresis.    Altered Mental Status  - Likely multifactorial with hyperviscosity of blood, hypercalcemia, urinary tract infection.  - Recommending plasmapheresis for hyperviscosity. IR for Shiley placement today   - F/u immunoglobulins panel, serum viscosity   - On ceftriaxone for UTI  - Endocrinology following; pamidronate and calcitonin administered. Continue to monitor calcium levels closely.    Hypoxia  - On AVAPS with breaks using nasal cannula as tolerated  - Pulmonology following     Will continue to follow.    Giovana Duarte PA-C  Hematology/Oncology  [facility]  [phone] (office)  [phone] (alt office)  Evenings and weekends please call MD on call or office

## 2022-09-02 NOTE — PROGRESS NOTE ADULT - ATTENDING COMMENTS
77F with hypercalcemia, history of lithium use, lymphoma, history of humeral fracture on x ray, low albumin. Follow up workup as outlined differential includes elevated 1,25D (lymphoma), multiple myeloma, primary hyperparathyroid from lithium, other malignancy mechanisms. PTH resulted 25 unlikely primary hyperpara. S/p pamidronate 60mg IV x1 8/30 may take 72 hours for full effect. Gentle IVF if appropriate per primary team.  Hypercalcemia is now mild (corrected 11.8) can observe, encourage oral hydration and follow up remaining workup.    Thais Tovar MD  Division of Endocrinology  Pager: [phone]    If after 6PM or before 9AM, or on weekends/holidays, please call endocrine answering service for assistance ([phone]).  For nonurgent matters email [email or web address] for assistance.

## 2022-09-02 NOTE — CONSULT NOTE ADULT - ASSESSMENT
78 y/o F with Lymphoma and Bipolar disorder presented with symptoms of hallucinations and worsening depression. But noted to have new SOB with CXR+ pleural effusion. CT head neg.   Elevated Lambda and IgM levels untested due to viscous specimen.     Blood bank consulted for PLEX for concern of hyperviscosity. Agreed with heme, a trial of PLEX can be beneficial.     Procedure/ risks/ benefits/ goals/ alternatives were explained. All questions answered. Informed content obtained from Daughter Alissa Abernathy  Ching d/w apheresis RN, procedure with 2500ml 5% albumin as replacement fluid. Procedure well tolerated.     Please call [phone] with questions or additional procedures.

## 2022-09-02 NOTE — PROGRESS NOTE ADULT - SUBJECTIVE AND OBJECTIVE BOX
Patient is a 77y old  Female who presents with a chief complaint of Hypoxic respiratory failure (02 Sep 2022 11:43)    Patient seen this morning, on nasal cannula for now. Appears to be breathing more comfortably than yesterday.     MEDICATIONS  (STANDING):  albumin human  5% IVPB 2500 milliLiter(s) IV Intermittent once  albuterol/ipratropium for Nebulization 3 milliLiter(s) Nebulizer every 12 hours  budesonide  80 MICROgram(s)/formoterol 4.5 MICROgram(s) Inhaler 2 Puff(s) Inhalation two times a day  calcium gluconate IVPB 2 Gram(s) IV Intermittent once  heparin   Injectable 5000 Unit(s) SubCutaneous every 12 hours  OLANZapine 2.5 milliGRAM(s) Oral at bedtime  thiamine IVPB 500 milliGRAM(s) IV Intermittent three times a day    MEDICATIONS  (PRN):  acetaminophen     Tablet .. 650 milliGRAM(s) Oral every 6 hours PRN Temp greater or equal to 38C (100.4F), Mild Pain (1 - 3)  ALBUTerol    90 MICROgram(s) HFA Inhaler 2 Puff(s) Inhalation every 6 hours PRN Shortness of Breath and/or Wheezing  aluminum hydroxide/magnesium hydroxide/simethicone Suspension 30 milliLiter(s) Oral every 4 hours PRN Dyspepsia  melatonin 3 milliGRAM(s) Oral at bedtime PRN Insomnia  ondansetron Injectable 4 milliGRAM(s) IV Push every 8 hours PRN Nausea and/or Vomiting        Vital Signs Last 24 Hrs  T(C): 36.5 (02 Sep 2022 13:00), Max: 37.1 (01 Sep 2022 16:30)  T(F): 97.7 (02 Sep 2022 13:00), Max: 98.7 (01 Sep 2022 16:30)  HR: 95 (02 Sep 2022 13:00) (82 - 105)  BP: 151/89 (02 Sep 2022 13:00) (107/70 - 151/89)  BP(mean): 91 (02 Sep 2022 12:15) (88 - 91)  RR: 16 (02 Sep 2022 13:00) (14 - 20)  SpO2: 100% (02 Sep 2022 13:00) (95% - 100%)    Parameters below as of 02 Sep 2022 13:00  Patient On (Oxygen Delivery Method): nasal cannula  O2 Flow (L/min): 6      PE  Appears anxious   Awake, alert  Anicteric, MMM  RRR  CTAB  No c/c/e  No rash grossly                          8.7    5.93  )-----------( 166      ( 02 Sep 2022 07:01 )             31.3       09-02    141  |  104  |  16.4  ----------------------------<  80  4.5   |  34<H>  |  0.8    Ca    10.2      02 Sep 2022 07:01  Phos  1.6     09-02  Mg     1.8     09-02    TPro  12  /  Alb  2.0  /  TBili  0.54  /  DBili  <0.2  /  AST  22  /  ALT  20  /  AlkPhos  40  09-02

## 2022-09-02 NOTE — PROGRESS NOTE ADULT - PROBLEM SELECTOR PLAN 3
-Patient with +UA. Urine cx growing E coli, pan-sensitive  -Unclear if experiencing dysuria as patient is not answering appropriately  -S/P CTX in ED  - CTX x3 days for treatment of acute cystitis

## 2022-09-02 NOTE — PROGRESS NOTE ADULT - PROBLEM SELECTOR PLAN 5
-Follows w/Dr. Real Cano @ [facility] [place]  -received treatment recently per daughter however she doesn't know specifics  -with progression of disease  -hematology to speak to [facility] regarding if further treatment is suggested

## 2022-09-02 NOTE — PROGRESS NOTE ADULT - ASSESSMENT
77 year old female with PMH of HLD, lymphoma (dx 2005, s/p CARRIE lobectomy, relapsed in 2019 but not currently on chemo/RT, being followed by heme every 3-6months at [facility] [place]), bipolar disorder, asthma, who presented to the hospital for AMS and hypoxic respiratory failure, found to have hypercalcemia.     #Hypercalcemia, likely malignancy-related elevated 1,25D (lymphoma) vs medication induced (lithium induced parathyroidism) vs in the setting of potential multiple myeloma transformation   - total protein of 11.4, Ca corrected now 11.9 (down from 12.9 day prior) Albumin 1.4. Continue to trend Calcium and albumin levels via CMP.  - s/p approximately 1.9 L of IVF (1L of LR on 8/29, 75 cc/hr of NS x 12 hours on 8/30). Currently off of IVF due to overload. s/p Lasix 20 mg IV in the AM (8/31/22).  Continue to hold Lithium. Encourage PO intake if possible. Avoid medications or supplements that will increase serum Ca.   - intact PTH level of 25, Vitamin D 25-hydroxy of 18.4 (low). Still waiting for results on PTH-related peptide, Vitamin D 1,25-hydroxy level, serum immunofixation assay.   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   - s/p 4 doses of IV Calcitonin. Follow with repeat serum Calcium levels.   - s/p pamidronate 60 mg IV x 1 (given recent EMILY), Doses of pamidronate should not be repeated sooner than a minimum of seven days. Monitor for response (peak effect of pamidronate may be seen at the 72 hour jose).        #history of fracture in the setting of likely osteoporosis;.   - Imaging findings of Old right humeral neck fracture. Left proximal humeral fracture s/p ORIF  - f/u outpatient bone-density testing.   - 25-hydroxy Vitamin D level of 18.4 (low), f/u 1,25 hydroxy level. Patient may benefit from future Vitamin D supplementation pending results from the rest of workup.     #Hyperlipidemia  - history of hyperlipidemia   - Cholesterol of 54, Triglycerides of 30, Serum HDL of 21, LDL of 27. A1C of 4.5   - can hold on statin therapy at this time due to age and risk factors at this time   - home medication history of zyprexa.     - rest of care per primary team.  77 year old female with PMH of HLD, lymphoma (dx 2005, s/p CARRIE lobectomy, relapsed in 2019 but not currently on chemo/RT, being followed by heme every 3-6months at [facility]), bipolar disorder, asthma, who presented to the hospital for AMS and hypoxic respiratory failure, found to have hypercalcemia.     #Hypercalcemia, likely malignancy-related elevated 1,25D (lymphoma) vs medication induced (lithium induced parathyroidism) vs in the setting of potential multiple myeloma transformation   - total protein of 12.0, Ca corrected now 11.8 (down from 11.9 day prior) Albumin 2.0. Continue to trend Calcium and albumin levels via CMP.  - s/p approximately 1.9 L of IVF (1L of LR on 8/29, 75 cc/hr of NS x 12 hours on 8/30). Currently off of IVF due to overload. s/p Lasix 20 mg IV in the AM (8/31/22).  Continue to hold Lithium. Encourage PO intake if possible. Avoid medications or supplements that will increase serum Ca.   - intact PTH level of 25, Vitamin D 25-hydroxy of 18.4 (low). Still waiting for results on PTH-related peptide, Vitamin D 1,25-hydroxy level, serum immunofixation assay.   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   - s/p 4 doses of IV Calcitonin. Follow with repeat serum Calcium levels.   - s/p pamidronate 60 mg IV x 1 (given recent EMILY), Doses of pamidronate should not be repeated sooner than a minimum of seven days. Monitor for response (peak effect of pamidronate may be seen at the 72 hour jose).        #history of fracture in the setting of likely osteoporosis;.   - Imaging findings of Old right humeral neck fracture. Left proximal humeral fracture s/p ORIF  - f/u outpatient bone-density testing.   - 25-hydroxy Vitamin D level of 18.4 (low), f/u 1,25 hydroxy level. Patient may benefit from future Vitamin D supplementation pending results from the rest of workup.     #Hyperlipidemia  - history of hyperlipidemia   - Cholesterol of 54, Triglycerides of 30, Serum HDL of 21, LDL of 27. A1C of 4.5   - can hold on statin therapy at this time due to age and risk factors at this time   - home medication history of zyprexa.     - rest of care per primary team.  77 year old female with PMH of HLD, lymphoma (dx 2005, s/p CARRIE lobectomy, relapsed in 2019 but not currently on chemo/RT, being followed by heme every 3-6months at [facility] [place]), bipolar disorder, asthma, who presented to the hospital for AMS and hypoxic respiratory failure, found to have hypercalcemia.     #Hypercalcemia, likely malignancy-related elevated 1,25D (lymphoma) vs medication induced (lithium induced parathyroidism) vs in the setting of potential multiple myeloma transformation   - total protein of 12.0, Ca corrected now 11.8 (down from 11.9 day prior) Albumin 2.0. Continue to trend Calcium and albumin levels via CMP.  - s/p approximately 1.9 L of IVF (1L of LR on 8/29, 75 cc/hr of NS x 12 hours on 8/30). Currently off of IVF due to overload. s/p Lasix 20 mg IV in the AM (8/31/22).  Continue to hold Lithium. Encourage PO intake if possible. Avoid medications or supplements that will increase serum Ca.   - intact PTH level of 25, Vitamin D 25-hydroxy of 18.4 (low). Still waiting for results on PTH-related peptide, Vitamin D 1,25-hydroxy level, serum immunofixation assay.   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   - s/p 4 doses of IV Calcitonin. Follow with repeat serum Calcium levels.   - s/p pamidronate 60 mg IV x 1 (given recent EMILY), Doses of pamidronate should not be repeated sooner than a minimum of seven days. Monitor for response (peak effect of pamidronate may be seen at the 72 hour jose).   - pt now with mild hypercalcemia, recommend oral intake and monitoring Ca and albumin levels. Light intravenous hydration per primary team as patient tolerates, no need for additional pharmacological intervention at this time.   - F/u rest of remaining lab workup, (1,25-hydroxy), Serum immunofixation assay, PTH-related peptide levels etc.       #history of fracture in the setting of likely osteoporosis;.   - Imaging findings of Old right humeral neck fracture. Left proximal humeral fracture s/p ORIF  - f/u outpatient bone-density testing.   - 25-hydroxy Vitamin D level of 18.4 (low), f/u 1,25 hydroxy level. Patient may benefit from future Vitamin D supplementation pending results from the rest of workup.     #Hyperlipidemia  - history of hyperlipidemia   - Cholesterol of 54, Triglycerides of 30, Serum HDL of 21, LDL of 27. A1C of 4.5   - can hold on statin therapy at this time due to age and risk factors at this time   - home medication history of zyprexa.     - rest of care per primary team.

## 2022-09-02 NOTE — PROGRESS NOTE ADULT - PROBLEM SELECTOR PLAN 1
-Likely multi-factorial: UTI, hypercapnia, hypercalcemia, psychiatric disorder,  and now with greater concern for hyperviscosity syndrome given inability to even obtain immunoglobulin panel due to degree of hyperviscosity  -serum viscosity level sent and pending  -spoke to hematology; recommend apheresis due to concern for hypervisocosity. Shiley to be placed and blood bank to be called  -daughter thinks patient stopped taking her psychiatric medication in february

## 2022-09-02 NOTE — PROGRESS NOTE ADULT - SUBJECTIVE AND OBJECTIVE BOX
PROGRESS NOTE:   Authored by Dr. Nakita Hartman MD, pager [phone]     Patient is a 77y old  Female who presents with a chief complaint of Hypoxic respiratory failure (02 Sep 2022 09:18)      SUBJECTIVE / OVERNIGHT EVENTS:  Pt is more alert this morning. Her voice is hoarse and her speech is virtually completely unintelligible. States "I dont' know" when asked about  her breathing. Weaned to 6L NC ON.     ADDITIONAL REVIEW OF SYSTEMS:  Unable to obtain due to confusion and unintelligible speech.     MEDICATIONS  (STANDING):  albuterol/ipratropium for Nebulization 3 milliLiter(s) Nebulizer every 12 hours  budesonide  80 MICROgram(s)/formoterol 4.5 MICROgram(s) Inhaler 2 Puff(s) Inhalation two times a day  cefTRIAXone   IVPB 1000 milliGRAM(s) IV Intermittent every 24 hours  heparin   Injectable 5000 Unit(s) SubCutaneous every 12 hours  OLANZapine 2.5 milliGRAM(s) Oral at bedtime  thiamine IVPB 500 milliGRAM(s) IV Intermittent three times a day    MEDICATIONS  (PRN):  acetaminophen     Tablet .. 650 milliGRAM(s) Oral every 6 hours PRN Temp greater or equal to 38C (100.4F), Mild Pain (1 - 3)  ALBUTerol    90 MICROgram(s) HFA Inhaler 2 Puff(s) Inhalation every 6 hours PRN Shortness of Breath and/or Wheezing  aluminum hydroxide/magnesium hydroxide/simethicone Suspension 30 milliLiter(s) Oral every 4 hours PRN Dyspepsia  melatonin 3 milliGRAM(s) Oral at bedtime PRN Insomnia  ondansetron Injectable 4 milliGRAM(s) IV Push every 8 hours PRN Nausea and/or Vomiting      CAPILLARY BLOOD GLUCOSE      POCT Blood Glucose.: 84 mg/dL (02 Sep 2022 08:27)  POCT Blood Glucose.: 98 mg/dL (02 Sep 2022 01:20)  POCT Blood Glucose.: 93 mg/dL (01 Sep 2022 17:07)    I&O's Summary      PHYSICAL EXAM:  Vital Signs Last 24 Hrs  T(C): 36.4 (02 Sep 2022 11:39), Max: 37.1 (01 Sep 2022 16:30)  T(F): 97.5 (02 Sep 2022 11:25), Max: 98.7 (01 Sep 2022 16:30)  HR: 105 (02 Sep 2022 11:39) (82 - 105)  BP: 107/70 (02 Sep 2022 11:39) (95/57 - 122/64)  BP(mean): --  RR: 18 (02 Sep 2022 11:39) (18 - 20)  SpO2: 98% (02 Sep 2022 11:25) (95% - 100%)    Parameters below as of 02 Sep 2022 09:30  Patient On (Oxygen Delivery Method): nasal cannula        CONSTITUTIONAL: Frail, elderly woman in NAD  RESPIRATORY: Normal respiratory effort; lungs are clear to auscultation bilaterally  CARDIOVASCULAR: Regular rate and rhythm, normal S1 and S2, no murmur/rub/gallop; No lower extremity edema; Peripheral pulses are 2+ bilaterally  ABDOMEN: Nontender to palpation, normoactive bowel sounds, no rebound/guarding; No hepatosplenomegaly  MUSCULOSKELETAL:  no joint swelling or tenderness to palpation  PSYCH: Alert and cooperative    LABS:                        8.7    5.93  )-----------( 166      ( 02 Sep 2022 07:01 )             31.3     09-02    141  |  104  |  16.4  ----------------------------<  80  4.5   |  34<H>  |  0.8    Ca    10.2      02 Sep 2022 07:01  Phos  1.6     09-02  Mg     1.8     09-02    TPro  12  /  Alb  2.0  /  TBili  0.54  /  DBili  <0.2  /  AST  22  /  ALT  20  /  AlkPhos  40  09-02    PT/INR - ( 02 Sep 2022 07:01 )   PT: 14.8 sec;   INR: 1.27 ratio         PTT - ( 02 Sep 2022 07:01 )  PTT:40.6 sec            RADIOLOGY & ADDITIONAL TESTS:  Results Reviewed:   Imaging Personally Reviewed:  Electrocardiogram Personally Reviewed:    COORDINATION OF CARE:  Care Discussed with Consultants/Other Providers [Y/N]:  Prior or Outpatient Records Reviewed [Y/N]:

## 2022-09-02 NOTE — PROGRESS NOTE ADULT - SUBJECTIVE AND OBJECTIVE BOX
NOTE INCOMPLETE/ IN PROGRESS  *Please wait for attending attestation for official recommendations.     Chief Complaint: hypoxic respiratory failure     History:  77 year old female with PMH of lymphoma (dx 2005, s/p CARRIE lobectomy, relapsed in 2019 but not currently on chemo/RT, being [place] by [place] every 3[place] [facility] [place] bipolar disorder, asthma (on O2 PRN at home, unknown how many L) brought to ED by daughter who states pt has been hallucinating, being more depressed.  Patient unable to provide much history, daughter Alissa states that patient was able to care for herself, manage home finances and attend doctor appointments independently until recently, she states that she believes her mom stopped taking her psychiatric medications in February of 2022 and has not followed up with any of her physicians. She has noticed recent weight loss (unsure how much) and change in voice.  Patient denies chest pain, shortness of breath or any discomfort, she is pleasantly confused. Daughter states that within the last year the patient has received treatment for her lymphoma however is unsure of the specifics, the patient was supposed to follow up in February however has not. (30 Aug 2022 10:47)   Patient of Dr. 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 Pt also had labs during admission at [facility] 01/2020 showing IgM lambda monoclonal gammopathy, with biopsy of a lung mass that revealed progression of lymphoma, treated with Rituxan-Bendamustine, completed 08/27/2020. Her last visit at [facility] was 07/08/2021 and was under surveillance with recommended follow-ups every 4 months, but has not returned since.   Pt denies history of constipation, abdominal pains, kidney stones. Patient with perseverations and bizarre affect,  but denying depression and hallucinations. Pt denying any recent falls or fracture but demonstrating poor insight into health and situation at this time.   Endocrinology consulted for hypercalcemia with Corrected calcium level of 14.2, pt's with albumin of 0.9 on admission. Pt received 1L LR bolus yesterday (8/29) night in the ED. Pt currently on 75 cc/hr of NS, on 4L NC maintaining saturations of 94-95%.   Pt also with Serum Pro BNP of 6447.     Interval history:   on IVF/calcitonin/bisphosphonate s/p ~2L IVF. Patient now on NRB as she desatted while on 5LNC down to 76%, yesterday evening and subsequently placed on NRB with improvement of O2 Saturations to the 90s. Per MICU note, On bedside POCUS, pt with B-lines throughout; IVC plump but with some respiratory variation; small pericardial effusion with questionable RV enlargement. Pt demonstrated increased work of breathing, ABG 7.29/69/150/33/99.3 (on 15L NRB), pt placed on BIPAP for 2 hours with symptomatic improvement and s/p IV Lasix 20 mg x1. Pt off of IVF as of 7:50 am this morning (8/31). Nursing staff at the bedside stating that pt is able to tolerate small amounts of PO intake in the AM.     As of 9/1/22, Patient's Calcium corrected is 11.9 (9/1/22), down from Ca corrected of 12.9 on 8/31/22, and a Ca corrected of 13.6 on 8/30/22. Pt is s/p IV pamidronate 60 mg x 1 and IV calcitonin 240 units x 4. Patient is currently off of IV fluids due to concerns of overload but is s/p approximately 1.9L of IVF throughout current hospital course.     Unable to obtain IgM level due to hyperviscosity. Noted elevated lambda light chain compared to kappa similar to values in early 2020.    ROS limited at this time due to patient's mental status but listed below.       MEDICATIONS  (STANDING):  albuterol/ipratropium for Nebulization 3 milliLiter(s) Nebulizer every 12 hours  budesonide  80 MICROgram(s)/formoterol 4.5 MICROgram(s) Inhaler 2 Puff(s) Inhalation two times a day  cefTRIAXone   IVPB 1000 milliGRAM(s) IV Intermittent every 24 hours  heparin   Injectable 5000 Unit(s) SubCutaneous every 12 hours  OLANZapine 2.5 milliGRAM(s) Oral at bedtime  thiamine IVPB 500 milliGRAM(s) IV Intermittent three times a day    MEDICATIONS  (PRN):  acetaminophen     Tablet .. 650 milliGRAM(s) Oral every 6 hours PRN Temp greater or equal to 38C (100.4F), Mild Pain (1 - 3)  ALBUTerol    90 MICROgram(s) HFA Inhaler 2 Puff(s) Inhalation every 6 hours PRN Shortness of Breath and/or Wheezing  aluminum hydroxide/magnesium hydroxide/simethicone Suspension 30 milliLiter(s) Oral every 4 hours PRN Dyspepsia  melatonin 3 milliGRAM(s) Oral at bedtime PRN Insomnia  ondansetron Injectable 4 milliGRAM(s) IV Push every 8 hours PRN Nausea and/or Vomiting      Allergies    latex (Rash)  No Known Drug Allergies    Intolerances      Review of Systems:  Constitutional: No fever  Eyes: No blurry vision  Neuro: No tremors  HEENT: No pain  Cardiovascular: No chest pain, palpitations  Respiratory: No SOB, no cough  GI: No nausea, vomiting, abdominal pain  : No dysuria  Skin: no rash  Psych: no depression  Endocrine: no polyuria, polydipsia  Hem/lymph: no swelling  Osteoporosis: no fractures    ALL OTHER SYSTEMS REVIEWED AND NEGATIVE    UNABLE TO OBTAIN    PHYSICAL EXAM:  VITALS: T(C): 36.9 (09-02-22 @ 05:10)  T(F): 98.4 (09-02-22 @ 05:10), Max: 98.7 (09-01-22 @ 16:30)  HR: 104 (09-02-22 @ 07:56) (82 - 104)  BP: 119/68 (09-02-22 @ 05:10) (95/57 - 122/64)  RR:  (19 - 20)  SpO2:  (95% - 100%)  Wt(kg): --  GENERAL: NAD, well-groomed, well-developed  EYES: No proptosis, no lid lag, anicteric  HEENT:  Atraumatic, Normocephalic, moist mucous membranes  THYROID: Normal size, no palpable nodules  RESPIRATORY: Clear to auscultation bilaterally; No rales, rhonchi, wheezing  CARDIOVASCULAR: Regular rate and rhythm; No murmurs; no peripheral edema  GI: Soft, nontender, non distended  SKIN: Dry, intact, No rashes or lesions  MUSCULOSKELETAL: Full range of motion, normal strength  NEURO: extraocular movements intact, no tremor  PSYCH: Alert and oriented x 3, normal affect, normal mood    CAPILLARY BLOOD GLUCOSE      POCT Blood Glucose.: 84 mg/dL (02 Sep 2022 08:27)  POCT Blood Glucose.: 98 mg/dL (02 Sep 2022 01:20)  POCT Blood Glucose.: 93 mg/dL (01 Sep 2022 17:07)  POCT Blood Glucose.: 84 mg/dL (01 Sep 2022 11:39)      09-01    141  |  106  |  18  ----------------------------<  87  4.7   |  35<H>  |  1.04    eGFR: 56<L>    Ca    9.8      09-01  Mg     2.00     09-01  Phos  2.8     09-01    TPro  11.4<H>  /  Alb  1.4<L>  /  TBili  0.6  /  DBili  x   /  AST  22  /  ALT  24  /  AlkPhos  40  09-01      A1C with Estimated Average Glucose Result: 4.5 % (08-31-22 @ 03:48)      Thyroid Function Tests:  08-31 @ 03:48 TSH 1.42 FreeT4 -- T3 -- Anti TPO -- Anti Thyroglobulin Ab -- TSI --  08-29 @ 22:52 TSH 3.06 FreeT4 -- T3 -- Anti TPO -- Anti Thyroglobulin Ab -- TSI --                       NOTE INCOMPLETE/ IN PROGRESS  *Please wait for attending attestation for official recommendations.     Chief Complaint: hypoxic respiratory failure     History:  77 year old female with PMH of lymphoma (dx 2005, s/p CARRIE lobectomy, relapsed in 2019 but not currently on chemo/RT, being followed by heme every 3-6months at [facility] bipolar disorder, asthma (on O2 PRN at home, unknown how many L) brought to ED by daughter who states pt has been hallucinating, being more depressed.  Patient unable to provide much history, daughter Alissa states that patient was able to care for herself, manage home finances and attend doctor appointments independently until recently, she states that she believes her mom stopped taking her psychiatric medications in February of 2022 and has not followed up with any of her physicians. She has noticed recent weight loss (unsure how much) and change in voice.  Patient denies chest pain, shortness of breath or any discomfort, she is pleasantly confused. Daughter states that within the last year the patient has received treatment for her lymphoma however is unsure of the specifics, the patient was supposed to follow up in February however has not. (30 Aug 2022 10:47)   [place] Real [place] [facility] [place] lymphoma of the lung s/p resection in 2005. Pt also had labs during admission at [facility] 01/2020 showing IgM lambda monoclonal gammopathy, with biopsy of a lung mass that revealed progression of lymphoma, treated with Rituxan-Bendamustine, completed 08/27/2020. [place] [facility] [place] and was under surveillance with recommended follow-ups every 4 months, but has not returned since.   Pt denies history of constipation, abdominal pains, kidney stones. Patient with perseverations and bizarre affect,  but denying depression and hallucinations. Pt denying any recent falls or fracture but demonstrating poor insight into health and situation at this time.   Endocrinology consulted for hypercalcemia with Corrected calcium level of 14.2, pt's with albumin of 0.9 on admission. Pt received 1L LR bolus yesterday (8/29) night in the ED. Pt currently on 75 cc/hr of NS, on 4L NC maintaining saturations of 94-95%.   Pt also with Serum Pro BNP of 6447.     Interval history:   on IVF/calcitonin/bisphosphonate s/p ~2L IVF. Patient now on NRB as she desatted while on 5LNC down to 76%, yesterday evening and subsequently placed on NRB with improvement of O2 Saturations to the 90s. Per MICU note, On bedside POCUS, pt with B-lines throughout; IVC plump but with some respiratory variation; small pericardial effusion with questionable RV enlargement. Pt demonstrated increased work of breathing, ABG 7.29/69/150/33/99.3 (on 15L NRB), pt placed on BIPAP for 2 hours with symptomatic improvement and s/p IV Lasix 20 mg x1. Pt off of IVF as of 7:50 am this morning (8/31). Nursing staff at the bedside stating that pt is able to tolerate small amounts of PO intake in the AM.     As of 9/1/22, Patient's Calcium corrected is 11.9 (9/1/22), down from Ca corrected of 12.9 on 8/31/22, and a Ca corrected of 13.6 on 8/30/22. Pt is s/p IV pamidronate 60 mg x 1 and IV calcitonin 240 units x 4. Patient is currently off of IV fluids due to concerns of overload but is s/p approximately 1.9L of IVF throughout current hospital course.     Unable to obtain IgM level due to hyperviscosity. Noted elevated lambda light chain compared to kappa similar to values in early 2020.    ROS limited at this time due to patient's mental status but listed below.       MEDICATIONS  (STANDING):  albuterol/ipratropium for Nebulization 3 milliLiter(s) Nebulizer every 12 hours  budesonide  80 MICROgram(s)/formoterol 4.5 MICROgram(s) Inhaler 2 Puff(s) Inhalation two times a day  cefTRIAXone   IVPB 1000 milliGRAM(s) IV Intermittent every 24 hours  heparin   Injectable 5000 Unit(s) SubCutaneous every 12 hours  OLANZapine 2.5 milliGRAM(s) Oral at bedtime  thiamine IVPB 500 milliGRAM(s) IV Intermittent three times a day    MEDICATIONS  (PRN):  acetaminophen     Tablet .. 650 milliGRAM(s) Oral every 6 hours PRN Temp greater or equal to 38C (100.4F), Mild Pain (1 - 3)  ALBUTerol    90 MICROgram(s) HFA Inhaler 2 Puff(s) Inhalation every 6 hours PRN Shortness of Breath and/or Wheezing  aluminum hydroxide/magnesium hydroxide/simethicone Suspension 30 milliLiter(s) Oral every 4 hours PRN Dyspepsia  melatonin 3 milliGRAM(s) Oral at bedtime PRN Insomnia  ondansetron Injectable 4 milliGRAM(s) IV Push every 8 hours PRN Nausea and/or Vomiting      Allergies    latex (Rash)  No Known Drug Allergies    Intolerances      Review of Systems: Limited at this time due to patient's mental status.   Constitutional: + weight loss, no fevers, chills,   Eyes: No blurry vision, changes to vision, floaters   Neuro: no tremors, seizure, headaches,   HEENT: No pain, no sore throat, no pain with swallowing   Cardiovascular: No chest pain, palpitations  Respiratory: No SOB, no cough, no increased sputum production   GI: No nausea, vomiting, abdominal pain, no constipation   : No dysuria, no incontinence, no urinary urgency   Skin: no rash, + bruising, no open skin lesions, no dried skin   Psych: + depression, hallucinations per history.   Endocrine: no polyuria, polydipsia, no heat or cold intolerance   Hem/lymph: no swelling, no excessive bleeding,   Osteoporosis: +fracture per imaging   Endocrine: no polyuria, polydipsia  Hem/lymph: no swelling  Osteoporosis: no fractures    ALL OTHER SYSTEMS REVIEWED AND NEGATIVE    UNABLE TO OBTAIN    PHYSICAL EXAM:  VITALS: T(C): 36.9 (09-02-22 @ 05:10)  T(F): 98.4 (09-02-22 @ 05:10), Max: 98.7 (09-01-22 @ 16:30)  HR: 104 (09-02-22 @ 07:56) (82 - 104)  BP: 119/68 (09-02-22 @ 05:10) (95/57 - 122/64)  RR:  (19 - 20)  SpO2:  (95% - 100%)  Wt(kg): --  GENERAL: NAD, talkative and responsive, but difficult to understand, pt with poor insight into situation and health.   EYES: No proptosis, no lid lag, anicteric  HEENT:  Atraumatic, Normocephalic, moist mucous membranes  THYROID: Normal size, no palpable nodules, no neck masses.   RESPIRATORY: Decreased breath sounds L>R. decreased breath sounds b/l in the bases; No rales, rhonchi, wheezing  CARDIOVASCULAR: Regular rate and rhythm; No murmurs; no peripheral edema  GI: Soft, nontender, non distended, normal bowel sounds  SKIN: Dry, intact, No rashes or lesions. Diffuse senile purpura, ecchymosis in the b/l lower extremities.   MUSCULOSKELETAL: Exam limited due to mental status.   NEURO: sensation intact, extraocular movements intact, mild tremors with motion in the b/l upper extremity.   PSYCH: Alert and oriented x 1, confused mood, bizarre affect.   CUSHING'S SIGNS: no striae, no dorsocervical fat pad, no moon facies.     CAPILLARY BLOOD GLUCOSE      POCT Blood Glucose.: 84 mg/dL (02 Sep 2022 08:27)  POCT Blood Glucose.: 98 mg/dL (02 Sep 2022 01:20)  POCT Blood Glucose.: 93 mg/dL (01 Sep 2022 17:07)  POCT Blood Glucose.: 84 mg/dL (01 Sep 2022 11:39)      09-01    141  |  106  |  18  ----------------------------<  87  4.7   |  35<H>  |  1.04    eGFR: 56<L>    Ca    9.8      09-01  Mg     2.00     09-01  Phos  2.8     09-01    TPro  11.4<H>  /  Alb  1.4<L>  /  TBili  0.6  /  DBili  x   /  AST  22  /  ALT  24  /  AlkPhos  40  09-01      A1C with Estimated Average Glucose Result: 4.5 % (08-31-22 @ 03:48)      Thyroid Function Tests:  08-31 @ 03:48 TSH 1.42 FreeT4 -- T3 -- Anti TPO -- Anti Thyroglobulin Ab -- TSI --  08-29 @ 22:52 TSH 3.06 FreeT4 -- T3 -- Anti TPO -- Anti Thyroglobulin Ab -- TSI --                       NOTE INCOMPLETE/ IN PROGRESS  *Please wait for attending attestation for official recommendations.     Chief Complaint: hypoxic respiratory failure     History:  77 year old female with PMH of lymphoma (dx 2005, s/p CARRIE lobectomy, relapsed in 2019 but not currently on chemo/RT, being followed by heme every 3-6months at [facility] [place]), bipolar disorder, asthma (on O2 PRN at home, unknown how many L) brought to ED by daughter who states pt has been hallucinating, being more depressed.  Patient unable to provide much history, daughter Alissa states that patient was able to care for herself, manage home finances and attend doctor appointments independently until recently, she states that she believes her mom stopped taking her psychiatric medications in February of 2022 and has not followed up with any of her physicians. She has noticed recent weight loss (unsure how much) and change in voice.  Patient denies chest pain, shortness of breath or any discomfort, she is pleasantly confused. Daughter states that within the last year the patient has received treatment for her lymphoma however is unsure of the specifics, the patient was supposed to follow up in February however has not. (30 Aug 2022 10:47)   [place] [facility] [place] zone lymphoma of the lung s/p resection in 2005. Pt also had labs during admission at [facility] 01/2020 showing IgM lambda monoclonal gammopathy, with biopsy of a lung mass that revealed progression of lymphoma, treated with Rituxan-Bendamustine, completed 08/27/2020. [place] [facility] [place] and was under surveillance with recommended follow-ups every 4 months, but has not returned since.   Pt denies history of constipation, abdominal pains, kidney stones. Patient with perseverations and bizarre affect,  but denying depression and hallucinations. Pt denying any recent falls or fracture but demonstrating poor insight into health and situation at this time.   Endocrinology consulted for hypercalcemia with Corrected calcium level of 14.2, pt's with albumin of 0.9 on admission. Pt received 1L LR bolus yesterday (8/29) night in the ED. Pt currently on 75 cc/hr of NS, on 4L NC maintaining saturations of 94-95%.   Pt also with Serum Pro BNP of 6447.     Interval history:   on IVF/calcitonin/bisphosphonate s/p ~2L IVF. Patient now on NRB as she desatted while on 5LNC down to 76%, yesterday evening and subsequently placed on NRB with improvement of O2 Saturations to the 90s. Per MICU note, On bedside POCUS, pt with B-lines throughout; IVC plump but with some respiratory variation; small pericardial effusion with questionable RV enlargement. Pt demonstrated increased work of breathing, ABG 7.29/69/150/33/99.3 (on 15L NRB), pt placed on BIPAP for 2 hours with symptomatic improvement and s/p IV Lasix 20 mg x1. Pt off of IVF as of 7:50 am this morning (8/31). Nursing staff at the bedside stating that pt is able to tolerate small amounts of PO intake in the AM.     As of 9/1/22, Patient's Calcium corrected is 11.9 (9/1/22), down from Ca corrected of 12.9 on 8/31/22, and a Ca corrected of 13.6 on 8/30/22. Pt is s/p IV pamidronate 60 mg x 1 and IV calcitonin 240 units x 4. Patient is currently off of IV fluids due to concerns of overload but is s/p approximately 1.9L of IVF throughout current hospital course.     Unable to obtain IgM level due to hyperviscosity. Noted elevated lambda light chain compared to kappa similar to values in early 2020. Remainder of lab workup still pending (1,25 hydroxy Vitamin D).     ROS limited at this time due to patient's mental status but listed below.       MEDICATIONS  (STANDING):  albuterol/ipratropium for Nebulization 3 milliLiter(s) Nebulizer every 12 hours  budesonide  80 MICROgram(s)/formoterol 4.5 MICROgram(s) Inhaler 2 Puff(s) Inhalation two times a day  cefTRIAXone   IVPB 1000 milliGRAM(s) IV Intermittent every 24 hours  heparin   Injectable 5000 Unit(s) SubCutaneous every 12 hours  OLANZapine 2.5 milliGRAM(s) Oral at bedtime  thiamine IVPB 500 milliGRAM(s) IV Intermittent three times a day    MEDICATIONS  (PRN):  acetaminophen     Tablet .. 650 milliGRAM(s) Oral every 6 hours PRN Temp greater or equal to 38C (100.4F), Mild Pain (1 - 3)  ALBUTerol    90 MICROgram(s) HFA Inhaler 2 Puff(s) Inhalation every 6 hours PRN Shortness of Breath and/or Wheezing  aluminum hydroxide/magnesium hydroxide/simethicone Suspension 30 milliLiter(s) Oral every 4 hours PRN Dyspepsia  melatonin 3 milliGRAM(s) Oral at bedtime PRN Insomnia  ondansetron Injectable 4 milliGRAM(s) IV Push every 8 hours PRN Nausea and/or Vomiting      Allergies    latex (Rash)  No Known Drug Allergies    Intolerances      Review of Systems: Limited at this time due to patient's mental status.   Constitutional: + weight loss, no fevers, chills,   Eyes: No blurry vision, changes to vision, floaters   Neuro: no tremors, seizure, headaches,   HEENT: No pain, no sore throat, no pain with swallowing   Cardiovascular: No chest pain, palpitations  Respiratory: No SOB, no cough, no increased sputum production   GI: No nausea, vomiting, abdominal pain, no constipation   : No dysuria, no incontinence, no urinary urgency   Skin: no rash, + bruising, no open skin lesions, no dried skin   Psych: + depression, hallucinations per history.   Endocrine: no polyuria, polydipsia, no heat or cold intolerance   Hem/lymph: no swelling, no excessive bleeding,   Osteoporosis: +fracture per imaging   Endocrine: no polyuria, polydipsia  Hem/lymph: no swelling  Osteoporosis: no fractures    ALL OTHER SYSTEMS REVIEWED AND NEGATIVE    UNABLE TO OBTAIN    PHYSICAL EXAM:  VITALS: T(C): 36.9 (09-02-22 @ 05:10)  T(F): 98.4 (09-02-22 @ 05:10), Max: 98.7 (09-01-22 @ 16:30)  HR: 104 (09-02-22 @ 07:56) (82 - 104)  BP: 119/68 (09-02-22 @ 05:10) (95/57 - 122/64)  RR:  (19 - 20)  SpO2:  (95% - 100%)  Wt(kg): --  GENERAL: NAD, talkative and responsive, pt alert to self but not place or situation.   EYES: No proptosis, no lid lag, anicteric  HEENT:  Atraumatic, Normocephalic, moist mucous membranes  THYROID: Normal size, no palpable nodules, no neck masses.   RESPIRATORY: Decreased breath sounds b/l in the bases; No rales, rhonchi, wheezing  CARDIOVASCULAR: Regular rate and rhythm; No murmurs; no peripheral edema  GI: Soft, nontender, non distended, normal bowel sounds  SKIN: Dry, intact, No rashes or lesions. Diffuse senile purpura, ecchymosis in the b/l lower extremities.   MUSCULOSKELETAL: Exam limited due to mental status.   NEURO: sensation intact, extraocular movements intact, mild tremors with motion in the b/l upper extremity.   PSYCH: Alert and oriented x 2, confused mood, bizarre affect.   CUSHING'S SIGNS: no striae, no dorsocervical fat pad, no moon facies.     CAPILLARY BLOOD GLUCOSE      POCT Blood Glucose.: 84 mg/dL (02 Sep 2022 08:27)  POCT Blood Glucose.: 98 mg/dL (02 Sep 2022 01:20)  POCT Blood Glucose.: 93 mg/dL (01 Sep 2022 17:07)  POCT Blood Glucose.: 84 mg/dL (01 Sep 2022 11:39)      09-01    141  |  106  |  18  ----------------------------<  87  4.7   |  35<H>  |  1.04    eGFR: 56<L>    Ca    9.8      09-01  Mg     2.00     09-01  Phos  2.8     09-01    TPro  11.4<H>  /  Alb  1.4<L>  /  TBili  0.6  /  DBili  x   /  AST  22  /  ALT  24  /  AlkPhos  40  09-01      A1C with Estimated Average Glucose Result: 4.5 % (08-31-22 @ 03:48)      Thyroid Function Tests:  08-31 @ 03:48 TSH 1.42 FreeT4 -- T3 -- Anti TPO -- Anti Thyroglobulin Ab -- TSI --  08-29 @ 22:52 TSH 3.06 FreeT4 -- T3 -- Anti TPO -- Anti Thyroglobulin Ab -- TSI --                       NOTE INCOMPLETE/ IN PROGRESS  *Please wait for attending attestation for official recommendations.     Chief Complaint: hypoxic respiratory failure     History:  77 year old female with PMH of lymphoma (dx 2005, s/p CARRIE lobectomy, relapsed in 2019 but not currently on chemo/RT, [place] [facility] [place] disorder, asthma (on O2 PRN at home, unknown how many L) brought to ED by daughter who states pt has been hallucinating, being more depressed.  Patient unable to provide much history, daughter Alissa states that patient was able to care for herself, manage home finances and attend doctor appointments independently until recently, she states that she believes her mom stopped taking her psychiatric medications in February of 2022 and has not followed up with any of her physicians. She has noticed recent weight loss (unsure how much) and change in voice.  Patient denies chest pain, shortness of breath or any discomfort, she is pleasantly confused. Daughter states that within the last year the patient has received treatment for her lymphoma however is unsure of the specifics, the patient was supposed to follow up in February however has not. (30 Aug 2022 10:47)   Patient of Dr. 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 Pt also had labs during admission at [facility] 01/2020 showing IgM lambda monoclonal gammopathy, with biopsy of a lung mass that revealed progression of lymphoma, treated with Rituxan-Bendamustine, completed 08/27/2020. Her last visit at [facility] was 07/08/2021 and was under surveillance with recommended follow-ups every 4 months, but has not returned since.   Pt denies history of constipation, abdominal pains, kidney stones. Patient with perseverations and bizarre affect,  but denying depression and hallucinations. Pt denying any recent falls or fracture but demonstrating poor insight into health and situation at this time.   Endocrinology consulted for hypercalcemia with Corrected calcium level of 14.2, pt's with albumin of 0.9 on admission. Pt received 1L LR bolus yesterday (8/29) night in the ED. Pt currently on 75 cc/hr of NS, on 4L NC maintaining saturations of 94-95%.   Pt also with Serum Pro BNP of 6447.     Interval history:   on IVF/calcitonin/bisphosphonate s/p ~2L IVF. Patient now on NRB as she desatted while on 5LNC down to 76%, yesterday evening and subsequently placed on NRB with improvement of O2 Saturations to the 90s. Per MICU note, On bedside POCUS, pt with B-lines throughout; IVC plump but with some respiratory variation; small pericardial effusion with questionable RV enlargement. Pt demonstrated increased work of breathing, ABG 7.29/69/150/33/99.3 (on 15L NRB), pt placed on BIPAP for 2 hours with symptomatic improvement and s/p IV Lasix 20 mg x1. Pt off of IVF as of 7:50 am this morning (8/31). Nursing staff at the bedside stating that pt is able to tolerate small amounts of PO intake in the AM.     As of 9/1/22, Patient's Calcium corrected is 11.9 (9/1/22), down from Ca corrected of 12.9 on 8/31/22, and a Ca corrected of 13.6 on 8/30/22. Pt is s/p IV pamidronate 60 mg x 1 and IV calcitonin 240 units x 4. Patient is currently off of IV fluids due to concerns of overload but is s/p approximately 1.9L of IVF throughout current hospital course.     9/2/22: Patient at the bedside resting comfortably on 6L NC.     Unable to obtain IgM level due to hyperviscosity. Noted elevated lambda light chain compared to kappa similar to values in early 2020. Remainder of lab workup still pending (1,25 hydroxy Vitamin D).     ROS limited at this time due to patient's mental status but listed below.       MEDICATIONS  (STANDING):  albuterol/ipratropium for Nebulization 3 milliLiter(s) Nebulizer every 12 hours  budesonide  80 MICROgram(s)/formoterol 4.5 MICROgram(s) Inhaler 2 Puff(s) Inhalation two times a day  cefTRIAXone   IVPB 1000 milliGRAM(s) IV Intermittent every 24 hours  heparin   Injectable 5000 Unit(s) SubCutaneous every 12 hours  OLANZapine 2.5 milliGRAM(s) Oral at bedtime  thiamine IVPB 500 milliGRAM(s) IV Intermittent three times a day    MEDICATIONS  (PRN):  acetaminophen     Tablet .. 650 milliGRAM(s) Oral every 6 hours PRN Temp greater or equal to 38C (100.4F), Mild Pain (1 - 3)  ALBUTerol    90 MICROgram(s) HFA Inhaler 2 Puff(s) Inhalation every 6 hours PRN Shortness of Breath and/or Wheezing  aluminum hydroxide/magnesium hydroxide/simethicone Suspension 30 milliLiter(s) Oral every 4 hours PRN Dyspepsia  melatonin 3 milliGRAM(s) Oral at bedtime PRN Insomnia  ondansetron Injectable 4 milliGRAM(s) IV Push every 8 hours PRN Nausea and/or Vomiting      Allergies    latex (Rash)  No Known Drug Allergies    Intolerances      Review of Systems: Limited at this time due to patient's mental status.   Constitutional: + weight loss, no fevers, chills,   Eyes: No blurry vision, changes to vision, floaters   Neuro: no tremors, seizure, headaches,   HEENT: No pain, no sore throat, no pain with swallowing   Cardiovascular: No chest pain, palpitations  Respiratory: No SOB, no cough, no increased sputum production   GI: No nausea, vomiting, abdominal pain, no constipation   : No dysuria, no incontinence, no urinary urgency   Skin: no rash, + bruising, no open skin lesions, no dried skin   Psych: + depression, hallucinations per history.   Endocrine: no polyuria, polydipsia, no heat or cold intolerance   Hem/lymph: no swelling, no excessive bleeding,   Osteoporosis: +fracture per imaging   Endocrine: no polyuria, polydipsia  Hem/lymph: no swelling  Osteoporosis: no fractures    ALL OTHER SYSTEMS REVIEWED AND NEGATIVE    UNABLE TO OBTAIN    PHYSICAL EXAM:  VITALS: T(C): 36.9 (09-02-22 @ 05:10)  T(F): 98.4 (09-02-22 @ 05:10), Max: 98.7 (09-01-22 @ 16:30)  HR: 104 (09-02-22 @ 07:56) (82 - 104)  BP: 119/68 (09-02-22 @ 05:10) (95/57 - 122/64)  RR:  (19 - 20)  SpO2:  (95% - 100%)  Wt(kg): --  GENERAL: NAD, talkative and responsive, pt alert to self but not place or situation.   EYES: No proptosis, no lid lag, anicteric  HEENT:  Atraumatic, Normocephalic, moist mucous membranes  THYROID: Normal size, no palpable nodules, no neck masses.   RESPIRATORY: Decreased breath sounds b/l in the bases; No rales, rhonchi, wheezing  CARDIOVASCULAR: Regular rate and rhythm; No murmurs; no peripheral edema  GI: Soft, nontender, non distended, normal bowel sounds  SKIN: Dry, intact, No rashes or lesions. Diffuse senile purpura, ecchymosis in the b/l lower extremities.   MUSCULOSKELETAL: Exam limited due to mental status.   NEURO: sensation intact, extraocular movements intact, mild tremors with motion in the b/l upper extremity.   PSYCH: Alert and oriented x 2, confused mood, bizarre affect.   CUSHING'S SIGNS: no striae, no dorsocervical fat pad, no moon facies.     CAPILLARY BLOOD GLUCOSE      POCT Blood Glucose.: 84 mg/dL (02 Sep 2022 08:27)  POCT Blood Glucose.: 98 mg/dL (02 Sep 2022 01:20)  POCT Blood Glucose.: 93 mg/dL (01 Sep 2022 17:07)  POCT Blood Glucose.: 84 mg/dL (01 Sep 2022 11:39)      09-01    141  |  106  |  18  ----------------------------<  87  4.7   |  35<H>  |  1.04    eGFR: 56<L>    Ca    9.8      09-01  Mg     2.00     09-01  Phos  2.8     09-01    TPro  11.4<H>  /  Alb  1.4<L>  /  TBili  0.6  /  DBili  x   /  AST  22  /  ALT  24  /  AlkPhos  40  09-01      A1C with Estimated Average Glucose Result: 4.5 % (08-31-22 @ 03:48)      Thyroid Function Tests:  08-31 @ 03:48 TSH 1.42 FreeT4 -- T3 -- Anti TPO -- Anti Thyroglobulin Ab -- TSI --  08-29 @ 22:52 TSH 3.06 FreeT4 -- T3 -- Anti TPO -- Anti Thyroglobulin Ab -- TSI --                       Chief Complaint: hypoxic respiratory failure     History:  77 year old female with PMH of lymphoma (dx 2005, s/p CARRIE lobectomy, relapsed in 2019 but not currently on chemo/RT, being followed by heme every 3-6months at [facility]), bipolar disorder, asthma (on O2 PRN at home, unknown how many L) brought to ED by daughter who states pt has been hallucinating, being more depressed.  Patient unable to provide much history, daughter Alissa states that patient was able to care for herself, manage home finances and attend doctor appointments independently until recently, she states that she believes her mom stopped taking her psychiatric medications in February of 2022 and has not followed up with any of her physicians. She has noticed recent weight loss (unsure how much) and change in voice.  Patient denies chest pain, shortness of breath or any discomfort, she is pleasantly confused. Daughter states that within the last year the patient has received treatment for her lymphoma however is unsure of the specifics, the patient was supposed to follow up in February however has not. (30 Aug 2022 10:47)   Patient of Dr. 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 Pt also had labs during admission at [facility] 01/2020 showing IgM lambda monoclonal gammopathy, with biopsy of a lung mass that revealed progression of lymphoma, treated with Rituxan-Bendamustine, completed 08/27/2020. Her last visit at [facility] was 07/08/2021 and was under surveillance with recommended follow-ups every 4 months, but has not returned since.   Pt denies history of constipation, abdominal pains, kidney stones. Patient with perseverations and bizarre affect,  but denying depression and hallucinations. Pt denying any recent falls or fracture but demonstrating poor insight into health and situation at this time.   Endocrinology consulted for hypercalcemia with Corrected calcium level of 14.2, pt's with albumin of 0.9 on admission. Pt received 1L LR bolus yesterday (8/29) night in the ED. Pt currently on 75 cc/hr of NS, on 4L NC maintaining saturations of 94-95%.   Pt also with Serum Pro BNP of 6447.     Interval history:   on IVF/calcitonin/bisphosphonate s/p ~2L IVF. Patient now on NRB as she desatted while on 5LNC down to 76%, yesterday evening and subsequently placed on NRB with improvement of O2 Saturations to the 90s. Per MICU note, On bedside POCUS, pt with B-lines throughout; IVC plump but with some respiratory variation; small pericardial effusion with questionable RV enlargement. Pt demonstrated increased work of breathing, ABG 7.29/69/150/33/99.3 (on 15L NRB), pt placed on BIPAP for 2 hours with symptomatic improvement and s/p IV Lasix 20 mg x1. Pt off of IVF as of 7:50 am this morning (8/31). Nursing staff at the bedside stating that pt is able to tolerate small amounts of PO intake in the AM.     As of 9/1/22, Patient's Calcium corrected is 11.9 (9/1/22), down from Ca corrected of 12.9 on 8/31/22, and a Ca corrected of 13.6 on 8/30/22. Pt is s/p IV pamidronate 60 mg x 1 and IV calcitonin 240 units x 4. Patient is currently off of IV fluids due to concerns of overload but is s/p approximately 1.9L of IVF throughout current hospital course.     9/2/22: Patient at the bedside resting comfortably on 6L NC.     Unable to obtain IgM level due to hyperviscosity. Noted elevated lambda light chain compared to kappa similar to values in early 2020. Remainder of lab workup still pending (1,25 hydroxy Vitamin D).     ROS limited at this time due to patient's mental status but listed below.       MEDICATIONS  (STANDING):  albuterol/ipratropium for Nebulization 3 milliLiter(s) Nebulizer every 12 hours  budesonide  80 MICROgram(s)/formoterol 4.5 MICROgram(s) Inhaler 2 Puff(s) Inhalation two times a day  cefTRIAXone   IVPB 1000 milliGRAM(s) IV Intermittent every 24 hours  heparin   Injectable 5000 Unit(s) SubCutaneous every 12 hours  OLANZapine 2.5 milliGRAM(s) Oral at bedtime  thiamine IVPB 500 milliGRAM(s) IV Intermittent three times a day    MEDICATIONS  (PRN):  acetaminophen     Tablet .. 650 milliGRAM(s) Oral every 6 hours PRN Temp greater or equal to 38C (100.4F), Mild Pain (1 - 3)  ALBUTerol    90 MICROgram(s) HFA Inhaler 2 Puff(s) Inhalation every 6 hours PRN Shortness of Breath and/or Wheezing  aluminum hydroxide/magnesium hydroxide/simethicone Suspension 30 milliLiter(s) Oral every 4 hours PRN Dyspepsia  melatonin 3 milliGRAM(s) Oral at bedtime PRN Insomnia  ondansetron Injectable 4 milliGRAM(s) IV Push every 8 hours PRN Nausea and/or Vomiting      Allergies    latex (Rash)  No Known Drug Allergies    Intolerances      Review of Systems: Limited at this time due to patient's mental status.   Constitutional: + weight loss, no fevers, chills,   Eyes: No blurry vision, changes to vision, floaters   Neuro: no tremors, seizure, headaches,   HEENT: No pain, no sore throat, no pain with swallowing   Cardiovascular: No chest pain, palpitations  Respiratory: No SOB, no cough, no increased sputum production   GI: No nausea, vomiting, abdominal pain, no constipation   : No dysuria, no incontinence, no urinary urgency   Skin: no rash, + bruising, no open skin lesions, no dried skin   Psych: + depression, hallucinations per history.   Endocrine: no polyuria, polydipsia, no heat or cold intolerance   Hem/lymph: no swelling, no excessive bleeding,   Osteoporosis: +fracture per imaging   Endocrine: no polyuria, polydipsia  Hem/lymph: no swelling  Osteoporosis: no fractures    ALL OTHER SYSTEMS REVIEWED AND NEGATIVE    UNABLE TO OBTAIN    PHYSICAL EXAM:  VITALS: T(C): 36.9 (09-02-22 @ 05:10)  T(F): 98.4 (09-02-22 @ 05:10), Max: 98.7 (09-01-22 @ 16:30)  HR: 104 (09-02-22 @ 07:56) (82 - 104)  BP: 119/68 (09-02-22 @ 05:10) (95/57 - 122/64)  RR:  (19 - 20)  SpO2:  (95% - 100%)  Wt(kg): --  GENERAL: NAD, talkative and responsive, pt alert to self but not place or situation.   EYES: No proptosis, no lid lag, anicteric  HEENT:  Atraumatic, Normocephalic, moist mucous membranes  THYROID: Normal size, no palpable nodules, no neck masses.   RESPIRATORY: Decreased breath sounds b/l in the bases; No rales, rhonchi, wheezing  CARDIOVASCULAR: Regular rate and rhythm; No murmurs; no peripheral edema  GI: Soft, nontender, non distended, normal bowel sounds  SKIN: Dry, intact, No rashes or lesions. Diffuse senile purpura, ecchymosis in the b/l lower extremities.   MUSCULOSKELETAL: Exam limited due to mental status.   NEURO: sensation intact, extraocular movements intact, mild tremors with motion in the b/l upper extremity.   PSYCH: Alert and oriented x 2, confused mood, bizarre affect.   CUSHING'S SIGNS: no striae, no dorsocervical fat pad, no moon facies.     CAPILLARY BLOOD GLUCOSE      POCT Blood Glucose.: 84 mg/dL (02 Sep 2022 08:27)  POCT Blood Glucose.: 98 mg/dL (02 Sep 2022 01:20)  POCT Blood Glucose.: 93 mg/dL (01 Sep 2022 17:07)  POCT Blood Glucose.: 84 mg/dL (01 Sep 2022 11:39)      09-01    141  |  106  |  18  ----------------------------<  87  4.7   |  35<H>  |  1.04    eGFR: 56<L>    Ca    9.8      09-01  Mg     2.00     09-01  Phos  2.8     09-01    TPro  11.4<H>  /  Alb  1.4<L>  /  TBili  0.6  /  DBili  x   /  AST  22  /  ALT  24  /  AlkPhos  40  09-01      A1C with Estimated Average Glucose Result: 4.5 % (08-31-22 @ 03:48)      Thyroid Function Tests:  08-31 @ 03:48 TSH 1.42 FreeT4 -- T3 -- Anti TPO -- Anti Thyroglobulin Ab -- TSI --  08-29 @ 22:52 TSH 3.06 FreeT4 -- T3 -- Anti TPO -- Anti Thyroglobulin Ab -- TSI --

## 2022-09-02 NOTE — PROGRESS NOTE ADULT - PROBLEM SELECTOR PLAN 7
-Hx of bipolar w/psych admission @ [place] many years ago  -non-compliant with medications  -F/u Psych recommendations  -Supportive care

## 2022-09-03 NOTE — PROGRESS NOTE ADULT - PROBLEM SELECTOR PLAN 2
-also with hypoxemic respiratory failure  -pCO2 improving  -will place on AVAPS intermittently as needed; currently saturating well on 6L NC --> 4 lts/NC  -CXR with concern for L pleural effusion, however, as per CT, changes are largely attributed to pt's lymphoma. No need for thora, as per pulm  -IVF d/john ON due to concern for pulmonary edema leading to hypoxia

## 2022-09-03 NOTE — CHART NOTE - NSCHARTNOTEFT_GEN_A_CORE
77 year old with a PMHx of HLD, lymphoma, bipolar who presents with AMS and hypoxia found to have hypercalcemia likely related to malignancy. Endocrinology following for hypercalcemia.    - corrected calcium 9.6 today  - continue to encourage PO intake  - f/u vitamin D 1,25 and PTHrP  - will follow along    Beltran Cooper MD  Endocrinology Fellow

## 2022-09-03 NOTE — PROGRESS NOTE ADULT - PROBLEM SELECTOR PLAN 7
-Hx of bipolar w/psych admission @ [place] many years ago  -non-compliant with medications  -F/u Psych recommendations  -Supportive care

## 2022-09-03 NOTE — PROGRESS NOTE ADULT - PROBLEM SELECTOR PLAN 5
-[place] [facility] [place]received treatment recently per daughter however she doesn't know specifics  -with progression of disease  -hematology to speak to [facility] regarding if further treatment is suggested

## 2022-09-03 NOTE — PROGRESS NOTE ADULT - PROBLEM SELECTOR PLAN 1
-Likely multi-factorial: UTI, hypercapnia, hypercalcemia, psychiatric disorder,  and now with greater concern for hyperviscosity syndrome given inability to even obtain immunoglobulin panel due to degree of hyperviscosity  -serum viscosity level sent and pending  -previous attending spoke to hematology; recommend apheresis due to concern for hyperviscosity. S/p shiley placement and plasmapheresis done last night 9/2  -daughter thinks patient stopped taking her psychiatric medication in february

## 2022-09-03 NOTE — PROGRESS NOTE ADULT - ASSESSMENT
MANDI GASPAR is a 77y Female who presents with a chief complaint of respiratory failure.    Marginal Zone Lymphoma  - Patient treated previously at [facility]; was on rituximab + bendamustine last in August 2020, and since then has been on surveillance, but lost to follow-up since July 2021.  - CT chest concerning for slight progression of disease.  - Unable to obtain IgM level due to hyperviscosity. Noted elevated lambda light chain compared to kappa similar to values in early 2020.  - Immunofixation with IgM lambda monoclonal protein as seen previously during admission in 2020. M-spike was elevated at the time, but bone marrow biopsy did not show multiple myeloma; may consider repeating after plasmapheresis to assess for progression. SPEP pending.   - Will continue discussion with [facility].   - Depending on goals of care as well, may need additional therapy such as repeat rituximab in addition to plasmapheresis if patient has improvement on plasmapheresis.  post plasmapharesis procedure yest    Altered Mental Status  - Likely multifactorial with hyperviscosity of blood, hypercalcemia, urinary tract infection.  - Recommending plasmapheresis for hyperviscosity. IR for Shiley placement today   - F/u immunoglobulins panel, serum viscosity   - On ceftriaxone for UTI  - Endocrinology following; pamidronate and calcitonin administered. Continue to monitor calcium levels closely.    Hypoxia  - On AVAPS with breaks using nasal cannula as tolerated  - Pulmonology following     Will continue to follow.      Chidi Tabares MD  [facility]  Cell: [phone]

## 2022-09-03 NOTE — CHART NOTE - NSCHARTNOTEFT_GEN_A_CORE
RN alerted medicine team to hypotension on routine vitals (86/50 mmHg, manually)    Chart reviewed, pt assessed at bedside, lethargic (baseline for this admission), aroused by verbal stimuli. Unable to obtain Hx and ROS 2/2 decreased level of consciousness. S/P IJ placement and PLEX today.     Vital Signs Last 24 Hrs  T(C): 36.9 (02 Sep 2022 22:15), Max: 37 (02 Sep 2022 20:01)  T(F): 98.5 (02 Sep 2022 22:15), Max: 98.6 (02 Sep 2022 20:01)  HR: 99 (03 Sep 2022 02:05) (82 - 105)  BP: 110/62 (03 Sep 2022 02:05) (78/42 - 151/89)  BP(mean): 91 (02 Sep 2022 12:15) (88 - 91)  RR: 17 (02 Sep 2022 22:15) (14 - 20)  SpO2: 99% (02 Sep 2022 22:33) (96% - 100%)    Parameters below as of 02 Sep 2022 22:33  Patient On (Oxygen Delivery Method): nasal cannula    Physical Exam  Gen: NAD, lethargic AOx0  Heart: S1 S2 no MRC  Lungs: CTABL no wheezing rales or rhonchi  Abdomen: soft, nontender, nondistended, normal active bowel sounds  Extremities: warm, nontender, no pitting edema, capillary refill <2 seconds     Plan:  -500cc NS IV bolus   -Reassess blood pressure s/p fluid bolus   -STAT CBC + CMP   -Will continue to monitor     -------------------------------------------------------------------------------------------------------------------    -Blood pressure improved s/p 2x 500cc NS boluses   -STAT CBC + CMP --> No gross deviation from baseline     Arnie Oakley PA-C  Department of Internal Medicine  In-House beeper number [phone] RN alerted medicine team to hypotension on routine vitals (86/50 mmHg, manually)    Chart reviewed, pt assessed at bedside, lethargic (baseline for this admission), aroused by verbal stimuli. Unable to obtain Hx and ROS 2/2 decreased level of consciousness. S/P IJ placement and PLEX today.     Vital Signs Last 24 Hrs  T(C): 36.9 (02 Sep 2022 22:15), Max: 37 (02 Sep 2022 20:01)  T(F): 98.5 (02 Sep 2022 22:15), Max: 98.6 (02 Sep 2022 20:01)  HR: 99 (03 Sep 2022 02:05) (82 - 105)  BP: 110/62 (03 Sep 2022 02:05) (78/42 - 151/89)  BP(mean): 91 (02 Sep 2022 12:15) (88 - 91)  RR: 17 (02 Sep 2022 22:15) (14 - 20)  SpO2: 99% (02 Sep 2022 22:33) (96% - 100%)    Parameters below as of 02 Sep 2022 22:33  Patient On (Oxygen Delivery Method): nasal cannula    Physical Exam  Gen: NAD, lethargic AOx0  Heart: S1 S2 no MRC  Lungs: CTABL no wheezing rales or rhonchi  Abdomen: soft, nontender, nondistended, normal active bowel sounds  Extremities: warm, nontender, no pitting edema, capillary refill <2 seconds     Plan:  -500cc NS IV bolus   -Reassess blood pressure s/p fluid bolus   -STAT CBC + CMP   -Will continue to monitor     -------------------------------------------------------------------------------------------------------------------    -Blood pressure and mentation improved s/p 2x 500cc NS boluses   -STAT CBC + CMP --> No gross deviation from previous labs    Arnie Oakley PA-C  Department of Internal Medicine  In-House beeper number [phone]

## 2022-09-03 NOTE — PROGRESS NOTE ADULT - SUBJECTIVE AND OBJECTIVE BOX
Patient seen, no complaints, poor historian    MEDICATIONS  (STANDING):  albumin human  5% IVPB 2500 milliLiter(s) IV Intermittent once  albuterol/ipratropium for Nebulization 3 milliLiter(s) Nebulizer every 12 hours  budesonide  80 MICROgram(s)/formoterol 4.5 MICROgram(s) Inhaler 2 Puff(s) Inhalation two times a day  calcium gluconate IVPB 2 Gram(s) IV Intermittent once  heparin   Injectable 5000 Unit(s) SubCutaneous every 12 hours  OLANZapine 2.5 milliGRAM(s) Oral at bedtime  thiamine IVPB 500 milliGRAM(s) IV Intermittent three times a day    MEDICATIONS  (PRN):  acetaminophen     Tablet .. 650 milliGRAM(s) Oral every 6 hours PRN Temp greater or equal to 38C (100.4F), Mild Pain (1 - 3)  ALBUTerol    90 MICROgram(s) HFA Inhaler 2 Puff(s) Inhalation every 6 hours PRN Shortness of Breath and/or Wheezing  aluminum hydroxide/magnesium hydroxide/simethicone Suspension 30 milliLiter(s) Oral every 4 hours PRN Dyspepsia  melatonin 3 milliGRAM(s) Oral at bedtime PRN Insomnia  ondansetron Injectable 4 milliGRAM(s) IV Push every 8 hours PRN Nausea and/or Vomiting      ROS  No fever, sweats, chills  No epistaxis, HA, sore throat  No CP, SOB, cough, sputum  No n/v/d, abd pain, melena, hematochezia  No edema  No rash  No anxiety  No back pain, joint pain  No bleeding, bruising  No dysuria, hematuria    Vital Signs Last 24 Hrs  T(C): 36.7 (03 Sep 2022 13:05), Max: 37 (02 Sep 2022 20:01)  T(F): 98 (03 Sep 2022 13:05), Max: 98.6 (02 Sep 2022 20:01)  HR: 99 (03 Sep 2022 13:05) (85 - 99)  BP: 100/62 (03 Sep 2022 13:05) (78/42 - 111/55)  BP(mean): --  RR: 18 (03 Sep 2022 13:05) (17 - 18)  SpO2: 100% (03 Sep 2022 13:05) (96% - 100%)    Parameters below as of 03 Sep 2022 13:05  Patient On (Oxygen Delivery Method): nasal cannula        PE  alert  RRR  CTAB  Abd soft, NT, ND  No c/c/e  No rash grossly  FROM                          8.8    5.62  )-----------( 126      ( 03 Sep 2022 06:20 )             30.6       09-03    141  |  101  |  14  ----------------------------<  100<H>  4.2   |  30  |  0.77    Ca    9.2      03 Sep 2022 06:20  Phos  1.6     09-03  Mg     1.80     09-03    TPro  9.3<H>  /  Alb  3.5  /  TBili  0.6  /  DBili  x   /  AST  13  /  ALT  13  /  AlkPhos  26<L>  09-03

## 2022-09-03 NOTE — PROGRESS NOTE ADULT - SUBJECTIVE AND OBJECTIVE BOX
Patient is a 77y old  Female who presents with a chief complaint of Hypoxic respiratory failure (03 Sep 2022 13:09)      INTERVAL HPI/OVERNIGHT EVENTS:  Seen by me this afternoon, resting comfortably in bed, confused. Decreased PO intake per aide in room. Had plasmapheresis last night. Hypotensive o/n that responded to IVF.    Review of Systems: 12 point review of systems otherwise negative    MEDICATIONS  (STANDING):  albumin human  5% IVPB 2500 milliLiter(s) IV Intermittent once  albuterol/ipratropium for Nebulization 3 milliLiter(s) Nebulizer every 12 hours  budesonide  80 MICROgram(s)/formoterol 4.5 MICROgram(s) Inhaler 2 Puff(s) Inhalation two times a day  calcium gluconate IVPB 2 Gram(s) IV Intermittent once  heparin   Injectable 5000 Unit(s) SubCutaneous every 12 hours  OLANZapine 2.5 milliGRAM(s) Oral at bedtime  thiamine IVPB 500 milliGRAM(s) IV Intermittent three times a day    MEDICATIONS  (PRN):  acetaminophen     Tablet .. 650 milliGRAM(s) Oral every 6 hours PRN Temp greater or equal to 38C (100.4F), Mild Pain (1 - 3)  ALBUTerol    90 MICROgram(s) HFA Inhaler 2 Puff(s) Inhalation every 6 hours PRN Shortness of Breath and/or Wheezing  aluminum hydroxide/magnesium hydroxide/simethicone Suspension 30 milliLiter(s) Oral every 4 hours PRN Dyspepsia  melatonin 3 milliGRAM(s) Oral at bedtime PRN Insomnia  ondansetron Injectable 4 milliGRAM(s) IV Push every 8 hours PRN Nausea and/or Vomiting      Allergies    latex (Rash)  No Known Drug Allergies    Intolerances          Vital Signs Last 24 Hrs  T(C): 36.6 (03 Sep 2022 19:28), Max: 37 (02 Sep 2022 20:01)  T(F): 97.9 (03 Sep 2022 19:28), Max: 98.6 (02 Sep 2022 20:01)  HR: 102 (03 Sep 2022 19:28) (85 - 102)  BP: 120/70 (03 Sep 2022 19:28) (78/42 - 120/70)  BP(mean): --  RR: 17 (03 Sep 2022 19:28) (17 - 18)  SpO2: 96% (03 Sep 2022 19:28) (96% - 100%)    Parameters below as of 03 Sep 2022 19:28  Patient On (Oxygen Delivery Method): nasal cannula  O2 Flow (L/min): 6    CAPILLARY BLOOD GLUCOSE      POCT Blood Glucose.: 94 mg/dL (03 Sep 2022 16:52)  POCT Blood Glucose.: 91 mg/dL (03 Sep 2022 11:23)  POCT Blood Glucose.: 91 mg/dL (03 Sep 2022 07:49)        Physical Exam:    Daily     Daily   General:  Well appearing, NAD, not cachetic, on 4 lts/NC  HEENT:  Nonicteric, PERRLA  CV:  RRR, no murmur, no JVD, R ant chest cath in place  Lungs:  CTA B/L, no wheezes, rales, rhonchi  Abdomen:  Soft, non-tender, no distended, positive BS, no hepatosplenomegaly  Extremities:  2+ pulses, no c/c, no edema  Skin:  Warm and dry, no rashes  :  No hoffman  Neuro:  AAOx3, non-focal, CN II-XII grossly intact  No Restraints    LABS:                        8.8    5.62  )-----------( 126      ( 03 Sep 2022 06:20 )             30.6     09-03    141  |  101  |  14  ----------------------------<  100<H>  4.2   |  30  |  0.77    Ca    9.2      03 Sep 2022 06:20  Phos  1.6     09-03  Mg     1.80     09-03    TPro  9.3<H>  /  Alb  3.5  /  TBili  0.6  /  DBili  x   /  AST  13  /  ALT  13  /  AlkPhos  26<L>  09-03    PT/INR - ( 02 Sep 2022 07:01 )   PT: 14.8 sec;   INR: 1.27 ratio         PTT - ( 02 Sep 2022 07:01 )  PTT:40.6 sec        RADIOLOGY & ADDITIONAL TESTS:  Reviewed by me

## 2022-09-03 NOTE — PROGRESS NOTE ADULT - ASSESSMENT
77 year old female with PMH of lymphoma (dx 2005, s/p CARRIE lobectomy, relapsed in 2019 but not currently on chemo/RT, being followed by heme every 3-6months at [facility] [place]), bipolar disorder, asthma (on O2 PRN at home, unknown how many L) brought to ED by daughter who states pt has been hallucinating, being more depressed.  Patient unable to provide much history, daughter Alissa states that patient was able to care for herself, manage home finances and attend doctor appointments independently until recently, she states that she believes her mom stopped taking her psychiatric medications in February of 2022 and has not followed up with any of her physicians. She has noticed recent weight loss (unsure how much) and change in voice.   Admitted to medicine service for further evaluation & treatment. Course c/b acute hypercapnic and hypoxic respiratory failure requiring BIPAP and AVAPS, on which she remains.  Now with concern for hyperviscosity syndrome given inability to even obtain immunoglobulin panel due to serum viscosity. Mental status is improving; pt is now alert although still confused.

## 2022-09-03 NOTE — PROGRESS NOTE ADULT - PROBLEM SELECTOR PLAN 4
-Corrected calcium 13.6 upon admission  -likely due to underlying malignancy, lymphoma vs new MM. F/u vitamin D 25 and 1, 25, PTHRP, MM labs. PTH is inappropriately normal  -s/p pamidronate on 8/30; now s/p 72 hours with Ca improved to approximately 11.8.   -calcitonin x 4 doses  -unfortunately, will hold off fluids for now given pulmonary edema that developed with IVF upon admission, which later required need for Lasix IV and AVAPS  -will speak to endocrine regarding need for Xgeva given ongoing hypercalcemia  -Apprec Endocrine recs-corrected calcium 9.6 today  - continue to encourage PO intake  - f/u vitamin D 1,25 and PTHrP

## 2022-09-04 NOTE — PROGRESS NOTE ADULT - SUBJECTIVE AND OBJECTIVE BOX
she has no complaints  received NS IV bolus this AM for hypotension    acetaminophen     Tablet .. 650 milliGRAM(s) Oral every 6 hours PRN  albumin human  5% IVPB 2500 milliLiter(s) IV Intermittent once  ALBUTerol    90 MICROgram(s) HFA Inhaler 2 Puff(s) Inhalation every 6 hours PRN  albuterol/ipratropium for Nebulization 3 milliLiter(s) Nebulizer every 12 hours  aluminum hydroxide/magnesium hydroxide/simethicone Suspension 30 milliLiter(s) Oral every 4 hours PRN  budesonide  80 MICROgram(s)/formoterol 4.5 MICROgram(s) Inhaler 2 Puff(s) Inhalation two times a day  calcium gluconate IVPB 2 Gram(s) IV Intermittent once  chlorhexidine 2% Cloths 1 Application(s) Topical daily  heparin   Injectable 5000 Unit(s) SubCutaneous every 12 hours  melatonin 3 milliGRAM(s) Oral at bedtime PRN  OLANZapine 2.5 milliGRAM(s) Oral at bedtime  ondansetron Injectable 4 milliGRAM(s) IV Push every 8 hours PRN  potassium phosphate / sodium phosphate Powder (PHOS-NaK) 1 Packet(s) Oral three times a day  thiamine IVPB 500 milliGRAM(s) IV Intermittent three times a day      latex (Rash)  No Known Drug Allergies      ROS otherwise negative     T(C): 36.7 (09-04-22 @ 10:45), Max: 36.8 (09-04-22 @ 05:05)  HR: 106 (09-04-22 @ 10:45) (88 - 106)  BP: 108/62 (09-04-22 @ 10:45) (80/42 - 120/70)  RR: 18 (09-04-22 @ 10:45) (17 - 18)  SpO2: 98% (09-04-22 @ 10:45) (96% - 100%)  PHYSICAL EXAM  Gen:  laying in bed, nad  H:  anicteric, eomi                          8.8    4.99  )-----------( 121      ( 04 Sep 2022 07:09 )             31.8   09-04    141  |  100  |  11  ----------------------------<  92  4.1   |  29  |  0.72    Ca    9.5      04 Sep 2022 07:09  Phos  1.7     09-04  Mg     1.90     09-04    TPro  9.3<H>  /  Alb  3.5  /  TBili  0.6  /  DBili  x   /  AST  13  /  ALT  13  /  AlkPhos  26<L>  09-03

## 2022-09-04 NOTE — PROGRESS NOTE ADULT - PROBLEM SELECTOR PLAN 7
-Hx of bipolar w/psych admission @ [facility] many years ago  -non-compliant with medications  -F/u Psych recommendations  -Supportive care

## 2022-09-04 NOTE — PROGRESS NOTE ADULT - ASSESSMENT
MANDI GASPAR is a 77y Female who presents with a chief complaint of respiratory failure.    Marginal Zone Lymphoma  - Patient treated previously at [facility]; was on rituximab + bendamustine last in August 2020, and since then has been on surveillance, but lost to follow-up since July 2021.  - CT chest concerning for slight progression of disease.  - Unable to obtain IgM level due to hyperviscosity. Noted elevated lambda light chain compared to kappa similar to values in early 2020.  - Immunofixation with IgM lambda monoclonal protein as seen previously during admission in 2020. M-spike was elevated at the time, but bone marrow biopsy did not show multiple myeloma; may consider repeating after plasmapheresis to assess for progression. SPEP pending.   - Will continue discussion with [facility].   - Depending on goals of care as well, may need additional therapy such as repeat rituximab in addition to plasmapheresis if patient has improvement on plasmapheresis.  post plasmapharesis procedure 9/2    Altered Mental Status  - Likely multifactorial with hyperviscosity of blood, hypercalcemia, urinary tract infection.  - Recommending plasmapheresis for hyperviscosity. IR for Shiley placement today   - F/u immunoglobulins panel, serum viscosity   - On ceftriaxone for UTI  - Endocrinology following; pamidronate and calcitonin administered. Continue to monitor calcium levels closely.    Hypoxia  - On AVAPS with breaks using nasal cannula as tolerated  - Pulmonology following     Hypotenision   -improved with fluid bolus    We will be happy to answer any onc questions on behalf of [facility] and will be in touch with them.    Azael Beltran MD  Hematology/Oncology  Weekends and nights please call [phone] for MD on call  Cell:  [phone]  Office Phone: [phone]  Office Fax:  [phone]  [address] [place] [address]

## 2022-09-04 NOTE — PROGRESS NOTE ADULT - SUBJECTIVE AND OBJECTIVE BOX
Patient is a 77y old  Female who presents with a chief complaint of Hypoxic respiratory failure (04 Sep 2022 11:51)      INTERVAL HPI/OVERNIGHT EVENTS:  Seen by me this afternoon, confused, no complaints. Events noted-hypotensive that responded to IVF's. Minimal PO intake per aide in room.    Review of Systems: 12 point review of systems otherwise negative    MEDICATIONS  (STANDING):  albumin human  5% IVPB 2500 milliLiter(s) IV Intermittent once  albuterol/ipratropium for Nebulization 3 milliLiter(s) Nebulizer every 12 hours  budesonide  80 MICROgram(s)/formoterol 4.5 MICROgram(s) Inhaler 2 Puff(s) Inhalation two times a day  calcium gluconate IVPB 2 Gram(s) IV Intermittent once  chlorhexidine 2% Cloths 1 Application(s) Topical daily  heparin   Injectable 5000 Unit(s) SubCutaneous every 12 hours  OLANZapine 2.5 milliGRAM(s) Oral at bedtime  potassium phosphate / sodium phosphate Powder (PHOS-NaK) 1 Packet(s) Oral three times a day  thiamine IVPB 500 milliGRAM(s) IV Intermittent three times a day    MEDICATIONS  (PRN):  acetaminophen     Tablet .. 650 milliGRAM(s) Oral every 6 hours PRN Temp greater or equal to 38C (100.4F), Mild Pain (1 - 3)  ALBUTerol    90 MICROgram(s) HFA Inhaler 2 Puff(s) Inhalation every 6 hours PRN Shortness of Breath and/or Wheezing  aluminum hydroxide/magnesium hydroxide/simethicone Suspension 30 milliLiter(s) Oral every 4 hours PRN Dyspepsia  melatonin 3 milliGRAM(s) Oral at bedtime PRN Insomnia  ondansetron Injectable 4 milliGRAM(s) IV Push every 8 hours PRN Nausea and/or Vomiting      Allergies    latex (Rash)  No Known Drug Allergies    Intolerances          Vital Signs Last 24 Hrs  T(C): 36.6 (04 Sep 2022 17:38), Max: 36.8 (04 Sep 2022 05:05)  T(F): 97.9 (04 Sep 2022 17:38), Max: 98.3 (04 Sep 2022 05:05)  HR: 105 (04 Sep 2022 17:38) (88 - 106)  BP: 105/76 (04 Sep 2022 17:38) (80/42 - 122/83)  BP(mean): --  RR: 18 (04 Sep 2022 17:38) (17 - 18)  SpO2: 96% (04 Sep 2022 17:38) (96% - 99%)    Parameters below as of 04 Sep 2022 17:38  Patient On (Oxygen Delivery Method): nasal cannula  O2 Flow (L/min): 4    CAPILLARY BLOOD GLUCOSE      POCT Blood Glucose.: 87 mg/dL (04 Sep 2022 17:02)  POCT Blood Glucose.: 112 mg/dL (04 Sep 2022 11:31)  POCT Blood Glucose.: 91 mg/dL (04 Sep 2022 08:06)        Physical Exam:    Daily     Daily   General:  Well appearing, NAD, not cachetic, on NC 6 lts/min  HEENT:  Nonicteric, PERRLA  CV:  RRR, no murmur, no JVD  Lungs:  CTA B/L, no wheezes, rales, rhonchi  Abdomen:  Soft, non-tender, no distended, positive BS, no hepatosplenomegaly  Extremities:  2+ pulses, no c/c, no edema  Skin:  Warm and dry, no rashes  :  No hoffman  Neuro:  Alert, non-focal, CN II-XII grossly intact  No Restraints    LABS:                        8.8    4.99  )-----------( 121      ( 04 Sep 2022 07:09 )             31.8     09-04    141  |  100  |  11  ----------------------------<  92  4.1   |  29  |  0.72    Ca    9.5      04 Sep 2022 07:09  Phos  1.7     09-04  Mg     1.90     09-04    TPro  9.3<H>  /  Alb  3.5  /  TBili  0.6  /  DBili  x   /  AST  13  /  ALT  13  /  AlkPhos  26<L>  09-03            RADIOLOGY & ADDITIONAL TESTS:  Reviewed by me

## 2022-09-04 NOTE — PROGRESS NOTE ADULT - PROBLEM SELECTOR PLAN 6
-improved w/IVF, now discontinued  -Continue to monitor, avoid nephrotoxins  -Encourage increased PO intake  -intake and output

## 2022-09-04 NOTE — PROGRESS NOTE ADULT - ASSESSMENT
77 year old female with PMH of HLD, lymphoma (dx 2005, s/p CARRIE lobectomy, relapsed in 2019 but not currently on chemo/RT, being followed by heme every 3-6months at [facility] [place]), bipolar disorder, asthma, who presented to the hospital for AMS and hypoxic respiratory failure, found to have hypercalcemia.     #Hypercalcemia, likely malignancy-related elevated 1,25D (lymphoma) vs medication induced (lithium induced parathyroidism) vs in the setting of potential multiple myeloma transformation   - total protein of 12.0, Ca corrected now 11.8 (down from 11.9 day prior) Albumin 2.0. Continue to trend Calcium and albumin levels via CMP.  - s/p approximately 1.9 L of IVF (1L of LR on 8/29, 75 cc/hr of NS x 12 hours on 8/30). Currently off of IVF due to overload. s/p Lasix 20 mg IV in the AM (8/31/22).  Continue to hold Lithium. Encourage PO intake if possible. Avoid medications or supplements that will increase serum Ca.   - intact PTH level of 25, Vitamin D 25-hydroxy of 18.4 (low). Still waiting for results on PTH-related peptide, Vitamin D 1,25-hydroxy level, serum immunofixation assay.   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   - s/p 4 doses of IV Calcitonin. Follow with repeat serum Calcium levels.   - s/p pamidronate 60 mg IV x 1 (given recent EMILY), Doses of pamidronate should not be repeated sooner than a minimum of seven days. Monitor for response (peak effect of pamidronate may be seen at the 72 hour jose).   - 9/4 Tiana Ca 9.9  PLAN  - pt now with mild hypercalcemia, recommend oral intake and monitoring Ca and albumin levels. Light intravenous hydration per primary team as patient tolerates, no need for additional pharmacological intervention at this time.   - F/u rest of remaining lab workup, (1,25-hydroxy), Serum immunofixation assay, PTH-related peptide levels etc.     #history of fracture in the setting of likely osteoporosis;.   - Imaging findings of Old right humeral neck fracture. Left proximal humeral fracture s/p ORIF  - f/u outpatient bone-density testing.   - 25-hydroxy Vitamin D level of 18.4 (low), f/u 1,25 hydroxy level. Patient may benefit from future Vitamin D supplementation pending results from the rest of workup.     #Hyperlipidemia  - history of hyperlipidemia   - Cholesterol of 54, Triglycerides of 30, Serum HDL of 21, LDL of 27. A1C of 4.5   - can hold on statin therapy at this time due to age and risk factors at this time   - home medication history of zyprexa.     Discussed with primary team.     Thank you for the interesting consult.    Beltran Cooper MD, Endocrinology Fellow  Pager [phone] from 9am to 5pm. After hours and on weekends, please call [phone].

## 2022-09-04 NOTE — PROGRESS NOTE ADULT - PROBLEM SELECTOR PLAN 9
-Heparin SQ    -Dispo issues:  -Patient with APS case ongoing due to unsanitary conditions at home. Will require SW/CM for safe placement once medically stable for d/c. Patient currently without capacity and with poor insight into her medical conditions.    D/w ACP

## 2022-09-04 NOTE — PROGRESS NOTE ADULT - PROBLEM SELECTOR PLAN 5
-[place] w/Dr. Real Cano [place] [facility] [place]  -received treatment recently per daughter however she doesn't know specifics  -with progression of disease  -hematology to speak to [facility] regarding if further treatment is suggested

## 2022-09-04 NOTE — PROGRESS NOTE ADULT - PROBLEM SELECTOR PLAN 2
-also with hypoxemic respiratory failure  -pCO2 improving  -will place on AVAPS intermittently as needed; currently saturating well on 6L NC, to be weaned off O2  -CXR with concern for L pleural effusion, however, as per CT, changes are largely attributed to pt's lymphoma. No need for thora, as per pulm  -IVF d/john due to concern for pulmonary edema leading to hypoxia

## 2022-09-04 NOTE — PROGRESS NOTE ADULT - SUBJECTIVE AND OBJECTIVE BOX
ENDOCRINE FOLLOW UP     Chief Complaint: hypercalcemia    History:   The patient reports feeling very dry but endorses that she is able to drink juice nad water. She endorses right arm pain. She declined physical exam.    MEDICATIONS  (STANDING):  albumin human  5% IVPB 2500 milliLiter(s) IV Intermittent once  albuterol/ipratropium for Nebulization 3 milliLiter(s) Nebulizer every 12 hours  budesonide  80 MICROgram(s)/formoterol 4.5 MICROgram(s) Inhaler 2 Puff(s) Inhalation two times a day  calcium gluconate IVPB 2 Gram(s) IV Intermittent once  chlorhexidine 2% Cloths 1 Application(s) Topical daily  heparin   Injectable 5000 Unit(s) SubCutaneous every 12 hours  OLANZapine 2.5 milliGRAM(s) Oral at bedtime  potassium phosphate / sodium phosphate Powder (PHOS-NaK) 1 Packet(s) Oral three times a day  thiamine IVPB 500 milliGRAM(s) IV Intermittent three times a day    MEDICATIONS  (PRN):  acetaminophen     Tablet .. 650 milliGRAM(s) Oral every 6 hours PRN Temp greater or equal to 38C (100.4F), Mild Pain (1 - 3)  ALBUTerol    90 MICROgram(s) HFA Inhaler 2 Puff(s) Inhalation every 6 hours PRN Shortness of Breath and/or Wheezing  aluminum hydroxide/magnesium hydroxide/simethicone Suspension 30 milliLiter(s) Oral every 4 hours PRN Dyspepsia  melatonin 3 milliGRAM(s) Oral at bedtime PRN Insomnia  ondansetron Injectable 4 milliGRAM(s) IV Push every 8 hours PRN Nausea and/or Vomiting      Allergies    latex (Rash)  No Known Drug Allergies    Intolerances        ROS: All other systems reviewed and negative    PHYSICAL EXAM:  VITALS: T(C): 36.7 (09-04-22 @ 10:45)  T(F): 98 (09-04-22 @ 10:45), Max: 98.3 (09-04-22 @ 05:05)  HR: 106 (09-04-22 @ 10:45) (88 - 106)  BP: 108/62 (09-04-22 @ 10:45) (80/42 - 120/70)  RR:  (17 - 18)  SpO2:  (96% - 100%)  Wt(kg): --    GENERAL: Ill-appearing, lying in bed  EYES: No proptosis,  anicteric  HEENT:  Atraumatic, Normocephalic, dry mucous membranes  RESPIRATORY: Nonlabored respirations on room air, normal rate/effort   CARDIOVASCULAR: No LE swelling visualized, did not appear cyanotic  GI: Did not appear distended, no masses visualized  NEURO/[facility]/SKIN: multiple bruises over body, right arm bandaged, moves all extremities spontaneously   PSYCH:  flat affect, depressed mood    POCT Blood Glucose.: 112 mg/dL (09-04-22 @ 11:31)  POCT Blood Glucose.: 91 mg/dL (09-04-22 @ 08:06)  POCT Blood Glucose.: 94 mg/dL (09-03-22 @ 16:52)  POCT Blood Glucose.: 91 mg/dL (09-03-22 @ 11:23)  POCT Blood Glucose.: 91 mg/dL (09-03-22 @ 07:49)  POCT Blood Glucose.: 122 mg/dL (09-02-22 @ 13:59)  POCT Blood Glucose.: 84 mg/dL (09-02-22 @ 08:27)  POCT Blood Glucose.: 98 mg/dL (09-02-22 @ 01:20)  POCT Blood Glucose.: 93 mg/dL (09-01-22 @ 17:07)      09-04    141  |  100  |  11  ----------------------------<  92  4.1   |  29  |  0.72    eGFR: 87    Ca    9.5      09-04  Mg     1.90     09-04  Phos  1.7     09-04    TPro  9.3<H>  /  Alb  3.5  /  TBili  0.6  /  DBili  x   /  AST  13  /  ALT  13  /  AlkPhos  26<L>  09-03      A1C with Estimated Average Glucose Result: 4.5 % (08-31-22 @ 03:48)      Thyroid Stimulating Hormone, Serum: 1.42 uIU/mL (08-31-22 @ 03:48)  Thyroid Stimulating Hormone, Serum: 3.06 uIU/mL (08-29-22 @ 22:52)

## 2022-09-04 NOTE — CHART NOTE - NSCHARTNOTEFT_GEN_A_CORE
RN reported hypotension on routine vital signs assessment.      Chart reviewed, pt assessed at bedside, NAD, AOx2, lethargic, unable to answer questions. Similar appearance as previous hypotensive episode. Lungs CTABL, no increased WOB, heart s1 s2 no MRC RRR, extremities warm acyanotic +2 radial and DP pulses BL capillary refill <2 seconds.    Vital Signs Last 24 Hrs  T(C): 36.8 (04 Sep 2022 05:05), Max: 36.8 (03 Sep 2022 09:08)  T(F): 98.3 (04 Sep 2022 05:05), Max: 98.3 (03 Sep 2022 09:08)  HR: 95 (04 Sep 2022 05:10) (90 - 102)  BP: 80/42 (04 Sep 2022 05:10) (80/42 - 120/70)  BP(mean): --  RR: 17 (04 Sep 2022 05:10) (17 - 18)  SpO2: 98% (04 Sep 2022 05:10) (96% - 100%)    Parameters below as of 04 Sep 2022 05:10  Patient On (Oxygen Delivery Method): nasal cannula  O2 Flow (L/min): 6      -1000cc NS bolus, RN will reassess vitals and contact covering provider with results.  -AM labs STAT   -Will continue to monitor     Arnie Oakley PA-C  Department of Internal Medicine  In-House beeper number [phone]

## 2022-09-04 NOTE — PROGRESS NOTE ADULT - ASSESSMENT
77 year old female with PMH of lymphoma (dx 2005, s/p CARRIE lobectomy, relapsed in 2019 but not currently on chemo/RT, being followed by heme every 3-6months at [facility] [place]), bipolar disorder, asthma (on O2 PRN at home, unknown how many L) brought to ED by daughter who states pt has been hallucinating, being more depressed.  Patient unable to provide much history, daughter Alissa states that patient was able to care for herself, manage home finances and attend doctor appointments independently until recently, she states that she believes her mom stopped taking her psychiatric medications in February of 2022 and has not followed up with any of her physicians. She has noticed recent weight loss (unsure how much) and change in voice.   Admitted to medicine service for further evaluation & treatment. Course c/b acute hypercapnic and hypoxic respiratory failure requiring BIPAP and AVAPS, on which she remains.  Now with concern for hyperviscosity syndrome given inability to even obtain immunoglobulin panel due to serum viscosity. Mental status is improving; pt is now alert although still confused.

## 2022-09-05 NOTE — PROGRESS NOTE ADULT - SUBJECTIVE AND OBJECTIVE BOX
Pt seen, comfortable    MEDICATIONS  (STANDING):  albumin human  5% IVPB 2500 milliLiter(s) IV Intermittent once  albuterol/ipratropium for Nebulization 3 milliLiter(s) Nebulizer every 12 hours  budesonide  80 MICROgram(s)/formoterol 4.5 MICROgram(s) Inhaler 2 Puff(s) Inhalation two times a day  calcium gluconate IVPB 2 Gram(s) IV Intermittent once  chlorhexidine 2% Cloths 1 Application(s) Topical daily  heparin   Injectable 5000 Unit(s) SubCutaneous every 12 hours  OLANZapine 2.5 milliGRAM(s) Oral at bedtime  thiamine IVPB 500 milliGRAM(s) IV Intermittent three times a day    MEDICATIONS  (PRN):  acetaminophen     Tablet .. 650 milliGRAM(s) Oral every 6 hours PRN Temp greater or equal to 38C (100.4F), Mild Pain (1 - 3)  ALBUTerol    90 MICROgram(s) HFA Inhaler 2 Puff(s) Inhalation every 6 hours PRN Shortness of Breath and/or Wheezing  aluminum hydroxide/magnesium hydroxide/simethicone Suspension 30 milliLiter(s) Oral every 4 hours PRN Dyspepsia  melatonin 3 milliGRAM(s) Oral at bedtime PRN Insomnia  ondansetron Injectable 4 milliGRAM(s) IV Push every 8 hours PRN Nausea and/or Vomiting      ROS  No fever, sweats, chills  No epistaxis, HA, sore throat  No CP, SOB, cough, sputum  No n/v/d, abd pain, melena, hematochezia  No edema  No rash  No anxiety  No back pain, joint pain  No bleeding, bruising  No dysuria, hematuria    Vital Signs Last 24 Hrs  T(C): 37.1 (05 Sep 2022 13:17), Max: 37.1 (05 Sep 2022 13:17)  T(F): 98.7 (05 Sep 2022 13:17), Max: 98.7 (05 Sep 2022 13:17)  HR: 95 (05 Sep 2022 13:17) (95 - 114)  BP: 104/56 (05 Sep 2022 13:17) (104/56 - 116/68)  BP(mean): --  RR: 18 (05 Sep 2022 13:17) (18 - 19)  SpO2: 98% (05 Sep 2022 13:17) (96% - 100%)    Parameters below as of 05 Sep 2022 13:17  Patient On (Oxygen Delivery Method): nasal cannula  O2 Flow (L/min): 4      PE  NAD  Awake, alert  Anicteric, MMM  RRR  CTAB  Abd soft, NT, ND  No c/c/e  No rash grossly  FROM                          9.3    5.86  )-----------( 122      ( 05 Sep 2022 06:30 )             32.1       09-05    142  |  102  |  12  ----------------------------<  90  4.9   |  31  |  0.67    Ca    9.4      05 Sep 2022 06:30  Phos  2.7     09-05  Mg     2.00     09-05    TPro  9.8<H>  /  Alb  3.4  /  TBili  0.7  /  DBili  x   /  AST  19  /  ALT  9   /  AlkPhos  31<L>  09-05

## 2022-09-05 NOTE — PROGRESS NOTE ADULT - PROBLEM SELECTOR PLAN 5
-Follows w/Dr. Real Cano @ [facility] [place]  -received treatment recently per daughter however she doesn't know specifics  -with progression of disease  -hematology to speak to [facility] regarding if further treatment is suggested  -Hem recs appreciated

## 2022-09-05 NOTE — PROGRESS NOTE ADULT - SUBJECTIVE AND OBJECTIVE BOX
Patient is a 77y old  Female who presents with a chief complaint of Hypoxic respiratory failure (05 Sep 2022 13:25)      INTERVAL HPI/OVERNIGHT EVENTS:  Seen by me this afternoon, confused, aide at bedside. Minimal PO intake. Looks comfortable, no SOB.    Review of Systems: 12 point review of systems otherwise negative    MEDICATIONS  (STANDING):  albumin human  5% IVPB 2500 milliLiter(s) IV Intermittent once  albuterol/ipratropium for Nebulization 3 milliLiter(s) Nebulizer every 12 hours  budesonide  80 MICROgram(s)/formoterol 4.5 MICROgram(s) Inhaler 2 Puff(s) Inhalation two times a day  calcium gluconate IVPB 2 Gram(s) IV Intermittent once  chlorhexidine 2% Cloths 1 Application(s) Topical daily  heparin   Injectable 5000 Unit(s) SubCutaneous every 12 hours  OLANZapine 2.5 milliGRAM(s) Oral at bedtime  thiamine IVPB 500 milliGRAM(s) IV Intermittent three times a day    MEDICATIONS  (PRN):  acetaminophen     Tablet .. 650 milliGRAM(s) Oral every 6 hours PRN Temp greater or equal to 38C (100.4F), Mild Pain (1 - 3)  ALBUTerol    90 MICROgram(s) HFA Inhaler 2 Puff(s) Inhalation every 6 hours PRN Shortness of Breath and/or Wheezing  aluminum hydroxide/magnesium hydroxide/simethicone Suspension 30 milliLiter(s) Oral every 4 hours PRN Dyspepsia  melatonin 3 milliGRAM(s) Oral at bedtime PRN Insomnia  ondansetron Injectable 4 milliGRAM(s) IV Push every 8 hours PRN Nausea and/or Vomiting      Allergies    latex (Rash)  No Known Drug Allergies    Intolerances          Vital Signs Last 24 Hrs  T(C): 37.1 (05 Sep 2022 13:17), Max: 37.1 (05 Sep 2022 13:17)  T(F): 98.7 (05 Sep 2022 13:17), Max: 98.7 (05 Sep 2022 13:17)  HR: 95 (05 Sep 2022 13:17) (95 - 103)  BP: 104/56 (05 Sep 2022 13:17) (104/56 - 116/68)  BP(mean): --  RR: 18 (05 Sep 2022 13:17) (18 - 19)  SpO2: 98% (05 Sep 2022 13:17) (97% - 100%)    Parameters below as of 05 Sep 2022 13:17  Patient On (Oxygen Delivery Method): nasal cannula  O2 Flow (L/min): 4    CAPILLARY BLOOD GLUCOSE      POCT Blood Glucose.: 101 mg/dL (05 Sep 2022 16:45)  POCT Blood Glucose.: 91 mg/dL (05 Sep 2022 06:13)  POCT Blood Glucose.: 97 mg/dL (05 Sep 2022 00:41)        Physical Exam:    Daily     Daily   General:  Well appearing, NAD, cachetic  HEENT:  Nonicteric, PERRLA  CV:  RRR, no murmur, no JVD, R ant cath in place  Lungs:  CTA B/L, no wheezes, rales, rhonchi  Abdomen:  Soft, non-tender, no distended, positive BS, no hepatosplenomegaly  Extremities:  2+ pulses, no c/c, no edema  Skin:  Warm and dry, no rashes  :  No hoffman  Neuro:  Alert, non-focal, CN II-XII grossly intact  No Restraints    LABS:                        9.3    5.86  )-----------( 122      ( 05 Sep 2022 06:30 )             32.1     09-05    142  |  102  |  12  ----------------------------<  90  4.9   |  31  |  0.67    Ca    9.4      05 Sep 2022 06:30  Phos  2.7     09-05  Mg     2.00     09-05    TPro  9.8<H>  /  Alb  3.4  /  TBili  0.7  /  DBili  x   /  AST  19  /  ALT  9   /  AlkPhos  31<L>  09-05            RADIOLOGY & ADDITIONAL TESTS:  Reviewed by me

## 2022-09-05 NOTE — CHART NOTE - NSCHARTNOTEFT_GEN_A_CORE
Endocrinology consulted for hypercalcemia. Corrected Ca 9.9 today.    Will f/u 1,25 vitamin D and PTHrP.    Beltran Cooper MD  Endocrinology fellow

## 2022-09-05 NOTE — PROGRESS NOTE ADULT - SUBJECTIVE AND OBJECTIVE BOX
ENDOCRINE FOLLOW UP     Chief Complaint: hypercalcemia    History:     MEDICATIONS  (STANDING):  albumin human  5% IVPB 2500 milliLiter(s) IV Intermittent once  albuterol/ipratropium for Nebulization 3 milliLiter(s) Nebulizer every 12 hours  budesonide  80 MICROgram(s)/formoterol 4.5 MICROgram(s) Inhaler 2 Puff(s) Inhalation two times a day  calcium gluconate IVPB 2 Gram(s) IV Intermittent once  chlorhexidine 2% Cloths 1 Application(s) Topical daily  heparin   Injectable 5000 Unit(s) SubCutaneous every 12 hours  OLANZapine 2.5 milliGRAM(s) Oral at bedtime  thiamine IVPB 500 milliGRAM(s) IV Intermittent three times a day    MEDICATIONS  (PRN):  acetaminophen     Tablet .. 650 milliGRAM(s) Oral every 6 hours PRN Temp greater or equal to 38C (100.4F), Mild Pain (1 - 3)  ALBUTerol    90 MICROgram(s) HFA Inhaler 2 Puff(s) Inhalation every 6 hours PRN Shortness of Breath and/or Wheezing  aluminum hydroxide/magnesium hydroxide/simethicone Suspension 30 milliLiter(s) Oral every 4 hours PRN Dyspepsia  melatonin 3 milliGRAM(s) Oral at bedtime PRN Insomnia  ondansetron Injectable 4 milliGRAM(s) IV Push every 8 hours PRN Nausea and/or Vomiting      Allergies    latex (Rash)  No Known Drug Allergies    Intolerances        ROS: All other systems reviewed and negative    PHYSICAL EXAM:  VITALS: T(C): 36.8 (09-05-22 @ 05:42)  T(F): 98.2 (09-05-22 @ 05:42), Max: 98.2 (09-04-22 @ 21:38)  HR: 103 (09-05-22 @ 05:42) (98 - 114)  BP: 112/66 (09-05-22 @ 05:42) (105/76 - 122/83)  RR:  (18 - 19)  SpO2:  (96% - 100%)  Wt(kg): --  GENERAL: NAD, resting comfortably   EYES: No proptosis,  anicteric  HEENT:  Atraumatic, Normocephalic, moist mucous membranes  RESPIRATORY: Nonlabored respirations on room air, normal rate/effort   CARDIOVASCULAR: Regular rate and rhythm; No murmurs  GI: Soft, nontender, non distended, normal bowel sounds  NEURO: AOx3, moves all extremities spontaenuously   PSYCH:  reactive affect, euthymic mood    POCT Blood Glucose.: 91 mg/dL (09-05-22 @ 06:13)  POCT Blood Glucose.: 97 mg/dL (09-05-22 @ 00:41)  POCT Blood Glucose.: 87 mg/dL (09-04-22 @ 17:02)  POCT Blood Glucose.: 112 mg/dL (09-04-22 @ 11:31)  POCT Blood Glucose.: 91 mg/dL (09-04-22 @ 08:06)  POCT Blood Glucose.: 94 mg/dL (09-03-22 @ 16:52)  POCT Blood Glucose.: 91 mg/dL (09-03-22 @ 11:23)  POCT Blood Glucose.: 91 mg/dL (09-03-22 @ 07:49)  POCT Blood Glucose.: 122 mg/dL (09-02-22 @ 13:59)      09-05    142  |  102  |  12  ----------------------------<  90  4.9   |  31  |  0.67    eGFR: 91    Ca    9.4      09-05  Mg     2.00     09-05  Phos  2.7     09-05    TPro  9.8<H>  /  Alb  3.4  /  TBili  0.7  /  DBili  x   /  AST  19  /  ALT  9   /  AlkPhos  31<L>  09-05      A1C with Estimated Average Glucose Result: 4.5 % (08-31-22 @ 03:48)      Thyroid Stimulating Hormone, Serum: 1.42 uIU/mL (08-31-22 @ 03:48)  Thyroid Stimulating Hormone, Serum: 3.06 uIU/mL (08-29-22 @ 22:52)

## 2022-09-05 NOTE — PROGRESS NOTE ADULT - PROBLEM SELECTOR PLAN 2
-also with hypoxemic respiratory failure  -pCO2 improving  -will place on AVAPS intermittently as needed; currently saturating well on 4L NC, being weaned off O2  -CXR with concern for L pleural effusion, however, as per CT, changes are largely attributed to pt's lymphoma. No need for thora, as per pulm  -IVF d/john due to concern for pulmonary edema leading to hypoxia

## 2022-09-05 NOTE — PROGRESS NOTE ADULT - PROBLEM SELECTOR PLAN 7
-Hx of bipolar w/psych admission @ [place] many years ago  -non-compliant with medications  -F/u Psych recommendations  -Supportive care

## 2022-09-05 NOTE — PROGRESS NOTE ADULT - ASSESSMENT
77 year old female with PMH of HLD, lymphoma (dx 2005, s/p CARRIE lobectomy, relapsed in 2019 but not currently on chemo/RT, [place] [facility] [place] bipolar disorder, asthma, who presented to the hospital for AMS and hypoxic respiratory failure, found to have hypercalcemia.     #Hypercalcemia, likely malignancy-related elevated 1,25D (lymphoma) vs medication induced (lithium induced parathyroidism) vs in the setting of potential multiple myeloma transformation   - total protein of 12.0, Ca corrected now 11.8 (down from 11.9 day prior) Albumin 2.0. Continue to trend Calcium and albumin levels via CMP.  - s/p approximately 1.9 L of IVF (1L of LR on 8/29, 75 cc/hr of NS x 12 hours on 8/30). Currently off of IVF due to overload. s/p Lasix 20 mg IV in the AM (8/31/22).  Continue to hold Lithium. Encourage PO intake if possible. Avoid medications or supplements that will increase serum Ca.   - intact PTH level of 25, Vitamin D 25-hydroxy of 18.4 (low). Still waiting for results on PTH-related peptide, Vitamin D 1,25-hydroxy level, serum immunofixation assay.   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   - s/p 4 doses of IV Calcitonin. Follow with repeat serum Calcium levels.   - s/p pamidronate 60 mg IV x 1 (given recent EMILY), Doses of pamidronate should not be repeated sooner than a minimum of seven days. Monitor for response (peak effect of pamidronate may be seen at the 72 hour jose).   - 9/5 Tiana Ca 9.9  PLAN  - pt now with mild hypercalcemia, recommend oral intake and monitoring Ca and albumin levels. Light intravenous hydration per primary team as patient tolerates, no need for additional pharmacological intervention at this time.   - F/u rest of remaining lab workup, (1,25-hydroxy), Serum immunofixation assay, PTH-related peptide levels etc.     #history of fracture in the setting of likely osteoporosis;.   - Imaging findings of Old right humeral neck fracture. Left proximal humeral fracture s/p ORIF  - f/u outpatient bone-density testing.   - 25-hydroxy Vitamin D level of 18.4 (low), f/u 1,25 hydroxy level. Patient may benefit from future Vitamin D supplementation pending results from the rest of workup.     #Hyperlipidemia  - history of hyperlipidemia   - Cholesterol of 54, Triglycerides of 30, Serum HDL of 21, LDL of 27. A1C of 4.5   - can hold on statin therapy at this time due to age and risk factors at this time   - home medication history of zyprexa.     Discussed with primary team.     Thank you for the interesting consult.    Beltran Cooper MD, Endocrinology Fellow  Pager [phone] from 9am to 5pm. After hours and on weekends, please call [phone].

## 2022-09-05 NOTE — PROGRESS NOTE ADULT - ASSESSMENT
77 year old female with PMH of lymphoma (dx 2005, s/p CARRIE lobectomy, relapsed in 2019 but not currently on chemo/RT, being followed by heme every 3-6months at [facility] [place]), bipolar disorder, asthma (on O2 PRN at home, unknown how many L) brought to ED by daughter who states pt has been hallucinating, being more depressed.  Patient unable to provide much history, daughter Alissa states that patient was able to care for herself, manage home finances and attend doctor appointments independently until recently, she states that she believes her mom stopped taking her psychiatric medications in February of 2022 and has not followed up with any of her physicians. She has noticed recent weight loss (unsure how much) and change in voice.   Admitted to medicine service for further evaluation & treatment. Course c/b acute hypercapnic and hypoxic respiratory failure requiring BIPAP and AVAPS, on which she remains.  Now with concern for hyperviscosity syndrome given inability to even obtain immunoglobulin panel due to serum viscosity. Mental status is improving; pt is now alert although still confused.

## 2022-09-05 NOTE — PROGRESS NOTE ADULT - PROBLEM SELECTOR PLAN 1
-Likely multi-factorial: UTI, hypercapnia, hypercalcemia, psychiatric disorder,  and now with greater concern for hyperviscosity syndrome given inability to even obtain immunoglobulin panel due to degree of hyperviscosity  -serum viscosity level sent and pending  -previous attending spoke to hematology; recommend apheresis due to concern for hyperviscosity. S/p Shiley placement and plasmapheresis done on 9/2  - Hem recs appreciated  -daughter thinks patient stopped taking her psychiatric medication in february

## 2022-09-05 NOTE — PROGRESS NOTE ADULT - PROBLEM SELECTOR PLAN 4
-Corrected calcium 13.6 upon admission  -likely due to underlying malignancy, lymphoma vs new MM. F/u vitamin D 25 and 1, 25, PTHRP, MM labs. PTH is inappropriately normal  -s/p pamidronate on 8/30; now s/p 72 hours with Ca improved to approximately 11.8.   -calcitonin x 4 doses  -unfortunately, will hold off fluids for now given pulmonary edema that developed with IVF upon admission, which later required need for Lasix IV and AVAPS  -will speak to endocrine regarding need for Xgeva given ongoing hypercalcemia  -Apprec Endocrine recs-corrected calcium 9.9 today  - continue to encourage PO intake  - f/u vitamin D 1,25 and PTHrP

## 2022-09-05 NOTE — PROGRESS NOTE ADULT - ASSESSMENT
MANDI GASPAR is a 77y Female who presents with a chief complaint of respiratory failure.    Marginal Zone Lymphoma  - Patient treated previously at [facility]; was on rituximab + bendamustine last in August 2020, and since then has been on surveillance, but lost to follow-up since July 2021.  - CT chest concerning for slight progression of disease.  - Unable to obtain IgM level due to hyperviscosity. Noted elevated lambda light chain compared to kappa similar to values in early 2020.  - Immunofixation with IgM lambda monoclonal protein as seen previously during admission in 2020. M-spike was elevated at the time, but bone marrow biopsy did not show multiple myeloma; may consider repeating after plasmapheresis to assess for progression. SPEP pending.   - Will continue discussion with [facility].   - Depending on goals of care as well, may need additional therapy such as repeat rituximab in addition to plasmapheresis if patient has improvement on plasmapheresis.  post plasmapharesis procedure 9/2    Altered Mental Status  - Likely multifactorial with hyperviscosity of blood, hypercalcemia, urinary tract infection.  - Recommending plasmapheresis for hyperviscosity. IR for Shiley placement today   - F/u immunoglobulins panel, serum viscosity   - On ceftriaxone for UTI  - Endocrinology following; pamidronate and calcitonin administered. Continue to monitor calcium levels closely.    Hypoxia  - On AVAPS with breaks using nasal cannula as tolerated  - Pulmonology following     Hypotenision   -improved with fluid bolus    Chidi Tabares MD  [facility]  Cell: [phone]      We will be happy to answer any onc questions on behalf of [facility] and will be in touch with them.

## 2022-09-06 NOTE — PROGRESS NOTE ADULT - ASSESSMENT
77 year old female with PMH of lymphoma (dx 2005, s/p CARRIE lobectomy, relapsed in 2019 but not currently on chemo/RT, being followed by heme every 3-6months at [facility] [place] bipolar disorder, asthma (on O2 PRN at home, unknown how many L) brought to ED by daughter who states pt has been hallucinating, being more depressed.  Patient unable to provide much history, daughter Alissa states that patient was able to care for herself, manage home finances and attend doctor appointments independently until recently, she states that she believes her mom stopped taking her psychiatric medications in February of 2022 and has not followed up with any of her physicians. She has noticed recent weight loss (unsure how much) and change in voice.   Admitted to medicine service for further evaluation & treatment. Course c/b acute hypercapnic and hypoxic respiratory failure requiring BIPAP and AVAPS.  Now with concern for hyperviscosity syndrome given inability to even obtain immunoglobulin panel due to serum viscosity. Mental status is improving; pt is now alert although still confused.

## 2022-09-06 NOTE — PROGRESS NOTE ADULT - ASSESSMENT
77 year old female with PMH of HLD, lymphoma (dx 2005, s/p CARRIE lobectomy, relapsed in 2019 but not currently on chemo/RT, being [place] [facility] [place] bipolar disorder, asthma, who presented to the hospital for AMS and hypoxic respiratory failure, found to have hypercalcemia.     #Hypercalcemia, likely malignancy-related elevated 1,25D (lymphoma) vs medication induced (lithium induced parathyroidism) vs in the setting of potential multiple myeloma transformation   - total protein of 12.0, intact PTH level of 25, Vitamin D 25-hydroxy of 18.4 (low).   serum immunofixation assay - monoclonal IgM lambda protein, hematology service is following.  - s/p 4 doses of IV Calcitonin. Follow with repeat serum Calcium levels.   - s/p pamidronate 60 mg IV x 1 (given recent EMILY), Doses of pamidronate should not be repeated sooner than a minimum of seven days.  - Given PTH level, lithium-induced hyperparathyroidism less likely etiology of hypercalcemia at this moment.   -Corrected calcium 10.3 today upper normal range, continue to monitor without additional therapy. Optimize oral hydration.  -Still waiting for results on PTH-related peptide, Vitamin D 1,25-hydroxy level      #history of fracture in the setting of likely osteoporosis;.   - Imaging findings of Old right humeral neck fracture. Left proximal humeral fracture s/p ORIF  Thais Tovar MD  Division of Endocrinology  Pager: [phone]    If after 6PM or before 9AM, or on weekends/holidays, please call endocrine answering service for assistance ([phone]).  For nonurgent matters email [email or web address] for assistance.- f/u outpatient bone-density testing.   - 25-hydroxy Vitamin D level of 18.4 (low), f/u 1,25 hydroxy level. Patient may benefit from future Vitamin D supplementation pending results from the rest of workup.     #Hyperlipidemia  - history of hyperlipidemia   - Cholesterol of 54, Triglycerides of 30, Serum HDL of 21, LDL of 27. A1C of 4.5   - can hold on statin therapy at this time due to age and risk factors at this time   - home medication history of zyprexa.     #Hypoglycemia - unclear if meeting whipple's triad  glucose 68 today  likely poor po intake   Appreciate RD consult  trend glucose  encourage po intake/assistance

## 2022-09-06 NOTE — PROGRESS NOTE ADULT - PROBLEM SELECTOR PLAN 3
-Patient with +UA. Urine cx growing E coli, pan-sensitive  -Unclear if experiencing dysuria as patient is not answering appropriately  -S/P CTX in ED  - CTX x3 days (8/31-9/2) for treatment of acute cystitis

## 2022-09-06 NOTE — PROGRESS NOTE ADULT - SUBJECTIVE AND OBJECTIVE BOX
CHIEF COMPLAINT:    Interval Events:    Pt seen and examined at bedside. REceived plasmapharesis over the weekend.       OBJECTIVE:  ICU Vital Signs Last 24 Hrs  T(C): 36.9 (06 Sep 2022 05:15), Max: 37.1 (05 Sep 2022 13:17)  T(F): 98.4 (06 Sep 2022 05:15), Max: 98.7 (05 Sep 2022 13:17)  HR: 89 (06 Sep 2022 05:15) (89 - 97)  BP: 110/71 (06 Sep 2022 05:15) (104/56 - 112/62)  BP(mean): --  ABP: --  ABP(mean): --  RR: 18 (06 Sep 2022 05:15) (18 - 18)  SpO2: 98% (06 Sep 2022 05:15) (98% - 99%)    O2 Parameters below as of 06 Sep 2022 05:15  Patient On (Oxygen Delivery Method): nasal cannula  O2 Flow (L/min): 4            CAPILLARY BLOOD GLUCOSE      POCT Blood Glucose.: 82 mg/dL (06 Sep 2022 05:52)    General: Awake, alert, oriented X 0   HEENT: Atraumatic, normocephalic.                  No tonsillar or pharyngeal exudates.  Lymph Nodes: No palpable lymphadenopathy  Neck: No JVD. No carotid bruit.   Respiratory: decreased breath sounds L > R,   Cardiovascular: S1 S2 normal. No murmurs, rubs or gallops.   Abdomen: Soft, non-tender, non-distended. No organomegaly.  Extremities: Warm to touch. Peripheral pulse palpable. No pedal edema.   Neurological: Non-focal    HOSPITAL MEDICATIONS:  MEDICATIONS  (STANDING):  albumin human  5% IVPB 2500 milliLiter(s) IV Intermittent once  albuterol/ipratropium for Nebulization 3 milliLiter(s) Nebulizer every 12 hours  budesonide  80 MICROgram(s)/formoterol 4.5 MICROgram(s) Inhaler 2 Puff(s) Inhalation two times a day  calcium gluconate IVPB 2 Gram(s) IV Intermittent once  chlorhexidine 2% Cloths 1 Application(s) Topical daily  heparin   Injectable 5000 Unit(s) SubCutaneous every 12 hours  OLANZapine 2.5 milliGRAM(s) Oral at bedtime  thiamine IVPB 500 milliGRAM(s) IV Intermittent three times a day    MEDICATIONS  (PRN):  acetaminophen     Tablet .. 650 milliGRAM(s) Oral every 6 hours PRN Temp greater or equal to 38C (100.4F), Mild Pain (1 - 3)  ALBUTerol    90 MICROgram(s) HFA Inhaler 2 Puff(s) Inhalation every 6 hours PRN Shortness of Breath and/or Wheezing  aluminum hydroxide/magnesium hydroxide/simethicone Suspension 30 milliLiter(s) Oral every 4 hours PRN Dyspepsia  melatonin 3 milliGRAM(s) Oral at bedtime PRN Insomnia  ondansetron Injectable 4 milliGRAM(s) IV Push every 8 hours PRN Nausea and/or Vomiting      LABS:                        9.3    5.86  )-----------( 122      ( 05 Sep 2022 06:30 )             32.1     09-05    142  |  102  |  12  ----------------------------<  90  4.9   |  31  |  0.67    Ca    9.4      05 Sep 2022 06:30  Phos  2.7     09-05  Mg     2.00     09-05    TPro  9.8<H>  /  Alb  3.4  /  TBili  0.7  /  DBili  x   /  AST  19  /  ALT  9   /  AlkPhos  31<L>  09-05              MICROBIOLOGY:     RADIOLOGY:  [ ] Reviewed and interpreted by me    Point of Care Ultrasound Findings:    PFT:    EKG:

## 2022-09-06 NOTE — PROGRESS NOTE ADULT - ASSESSMENT
77F with hx of lymphoma (dx 2005, s/p CARRIE lobectomy, relapsed 2019, currently off chemo/RT) BPD, asthma admitted for metabolic encephalopathy 2/2 UTI/hypercalcemia with course now complicated by hypoxic/hypercapnic respiratory failure. Pulmonary consulted for c/f pleural effusion on CXR.     #Hypoxic/Hypercapnic respiratory failure  #Metabolic Encephalopathy  #Lymphoma  #MGUS   #Trace pleural Effusion  #Asthma  #Hyperviscosity Syndrome     Imaging reviewed. Opacity on CXR is likely soft tissue mass (lymphoma) as seen on CT Chest. Patient has small pleural effusion which would not be amenable to thoracentesis.     INCOMPLETE NOTE  77F with hx of lymphoma (dx 2005, s/p CARRIE lobectomy, relapsed 2019, currently off chemo/RT) BPD, asthma admitted for metabolic encephalopathy 2/2 UTI/hypercalcemia with course now complicated by hypoxic/hypercapnic respiratory failure. Pulmonary consulted for c/f pleural effusion on CXR.     #Hypoxic/Hypercapnic respiratory failure  #Metabolic Encephalopathy  #Lymphoma  #MGUS   #Trace pleural Effusion  #Asthma  #Hyperviscosity Syndrome     Imaging reviewed. Opacity on CXR is likely soft tissue mass (lymphoma) as seen on CT Chest. Patient has small pleural effusion which would not be amenable to thoracentesis.    - Patient currently off AVAPS from the weekend. Improved mental status. Please check ABG in the morning if continuing off AVAPS.

## 2022-09-06 NOTE — DIETITIAN INITIAL EVALUATION ADULT - PERTINENT LABORATORY DATA
09-06    141  |  100  |  14  ----------------------------<  71  5.0   |  31  |  0.63    Ca    9.8      06 Sep 2022 07:32  Phos  2.2     09-06  Mg     2.00     09-06    TPro  10.5<H>  /  Alb  3.4  /  TBili  0.8  /  DBili  x   /  AST  20  /  ALT  15  /  AlkPhos  40  09-06  POCT Blood Glucose.: 136 mg/dL (09-06-22 @ 12:18)  A1C with Estimated Average Glucose Result: 4.5 % (08-31-22 @ 03:48)

## 2022-09-06 NOTE — PROGRESS NOTE ADULT - PROBLEM SELECTOR PLAN 7
-Hx of bipolar w/psych admission @ [place] many years ago  -non-compliant with medications  -F/u Psych recommendations  -Supportive care

## 2022-09-06 NOTE — PROGRESS NOTE ADULT - SUBJECTIVE AND OBJECTIVE BOX
Chief Complaint: Hypercalcemia    History: low glucose 68 noted today, per PCA at bedside patient was given treatment for this.  Patient minimally answering questions  Does not report any hypoglycemia symptoms but poor historian.    MEDICATIONS  (STANDING):  albumin human  5% IVPB 2500 milliLiter(s) IV Intermittent once  albuterol/ipratropium for Nebulization 3 milliLiter(s) Nebulizer every 12 hours  budesonide  80 MICROgram(s)/formoterol 4.5 MICROgram(s) Inhaler 2 Puff(s) Inhalation two times a day  calcium gluconate IVPB 2 Gram(s) IV Intermittent once  chlorhexidine 2% Cloths 1 Application(s) Topical daily  heparin   Injectable 5000 Unit(s) SubCutaneous every 12 hours  megestrol 40 milliGRAM(s) Oral daily  multivitamin 1 Tablet(s) Oral daily  OLANZapine 2.5 milliGRAM(s) Oral at bedtime  potassium phosphate / sodium phosphate Powder (PHOS-NaK) 1 Packet(s) Oral once  thiamine IVPB 500 milliGRAM(s) IV Intermittent three times a day    MEDICATIONS  (PRN):  acetaminophen     Tablet .. 650 milliGRAM(s) Oral every 6 hours PRN Temp greater or equal to 38C (100.4F), Mild Pain (1 - 3)  ALBUTerol    90 MICROgram(s) HFA Inhaler 2 Puff(s) Inhalation every 6 hours PRN Shortness of Breath and/or Wheezing  aluminum hydroxide/magnesium hydroxide/simethicone Suspension 30 milliLiter(s) Oral every 4 hours PRN Dyspepsia  melatonin 3 milliGRAM(s) Oral at bedtime PRN Insomnia  ondansetron Injectable 4 milliGRAM(s) IV Push every 8 hours PRN Nausea and/or Vomiting      Allergies    latex (Rash)  No Known Drug Allergies    Intolerances      Review of Systems:  UNABLE TO OBTAIN    PHYSICAL EXAM:  VITALS: T(C): 36.4 (09-06-22 @ 13:15)  T(F): 97.6 (09-06-22 @ 13:15), Max: 98.6 (09-05-22 @ 20:42)  HR: 105 (09-06-22 @ 13:15) (89 - 105)  BP: 106/65 (09-06-22 @ 13:15) (101/61 - 112/62)  RR:  (16 - 18)  SpO2:  (89% - 99%)  Wt(kg): --  GENERAL: NAD, arousable but minimally answering questions  EYES: No proptosis,  anicteric  HEENT:  Atraumatic, Normocephalic, dry mucous membranes  RESPIRATORY: nonlabored respirations, no wheezing    CAPILLARY BLOOD GLUCOSE      POCT Blood Glucose.: 120 mg/dL (06 Sep 2022 16:31)  POCT Blood Glucose.: 136 mg/dL (06 Sep 2022 12:18)  POCT Blood Glucose.: 94 mg/dL (06 Sep 2022 12:02)  POCT Blood Glucose.: 93 mg/dL (06 Sep 2022 11:46)  POCT Blood Glucose.: 89 mg/dL (06 Sep 2022 11:33)  POCT Blood Glucose.: 68 mg/dL (06 Sep 2022 11:19)  POCT Blood Glucose.: 68 mg/dL (06 Sep 2022 11:14)  POCT Blood Glucose.: 82 mg/dL (06 Sep 2022 05:52)  POCT Blood Glucose.: 98 mg/dL (05 Sep 2022 22:11)      09-06    141  |  100  |  14  ----------------------------<  71  5.0   |  31  |  0.63    eGFR: 92    Ca    9.8      09-06  Mg     2.00     09-06  Phos  2.2     09-06    TPro  10.5<H>  /  Alb  3.4  /  TBili  0.8  /  DBili  x   /  AST  20  /  ALT  15  /  AlkPhos  40  09-06      A1C with Estimated Average Glucose Result: 4.5 % (08-31-22 @ 03:48)      Thyroid Function Tests:  08-31 @ 03:48 TSH 1.42 FreeT4 -- T3 -- Anti TPO -- Anti Thyroglobulin Ab -- TSI --  08-29 @ 22:52 TSH 3.06 FreeT4 -- T3 -- Anti TPO -- Anti Thyroglobulin Ab -- TSI --

## 2022-09-06 NOTE — PROGRESS NOTE ADULT - SUBJECTIVE AND OBJECTIVE BOX
Patient is a 77y old  Female who presents with a chief complaint of Hypoxic respiratory failure (06 Sep 2022 08:14)    Patient seen and examined at bedside.    MEDICATIONS  (STANDING):  albumin human  5% IVPB 2500 milliLiter(s) IV Intermittent once  albuterol/ipratropium for Nebulization 3 milliLiter(s) Nebulizer every 12 hours  budesonide  80 MICROgram(s)/formoterol 4.5 MICROgram(s) Inhaler 2 Puff(s) Inhalation two times a day  calcium gluconate IVPB 2 Gram(s) IV Intermittent once  chlorhexidine 2% Cloths 1 Application(s) Topical daily  heparin   Injectable 5000 Unit(s) SubCutaneous every 12 hours  OLANZapine 2.5 milliGRAM(s) Oral at bedtime  potassium phosphate / sodium phosphate Powder (PHOS-NaK) 1 Packet(s) Oral once  thiamine IVPB 500 milliGRAM(s) IV Intermittent three times a day    MEDICATIONS  (PRN):  acetaminophen     Tablet .. 650 milliGRAM(s) Oral every 6 hours PRN Temp greater or equal to 38C (100.4F), Mild Pain (1 - 3)  ALBUTerol    90 MICROgram(s) HFA Inhaler 2 Puff(s) Inhalation every 6 hours PRN Shortness of Breath and/or Wheezing  aluminum hydroxide/magnesium hydroxide/simethicone Suspension 30 milliLiter(s) Oral every 4 hours PRN Dyspepsia  melatonin 3 milliGRAM(s) Oral at bedtime PRN Insomnia  ondansetron Injectable 4 milliGRAM(s) IV Push every 8 hours PRN Nausea and/or Vomiting      Vital Signs Last 24 Hrs  T(C): 36.4 (06 Sep 2022 09:15), Max: 37 (05 Sep 2022 20:42)  T(F): 97.5 (06 Sep 2022 09:15), Max: 98.6 (05 Sep 2022 20:42)  HR: 98 (06 Sep 2022 09:15) (89 - 98)  BP: 101/61 (06 Sep 2022 09:15) (101/61 - 112/62)  BP(mean): --  RR: 16 (06 Sep 2022 09:15) (16 - 18)  SpO2: 98% (06 Sep 2022 09:15) (89% - 99%)    Parameters below as of 06 Sep 2022 09:15  Patient On (Oxygen Delivery Method): nasal cannula  O2 Flow (L/min): 4      PE  NAD  Awake, alert  Anicteric, MMM  No c/c/e  No rash grossly  FROM                          9.3    5.86  )-----------( 122      ( 05 Sep 2022 06:30 )             32.1       09-06    141  |  100  |  14  ----------------------------<  71  5.0   |  31  |  0.63    Ca    9.8      06 Sep 2022 07:32  Phos  2.2     09-06  Mg     2.00     09-06    TPro  10.5<H>  /  Alb  3.4  /  TBili  0.8  /  DBili  x   /  AST  20  /  ALT  15  /  AlkPhos  40  09-06

## 2022-09-06 NOTE — PROGRESS NOTE ADULT - PROBLEM SELECTOR PLAN 1
-Likely multi-factorial: UTI, hypercapnia, hypercalcemia, psychiatric disorder,  and now with greater concern for hyperviscosity syndrome given inability to even obtain immunoglobulin panel due to degree of hyperviscosity  -serum viscosity level sent and pending  -previous attending spoke to hematology; recommend apheresis due to concern for hyperviscosity. S/p Shiley placement and plasmapheresis done on 9/2  - Hem recs appreciated  -daughter thinks patient stopped taking her psychiatric medication in february  Poor PO intake- nutrition consulted , ensure added to diet

## 2022-09-06 NOTE — PROGRESS NOTE ADULT - ASSESSMENT
MANDI GASPAR is a 77y Female who presents with a chief complaint of respiratory failure.    Marginal Zone Lymphoma  - Patient treated previously at [facility]; was on rituximab + bendamustine last in August 2020, and since then has been on surveillance, but lost to follow-up since July 2021.  - CT chest concerning for slight progression of disease.  - Unable to obtain IgM level due to hyperviscosity. Noted elevated lambda light chain compared to kappa similar to values in early 2020.  - Immunofixation with IgM lambda monoclonal protein as seen previously during admission in 2020. M-spike was elevated at the time, but bone marrow biopsy did not show multiple myeloma; may consider repeating after plasmapheresis to assess for progression.  - Will continue discussion with [facility].   - IgA 23, IgM 7356, IgG 307  - Depending on goals of care as well, may need additional therapy such as repeat rituximab in addition to plasmapheresis if patient has improvement on plasmapheresis. S/p plasmapheresis procedure 9/2    Altered Mental Status  - Likely multifactorial with hyperviscosity of blood, hypercalcemia, urinary tract infection.  - S/p Shiley placement  - Completed antibiotics  - Endocrinology following. Continue to monitor calcium levels closely.    Hypoxia  - Currently on nasal cannula   - Pulmonology following     Hypotension   - improved with fluid bolus    Bari Landry PA-C  Hematology/Oncology  [facility]  [phone] (office)  [phone] (alt office)  Evenings and weekends please call MD on call or office

## 2022-09-06 NOTE — PROGRESS NOTE ADULT - SUBJECTIVE AND OBJECTIVE BOX
[facility] Division of Hospital Medicine  Barbie Brooks MD  Hospitalist   Pager [phone]  Avaliable via MS teams     77y old  Female who presents with a chief complaint of Hypoxic respiratory failure (05 Sep 2022 13:25)      INTERVAL HPI/OVERNIGHT EVENTS:  Pt seen and examined. Aox1 , sleepy but answering questions with verbal stimuli. Episode of hypoglycemia this morning- improved with juice.     SUBJECTIVE / OVERNIGHT EVENTS:    ADDITIONAL REVIEW OF SYSTEMS:    MEDICATIONS  (STANDING):  albumin human  5% IVPB 2500 milliLiter(s) IV Intermittent once  albuterol/ipratropium for Nebulization 3 milliLiter(s) Nebulizer every 12 hours  budesonide  80 MICROgram(s)/formoterol 4.5 MICROgram(s) Inhaler 2 Puff(s) Inhalation two times a day  calcium gluconate IVPB 2 Gram(s) IV Intermittent once  chlorhexidine 2% Cloths 1 Application(s) Topical daily  heparin   Injectable 5000 Unit(s) SubCutaneous every 12 hours  OLANZapine 2.5 milliGRAM(s) Oral at bedtime  potassium phosphate / sodium phosphate Powder (PHOS-NaK) 1 Packet(s) Oral once  thiamine IVPB 500 milliGRAM(s) IV Intermittent three times a day    MEDICATIONS  (PRN):  acetaminophen     Tablet .. 650 milliGRAM(s) Oral every 6 hours PRN Temp greater or equal to 38C (100.4F), Mild Pain (1 - 3)  ALBUTerol    90 MICROgram(s) HFA Inhaler 2 Puff(s) Inhalation every 6 hours PRN Shortness of Breath and/or Wheezing  aluminum hydroxide/magnesium hydroxide/simethicone Suspension 30 milliLiter(s) Oral every 4 hours PRN Dyspepsia  melatonin 3 milliGRAM(s) Oral at bedtime PRN Insomnia  ondansetron Injectable 4 milliGRAM(s) IV Push every 8 hours PRN Nausea and/or Vomiting      I&O's Summary      PHYSICAL EXAM:  Vital Signs Last 24 Hrs  T(C): 36.4 (06 Sep 2022 09:15), Max: 37 (05 Sep 2022 20:42)  T(F): 97.5 (06 Sep 2022 09:15), Max: 98.6 (05 Sep 2022 20:42)  HR: 98 (06 Sep 2022 09:15) (89 - 98)  BP: 101/61 (06 Sep 2022 09:15) (101/61 - 112/62)  BP(mean): --  RR: 16 (06 Sep 2022 09:15) (16 - 18)  SpO2: 98% (06 Sep 2022 09:15) (89% - 99%)    Parameters below as of 06 Sep 2022 09:15  Patient On (Oxygen Delivery Method): nasal cannula  O2 Flow (L/min): 4    CONSTITUTIONAL: NAD, well-developed  EYES: PERRLA; conjunctiva and sclera clear  ENMT: Moist oral mucosa, no pharyngeal injection or exudates; normal dentition  NECK: Supple, no palpable masses; no thyromegaly  RESPIRATORY: Normal respiratory effort; lungs are clear to auscultation bilaterally  CARDIOVASCULAR: Regular rate and rhythm, normal S1 and S2, no murmur/rub/gallop;  ABDOMEN: Nontender to palpation, normoactive bowel sounds, no rebound/guarding; No hepatosplenomegaly  MUSCULOSKELETAL:  Normal gait; no clubbing or cyanosis of digits; no joint swelling or tenderness to palpation  PSYCH: A+O x1  NEUROLOGY: CN 2-12 are intact and symmetric; no gross sensory deficits   SKIN: No rashes; no palpable lesions    LABS:                        9.3    5.86  )-----------( 122      ( 05 Sep 2022 06:30 )             32.1     09-06    141  |  100  |  14  ----------------------------<  71  5.0   |  31  |  0.63    Ca    9.8      06 Sep 2022 07:32  Phos  2.2     09-06  Mg     2.00     09-06    TPro  10.5<H>  /  Alb  3.4  /  TBili  0.8  /  DBili  x   /  AST  20  /  ALT  15  /  AlkPhos  40  09-06          COVID-19 PCR: NotDetec (05 Sep 2022 06:43)

## 2022-09-06 NOTE — PROGRESS NOTE ADULT - ATTENDING COMMENTS
77 year old female with history of lymphoma with acute on chronic hypoxic/hypercapnic respiratory failure.     AVAPS was discontinued over the weekend. Appears to be doing well. At this time would suggest getting an ABG in the morning to ensure that the patient does not continue to have persistent hypercapnic respiratory failure. Continue supplemental oxygen as needed.     Medical management per primary team.

## 2022-09-06 NOTE — DIETITIAN INITIAL EVALUATION ADULT - NS FNS DIET ORDER
Pureed: DASH/TLC {Sodium & Cholesterol Restricted} (DASH)  Supplement Feeding Modality:  Oral Ensure Enlive Cans or Servings Per Day:  1       Frequency:  Two Times a day (09-06-22 @ 10:01)

## 2022-09-06 NOTE — DIETITIAN INITIAL EVALUATION ADULT - ADD RECOMMEND
1. Encourage & assist Pt with meals; Monitor PO diet tolerance;  2. Add appetite stimulant to boost Pt's PO intake/appetite;  3. Add Multivitamins 1 tab daily for micronutrient coverage;   4. Monitor labs, hydration status;

## 2022-09-06 NOTE — PROGRESS NOTE ADULT - PROBLEM SELECTOR PLAN 5
-Follows w/Dr. Real Cano @ [facility]  -received treatment recently per daughter however she doesn't know specifics  -with progression of disease  -hematology to speak to [facility] regarding if further treatment is suggested  -Hem recs appreciated

## 2022-09-06 NOTE — DIETITIAN INITIAL EVALUATION ADULT - OTHER INFO
Pt 80 yo female with PMHx of lymphoma (dx 2005, s/p CARRIE lobectomy, being followed by heme every 3-6months at [facility]), bipolar disorder, asthma presented by her daughter 2/2 Pt hallucinating & more depressed.    At time of visit, Pt asleep; untouched breakfast tray at bed side. Nurse not sure about Pt's appetite/PO intake at present. Nurse will try to fed later when Pt wakes up reported. No report of nausea, vomiting or diarrhea @ this time. No report of pressure injury. Of note, Pt passed Swallow Bedside Assessment Adult, SLP rec: Pureed (Pt's preferences) with thin liquids (8/30). Case discussed with nurse, RDN remains available, nurse made aware.

## 2022-09-06 NOTE — PROGRESS NOTE ADULT - PROBLEM SELECTOR PLAN 2
-also with hypoxemic respiratory failure  -pCO2 improving  -was off AVAPS over the weekend, will continue AVAPS intermittently as needed and QHS; currently saturating well on 4L NC, being weaned off O2  -CXR with concern for L pleural effusion, however, as per CT, changes are largely attributed to pt's lymphoma. No need for thora, as per pulm  -IVF d/john due to concern for pulmonary edema leading to hypoxia

## 2022-09-07 NOTE — PROGRESS NOTE ADULT - PROBLEM SELECTOR PLAN 2
-also with hypoxemic respiratory failure  -pCO2 improving  -was off AVAPS over the weekend, restarted on 9/7/22 due to hypercapnia, transition to AVAPS only QHS, NC during the day  -CXR with concern for L pleural effusion, however, as per CT, changes are largely attributed to pt's lymphoma. No need for thora, as per pulm  -IVF d/john due to concern for pulmonary edema leading to hypoxia

## 2022-09-07 NOTE — PROGRESS NOTE ADULT - ATTENDING COMMENTS
77 year old female with history of lymphoma with acute on chronic hypoxic/hypercapnic respiratory failure.     AVAPS was discontinued over the weekend. ABG in the morning showed respiratory acidosis without appropriate metabolic compensation. Placed back on AVAPS. Please get repeat ABG. If compensated, then can discontinue AVAPS during the day and placed AVAPS QHS.     Medical management per primary team.      Will continue to follow.

## 2022-09-07 NOTE — CHART NOTE - NSCHARTNOTEFT_GEN_A_CORE
Notified by RN that patient is unable to tolerate AVPAS. Pulmonary notes reviewed, as per pulmonary patient can be off AVAPS and do ABG in AM to evaluate hypercapnia. Patient is currently on 2 LNC maintaining 100%.

## 2022-09-07 NOTE — PROGRESS NOTE ADULT - ASSESSMENT
77 year old female with PMH of lymphoma (dx 2005, s/p CARRIE lobectomy, relapsed in 2019 but not currently on chemo/RT, being followed by heme every 3-6months at [facility]), bipolar disorder, asthma (on O2 PRN at home, unknown how many L) brought to ED by daughter who states pt has been hallucinating, being more depressed.  Patient unable to provide much history, daughter Alissa states that patient was able to care for herself, manage home finances and attend doctor appointments independently until recently, she states that she believes her mom stopped taking her psychiatric medications in February of 2022 and has not followed up with any of her physicians. She has noticed recent weight loss (unsure how much) and change in voice.   Admitted to medicine service for further evaluation & treatment. Course c/b acute hypercapnic and hypoxic respiratory failure requiring BIPAP and AVAPS.  Now with concern for hyperviscosity syndrome given inability to even obtain immunoglobulin panel due to serum viscosity. Mental status is improving; pt is now alert although still confused.  (1) pregnancy or postpartum (now or within 1 month)

## 2022-09-07 NOTE — PROGRESS NOTE ADULT - SUBJECTIVE AND OBJECTIVE BOX
CHIEF COMPLAINT:    Interval Events:    Pt seen and examined at bedside. Was started on avaps this morning.     REVIEW OF SYSTEMS:  unable to assess     OBJECTIVE:  ICU Vital Signs Last 24 Hrs  T(C): 37.1 (07 Sep 2022 05:30), Max: 37.1 (07 Sep 2022 05:30)  T(F): 98.8 (07 Sep 2022 05:30), Max: 98.8 (07 Sep 2022 05:30)  HR: 103 (07 Sep 2022 07:52) (68 - 108)  BP: 121/76 (07 Sep 2022 05:30) (102/58 - 121/76)  BP(mean): --  ABP: --  ABP(mean): --  RR: 18 (07 Sep 2022 05:30) (16 - 18)  SpO2: 98% (07 Sep 2022 07:52) (95% - 100%)    O2 Parameters below as of 07 Sep 2022 05:30  Patient On (Oxygen Delivery Method): nasal cannula  O2 Flow (L/min): 2        Mode: standby    CAPILLARY BLOOD GLUCOSE      POCT Blood Glucose.: 100 mg/dL (07 Sep 2022 07:43)      General: Awake, alert, oriented X 0   HEENT: Atraumatic, normocephalic.                  No tonsillar or pharyngeal exudates.  Lymph Nodes: No palpable lymphadenopathy  Neck: No JVD. No carotid bruit.   Respiratory: decreased breath sounds L > R,   Cardiovascular: S1 S2 normal. No murmurs, rubs or gallops.   Abdomen: Soft, non-tender, non-distended. No organomegaly.  Extremities: Warm to touch. Peripheral pulse palpable. No pedal edema.   Neurological: Non-focal      HOSPITAL MEDICATIONS:  MEDICATIONS  (STANDING):  albumin human  5% IVPB 2500 milliLiter(s) IV Intermittent once  albuterol/ipratropium for Nebulization 3 milliLiter(s) Nebulizer every 12 hours  budesonide  80 MICROgram(s)/formoterol 4.5 MICROgram(s) Inhaler 2 Puff(s) Inhalation two times a day  calcium gluconate IVPB 2 Gram(s) IV Intermittent once  chlorhexidine 2% Cloths 1 Application(s) Topical daily  heparin   Injectable 5000 Unit(s) SubCutaneous every 12 hours  multivitamin 1 Tablet(s) Oral daily  OLANZapine 2.5 milliGRAM(s) Oral at bedtime  thiamine IVPB 500 milliGRAM(s) IV Intermittent three times a day    MEDICATIONS  (PRN):  acetaminophen     Tablet .. 650 milliGRAM(s) Oral every 6 hours PRN Temp greater or equal to 38C (100.4F), Mild Pain (1 - 3)  ALBUTerol    90 MICROgram(s) HFA Inhaler 2 Puff(s) Inhalation every 6 hours PRN Shortness of Breath and/or Wheezing  aluminum hydroxide/magnesium hydroxide/simethicone Suspension 30 milliLiter(s) Oral every 4 hours PRN Dyspepsia  melatonin 3 milliGRAM(s) Oral at bedtime PRN Insomnia  ondansetron Injectable 4 milliGRAM(s) IV Push every 8 hours PRN Nausea and/or Vomiting      LABS:                        9.4    6.80  )-----------( 137      ( 07 Sep 2022 05:40 )             32.4     09-07    140  |  98  |  13  ----------------------------<  85  5.0   |  33<H>  |  0.59    Ca    10.0      07 Sep 2022 05:40  Phos  2.5     09-07  Mg     1.80     09-07    TPro  10.4<H>  /  Alb  3.2<L>  /  TBili  0.8  /  DBili  x   /  AST  19  /  ALT  12  /  AlkPhos  38<L>  09-07        Arterial Blood Gas:  09-07 @ 06:40  7.31/73/118/37/99.1/7.7  ABG lactate: --        MICROBIOLOGY:     RADIOLOGY:  [ ] Reviewed and interpreted by me    Point of Care Ultrasound Findings:    PFT:    EKG:

## 2022-09-07 NOTE — PROGRESS NOTE ADULT - SUBJECTIVE AND OBJECTIVE BOX
[facility] Division of Hospital Medicine  Barbie Brooks MD  Hospitalist   Pager [phone]  Avaliable via MS teams     77y old  Female who presents with a chief complaint of Hypoxic respiratory failure (05 Sep 2022 13:25)      INTERVAL HPI/OVERNIGHT EVENTS:  Pt seen and examined. Aox1 , awake, on AVAPS. Conversant this morning - however, unable to make out what patient is trying to say.     ADDITIONAL REVIEW OF SYSTEMS:    MEDICATIONS  (STANDING):  albumin human  5% IVPB 2500 milliLiter(s) IV Intermittent once  albuterol/ipratropium for Nebulization 3 milliLiter(s) Nebulizer every 12 hours  budesonide  80 MICROgram(s)/formoterol 4.5 MICROgram(s) Inhaler 2 Puff(s) Inhalation two times a day  calcium gluconate IVPB 2 Gram(s) IV Intermittent once  chlorhexidine 2% Cloths 1 Application(s) Topical daily  heparin   Injectable 5000 Unit(s) SubCutaneous every 12 hours  multivitamin 1 Tablet(s) Oral daily  OLANZapine 2.5 milliGRAM(s) Oral at bedtime  thiamine IVPB 500 milliGRAM(s) IV Intermittent three times a day    MEDICATIONS  (PRN):  acetaminophen     Tablet .. 650 milliGRAM(s) Oral every 6 hours PRN Temp greater or equal to 38C (100.4F), Mild Pain (1 - 3)  ALBUTerol    90 MICROgram(s) HFA Inhaler 2 Puff(s) Inhalation every 6 hours PRN Shortness of Breath and/or Wheezing  aluminum hydroxide/magnesium hydroxide/simethicone Suspension 30 milliLiter(s) Oral every 4 hours PRN Dyspepsia  melatonin 3 milliGRAM(s) Oral at bedtime PRN Insomnia  ondansetron Injectable 4 milliGRAM(s) IV Push every 8 hours PRN Nausea and/or Vomiting      I&O's Summary      PHYSICAL EXAM:  Vital Signs Last 24 Hrs  T(C): 37.1 (07 Sep 2022 09:30), Max: 37.1 (07 Sep 2022 05:30)  T(F): 98.8 (07 Sep 2022 09:30), Max: 98.8 (07 Sep 2022 05:30)  HR: 99 (07 Sep 2022 09:50) (68 - 108)  BP: 158/95 (07 Sep 2022 09:30) (102/58 - 158/95)  BP(mean): --  RR: 19 (07 Sep 2022 09:30) (16 - 19)  SpO2: 98% (07 Sep 2022 09:50) (95% - 100%)    Parameters below as of 07 Sep 2022 09:30  Patient On (Oxygen Delivery Method): BiPAP/CPAP      CONSTITUTIONAL: NAD, well-developed  EYES: PERRLA; conjunctiva and sclera clear  ENMT: Moist oral mucosa, no pharyngeal injection or exudates; poor dentition  NECK: Supple, no palpable masses; no thyromegaly  RESPIRATORY: Normal respiratory effort; lungs are clear to auscultation bilaterally  CARDIOVASCULAR: Regular rate and rhythm, normal S1 and S2, no murmur/rub/gallop;  ABDOMEN: Nontender to palpation, normoactive bowel sounds, no rebound/guarding; No hepatosplenomegaly  MUSCULOSKELETAL:  Normal gait; no clubbing or cyanosis of digits; no joint swelling or tenderness to palpation  PSYCH: A+O x1  NEUROLOGY: CN 2-12 are intact and symmetric; no gross sensory deficits   SKIN: No rashes; no palpable lesions    LABS:                        9.4    6.80  )-----------( 137      ( 07 Sep 2022 05:40 )             32.4     09-07    140  |  98  |  13  ----------------------------<  85  5.0   |  33<H>  |  0.59    Ca    10.0      07 Sep 2022 05:40  Phos  2.5     09-07  Mg     1.80     09-07    TPro  10.4<H>  /  Alb  3.2<L>  /  TBili  0.8  /  DBili  x   /  AST  19  /  ALT  12  /  AlkPhos  38<L>  09-07      COVID-19 PCR: NotDetec (05 Sep 2022 06:43)

## 2022-09-07 NOTE — DIETITIAN NUTRITION RISK NOTIFICATION - TREATMENT: THE FOLLOWING DIET HAS BEEN RECOMMENDED
Diet, Pureed:   DASH/TLC {Sodium & Cholesterol Restricted} (DASH)  Supplement Feeding Modality:  Oral  Ensure Enlive Cans or Servings Per Day:  1       Frequency:  Two Times a day (09-06-22 @ 10:01) [Active]

## 2022-09-07 NOTE — CHART NOTE - NSCHARTNOTEFT_GEN_A_CORE
Nutrition Consult X NST - Poor PO intake  Source: other [x] nurse, medical chart   Diet rx: Pureed: DASH/TLC {Sodium & Cholesterol Restricted} (DASH)  Supplement Feeding Modality:  Oral   Ensure Enlive Cans or Servings Per Day:  1       Frequency:  Two Times a day (09-06-22 @ 10:01) [Active]    Pt 80 yo female with PMHx of lymphoma (dx 2005, s/p CARRIE lobectomy, [place] [facility] [place] disorder, asthma presented by her daughter 2/2 Pt hallucinating & more depressed.    At time of visit, Pt awake, appears weak; Pt on BiPAP. Per nurse, Pt with poor appetite/PO intake. This morning, Pt only had few sips of Ensure (PO supplement) per PES (at bed side). Pt needs assistance with meals reported. Of note, Pt passed Swallow Bedside Assessment Adult, SLP rec: Puree (per Pt preference) with thin liquids (8/30/22). No report of nausea, vomiting or diarrhea @ this time +BM (9/7) fecal incontinence, per flow sheet. Rec to add appetite stimulant to boost Pt's PO intake/appetite. If Pt's PO intake remains poor, suggest initiating alternative means of nutrition support according to [facility] plans. Case discussed with nurse. RDN remains available, nurse made aware    Pt's height: 63" (8/29)       IBW: 115#+/-10%     Pt's weights: 60 kg (9/2), 70.3 kg (HIE - 1/19/20)   Pertinent Medications: Heparin, Multivitamin, Albumin, Zofran (PRN), Thiamin,    Pertinent Labs: (9/7) H/H 9.4/32.4 L,  L;    (9/6) Albumin 3.4, phosphorus 2.2 L;     (8/31) HbA1c 4.5%, HDL 21 L    Skin: +dryness/ecchymosis, +IAD   Nutrition focused physical exam ->> subcutaneous fat loss: [moderate] Orbital fat pads region, [moderate] Buccal fat region & muscle wasting: [moderate] Temples region, [moderate] Clavicle region [moderate] Shoulder region    Estimated Needs: [x] no change since previous assessment  Previous Nutrition Diagnosis: [x] Inadequate Energy Intake   Nutrition Diagnosis is [x] ongoing       New Nutrition Diagnosis: [x] Malnutrition, severe     Related to: Pt meets criteria for severe malnutrition in the context of acute in chronic illness   As evidenced by: Pt with physical findings described above; < 50% of Kcal intake      Nutrition Interventions/Recommendations:   1. Continue PO diet as ordered; Encourage/assist Pt with meals as needed;   2. Monitor PO diet tolerance; Honor food preferences;   3. Add appetite stimulant to boost Pt's PO intake/appetite;  4. Monitor labs, weekly weights, hydration status;   5. If Pt's PO intake remains poor, suggest initiating alternative means of nutrition support according to [facility] plans;  reconsult nutrition if warranted; RDN remains available

## 2022-09-07 NOTE — PROGRESS NOTE ADULT - SUBJECTIVE AND OBJECTIVE BOX
Patient is a 77y old  Female who presents with a chief complaint of Hypoxic respiratory failure (07 Sep 2022 12:09)    Patient seen and examined at bedside.    MEDICATIONS  (STANDING):  albumin human  5% IVPB 2500 milliLiter(s) IV Intermittent once  albuterol/ipratropium for Nebulization 3 milliLiter(s) Nebulizer every 12 hours  budesonide  80 MICROgram(s)/formoterol 4.5 MICROgram(s) Inhaler 2 Puff(s) Inhalation two times a day  calcium gluconate IVPB 2 Gram(s) IV Intermittent once  chlorhexidine 2% Cloths 1 Application(s) Topical daily  dronabinol 2.5 milliGRAM(s) Oral two times a day  heparin   Injectable 5000 Unit(s) SubCutaneous every 12 hours  multivitamin 1 Tablet(s) Oral daily  OLANZapine 2.5 milliGRAM(s) Oral at bedtime  thiamine IVPB 500 milliGRAM(s) IV Intermittent three times a day    MEDICATIONS  (PRN):  acetaminophen     Tablet .. 650 milliGRAM(s) Oral every 6 hours PRN Temp greater or equal to 38C (100.4F), Mild Pain (1 - 3)  ALBUTerol    90 MICROgram(s) HFA Inhaler 2 Puff(s) Inhalation every 6 hours PRN Shortness of Breath and/or Wheezing  aluminum hydroxide/magnesium hydroxide/simethicone Suspension 30 milliLiter(s) Oral every 4 hours PRN Dyspepsia  melatonin 3 milliGRAM(s) Oral at bedtime PRN Insomnia  ondansetron Injectable 4 milliGRAM(s) IV Push every 8 hours PRN Nausea and/or Vomiting    Vital Signs Last 24 Hrs  T(C): 37.1 (07 Sep 2022 09:30), Max: 37.1 (07 Sep 2022 05:30)  T(F): 98.8 (07 Sep 2022 09:30), Max: 98.8 (07 Sep 2022 05:30)  HR: 99 (07 Sep 2022 09:50) (68 - 108)  BP: 158/95 (07 Sep 2022 09:30) (102/58 - 158/95)  BP(mean): --  RR: 19 (07 Sep 2022 09:30) (16 - 19)  SpO2: 98% (07 Sep 2022 09:50) (95% - 100%)    Parameters below as of 07 Sep 2022 09:30  Patient On (Oxygen Delivery Method): BiPAP/CPAP    PE  NAD  Awake, alert  Anicteric, MMM  No c/c/e  No rash grossly  FROM                          9.4    6.80  )-----------( 137      ( 07 Sep 2022 05:40 )             32.4       09-07    140  |  98  |  13  ----------------------------<  85  5.0   |  33<H>  |  0.59    Ca    10.0      07 Sep 2022 05:40  Phos  2.5     09-07  Mg     1.80     09-07    TPro  10.4<H>  /  Alb  3.2<L>  /  TBili  0.8  /  DBili  x   /  AST  19  /  ALT  12  /  AlkPhos  38<L>  09-07

## 2022-09-07 NOTE — PROGRESS NOTE ADULT - ASSESSMENT
MANDI GASPAR is a 77y Female who presents with a chief complaint of respiratory failure.    Marginal Zone Lymphoma  - Patient treated previously at [facility]; was on rituximab + bendamustine last in August 2020, and since then has been on surveillance, but lost to follow-up since July 2021.  - CT chest concerning for slight progression of disease.  - Unable to obtain IgM level due to hyperviscosity. Noted elevated lambda light chain compared to kappa similar to values in early 2020.  - Immunofixation with IgM lambda monoclonal protein as seen previously during admission in 2020. M-spike was elevated at the time, but bone marrow biopsy did not show multiple myeloma; may consider repeating after plasmapheresis to assess for progression.  - Will continue discussion with [facility].   - IgA 23, IgM 7356, IgG 307  - Depending on goals of care as well, may need additional therapy such as repeat rituximab in addition to plasmapheresis if patient has improvement on plasmapheresis. S/p plasmapheresis procedure 9/2    Altered Mental Status  - Likely multifactorial with hyperviscosity of blood, hypercalcemia, urinary tract infection.  - S/p Shiley placement  - Completed antibiotics  - Endocrinology following. Continue to monitor calcium levels closely.  - Psych input appreciated    Hypoxia  - Currently on AVPAS  - Pulmonology following     Hypotension   - improved with fluid bolus    Bari Landry PA-C  Hematology/Oncology  [facility]  [phone] (office)  [phone] (alt office)  Evenings and weekends please call MD on call or office

## 2022-09-07 NOTE — PROGRESS NOTE ADULT - PROBLEM SELECTOR PLAN 3
-Patient with +UA. Urine cx growing E coli, pan-sensitive  - CTX x3 days (8/31-9/2) for treatment of acute cystitis

## 2022-09-07 NOTE — PROGRESS NOTE ADULT - PROBLEM SELECTOR PLAN 7
-Hx of bipolar w/psych admission @ [place] many years ago  -non-compliant with medications  -F/u Psych recommendations  -Supportive care

## 2022-09-07 NOTE — CHART NOTE - NSCHARTNOTEFT_GEN_A_CORE
Patient hypercapnic this morning on ABG, discussed with pulmonary and attending. Placed patient on AVAPS as long as patient tolerates and repeat blood gas at 14:00.

## 2022-09-07 NOTE — PROGRESS NOTE ADULT - PROBLEM SELECTOR PLAN 1
-Likely multi-factorial: UTI, hypercapnia, hypercalcemia, psychiatric disorder,  and now with greater concern for hyperviscosity syndrome given inability to even obtain immunoglobulin panel due to degree of hyperviscosity  -serum viscosity level sent and pending  -previous attending spoke to hematology; recommend apheresis due to concern for hyperviscosity. S/p Shiley placement and plasmapheresis done on 9/2  - Hem recs appreciated  -daughter thinks patient stopped taking her psychiatric medication in february  Poor PO intake- nutrition consulted , ensure added to diet , psych recommending dronabinol 2.5mg BID - monitor for side effects

## 2022-09-07 NOTE — PROGRESS NOTE ADULT - ASSESSMENT
77F with hx of lymphoma (dx 2005, s/p CARRIE lobectomy, relapsed 2019, currently off chemo/RT) BPD, asthma admitted for metabolic encephalopathy 2/2 UTI/hypercalcemia with course now complicated by hypoxic/hypercapnic respiratory failure. Pulmonary consulted for c/f pleural effusion on CXR.     #Hypoxic/Hypercapnic respiratory failure  #Metabolic Encephalopathy  #Lymphoma  #MGUS   #Trace pleural Effusion  #Asthma  #Hyperviscosity Syndrome     Imaging reviewed. Opacity on CXR is likely soft tissue mass (lymphoma) as seen on CT Chest. Patient has small pleural effusion which would not be amenable to thoracentesis.    - ABG reviewed, team started on AVAPS. Can recheck ABG now that she has been avaps.   - Can likely just put on NIPPV at night for maintenance

## 2022-09-07 NOTE — PROGRESS NOTE ADULT - PROBLEM SELECTOR PLAN 5
-Follows w/Dr. Real Cano @ [facility] [place]  -received treatment recently per daughter however she doesn't know specifics  -with progression of disease  -hematology to speak to [facility] regarding if further treatment is suggested  -Hem recs appreciated

## 2022-09-07 NOTE — SWALLOW BEDSIDE ASSESSMENT ADULT - COMMENTS
Internal Medicine 9/7 "77 year old female with PMH of lymphoma (dx 2005, s/p CARRIE lobectomy, relapsed in 2019 but not currently on chemo/RT, [place] [facility] [place] bipolar disorder, asthma (on O2 PRN at home, unknown how many L) brought to ED by daughter who states pt has been hallucinating, being more depressed.  Patient unable to provide much history, daughter Alissa states that patient was able to care for herself, manage home finances and attend doctor appointments independently until recently, she states that she believes her mom stopped taking her psychiatric medications in February of 2022 and has not followed up with any of her physicians. She has noticed recent weight loss (unsure how much) and change in voice.   Admitted to medicine service for further evaluation & treatment. Course c/b acute hypercapnic and hypoxic respiratory failure requiring BIPAP and AVAPS."    Upon SLP arrival, patient receiving supplemental O2 via AVAPS. Discussed patient case with RN, who reported mask unable to be removed at this time. Swallow evaluation not completed at this time given patient's need for increased respiratory support. Reconsult this service when patient is able to tolerate oxygenation via nasal cannula or room air.
Per H&P 8/30/22: "77 year old female with PMH of lymphoma (dx 2005, s/p CARRIE lobectomy, relapsed in 2019 but not currently on chemo/RT, [place] [facility] [place] bipolar disorder, asthma (on O2 PRN at home, unknown how many L) brought to ED by daughter who states pt has been hallucinating, being more depressed.  Patient unable to provide much history, daughter Alissa states that patient was able to care for herself, manage home finances and attend doctor appointments independently until recently, she states that she believes her mom stopped taking her psychiatric medications in February of 2022 and has not followed up with any of her physicians. She has noticed recent weight loss (unsure how much) and change in voice.  Patient denies chest pain, shortness of breath or any discomfort, she is pleasantly confused. Daughter states that within the last year the patient has received treatment for her lymphoma however is unsure of the specifics, the patient was supposed to follow up in February however has not"     Patient received upright in bed, awake and alert with supplemental oxygen in place via nasal canula. Patient denies symptoms of dysphagia

## 2022-09-08 NOTE — PROGRESS NOTE ADULT - SUBJECTIVE AND OBJECTIVE BOX
[facility] Division of Hospital Medicine  Barbie Brooks MD  Hospitalist   Pager [phone]  Avaliable via MS teams     77y old  Female who presents with a chief complaint of Hypoxic respiratory failure (05 Sep 2022 13:25)    INTERVAL HPI/OVERNIGHT EVENTS:  Pt seen and examined. Aox1 , awake.  Conversant this morning - however, unable to make out what patient is trying to say.       ADDITIONAL REVIEW OF SYSTEMS:    MEDICATIONS  (STANDING):  albumin human  5% IVPB 2500 milliLiter(s) IV Intermittent once  albuterol/ipratropium for Nebulization 3 milliLiter(s) Nebulizer every 12 hours  budesonide  80 MICROgram(s)/formoterol 4.5 MICROgram(s) Inhaler 2 Puff(s) Inhalation two times a day  calcium gluconate IVPB 2 Gram(s) IV Intermittent once  chlorhexidine 2% Cloths 1 Application(s) Topical daily  cholecalciferol 1000 Unit(s) Oral daily  dronabinol 2.5 milliGRAM(s) Oral two times a day  heparin   Injectable 5000 Unit(s) SubCutaneous every 12 hours  magnesium oxide 400 milliGRAM(s) Oral three times a day with meals  multivitamin 1 Tablet(s) Oral daily  OLANZapine 2.5 milliGRAM(s) Oral at bedtime  thiamine IVPB 500 milliGRAM(s) IV Intermittent three times a day    MEDICATIONS  (PRN):  acetaminophen     Tablet .. 650 milliGRAM(s) Oral every 6 hours PRN Temp greater or equal to 38C (100.4F), Mild Pain (1 - 3)  ALBUTerol    90 MICROgram(s) HFA Inhaler 2 Puff(s) Inhalation every 6 hours PRN Shortness of Breath and/or Wheezing  aluminum hydroxide/magnesium hydroxide/simethicone Suspension 30 milliLiter(s) Oral every 4 hours PRN Dyspepsia  melatonin 3 milliGRAM(s) Oral at bedtime PRN Insomnia  ondansetron Injectable 4 milliGRAM(s) IV Push every 8 hours PRN Nausea and/or Vomiting      I&O's Summary      PHYSICAL EXAM:  Vital Signs Last 24 Hrs  T(C): 36.9 (08 Sep 2022 10:00), Max: 36.9 (08 Sep 2022 10:00)  T(F): 98.4 (08 Sep 2022 10:00), Max: 98.4 (08 Sep 2022 10:00)  HR: 96 (08 Sep 2022 10:08) (92 - 110)  BP: 103/50 (08 Sep 2022 10:00) (103/50 - 141/77)  BP(mean): --  RR: 18 (08 Sep 2022 10:00) (18 - 18)  SpO2: 100% (08 Sep 2022 10:08) (95% - 100%)    Parameters below as of 08 Sep 2022 10:08  Patient On (Oxygen Delivery Method): nasal cannula,tx    CONSTITUTIONAL: NAD, well-developed  EYES: PERRLA; conjunctiva and sclera clear  ENMT: Moist oral mucosa, no pharyngeal injection or exudates; poor dentition  NECK: Supple, no palpable masses; no thyromegaly, dressing in place cdi over the non-tunneled catheter   RESPIRATORY: Normal respiratory effort; lungs are clear to auscultation bilaterally  CARDIOVASCULAR: Regular rate and rhythm, normal S1 and S2, no murmur/rub/gallop;  ABDOMEN: Nontender to palpation, normoactive bowel sounds, no rebound/guarding; No hepatosplenomegaly  MUSCULOSKELETAL:  Normal gait; no clubbing or cyanosis of digits; no joint swelling or tenderness to palpation  PSYCH: A+O x1  NEUROLOGY: CN 2-12 are intact and symmetric; no gross sensory deficits   SKIN: No rashes; no palpable lesions    LABS:                        8.9    6.28  )-----------( 145      ( 08 Sep 2022 06:15 )             32.5     09-08    141  |  97<L>  |  15  ----------------------------<  93  4.6   |  33<H>  |  0.60    Ca    10.0      08 Sep 2022 06:15  Phos  2.8     09-08  Mg     1.70     09-08    TPro  10.0<H>  /  Alb  3.3  /  TBili  0.8  /  DBili  x   /  AST  18  /  ALT  12  /  AlkPhos  35<L>  09-08      COVID-19 PCR: NotDetec (05 Sep 2022 06:43)      RADIOLOGY & ADDITIONAL TESTS:  New Results Reviewed Today:   New Imaging Personally Reviewed Today:  New Electrocardiogram Personally Reviewed Today:  Prior or Outpatient Records Reviewed Today:    COMMUNICATION:  Care Discussed with Consultants/Other Providers and Details of Discussion:  Discussions with Patient/Family:  PCP Communication:

## 2022-09-08 NOTE — PROGRESS NOTE ADULT - PROBLEM SELECTOR PLAN 1
-Likely multi-factorial: UTI, hypercapnia, hypercalcemia, psychiatric disorder,  and now with greater concern for hyperviscosity syndrome given inability to even obtain immunoglobulin panel due to degree of hyperviscosity  -serum viscosity level sent and pending  -previous attending spoke to hematology; recommend apheresis due to concern for hyperviscosity. S/p Shiley placement and plasmapheresis done on 9/2  - Hem recs appreciated- may need plex again   -daughter thinks patient stopped taking her psychiatric medication in february  Poor PO intake- nutrition consulted , ensure added to diet , psych recommending dronabinol 2.5mg BID - monitor for side effects

## 2022-09-08 NOTE — PROGRESS NOTE ADULT - PROBLEM SELECTOR PLAN 4
-likely due to underlying malignancy, lymphoma vs new MM. F/u vitamin D 25 and 1, 25, PTHRP, MM labs. PTH is inappropriately normal  -s/p pamidronate on 8/30; Ca improved.  -calcitonin x 4 doses  -unfortunately, will hold off fluids for now given pulmonary edema that developed with IVF upon admission, which later required need for Lasix IV and AVAPS  -will speak to endocrine regarding need for Xgeva given ongoing hypercalcemia  -Apprec Endocrine recs-corrected calcium 9.9 today  - continue to encourage PO intake  - f/u vitamin D 1,25 and PTHrP

## 2022-09-08 NOTE — PROGRESS NOTE ADULT - ASSESSMENT
MANDI GASPAR is a 77y Female who presents with a chief complaint of hypoxic respiratory failure.    Marginal Zone Lymphoma  - Patient treated previously at [facility]; was on rituximab + bendamustine last in August 2020, and since then has been on surveillance, but lost to follow-up since July 2021.  - CT chest concerning for slight progression of disease.  - Unable to obtain IgM level due to hyperviscosity. Noted elevated lambda light chain compared to kappa similar to values in early 2020.  - Recommend continuing plasmapheresis at this time.   - Immunofixation with IgM lambda monoclonal protein as seen previously during admission in 2020.   - Will continue discussion with [facility].   - Will need to continue monitoring of serum viscosity and IgM levels while on plasmapheresis.   - Depending on goals of care as well, may need additional therapy such as repeat rituximab in addition to plasmapheresis if patient has improvement on plasmapheresis.    Altered Mental Status  - Likely multifactorial with hyperviscosity of blood, hypercalcemia, urinary tract infection.  - S/p Shiley placement  - Completed antibiotics  - Endocrinology following. Continue to monitor calcium levels closely.  - Psych input appreciated    Hypoxia  - Currently on AVPAS  - Pulmonology following.    Will continue to follow.    Alexander Peraza MD  Hematology/Oncology  O: [phone]

## 2022-09-08 NOTE — PROGRESS NOTE ADULT - PROBLEM SELECTOR PLAN 7
-Hx of bipolar w/psych admission @ [place] many years ago  -non-compliant with medications  -c/w Zyprexa 2.5mg QHS   -Psych recommendations appreciated   -Supportive care

## 2022-09-08 NOTE — PROGRESS NOTE ADULT - PROBLEM SELECTOR PLAN 5
-[place] [facility] [place]received treatment recently per daughter however she doesn't know specifics  -with progression of disease  -hematology to speak to [facility] regarding if further treatment is suggested  -Hem recs appreciated

## 2022-09-08 NOTE — PROGRESS NOTE ADULT - ASSESSMENT
77F with hx of lymphoma (dx 2005, s/p CARRIE lobectomy, relapsed 2019, currently off chemo/RT) BPD, asthma admitted for metabolic encephalopathy 2/2 UTI/hypercalcemia. Pulmonary initially consulted for c/f pleural effusion which was actually 2/2 known lymphoma, with course now c/b hypoxic/hypercapnic respiratory failure now on nocturnal NIPPV in setting of hyperviscosity syndrome/multifactorial metabolic encephalopathy treated with plasmapharesis    #Hypoxic/Hypercapnic respiratory failure  #Metabolic Encephalopathy  #Lymphoma  #MGUS   #Trace pleural Effusion  #Asthma  #Hyperviscosity Syndrome     - ABG reviewed pre and post AVAP, can continue with nocturnal AVAPS .   - Wean oxygen as tolerated   - Continue with bronchodilators as ordered

## 2022-09-08 NOTE — PROGRESS NOTE ADULT - ATTENDING COMMENTS
77 year old female with history of lymphoma with acute on chronic hypoxic/hypercapnic respiratory failure.     AVAPS was discontinued  but subsequent ABG showed respiratory acidosis without appropriate metabolic compensation. Placed back on AVAPS. ABG shows appropriate compensation while on AVAPS. Would suggest to continue AVAPS every night while asleep.     No further recommendations from a pulmonary prospective.     Medical management per primary team.      Thank you for your consult. Please call back if you have further questions regarding the care of this patient.

## 2022-09-08 NOTE — PROGRESS NOTE ADULT - ASSESSMENT
77 year old female with PMH of HLD, lymphoma (dx 2005, s/p CARRIE lobectomy, relapsed in 2019 but not currently on chemo/RT, being followed by heme every 3-6months at [place]), bipolar disorder, asthma, who presented to the hospital for AMS and hypoxic respiratory failure, found to have hypercalcemia.     #Hypercalcemia, likely malignancy-related elevated 1,25D (lymphoma) vs medication induced (lithium induced parathyroidism) vs in the setting of potential multiple myeloma transformation   - total protein of 12.0, intact PTH level of 25, Vitamin D 25-hydroxy of 18.4 (low).   serum immunofixation assay - monoclonal IgM lambda protein, hematology service is following, hyperviscosity syndrome.  - s/p 4 doses of IV Calcitonin. Follow with repeat serum Calcium levels.   - s/p pamidronate 60 mg IV x 1 8/30/22   - Given PTH level not elevated, lithium-induced hyperparathyroidism less likely etiology of hypercalcemia at this moment.   -Corrected calcium 10.6 mildly elevated, continue to monitor without additional therapy. Optimize oral hydration.  -Still waiting for results on PTH-related peptide,   -Please reorder Vitamin D 1,25-hydroxy level, no longer testing      #history of fracture in the setting of likely osteoporosis;.   - Imaging findings of Old right humeral neck fracture. Left proximal humeral fracture s/p ORIF  .- f/u outpatient bone-density testing.   - 25-hydroxy Vitamin D level of 18.4 (low), f/u 1,25 hydroxy level. Patient may benefit from future Vitamin D supplementation pending results from the rest of workup.   Primary team started cholecalciferol 1000 units daily.    #Hyperlipidemia  - history of hyperlipidemia   - Cholesterol of 54, Triglycerides of 30, Serum HDL of 21, LDL of 27. A1C of 4.5   - can hold on statin therapy at this time due to age and risk factors at this time   - home medication history of zyprexa.     #Hypoglycemia - unclear if meeting whipple's triad  glucose has been improved, no further lows  encourage po intake/assistance      Thais Tovar MD  Division of Endocrinology  Pager: [phone]    If after 6PM or before 9AM, or on weekends/holidays, please call endocrine answering service for assistance ([phone]).  For nonurgent matters email [email or web address] for assistance.

## 2022-09-08 NOTE — PROGRESS NOTE ADULT - PROBLEM SELECTOR PLAN 2
-also with hypoxemic respiratory failure  -pCO2 improving  -was off AVAPS over the weekend, restarted on 9/7/22 due to hypercapnia, transitioned to AVAPS only QHS, NC during the day  -CXR with concern for L pleural effusion, however, as per CT, changes are largely attributed to pt's lymphoma. No need for thora, as per pulm  -IVF d/john due to concern for pulmonary edema leading to hypoxia

## 2022-09-08 NOTE — CHART NOTE - NSCHARTNOTEFT_GEN_A_CORE
Patient not sleeping per staff, would increase qHS olanzapine to 3.75mg PO (going very slowly so as to avoid oversedation while on AVAPS), hold for resp. depression, will f/u in AM

## 2022-09-08 NOTE — PROGRESS NOTE ADULT - SUBJECTIVE AND OBJECTIVE BOX
CHIEF COMPLAINT:    Interval Events:    ABG checked on AVAPs yesterday     REVIEW OF SYSTEMS:    OBJECTIVE:  ICU Vital Signs Last 24 Hrs  T(C): 36.7 (08 Sep 2022 05:08), Max: 37.1 (07 Sep 2022 09:30)  T(F): 98 (08 Sep 2022 05:08), Max: 98.8 (07 Sep 2022 09:30)  HR: 98 (08 Sep 2022 05:08) (69 - 110)  BP: 117/62 (08 Sep 2022 05:08) (117/62 - 158/95)  BP(mean): --  ABP: --  ABP(mean): --  RR: 18 (08 Sep 2022 05:08) (18 - 19)  SpO2: 95% (08 Sep 2022 05:08) (95% - 99%)    O2 Parameters below as of 08 Sep 2022 05:08  Patient On (Oxygen Delivery Method): nasal cannula  O2 Flow (L/min): 2        Mode: standby    CAPILLARY BLOOD GLUCOSE      POCT Blood Glucose.: 90 mg/dL (08 Sep 2022 07:09)      PHYSICAL EXAM:  General: Awake, alert, oriented X 0   HEENT: Atraumatic, normocephalic.                  No tonsillar or pharyngeal exudates.  Lymph Nodes: No palpable lymphadenopathy  Neck: No JVD. No carotid bruit.   Respiratory: decreased breath sounds L > R,   Cardiovascular: S1 S2 normal. No murmurs, rubs or gallops.   Abdomen: Soft, non-tender, non-distended. No organomegaly.  Extremities: Warm to touch. Peripheral pulse palpable. No pedal edema.   Neurological: Non-focal    HOSPITAL MEDICATIONS:  MEDICATIONS  (STANDING):  albumin human  5% IVPB 2500 milliLiter(s) IV Intermittent once  albuterol/ipratropium for Nebulization 3 milliLiter(s) Nebulizer every 12 hours  budesonide  80 MICROgram(s)/formoterol 4.5 MICROgram(s) Inhaler 2 Puff(s) Inhalation two times a day  calcium gluconate IVPB 2 Gram(s) IV Intermittent once  chlorhexidine 2% Cloths 1 Application(s) Topical daily  dronabinol 2.5 milliGRAM(s) Oral two times a day  heparin   Injectable 5000 Unit(s) SubCutaneous every 12 hours  multivitamin 1 Tablet(s) Oral daily  OLANZapine 2.5 milliGRAM(s) Oral at bedtime  thiamine IVPB 500 milliGRAM(s) IV Intermittent three times a day    MEDICATIONS  (PRN):  acetaminophen     Tablet .. 650 milliGRAM(s) Oral every 6 hours PRN Temp greater or equal to 38C (100.4F), Mild Pain (1 - 3)  ALBUTerol    90 MICROgram(s) HFA Inhaler 2 Puff(s) Inhalation every 6 hours PRN Shortness of Breath and/or Wheezing  aluminum hydroxide/magnesium hydroxide/simethicone Suspension 30 milliLiter(s) Oral every 4 hours PRN Dyspepsia  melatonin 3 milliGRAM(s) Oral at bedtime PRN Insomnia  ondansetron Injectable 4 milliGRAM(s) IV Push every 8 hours PRN Nausea and/or Vomiting      LABS:                        9.4    6.80  )-----------( 137      ( 07 Sep 2022 05:40 )             32.4     09-08    141  |  97<L>  |  15  ----------------------------<  93  4.6   |  33<H>  |  0.60    Ca    10.0      08 Sep 2022 06:15  Phos  2.8     09-08  Mg     1.70     09-08    TPro  10.0<H>  /  Alb  3.3  /  TBili  0.8  /  DBili  x   /  AST  18  /  ALT  12  /  AlkPhos  35<L>  09-08        Arterial Blood Gas:  09-07 @ 14:15  7.44/52/125/35/99.6/9.4  ABG lactate: --  Arterial Blood Gas:  09-07 @ 06:40  7.31/73/118/37/99.1/7.7  ABG lactate: --        MICROBIOLOGY:     RADIOLOGY:  [ ] Reviewed and interpreted by me    Point of Care Ultrasound Findings:    PFT:    EKG:

## 2022-09-08 NOTE — PROGRESS NOTE ADULT - SUBJECTIVE AND OBJECTIVE BOX
CHIEF COMPLAINT  Hypoxic Respiratory Failure    HISTORY OF PRESENT ILLNESS  MANDI GASPAR is a 77y Female who presents with a chief complaint of hypoxic respiratory failure.    No acute events.     REVIEW OF SYSTEMS  Unable to obtain - mental status    PHYSICAL EXAM  T(C): 36.7 (09-08-22 @ 05:08), Max: 37.1 (09-07-22 @ 09:30)  HR: 107 (09-08-22 @ 07:46) (69 - 110)  BP: 117/62 (09-08-22 @ 05:08) (117/62 - 158/95)  RR: 18 (09-08-22 @ 05:08) (18 - 19)  SpO2: 97% (09-08-22 @ 07:46) (95% - 99%)  Constitutional: awake, in no acute distress  Eyes: PERRL, EOMI  HEENT: normocephalic, atraumatic  Neck: supple, non-tender  Cardiovascular: normal perfusion, tachycardic  Respiratory: normal respiratory efforts; no increased use of accessory muscles  Gastrointestinal: soft, non-tender  Musculoskeletal: normal range of motion, no deformities noted  Skin: warm, dry    LABORATORY DATA                        8.9    6.28  )-----------( 145      ( 08 Sep 2022 06:15 )             32.5     09-08    141  |  97<L>  |  15  ----------------------------<  93  4.6   |  33<H>  |  0.60    Ca    10.0      08 Sep 2022 06:15  Phos  2.8     09-08  Mg     1.70     09-08    TPro  10.0<H>  /  Alb  3.3  /  TBili  0.8  /  DBili  x   /  AST  18  /  ALT  12  /  AlkPhos  35<L>  09-08     CHIEF COMPLAINT  Hypoxic Respiratory Failure    HISTORY OF PRESENT ILLNESS  MANDI GASPAR is a 77y Female who presents with a chief complaint of hypoxic respiratory failure.    No acute events. Denies any complaints    REVIEW OF SYSTEMS  Unable to obtain - mental status    PHYSICAL EXAM  T(C): 36.7 (09-08-22 @ 05:08), Max: 37.1 (09-07-22 @ 09:30)  HR: 107 (09-08-22 @ 07:46) (69 - 110)  BP: 117/62 (09-08-22 @ 05:08) (117/62 - 158/95)  RR: 18 (09-08-22 @ 05:08) (18 - 19)  SpO2: 97% (09-08-22 @ 07:46) (95% - 99%)  Constitutional: awake, in no acute distress  Eyes: PERRL, EOMI  HEENT: normocephalic, atraumatic  Neck: supple, non-tender  Cardiovascular: normal perfusion, tachycardic  Respiratory: normal respiratory efforts; no increased use of accessory muscles  Gastrointestinal: soft, non-tender  Musculoskeletal: normal range of motion, no deformities noted  Neurological: A&O * 0. Answers questions and arouses to voice.  Skin: warm, dry    LABORATORY DATA                        8.9    6.28  )-----------( 145      ( 08 Sep 2022 06:15 )             32.5     09-08    141  |  97<L>  |  15  ----------------------------<  93  4.6   |  33<H>  |  0.60    Ca    10.0      08 Sep 2022 06:15  Phos  2.8     09-08  Mg     1.70     09-08    TPro  10.0<H>  /  Alb  3.3  /  TBili  0.8  /  DBili  x   /  AST  18  /  ALT  12  /  AlkPhos  35<L>  09-08

## 2022-09-08 NOTE — PROGRESS NOTE ADULT - SUBJECTIVE AND OBJECTIVE BOX
Chief Complaint: Hypercalcemia    History: poor historian, refusing to eat per staff    MEDICATIONS  (STANDING):  albumin human  5% IVPB 2500 milliLiter(s) IV Intermittent once  albuterol/ipratropium for Nebulization 3 milliLiter(s) Nebulizer every 12 hours  budesonide  80 MICROgram(s)/formoterol 4.5 MICROgram(s) Inhaler 2 Puff(s) Inhalation two times a day  calcium gluconate IVPB 2 Gram(s) IV Intermittent once  chlorhexidine 2% Cloths 1 Application(s) Topical daily  cholecalciferol 1000 Unit(s) Oral daily  dronabinol 2.5 milliGRAM(s) Oral two times a day  heparin   Injectable 5000 Unit(s) SubCutaneous every 12 hours  magnesium oxide 400 milliGRAM(s) Oral three times a day with meals  multivitamin 1 Tablet(s) Oral daily  OLANZapine 2.5 milliGRAM(s) Oral at bedtime    MEDICATIONS  (PRN):  acetaminophen     Tablet .. 650 milliGRAM(s) Oral every 6 hours PRN Temp greater or equal to 38C (100.4F), Mild Pain (1 - 3)  ALBUTerol    90 MICROgram(s) HFA Inhaler 2 Puff(s) Inhalation every 6 hours PRN Shortness of Breath and/or Wheezing  aluminum hydroxide/magnesium hydroxide/simethicone Suspension 30 milliLiter(s) Oral every 4 hours PRN Dyspepsia  melatonin 3 milliGRAM(s) Oral at bedtime PRN Insomnia  ondansetron Injectable 4 milliGRAM(s) IV Push every 8 hours PRN Nausea and/or Vomiting      Allergies    latex (Rash)  No Known Drug Allergies    Intolerances      Review of Systems:  UNABLE TO OBTAIN    PHYSICAL EXAM:  VITALS: T(C): 36.9 (09-08-22 @ 10:00)  T(F): 98.4 (09-08-22 @ 10:00), Max: 98.4 (09-08-22 @ 10:00)  HR: 96 (09-08-22 @ 10:08) (92 - 110)  BP: 103/50 (09-08-22 @ 10:00) (103/50 - 141/77)  RR:  (18 - 18)  SpO2:  (95% - 100%)  Wt(kg): --  GENERAL: NAD, appears comfortable, poor historian  EYES: No proptosis, anicteric  HEENT:  Atraumatic, Normocephalic, dry mucous membranes  RESPIRATORY: nonlabored respirations    CAPILLARY BLOOD GLUCOSE      POCT Blood Glucose.: 77 mg/dL (08 Sep 2022 11:05)  POCT Blood Glucose.: 90 mg/dL (08 Sep 2022 07:09)  POCT Blood Glucose.: 91 mg/dL (07 Sep 2022 21:24)  POCT Blood Glucose.: 131 mg/dL (07 Sep 2022 16:36)      09-08    141  |  97<L>  |  15  ----------------------------<  93  4.6   |  33<H>  |  0.60    eGFR: 93    Ca    10.0      09-08  Mg     1.70     09-08  Phos  2.8     09-08    TPro  10.0<H>  /  Alb  3.3  /  TBili  0.8  /  DBili  x   /  AST  18  /  ALT  12  /  AlkPhos  35<L>  09-08      A1C with Estimated Average Glucose Result: 4.5 % (08-31-22 @ 03:48)      Thyroid Function Tests:  08-31 @ 03:48 TSH 1.42 FreeT4 -- T3 -- Anti TPO -- Anti Thyroglobulin Ab -- TSI --  08-29 @ 22:52 TSH 3.06 FreeT4 -- T3 -- Anti TPO -- Anti Thyroglobulin Ab -- TSI --

## 2022-09-08 NOTE — PROGRESS NOTE ADULT - ASSESSMENT
77 year old female with PMH of lymphoma (dx 2005, s/p CARRIE lobectomy, relapsed in 2019 but not currently on chemo/RT, being followed by heme every 3-6months at [facility] [place]), bipolar disorder, asthma (on O2 PRN at home, unknown how many L) brought to ED by daughter who states pt has been hallucinating, being more depressed.  Patient unable to provide much history, daughter Alissa states that patient was able to care for herself, manage home finances and attend doctor appointments independently until recently, she states that she believes her mom stopped taking her psychiatric medications in February of 2022 and has not followed up with any of her physicians. She has noticed recent weight loss (unsure how much) and change in voice.   Admitted to medicine service for further evaluation & treatment. Course c/b acute hypercapnic and hypoxic respiratory failure requiring BIPAP and AVAPS.  Now with concern for hyperviscosity syndrome given inability to even obtain immunoglobulin panel due to serum viscosity. Mental status is improving; pt is now alert although still confused.

## 2022-09-09 NOTE — PROGRESS NOTE ADULT - ASSESSMENT
77 year old female with PMH of HLD, lymphoma (dx 2005, s/p CARRIE lobectomy, relapsed in 2019 but not currently on chemo/RT, [place] [facility] [place] bipolar disorder, asthma, who presented to the hospital for AMS and hypoxic respiratory failure, found to have hypercalcemia.     #Hypercalcemia, likely malignancy-related elevated 1,25D (lymphoma) vs medication induced (lithium induced parathyroidism) vs in the setting of potential multiple myeloma transformation   - total protein of 12.0, intact PTH level of 25, Vitamin D 25-hydroxy of 18.4 (low).   serum immunofixation assay - monoclonal IgM lambda protein, hematology service is following, hyperviscosity syndrome.  - s/p 4 doses of IV Calcitonin. Follow with repeat serum Calcium levels.   - s/p pamidronate 60 mg IV x 1 8/30/22   - Given PTH level not elevated, lithium-induced hyperparathyroidism less likely etiology of hypercalcemia at this moment.   -Corrected calcium 10.6 mildly elevated on yesterday 9/8 labs, continue to monitor without additional therapy. Optimize oral hydration.  -Still waiting for results on PTH-related peptide,   -Labs not drawn today, reordered Vitamin D 1,25-hydroxy level for AM labs on 9/10    #history of fracture in the setting of likely osteoporosis;.   - Imaging findings of Old right humeral neck fracture. Left proximal humeral fracture s/p ORIF  .- f/u outpatient bone-density testing.   - 25-hydroxy Vitamin D level of 18.4 (low), f/u 1,25 hydroxy level. Patient may benefit from future Vitamin D supplementation pending results from the rest of workup.   Primary team started cholecalciferol 1000 units daily.    #Hyperlipidemia  - history of hyperlipidemia   - Cholesterol of 54, Triglycerides of 30, Serum HDL of 21, LDL of 27. A1C of 4.5   - can hold on statin therapy at this time due to age and risk factors at this time   - home medication history of zyprexa.     #Hypoglycemia - unclear if meeting whipple's triad  Ongoing poor po intake, on nonrebreather mask. Not eating solid food but accepting liquids. Discussed with RN importance of offering Ensure and juice to patient throughout the day.  encourage po intake/assistance  Can check 8AM cortisol and Free T4 in AM  If able can send labs with serum glucose at time of low glucose on point of care testing to confirm.      Rifka Maycol-Natali, MD  Division of Endocrinology  Pager: [phone]    If after 6PM or before 9AM, or on weekends/holidays, please call endocrine answering service for assistance ([phone]).  For nonurgent matters email [email or web address] for assistance.

## 2022-09-09 NOTE — CHART NOTE - NSCHARTNOTEFT_GEN_A_CORE
Pt hypoxic to mid 80's this afternoon, 02 saturation improved with NRB. Pt not in distress. CXR done which shows New layering right effusion and moderate to large left effusion unchanged. IV Lasix 20mg Once on 9/9/22. Monitor symptoms, Maintain O2 saturation > 92%. Heme Onc is also planning for repeat plasmapheresis today.

## 2022-09-09 NOTE — PROGRESS NOTE ADULT - PROBLEM SELECTOR PLAN 5
-Follows w/Dr. Real Cano @ [facility] [place]  -received treatment recently per daughter however she doesn't know specifics  -with progression of disease  -hematology to speak to [facility] regarding if further treatment is suggested  -Hem recs appreciated

## 2022-09-09 NOTE — PROGRESS NOTE ADULT - PROBLEM SELECTOR PLAN 7
-Hx of bipolar w/psych admission @ [place] many years ago, unclear if patient actually had Bipolar   -non-compliant with medications  -Zyprexa 2.5mg QHS increased to 3.75mg QHS on 9/8/22 (pt refused), if patient has respiratory depression- hold Zyprexa, may try low dose Mirtazapine (also sedating)  -Psych recommendations appreciated   -Supportive care

## 2022-09-09 NOTE — BH CONSULTATION LIAISON PROGRESS NOTE - NSBHCHARTREVIEWVS_PSY_A_CORE FT
Vital Signs Last 24 Hrs  T(C): 36.5 (02 Sep 2022 13:00), Max: 37.1 (01 Sep 2022 16:30)  T(F): 97.7 (02 Sep 2022 13:00), Max: 98.7 (01 Sep 2022 16:30)  HR: 95 (02 Sep 2022 13:00) (82 - 105)  BP: 151/89 (02 Sep 2022 13:00) (107/70 - 151/89)  BP(mean): 91 (02 Sep 2022 12:15) (88 - 91)  RR: 16 (02 Sep 2022 13:00) (14 - 20)  SpO2: 100% (02 Sep 2022 13:00) (95% - 100%)    Parameters below as of 02 Sep 2022 13:00  Patient On (Oxygen Delivery Method): nasal cannula  O2 Flow (L/min): 6  
Vital Signs Last 24 Hrs  T(C): 37.1 (07 Sep 2022 05:30), Max: 37.1 (07 Sep 2022 05:30)  T(F): 98.8 (07 Sep 2022 05:30), Max: 98.8 (07 Sep 2022 05:30)  HR: 99 (07 Sep 2022 09:50) (68 - 108)  BP: 121/76 (07 Sep 2022 05:30) (102/58 - 121/76)  BP(mean): --  RR: 18 (07 Sep 2022 05:30) (16 - 18)  SpO2: 98% (07 Sep 2022 09:50) (95% - 100%)    Parameters below as of 07 Sep 2022 05:30  Patient On (Oxygen Delivery Method): nasal cannula  O2 Flow (L/min): 2  
Vital Signs Last 24 Hrs  T(C): 36.9 (09 Sep 2022 12:15), Max: 37 (08 Sep 2022 15:29)  T(F): 98.4 (09 Sep 2022 12:15), Max: 98.6 (08 Sep 2022 15:29)  HR: 94 (09 Sep 2022 12:15) (78 - 111)  BP: 105/63 (09 Sep 2022 12:15) (105/63 - 123/71)  BP(mean): --  RR: 17 (09 Sep 2022 12:15) (17 - 22)  SpO2: 100% (09 Sep 2022 12:15) (84% - 100%)    Parameters below as of 09 Sep 2022 12:15  Patient On (Oxygen Delivery Method): mask, nonrebreather    
Vital Signs Last 24 Hrs  T(C): 36.6 (31 Aug 2022 13:00), Max: 36.9 (31 Aug 2022 00:41)  T(F): 97.9 (31 Aug 2022 13:00), Max: 98.4 (31 Aug 2022 00:41)  HR: 91 (31 Aug 2022 13:00) (84 - 93)  BP: 100/65 (31 Aug 2022 13:00) (100/65 - 124/65)  BP(mean): --  RR: 20 (31 Aug 2022 13:00) (19 - 20)  SpO2: 100% (31 Aug 2022 13:00) (89% - 100%)    Parameters below as of 31 Aug 2022 13:00  Patient On (Oxygen Delivery Method): mask, nonrebreather

## 2022-09-09 NOTE — PROGRESS NOTE ADULT - SUBJECTIVE AND OBJECTIVE BOX
Patient is a 77y old  Female who presents with a chief complaint of Hypoxic respiratory failure (09 Sep 2022 13:12)    Patient seen this morning. Appears calmer and clearer today. Denies new complaints.     MEDICATIONS  (STANDING):  albumin human  5% IVPB 2500 milliLiter(s) IV Intermittent once  albumin human  5% IVPB 2500 milliLiter(s) IV Intermittent once  albuterol/ipratropium for Nebulization 3 milliLiter(s) Nebulizer every 12 hours  budesonide  80 MICROgram(s)/formoterol 4.5 MICROgram(s) Inhaler 2 Puff(s) Inhalation two times a day  calcium gluconate IVPB 2 Gram(s) IV Intermittent once  chlorhexidine 2% Cloths 1 Application(s) Topical daily  cholecalciferol 1000 Unit(s) Oral daily  dronabinol 5 milliGRAM(s) Oral two times a day  heparin   Injectable 5000 Unit(s) SubCutaneous every 12 hours  mirtazapine 7.5 milliGRAM(s) Oral <User Schedule>  multivitamin 1 Tablet(s) Oral daily    MEDICATIONS  (PRN):  acetaminophen     Tablet .. 650 milliGRAM(s) Oral every 6 hours PRN Temp greater or equal to 38C (100.4F), Mild Pain (1 - 3)  ALBUTerol    90 MICROgram(s) HFA Inhaler 2 Puff(s) Inhalation every 6 hours PRN Shortness of Breath and/or Wheezing  aluminum hydroxide/magnesium hydroxide/simethicone Suspension 30 milliLiter(s) Oral every 4 hours PRN Dyspepsia  melatonin 3 milliGRAM(s) Oral at bedtime PRN Insomnia  ondansetron Injectable 4 milliGRAM(s) IV Push every 8 hours PRN Nausea and/or Vomiting        Vital Signs Last 24 Hrs  T(C): 36.9 (09 Sep 2022 12:15), Max: 37 (08 Sep 2022 20:49)  T(F): 98.4 (09 Sep 2022 12:15), Max: 98.6 (08 Sep 2022 20:49)  HR: 94 (09 Sep 2022 12:15) (78 - 111)  BP: 105/63 (09 Sep 2022 12:15) (105/63 - 123/71)  BP(mean): --  RR: 17 (09 Sep 2022 12:15) (17 - 22)  SpO2: 100% (09 Sep 2022 12:15) (84% - 100%)    Parameters below as of 09 Sep 2022 12:15  Patient On (Oxygen Delivery Method): mask, nonrebreather        PE  NAD  Awake, alert  Anicteric, MMM  RRR  CTAB  Abd soft, NT, ND  No c/c/e  No rash grossly                          8.7    6.15  )-----------( 156      ( 09 Sep 2022 13:20 )             30.5       09-09    142  |  97<L>  |  18  ----------------------------<  100<H>  4.7   |  34<H>  |  0.63    Ca    10.1      09 Sep 2022 13:20  Phos  2.5     09-09  Mg     1.90     09-09    TPro  10.0<H>  /  Alb  3.2<L>  /  TBili  0.8  /  DBili  x   /  AST  17  /  ALT  11  /  AlkPhos  35<L>  09-09        ACC: [id number] EXAM:  XR CHEST PORTABLE URGENT 1V                          PROCEDURE DATE:  09/09/2022          INTERPRETATION:  CLINICAL INFORMATION: Hypoxia    TIME OF EXAMINATION: September 9 at 1:30 PM    EXAM: Portable chest    FINDINGS:  Small to moderate layering right effusion appears new from the last   study. Moderate left pleural effusion unchanged. No focal consolidations   in the visible lungs. Heart difficult to evaluate on this radiograph.   Right central venous catheter not seen on the last study.    Old fracture proximal right humerus.        COMPARISON: August 31, 2022        IMPRESSION:  New layering right effusion and moderate to large left effusion unchanged.    --- End of Report ---

## 2022-09-09 NOTE — BH CONSULTATION LIAISON PROGRESS NOTE - NSBHATTESTBILLINGAW_PSY_A_CORE
[id number] Inpatient care - moderate complexity - 25 minutes
[id number] Inpatient care - moderate complexity - 25 minutes
[id number] Inpatient care - moderate complexity - 25 minutes
[id number] Inpatient care - moderate complexity - 25 minutes

## 2022-09-09 NOTE — PROGRESS NOTE ADULT - SUBJECTIVE AND OBJECTIVE BOX
[facility] Division of Hospital Medicine  Barbie Brooks MD  Hospitalist   Pager [phone]  Avaliable via MS teams     77y old  Female who presents with a chief complaint of Hypoxic respiratory failure.     SUBJECTIVE / OVERNIGHT EVENTS: Pt seen and examined. This morning, patient was desaturating to mid 80's, placed on NRB. Pt looked comfortable, not endorsing any complaints. Pt did not receive Zyprexa last night, refused.     ADDITIONAL REVIEW OF SYSTEMS:    MEDICATIONS  (STANDING):  albumin human  5% IVPB 2500 milliLiter(s) IV Intermittent once  albuterol/ipratropium for Nebulization 3 milliLiter(s) Nebulizer every 12 hours  budesonide  80 MICROgram(s)/formoterol 4.5 MICROgram(s) Inhaler 2 Puff(s) Inhalation two times a day  chlorhexidine 2% Cloths 1 Application(s) Topical daily  cholecalciferol 1000 Unit(s) Oral daily  dronabinol 2.5 milliGRAM(s) Oral two times a day  heparin   Injectable 5000 Unit(s) SubCutaneous every 12 hours  multivitamin 1 Tablet(s) Oral daily  OLANZapine 3.75 milliGRAM(s) Oral at bedtime    MEDICATIONS  (PRN):  acetaminophen     Tablet .. 650 milliGRAM(s) Oral every 6 hours PRN Temp greater or equal to 38C (100.4F), Mild Pain (1 - 3)  ALBUTerol    90 MICROgram(s) HFA Inhaler 2 Puff(s) Inhalation every 6 hours PRN Shortness of Breath and/or Wheezing  aluminum hydroxide/magnesium hydroxide/simethicone Suspension 30 milliLiter(s) Oral every 4 hours PRN Dyspepsia  melatonin 3 milliGRAM(s) Oral at bedtime PRN Insomnia  ondansetron Injectable 4 milliGRAM(s) IV Push every 8 hours PRN Nausea and/or Vomiting      I&O's Summary      PHYSICAL EXAM:  Vital Signs Last 24 Hrs  T(C): 36.9 (09 Sep 2022 12:15), Max: 37 (08 Sep 2022 15:29)  T(F): 98.4 (09 Sep 2022 12:15), Max: 98.6 (08 Sep 2022 15:29)  HR: 94 (09 Sep 2022 12:15) (78 - 111)  BP: 105/63 (09 Sep 2022 12:15) (105/63 - 123/71)  RR: 17 (09 Sep 2022 12:15) (17 - 22)  SpO2: 100% (09 Sep 2022 12:15) (84% - 100%)    Parameters below as of 09 Sep 2022 12:15  Patient On (Oxygen Delivery Method): mask, nonrebreather    CONSTITUTIONAL: NAD, well-developed, on NRB   EYES: PERRLA; conjunctiva and sclera clear  ENMT: Moist oral mucosa, no pharyngeal injection or exudates; poor dentition  NECK: Supple, no palpable masses; no thyromegaly, dressing in place cdi over the non-tunneled catheter   RESPIRATORY: Normal respiratory effort; lungs are clear to auscultation bilaterally  CARDIOVASCULAR: Regular rate and rhythm, normal S1 and S2, no murmur/rub/gallop;  ABDOMEN: Nontender to palpation, normoactive bowel sounds, no rebound/guarding; No hepatosplenomegaly  MUSCULOSKELETAL:  Normal gait; no clubbing or cyanosis of digits; no joint swelling or tenderness to palpation  PSYCH: A+O x1  NEUROLOGY: CN 2-12 are intact and symmetric; no gross sensory deficits   SKIN: No rashes; no palpable lesions    LABS:                        8.9    6.28  )-----------( 145      ( 08 Sep 2022 06:15 )             32.5     09-08    141  |  97<L>  |  15  ----------------------------<  93  4.6   |  33<H>  |  0.60    Ca    10.0      08 Sep 2022 06:15  Phos  2.8     09-08  Mg     1.70     09-08    TPro  10.0<H>  /  Alb  3.3  /  TBili  0.8  /  DBili  x   /  AST  18  /  ALT  12  /  AlkPhos  35<L>  09-08      COVID-19 PCR: NotDetec (05 Sep 2022 06:43)      RADIOLOGY & ADDITIONAL TESTS:  New Results Reviewed Today:   New Imaging Personally Reviewed Today:  New Electrocardiogram Personally Reviewed Today:  Prior or Outpatient Records Reviewed Today:    COMMUNICATION:  Care Discussed with Consultants/Other Providers and Details of Discussion:  Discussions with Patient/Family:  PCP Communication:

## 2022-09-09 NOTE — BH CONSULTATION LIAISON PROGRESS NOTE - NSBHFUPINTERVALHXFT_PSY_A_CORE
patient still confused, told that she must eat more, difficult to understand with AVAPS, per staff patient gets confused and removes masks needs redirection to keep on 
patient confused, not agitated, tolerating NRB, AOx2
patient confused, AOx1 today, able to follow simple commands, sleeping with NRB 
patient still confused, denies depression not eating says she has no appetite, sleeping poorly

## 2022-09-09 NOTE — PROGRESS NOTE ADULT - PROBLEM SELECTOR PLAN 2
-also with hypoxemic respiratory failure  -pCO2 improving  -CXR with concern for L pleural effusion, however, as per CT, changes are largely attributed to pt's lymphoma. No need for thora, as per pulm  - transitioned to AVAPS only QHS, NC during the day , on 9/9/22 - requiring NRB, Follow up with CXR, if mentation changes- order ABG   -Continue Duoneb BID , c/w Symbicort BID,  Albuterol PRN   -IVF d/john due to concern for pulmonary edema leading to hypoxia

## 2022-09-09 NOTE — CHART NOTE - NSCHARTNOTEFT_GEN_A_CORE
77y old  Female with PMH of lymphoma (dx 2005, s/p CARRIE lobectomy, relapsed in 2019 but not currently on chemo/RT, [place] [facility] [place] bipolar disorder, asthma (on O2 PRN at home, unknown how many L) brought to ED by daughter for hallucination, being more depressed.       - Metabolic encephalopathy. Likely multi-factorial: UTI, hypercapnia, hypercalcemia, psychiatric disorder,  with  concern for hyperviscosity syndrome given inability to even obtain immunoglobulin panel due to degree of hyperviscosity serum viscosity level sent and pending plasmapheresis done on 9/2, now hematology recommended another procedure for worsening condition.   Procedure was arranged with [facility] apheresis service, for exchange with one plasma volume with 2500cc albumin 5%.

## 2022-09-09 NOTE — PROGRESS NOTE ADULT - ASSESSMENT
77 year old female with PMH of lymphoma (dx 2005, s/p CARRIE lobectomy, relapsed in 2019 but not currently on chemo/RT, being followed by [place] every 3[place]months [place] [facility] [place] bipolar disorder, asthma (on O2 PRN at home, unknown how many L) brought to ED by daughter who states pt has been hallucinating, being more depressed.  Patient unable to provide much history, daughter Alissa states that patient was able to care for herself, manage home finances and attend doctor appointments independently until recently, she states that she believes her mom stopped taking her psychiatric medications in February of 2022 and has not followed up with any of her physicians. She has noticed recent weight loss (unsure how much) and change in voice.   Admitted to medicine service for further evaluation & treatment. Course c/b acute hypercapnic and hypoxic respiratory failure requiring BIPAP and AVAPS.  Now with concern for hyperviscosity syndrome given inability to even obtain immunoglobulin panel due to serum viscosity. Mental status is improving; pt is now alert although still confused.

## 2022-09-09 NOTE — PROGRESS NOTE ADULT - PROBLEM SELECTOR PLAN 4
-likely due to underlying malignancy, lymphoma vs new MM. F/u vitamin D 25 and 1, 25, PTHRP, MM labs. PTH is inappropriately normal  -s/p pamidronate on 8/30; Ca improved.  -calcitonin x 4 doses  -unfortunately, will hold off fluids for now given pulmonary edema that developed with IVF upon admission, which later required need for Lasix IV and AVAPS  -Apprec Endocrine recs  - continue to encourage PO intake  - f/u vitamin D 1,25 and PTHrP

## 2022-09-09 NOTE — PROGRESS NOTE ADULT - SUBJECTIVE AND OBJECTIVE BOX
Chief Complaint: Hypercalcemia, hypoglycemia    History: glucose 50s noted this AM  patient poor historian unable to report if had hypoglycemia symptoms  Now on nonrebreather  Was given D50 with improvement in glucose  patient not eating, reports not hungry.  Per staff with encouragement will drink Ensure or juice.    MEDICATIONS  (STANDING):  albumin human  5% IVPB 2500 milliLiter(s) IV Intermittent once  albuterol/ipratropium for Nebulization 3 milliLiter(s) Nebulizer every 12 hours  budesonide  80 MICROgram(s)/formoterol 4.5 MICROgram(s) Inhaler 2 Puff(s) Inhalation two times a day  chlorhexidine 2% Cloths 1 Application(s) Topical daily  cholecalciferol 1000 Unit(s) Oral daily  dronabinol 2.5 milliGRAM(s) Oral two times a day  heparin   Injectable 5000 Unit(s) SubCutaneous every 12 hours  multivitamin 1 Tablet(s) Oral daily  OLANZapine 3.75 milliGRAM(s) Oral at bedtime    MEDICATIONS  (PRN):  acetaminophen     Tablet .. 650 milliGRAM(s) Oral every 6 hours PRN Temp greater or equal to 38C (100.4F), Mild Pain (1 - 3)  ALBUTerol    90 MICROgram(s) HFA Inhaler 2 Puff(s) Inhalation every 6 hours PRN Shortness of Breath and/or Wheezing  aluminum hydroxide/magnesium hydroxide/simethicone Suspension 30 milliLiter(s) Oral every 4 hours PRN Dyspepsia  melatonin 3 milliGRAM(s) Oral at bedtime PRN Insomnia  ondansetron Injectable 4 milliGRAM(s) IV Push every 8 hours PRN Nausea and/or Vomiting      Allergies    latex (Rash)  No Known Drug Allergies    Intolerances      Review of Systems:  UNABLE TO OBTAIN    PHYSICAL EXAM:  VITALS: T(C): 36.9 (09-09-22 @ 12:15)  T(F): 98.4 (09-09-22 @ 12:15), Max: 98.6 (09-08-22 @ 15:29)  HR: 94 (09-09-22 @ 12:15) (78 - 111)  BP: 105/63 (09-09-22 @ 12:15) (105/63 - 123/71)  RR:  (17 - 22)  SpO2:  (84% - 100%)  Wt(kg): --  GENERAL: awake, confused  EYES: No proptosis, no lid lag, anicteric  HEENT:  Atraumatic, Normocephalic, moist mucous membranes  RESPIRATORY: on nonrebreather mask, non labored    CAPILLARY BLOOD GLUCOSE      POCT Blood Glucose.: 93 mg/dL (09 Sep 2022 11:25)  POCT Blood Glucose.: 178 mg/dL (09 Sep 2022 08:31)  POCT Blood Glucose.: 56 mg/dL (09 Sep 2022 07:38)  POCT Blood Glucose.: 59 mg/dL (09 Sep 2022 07:31)  POCT Blood Glucose.: 78 mg/dL (08 Sep 2022 21:51)  POCT Blood Glucose.: 81 mg/dL (08 Sep 2022 16:58)      09-08    141  |  97<L>  |  15  ----------------------------<  93  4.6   |  33<H>  |  0.60    eGFR: 93    Ca    10.0      09-08  Mg     1.70     09-08  Phos  2.8     09-08    TPro  10.0<H>  /  Alb  3.3  /  TBili  0.8  /  DBili  x   /  AST  18  /  ALT  12  /  AlkPhos  35<L>  09-08      A1C with Estimated Average Glucose Result: 4.5 % (08-31-22 @ 03:48)      Thyroid Function Tests:  08-31 @ 03:48 TSH 1.42 FreeT4 -- T3 -- Anti TPO -- Anti Thyroglobulin Ab -- TSI --  08-29 @ 22:52 TSH 3.06 FreeT4 -- T3 -- Anti TPO -- Anti Thyroglobulin Ab -- TSI --

## 2022-09-09 NOTE — PROGRESS NOTE ADULT - ASSESSMENT
MANDI GASPAR is a 77y Female who presents with a chief complaint of hypoxic respiratory failure.    Marginal Zone Lymphoma  - Patient treated previously at [facility]; was on rituximab + bendamustine last in August 2020, and since then has been on surveillance, but lost to follow-up since July 2021.  - CT chest concerning for slight progression of disease.  - Unable to obtain IgM level due to hyperviscosity. Noted elevated lambda light chain compared to kappa similar to values in early 2020.  - Immunofixation with IgM lambda monoclonal protein as seen previously during admission in 2020.     Lymphoplasmacytic Lymphoma  - Noted from bone marrow biopsy done during prior admission here   - s/p plasmapheresis 09/02 with some improvement in IgM  - Recommend repeat plasmapheresis today and check IgM levels afterwards   - If improved, will start rituximab    - Will continue discussion with [facility]. They are in agreement with plasmapheresis + rituximab     Altered Mental Status  - Likely multifactorial with hyperviscosity of blood, hypercalcemia, urinary tract infection.  - S/p Shiley placement  - Completed antibiotics  - Endocrinology following. Continue to monitor calcium levels closely.  - Psych input appreciated    Hypoxia  - Currently on AVAPS  - Pulmonology following.    Hospital course and plan discussed with patient's daughter.    Will continue to follow.    Giovana Duarte PA-C  Hematology/Oncology  [facility]  [phone] (office)  [phone] (alt office)  Evenings and weekends please call MD on call or office

## 2022-09-09 NOTE — BH CONSULTATION LIAISON PROGRESS NOTE - CURRENT MEDICATION
MEDICATIONS  (STANDING):  calcitonin Injectable 240 International Unit(s) IntraMuscular every 12 hours  cefTRIAXone   IVPB 1000 milliGRAM(s) IV Intermittent every 24 hours  heparin   Injectable 5000 Unit(s) SubCutaneous every 12 hours  OLANZapine 2.5 milliGRAM(s) Oral at bedtime  thiamine IVPB 500 milliGRAM(s) IV Intermittent three times a day    MEDICATIONS  (PRN):  acetaminophen     Tablet .. 650 milliGRAM(s) Oral every 6 hours PRN Temp greater or equal to 38C (100.4F), Mild Pain (1 - 3)  aluminum hydroxide/magnesium hydroxide/simethicone Suspension 30 milliLiter(s) Oral every 4 hours PRN Dyspepsia  melatonin 3 milliGRAM(s) Oral at bedtime PRN Insomnia  ondansetron Injectable 4 milliGRAM(s) IV Push every 8 hours PRN Nausea and/or Vomiting  
MEDICATIONS  (STANDING):  albumin human  5% IVPB 2500 milliLiter(s) IV Intermittent once  albuterol/ipratropium for Nebulization 3 milliLiter(s) Nebulizer every 12 hours  budesonide  80 MICROgram(s)/formoterol 4.5 MICROgram(s) Inhaler 2 Puff(s) Inhalation two times a day  calcium gluconate IVPB 2 Gram(s) IV Intermittent once  chlorhexidine 2% Cloths 1 Application(s) Topical daily  heparin   Injectable 5000 Unit(s) SubCutaneous every 12 hours  multivitamin 1 Tablet(s) Oral daily  OLANZapine 2.5 milliGRAM(s) Oral at bedtime  thiamine IVPB 500 milliGRAM(s) IV Intermittent three times a day    MEDICATIONS  (PRN):  acetaminophen     Tablet .. 650 milliGRAM(s) Oral every 6 hours PRN Temp greater or equal to 38C (100.4F), Mild Pain (1 - 3)  ALBUTerol    90 MICROgram(s) HFA Inhaler 2 Puff(s) Inhalation every 6 hours PRN Shortness of Breath and/or Wheezing  aluminum hydroxide/magnesium hydroxide/simethicone Suspension 30 milliLiter(s) Oral every 4 hours PRN Dyspepsia  melatonin 3 milliGRAM(s) Oral at bedtime PRN Insomnia  ondansetron Injectable 4 milliGRAM(s) IV Push every 8 hours PRN Nausea and/or Vomiting  
MEDICATIONS  (STANDING):  albumin human  5% IVPB 2500 milliLiter(s) IV Intermittent once  albuterol/ipratropium for Nebulization 3 milliLiter(s) Nebulizer every 12 hours  budesonide  80 MICROgram(s)/formoterol 4.5 MICROgram(s) Inhaler 2 Puff(s) Inhalation two times a day  chlorhexidine 2% Cloths 1 Application(s) Topical daily  cholecalciferol 1000 Unit(s) Oral daily  dronabinol 2.5 milliGRAM(s) Oral two times a day  heparin   Injectable 5000 Unit(s) SubCutaneous every 12 hours  multivitamin 1 Tablet(s) Oral daily  OLANZapine 3.75 milliGRAM(s) Oral at bedtime    MEDICATIONS  (PRN):  acetaminophen     Tablet .. 650 milliGRAM(s) Oral every 6 hours PRN Temp greater or equal to 38C (100.4F), Mild Pain (1 - 3)  ALBUTerol    90 MICROgram(s) HFA Inhaler 2 Puff(s) Inhalation every 6 hours PRN Shortness of Breath and/or Wheezing  aluminum hydroxide/magnesium hydroxide/simethicone Suspension 30 milliLiter(s) Oral every 4 hours PRN Dyspepsia  melatonin 3 milliGRAM(s) Oral at bedtime PRN Insomnia  ondansetron Injectable 4 milliGRAM(s) IV Push every 8 hours PRN Nausea and/or Vomiting  
MEDICATIONS  (STANDING):  albumin human  5% IVPB 2500 milliLiter(s) IV Intermittent once  albuterol/ipratropium for Nebulization 3 milliLiter(s) Nebulizer every 12 hours  budesonide  80 MICROgram(s)/formoterol 4.5 MICROgram(s) Inhaler 2 Puff(s) Inhalation two times a day  calcium gluconate IVPB 2 Gram(s) IV Intermittent once  heparin   Injectable 5000 Unit(s) SubCutaneous every 12 hours  OLANZapine 2.5 milliGRAM(s) Oral at bedtime  thiamine IVPB 500 milliGRAM(s) IV Intermittent three times a day    MEDICATIONS  (PRN):  acetaminophen     Tablet .. 650 milliGRAM(s) Oral every 6 hours PRN Temp greater or equal to 38C (100.4F), Mild Pain (1 - 3)  ALBUTerol    90 MICROgram(s) HFA Inhaler 2 Puff(s) Inhalation every 6 hours PRN Shortness of Breath and/or Wheezing  aluminum hydroxide/magnesium hydroxide/simethicone Suspension 30 milliLiter(s) Oral every 4 hours PRN Dyspepsia  melatonin 3 milliGRAM(s) Oral at bedtime PRN Insomnia  ondansetron Injectable 4 milliGRAM(s) IV Push every 8 hours PRN Nausea and/or Vomiting

## 2022-09-09 NOTE — BH CONSULTATION LIAISON PROGRESS NOTE - NSBHCONSFOLLOWNEEDS_PSY_ALL_CORE
Needs further psych safety assessment prior to discharge

## 2022-09-09 NOTE — BH CONSULTATION LIAISON PROGRESS NOTE - NSBHASSESSMENTFT_PSY_ALL_CORE
77 year old female with PPH of bipolar d/o, PMH of lymphoma dx 2005, s/p CARRIE lobectomy, relapsed in 2019 but not currently on chemo/RT, asthma admitted for FTT, depression, confusion. Psych c/s for the same.     Patient with notable features of delirium and bipolar depression, may be interfering with self care and to a degree cognition (pseudodementia) as well. Will continue to evaluate to r/o other cognitive limitations. No tremors or finger-nose dysmetria on exam, unlikely to be lithium toxic (level 1.5 high), though suspect patient had at least recently been taking lithium, suspect polypharmacy may be contributing to confusion to a degree. Notable increase in Cr    8/31 - patient more delirious today, c/w zyprexa would avoid lithium/vpa for now   9/2 - patient less delirous though still confused, meds as below   9/7 - still very delirious, low utility in med titration for depression. OK to use meds for appetite stimulation, would hold if marinol becomes sedating (is typically not sedating)  9/9 - delirium somewhat improved but still confused, meds as below, low concern for bipolar d/o will continue to observe    Recs:  - STOP lithium  - STOP Zyprexa 2.5mg qHS  - Begin mirtazapine 7.5mg qHS (hold for resp. depression or oversedation)  - Increase dronabinol to 5mg BID 1h prior to breakfast and lunch   - hold depakote for now  - f/u b12, folate  - empiric IV thiamine repletion 500mg IV TID   - PRN for agitation Zyprexa 2.5mg q6h PRN  - f/u EKG for QTc  - psych will follow  
77 year old female with PPH of bipolar d/o, PMH of lymphoma dx 2005, s/p CARRIE lobectomy, relapsed in 2019 but not currently on chemo/RT, asthma admitted for FTT, depression, confusion. Psych c/s for the same.     Patient with notable features of delirium and bipolar depression, may be interfering with self care and to a degree cognition (pseudodementia) as well. Will continue to evaluate to r/o other cognitive limitations. No tremors or finger-nose dysmetria on exam, unlikely to be lithium toxic (level 1.5 high), though suspect patient had at least recently been taking lithium, suspect polypharmacy may be contributing to confusion to a degree. Notable increase in Cr    8/31 - patient more delirious today, c/w zyprexa would avoid lithium/vpa for now     Recs:  - STOP lithium  - BEGIN Zyprexa 2.5mg qHS  - hold depakote for now  - f/u b12, folate  - empiric IV thiamine repletion 500mg IV TID   - f/u EKG for QTc  - psych will follow  
77 year old female with PPH of bipolar d/o, PMH of lymphoma dx 2005, s/p CARRIE lobectomy, relapsed in 2019 but not currently on chemo/RT, asthma admitted for FTT, depression, confusion. Psych c/s for the same.     Patient with notable features of delirium and bipolar depression, may be interfering with self care and to a degree cognition (pseudodementia) as well. Will continue to evaluate to r/o other cognitive limitations. No tremors or finger-nose dysmetria on exam, unlikely to be lithium toxic (level 1.5 high), though suspect patient had at least recently been taking lithium, suspect polypharmacy may be contributing to confusion to a degree. Notable increase in Cr    8/31 - patient more delirious today, c/w zyprexa would avoid lithium/vpa for now   9/2 - patient less delirous though still confused, meds as below   9/7 - still very delirious, low utility in med titration for depression. OK to use meds for appetite stimulation, would hold if marinol becomes sedating (is typically not sedating)    Recs:  - STOP lithium  - c/w Zyprexa 2.5mg qHS  - Begin dronabinol 2.5mg BID 1h prior to breakfast and lunch   - hold depakote for now  - f/u b12, folate  - empiric IV thiamine repletion 500mg IV TID   - PRN for agitation Zyprexa 2.5mg q6h PRN  - f/u EKG for QTc  - psych will follow  
77 year old female with PPH of bipolar d/o, PMH of lymphoma dx 2005, s/p CARRIE lobectomy, relapsed in 2019 but not currently on chemo/RT, asthma admitted for FTT, depression, confusion. Psych c/s for the same.     Patient with notable features of delirium and bipolar depression, may be interfering with self care and to a degree cognition (pseudodementia) as well. Will continue to evaluate to r/o other cognitive limitations. No tremors or finger-nose dysmetria on exam, unlikely to be lithium toxic (level 1.5 high), though suspect patient had at least recently been taking lithium, suspect polypharmacy may be contributing to confusion to a degree. Notable increase in Cr    8/31 - patient more delirious today, c/w zyprexa would avoid lithium/vpa for now   9/2 - patient less delirous though still confused, meds as below     Recs:  - STOP lithium  - c/w Zyprexa 2.5mg qHS  - hold depakote for now  - f/u b12, folate  - empiric IV thiamine repletion 500mg IV TID   - PRN for agitation Zyprexa 2.5mg q6h PRN  - f/u EKG for QTc  - psych will follow

## 2022-09-10 NOTE — CHART NOTE - NSCHARTNOTEFT_GEN_A_CORE
77 year old female with PMH of HLD, lymphoma (dx 2005, s/p CARRIE lobectomy, relapsed in 2019 but not currently on chemo/RT, being followed by heme every 3-6months at [facility]), bipolar disorder, asthma, who presented to the hospital for AMS and hypoxic respiratory failure, found to have hypercalcemia.     #Hypercalcemia, likely malignancy-related elevated 1,25D (lymphoma) vs medication induced (lithium induced parathyroidism) vs in the setting of potential multiple myeloma transformation   - total protein of 12.0, intact PTH level of 25, Vitamin D 25-hydroxy of 18.4 (low).   serum immunofixation assay - monoclonal IgM lambda protein, hematology service is following, hyperviscosity syndrome.  - s/p 4 doses of IV Calcitonin. Follow with repeat serum Calcium levels.   - s/p pamidronate 60 mg IV x 1 8/30/22   - Given PTH level not elevated, lithium-induced hyperparathyroidism less likely etiology of hypercalcemia at this moment.   -Corrected calcium 10.7 mildly elevated on yesterday 9/10 labs, continue to monitor without additional therapy. Optimize oral hydration.  -Still waiting for results on PTH-related peptide,   -Labs not drawn today, please order  Vitamin D 1,25-hydroxy level for AM labs on 9/11    #history of fracture in the setting of likely osteoporosis;.   - Imaging findings of Old right humeral neck fracture. Left proximal humeral fracture s/p ORIF  .- f/u outpatient bone-density testing.   - 25-hydroxy Vitamin D level of 18.4 (low), f/u 1,25 hydroxy level. Patient may benefit from future Vitamin D supplementation pending results from the rest of workup.   Primary team started cholecalciferol 1000 units daily.    #Hyperlipidemia  - history of hyperlipidemia   - Cholesterol of 54, Triglycerides of 30, Serum HDL of 21, LDL of 27. A1C of 4.5   - can hold on statin therapy at this time due to age and risk factors at this time   - home medication history of zyprexa.     #Hypoglycemia - unclear if meeting whipple's triad  Ongoing poor po intake, on nonrebreather mask. Not eating solid food but accepting liquids. Endo team had Discussed with RN importance of offering Ensure and juice to patient throughout the day on 9/9  encourage po intake/assistance  glucose has now improved in past 24 hrs   Can check 8AM cortisol and Free T4 in AM (this was to be drawn today and not done)  If recurrent hypoglycemia on FSBG, and if able to, would  can send lab at with serum glucose at time of low glucose on point of care testing to confirm if true low blood glucose           Brionna Montague MD  Attending Physician   Department of Endocrinology, Diabetes and Metabolism     Pager  [phone] [please provide 10 digit call back number]  LIVICKI 9-5  [email or web address]  Nights and weekends: [phone]  Please note that this patient may be followed by a different provider tomorrow.   If no answer or after hours, please contact [phone].  For final dc reccomendations, please call [phone] or page the endocrine fellow on call.

## 2022-09-10 NOTE — PROGRESS NOTE ADULT - SUBJECTIVE AND OBJECTIVE BOX
CHIEF COMPLAINT: shortness of breath, hypercapnia    Interval Events:  - patient w/worsening mental status and increasing hypercapnea, AVAPS re-initiated      REVIEW OF SYSTEMS:  Constitutional: [ ] negative [ ] fevers [ ] chills [ ] weight loss [ ] weight gain  HEENT: [ ] negative [ ] dry eyes [ ] eye irritation [ ] postnasal drip [ ] nasal congestion  CV: [ ] negative  [ ] chest pain [ ] orthopnea [ ] palpitations [ ] murmur  Resp: [ ] negative [ ] cough [ ] shortness of breath [ ] dyspnea [ ] wheezing [ ] sputum [ ] hemoptysis  GI: [ ] negative [ ] nausea [ ] vomiting [ ] diarrhea [ ] constipation [ ] abd pain [ ] dysphagia   : [ ] negative [ ] dysuria [ ] nocturia [ ] hematuria [ ] increased urinary frequency  Musculoskeletal: [ ] negative [ ] back pain [ ] myalgias [ ] arthralgias [ ] fracture  Skin: [ ] negative [ ] rash [ ] itch  Neurological: [ ] negative [ ] headache [ ] dizziness [ ] syncope [ ] weakness [ ] numbness  Psychiatric: [ ] negative [ ] anxiety [ ] depression  Endocrine: [ ] negative [ ] diabetes [ ] thyroid problem  Hematologic/Lymphatic: [ ] negative [ ] anemia [ ] bleeding problem  Allergic/Immunologic: [ ] negative [ ] itchy eyes [ ] nasal discharge [ ] hives [ ] angioedema  [ ] All other systems negative  [x ] Unable to assess ROS because patient non-verbal     OBJECTIVE:  ICU Vital Signs Last 24 Hrs  T(C): 36.8 (11 Sep 2022 05:00), Max: 36.9 (10 Sep 2022 20:25)  T(F): 98.3 (11 Sep 2022 05:00), Max: 98.4 (10 Sep 2022 20:25)  HR: 84 (11 Sep 2022 05:00) (68 - 102)  BP: 103/71 (11 Sep 2022 05:00) (83/43 - 136/86)  BP(mean): --  ABP: --  ABP(mean): --  RR: 18 (11 Sep 2022 05:00) (18 - 20)  SpO2: 100% (11 Sep 2022 05:00) (95% - 100%)    O2 Parameters below as of 11 Sep 2022 05:00  Patient On (Oxygen Delivery Method): AVAPS          Mode: NIV (Noninvasive Ventilation), RR (machine): 16, TV (machine): 500, FiO2: 40, PEEP: 5, ITime: 1, P-High: 25, P-Low: 10, PIP: 25    CAPILLARY BLOOD GLUCOSE      POCT Blood Glucose.: 84 mg/dL (11 Sep 2022 07:46)      PHYSICAL EXAM:  General: somnolent, oriented X 0   HEENT: Atraumatic, normocephalic.                  No tonsillar or pharyngeal exudates.  Lymph Nodes: No palpable lymphadenopathy  Neck: No JVD. No carotid bruit.   Respiratory: decreased breath sounds L > R,   Cardiovascular: S1 S2 normal. No murmurs, rubs or gallops.   Abdomen: Soft, non-tender, non-distended. No organomegaly.  Extremities: Warm to touch. Peripheral pulse palpable. No pedal edema.   Neurological: Non-focal    HOSPITAL MEDICATIONS:  MEDICATIONS  (STANDING):  albumin human  5% IVPB 2500 milliLiter(s) IV Intermittent once  albumin human  5% IVPB 2500 milliLiter(s) IV Intermittent once  albuterol/ipratropium for Nebulization 3 milliLiter(s) Nebulizer every 12 hours  budesonide  80 MICROgram(s)/formoterol 4.5 MICROgram(s) Inhaler 2 Puff(s) Inhalation two times a day  calcium gluconate IVPB 2 Gram(s) IV Intermittent once  chlorhexidine 2% Cloths 1 Application(s) Topical daily  cholecalciferol 1000 Unit(s) Oral daily  heparin   Injectable 5000 Unit(s) SubCutaneous every 12 hours  mirtazapine 7.5 milliGRAM(s) Oral <User Schedule>  multivitamin 1 Tablet(s) Oral daily    MEDICATIONS  (PRN):  acetaminophen     Tablet .. 650 milliGRAM(s) Oral every 6 hours PRN Temp greater or equal to 38C (100.4F), Mild Pain (1 - 3)  ALBUTerol    90 MICROgram(s) HFA Inhaler 2 Puff(s) Inhalation every 6 hours PRN Shortness of Breath and/or Wheezing  aluminum hydroxide/magnesium hydroxide/simethicone Suspension 30 milliLiter(s) Oral every 4 hours PRN Dyspepsia  melatonin 3 milliGRAM(s) Oral at bedtime PRN Insomnia  ondansetron Injectable 4 milliGRAM(s) IV Push every 8 hours PRN Nausea and/or Vomiting      LABS:                        9.0    5.83  )-----------( 124      ( 11 Sep 2022 06:02 )             30.8     Hgb Trend: 9.0<--, 8.9<--, 8.7<--, 8.9<--, 9.4<--  09-11    144  |  98  |  18  ----------------------------<  73  4.3   |  37<H>  |  0.75    Ca    9.5      11 Sep 2022 06:02  Phos  1.7     09-11  Mg     1.60     09-11    TPro  10.0<H>  /  Alb  3.2<L>  /  TBili  0.8  /  DBili  x   /  AST  17  /  ALT  11  /  AlkPhos  35<L>  09-09    Creatinine Trend: 0.75<--, 0.63<--, 0.63<--, 0.60<--, 0.59<--, 0.63<--      Arterial Blood Gas:  09-10 @ 17:10  7.42/65/77/42/98.4/14.5  ABG lactate: --  Arterial Blood Gas:  09-10 @ 14:30  7.35/74/62/41/94.0/11.9  ABG lactate: --  Arterial Blood Gas:  09-10 @ 11:45  7.36/70/166/40/99.5/11.0  ABG lactate: --        MICROBIOLOGY:       RADIOLOGY:  [ ] Reviewed and interpreted by me    PULMONARY FUNCTION TESTS:    EKG:

## 2022-09-10 NOTE — PROGRESS NOTE ADULT - ATTENDING COMMENTS
77 year old female with history of lymphoma with acute on chronic hypoxic/hypercapnic respiratory failure.     Was called over the weekend for worsening mental status and acute on chronic hypercarbic respiratory failure. She had failed several hours of AVAPS and continued to be hypercapnic. Upon evaluation, the patient's facemask was not fitted properly and she was only pulling tidal volumes of 200-300cc. After adjustments to her face mask, her TV increased to above 500cc. This is likely the reason why she continues to have hypercarbia.     [facility] about follow up to ensure that her mask continues to be well fitted. She can take off her mask during the day if she has appropriately worn it throughout the night.     Medical management per primary team.      Will continue to follow.

## 2022-09-10 NOTE — PROGRESS NOTE ADULT - ASSESSMENT
MANDI GASPAR is a 77y Female who presents with a chief complaint of hypoxic respiratory failure.    Marginal Zone Lymphoma  - Patient treated previously at [facility]; was on rituximab + bendamustine last in August 2020, and since then has been on surveillance, but lost to follow-up since July 2021  - CT chest concerning for slight progression of disease  - Unable to obtain IgM level due to hyperviscosity. Noted elevated lambda light chain compared to kappa similar to values in early 2020  - Immunofixation with IgM lambda monoclonal protein as seen previously during admission in 2020    Lymphoplasmacytic Lymphoma  - Noted from bone marrow biopsy done during prior admission here   - s/p plasmapheresis 09/02 with some improvement in IgM  - s/p repeat plasmapheresis 09/09, check IgM levels afterwards   - If improved, may start rituximab    - Will continue to coordinate care with [facility]    Altered Mental Status  - Likely multifactorial with hyperviscosity of blood, hypercalcemia, urinary tract infection  - S/p Shiley placement  - Completed antibiotics  - Endocrinology following  - Psych input appreciated    Hypoxia  - Management per pulmonary

## 2022-09-10 NOTE — PROGRESS NOTE ADULT - TIME BILLING
Medical management as above, review of results/records, discussion with patient and primary team.
Medical management as above, review of results/records, discussion with patient and primary team.
patient care
Medical management as above, review of results/records, discussion with patient and primary team.
Medical management as above, review of results/records, discussion with patient and primary team.

## 2022-09-10 NOTE — PROGRESS NOTE ADULT - ASSESSMENT
77F with hx of lymphoma (dx 2005, s/p CARRIE lobectomy, relapsed 2019, currently off chemo/RT) BPD, asthma admitted for metabolic encephalopathy 2/2 UTI/hypercalcemia. Pulmonary initially consulted for c/f pleural effusion which was actually 2/2 known lymphoma, with course now c/b hypoxic/hypercapnic respiratory failure now on nocturnal NIPPV in setting of hyperviscosity syndrome/multifactorial metabolic encephalopathy treated with plasmapharesis    #Hypoxic/Hypercapnic respiratory failure  #Metabolic Encephalopathy  #Lymphoma  #MGUS   #Trace pleural Effusion  #Asthma  #Hyperviscosity Syndrome     Recommendations:  - continue AVAPs, repeat ABG   - give lasix 40 mg IV 1x  - maintain strict Is/Os, daily weights, would diurese to goal net even-net negative   - continue w/current bronchodilator regimen  - please alert pulmonary to any further deterioration of respiratory status  - goals of care discussion    Patient seen and examined with Dr. Neville Downs PGY5  [phone]

## 2022-09-10 NOTE — PROGRESS NOTE ADULT - PROBLEM SELECTOR PLAN 1
hypercapnic and hypoxemic respiratory failure  Pulm following, pt on AVAPS qhs, NC during the day, but 9/9 pt placed on NRB d/t desaturation during the day, CXR w/worsening effusion?, given IV Lasix 20mg x1. BP soft for further diuresis   -9/10: lethargic, but arousable, was on NRB since yesterday, but not using AVAPS at night per pulm recs  -ABG w/CO2 70s, pt placed on AVAPS, repeat ABG pending.  -VM left for daughter to callback for status update. Last GOC documented 8/30, pt was FULL CODE.   -Continue Duoneb BID , c/w Symbicort BID,  Albuterol PRN  -IF CO2 worsening, will consult MICU hypercapnic and hypoxemic respiratory failure  Pulm following, pt on AVAPS qhs, NC during the day, but 9/9 pt placed on NRB d/t desaturation during the day, CXR w/worsening effusion?, given IV Lasix 20mg x1. BP soft for further diuresis   -9/10: lethargic, but arousable, was on NRB since yesterday, but not using AVAPS at night per pulm recs  -ABG w/CO2 70s, pt placed on AVAPS, repeat ABG pending.  -Unable to reach [facility] x2, VM left for callback to floor for status update and continued GOC. Last GOC documented 8/30, pt was FULL CODE.   -Continue Duoneb BID , c/w Symbicort BID,  Albuterol PRN  -IF CO2 worsening, will consult MICU

## 2022-09-10 NOTE — PROGRESS NOTE ADULT - PROBLEM SELECTOR PLAN 5
-Follows w/Dr. Real Cano @ [facility] according to the [facility]  -received treatment recently per daughter however she doesn't know specifics  -with progression of disease  -hematology to speak to [facility] regarding if further treatment is suggested  -Hem recs appreciated

## 2022-09-10 NOTE — PROGRESS NOTE ADULT - SUBJECTIVE AND OBJECTIVE BOX
No acute events.     MEDICATIONS  (STANDING):  albumin human  5% IVPB 2500 milliLiter(s) IV Intermittent once  albumin human  5% IVPB 2500 milliLiter(s) IV Intermittent once  albuterol/ipratropium for Nebulization 3 milliLiter(s) Nebulizer every 12 hours  budesonide  80 MICROgram(s)/formoterol 4.5 MICROgram(s) Inhaler 2 Puff(s) Inhalation two times a day  calcium gluconate IVPB 2 Gram(s) IV Intermittent once  chlorhexidine 2% Cloths 1 Application(s) Topical daily  cholecalciferol 1000 Unit(s) Oral daily  furosemide   Injectable 40 milliGRAM(s) IV Push once  heparin   Injectable 5000 Unit(s) SubCutaneous every 12 hours  mirtazapine 7.5 milliGRAM(s) Oral <User Schedule>  multivitamin 1 Tablet(s) Oral daily    MEDICATIONS  (PRN):  acetaminophen     Tablet .. 650 milliGRAM(s) Oral every 6 hours PRN Temp greater or equal to 38C (100.4F), Mild Pain (1 - 3)  ALBUTerol    90 MICROgram(s) HFA Inhaler 2 Puff(s) Inhalation every 6 hours PRN Shortness of Breath and/or Wheezing  aluminum hydroxide/magnesium hydroxide/simethicone Suspension 30 milliLiter(s) Oral every 4 hours PRN Dyspepsia  melatonin 3 milliGRAM(s) Oral at bedtime PRN Insomnia  ondansetron Injectable 4 milliGRAM(s) IV Push every 8 hours PRN Nausea and/or Vomiting    Vital Signs Last 24 Hrs  T(C): 36.6 (10 Sep 2022 10:19), Max: 36.9 (09 Sep 2022 22:05)  T(F): 97.8 (10 Sep 2022 10:19), Max: 98.5 (09 Sep 2022 22:05)  HR: 96 (10 Sep 2022 15:59) (89 - 110)  BP: 85/51 (10 Sep 2022 13:45) (83/43 - 121/58)  BP(mean): --  RR: 20 (10 Sep 2022 13:45) (19 - 20)  SpO2: 97% (10 Sep 2022 15:59) (94% - 100%)    Parameters below as of 10 Sep 2022 13:45  Patient On (Oxygen Delivery Method): avaps    PHYSICAL EXAM  Not performed                        8.9    5.57  )-----------( 107      ( 10 Sep 2022 11:45 )             32.5       09-10    145  |  100  |  16  ----------------------------<  79  4.8   |  35<H>  |  0.63    Ca    9.2      10 Sep 2022 11:45  Phos  3.3     09-10  Mg     1.70     09-10    TPro  10.0<H>  /  Alb  3.2<L>  /  TBili  0.8  /  DBili  x   /  AST  17  /  ALT  11  /  AlkPhos  35<L>  09-09

## 2022-09-10 NOTE — PROGRESS NOTE ADULT - PROBLEM SELECTOR PLAN 9
-Heparin SQ    -Dispo issues: medically active   -Patient with APS case ongoing due to unsanitary conditions at home. Will require SW/CM for safe placement once medically stable for d/c. Patient currently without capacity and with poor insight into her medical conditions.

## 2022-09-10 NOTE — PROGRESS NOTE ADULT - ASSESSMENT
77 year old female with PMH of lymphoma (dx 2005, s/p CARRIE lobectomy, relapsed in 2019 but not currently on chemo/RT, being followed by heme every 3-6months at [facility] [place] [facility]), bipolar disorder, asthma (on O2 PRN at home, unknown how many L) brought to ED by daughter who states pt has been hallucinating, being more depressed.  Patient unable to provide much history, daughter Alissa states that patient was able to care for herself, manage home finances and attend doctor appointments independently until recently, she states that she believes her mom stopped taking her psychiatric medications in February of 2022 and has not followed up with any of her physicians. She has noticed recent weight loss (unsure how much) and change in voice.   Admitted to medicine service for further evaluation & treatment. Course c/b acute hypercapnic and hypoxic respiratory failure requiring BIPAP and AVAPS.  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

## 2022-09-10 NOTE — PROGRESS NOTE ADULT - PROBLEM SELECTOR PLAN 7
-Hx of bipolar w/psych admission @ [place] many years ago, unclear if patient actually had Bipolar   -non-compliant with medications  -Zyprexa 2.5mg QHS increased to 3.75mg QHS on 9/8/22 (pt refused), if patient has respiratory depression- hold Zyprexa, may try low dose Mirtazapine (also sedating)  -Psych recommendations appreciated   -Supportive care

## 2022-09-10 NOTE — PROGRESS NOTE ADULT - SUBJECTIVE AND OBJECTIVE BOX
[facility] Division of Hospital Medicine  Cassidy Ann DO  Pager (M-F, 1Z-5P): [phone]      Patient is a 77y old  Female who presents with a chief complaint of Hypoxic respiratory failure (09 Sep 2022 16:12)      SUBJECTIVE / OVERNIGHT EVENTS: Pt on NRB overnight, arousable to voice, but lethargic this morning. Unclear why pt not on AVAPS overnight.     MEDICATIONS  (STANDING):  albumin human  5% IVPB 2500 milliLiter(s) IV Intermittent once  albumin human  5% IVPB 2500 milliLiter(s) IV Intermittent once  albuterol/ipratropium for Nebulization 3 milliLiter(s) Nebulizer every 12 hours  budesonide  80 MICROgram(s)/formoterol 4.5 MICROgram(s) Inhaler 2 Puff(s) Inhalation two times a day  calcium gluconate IVPB 2 Gram(s) IV Intermittent once  chlorhexidine 2% Cloths 1 Application(s) Topical daily  cholecalciferol 1000 Unit(s) Oral daily  heparin   Injectable 5000 Unit(s) SubCutaneous every 12 hours  mirtazapine 7.5 milliGRAM(s) Oral <User Schedule>  multivitamin 1 Tablet(s) Oral daily    MEDICATIONS  (PRN):  acetaminophen     Tablet .. 650 milliGRAM(s) Oral every 6 hours PRN Temp greater or equal to 38C (100.4F), Mild Pain (1 - 3)  ALBUTerol    90 MICROgram(s) HFA Inhaler 2 Puff(s) Inhalation every 6 hours PRN Shortness of Breath and/or Wheezing  aluminum hydroxide/magnesium hydroxide/simethicone Suspension 30 milliLiter(s) Oral every 4 hours PRN Dyspepsia  melatonin 3 milliGRAM(s) Oral at bedtime PRN Insomnia  ondansetron Injectable 4 milliGRAM(s) IV Push every 8 hours PRN Nausea and/or Vomiting      CAPILLARY BLOOD GLUCOSE      POCT Blood Glucose.: 74 mg/dL (10 Sep 2022 11:49)  POCT Blood Glucose.: 92 mg/dL (10 Sep 2022 07:54)  POCT Blood Glucose.: 98 mg/dL (09 Sep 2022 21:15)  POCT Blood Glucose.: 85 mg/dL (09 Sep 2022 16:53)    I&O's Summary      PHYSICAL EXAM:  Vital Signs Last 24 Hrs  T(C): 36.6 (10 Sep 2022 10:19), Max: 36.9 (09 Sep 2022 22:05)  T(F): 97.8 (10 Sep 2022 10:19), Max: 98.5 (09 Sep 2022 22:05)  HR: 102 (10 Sep 2022 10:19) (89 - 110)  BP: 90/50 (10 Sep 2022 11:14) (83/43 - 121/58)  BP(mean): --  RR: 20 (10 Sep 2022 10:19) (19 - 20)  SpO2: 97% (10 Sep 2022 10:19) (94% - 100%)    Parameters below as of 10 Sep 2022 10:19  Patient On (Oxygen Delivery Method): nasal cannula  O2 Flow (L/min): 6    CONSTITUTIONAL: NAD, well-developed, on NRB   HEENT: EOMI, non-tunneled catheter   RESPIRATORY: Normal respiratory effort; anteriorly clear, on NRB  CARDIOVASCULAR: Regular rate and rhythm, normal S1 and S2, no murmurs  ABDOMEN: Nontender to palpation, normoactive bowel sounds, soft   PSYCH: unable to assess   NEUROLOGY: unable to assess   SKIN: No rashes; no palpable lesions    LABS:                        8.9    5.57  )-----------( 107      ( 10 Sep 2022 11:45 )             32.5     09-10    145  |  100  |  16  ----------------------------<  79  4.8   |  35<H>  |  0.63    Ca    9.2      10 Sep 2022 11:45  Phos  3.3     09-10  Mg     1.70     09-10    TPro  10.0<H>  /  Alb  3.2<L>  /  TBili  0.8  /  DBili  x   /  AST  17  /  ALT  11  /  AlkPhos  35<L>  09-09                RADIOLOGY & ADDITIONAL TESTS:  Results Reviewed:   Imaging Personally Reviewed:  Electrocardiogram Personally Reviewed:    COORDINATION OF CARE:  Care Discussed with Consultants/Other Providers [Y/N]: Y  Prior or Outpatient Records Reviewed [Y/N]: Y

## 2022-09-10 NOTE — PROGRESS NOTE ADULT - PROBLEM SELECTOR PLAN 2
-Likely multi-factorial: UTI, hypercapnia, hypercalcemia, psychiatric disorder,  and now with greater concern for hyperviscosity syndrome given inability to even obtain immunoglobulin panel due to degree of hyperviscosity  -serum viscosity level sent and pending  -plasmapheresis per heme/onc for hyperviscosity -- last 9/9 via [facility]   -daughter thinks patient stopped taking her psychiatric medication in february  Poor PO intake- nutrition consulted , ensure added to diet , psych recommending dronabinol 2.5mg BID - monitor for side effects

## 2022-09-11 NOTE — PROGRESS NOTE ADULT - PROBLEM SELECTOR PLAN 9
-Heparin SQ    -Dispo issues: medically active   -Patient with APS case ongoing due to unsanitary conditions at home. Will require SW/CM for safe placement once medically stable for d/c. Patient currently without capacity and with poor insight into her medical conditions.    Updated dgt Alissa on 9/11, states pt wanted to fight before she got sick, wants pt to be comfortable but also wants to continue aggressive care. Pt is full code, will continue readdressing GOC.

## 2022-09-11 NOTE — PROGRESS NOTE ADULT - SUBJECTIVE AND OBJECTIVE BOX
[facility]  Cassidy Ann DO  Pager (M-F, 5K-5P): [phone]      Patient is a 77y old  Female who presents with a chief complaint of Hypoxic respiratory failure (11 Sep 2022 14:22)      SUBJECTIVE / OVERNIGHT EVENTS: Pt on AVAPS this morning, sitting in bed, but awake, answering basic questions.   ADDITIONAL REVIEW OF SYSTEMS: no cp/sob     MEDICATIONS  (STANDING):  albumin human  5% IVPB 2500 milliLiter(s) IV Intermittent once  albumin human  5% IVPB 2500 milliLiter(s) IV Intermittent once  albuterol/ipratropium for Nebulization 3 milliLiter(s) Nebulizer every 12 hours  budesonide  80 MICROgram(s)/formoterol 4.5 MICROgram(s) Inhaler 2 Puff(s) Inhalation two times a day  calcium gluconate IVPB 2 Gram(s) IV Intermittent once  chlorhexidine 2% Cloths 1 Application(s) Topical daily  cholecalciferol 1000 Unit(s) Oral daily  heparin   Injectable 5000 Unit(s) SubCutaneous every 12 hours  mirtazapine 7.5 milliGRAM(s) Oral <User Schedule>  multivitamin 1 Tablet(s) Oral daily    MEDICATIONS  (PRN):  acetaminophen     Tablet .. 650 milliGRAM(s) Oral every 6 hours PRN Temp greater or equal to 38C (100.4F), Mild Pain (1 - 3)  ALBUTerol    90 MICROgram(s) HFA Inhaler 2 Puff(s) Inhalation every 6 hours PRN Shortness of Breath and/or Wheezing  aluminum hydroxide/magnesium hydroxide/simethicone Suspension 30 milliLiter(s) Oral every 4 hours PRN Dyspepsia  melatonin 3 milliGRAM(s) Oral at bedtime PRN Insomnia  ondansetron Injectable 4 milliGRAM(s) IV Push every 8 hours PRN Nausea and/or Vomiting      CAPILLARY BLOOD GLUCOSE      POCT Blood Glucose.: 85 mg/dL (11 Sep 2022 11:57)  POCT Blood Glucose.: 84 mg/dL (11 Sep 2022 07:46)  POCT Blood Glucose.: 105 mg/dL (11 Sep 2022 00:56)  POCT Blood Glucose.: 72 mg/dL (10 Sep 2022 21:37)  POCT Blood Glucose.: 191 mg/dL (10 Sep 2022 17:22)  POCT Blood Glucose.: 205 mg/dL (10 Sep 2022 17:02)  POCT Blood Glucose.: 67 mg/dL (10 Sep 2022 16:26)    I&O's Summary      PHYSICAL EXAM:  Vital Signs Last 24 Hrs  T(C): 36.5 (11 Sep 2022 15:41), Max: 36.9 (10 Sep 2022 20:25)  T(F): 97.7 (11 Sep 2022 15:41), Max: 98.4 (10 Sep 2022 20:25)  HR: 96 (11 Sep 2022 15:41) (68 - 96)  BP: 97/62 (11 Sep 2022 15:41) (97/62 - 136/86)  BP(mean): --  RR: 17 (11 Sep 2022 15:41) (17 - 18)  SpO2: 97% (11 Sep 2022 15:41) (95% - 100%)    Parameters below as of 11 Sep 2022 15:41  Patient On (Oxygen Delivery Method): nasal cannula  O2 Flow (L/min): 6    CONSTITUTIONAL: NAD, elderly, chronically ill-appearing  HEENT: EOMI, non-tunneled catheter   RESPIRATORY: Normal respiratory effort; anteriorly clear, AVAPS  CARDIOVASCULAR: Regular rate and rhythm, normal S1 and S2, no murmurs  ABDOMEN: Nontender to palpation, normoactive bowel sounds, soft   PSYCH: calm, cooperative   NEUROLOGY: alert, answering questions, following commands, nonfocal   SKIN: scattered ecchymosis     LABS:                        9.0    5.83  )-----------( 124      ( 11 Sep 2022 06:02 )             30.8     09-11    144  |  98  |  18  ----------------------------<  73  4.3   |  37<H>  |  0.75    Ca    9.5      11 Sep 2022 06:02  Phos  1.7     09-11  Mg     1.60     09-11                  RADIOLOGY & ADDITIONAL TESTS:  Results Reviewed:   Imaging Personally Reviewed:  Electrocardiogram Personally Reviewed:    COORDINATION OF CARE:  Care Discussed with Consultants/Other Providers [Y/N]: Y  Prior or Outpatient Records Reviewed [Y/N]: Y

## 2022-09-11 NOTE — PROGRESS NOTE ADULT - PROBLEM SELECTOR PLAN 4
-likely due to underlying malignancy, lymphoma vs new MM. F/u vitamin D 25 and 1, 25, PTHRP, MM labs. PTH is inappropriately normal  -s/p pamidronate on 8/30; Ca improved.  -calcitonin x 4 doses  -unfortunately, will hold off fluids for now given pulmonary edema that developed with IVF upon admission, which later required need for Lasix IV and AVAPS  -Apprec Endocrine recs  - continue to encourage PO intake  - vitamin D 1,25 not elevated, pending PTHrP

## 2022-09-11 NOTE — PROGRESS NOTE ADULT - ASSESSMENT
MANDI GASPAR is a 77y Female who presents with a chief complaint of hypoxic respiratory failure.    Marginal Zone Lymphoma  - Patient treated previously at [facility]; was on rituximab + bendamustine last in August 2020, and since then has been on surveillance, but lost to follow-up since July 2021  - CT chest concerning for slight progression of disease  - Noted elevated lambda light chain compared to kappa similar to values in early 2020  - Immunofixation with IgM lambda monoclonal protein as seen previously during admission in 2020    Lymphoplasmacytic Lymphoma  - Noted from bone marrow biopsy done during prior admission here   - s/p plasmapheresis 09/02 with some improvement in IgM  - s/p repeat plasmapheresis 09/09, with continued improvement in IgM  - Rituximab remains an option, will coordinate care with [facility]    Altered Mental Status  - Likely multifactorial with hyperviscosity of blood, hypercalcemia, urinary tract infection  - S/p Shiley placement  - Completed antibiotics  - Endocrinology following  - Psychiatry appreciated    Hypoxia  - Management per pulmonary

## 2022-09-11 NOTE — PROGRESS NOTE ADULT - PROBLEM SELECTOR PLAN 1
hypercapnic and hypoxemic respiratory failure  worsening resp status on 9/10, pt lethargic, was not using AVAPS the night prior, was on NRB instead  -CO2 downtrending and pt mentating, trial off AVAPS to NC  -C/w intermittent diuresis for pleural effusions as BP allows, last given IV Lasix 40mg on 9/10.   -Seen by pulm, pt requires AVAPS at night, d/w ACP and RN.   -Palliative consult in AM   -Continue Duoneb BID, Symbicort BID, Albuterol PRN  -Low threshold for MICU consult, pt is full code per dgt Alissa who would want pt intubated if necessary.

## 2022-09-11 NOTE — PROGRESS NOTE ADULT - SUBJECTIVE AND OBJECTIVE BOX
No acute events.    MEDICATIONS  (STANDING):  albumin human  5% IVPB 2500 milliLiter(s) IV Intermittent once  albumin human  5% IVPB 2500 milliLiter(s) IV Intermittent once  albuterol/ipratropium for Nebulization 3 milliLiter(s) Nebulizer every 12 hours  budesonide  80 MICROgram(s)/formoterol 4.5 MICROgram(s) Inhaler 2 Puff(s) Inhalation two times a day  calcium gluconate IVPB 2 Gram(s) IV Intermittent once  chlorhexidine 2% Cloths 1 Application(s) Topical daily  cholecalciferol 1000 Unit(s) Oral daily  heparin   Injectable 5000 Unit(s) SubCutaneous every 12 hours  mirtazapine 7.5 milliGRAM(s) Oral <User Schedule>  multivitamin 1 Tablet(s) Oral daily    MEDICATIONS  (PRN):  acetaminophen     Tablet .. 650 milliGRAM(s) Oral every 6 hours PRN Temp greater or equal to 38C (100.4F), Mild Pain (1 - 3)  ALBUTerol    90 MICROgram(s) HFA Inhaler 2 Puff(s) Inhalation every 6 hours PRN Shortness of Breath and/or Wheezing  aluminum hydroxide/magnesium hydroxide/simethicone Suspension 30 milliLiter(s) Oral every 4 hours PRN Dyspepsia  melatonin 3 milliGRAM(s) Oral at bedtime PRN Insomnia  ondansetron Injectable 4 milliGRAM(s) IV Push every 8 hours PRN Nausea and/or Vomiting    Vital Signs Last 24 Hrs  T(C): 36.5 (11 Sep 2022 15:41), Max: 36.9 (10 Sep 2022 20:25)  T(F): 97.7 (11 Sep 2022 15:41), Max: 98.4 (10 Sep 2022 20:25)  HR: 96 (11 Sep 2022 15:41) (68 - 96)  BP: 97/62 (11 Sep 2022 15:41) (97/62 - 136/86)  BP(mean): --  RR: 17 (11 Sep 2022 15:41) (17 - 18)  SpO2: 97% (11 Sep 2022 15:41) (95% - 100%)    Parameters below as of 11 Sep 2022 15:41  Patient On (Oxygen Delivery Method): nasal cannula  O2 Flow (L/min): 6    PHYSICAL EXAM:   No acute distress  Alert, awake  Not dyspneic                          9.0    5.83  )-----------( 124      ( 11 Sep 2022 06:02 )             30.8       09-11    144  |  98  |  18  ----------------------------<  73  4.3   |  37<H>  |  0.75    Ca    9.5      11 Sep 2022 06:02  Phos  1.7     09-11  Mg     1.60     09-11

## 2022-09-11 NOTE — PROGRESS NOTE ADULT - ASSESSMENT
77 year old woman with history of HLD, lymphoma (dx 2005, s/p CARRIE lobectomy, relapsed in 2019 but not currently on chemo/RT, [place] [facility] [place] disorder, asthma, who presented to the hospital for AMS and hypoxic respiratory failure, found to have hypercalcemia.     #Hypercalcemia - malignancy-related vs medication induced (lithium induced parathyroidism) vs in the setting of potential multiple myeloma transformation   - s/p 4 doses of IV Calcitonin. Follow with repeat serum Calcium levels.   - s/p pamidronate 60 mg IV x 1 8/30/22   - Corrected calcium now in normal range continue to monitor  - 1,25 vitamin D not elevated  -Still waiting for results on PTH-related peptide,       #history of fracture in the setting of likely osteoporosis;.   - Imaging findings of Old right humeral neck fracture. Left proximal humeral fracture s/p ORIF  .- f/u outpatient bone-density testing.   - 25-hydroxy Vitamin D level of 18.4 (low),   -on cholecalciferol 1000 units daily.    #Hyperlipidemia  - history of hyperlipidemia   - Cholesterol of 54, Triglycerides of 30, Serum HDL of 21, LDL of 27. A1C of 4.5   - can hold on statin therapy at this time due to age and risk factors at this time   - home medication history of zyprexa.     #Hypoglycemia -   Ongoing poor po intake, no further serum hypoglycemia  encourage po intake/assistance  If able, can send labs with serum glucose at time of low glucose on point of care testing to confirm.  6 AM cortisol 8.6 - recommend cosyntropin stimulation test (Give cosyntropin 0.25 mg IV, then check cortisol after 30 minutes and after 60 minutes)      Discussed with RN and ACP    Nilam Hui MD  pager  or via Teams on 9/11/22  Other times:  Diabetes team: [phone] business hours  [phone] night/weekend  or email [email or web address]   or email [email or web address]

## 2022-09-11 NOTE — PROGRESS NOTE ADULT - PROBLEM SELECTOR PLAN 7
-Hx of bipolar w/psych admission @ [facility] many years ago, unclear if patient actually had Bipolar   -non-compliant with medications  -Zyprexa 2.5mg QHS increased to 3.75mg QHS on 9/8/22 (pt refused), if patient has respiratory depression- hold Zyprexa, may try low dose Mirtazapine (also sedating)  -Psych recommendations appreciated   -Supportive care

## 2022-09-11 NOTE — PROGRESS NOTE ADULT - ASSESSMENT
77 year old female with PMH of lymphoma (dx 2005, s/p CARRIE lobectomy, relapsed in 2019 but not currently on chemo/RT, being [place] every [place] [facility] [place] bipolar disorder, asthma (on O2 PRN at home, unknown how many L) brought to ED by daughter who states pt has been hallucinating, being more depressed.  Patient unable to provide much history, daughter Alissa states that patient was able to care for herself, manage home finances and attend doctor appointments independently until recently, she states that she believes her mom stopped taking her psychiatric medications in February of 2022 and has not followed up with any of her physicians. She has noticed recent weight loss (unsure how much) and change in voice.   Admitted to medicine service for further evaluation & treatment. Course c/b acute hypercapnic and hypoxic respiratory failure requiring BIPAP and AVAPS.  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

## 2022-09-11 NOTE — PROGRESS NOTE ADULT - PROBLEM SELECTOR PLAN 2
-Likely multi-factorial: UTI, hypercapnia, hypercalcemia, psychiatric disorder,  and now with greater concern for hyperviscosity syndrome given inability to even obtain immunoglobulin panel due to degree of hyperviscosity  -serum viscosity level sent and pending  -plasmapheresis per heme/onc for hyperviscosity -- last 9/9 via [facility]ero, needs IgM levels after each session   -IgM level downtrending, [id number]-->>[id number] --> [id number]  -daughter thinks patient stopped taking her psychiatric medication in february  Poor PO intake- nutrition consulted , ensure added to diet , psych recommending dronabinol 2.5mg BID - monitor for side effects

## 2022-09-11 NOTE — PROGRESS NOTE ADULT - PROBLEM SELECTOR PLAN 5
-[place]. [place] [facility] [place]  -received treatment recently per daughter however she doesn't know specifics  -with progression of disease  -hematology to speak to [facility] regarding if further treatment is suggested  -Hem recs appreciated

## 2022-09-11 NOTE — PROGRESS NOTE ADULT - SUBJECTIVE AND OBJECTIVE BOX
Endocrine Follow up note    Interval History/ROS: Pt resting comfortably, not verbally responsive, unable to obtain ros    MEDICATIONS  (STANDING):  albumin human  5% IVPB 2500 milliLiter(s) IV Intermittent once  albumin human  5% IVPB 2500 milliLiter(s) IV Intermittent once  albuterol/ipratropium for Nebulization 3 milliLiter(s) Nebulizer every 12 hours  budesonide  80 MICROgram(s)/formoterol 4.5 MICROgram(s) Inhaler 2 Puff(s) Inhalation two times a day  calcium gluconate IVPB 2 Gram(s) IV Intermittent once  chlorhexidine 2% Cloths 1 Application(s) Topical daily  cholecalciferol 1000 Unit(s) Oral daily  heparin   Injectable 5000 Unit(s) SubCutaneous every 12 hours  mirtazapine 7.5 milliGRAM(s) Oral <User Schedule>  multivitamin 1 Tablet(s) Oral daily    MEDICATIONS  (PRN):  acetaminophen     Tablet .. 650 milliGRAM(s) Oral every 6 hours PRN Temp greater or equal to 38C (100.4F), Mild Pain (1 - 3)  ALBUTerol    90 MICROgram(s) HFA Inhaler 2 Puff(s) Inhalation every 6 hours PRN Shortness of Breath and/or Wheezing  aluminum hydroxide/magnesium hydroxide/simethicone Suspension 30 milliLiter(s) Oral every 4 hours PRN Dyspepsia  melatonin 3 milliGRAM(s) Oral at bedtime PRN Insomnia  ondansetron Injectable 4 milliGRAM(s) IV Push every 8 hours PRN Nausea and/or Vomiting      Allergies    latex (Rash)  No Known Drug Allergies    Intolerances          PHYSICAL EXAM:  VITALS: T(C): 36.4 (09-11-22 @ 12:10)  T(F): 97.6 (09-11-22 @ 12:10), Max: 98.4 (09-10-22 @ 20:25)  HR: 88 (09-11-22 @ 12:10) (68 - 96)  BP: 103/62 (09-11-22 @ 12:10) (98/60 - 136/86)  RR:  (17 - 19)  SpO2:  (95% - 100%)  GENERAL: Lying in bed in NAD,   EYES: No proptosis,  HEENT:  Atraumatic, Normocephalic,   RESPIRATORY: Clear to auscultation bilaterally  CARDIOVASCULAR: Regular rhythm; +upper extremity peripheral edema  GI: Soft, normal bowel sounds        POCT Blood Glucose.: 85 mg/dL (09-11-22 @ 11:57)  POCT Blood Glucose.: 84 mg/dL (09-11-22 @ 07:46)  POCT Blood Glucose.: 105 mg/dL (09-11-22 @ 00:56)  POCT Blood Glucose.: 72 mg/dL (09-10-22 @ 21:37)  POCT Blood Glucose.: 191 mg/dL (09-10-22 @ 17:22)  POCT Blood Glucose.: 205 mg/dL (09-10-22 @ 17:02)  POCT Blood Glucose.: 67 mg/dL (09-10-22 @ 16:26)  POCT Blood Glucose.: 70 mg/dL (09-10-22 @ 16:24)  POCT Blood Glucose.: 74 mg/dL (09-10-22 @ 11:49)  POCT Blood Glucose.: 92 mg/dL (09-10-22 @ 07:54)  POCT Blood Glucose.: 98 mg/dL (09-09-22 @ 21:15)  POCT Blood Glucose.: 85 mg/dL (09-09-22 @ 16:53)  POCT Blood Glucose.: 93 mg/dL (09-09-22 @ 11:25)  POCT Blood Glucose.: 178 mg/dL (09-09-22 @ 08:31)  POCT Blood Glucose.: 56 mg/dL (09-09-22 @ 07:38)  POCT Blood Glucose.: 59 mg/dL (09-09-22 @ 07:31)  POCT Blood Glucose.: 78 mg/dL (09-08-22 @ 21:51)  POCT Blood Glucose.: 81 mg/dL (09-08-22 @ 16:58)        09-11    144  |  98  |  18  ----------------------------<  73  4.3   |  37<H>  |  0.75    eGFR: 82    Ca    9.5      09-11  Mg     1.60     09-11  Phos  1.7     09-11    TPro  10.0<H>  /  Alb  3.2<L>  /  TBili  0.8  /  DBili  x   /  AST  17  /  ALT  11  /  AlkPhos  35<L>  09-09        A1C with Estimated Average Glucose Result: 4.5 % (08-31-22 @ 03:48)      Cortisol AM, Serum . (09.11.22 @ 06:02)    Cortisol AM, Serum: 8.6 ug/dL    Vitamin D, 1, 25-Dihydroxy (09.10.22 @ 04:19)    Vitamin D, 1, 25-Dihydroxy: 37.1 pg/mL

## 2022-09-12 NOTE — CONSULT NOTE ADULT - SUBJECTIVE AND OBJECTIVE BOX
HPI:  Patient is a 77 year old female with PMH of marginal zone lymphoma (dx 2005, s/p CARRIE lobectomy, relapsed in 2019 but not currently on chemo/RT, bipolar disorder, asthma (on O2 PRN at home, unknown how many L) presented to ED for hallucinations and depression.   Recent bone marrow biopsy showed lymphoplasmacytic lymphoma, s/p plasmapharesis.   AMS 2/2 to hyperviscosity of blood, hypercalcemia, and UTI.   Worsening hypercarbic and hypoxic respiratory failure requiring AVAPs  Daughter believes patient stopped taking her psych meds   Hospital course complicated by UTI          PAST MEDICAL & SURGICAL HISTORY:  GERD (gastroesophageal reflux disease)      Hyperlipidemia      Manic depression      Asthma      Lymphoma      History of lobectomy of lung  L  lung      Injury of left shoulder, initial encounter  Surgical repair with plates          Social History:     FAMILY HISTORY:  Family history of gastric cancer        Allergies: latex (Rash)  No Known Drug Allergies      Current Medications: acetaminophen     Tablet .. 650 milliGRAM(s) Oral every 6 hours PRN  ALBUTerol    90 MICROgram(s) HFA Inhaler 2 Puff(s) Inhalation every 6 hours PRN  albuterol/ipratropium for Nebulization 3 milliLiter(s) Nebulizer every 12 hours  aluminum hydroxide/magnesium hydroxide/simethicone Suspension 30 milliLiter(s) Oral every 4 hours PRN  budesonide  80 MICROgram(s)/formoterol 4.5 MICROgram(s) Inhaler 2 Puff(s) Inhalation two times a day  cefTRIAXone   IVPB 1000 milliGRAM(s) IV Intermittent every 24 hours  heparin   Injectable 5000 Unit(s) SubCutaneous every 12 hours  melatonin 3 milliGRAM(s) Oral at bedtime PRN  OLANZapine 2.5 milliGRAM(s) Oral at bedtime  ondansetron Injectable 4 milliGRAM(s) IV Push every 8 hours PRN  thiamine IVPB 500 milliGRAM(s) IV Intermittent three times a day      Labs:                         8.7    5.93  )-----------( 166      ( 02 Sep 2022 07:01 )             31.3       09-02    141  |  104  |  16.4  ----------------------------<  80  4.5   |  34<H>  |  0.8    Ca    10.2      02 Sep 2022 07:01  Phos  1.6     09-02  Mg     1.8     09-02    TPro  12  /  Alb  2.0  /  TBili  0.54  /  DBili  <0.2  /  AST  22  /  ALT  20  /  AlkPhos  40  09-02      HCG:       Assessment/Plan:   This is a 77y Female  presents with hyperviscosity  Patient presents to IR for non tunneled central venous catheter insertion for plasmaphoresis.  Procedure/ risks/ benefits/ goals/ alternatives were explained. All questions answered. Informed content obtained from patient. Consent placed in chart.      (02 Sep 2022 11:39)    PERTINENT PM/SXH:   GERD (gastroesophageal reflux disease)    Hyperlipidemia    Manic depression    Asthma    Lymphoma      History of lobectomy of lung    Injury of left shoulder, initial encounter      FAMILY HISTORY:  Family history of gastric cancer          ------------------------------------------------------------------------------------------------------------    ITEMS NOT CHECKED ARE NOT PRESENT    SOCIAL HISTORY:   Support:   Work:   Substance hx:  [ ]   Tobacco hx:  [ ]   Alcohol hx: [ ]   Family Hx substance abuse [ ]yes [ ]no  Home Opioid hx:  [ ] I-Stop Reference No:  Living Situation: [ ]Home  [ ]Long term care  [ ]Rehab [ ]Other  Confucianism/Spirituality:    PCSSQ[Palliative Care Spiritual Screening Question]   Severity (0-10):  Score of 4 or > indicate consideration of Chaplaincy referral.  Chaplaincy Referral: [ ] yes [ ] refused [ ] following    ------------------------------------------------------------------------------------------------------------    FUNCTIONAL STATUS:     Baseline ADL (prior to admission):  [ ] Independent  [ ] Moderate Assisstance [ ] Dependent  Palliative Performance Score:     [ ]PPSV2 < or = to 30% [ ]significant weight loss  [ ]poor nutritional intake  [ ]anasarca [ ]Artificial Nutrition      Protein Calorie Malnutrition Present: [ ]mild [ ]moderate [ ]severe [ ]underweight [ ]morbid obesity    Height (cm): 160 (09-02-22 @ 11:25)  Weight (kg): 60 (09-02-22 @ 11:25)  BMI (kg/m2): 23.4 (09-02-22 @ 11:25)  ------------------------------------------------------------------------------------------------------------    PRIOR ADVANCE DIRECTIVES:    DNR/MOLST  [ ]  Living Will  [ ]   DECISION MAKER(s):  [ ] Health Care Proxy(s)  [ ] Surrogate(s)  [ ] Guardian           Name(s):     ------------------------------------------------------------------------------------------------------------    PHYSICAL EXAM:  Vital Signs Last 24 Hrs  T(C): 36.5 (12 Sep 2022 05:15), Max: 36.7 (11 Sep 2022 20:11)  T(F): 97.7 (12 Sep 2022 05:15), Max: 98.1 (11 Sep 2022 20:11)  HR: 97 (12 Sep 2022 07:47) (88 - 113)  BP: 101/55 (12 Sep 2022 05:15) (97/62 - 105/58)  BP(mean): --  RR: 17 (12 Sep 2022 05:15) (17 - 17)  SpO2: 98% (12 Sep 2022 07:47) (93% - 99%)    Parameters below as of 12 Sep 2022 05:15  Patient On (Oxygen Delivery Method): nasal cannula  O2 Flow (L/min): 6   I&O's Summary      GENERAL:  [ ]Cachexia  [ ] Frail  [ ]Awake  [ ]Oriented x   [ ]Lethargic  [ ]Unarousable  [ ]Verbal  [ ]Non-Verbal  Behavioral: [ ] Anxiety  [ ] Delirium [ ] Agitation [ ] Other  HEENT: [ ]Normal   [ ]Dry mouth   [ ]ET Tube/Trach  [ ]Oral lesions  PULMONARY:   [ ]Clear [ ]Tachypnea  [ ]Audible excessive secretions   [ ]Rhonchi        [ ]Right [ ]Left [ ]Bilateral  [ ]Crackles        [ ]Right [ ]Left [ ]Bilateral  [ ]Wheezing     [ ]Right [ ]Left [ ]Bilateral  [ ]Diminished breath sounds [ ]right [ ]left [ ]bilateral  CARDIOVASCULAR:    [ ]Regular [ ]Irregular [ ]Tachy  [ ]Mak [ ]Murmur [ ]Other  GASTROINTESTINAL:  [ ]Soft  [ ]Distended   [ ]+BS  [ ]Non tender [ ]Tender  [ ]Other [ ]PEG [ ]OGT/ NGT  Last BM:  GENITOURINARY:  [ ]Normal [ ] Incontinent   [ ]Oliguria/Anuria   [ ]Archer  MUSCULOSKELETAL:   [ ]Normal   [ ]Weakness  [ ]Bed/Wheelchair bound [ ]Edema  NEUROLOGIC:   [ ]No focal deficits  [ ]Cognitive impairment  [ ]Dysphagia [ ]Dysarthria [ ]Paresis [ ]Other   SKIN:   [ ]Normal  [ ]Rash  [ ]Other  [ ]Pressure ulcer(s)       Present on admission [ ]y [ ]n    ------------------------------------------------------------------------------------------------------------    LABS:                        9.4    6.17  )-----------( 137      ( 12 Sep 2022 07:10 )             33.3   09-12    145  |  99  |  23  ----------------------------<  85  4.6   |  38<H>  |  0.64    Ca    9.6      12 Sep 2022 07:10  Phos  3.3     09-12  Mg     1.70     09-12          RADIOLOGY & ADDITIONAL STUDIES:    ------------------------------------------------------------------------------------------------------------    ALLERGIES:  Allergies    latex (Rash)  No Known Drug Allergies    Intolerances        MEDICATIONS:   MEDICATIONS  (STANDING):  albumin human  5% IVPB 2500 milliLiter(s) IV Intermittent once  albumin human  5% IVPB 2500 milliLiter(s) IV Intermittent once  albuterol/ipratropium for Nebulization 3 milliLiter(s) Nebulizer every 12 hours  budesonide  80 MICROgram(s)/formoterol 4.5 MICROgram(s) Inhaler 2 Puff(s) Inhalation two times a day  calcium gluconate IVPB 2 Gram(s) IV Intermittent once  chlorhexidine 2% Cloths 1 Application(s) Topical daily  cholecalciferol 1000 Unit(s) Oral daily  heparin   Injectable 5000 Unit(s) SubCutaneous every 12 hours  mirtazapine 7.5 milliGRAM(s) Oral <User Schedule>  multivitamin 1 Tablet(s) Oral daily    MEDICATIONS  (PRN):  acetaminophen     Tablet .. 650 milliGRAM(s) Oral every 6 hours PRN Temp greater or equal to 38C (100.4F), Mild Pain (1 - 3)  ALBUTerol    90 MICROgram(s) HFA Inhaler 2 Puff(s) Inhalation every 6 hours PRN Shortness of Breath and/or Wheezing  aluminum hydroxide/magnesium hydroxide/simethicone Suspension 30 milliLiter(s) Oral every 4 hours PRN Dyspepsia  melatonin 3 milliGRAM(s) Oral at bedtime PRN Insomnia  ondansetron Injectable 4 milliGRAM(s) IV Push every 8 hours PRN Nausea and/or Vomiting      ------------------------------------------------------------------------------------------------------------    PRESENT SYMPTOMS:  [ ] Unable to self-report   [ ] CPOT (ICU)  [ ] PAINADs  [ ] RDOS  Source if other than patient:   [ ]Family   [ ]Team     PAIN:   [ ]yes [ ]no  Location -                    Aggravating factors -  Quality -  Radiation -  Timing-  Severity (0-10 scale):  Minimal acceptable level (0-10 scale):     Dyspnea:                           [ ]Mild [ ]Moderate [ ]Severe  Anxiety:                             [ ]Mild [ ]Moderate [ ]Severe  Fatigue:                             [ ]Mild [ ]Moderate [ ]Severe  Nausea:                             [ ]Mild [ ]Moderate [ ]Severe  Loss of appetite:              [ ]Mild [ ]Moderate [ ]Severe  Constipation:                    [ ]Mild [ ]Moderate [ ]Severe    Other Symptoms:  [ ]All other review of systems negative     Home Medications for symptoms if any:     ------------------------------------------------------------------------------------------------------------    CRITICAL CARE:  [ ] Shock Present  [ ]Septic [ ]Cardiogenic [ ]Neurologic [ ]Hypovolemic  [ ]  Vasopressors [ ]  Inotropes   [ ]Respiratory failure present [ ]Mechanical ventilation [ ]Non-invasive ventilatory support [ ]High flow  Mode: standby  [ ]Acute  [ ]Chronic [ ]Hypoxic  [ ]Hypercarbic [ ]Other  [ ]Other organ failure     Other REFERRALS:  [ ]Hospice  [ ]Child Life  [ ]Social Work  [ ]Case management [ ]Holistic Therapy    HPI:  Patient is a 77 year old female with PMH of marginal zone lymphoma (dx 2005, s/p CARRIE lobectomy, relapsed in 2019 but not currently on chemo/RT, bipolar disorder, asthma (on O2 PRN at home, unknown how many L) presented to ED for altered mental status. Patient found to have hypercalcemia 11.7, also concern for hyperviscosity syndrome. Recent bone marrow biopsy showed lymphoplasmacytic lymphoma, s/p plasmapheresis. Hospital course complicated by UTI and worsening hypercarbic and hypoxic respiratory failure requiring AVAPs. GOC discussed with daughter yesterday, who wants patient to be full code. Palliative consulted for GOC and symptom management.     ------------------------------------------------------------------------------------------------------------    ITEMS NOT CHECKED ARE NOT PRESENT    SOCIAL HISTORY:   Support:   Work:   Substance hx:  [ ]   Tobacco hx:  [ ]   Alcohol hx: [ ]   Family Hx substance abuse [ ]yes [ ]no  Home Opioid hx:  [ ] I-Stop Reference No:  Living Situation: [ ]Home  [ ]Long term care  [ ]Rehab [ ]Other  Patient found to be living in unsanitary condition, APS involved ca    Anabaptist/Spirituality:    PCSSQ[Palliative Care Spiritual Screening Question]   Severity (0-10):  Score of 4 or > indicate consideration of Chaplaincy referral.  Chaplaincy Referral: [ ] yes [ ] refused [ ] following    ------------------------------------------------------------------------------------------------------------    FUNCTIONAL STATUS:     Baseline ADL (prior to admission):  [ ] Independent  [ ] Moderate Assisstance [ ] Dependent  Palliative Performance Score:     [ ]PPSV2 < or = to 30% [ ]significant weight loss  [ ]poor nutritional intake  [ ]anasarca [ ]Artificial Nutrition      Protein Calorie Malnutrition Present: [ ]mild [ ]moderate [ ]severe [ ]underweight [ ]morbid obesity    Height (cm): 160 (09-02-22 @ 11:25)  Weight (kg): 60 (09-02-22 @ 11:25)  BMI (kg/m2): 23.4 (09-02-22 @ 11:25)  ------------------------------------------------------------------------------------------------------------    PRIOR ADVANCE DIRECTIVES:    DNR/MOLST  [ ]  Living Will  [ ]   DECISION MAKER(s):  [ ] Health Care Proxy(s)  [ ] Surrogate(s)  [ ] Guardian           Name(s):     ------------------------------------------------------------------------------------------------------------    PHYSICAL EXAM:  Vital Signs Last 24 Hrs  T(C): 36.5 (12 Sep 2022 05:15), Max: 36.7 (11 Sep 2022 20:11)  T(F): 97.7 (12 Sep 2022 05:15), Max: 98.1 (11 Sep 2022 20:11)  HR: 97 (12 Sep 2022 07:47) (88 - 113)  BP: 101/55 (12 Sep 2022 05:15) (97/62 - 105/58)  BP(mean): --  RR: 17 (12 Sep 2022 05:15) (17 - 17)  SpO2: 98% (12 Sep 2022 07:47) (93% - 99%)    Parameters below as of 12 Sep 2022 05:15  Patient On (Oxygen Delivery Method): nasal cannula  O2 Flow (L/min): 6   I&O's Summary      GENERAL:  [ ]Cachexia  [ ] Frail  [ ]Awake  [ ]Oriented x   [ ]Lethargic  [ ]Unarousable  [ ]Verbal  [ ]Non-Verbal  Behavioral: [ ] Anxiety  [ ] Delirium [ ] Agitation [ ] Other  HEENT: [ ]Normal   [ ]Dry mouth   [ ]ET Tube/Trach  [ ]Oral lesions  PULMONARY:   [ ]Clear [ ]Tachypnea  [ ]Audible excessive secretions   [ ]Rhonchi        [ ]Right [ ]Left [ ]Bilateral  [ ]Crackles        [ ]Right [ ]Left [ ]Bilateral  [ ]Wheezing     [ ]Right [ ]Left [ ]Bilateral  [ ]Diminished breath sounds [ ]right [ ]left [ ]bilateral  CARDIOVASCULAR:    [ ]Regular [ ]Irregular [ ]Tachy  [ ]Mak [ ]Murmur [ ]Other  GASTROINTESTINAL:  [ ]Soft  [ ]Distended   [ ]+BS  [ ]Non tender [ ]Tender  [ ]Other [ ]PEG [ ]OGT/ NGT  Last BM:  GENITOURINARY:  [ ]Normal [ ] Incontinent   [ ]Oliguria/Anuria   [ ]Archer  MUSCULOSKELETAL:   [ ]Normal   [ ]Weakness  [ ]Bed/Wheelchair bound [ ]Edema  NEUROLOGIC:   [ ]No focal deficits  [ ]Cognitive impairment  [ ]Dysphagia [ ]Dysarthria [ ]Paresis [ ]Other   SKIN:   [ ]Normal  [ ]Rash  [ ]Other  [ ]Pressure ulcer(s)       Present on admission [ ]y [ ]n    ------------------------------------------------------------------------------------------------------------    LABS:                        9.4    6.17  )-----------( 137      ( 12 Sep 2022 07:10 )             33.3   09-12    145  |  99  |  23  ----------------------------<  85  4.6   |  38<H>  |  0.64    Ca    9.6      12 Sep 2022 07:10  Phos  3.3     09-12  Mg     1.70     09-12    RADIOLOGY & ADDITIONAL STUDIES:    < from: CT Head No Cont (08.30.22 @ 03:22) >  IMPRESSION:    Head CT: Motion degraded.Grossly stable exam without evidence of acute   intracranial hemorrhage, vasogenic edema, extra-axial collection or   calvarial fracture.    Cervical spine CT: Motion degraded. No acute fracture or traumatic   malalignment.    < end of copied text >  < from: CT Chest No Cont (08.30.22 @ 03:23) >  IMPRESSION: Suboptimal evaluation due to motion artifact and lack of   contrast media.    Confluent soft tissue density of the chest, predominantly affecting the   left hemithorax, though also encasement mediastinal structures extending   along the pleural surfaces bilaterally is compatible with sequelae of the   patient's known lymphoma. Additional soft tissue density along the   inferior left anterolateral thoracic rib cage is also likely neoplastic   in nature.Overall appearance is slightly progressive compared to previous   exam.    Small pericardial effusion, increased compared to previous exam.    Possibility of small effusions aren't excluded.    < end of copied text >  < from: Xray Chest 1 View- PORTABLE-Urgent (Xray Chest 1 View- PORTABLE-Urgent .) (09.09.22 @ 14:11) >  IMPRESSION:  New layering right effusion and moderate to large left effusion unchanged.    < end of copied text >    ------------------------------------------------------------------------------------------------------------    ALLERGIES:  Allergies    latex (Rash)  No Known Drug Allergies    Intolerances    MEDICATIONS:   MEDICATIONS  (STANDING):  albumin human  5% IVPB 2500 milliLiter(s) IV Intermittent once  albumin human  5% IVPB 2500 milliLiter(s) IV Intermittent once  albuterol/ipratropium for Nebulization 3 milliLiter(s) Nebulizer every 12 hours  budesonide  80 MICROgram(s)/formoterol 4.5 MICROgram(s) Inhaler 2 Puff(s) Inhalation two times a day  calcium gluconate IVPB 2 Gram(s) IV Intermittent once  chlorhexidine 2% Cloths 1 Application(s) Topical daily  cholecalciferol 1000 Unit(s) Oral daily  heparin   Injectable 5000 Unit(s) SubCutaneous every 12 hours  mirtazapine 7.5 milliGRAM(s) Oral <User Schedule>  multivitamin 1 Tablet(s) Oral daily    MEDICATIONS  (PRN):  acetaminophen     Tablet .. 650 milliGRAM(s) Oral every 6 hours PRN Temp greater or equal to 38C (100.4F), Mild Pain (1 - 3)  ALBUTerol    90 MICROgram(s) HFA Inhaler 2 Puff(s) Inhalation every 6 hours PRN Shortness of Breath and/or Wheezing  aluminum hydroxide/magnesium hydroxide/simethicone Suspension 30 milliLiter(s) Oral every 4 hours PRN Dyspepsia  melatonin 3 milliGRAM(s) Oral at bedtime PRN Insomnia  ondansetron Injectable 4 milliGRAM(s) IV Push every 8 hours PRN Nausea and/or Vomiting  ------------------------------------------------------------------------------------------------------------    PRESENT SYMPTOMS:  [ ] Unable to self-report   [ ] CPOT (ICU)  [ ] PAINADs  [ ] RDOS  Source if other than patient:   [ ]Family   [ ]Team     PAIN:   [ ]yes [ ]no  Location -                    Aggravating factors -  Quality -  Radiation -  Timing-  Severity (0-10 scale):  Minimal acceptable level (0-10 scale):     Dyspnea:                           [ ]Mild [ ]Moderate [ ]Severe  Anxiety:                             [ ]Mild [ ]Moderate [ ]Severe  Fatigue:                             [ ]Mild [ ]Moderate [ ]Severe  Nausea:                             [ ]Mild [ ]Moderate [ ]Severe  Loss of appetite:              [ ]Mild [ ]Moderate [ ]Severe  Constipation:                    [ ]Mild [ ]Moderate [ ]Severe    Other Symptoms:  [ ]All other review of systems negative     Home Medications for symptoms if any:     ------------------------------------------------------------------------------------------------------------    CRITICAL CARE:  [ ] Shock Present  [ ]Septic [ ]Cardiogenic [ ]Neurologic [ ]Hypovolemic  [ ]  Vasopressors [ ]  Inotropes   [X ]Respiratory failure present [ ]Mechanical ventilation [X ]Non-invasive ventilatory support [ ]  [X ]Acute  [ ]Chronic [X ]Hypoxic  [X ]Hypercarbic [ ]Other  [ ]Other organ failure     Other REFERRALS:  [ ]Hospice  [ ]Child Life  [ ]Social Work  [ ]Case management [ ]Holistic Therapy    HPI:  Patient is a 77 year old female with PMH of marginal zone lymphoma (dx 2005, s/p CARRIE lobectomy, relapsed in 2019 but not currently on chemo/RT, bipolar disorder, asthma (on O2 PRN at home, unknown how many L) presented to ED for altered mental status. Patient found to have hypercalcemia 11.7, also concern for hyperviscosity syndrome. Recent bone marrow biopsy showed lymphoplasmacytic lymphoma, s/p plasmapheresis. Hospital course complicated by UTI and worsening hypercarbic and hypoxic respiratory failure requiring AVAPs. GOC discussed with daughter yesterday, who wants patient to be full code. Palliative consulted for GOC and symptom management.     Patient was seen this AM, looking comfortable on oxygen mask. Difficult to understand patient due to her muffled speech. See below for GOC with daughter Alissa.     ------------------------------------------------------------------------------------------------------------    ITEMS NOT CHECKED ARE NOT PRESENT    SOCIAL HISTORY:   Support: ,  with dementia, one son and daughter   Substance hx:  [ ]   Tobacco hx:  [ ]   Alcohol hx: [ ]   Family Hx substance abuse [ ]yes [ ]no  Home Opioid hx:  [ ] I-Stop Reference No:  Living Situation: [X ]Home  [ ]Long term care  [ ]Rehab [ ]Other    Druze/Spirituality:    PCSSQ[Palliative Care Spiritual Screening Question]   Severity (0-10): 0  Score of 4 or > indicate consideration of Chaplaincy referral.  Chaplaincy Referral: [ ] yes [ ] refused [ ] following    ------------------------------------------------------------------------------------------------------------    FUNCTIONAL STATUS:     Baseline ADL (prior to admission):  [ ] Independent  [ ] Moderate Assistance [X] Dependent  Palliative Performance Score:     [X ]PPSV2 < or = to 30% [X ]significant weight loss  [X ]poor nutritional intake  [ ]anasarca [ ]Artificial Nutrition      Protein Calorie Malnutrition Present: [ ]mild [ ]moderate [X ]severe [ ]underweight [ ]morbid obesity    Height (cm): 160 (09-02-22 @ 11:25)  Weight (kg): 60 (09-02-22 @ 11:25)  BMI (kg/m2): 23.4 (09-02-22 @ 11:25)  ------------------------------------------------------------------------------------------------------------    PRIOR ADVANCE DIRECTIVES:    DNR/MOLST  [ ]  Living Will  [ ]   DECISION MAKER(s): Patient's daughter Alissa Abernathy   [ ] Health Care Proxy(s)  [X ] Surrogate(s)  [ ] Guardian           Name(s):     ------------------------------------------------------------------------------------------------------------    PHYSICAL EXAM:  Vital Signs Last 24 Hrs  T(C): 36.5 (12 Sep 2022 05:15), Max: 36.7 (11 Sep 2022 20:11)  T(F): 97.7 (12 Sep 2022 05:15), Max: 98.1 (11 Sep 2022 20:11)  HR: 97 (12 Sep 2022 07:47) (88 - 113)  BP: 101/55 (12 Sep 2022 05:15) (97/62 - 105/58)  BP(mean): --  RR: 17 (12 Sep 2022 05:15) (17 - 17)  SpO2: 98% (12 Sep 2022 07:47) (93% - 99%)    Parameters below as of 12 Sep 2022 05:15  Patient On (Oxygen Delivery Method): nasal cannula  O2 Flow (L/min): 6   I&O's Summary      GENERAL:  [X ]Cachexia  [X ] Frail  [X ]Awake  [X ]Oriented x 1  [ ]Lethargic  [ ]Unarousable  [ ]Verbal  [ ]Non-Verbal  Behavioral: [ ] Anxiety  [ ] Delirium [ ] Agitation [ ] Other  HEENT: [ ]Normal   [ ]Dry mouth   [ ]ET Tube/Trach  [ ]Oral lesions  PULMONARY:   [ ]Clear [ ]Tachypnea  [ ]Audible excessive secretions   [ ]Rhonchi        [ ]Right [ ]Left [ ]Bilateral  [ ]Crackles        [ ]Right [ ]Left [ ]Bilateral  [ ]Wheezing     [ ]Right [ ]Left [ ]Bilateral  [X ]Diminished breath sounds [ ]right [ ]left [X ]bilateral  CARDIOVASCULAR:    [X ]Regular [ ]Irregular [ ]Tachy  [ ]Mak [ ]Murmur [ ]Other  GASTROINTESTINAL:  [X ]Soft  [ ]Distended   [ ]+BS  [X ]Non tender [ ]Tender  [ ]Other [ ]PEG [ ]OGT/ NGT  Last BM:  GENITOURINARY:  [ ]Normal [X ] Incontinent   [ ]Oliguria/Anuria   [ ]Archer  MUSCULOSKELETAL:   [ ]Normal   [ ]Weakness  [X ]Bed/Wheelchair bound [ ]Edema  NEUROLOGIC:   [ ]No focal deficits  [X ]Cognitive impairment  [ ]Dysphagia [ ]Dysarthria [ ]Paresis [ ]Other   SKIN:   [X ]Normal  [ ]Rash  [ ]Other  [ ]Pressure ulcer(s)       Present on admission [ ]y [ ]n    ------------------------------------------------------------------------------------------------------------    LABS:                        9.4    6.17  )-----------( 137      ( 12 Sep 2022 07:10 )             33.3   09-12    145  |  99  |  23  ----------------------------<  85  4.6   |  38<H>  |  0.64    Ca    9.6      12 Sep 2022 07:10  Phos  3.3     09-12  Mg     1.70     09-12    RADIOLOGY & ADDITIONAL STUDIES:    < from: CT Head No Cont (08.30.22 @ 03:22) >  IMPRESSION:    Head CT: Motion degraded.Grossly stable exam without evidence of acute   intracranial hemorrhage, vasogenic edema, extra-axial collection or   calvarial fracture.    Cervical spine CT: Motion degraded. No acute fracture or traumatic   malalignment.    < end of copied text >  < from: CT Chest No Cont (08.30.22 @ 03:23) >  IMPRESSION: Suboptimal evaluation due to motion artifact and lack of   contrast media.    Confluent soft tissue density of the chest, predominantly affecting the   left hemithorax, though also encasement mediastinal structures extending   along the pleural surfaces bilaterally is compatible with sequelae of the   patient's known lymphoma. Additional soft tissue density along the   inferior left anterolateral thoracic rib cage is also likely neoplastic   in nature.Overall appearance is slightly progressive compared to previous   exam.    Small pericardial effusion, increased compared to previous exam.    Possibility of small effusions aren't excluded.    < end of copied text >  < from: Xray Chest 1 View- PORTABLE-Urgent (Xray Chest 1 View- PORTABLE-Urgent .) (09.09.22 @ 14:11) >  IMPRESSION:  New layering right effusion and moderate to large left effusion unchanged.    < end of copied text >    ------------------------------------------------------------------------------------------------------------    ALLERGIES:  Allergies    latex (Rash)  No Known Drug Allergies    Intolerances    MEDICATIONS:   MEDICATIONS  (STANDING):  albumin human  5% IVPB 2500 milliLiter(s) IV Intermittent once  albumin human  5% IVPB 2500 milliLiter(s) IV Intermittent once  albuterol/ipratropium for Nebulization 3 milliLiter(s) Nebulizer every 12 hours  budesonide  80 MICROgram(s)/formoterol 4.5 MICROgram(s) Inhaler 2 Puff(s) Inhalation two times a day  calcium gluconate IVPB 2 Gram(s) IV Intermittent once  chlorhexidine 2% Cloths 1 Application(s) Topical daily  cholecalciferol 1000 Unit(s) Oral daily  heparin   Injectable 5000 Unit(s) SubCutaneous every 12 hours  mirtazapine 7.5 milliGRAM(s) Oral <User Schedule>  multivitamin 1 Tablet(s) Oral daily    MEDICATIONS  (PRN):  acetaminophen     Tablet .. 650 milliGRAM(s) Oral every 6 hours PRN Temp greater or equal to 38C (100.4F), Mild Pain (1 - 3)  ALBUTerol    90 MICROgram(s) HFA Inhaler 2 Puff(s) Inhalation every 6 hours PRN Shortness of Breath and/or Wheezing  aluminum hydroxide/magnesium hydroxide/simethicone Suspension 30 milliLiter(s) Oral every 4 hours PRN Dyspepsia  melatonin 3 milliGRAM(s) Oral at bedtime PRN Insomnia  ondansetron Injectable 4 milliGRAM(s) IV Push every 8 hours PRN Nausea and/or Vomiting  ------------------------------------------------------------------------------------------------------------    PRESENT SYMPTOMS:  [X ] Unable to self-report - difficult to understand   [ ] CPOT (ICU)  [ ] PAINADs  [X ] RDOS - score of 1   Source if other than patient:   [ ]Family   [ ]Team     PAIN:   [ ]yes [ ]no  Location -                    Aggravating factors -  Quality -  Radiation -  Timing-  Severity (0-10 scale):  Minimal acceptable level (0-10 scale):     Dyspnea:                           [ ]Mild [ ]Moderate [ ]Severe  Anxiety:                             [ ]Mild [ ]Moderate [ ]Severe  Fatigue:                             [ ]Mild [ ]Moderate [ ]Severe  Nausea:                             [ ]Mild [ ]Moderate [ ]Severe  Loss of appetite:              [ ]Mild [ ]Moderate [ ]Severe  Constipation:                    [ ]Mild [ ]Moderate [ ]Severe    Other Symptoms:  [ ]All other review of systems negative     Home Medications for symptoms if any:     ------------------------------------------------------------------------------------------------------------    CRITICAL CARE:  [ ] Shock Present  [ ]Septic [ ]Cardiogenic [ ]Neurologic [ ]Hypovolemic  [ ]  Vasopressors [ ]  Inotropes   [X ]Respiratory failure present [ ]Mechanical ventilation [X ]Non-invasive ventilatory support [ ]  [X ]Acute  [ ]Chronic [X ]Hypoxic  [X ]Hypercarbic [ ]Other  [ ]Other organ failure     Other REFERRALS:  [ ]Hospice  [ ]Child Life  [ ]Social Work  [ ]Case management [ ]Holistic Therapy    HPI:  Patient is a 77 year old female with PMH of marginal zone lymphoma (dx 2005, s/p CARRIE lobectomy, relapsed in 2019 but not currently on chemo/RT, bipolar disorder, asthma (on O2 PRN at home, unknown how many L) presented to ED for altered mental status. Patient found to have hypercalcemia 11.7, also concern for hyperviscosity syndrome. Recent bone marrow biopsy showed lymphoplasmacytic lymphoma, s/p plasmapheresis. Hospital course complicated by UTI and worsening hypercarbic and hypoxic respiratory failure requiring AVAPs. GOC discussed with daughter yesterday, who wants patient to be full code. Palliative consulted for GOC and symptom management.     Patient was seen this AM, looking comfortable on oxygen mask. Difficult to understand patient due to her muffled speech. See below for GOC with daughter Alissa.     ------------------------------------------------------------------------------------------------------------    ITEMS NOT CHECKED ARE NOT PRESENT    SOCIAL HISTORY:   Support: ,  with dementia, one son and daughter   Substance hx:  [ ]   Tobacco hx:  [ ]   Alcohol hx: [ ]   Family Hx substance abuse [ ]yes [ ]no  Home Opioid hx:  [ ] I-Stop Reference No:  Living Situation: [X ]Home  [ ]Long term care  [ ]Rehab [ ]Other  Patient has bipolar, most likely not taking her medications, and has been living in unkempt conditions. Daughter has called APS     Taoism/Spirituality:    PCSSQ[Palliative Care Spiritual Screening Question]   Severity (0-10): 0  Score of 4 or > indicate consideration of Chaplaincy referral.  Chaplaincy Referral: [ ] yes [ ] refused [ ] following    ------------------------------------------------------------------------------------------------------------    FUNCTIONAL STATUS:     Baseline ADL (prior to admission):  [ ] Independent  [ ] Moderate Assistance [X] Dependent  Palliative Performance Score: 30%     [X ]PPSV2 < or = to 30% [X ]significant weight loss  [X ]poor nutritional intake  [ ]anasarca [ ]Artificial Nutrition      Protein Calorie Malnutrition Present: [ ]mild [ ]moderate [X ]severe [ ]underweight [ ]morbid obesity    Height (cm): 160 (09-02-22 @ 11:25)  Weight (kg): 60 (09-02-22 @ 11:25)  BMI (kg/m2): 23.4 (09-02-22 @ 11:25)  ------------------------------------------------------------------------------------------------------------    PRIOR ADVANCE DIRECTIVES:    DNR/MOLST  [ ]  Living Will  [ ]   DECISION MAKER(s): Patient's daughter Alissa Abernathy   [ ] Health Care Proxy(s)  [X ] Surrogate(s)  [ ] Guardian           Name(s):     ------------------------------------------------------------------------------------------------------------    PHYSICAL EXAM:  Vital Signs Last 24 Hrs  T(C): 36.5 (12 Sep 2022 05:15), Max: 36.7 (11 Sep 2022 20:11)  T(F): 97.7 (12 Sep 2022 05:15), Max: 98.1 (11 Sep 2022 20:11)  HR: 97 (12 Sep 2022 07:47) (88 - 113)  BP: 101/55 (12 Sep 2022 05:15) (97/62 - 105/58)  BP(mean): --  RR: 17 (12 Sep 2022 05:15) (17 - 17)  SpO2: 98% (12 Sep 2022 07:47) (93% - 99%)    Parameters below as of 12 Sep 2022 05:15  Patient On (Oxygen Delivery Method): nasal cannula  O2 Flow (L/min): 6   I&O's Summary      GENERAL:  [X ]Cachexia  [X ] Frail  [X ]Awake  [X ]Oriented x 1  [ ]Lethargic  [ ]Unarousable  [ ]Verbal  [ ]Non-Verbal  Behavioral: [ ] Anxiety  [ ] Delirium [ ] Agitation [ ] Other  HEENT: [ ]Normal   [ ]Dry mouth   [ ]ET Tube/Trach  [ ]Oral lesions  PULMONARY:   [ ]Clear [ ]Tachypnea  [ ]Audible excessive secretions   [ ]Rhonchi        [ ]Right [ ]Left [ ]Bilateral  [ ]Crackles        [ ]Right [ ]Left [ ]Bilateral  [ ]Wheezing     [ ]Right [ ]Left [ ]Bilateral  [X ]Diminished breath sounds [ ]right [ ]left [X ]bilateral  CARDIOVASCULAR:    [X ]Regular [ ]Irregular [ ]Tachy  [ ]Mak [ ]Murmur [ ]Other  GASTROINTESTINAL:  [X ]Soft  [ ]Distended   [ ]+BS  [X ]Non tender [ ]Tender  [ ]Other [ ]PEG [ ]OGT/ NGT  Last BM:  GENITOURINARY:  [ ]Normal [X ] Incontinent   [ ]Oliguria/Anuria   [ ]Archer  MUSCULOSKELETAL:   [ ]Normal   [ ]Weakness  [X ]Bed/Wheelchair bound [ ]Edema  NEUROLOGIC:   [ ]No focal deficits  [X ]Cognitive impairment  [ ]Dysphagia [ ]Dysarthria [ ]Paresis [ ]Other   SKIN:   [X ]Normal  [ ]Rash  [ ]Other  [ ]Pressure ulcer(s)       Present on admission [ ]y [ ]n    ------------------------------------------------------------------------------------------------------------    LABS:                        9.4    6.17  )-----------( 137      ( 12 Sep 2022 07:10 )             33.3   09-12    145  |  99  |  23  ----------------------------<  85  4.6   |  38<H>  |  0.64    Ca    9.6      12 Sep 2022 07:10  Phos  3.3     09-12  Mg     1.70     09-12    RADIOLOGY & ADDITIONAL STUDIES:    < from: CT Head No Cont (08.30.22 @ 03:22) >  IMPRESSION:    Head CT: Motion degraded.Grossly stable exam without evidence of acute   intracranial hemorrhage, vasogenic edema, extra-axial collection or   calvarial fracture.    Cervical spine CT: Motion degraded. No acute fracture or traumatic   malalignment.    < end of copied text >  < from: CT Chest No Cont (08.30.22 @ 03:23) >  IMPRESSION: Suboptimal evaluation due to motion artifact and lack of   contrast media.    Confluent soft tissue density of the chest, predominantly affecting the   left hemithorax, though also encasement mediastinal structures extending   along the pleural surfaces bilaterally is compatible with sequelae of the   patient's known lymphoma. Additional soft tissue density along the   inferior left anterolateral thoracic rib cage is also likely neoplastic   in nature.Overall appearance is slightly progressive compared to previous   exam.    Small pericardial effusion, increased compared to previous exam.    Possibility of small effusions aren't excluded.    < end of copied text >  < from: Xray Chest 1 View- PORTABLE-Urgent (Xray Chest 1 View- PORTABLE-Urgent .) (09.09.22 @ 14:11) >  IMPRESSION:  New layering right effusion and moderate to large left effusion unchanged.    < end of copied text >    ------------------------------------------------------------------------------------------------------------    ALLERGIES:  Allergies    latex (Rash)  No Known Drug Allergies    Intolerances    MEDICATIONS:   MEDICATIONS  (STANDING):  albumin human  5% IVPB 2500 milliLiter(s) IV Intermittent once  albumin human  5% IVPB 2500 milliLiter(s) IV Intermittent once  albuterol/ipratropium for Nebulization 3 milliLiter(s) Nebulizer every 12 hours  budesonide  80 MICROgram(s)/formoterol 4.5 MICROgram(s) Inhaler 2 Puff(s) Inhalation two times a day  calcium gluconate IVPB 2 Gram(s) IV Intermittent once  chlorhexidine 2% Cloths 1 Application(s) Topical daily  cholecalciferol 1000 Unit(s) Oral daily  heparin   Injectable 5000 Unit(s) SubCutaneous every 12 hours  mirtazapine 7.5 milliGRAM(s) Oral <User Schedule>  multivitamin 1 Tablet(s) Oral daily    MEDICATIONS  (PRN):  acetaminophen     Tablet .. 650 milliGRAM(s) Oral every 6 hours PRN Temp greater or equal to 38C (100.4F), Mild Pain (1 - 3)  ALBUTerol    90 MICROgram(s) HFA Inhaler 2 Puff(s) Inhalation every 6 hours PRN Shortness of Breath and/or Wheezing  aluminum hydroxide/magnesium hydroxide/simethicone Suspension 30 milliLiter(s) Oral every 4 hours PRN Dyspepsia  melatonin 3 milliGRAM(s) Oral at bedtime PRN Insomnia  ondansetron Injectable 4 milliGRAM(s) IV Push every 8 hours PRN Nausea and/or Vomiting  ------------------------------------------------------------------------------------------------------------    PRESENT SYMPTOMS:  [X ] Unable to self-report - difficult to understand   [ ] CPOT (ICU)  [ ] PAINADs  [X ] RDOS - score of 1   Source if other than patient:   [ ]Family   [ ]Team     PAIN:   [ ]yes [ ]no  Location -                    Aggravating factors -  Quality -  Radiation -  Timing-  Severity (0-10 scale):  Minimal acceptable level (0-10 scale):     Dyspnea:                           [ ]Mild [ ]Moderate [ ]Severe  Anxiety:                             [ ]Mild [ ]Moderate [ ]Severe  Fatigue:                             [ ]Mild [ ]Moderate [ ]Severe  Nausea:                             [ ]Mild [ ]Moderate [ ]Severe  Loss of appetite:              [ ]Mild [ ]Moderate [ ]Severe  Constipation:                    [ ]Mild [ ]Moderate [ ]Severe    Other Symptoms:  [ ]All other review of systems negative     Home Medications for symptoms if any:     ------------------------------------------------------------------------------------------------------------    CRITICAL CARE:  [ ] Shock Present  [ ]Septic [ ]Cardiogenic [ ]Neurologic [ ]Hypovolemic  [ ]  Vasopressors [ ]  Inotropes   [X ]Respiratory failure present [ ]Mechanical ventilation [X ]Non-invasive ventilatory support [ ]  [X ]Acute  [ ]Chronic [X ]Hypoxic  [X ]Hypercarbic [ ]Other  [ ]Other organ failure     Other REFERRALS:  [ ]Hospice  [ ]Child Life  [ ]Social Work  [ ]Case management [ ]Holistic Therapy

## 2022-09-12 NOTE — PROGRESS NOTE ADULT - ASSESSMENT
77F with hx of lymphoma (dx 2005, s/p CARRIE lobectomy, relapsed 2019, currently off chemo/RT) BPD, asthma admitted for metabolic encephalopathy 2/2 UTI/hypercalcemia. Pulmonary initially consulted for c/f pleural effusion which was actually 2/2 known lymphoma, with course now c/b hypoxic/hypercapnic respiratory failure now on nocturnal NIPPV in setting of hyperviscosity syndrome/multifactorial metabolic encephalopathy treated with plasmapharesis    #Hypoxic/Hypercapnic respiratory failure  #Metabolic Encephalopathy  #Lymphoma  #MGUS   #Trace pleural Effusion  #Asthma  #Hyperviscosity Syndrome     Recommendations:  - continue AVAPs at night   - give lasix 40 mg IV given   - maintain strict Is/Os, daily weights, would diurese to goal net even-net negative   - continue w/current bronchodilator regimen  - goals of care discussion   77F with hx of lymphoma (dx 2005, s/p CARRIE lobectomy, relapsed 2019, currently off chemo/RT) BPD, asthma admitted for metabolic encephalopathy 2/2 UTI/hypercalcemia. Pulmonary initially consulted for c/f pleural effusion which was actually 2/2 known lymphoma, with course now c/b hypoxic/hypercapnic respiratory failure now on nocturnal NIPPV in setting of hyperviscosity syndrome/multifactorial metabolic encephalopathy treated with plasmapharesis    #Hypoxic/Hypercapnic respiratory failure  #Metabolic Encephalopathy  #Lymphoma  #MGUS   #Trace pleural Effusion  #Asthma  #Hyperviscosity Syndrome     Recommendations:  - continue AVAPs at night; while of AVAPS can trial HFNC at 50/50 but wean as tolerated   - give lasix 40 mg IV, can continue with diuresis   - maintain strict Is/Os, daily weights, would diurese to goal net even-net negative   - continue w/current bronchodilator regimen  - goals of care discussion

## 2022-09-12 NOTE — PROGRESS NOTE ADULT - PROBLEM SELECTOR PLAN 1
hypercapnic and hypoxemic respiratory failure  worsening resp status on 9/10, pt lethargic, was not using AVAPS the night prior, was on NRB instead  -CO2 downtrending and pt mentating, trial off AVAPS to NC  -C/w intermittent diuresis for pleural effusions as BP allows, last given IV Lasix 40mg on 9/10.   -Seen by pulm, pt requires AVAPS at night, d/w ACP and RN.   -Palliative consult in AM   -Continue Duoneb BID, Symbicort BID, Albuterol PRN  -Low threshold for MICU consult, pt is full code per dgt Alissa who would want pt intubated if necessary. hypercapnic and hypoxemic respiratory failure  worsening resp status on 9/10, pt lethargic, was not using AVAPS the night prior, was on NRB instead  - VBG this am and will place on HFNC and continue to wean off oxygen  -CO2 downtrending and pt mentating, trial off AVAPS to NC  -C/w intermittent diuresis for pleural effusions as BP allows, last given IV Lasix 40mg on 9/10.   -Seen by pulm, pt requires AVAPS at night, d/w ACP and RN.   -Palliative following  -Continue Duoneb BID, Symbicort BID, Albuterol PRN      -Low threshold for MICU consult, pt is full code per daughter Alissa who would want pt intubated if necessary.

## 2022-09-12 NOTE — CONSULT NOTE ADULT - PROBLEM SELECTOR RECOMMENDATION 9
- Patient with acute hypercapnic and hypoxemic respiratory failure 2/2 to metabolic encephalopathy, possibly hyperviscosity syndrome   - CXR with pleural effusion but not amenale to thoracentesis   - Patient is comfortable on oxygen mask, no distress   - If has dyspnea/tachypnea, can start IV dilaudid 0.2 mg q4h PRN for dyspnea   - Daughter wants full code, however feels torn- wants to see if patient's state is reversible but also doesn't want patient to suffer

## 2022-09-12 NOTE — CONSULT NOTE ADULT - CONVERSATION DETAILS
Spoke with patient's daughter Alissa over the phone. Alissa described her frustration with her mother's declining condition, which she believes is driven by patient's psychiatric illness. She herself called APS to be involved as she was worried about patient. Daughter has also been trying to manage care or her father who has dementia with recent hospital course of his own. Daughter is under much emotional and financial stress.     She is not sure about patient's state of the lymphoma, reports not having had a conversation describing patient's condition and prognosis.     She is concerned about patient's lack of nutrition, discussed if she continues to not eat, conversation for artifical nutrition is usually introduced. I discussed with patient's cachectic state, unclear whether a PEG would reverse patient's deterioration. There is also high concern patient would take it out.     We discussed code status. Daughter affirms her wish for a trial of intubation, but knows patient would never want to be in a vegetative state. I discussed that due to patient's frailty, trial of intubation itself would probably have poor outcomes. I introduced possible comfort care approach if patient were to have worsening respiratory distress as another option instead of ICU course, with understanding patient would die. Daughter is clearly emotional and suffering from caregiver burden. She voices she wouldn't want patient to suffer, but unable to make concrete decisions as it is overwhelming.

## 2022-09-12 NOTE — PROGRESS NOTE ADULT - ASSESSMENT
MANDI GASPAR is a 77y Female who presents with a chief complaint of hypoxic respiratory failure.    Marginal Zone Lymphoma  - Patient treated previously at [facility]; was on rituximab + bendamustine last in August 2020, and since then has been on surveillance, but lost to follow-up since July 2021  - CT chest concerning for slight progression of disease    Lymphoplasmacytic Lymphoma  - Noted from bone marrow biopsy done during prior admission here   - s/p plasmapheresis 09/02 with some improvement in IgM  - s/p repeat plasmapheresis 09/09, with continued improvement in IgM  - Rituximab remains an option, will coordinate care with [facility]    - repeat her viscosity level, IgM level as well.  Concerned about IgM flare with rituxan, can wait for IgM to decrease further and then treat with rituxan or would opt to given cytoxan 100-200mg daily x 5 days to debulk then given rituxan in a few week.   Left message with Dr. Cano to discuss treatment options further.      Altered Mental Status  - Likely multifactorial with hyperviscosity of blood, hypercalcemia, urinary tract infection  - S/p Shiley placement  - Completed antibiotics  - Endocrinology following  - Psychiatry appreciated    Hypoxia  - Management per pulmonary

## 2022-09-12 NOTE — PROGRESS NOTE ADULT - ATTENDING COMMENTS
77F with hx of lymphoma (dx 2005, s/p CARRIE lobectomy, relapsed 2019, currently off chemo/RT) BPD, asthma admitted for metabolic encephalopathy 2/2 UTI/hypercalcemia. Pulmonary initially consulted for c/f pleural effusion which was actually 2/2 known lymphoma, with course now c/b hypoxic/hypercapnic respiratory failure now on nocturnal NIPPV in setting of hyperviscosity syndrome/multifactorial metabolic encephalopathy treated with plasmapharesis x 2.    Recommend:  - AVAPS qhs for hypercapnea  - trial of HFNC during the day  - would trial diuresis, volume overloaded on exam  - continue current bronchodilator regimen    Katt Posey MD

## 2022-09-12 NOTE — CONSULT NOTE ADULT - PROBLEM SELECTOR RECOMMENDATION 2
- Patient with hx of Marginal Zone Lymphoma, was on surveillance but lost to follow up at [facility]   - Now with Lymphoplasmacytic Lymphoma s/p plasmapheresis for hyperviscosity   - Lymphoma possibly progressing based on recent CT chest   - Heme/onc following  - Daughter requesting more information on patient's lymphoma

## 2022-09-12 NOTE — CONSULT NOTE ADULT - PROBLEM SELECTOR RECOMMENDATION 3
- Palliative consulted for GOC and symptom management  - Daughter Alissa requesting SW/CM help with social issues

## 2022-09-12 NOTE — PROGRESS NOTE ADULT - SUBJECTIVE AND OBJECTIVE BOX
Chief Complaint: Hypercalcemia, hypoglycemia    History: No low glucose since 9/10  patient reports tolerating po  nonrebreather in place  patient remains poor historian    MEDICATIONS  (STANDING):  albumin human  5% IVPB 2500 milliLiter(s) IV Intermittent once  albumin human  5% IVPB 2500 milliLiter(s) IV Intermittent once  albuterol/ipratropium for Nebulization 3 milliLiter(s) Nebulizer every 12 hours  budesonide  80 MICROgram(s)/formoterol 4.5 MICROgram(s) Inhaler 2 Puff(s) Inhalation two times a day  calcium gluconate IVPB 2 Gram(s) IV Intermittent once  chlorhexidine 2% Cloths 1 Application(s) Topical daily  cholecalciferol 1000 Unit(s) Oral daily  heparin   Injectable 5000 Unit(s) SubCutaneous every 12 hours  mirtazapine 7.5 milliGRAM(s) Oral <User Schedule>  multivitamin 1 Tablet(s) Oral daily    MEDICATIONS  (PRN):  acetaminophen     Tablet .. 650 milliGRAM(s) Oral every 6 hours PRN Temp greater or equal to 38C (100.4F), Mild Pain (1 - 3)  ALBUTerol    90 MICROgram(s) HFA Inhaler 2 Puff(s) Inhalation every 6 hours PRN Shortness of Breath and/or Wheezing  aluminum hydroxide/magnesium hydroxide/simethicone Suspension 30 milliLiter(s) Oral every 4 hours PRN Dyspepsia  melatonin 3 milliGRAM(s) Oral at bedtime PRN Insomnia  ondansetron Injectable 4 milliGRAM(s) IV Push every 8 hours PRN Nausea and/or Vomiting      Allergies    latex (Rash)  No Known Drug Allergies    Intolerances      Review of Systems:  ALL OTHER SYSTEMS REVIEWED AND NEGATIVE      PHYSICAL EXAM:  VITALS: T(C): 36.9 (09-12-22 @ 10:00)  T(F): 98.5 (09-12-22 @ 10:00), Max: 98.5 (09-12-22 @ 10:00)  HR: 106 (09-12-22 @ 10:00) (94 - 113)  BP: 101/58 (09-12-22 @ 10:00) (97/62 - 105/58)  RR:  (17 - 18)  SpO2:  (93% - 100%)  Wt(kg): --  GENERAL: NAD, appears comfortable  EYES: No proptosis, no lid lag, anicteric  HEENT:  Atraumatic, Normocephalic, moist mucous membranes  RESPIRATORY: nonrebreather mask in place, nonlabored respirations    CAPILLARY BLOOD GLUCOSE      POCT Blood Glucose.: 132 mg/dL (12 Sep 2022 12:00)  POCT Blood Glucose.: 112 mg/dL (12 Sep 2022 08:38)  POCT Blood Glucose.: 85 mg/dL (12 Sep 2022 06:22)  POCT Blood Glucose.: 84 mg/dL (12 Sep 2022 00:34)  POCT Blood Glucose.: 99 mg/dL (11 Sep 2022 21:25)  POCT Blood Glucose.: 89 mg/dL (11 Sep 2022 16:43)      09-12    145  |  99  |  23  ----------------------------<  85  4.6   |  38<H>  |  0.64    eGFR: 92    Ca    9.6      09-12  Mg     1.70     09-12  Phos  3.3     09-12        A1C with Estimated Average Glucose Result: 4.5 % (08-31-22 @ 03:48)      Thyroid Function Tests:  09-10 @ 11:45 TSH -- FreeT4 1.1 T3 -- Anti TPO -- Anti Thyroglobulin Ab -- TSI --  08-31 @ 03:48 TSH 1.42 FreeT4 -- T3 -- Anti TPO -- Anti Thyroglobulin Ab -- TSI --

## 2022-09-12 NOTE — CHART NOTE - NSCHARTNOTEFT_GEN_A_CORE
ABG:  Ph-7.4, Co2 65, o2 39, Hco3 44, co2 46% Sat 65%- on Room Air. She is compensated. She was put on 100% NRB o2 sat^ to 99%.  Will place on NC 6 Liter.  Jenny LYNCH

## 2022-09-12 NOTE — PROGRESS NOTE ADULT - PROBLEM SELECTOR PLAN 5
-Follows w/Dr. Real Cano @ [facility] [place]  -received treatment recently per daughter however she doesn't know specifics  -with progression of disease  -hematology to speak to [facility] regarding if further treatment is suggested  -Hem recs appreciated

## 2022-09-12 NOTE — PROGRESS NOTE ADULT - PROBLEM SELECTOR PLAN 7
-Hx of bipolar w/psych admission @ [facility] many years ago, unclear if patient actually had Bipolar   -non-compliant with medications  -Zyprexa 2.5mg QHS increased to 3.75mg QHS on 9/8/22 (pt refused), if patient has respiratory depression- hold Zyprexa, may try low dose Mirtazapine (also sedating)  -Psych recommendations appreciated   -Supportive care

## 2022-09-12 NOTE — CONSULT NOTE ADULT - ASSESSMENT
Patient is a 77 year old female with PMH of marginal zone lymphoma (dx 2005, s/p CARRIE lobectomy, relapsed in 2019 but not currently on chemo/RT, bipolar disorder, asthma (on O2 PRN at home, unknown how many L) presented to ED for altered mental status. Patient found to have hypercalcemia 11.7, also concern for hyperviscosity syndrome. Recent bone marrow biopsy showed lymphoplasmacytic lymphoma, s/p plasmapheresis. Hospital course complicated by UTI and worsening hypercarbic and hypoxic respiratory failure requiring AVAPs. GOC discussed with daughter yesterday, who wants patient to be full code. Palliative consulted for GOC and symptom management.

## 2022-09-12 NOTE — PROGRESS NOTE ADULT - SUBJECTIVE AND OBJECTIVE BOX
Patient is a 77y old  Female who presents with a chief complaint of Hypoxic respiratory failure (12 Sep 2022 15:00)  She is conversant but declines therapy, AAO x 2.    MEDICATIONS  (STANDING):  albumin human  5% IVPB 2500 milliLiter(s) IV Intermittent once  albumin human  5% IVPB 2500 milliLiter(s) IV Intermittent once  albuterol/ipratropium for Nebulization 3 milliLiter(s) Nebulizer every 12 hours  budesonide  80 MICROgram(s)/formoterol 4.5 MICROgram(s) Inhaler 2 Puff(s) Inhalation two times a day  calcium gluconate IVPB 2 Gram(s) IV Intermittent once  chlorhexidine 2% Cloths 1 Application(s) Topical daily  cholecalciferol 1000 Unit(s) Oral daily  heparin   Injectable 5000 Unit(s) SubCutaneous every 12 hours  mirtazapine 7.5 milliGRAM(s) Oral <User Schedule>  multivitamin 1 Tablet(s) Oral daily    MEDICATIONS  (PRN):  acetaminophen     Tablet .. 650 milliGRAM(s) Oral every 6 hours PRN Temp greater or equal to 38C (100.4F), Mild Pain (1 - 3)  ALBUTerol    90 MICROgram(s) HFA Inhaler 2 Puff(s) Inhalation every 6 hours PRN Shortness of Breath and/or Wheezing  aluminum hydroxide/magnesium hydroxide/simethicone Suspension 30 milliLiter(s) Oral every 4 hours PRN Dyspepsia  melatonin 3 milliGRAM(s) Oral at bedtime PRN Insomnia  ondansetron Injectable 4 milliGRAM(s) IV Push every 8 hours PRN Nausea and/or Vomiting      ROS  No fever, sweats, chills  No epistaxis, HA, sore throat  No CP, SOB, cough, sputum  No n/v/d, abd pain, melena, hematochezia  No edema  No rash  No anxiety  No back pain, joint pain  No bleeding, bruising  No dysuria, hematuria    Vital Signs Last 24 Hrs  T(C): 37 (12 Sep 2022 17:05), Max: 37 (12 Sep 2022 17:05)  T(F): 98.6 (12 Sep 2022 17:05), Max: 98.6 (12 Sep 2022 17:05)  HR: 95 (12 Sep 2022 20:10) (89 - 113)  BP: 97/48 (12 Sep 2022 17:05) (97/48 - 104/62)  BP(mean): --  RR: 16 (12 Sep 2022 20:07) (16 - 19)  SpO2: 99% (12 Sep 2022 20:10) (93% - 100%)    Parameters below as of 12 Sep 2022 20:10  Patient On (Oxygen Delivery Method): nasal cannula, high flow        PE  NAD  Awake, alert  Anicteric, MMM  RRR  CTAB  Abd soft, NT, ND  No c/c/e  No rash grossly  FROM                          9.4    6.17  )-----------( 137      ( 12 Sep 2022 07:10 )             33.3       09-12    145  |  99  |  23  ----------------------------<  85  4.6   |  38<H>  |  0.64    Ca    9.6      12 Sep 2022 07:10  Phos  3.3     09-12  Mg     1.70     09-12

## 2022-09-12 NOTE — PROGRESS NOTE ADULT - SUBJECTIVE AND OBJECTIVE BOX
Patient is a 77y old  Female who presents with a chief complaint of Hypoxic respiratory failure (12 Sep 2022 14:10)      SUBJECTIVE / OVERNIGHT EVENTS:    No events overnight. This AM, patient without n/v/d/cp/sob.      MEDICATIONS  (STANDING):  albumin human  5% IVPB 2500 milliLiter(s) IV Intermittent once  albumin human  5% IVPB 2500 milliLiter(s) IV Intermittent once  albuterol/ipratropium for Nebulization 3 milliLiter(s) Nebulizer every 12 hours  budesonide  80 MICROgram(s)/formoterol 4.5 MICROgram(s) Inhaler 2 Puff(s) Inhalation two times a day  calcium gluconate IVPB 2 Gram(s) IV Intermittent once  chlorhexidine 2% Cloths 1 Application(s) Topical daily  cholecalciferol 1000 Unit(s) Oral daily  heparin   Injectable 5000 Unit(s) SubCutaneous every 12 hours  mirtazapine 7.5 milliGRAM(s) Oral <User Schedule>  multivitamin 1 Tablet(s) Oral daily    MEDICATIONS  (PRN):  acetaminophen     Tablet .. 650 milliGRAM(s) Oral every 6 hours PRN Temp greater or equal to 38C (100.4F), Mild Pain (1 - 3)  ALBUTerol    90 MICROgram(s) HFA Inhaler 2 Puff(s) Inhalation every 6 hours PRN Shortness of Breath and/or Wheezing  aluminum hydroxide/magnesium hydroxide/simethicone Suspension 30 milliLiter(s) Oral every 4 hours PRN Dyspepsia  melatonin 3 milliGRAM(s) Oral at bedtime PRN Insomnia  ondansetron Injectable 4 milliGRAM(s) IV Push every 8 hours PRN Nausea and/or Vomiting      PHYSICAL EXAM:  T(C): 37 (09-12-22 @ 17:05), Max: 37 (09-12-22 @ 17:05)  HR: 89 (09-12-22 @ 17:29) (89 - 113)  BP: 97/48 (09-12-22 @ 17:05) (97/48 - 105/58)  RR: 18 (09-12-22 @ 17:05) (17 - 19)  SpO2: 95% (09-12-22 @ 17:29) (93% - 100%)  I&O's Summary    GENERAL: NAD, well-developed  HEAD:  Atraumatic, Normocephalic, MMM  CHEST/LUNG: No use of accessory muscles, CTAB, breathing non-labored  COR: RR, no mrcg  ABD: Soft, ND/NT, +BS  PSYCH: AAOx3  NEUROLOGY: CN II-XII grossly intact, moving all extremities  SKIN: No rashes or lesions  EXT: wwp, no cce    LABS:  CAPILLARY BLOOD GLUCOSE      POCT Blood Glucose.: 149 mg/dL (12 Sep 2022 18:27)  POCT Blood Glucose.: 132 mg/dL (12 Sep 2022 12:00)  POCT Blood Glucose.: 112 mg/dL (12 Sep 2022 08:38)  POCT Blood Glucose.: 85 mg/dL (12 Sep 2022 06:22)  POCT Blood Glucose.: 84 mg/dL (12 Sep 2022 00:34)  POCT Blood Glucose.: 99 mg/dL (11 Sep 2022 21:25)                          9.4    6.17  )-----------( 137      ( 12 Sep 2022 07:10 )             33.3     09-12    145  |  99  |  23  ----------------------------<  85  4.6   |  38<H>  |  0.64    Ca    9.6      12 Sep 2022 07:10  Phos  3.3     09-12  Mg     1.70     09-12                  RADIOLOGY & ADDITIONAL TESTS:    Telemetry Personally Reviewed -     Imaging Personally Reviewed -     Imaging Reviewed -     Consultant(s) Notes Reviewed -       Care Discussed with Consultants/Other Providers -  Patient is a 77y old  Female who presents with a chief complaint of Hypoxic respiratory failure (12 Sep 2022 14:10)      SUBJECTIVE / OVERNIGHT EVENTS:    No events overnight. This AM, patient without n/v/d/cp/sob.  Continues to answer no to all questions and that she is ok.     MEDICATIONS  (STANDING):  albumin human  5% IVPB 2500 milliLiter(s) IV Intermittent once  albumin human  5% IVPB 2500 milliLiter(s) IV Intermittent once  albuterol/ipratropium for Nebulization 3 milliLiter(s) Nebulizer every 12 hours  budesonide  80 MICROgram(s)/formoterol 4.5 MICROgram(s) Inhaler 2 Puff(s) Inhalation two times a day  calcium gluconate IVPB 2 Gram(s) IV Intermittent once  chlorhexidine 2% Cloths 1 Application(s) Topical daily  cholecalciferol 1000 Unit(s) Oral daily  heparin   Injectable 5000 Unit(s) SubCutaneous every 12 hours  mirtazapine 7.5 milliGRAM(s) Oral <User Schedule>  multivitamin 1 Tablet(s) Oral daily    MEDICATIONS  (PRN):  acetaminophen     Tablet .. 650 milliGRAM(s) Oral every 6 hours PRN Temp greater or equal to 38C (100.4F), Mild Pain (1 - 3)  ALBUTerol    90 MICROgram(s) HFA Inhaler 2 Puff(s) Inhalation every 6 hours PRN Shortness of Breath and/or Wheezing  aluminum hydroxide/magnesium hydroxide/simethicone Suspension 30 milliLiter(s) Oral every 4 hours PRN Dyspepsia  melatonin 3 milliGRAM(s) Oral at bedtime PRN Insomnia  ondansetron Injectable 4 milliGRAM(s) IV Push every 8 hours PRN Nausea and/or Vomiting      PHYSICAL EXAM:  T(C): 37 (09-12-22 @ 17:05), Max: 37 (09-12-22 @ 17:05)  HR: 89 (09-12-22 @ 17:29) (89 - 113)  BP: 97/48 (09-12-22 @ 17:05) (97/48 - 105/58)  RR: 18 (09-12-22 @ 17:05) (17 - 19)  SpO2: 95% (09-12-22 @ 17:29) (93% - 100%)  I&O's Summary    GENERAL: NAD, well-developed  HEAD:  Atraumatic, Normocephalic, MMM  CHEST/LUNG: No use of accessory muscles, CTAB, breathing non-labored  COR: RR, no mrcg  ABD: Soft, ND/NT, +BS  PSYCH: AAOx3  NEUROLOGY: CN II-XII grossly intact, moving all extremities  SKIN: No rashes or lesions  EXT: wwp, no cce    LABS:  CAPILLARY BLOOD GLUCOSE      POCT Blood Glucose.: 149 mg/dL (12 Sep 2022 18:27)  POCT Blood Glucose.: 132 mg/dL (12 Sep 2022 12:00)  POCT Blood Glucose.: 112 mg/dL (12 Sep 2022 08:38)  POCT Blood Glucose.: 85 mg/dL (12 Sep 2022 06:22)  POCT Blood Glucose.: 84 mg/dL (12 Sep 2022 00:34)  POCT Blood Glucose.: 99 mg/dL (11 Sep 2022 21:25)                          9.4    6.17  )-----------( 137      ( 12 Sep 2022 07:10 )             33.3     09-12    145  |  99  |  23  ----------------------------<  85  4.6   |  38<H>  |  0.64    Ca    9.6      12 Sep 2022 07:10  Phos  3.3     09-12  Mg     1.70     09-12                  RADIOLOGY & ADDITIONAL TESTS:    Consultant(s) Notes Reviewed -       Care Discussed with Consultants/Other Providers -

## 2022-09-12 NOTE — PROGRESS NOTE ADULT - ASSESSMENT
77 year old woman with history of HLD, lymphoma (dx 2005, s/p CARRIE lobectomy, relapsed in 2019 but not currently on chemo/RT, being followed by heme every 3-6months at [facility] [place]), bipolar disorder, asthma, who presented to the hospital for AMS and hypoxic respiratory failure, found to have hypercalcemia.     #Hypercalcemia - malignancy-related vs medication induced (lithium induced parathyroidism) vs in the setting of potential multiple myeloma transformation   - s/p 4 doses of IV Calcitonin, s/p pamidronate 60 mg IV x 1 8/30/22   - Corrected calcium now in upper normal range (10.2) continue to monitor  - 1,25 vitamin D not elevated (37.1)  -PTH-related peptide negative  -Etiology of hypercalcemia seems most likely related to multiple myeloma/monoclonal gammopathy. Management per heme/onc.  -Also of note is receiving calcium gluconate as part of plasma exchange therapy which may be contributing to higher calcium level.  -Trend calcium and albumin daily.      #history of fracture in the setting of likely osteoporosis;.   - Imaging findings of Old right humeral neck fracture. Left proximal humeral fracture s/p ORIF  .- f/u outpatient bone-density testing.   - 25-hydroxy Vitamin D level of 18.4 (low),   -continue cholecalciferol 1000 units daily.    #Hyperlipidemia  - history of hyperlipidemia   - Cholesterol of 54, Triglycerides of 30, Serum HDL of 21, LDL of 27. A1C of 4.5   - can hold on statin therapy at this time due to age and risk factors at this time   - home medication history of zyprexa.     #Hypoglycemia -   Ongoing poor po intake, no further serum hypoglycemia  encourage po intake/assistance  If able, can send labs with serum glucose at time of low glucose on point of care testing to confirm.  6 AM cortisol 8.6 - recommend repeat AM cortisol at 8AM tomorrow morning.  If 8AM cortisol < 10 tomorrow will pursue cosyntropin stimulation test (Give cosyntropin 0.25 mg IV, then check cortisol after 30 minutes and after 60 minutes)    Paged Hope PA to discuss await call back.  Complex patient high level decision making.    Thais Tovar MD  Division of Endocrinology  Pager: [phone]    If after 6PM or before 9AM, or on weekends/holidays, please call endocrine answering service for assistance ([phone]).  For nonurgent matters email [email or web address] for assistance.

## 2022-09-12 NOTE — CHART NOTE - NSCHARTNOTEFT_GEN_A_CORE
Placed an order to have RN draw cortisol level at 8am exactly.  SPoke to rn as to the importance of the timing of this blood draw. RN to pass it on to the day shift RN.

## 2022-09-12 NOTE — PROGRESS NOTE ADULT - ASSESSMENT
77 year old female with PMH of lymphoma (dx 2005, s/p CARRIE lobectomy, relapsed in 2019 but not currently on chemo/RT, being followed by heme every 3-6months at [facility] [place]), bipolar disorder, asthma (on O2 PRN at home, unknown how many L) brought to ED by daughter who states pt has been hallucinating, being more depressed.  Patient unable to provide much history, daughter Alissa states that patient was able to care for herself, manage home finances and attend doctor appointments independently until recently, she states that she believes her mom stopped taking her psychiatric medications in February of 2022 and has not followed up with any of her physicians. She has noticed recent weight loss (unsure how much) and change in voice.   Admitted to medicine service for further evaluation & treatment. Course c/b acute hypercapnic and hypoxic respiratory failure requiring BIPAP and AVAPS.  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

## 2022-09-12 NOTE — PROGRESS NOTE ADULT - SUBJECTIVE AND OBJECTIVE BOX
CHIEF COMPLAINT:    Interval Events:    Pt seen and examined at bedside. Smiles, responds yes/no     REVIEW OF SYSTEMS:  uanble to assess     OBJECTIVE:  ICU Vital Signs Last 24 Hrs  T(C): 36.9 (12 Sep 2022 10:00), Max: 36.9 (12 Sep 2022 10:00)  T(F): 98.5 (12 Sep 2022 10:00), Max: 98.5 (12 Sep 2022 10:00)  HR: 106 (12 Sep 2022 10:00) (88 - 113)  BP: 101/58 (12 Sep 2022 10:00) (97/62 - 105/58)  BP(mean): --  ABP: --  ABP(mean): --  RR: 18 (12 Sep 2022 10:00) (17 - 18)  SpO2: 100% (12 Sep 2022 10:00) (93% - 100%)    O2 Parameters below as of 12 Sep 2022 10:00  Patient On (Oxygen Delivery Method): mask, nonrebreather          Mode: standby    CAPILLARY BLOOD GLUCOSE      POCT Blood Glucose.: 112 mg/dL (12 Sep 2022 08:38)      PHYSICAL EXAM:  PHYSICAL EXAM:  General: Awake, alert, oriented X 0   HEENT: Atraumatic, normocephalic.                  No tonsillar or pharyngeal exudates.  Lymph Nodes: No palpable lymphadenopathy  Neck: No JVD. No carotid bruit.   Respiratory: decreased breath sounds L > R,   Cardiovascular: S1 S2 normal. No murmurs, rubs or gallops.   Abdomen: Soft, non-tender, non-distended. No organomegaly.  Extremities: Warm to touch. Peripheral pulse palpable. No pedal edema.   Neurological: Non-focal    HOSPITAL MEDICATIONS:  MEDICATIONS  (STANDING):  albumin human  5% IVPB 2500 milliLiter(s) IV Intermittent once  albumin human  5% IVPB 2500 milliLiter(s) IV Intermittent once  albuterol/ipratropium for Nebulization 3 milliLiter(s) Nebulizer every 12 hours  budesonide  80 MICROgram(s)/formoterol 4.5 MICROgram(s) Inhaler 2 Puff(s) Inhalation two times a day  calcium gluconate IVPB 2 Gram(s) IV Intermittent once  chlorhexidine 2% Cloths 1 Application(s) Topical daily  cholecalciferol 1000 Unit(s) Oral daily  heparin   Injectable 5000 Unit(s) SubCutaneous every 12 hours  mirtazapine 7.5 milliGRAM(s) Oral <User Schedule>  multivitamin 1 Tablet(s) Oral daily    MEDICATIONS  (PRN):  acetaminophen     Tablet .. 650 milliGRAM(s) Oral every 6 hours PRN Temp greater or equal to 38C (100.4F), Mild Pain (1 - 3)  ALBUTerol    90 MICROgram(s) HFA Inhaler 2 Puff(s) Inhalation every 6 hours PRN Shortness of Breath and/or Wheezing  aluminum hydroxide/magnesium hydroxide/simethicone Suspension 30 milliLiter(s) Oral every 4 hours PRN Dyspepsia  melatonin 3 milliGRAM(s) Oral at bedtime PRN Insomnia  ondansetron Injectable 4 milliGRAM(s) IV Push every 8 hours PRN Nausea and/or Vomiting      LABS:                        9.4    6.17  )-----------( 137      ( 12 Sep 2022 07:10 )             33.3     09-12    145  |  99  |  23  ----------------------------<  85  4.6   |  38<H>  |  0.64    Ca    9.6      12 Sep 2022 07:10  Phos  3.3     09-12  Mg     1.70     09-12          Arterial Blood Gas:  09-11 @ 09:27  7.46/60/142/43/99.7/15.8  ABG lactate: --  Arterial Blood Gas:  09-10 @ 17:10  7.42/65/77/42/98.4/14.5  ABG lactate: --  Arterial Blood Gas:  09-10 @ 14:30  7.35/74/62/41/94.0/11.9  ABG lactate: --  Arterial Blood Gas:  09-10 @ 11:45  7.36/70/166/40/99.5/11.0  ABG lactate: --        MICROBIOLOGY:     RADIOLOGY:  [ ] Reviewed and interpreted by me    Point of Care Ultrasound Findings:    PFT:    EKG:

## 2022-09-13 NOTE — PROGRESS NOTE ADULT - SUBJECTIVE AND OBJECTIVE BOX
CHIEF COMPLAINT:    Interval Events:  Pt seen and examined at bedside. Started on HFNC yesterday afternoon       REVIEW OF SYSTEMS:  unable to assess    OBJECTIVE:  ICU Vital Signs Last 24 Hrs  T(C): 37.2 (13 Sep 2022 04:50), Max: 37.2 (13 Sep 2022 04:50)  T(F): 98.9 (13 Sep 2022 04:50), Max: 98.9 (13 Sep 2022 04:50)  HR: 97 (13 Sep 2022 05:05) (80 - 117)  BP: 119/65 (13 Sep 2022 04:50) (97/48 - 119/65)  BP(mean): --  ABP: --  ABP(mean): --  RR: 16 (13 Sep 2022 05:05) (16 - 20)  SpO2: 99% (13 Sep 2022 05:05) (74% - 100%)    O2 Parameters below as of 13 Sep 2022 05:05  Patient On (Oxygen Delivery Method): nasal cannula, high flow  O2 Flow (L/min): 50  O2 Concentration (%): 90          CAPILLARY BLOOD GLUCOSE      POCT Blood Glucose.: 87 mg/dL (13 Sep 2022 06:49)      PHYSICAL EXAM:  General: Awake, alert, oriented X 0   HEENT: Atraumatic, normocephalic.                  No tonsillar or pharyngeal exudates.  Lymph Nodes: No palpable lymphadenopathy  Neck: No JVD. No carotid bruit.   Respiratory: decreased breath sounds L > R,   Cardiovascular: S1 S2 normal. No murmurs, rubs or gallops.   Abdomen: Soft, non-tender, non-distended. No organomegaly.  Extremities: Warm to touch. Peripheral pulse palpable. No pedal edema.   Neurological: Non-focal    HOSPITAL MEDICATIONS:  MEDICATIONS  (STANDING):  albumin human  5% IVPB 2500 milliLiter(s) IV Intermittent once  albumin human  5% IVPB 2500 milliLiter(s) IV Intermittent once  albuterol/ipratropium for Nebulization 3 milliLiter(s) Nebulizer every 12 hours  budesonide  80 MICROgram(s)/formoterol 4.5 MICROgram(s) Inhaler 2 Puff(s) Inhalation two times a day  calcium gluconate IVPB 2 Gram(s) IV Intermittent once  chlorhexidine 2% Cloths 1 Application(s) Topical daily  cholecalciferol 1000 Unit(s) Oral daily  heparin   Injectable 5000 Unit(s) SubCutaneous every 12 hours  mirtazapine 7.5 milliGRAM(s) Oral <User Schedule>  multivitamin 1 Tablet(s) Oral daily    MEDICATIONS  (PRN):  acetaminophen     Tablet .. 650 milliGRAM(s) Oral every 6 hours PRN Temp greater or equal to 38C (100.4F), Mild Pain (1 - 3)  ALBUTerol    90 MICROgram(s) HFA Inhaler 2 Puff(s) Inhalation every 6 hours PRN Shortness of Breath and/or Wheezing  aluminum hydroxide/magnesium hydroxide/simethicone Suspension 30 milliLiter(s) Oral every 4 hours PRN Dyspepsia  melatonin 3 milliGRAM(s) Oral at bedtime PRN Insomnia  ondansetron Injectable 4 milliGRAM(s) IV Push every 8 hours PRN Nausea and/or Vomiting      LABS:                        9.4    6.17  )-----------( 137      ( 12 Sep 2022 07:10 )             33.3     09-12    145  |  99  |  23  ----------------------------<  85  4.6   |  38<H>  |  0.64    Ca    9.6      12 Sep 2022 07:10  Phos  3.3     09-12  Mg     1.70     09-12          Arterial Blood Gas:  09-12 @ 13:12  7.44/65/39/44/69.5/16.5  ABG lactate: --  Arterial Blood Gas:  09-11 @ 09:27  7.46/60/142/43/99.7/15.8  ABG lactate: --        MICROBIOLOGY:     RADIOLOGY:  [ ] Reviewed and interpreted by me    Point of Care Ultrasound Findings:    PFT:    EKG:

## 2022-09-13 NOTE — PROGRESS NOTE ADULT - ASSESSMENT
77 year old woman with history of HLD, lymphoma (dx 2005, s/p CARRIE lobectomy, relapsed in 2019 but not currently on chemo/RT, being followed by heme every 3-6months at [facility] [place]), bipolar disorder, asthma, who presented to the hospital for AMS and hypoxic respiratory failure, found to have hypercalcemia.     #Hypercalcemia - malignancy-related vs medication induced (lithium induced parathyroidism) vs in the setting of potential multiple myeloma transformation   - s/p 4 doses of IV Calcitonin, s/p pamidronate 60 mg IV x 1 8/30/22   - Corrected calcium now mildly elevated (10.8) continue to monitor  -repeat albumin with AM labs for corrected calcium calculation  - 1,25 vitamin D not elevated (37.1)  -PTH-related peptide negative  -Etiology of hypercalcemia seems most likely related to multiple myeloma/monoclonal gammopathy. Management per heme/onc.  -Also of note is receiving calcium gluconate as part of plasma exchange therapy which may be contributing to higher calcium level.  -Trend calcium and albumin daily.      #history of fracture in the setting of likely osteoporosis;.   - Imaging findings of Old right humeral neck fracture. Left proximal humeral fracture s/p ORIF  .- f/u outpatient bone-density testing.   - 25-hydroxy Vitamin D level of 18.4 (low),   -continue cholecalciferol 1000 units daily.    #Hyperlipidemia  - history of hyperlipidemia   - Cholesterol of 54, Triglycerides of 30, Serum HDL of 21, LDL of 27. A1C of 4.5   - can hold on statin therapy at this time due to age and risk factors at this time   - home medication history of zyprexa.     #Hypoglycemia -   Ongoing poor po intake, no further serum hypoglycemia  encourage po intake/assistance  If able, can send labs with serum glucose at time of low glucose on point of care testing to confirm.  6 AM cortisol 8.6, repeated AM cortisol at 8AM today resulted at 12.4 acceptable level and not indicative of Adrenal insufficiency. Will defer need for stim test at this time.    Complex patient high level decision making.    Thais Tovar MD  Division of Endocrinology  Pager: [phone]    If after 6PM or before 9AM, or on weekends/holidays, please call endocrine answering service for assistance ([phone]).  For nonurgent matters email [email or web address] for assistance.

## 2022-09-13 NOTE — PROGRESS NOTE ADULT - PROBLEM SELECTOR PLAN 5
-Follows w/Dr. Real Cano @ [facility] [place]  -received treatment recently per daughter however she doesn't know specifics  -with progression of disease  -hematology to speak to [facility] regarding if further treatment is suggested  -Hem recs appreciated -Follows w/Dr. Real Cano @ [facility] [place]  -received treatment recently per daughter however she doesn't know specifics  -with progression of disease  -hematology to speak to [facility] regarding if further treatment is suggested  -Hem recs appreciated- plasmapheresis today and trend IgM tomorrow. if improving, will reconsider Rituxan and if not would give cytoxan 100-200mg daily x 5 days to debulk then given rituxan in a few week.

## 2022-09-13 NOTE — PROGRESS NOTE ADULT - ATTENDING COMMENTS
Ms. Abernathy is a 76 yo F with PMhx of lymphoma (dx 2005, s/p CARRIE lobectomy, relapsed 2019, currently off chemo/RT) BPD, asthma admitted for metabolic encephalopathy 2/2 UTI/hypercalcemia. Pulmonary initially consulted for c/f pleural effusion which was actually 2/2 known lymphoma, with course now c/b hypoxic/hypercapnic respiratory failure now on nocturnal NIPPV in setting of hyperviscosity syndrome/multifactorial metabolic encephalopathy treated with plasmapharesis x 2. IgM levels remain grossly elevated.  Patient more alert today.  HFNC weaned to 45%/40Lpm with O2Sat still maintaining 96%.  Suspect hypoxia/hypercapnea due to combination of volume overload and hypoventilation in setting of metabolic encephalopathy.    Recommend:  - Continue AVAPS qhs for hypercapnea  - wean HFNC to maintain O2Sat 92-95%  - Administer Diamox for continued volume overload on exam in setting of elevated bicarbonate  - continue current bronchodilator regimen    Katt Posey MD .

## 2022-09-13 NOTE — PROGRESS NOTE ADULT - SUBJECTIVE AND OBJECTIVE BOX
Patient is a 77y old  Female who presents with a chief complaint of Hypoxic respiratory failure (13 Sep 2022 13:24)    Patient seen this morning. On HFNC.     MEDICATIONS  (STANDING):  albumin human  5% IVPB 2500 milliLiter(s) IV Intermittent once  albumin human  5% IVPB 2500 milliLiter(s) IV Intermittent once  albumin human  5% IVPB 2500 milliLiter(s) IV Intermittent once  albuterol/ipratropium for Nebulization 3 milliLiter(s) Nebulizer every 12 hours  budesonide  80 MICROgram(s)/formoterol 4.5 MICROgram(s) Inhaler 2 Puff(s) Inhalation two times a day  calcium gluconate IVPB 2 Gram(s) IV Intermittent once  calcium gluconate IVPB 2 Gram(s) IV Intermittent once  chlorhexidine 2% Cloths 1 Application(s) Topical daily  cholecalciferol 1000 Unit(s) Oral daily  heparin   Injectable 5000 Unit(s) SubCutaneous every 12 hours  mirtazapine 7.5 milliGRAM(s) Oral <User Schedule>  multivitamin 1 Tablet(s) Oral daily    MEDICATIONS  (PRN):  acetaminophen     Tablet .. 650 milliGRAM(s) Oral every 6 hours PRN Temp greater or equal to 38C (100.4F), Mild Pain (1 - 3)  ALBUTerol    90 MICROgram(s) HFA Inhaler 2 Puff(s) Inhalation every 6 hours PRN Shortness of Breath and/or Wheezing  aluminum hydroxide/magnesium hydroxide/simethicone Suspension 30 milliLiter(s) Oral every 4 hours PRN Dyspepsia  melatonin 3 milliGRAM(s) Oral at bedtime PRN Insomnia  ondansetron Injectable 4 milliGRAM(s) IV Push every 8 hours PRN Nausea and/or Vomiting        Vital Signs Last 24 Hrs  T(C): 36.8 (13 Sep 2022 12:15), Max: 37.2 (13 Sep 2022 04:50)  T(F): 98.2 (13 Sep 2022 12:15), Max: 98.9 (13 Sep 2022 04:50)  HR: 99 (13 Sep 2022 12:15) (80 - 117)  BP: 94/54 (13 Sep 2022 12:45) (84/48 - 119/65)  BP(mean): --  RR: 18 (13 Sep 2022 12:15) (16 - 20)  SpO2: 98% (13 Sep 2022 12:15) (74% - 100%)    Parameters below as of 13 Sep 2022 12:15  Patient On (Oxygen Delivery Method): nasal cannula, high flow        PE  NAD  Anicteric, MMM  No c/c/e  Left wrist w mild bruising, soft swelling                           8.9    5.63  )-----------( 152      ( 13 Sep 2022 09:05 )             31.4       09-13    145  |  99  |  28<H>  ----------------------------<  106<H>  4.6   |  39<H>  |  0.61    Ca    10.2      13 Sep 2022 09:05  Phos  2.8     09-13  Mg     1.80     09-13    TPro  x   /  Alb  3.2<L>  /  TBili  x   /  DBili  x   /  AST  x   /  ALT  x   /  AlkPhos  x   09-13

## 2022-09-13 NOTE — PROGRESS NOTE ADULT - PROBLEM SELECTOR PLAN 7
-Hx of bipolar w/psych admission @ [facility] many years ago, unclear if patient actually had Bipolar   -non-compliant with medications  -Zyprexa 2.5mg QHS increased to 3.75mg QHS on 9/8/22 (pt refused), if patient has respiratory depression- hold Zyprexa, may try low dose Mirtazapine (also sedating)  -Psych recommendations appreciated   -Supportive care

## 2022-09-13 NOTE — PROGRESS NOTE ADULT - PROBLEM SELECTOR PLAN 1
hypercapnic and hypoxemic respiratory failure  worsening resp status on 9/10, pt lethargic, was not using AVAPS the night prior, was on NRB instead  - VBG this am and will place on HFNC and continue to wean off oxygen  -CO2 downtrending and pt mentating, trial off AVAPS to NC  -C/w intermittent diuresis for pleural effusions as BP allows, last given IV Lasix 40mg on 9/10.   -Seen by pulm, pt requires AVAPS at night, d/w ACP and RN.   -Palliative following  -Continue Duoneb BID, Symbicort BID, Albuterol PRN      -Low threshold for MICU consult, pt is full code per daughter Alissa who would want pt intubated if necessary. hypercapnic and hypoxemic respiratory failure  worsening resp status on 9/10, pt lethargic, was not using AVAPS the night prior, was on NRB instead  - VBG this am showed worsening pCO2 and low O2-> will palce on AVAPs for now  - VBG in am and will place on HFNC in am  -C/w intermittent diuresis for pleural effusions as BP allows, last given IV Lasix 40mg on 9/10.   -Seen by pulm and recommend Diamox and ordered for today in setting of worsening bicarb  -Palliative following  -Continue Duoneb BID, Symbicort BID, Albuterol PRN    -Low threshold for MICU consult, pt is full code per daughter Alissa who would want pt intubated if necessary.

## 2022-09-13 NOTE — PROGRESS NOTE ADULT - ASSESSMENT
MANDI GASPAR is a 77y Female who presents with a chief complaint of hypoxic respiratory failure.    Marginal Zone Lymphoma  - Patient treated previously at [facility]; was on rituximab + bendamustine last in August 2020, and since then has been on surveillance, but lost to follow-up since July 2021  - CT chest concerning for slight progression of disease    Lymphoplasmacytic Lymphoma  - Noted from bone marrow biopsy done during prior admission here   - s/p plasmapheresis 09/02 and 09/09 with some improvement in IgM   - Plan for plasmapheresis today. Re-check IgM and serum viscosity in AM   - Concerned about IgM flare with Rituxan, can wait for IgM to decrease further and then treat with Rituxan or would opt to given cytoxan 100-200mg daily x 5 days to debulk then given rituxan in a few week.   Left message with [facility] hematologist Dr. Crawford to discuss treatment options further.      Altered Mental Status  - Likely multifactorial with hyperviscosity of blood, hypercalcemia, urinary tract infection  - S/p Shiley placement  - Completed antibiotics  - Endocrinology following  - Psychiatry appreciated    Hypoxia  - Management per pulmonary MANDI GASPAR is a 77y Female who presents with a chief complaint of hypoxic respiratory failure.    Marginal Zone Lymphoma  - Patient treated previously at [facility]; was on rituximab + bendamustine last in August 2020, and since then has been on surveillance, but lost to follow-up since July 2021  - CT chest concerning for slight progression of disease    Lymphoplasmacytic Lymphoma  - Noted from bone marrow biopsy done during prior admission here   - s/p plasmapheresis 09/02 and 09/09 with some improvement in IgM   - Plan for plasmapheresis today. Re-check IgM and serum viscosity in AM   - Concerned about IgM flare with Rituxan, can wait for IgM to decrease further and then treat with Rituxan or would opt to given cytoxan 100-200mg daily x 5 days to debulk then given rituxan in a few week.   Left message with [facility] hematologist Dr. Crawford to discuss treatment options further.      Altered Mental Status  - Likely multifactorial with hyperviscosity of blood, hypercalcemia, urinary tract infection  - S/p Shiley placement  - Completed antibiotics  - Endocrinology following  - Psychiatry appreciated    Hypoxia  - Management per pulmonary    Will continue to follow.    Giovana Duarte PA-C  Hematology/Oncology  [facility]  [phone] (office)  [phone] (alt office)  Evenings and weekends please call MD on call or office

## 2022-09-13 NOTE — PROGRESS NOTE ADULT - PROBLEM SELECTOR PLAN 2
-Likely multi-factorial: UTI, hypercapnia, hypercalcemia, psychiatric disorder,  and now with greater concern for hyperviscosity syndrome given inability to even obtain immunoglobulin panel due to degree of hyperviscosity  -serum viscosity level sent and pending  -plasmapheresis per heme/onc for hyperviscosity -- last 9/9 via guero, needs IgM levels after each session   -IgM level downtrending, 7356-->>5300 --> 5192  -daughter thinks patient stopped taking her psychiatric medication in february  Poor PO intake- nutrition consulted , ensure added to diet , psych recommending dronabinol 2.5mg BID - monitor for side effects -Likely multi-factorial: UTI, hypercapnia, hypercalcemia, psychiatric disorder,  and now with greater concern for hyperviscosity syndrome given inability to even obtain immunoglobulin panel due to degree of hyperviscosity  -serum viscosity level sent and pending  -plasmapheresis per heme/onc for hyperviscosity -- last 9/9 via guero, needs IgM levels after each session   -IgM level downtrending, 7356-->>5300 --> 5192-> >5850   -daughter thinks patient stopped taking her psychiatric medication in february  - Poor PO intake- nutrition consulted , ensure added to diet   - monitor for side effects

## 2022-09-13 NOTE — PROGRESS NOTE ADULT - SUBJECTIVE AND OBJECTIVE BOX
Chief Complaint: Hypercalcemia, hypoglycemia    History: patient denies eating or drinking today  in bed appears comfortable  poor historian    MEDICATIONS  (STANDING):  albumin human  5% IVPB 2500 milliLiter(s) IV Intermittent once  albumin human  5% IVPB 2500 milliLiter(s) IV Intermittent once  albuterol/ipratropium for Nebulization 3 milliLiter(s) Nebulizer every 12 hours  budesonide  80 MICROgram(s)/formoterol 4.5 MICROgram(s) Inhaler 2 Puff(s) Inhalation two times a day  calcium gluconate IVPB 2 Gram(s) IV Intermittent once  chlorhexidine 2% Cloths 1 Application(s) Topical daily  cholecalciferol 1000 Unit(s) Oral daily  heparin   Injectable 5000 Unit(s) SubCutaneous every 12 hours  mirtazapine 7.5 milliGRAM(s) Oral <User Schedule>  multivitamin 1 Tablet(s) Oral daily    MEDICATIONS  (PRN):  acetaminophen     Tablet .. 650 milliGRAM(s) Oral every 6 hours PRN Temp greater or equal to 38C (100.4F), Mild Pain (1 - 3)  ALBUTerol    90 MICROgram(s) HFA Inhaler 2 Puff(s) Inhalation every 6 hours PRN Shortness of Breath and/or Wheezing  aluminum hydroxide/magnesium hydroxide/simethicone Suspension 30 milliLiter(s) Oral every 4 hours PRN Dyspepsia  melatonin 3 milliGRAM(s) Oral at bedtime PRN Insomnia  ondansetron Injectable 4 milliGRAM(s) IV Push every 8 hours PRN Nausea and/or Vomiting      Allergies    latex (Rash)  No Known Drug Allergies    Intolerances      Review of Systems:  UNABLE TO OBTAIN    PHYSICAL EXAM:  VITALS: T(C): 36.8 (09-13-22 @ 12:15)  T(F): 98.2 (09-13-22 @ 12:15), Max: 98.9 (09-13-22 @ 04:50)  HR: 99 (09-13-22 @ 12:15) (80 - 117)  BP: 90/52 (09-13-22 @ 12:25) (84/48 - 119/65)  RR:  (16 - 20)  SpO2:  (74% - 100%)  Wt(kg): --  GENERAL: NAD, resting in bed  EYES: No proptosis, no lid lag, anicteric  HEENT:  Atraumatic, Normocephalic, dry mucous membranes  RESPIRATORY: O2 high flow nasal cannula, nonlabored respirations  SKIN: L hand bruising  PSYCH: awake, pleasant    CAPILLARY BLOOD GLUCOSE      POCT Blood Glucose.: 85 mg/dL (13 Sep 2022 12:02)  POCT Blood Glucose.: 87 mg/dL (13 Sep 2022 06:49)  POCT Blood Glucose.: 92 mg/dL (13 Sep 2022 00:52)  POCT Blood Glucose.: 149 mg/dL (12 Sep 2022 18:27)      09-13    145  |  99  |  28<H>  ----------------------------<  106<H>  4.6   |  39<H>  |  0.61    eGFR: 93    Ca    10.2      09-13  Mg     1.80     09-13  Phos  2.8     09-13        A1C with Estimated Average Glucose Result: 4.5 % (08-31-22 @ 03:48)      Thyroid Function Tests:  09-10 @ 11:45 TSH -- FreeT4 1.1 T3 -- Anti TPO -- Anti Thyroglobulin Ab -- TSI --  08-31 @ 03:48 TSH 1.42 FreeT4 -- T3 -- Anti TPO -- Anti Thyroglobulin Ab -- TSI --

## 2022-09-13 NOTE — PROGRESS NOTE ADULT - ASSESSMENT
77F with hx of lymphoma (dx 2005, s/p CARRIE lobectomy, relapsed 2019, currently off chemo/RT) BPD, asthma admitted for metabolic encephalopathy 2/2 UTI/hypercalcemia. Pulmonary initially consulted for c/f pleural effusion which was actually 2/2 known lymphoma, with course now c/b hypoxic/hypercapnic respiratory failure now on nocturnal NIPPV in setting of hyperviscosity syndrome/multifactorial metabolic encephalopathy treated with plasmapharesis    #Hypoxic/Hypercapnic respiratory failure  #Metabolic Encephalopathy  #Lymphoma  #MGUS   #Trace pleural Effusion  #Asthma  #Hyperviscosity Syndrome     Worsening hypoxic respiratory failure is likely in setting of decreased reserve with worsening atelectasis    Recommendations:  - continue AVAPs at night; while of AVAPS can trial HFNC at 50/50 wean as tolerated  - Would give a dose of 40 IV lasix today   - maintain strict Is/Os, daily weights, would diurese to goal net even-net negative   - continue w/current bronchodilator regimen  - goals of care discussion  - Out of bed to chair as tolerated, trial nebulizer treatments for airway clearance and opening. Head of bed up, physical therapy as able    77F with hx of lymphoma (dx 2005, s/p CARRIE lobectomy, relapsed 2019, currently off chemo/RT) BPD, asthma admitted for metabolic encephalopathy 2/2 UTI/hypercalcemia. Pulmonary initially consulted for c/f pleural effusion which was actually 2/2 known lymphoma, with course now c/b hypoxic/hypercapnic respiratory failure now on nocturnal NIPPV in setting of hyperviscosity syndrome/multifactorial metabolic encephalopathy treated with plasmapharesis    #Hypoxic/Hypercapnic respiratory failure  #Metabolic Encephalopathy  #Lymphoma  #MGUS   #Trace pleural Effusion  #Asthma  #Hyperviscosity Syndrome     Worsening hypoxic respiratory failure is likely in setting of decreased reserve with worsening atelectasis    Recommendations:  - continue AVAPs at night; while of AVAPS can trial HFNC at 50/50 wean as tolerated  - Would give a dose of 250mg IV Diamox   - maintain strict Is/Os, daily weights, would diurese to goal net even-net negative   - continue w/current bronchodilator regimen  - goals of care discussion  - Out of bed to chair as tolerated, trial nebulizer treatments for airway clearance and opening. Head of bed up, physical therapy as able

## 2022-09-13 NOTE — PROGRESS NOTE ADULT - SUBJECTIVE AND OBJECTIVE BOX
Patient is a 77y old  Female who presents with a chief complaint of Hypoxic respiratory failure (13 Sep 2022 15:58)      SUBJECTIVE / OVERNIGHT EVENTS:    No events overnight. This AM, patient without n/v/d/cp/sob.      MEDICATIONS  (STANDING):  albumin human  5% IVPB 2500 milliLiter(s) IV Intermittent once  albumin human  5% IVPB 2500 milliLiter(s) IV Intermittent once  albumin human  5% IVPB 2500 milliLiter(s) IV Intermittent once  albuterol/ipratropium for Nebulization 3 milliLiter(s) Nebulizer every 12 hours  budesonide  80 MICROgram(s)/formoterol 4.5 MICROgram(s) Inhaler 2 Puff(s) Inhalation two times a day  calcium gluconate IVPB 2 Gram(s) IV Intermittent once  calcium gluconate IVPB 2 Gram(s) IV Intermittent once  chlorhexidine 2% Cloths 1 Application(s) Topical daily  cholecalciferol 1000 Unit(s) Oral daily  heparin   Injectable 5000 Unit(s) SubCutaneous every 12 hours  mirtazapine 7.5 milliGRAM(s) Oral <User Schedule>  multivitamin 1 Tablet(s) Oral daily    MEDICATIONS  (PRN):  acetaminophen     Tablet .. 650 milliGRAM(s) Oral every 6 hours PRN Temp greater or equal to 38C (100.4F), Mild Pain (1 - 3)  ALBUTerol    90 MICROgram(s) HFA Inhaler 2 Puff(s) Inhalation every 6 hours PRN Shortness of Breath and/or Wheezing  aluminum hydroxide/magnesium hydroxide/simethicone Suspension 30 milliLiter(s) Oral every 4 hours PRN Dyspepsia  melatonin 3 milliGRAM(s) Oral at bedtime PRN Insomnia  ondansetron Injectable 4 milliGRAM(s) IV Push every 8 hours PRN Nausea and/or Vomiting      PHYSICAL EXAM:  T(C): 36.7 (09-13-22 @ 16:38), Max: 37.2 (09-13-22 @ 04:50)  HR: 99 (09-13-22 @ 16:38) (80 - 117)  BP: 92/52 (09-13-22 @ 16:38) (84/48 - 119/65)  RR: 17 (09-13-22 @ 16:38) (16 - 20)  SpO2: 95% (09-13-22 @ 16:38) (74% - 100%)  I&O's Summary    GENERAL: NAD, well-developed  HEAD:  Atraumatic, Normocephalic, MMM  CHEST/LUNG: No use of accessory muscles, CTAB, breathing non-labored  COR: RR, no mrcg  ABD: Soft, ND/NT, +BS  PSYCH: AAOx3  NEUROLOGY: CN II-XII grossly intact, moving all extremities  SKIN: No rashes or lesions  EXT: wwp, no cce    LABS:  CAPILLARY BLOOD GLUCOSE      POCT Blood Glucose.: 85 mg/dL (13 Sep 2022 12:02)  POCT Blood Glucose.: 87 mg/dL (13 Sep 2022 06:49)  POCT Blood Glucose.: 92 mg/dL (13 Sep 2022 00:52)  POCT Blood Glucose.: 149 mg/dL (12 Sep 2022 18:27)                          8.9    5.63  )-----------( 152      ( 13 Sep 2022 09:05 )             31.4     09-13    145  |  99  |  28<H>  ----------------------------<  106<H>  4.6   |  39<H>  |  0.61    Ca    10.2      13 Sep 2022 09:05  Phos  2.8     09-13  Mg     1.80     09-13    TPro  x   /  Alb  3.2<L>  /  TBili  x   /  DBili  x   /  AST  x   /  ALT  x   /  AlkPhos  x   09-13                RADIOLOGY & ADDITIONAL TESTS:    Consultant(s) Notes Reviewed -       Care Discussed with Consultants/Other Providers -

## 2022-09-13 NOTE — PROGRESS NOTE ADULT - PROBLEM SELECTOR PLAN 9
-Heparin SQ    -Dispo issues: medically active   -Patient with APS case ongoing due to unsanitary conditions at home. Will require SW/CM for safe placement once medically stable for d/c. Patient currently without capacity and with poor insight into her medical conditions.    Updated dgt Alissa on 9/11, states pt wanted to fight before she got sick, wants pt to be comfortable but also wants to continue aggressive care. Pt is full code, will continue readdressing GOC. -Heparin SQ    -Dispo issues: medically active   -Patient with APS case ongoing due to unsanitary conditions at home. Will require SW/CM for safe placement once medically stable for d/c. Patient currently without capacity and with poor insight into her medical conditions.    Updated daughter Alissa on 9/13, updated about current plan. Daughter states she is currently working on trying to save her parents house and states house is unsafe for her parents. She is looking for help for her parents.   Pt is full code, will continue readdressing [facility].  Best number to call Alissa at [phone].

## 2022-09-14 NOTE — PROGRESS NOTE ADULT - SUBJECTIVE AND OBJECTIVE BOX
Patient is a 77y old  Female who presents with a chief complaint of Hypoxic respiratory failure (14 Sep 2022 15:33)      SUBJECTIVE / OVERNIGHT EVENTS:    No events overnight. This AM, patient without n/v/d/cp/sob.      MEDICATIONS  (STANDING):  albumin human  5% IVPB 2500 milliLiter(s) IV Intermittent once  albumin human  5% IVPB 2500 milliLiter(s) IV Intermittent once  albumin human  5% IVPB 2500 milliLiter(s) IV Intermittent once  albuterol/ipratropium for Nebulization 3 milliLiter(s) Nebulizer every 12 hours  budesonide  80 MICROgram(s)/formoterol 4.5 MICROgram(s) Inhaler 2 Puff(s) Inhalation two times a day  calcium gluconate IVPB 2 Gram(s) IV Intermittent once  calcium gluconate IVPB 2 Gram(s) IV Intermittent once  chlorhexidine 2% Cloths 1 Application(s) Topical daily  cholecalciferol 1000 Unit(s) Oral daily  dextrose 5% + sodium chloride 0.45%. 1000 milliLiter(s) (75 mL/Hr) IV Continuous <Continuous>  heparin   Injectable 5000 Unit(s) SubCutaneous every 12 hours  mirtazapine 7.5 milliGRAM(s) Oral <User Schedule>  multivitamin 1 Tablet(s) Oral daily    MEDICATIONS  (PRN):  acetaminophen     Tablet .. 650 milliGRAM(s) Oral every 6 hours PRN Temp greater or equal to 38C (100.4F), Mild Pain (1 - 3)  ALBUTerol    90 MICROgram(s) HFA Inhaler 2 Puff(s) Inhalation every 6 hours PRN Shortness of Breath and/or Wheezing  aluminum hydroxide/magnesium hydroxide/simethicone Suspension 30 milliLiter(s) Oral every 4 hours PRN Dyspepsia  melatonin 3 milliGRAM(s) Oral at bedtime PRN Insomnia  ondansetron Injectable 4 milliGRAM(s) IV Push every 8 hours PRN Nausea and/or Vomiting      PHYSICAL EXAM:  T(C): 36.8 (09-14-22 @ 10:24), Max: 36.8 (09-14-22 @ 10:24)  HR: 87 (09-14-22 @ 15:40) (67 - 778)  BP: 133/61 (09-14-22 @ 10:24) (92/52 - 133/61)  RR: 19 (09-14-22 @ 15:40) (17 - 19)  SpO2: 98% (09-14-22 @ 15:40) (95% - 98%)  I&O's Summary    GENERAL: NAD, well-developed  HEAD:  Atraumatic, Normocephalic, MMM  CHEST/LUNG: No use of accessory muscles, CTAB, breathing non-labored  COR: RR, no mrcg  ABD: Soft, ND/NT, +BS  PSYCH: AAOx3  NEUROLOGY: CN II-XII grossly intact, moving all extremities  SKIN: LLE small area that is open, area non-erythematous, non-tender, no warmth noted  EXT: wwp, no cce    LABS:  CAPILLARY BLOOD GLUCOSE      POCT Blood Glucose.: 266 mg/dL (14 Sep 2022 12:55)  POCT Blood Glucose.: 83 mg/dL (14 Sep 2022 07:00)  POCT Blood Glucose.: 84 mg/dL (14 Sep 2022 02:34)  POCT Blood Glucose.: 131 mg/dL (13 Sep 2022 18:21)                          9.3    7.34  )-----------( 137      ( 14 Sep 2022 07:10 )             32.1     09-14    147<H>  |  102  |  26<H>  ----------------------------<  77  4.2   |  35<H>  |  0.66    Ca    9.7      14 Sep 2022 07:10  Phos  2.7     09-14  Mg     1.70     09-14    TPro  x   /  Alb  3.2<L>  /  TBili  x   /  DBili  x   /  AST  x   /  ALT  x   /  AlkPhos  x   09-13                RADIOLOGY & ADDITIONAL TESTS:    Consultant(s) Notes Reviewed -       Care Discussed with Consultants/Other Providers -  Patient is a 77y old  Female who presents with a chief complaint of Hypoxic respiratory failure (14 Sep 2022 15:33)      SUBJECTIVE / OVERNIGHT EVENTS:    This AM, patient without n/v/d/cp. Patient sitting in chair today and more alert. She is able to talk to me and converse about her daughter as well as . Encouraged to eat more and offered ice cream to which pt said yes.     MEDICATIONS  (STANDING):  albumin human  5% IVPB 2500 milliLiter(s) IV Intermittent once  albumin human  5% IVPB 2500 milliLiter(s) IV Intermittent once  albumin human  5% IVPB 2500 milliLiter(s) IV Intermittent once  albuterol/ipratropium for Nebulization 3 milliLiter(s) Nebulizer every 12 hours  budesonide  80 MICROgram(s)/formoterol 4.5 MICROgram(s) Inhaler 2 Puff(s) Inhalation two times a day  calcium gluconate IVPB 2 Gram(s) IV Intermittent once  calcium gluconate IVPB 2 Gram(s) IV Intermittent once  chlorhexidine 2% Cloths 1 Application(s) Topical daily  cholecalciferol 1000 Unit(s) Oral daily  dextrose 5% + sodium chloride 0.45%. 1000 milliLiter(s) (75 mL/Hr) IV Continuous <Continuous>  heparin   Injectable 5000 Unit(s) SubCutaneous every 12 hours  mirtazapine 7.5 milliGRAM(s) Oral <User Schedule>  multivitamin 1 Tablet(s) Oral daily    MEDICATIONS  (PRN):  acetaminophen     Tablet .. 650 milliGRAM(s) Oral every 6 hours PRN Temp greater or equal to 38C (100.4F), Mild Pain (1 - 3)  ALBUTerol    90 MICROgram(s) HFA Inhaler 2 Puff(s) Inhalation every 6 hours PRN Shortness of Breath and/or Wheezing  aluminum hydroxide/magnesium hydroxide/simethicone Suspension 30 milliLiter(s) Oral every 4 hours PRN Dyspepsia  melatonin 3 milliGRAM(s) Oral at bedtime PRN Insomnia  ondansetron Injectable 4 milliGRAM(s) IV Push every 8 hours PRN Nausea and/or Vomiting      PHYSICAL EXAM:  T(C): 36.8 (09-14-22 @ 10:24), Max: 36.8 (09-14-22 @ 10:24)  HR: 87 (09-14-22 @ 15:40) (67 - 778)  BP: 133/61 (09-14-22 @ 10:24) (92/52 - 133/61)  RR: 19 (09-14-22 @ 15:40) (17 - 19)  SpO2: 98% (09-14-22 @ 15:40) (95% - 98%)  I&O's Summary    GENERAL: NAD, frail appearing  HEAD:  Atraumatic, Normocephalic, MMM  CHEST/LUNG: No use of accessory muscles, CTAB, breathing non-labored  COR: RR, normal S1/S2  ABD: Soft, ND/NT, +BS  PSYCH: AAOx2 but remains confused  NEUROLOGY: CN II-XII grossly intact, moving all extremities  SKIN: LLE small area that is open, area non-erythematous, non-tender, no warmth noted  EXT:no edema or cyanosis noted    LABS:  CAPILLARY BLOOD GLUCOSE      POCT Blood Glucose.: 266 mg/dL (14 Sep 2022 12:55)  POCT Blood Glucose.: 83 mg/dL (14 Sep 2022 07:00)  POCT Blood Glucose.: 84 mg/dL (14 Sep 2022 02:34)  POCT Blood Glucose.: 131 mg/dL (13 Sep 2022 18:21)                          9.3    7.34  )-----------( 137      ( 14 Sep 2022 07:10 )             32.1     09-14    147<H>  |  102  |  26<H>  ----------------------------<  77  4.2   |  35<H>  |  0.66    Ca    9.7      14 Sep 2022 07:10  Phos  2.7     09-14  Mg     1.70     09-14    TPro  x   /  Alb  3.2<L>  /  TBili  x   /  DBili  x   /  AST  x   /  ALT  x   /  AlkPhos  x   09-13                RADIOLOGY & ADDITIONAL TESTS:    Consultant(s) Notes Reviewed -       Care Discussed with Consultants/Other Providers -

## 2022-09-14 NOTE — PROGRESS NOTE ADULT - SUBJECTIVE AND OBJECTIVE BOX
CHIEF COMPLAINT:    Interval Events:    Pt seen and examined at bedside. Switching between AVAPs and HFNC.       OBJECTIVE:  ICU Vital Signs Last 24 Hrs  T(C): 36.7 (14 Sep 2022 06:24), Max: 36.9 (13 Sep 2022 08:30)  T(F): 98 (14 Sep 2022 06:24), Max: 98.4 (13 Sep 2022 08:30)  HR: 70 (14 Sep 2022 06:24) (70 - 100)  BP: 128/60 (14 Sep 2022 06:24) (84/48 - 130/64)  BP(mean): --  ABP: --  ABP(mean): --  RR: 17 (14 Sep 2022 06:24) (17 - 18)  SpO2: 95% (14 Sep 2022 06:24) (95% - 100%)    O2 Parameters below as of 13 Sep 2022 21:01  Patient On (Oxygen Delivery Method): BiPAP/CPAP          Mode: NIV (Noninvasive Ventilation), RR (machine): 16, TV (machine): 500, FiO2: 40, PEEP: 5, ITime: 0.9, PIP: 13    CAPILLARY BLOOD GLUCOSE      POCT Blood Glucose.: 83 mg/dL (14 Sep 2022 07:00)      HYSICAL EXAM:  General: Awake, alert, oriented X 0   HEENT: Atraumatic, normocephalic.                  No tonsillar or pharyngeal exudates.  Lymph Nodes: No palpable lymphadenopathy  Neck: No JVD. No carotid bruit.   Respiratory: decreased breath sounds L > R,   Cardiovascular: S1 S2 normal. No murmurs, rubs or gallops.   Abdomen: Soft, non-tender, non-distended. No organomegaly.  Extremities: Warm to touch. Peripheral pulse palpable. No pedal edema.   Neurological: Non-focal    HOSPITAL MEDICATIONS:  MEDICATIONS  (STANDING):  albumin human  5% IVPB 2500 milliLiter(s) IV Intermittent once  albumin human  5% IVPB 2500 milliLiter(s) IV Intermittent once  albumin human  5% IVPB 2500 milliLiter(s) IV Intermittent once  albuterol/ipratropium for Nebulization 3 milliLiter(s) Nebulizer every 12 hours  budesonide  80 MICROgram(s)/formoterol 4.5 MICROgram(s) Inhaler 2 Puff(s) Inhalation two times a day  calcium gluconate IVPB 2 Gram(s) IV Intermittent once  calcium gluconate IVPB 2 Gram(s) IV Intermittent once  chlorhexidine 2% Cloths 1 Application(s) Topical daily  cholecalciferol 1000 Unit(s) Oral daily  heparin   Injectable 5000 Unit(s) SubCutaneous every 12 hours  mirtazapine 7.5 milliGRAM(s) Oral <User Schedule>  multivitamin 1 Tablet(s) Oral daily    MEDICATIONS  (PRN):  acetaminophen     Tablet .. 650 milliGRAM(s) Oral every 6 hours PRN Temp greater or equal to 38C (100.4F), Mild Pain (1 - 3)  ALBUTerol    90 MICROgram(s) HFA Inhaler 2 Puff(s) Inhalation every 6 hours PRN Shortness of Breath and/or Wheezing  aluminum hydroxide/magnesium hydroxide/simethicone Suspension 30 milliLiter(s) Oral every 4 hours PRN Dyspepsia  melatonin 3 milliGRAM(s) Oral at bedtime PRN Insomnia  ondansetron Injectable 4 milliGRAM(s) IV Push every 8 hours PRN Nausea and/or Vomiting      LABS:                        8.9    5.63  )-----------( 152      ( 13 Sep 2022 09:05 )             31.4     09-13    145  |  99  |  28<H>  ----------------------------<  106<H>  4.6   |  39<H>  |  0.61    Ca    10.2      13 Sep 2022 09:05  Phos  2.8     09-13  Mg     1.80     09-13    TPro  x   /  Alb  3.2<L>  /  TBili  x   /  DBili  x   /  AST  x   /  ALT  x   /  AlkPhos  x   09-13        Arterial Blood Gas:  09-12 @ 13:12  7.44/65/39/44/69.5/16.5  ABG lactate: --    Venous Blood Gas:  09-13 @ 10:20  7.36/79/32/45/53.8  VBG Lactate: 1.2      MICROBIOLOGY:     RADIOLOGY:  [ ] Reviewed and interpreted by me    Point of Care Ultrasound Findings:    PFT:    EKG:

## 2022-09-14 NOTE — PROGRESS NOTE ADULT - PROBLEM SELECTOR PLAN 1
hypercapnic and hypoxemic respiratory failure  worsening resp status on 9/10, pt lethargic, was not using AVAPS the night prior, was on NRB instead  - VBG this am and pCO2 improving, placed on HFNC in am- continue to wean off oxygen as tolerated  -C/w intermittent diuresis for pleural effusions as BP allows, last given IV Lasix 40mg on 9/10.   -Seen by pulm and recommend Diamox s/p 9/13 in setting of worsening bicarb  -Palliative following  -Continue Duoneb BID, Symbicort BID, Albuterol PRN    -Low threshold for MICU consult, pt is full code per daughter Alissa who would want pt intubated if necessary. hypercapnic and hypoxemic respiratory failure  worsening resp status on 9/10, pt lethargic, was not using AVAPS the night prior, was on NRB instead  - VBG this am and pCO2 improving, placed on HFNC in am- continue to wean off oxygen as tolerated  -C/w intermittent diuresis for pleural effusions as BP allows, last given IV Lasix 40mg on 9/10.   -Seen by pulm and recommend Diamox s/p 9/13 in setting of worsening bicarb  -Palliative following  -Continue Duoneb BID, Symbicort BID, Albuterol PRN

## 2022-09-14 NOTE — PROGRESS NOTE ADULT - SUBJECTIVE AND OBJECTIVE BOX
Indication of Geriatrcis and Palliative Medicine Services:  DNR on chart:Yes  Yes    INTERVAL EVENTS: Patient seen this AM, on HFNC, frail but awake and speaks, however mumbled speech.   See below for conversation with her daughter Alissa Abernathy.     -------------------------------------------------------------------------------------------------------  ITEMS UNCHECKED ARE NOT PRESENT    PHYSICAL:  Vital Signs Last 24 Hrs  T(C): 36.8 (14 Sep 2022 10:24), Max: 36.8 (14 Sep 2022 10:24)  T(F): 98.3 (14 Sep 2022 10:24), Max: 98.3 (14 Sep 2022 10:24)  HR: 92 (14 Sep 2022 11:16) (67 - 778)  BP: 133/61 (14 Sep 2022 10:24) (92/52 - 133/61)  BP(mean): --  RR: 18 (14 Sep 2022 11:16) (17 - 18)  SpO2: 97% (14 Sep 2022 11:16) (95% - 98%)    Parameters below as of 14 Sep 2022 11:16  Patient On (Oxygen Delivery Method): nasal cannula, high flow  O2 Flow (L/min): 50  O2 Concentration (%): 50 I&O's Summary     GENERAL:  [X ]Cachexia  [X ] Frail  [X ]Awake  [X ]Oriented x 1  [ ]Lethargic  [ ]Unarousable  [ ]Verbal  [ ]Non-Verbal  Behavioral: [ ] Anxiety  [ ] Delirium [ ] Agitation [ ] Other  HEENT: [ ]Normal   [ ]Dry mouth   [ ]ET Tube/Trach  [ ]Oral lesions  PULMONARY:   [ ]Clear [ ]Tachypnea  [ ]Audible excessive secretions   [ ]Rhonchi        [ ]Right [ ]Left [ ]Bilateral  [ ]Crackles        [ ]Right [ ]Left [ ]Bilateral  [ ]Wheezing     [ ]Right [ ]Left [ ]Bilateral  [X ]Diminished breath sounds [ ]right [ ]left [X ]bilateral  CARDIOVASCULAR:    [X ]Regular [ ]Irregular [ ]Tachy  [ ]Mak [ ]Murmur [ ]Other  GASTROINTESTINAL:  [X ]Soft  [ ]Distended   [ ]+BS  [X ]Non tender [ ]Tender  [ ]Other [ ]PEG [ ]OGT/ NGT  Last BM:  GENITOURINARY:  [ ]Normal [X ] Incontinent   [ ]Oliguria/Anuria   [ ]Archer  MUSCULOSKELETAL:   [ ]Normal   [ ]Weakness  [X ]Bed/Wheelchair bound [ ]Edema  NEUROLOGIC:   [ ]No focal deficits  [X ]Cognitive impairment  [ ]Dysphagia [ ]Dysarthria [ ]Paresis [ ]Other   SKIN:   [X ]Normal  [ ]Rash  [ ]Other  [ ]Pressure ulcer(s)       Present on admission [ ]y [ ]n    -------------------------------------------------------------------------------------------------------  LABS:                        9.3    7.34  )-----------( 137      ( 14 Sep 2022 07:10 )             32.1   09-14    147<H>  |  102  |  26<H>  ----------------------------<  77  4.2   |  35<H>  |  0.66    Ca    9.7      14 Sep 2022 07:10  Phos  2.7     09-14  Mg     1.70     09-14    TPro  x   /  Alb  3.2<L>  /  TBili  x   /  DBili  x   /  AST  x   /  ALT  x   /  AlkPhos  x   09-13    -------------------------------------------------------------------------------------------------------  RADIOLOGY & ADDITIONAL STUDIES:           -------------------------------------------------------------------------------------------------------  MEDICATIONS:     MEDICATIONS  (STANDING):  albumin human  5% IVPB 2500 milliLiter(s) IV Intermittent once  albumin human  5% IVPB 2500 milliLiter(s) IV Intermittent once  albumin human  5% IVPB 2500 milliLiter(s) IV Intermittent once  albuterol/ipratropium for Nebulization 3 milliLiter(s) Nebulizer every 12 hours  budesonide  80 MICROgram(s)/formoterol 4.5 MICROgram(s) Inhaler 2 Puff(s) Inhalation two times a day  calcium gluconate IVPB 2 Gram(s) IV Intermittent once  calcium gluconate IVPB 2 Gram(s) IV Intermittent once  chlorhexidine 2% Cloths 1 Application(s) Topical daily  cholecalciferol 1000 Unit(s) Oral daily  dextrose 5% + sodium chloride 0.45%. 1000 milliLiter(s) (75 mL/Hr) IV Continuous <Continuous>  heparin   Injectable 5000 Unit(s) SubCutaneous every 12 hours  mirtazapine 7.5 milliGRAM(s) Oral <User Schedule>  multivitamin 1 Tablet(s) Oral daily    MEDICATIONS  (PRN):  acetaminophen     Tablet .. 650 milliGRAM(s) Oral every 6 hours PRN Temp greater or equal to 38C (100.4F), Mild Pain (1 - 3)  ALBUTerol    90 MICROgram(s) HFA Inhaler 2 Puff(s) Inhalation every 6 hours PRN Shortness of Breath and/or Wheezing  aluminum hydroxide/magnesium hydroxide/simethicone Suspension 30 milliLiter(s) Oral every 4 hours PRN Dyspepsia  melatonin 3 milliGRAM(s) Oral at bedtime PRN Insomnia  ondansetron Injectable 4 milliGRAM(s) IV Push every 8 hours PRN Nausea and/or Vomiting      -------------------------------------------------------------------------------------------------------  PRESENT SYMPTOMS:   [ ] Unable to report   Source if other than patient:  [ ]Family   [ ]Team     Pain:  [ ]yes [ ]no  Location -                    Aggravating factors -  Quality -  Radiation -  Timing-  Severity (0-10 scale):  Minimal acceptable level (0-10 scale):     Dyspnea:                           [ ]Mild [ ]Moderate [ ]Severe  Anxiety:                             [ ]Mild [ ]Moderate [ ]Severe  Fatigue:                             [ ]Mild [ ]Moderate [ ]Severe  Nausea:                             [ ]Mild [ ]Moderate [ ]Severe  Loss of appetite:              [ ]Mild [ ]Moderate [ ]Severe  Constipation:                    [ ]Mild [ ]Moderate [ ]Severe    Other Symptoms:  [ ]All other review of systems negative     Home Medications for Symptoms if present:     -------------------------------------------------------------------------------------------------------    CRITICAL CARE:  [ ]Shock Present  [ ]Septic [ ]Cardiogenic [ ]Neurologic [ ]Hypovolemic  [ ]Vasopressors [ ]Inotropes  [ ]Respiratory failure present [ ]Mechanical Ventilation [ ]Non-invasive ventilatory support [ ]High-Flow Mode: standby  [ ]Acute  [ ]Chronic [ ]Hypoxic  [ ]Hypercarbic [ ]Other  [ ]Other organ failure     -------------------------------------------------------------------------------------------------------  REFERRALS:   [ ]Chaplaincy  [ ]Hospice  [ ]Child Life  [ ]Social Work  [ ]Case management [ ]Holistic Therapy    Indication of Geriatrcis and Palliative Medicine Services:  DNR on chart:Yes  Yes    INTERVAL EVENTS: Patient seen this AM, on HFNC, frail but awake and speaks, however mumbled speech. She is comfortable with no distress.   See below for conversation with her daughter Alissa Abernathy.     -------------------------------------------------------------------------------------------------------  ITEMS UNCHECKED ARE NOT PRESENT    PHYSICAL:  Vital Signs Last 24 Hrs  T(C): 36.8 (14 Sep 2022 10:24), Max: 36.8 (14 Sep 2022 10:24)  T(F): 98.3 (14 Sep 2022 10:24), Max: 98.3 (14 Sep 2022 10:24)  HR: 92 (14 Sep 2022 11:16) (67 - 778)  BP: 133/61 (14 Sep 2022 10:24) (92/52 - 133/61)  BP(mean): --  RR: 18 (14 Sep 2022 11:16) (17 - 18)  SpO2: 97% (14 Sep 2022 11:16) (95% - 98%)    Parameters below as of 14 Sep 2022 11:16  Patient On (Oxygen Delivery Method): nasal cannula, high flow  O2 Flow (L/min): 50  O2 Concentration (%): 50 I&O's Summary     GENERAL:  [X ]Cachexia  [X ] Frail  [X ]Awake  [X ]Oriented x 1  [ ]Lethargic  [ ]Unarousable  [ ]Verbal  [ ]Non-Verbal  Behavioral: [ ] Anxiety  [ ] Delirium [ ] Agitation [ ] Other  HEENT: [ ]Normal   [ ]Dry mouth   [ ]ET Tube/Trach  [ ]Oral lesions  PULMONARY:   [ ]Clear [ ]Tachypnea  [ ]Audible excessive secretions   [ ]Rhonchi        [ ]Right [ ]Left [ ]Bilateral  [ ]Crackles        [ ]Right [ ]Left [ ]Bilateral  [ ]Wheezing     [ ]Right [ ]Left [ ]Bilateral  [X ]Diminished breath sounds [ ]right [ ]left [X ]bilateral  CARDIOVASCULAR:    [X ]Regular [ ]Irregular [ ]Tachy  [ ]Mak [ ]Murmur [ ]Other  GASTROINTESTINAL:  [X ]Soft  [ ]Distended   [ ]+BS  [X ]Non tender [ ]Tender  [ ]Other [ ]PEG [ ]OGT/ NGT  Last BM:  GENITOURINARY:  [ ]Normal [X ] Incontinent   [ ]Oliguria/Anuria   [ ]Archer  MUSCULOSKELETAL:   [ ]Normal   [ ]Weakness  [X ]Bed/Wheelchair bound [ ]Edema  NEUROLOGIC:   [ ]No focal deficits  [X ]Cognitive impairment  [ ]Dysphagia [ ]Dysarthria [ ]Paresis [ ]Other   SKIN:   [X ]Normal  [ ]Rash  [ ]Other  [ ]Pressure ulcer(s)       Present on admission [ ]y [ ]n    -------------------------------------------------------------------------------------------------------  LABS:                        9.3    7.34  )-----------( 137      ( 14 Sep 2022 07:10 )             32.1   09-14    147<H>  |  102  |  26<H>  ----------------------------<  77  4.2   |  35<H>  |  0.66    Ca    9.7      14 Sep 2022 07:10  Phos  2.7     09-14  Mg     1.70     09-14    TPro  x   /  Alb  3.2<L>  /  TBili  x   /  DBili  x   /  AST  x   /  ALT  x   /  AlkPhos  x   09-13    -------------------------------------------------------------------------------------------------------  RADIOLOGY & ADDITIONAL STUDIES:           -------------------------------------------------------------------------------------------------------  MEDICATIONS:     MEDICATIONS  (STANDING):  albumin human  5% IVPB 2500 milliLiter(s) IV Intermittent once  albumin human  5% IVPB 2500 milliLiter(s) IV Intermittent once  albumin human  5% IVPB 2500 milliLiter(s) IV Intermittent once  albuterol/ipratropium for Nebulization 3 milliLiter(s) Nebulizer every 12 hours  budesonide  80 MICROgram(s)/formoterol 4.5 MICROgram(s) Inhaler 2 Puff(s) Inhalation two times a day  calcium gluconate IVPB 2 Gram(s) IV Intermittent once  calcium gluconate IVPB 2 Gram(s) IV Intermittent once  chlorhexidine 2% Cloths 1 Application(s) Topical daily  cholecalciferol 1000 Unit(s) Oral daily  dextrose 5% + sodium chloride 0.45%. 1000 milliLiter(s) (75 mL/Hr) IV Continuous <Continuous>  heparin   Injectable 5000 Unit(s) SubCutaneous every 12 hours  mirtazapine 7.5 milliGRAM(s) Oral <User Schedule>  multivitamin 1 Tablet(s) Oral daily    MEDICATIONS  (PRN):  acetaminophen     Tablet .. 650 milliGRAM(s) Oral every 6 hours PRN Temp greater or equal to 38C (100.4F), Mild Pain (1 - 3)  ALBUTerol    90 MICROgram(s) HFA Inhaler 2 Puff(s) Inhalation every 6 hours PRN Shortness of Breath and/or Wheezing  aluminum hydroxide/magnesium hydroxide/simethicone Suspension 30 milliLiter(s) Oral every 4 hours PRN Dyspepsia  melatonin 3 milliGRAM(s) Oral at bedtime PRN Insomnia  ondansetron Injectable 4 milliGRAM(s) IV Push every 8 hours PRN Nausea and/or Vomiting      -------------------------------------------------------------------------------------------------------  PRESENT SYMPTOMS:   [ ] Unable to report   Source if other than patient:  [ ]Family   [ ]Team     Pain:  [ ]yes [ ]no  Location -                    Aggravating factors -  Quality -  Radiation -  Timing-  Severity (0-10 scale):  Minimal acceptable level (0-10 scale):     Dyspnea:                           [ ]Mild [ ]Moderate [ ]Severe  Anxiety:                             [ ]Mild [ ]Moderate [ ]Severe  Fatigue:                             [ ]Mild [ ]Moderate [ ]Severe  Nausea:                             [ ]Mild [ ]Moderate [ ]Severe  Loss of appetite:              [ ]Mild [ ]Moderate [ ]Severe  Constipation:                    [ ]Mild [ ]Moderate [ ]Severe    Other Symptoms:  [ ]All other review of systems negative     Home Medications for Symptoms if present:     -------------------------------------------------------------------------------------------------------    CRITICAL CARE:  [ ]Shock Present  [ ]Septic [ ]Cardiogenic [ ]Neurologic [ ]Hypovolemic  [ ]Vasopressors [ ]Inotropes  [ ]Respiratory failure present [ ]Mechanical Ventilation [ ]Non-invasive ventilatory support [ ]High-Flow Mode: standby  [ ]Acute  [ ]Chronic [ ]Hypoxic  [ ]Hypercarbic [ ]Other  [ ]Other organ failure     -------------------------------------------------------------------------------------------------------  REFERRALS:   [ ]Chaplaincy  [ ]Hospice  [ ]Child Life  [ ]Social Work  [ ]Case management [ ]Holistic Therapy

## 2022-09-14 NOTE — PROGRESS NOTE ADULT - PROBLEM SELECTOR PLAN 4
-likely due to underlying malignancy, lymphoma vs new MM. F/u vitamin D 25 and 1, 25, PTHRP, MM labs. PTH is inappropriately normal  -s/p pamidronate on 8/30; Ca improved.  -calcitonin x 4 doses  -unfortunately, will hold off fluids for now given pulmonary edema that developed with IVF upon admission, which later required need for Lasix IV and AVAPS  -Apprec Endocrine recs  - continue to encourage PO intake  - vitamin D 1,25 not elevated, pending PTHrP -likely due to underlying malignancy, lymphoma vs new MM.  PTH is inappropriately normal. vitamin D 1,25 not elevated/ PTHrP not elevated   -s/p pamidronate on 8/30; Ca improved.  -calcitonin x 4 doses  - hold off fluids for now given pulmonary edema that developed with IVF upon admission, which later required need for Lasix IV and AVAPS  -Apprec Endocrine recs  - continue to encourage PO intake  - Monitor Ca levels

## 2022-09-14 NOTE — PROGRESS NOTE ADULT - ASSESSMENT
77 year old female with PMH of lymphoma (dx 2005, s/p CARRIE lobectomy, relapsed in 2019 but not currently on chemo/RT, [place] by [place] [facility] [place] bipolar disorder, asthma (on O2 PRN at home, unknown how many L) brought to ED by daughter who states pt has been hallucinating, being more depressed.  Patient unable to provide much history, daughter Alissa states that patient was able to care for herself, manage home finances and attend doctor appointments independently until recently, she states that she believes her mom stopped taking her psychiatric medications in February of 2022 and has not followed up with any of her physicians. She has noticed recent weight loss (unsure how much) and change in voice.   Admitted to medicine service for further evaluation & treatment. Course c/b acute hypercapnic and hypoxic respiratory failure requiring BIPAP and AVAPS.  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

## 2022-09-14 NOTE — PROGRESS NOTE ADULT - ASSESSMENT
MANDI GASPAR is a 77y Female who presents with a chief complaint of hypoxic respiratory failure.    Marginal Zone Lymphoma  - Patient treated previously at [facility]; was on rituximab + bendamustine last in August 2020, and since then has been on surveillance, but lost to follow-up since July 2021  - CT chest concerning for slight progression of disease    Lymphoplasmacytic Lymphoma  - Noted from bone marrow biopsy done during prior admission here   - s/p plasmapheresis 09/02, 09/09, 09/13 - IgM significantly improved to 2848  - Serum viscosity pending. If still high, will need more plasmapheresis before further treatment   - Concerned about IgM flare with Rituxan, can wait for IgM to decrease further and then treat with Rituxan or would opt to given cytoxan 100-200mg daily x 5 days to debulk then given rituxan in a few week.   Left message with [facility] hematologist Dr. Crawford to discuss treatment options further.      Altered Mental Status  - Likely multifactorial with hyperviscosity of blood, hypercalcemia, urinary tract infection  - S/p Shiley placement  - Completed antibiotics  - Endocrinology following  - Psychiatry appreciated    Hypoxia  - Management per pulmonary    Will continue to follow.    Giovana Duarte PA-C  Hematology/Oncology  [facility]  [phone] (office)  [phone] (alt office)  Evenings and weekends please call MD on call or office

## 2022-09-14 NOTE — CHART NOTE - NSCHARTNOTEFT_GEN_A_CORE
76 y/o F with Lymphoma and elevated IgM levels with hyperviscosity mainly in the form of SOB and resp distress, has undergone PLEX twice.  BB was consulted for PLEX for IgM >5000 to further reduce prior to initiating Rituxan, preventing worsening hyperviscosity.   On 9/13/22 PLEX with 2.5L 5% albumin as replacement fluid was performed.   Care d/w apheresis RN.     Procedure well tolerated.  Post procedure IgM levels apprx 2900.   Serum viscosity pending.

## 2022-09-14 NOTE — PROGRESS NOTE ADULT - PROBLEM SELECTOR PLAN 7
-Hx of bipolar w/psych admission @ [place] many years ago, unclear if patient actually had Bipolar   -non-compliant with medications  -Zyprexa 2.5mg QHS increased to 3.75mg QHS on 9/8/22 (pt refused), if patient has respiratory depression- hold Zyprexa, may try low dose Mirtazapine (also sedating)  -Psych recommendations appreciated   -Supportive care

## 2022-09-14 NOTE — PROGRESS NOTE ADULT - PROBLEM SELECTOR PLAN 8
-S/P fall at home   -LLE small area that is open, area non-erythematous, non-tender, no warmth noted  -x-ray without fracture or dislocation, soft tissue swelling present  -S/P tetanus vaccine in ED

## 2022-09-14 NOTE — PROGRESS NOTE ADULT - PROBLEM SELECTOR PLAN 3
- Palliative consulted for GOC and symptom management  - Patient comfortable for now   - Surrogate/daughter agreed to DNR/DNI but would like continued medical interventions for now   - As goals are clear, palliative to sign off  - Recommend continued medical updates to daughter to aid in her understanding of patient's poor prognosis

## 2022-09-14 NOTE — PROGRESS NOTE ADULT - PROBLEM SELECTOR PLAN 1
- Patient with acute hypercapnic and hypoxemic respiratory failure 2/2 to metabolic encephalopathy, possibly hyperviscosity syndrome   - CXR with pleural effusion but not amenable to thoracentesis   - Patient is comfortable on oxygen mask, no distress   - If has dyspnea/tachypnea, can start IV dilaudid 0.2 mg q4h PRN for dyspnea   - Daughter agreed to DNR/DNI (see GOC note above)

## 2022-09-14 NOTE — PROGRESS NOTE ADULT - PROBLEM SELECTOR PLAN 2
-Likely multi-factorial: UTI, hypercapnia, hypercalcemia, psychiatric disorder,  and now with greater concern for hyperviscosity syndrome given inability to even obtain immunoglobulin panel due to degree of hyperviscosity  -serum viscosity level sent and pending  -plasmapheresis 9/13 per heme/onc for hyperviscosity --via guero, needs IgM levels after each session   -IgM level downtrending  -daughter thinks patient stopped taking her psychiatric medication in february  - Poor PO intake- nutrition consulted , ensure added to diet   - monitor for side effects -Likely multi-factorial: UTI, hypercapnia, hypercalcemia, psychiatric disorder,  and now with greater concern for hyperviscosity syndrome given inability to even obtain immunoglobulin panel due to degree of hyperviscosity    -serum viscosity level  pending after plasmapheresis on 9/13   -plasmapheresis 9/13 per heme/onc for hyperviscosity --via guero, needs IgM levels after each session   -IgM level downtrending  -daughter thinks patient stopped taking her psychiatric medication in february  - Poor PO intake- nutrition consulted previously   - monitor for side effects

## 2022-09-14 NOTE — PROGRESS NOTE ADULT - PROBLEM SELECTOR PLAN 5
-Follows w/Dr. Real Cano @ [facility] [place]  -received treatment recently per daughter however she doesn't know specifics  -with progression of disease  -hematology to speak to [facility] regarding if further treatment is suggested      -Hem recs appreciated- plasmapheresis  09/02, 09/09, 09/13 - IgM significantly improved to 2848  and trend IgM downtrending. Serum Viscosity still pending    - If still high, will need more plasmapheresis before further treatment     - Heme/Onc concerned about IgM flare with Rituxan. Recommend can wait for IgM to decrease further and then treat with Rituxan or would opt to given cytoxan 100-200mg daily x 5 days to debulk then given rituxan in a few week.

## 2022-09-14 NOTE — CHART NOTE - NSCHARTNOTEFT_GEN_A_CORE
Source: Patient A&Ox1 [x ]    Family [ ]     other [ x] Chart    77 year old female with PMH of lymphoma (dx 2005, s/p CARRIE lobectomy, relapsed in 2019 but not currently on chemo/RT, being followed by heme every 3-6months at [facility] [place]), bipolar disorder, asthma (on O2 PRN at home, unknown how many L). Course c/b acute hypercapnic and hypoxic respiratory failure requiring BIPAP and AVAPS.  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 Pulmonology following.     Interview: Observed barley touched lunch tray and two unopened ensures at bedside. As per RN flowsheet, pt consuming 0-25% of meals. Pt endorses poor appetite. RD offered magic cup - pt agreeable to try.      Diet, Pureed:   DASH/TLC {Sodium & Cholesterol Restricted} (DASH)  Supplement Feeding Modality:  Oral  Ensure Enlive Cans or Servings Per Day:  1       Frequency:  Two Times a day (09-06-22 @ 10:01)      GI: WDL. Last documented BM 9/9.    PO intake:  < 50% [x ] 50-75% [ ]   % [ ]  other :    Anthropometrics: Height (cm): 160 (09-02)  Weight (kg): 60 (09-02)  BMI (kg/m2): 23.4 (09-02)    Edema: 1+ Left arm edema noted  Pressure Injuries: As per RN flowsheet, skin remains intact.     __________________ Pertinent Medications__________________   MEDICATIONS  (STANDING):  albumin human  5% IVPB 2500 milliLiter(s) IV Intermittent once  albumin human  5% IVPB 2500 milliLiter(s) IV Intermittent once  albumin human  5% IVPB 2500 milliLiter(s) IV Intermittent once  albuterol/ipratropium for Nebulization 3 milliLiter(s) Nebulizer every 12 hours  budesonide  80 MICROgram(s)/formoterol 4.5 MICROgram(s) Inhaler 2 Puff(s) Inhalation two times a day  calcium gluconate IVPB 2 Gram(s) IV Intermittent once  calcium gluconate IVPB 2 Gram(s) IV Intermittent once  chlorhexidine 2% Cloths 1 Application(s) Topical daily  cholecalciferol 1000 Unit(s) Oral daily  dextrose 5% + sodium chloride 0.45%. 1000 milliLiter(s) (75 mL/Hr) IV Continuous <Continuous>  heparin   Injectable 5000 Unit(s) SubCutaneous every 12 hours  mirtazapine 7.5 milliGRAM(s) Oral <User Schedule>  multivitamin 1 Tablet(s) Oral daily    MEDICATIONS  (PRN):  acetaminophen     Tablet .. 650 milliGRAM(s) Oral every 6 hours PRN Temp greater or equal to 38C (100.4F), Mild Pain (1 - 3)  ALBUTerol    90 MICROgram(s) HFA Inhaler 2 Puff(s) Inhalation every 6 hours PRN Shortness of Breath and/or Wheezing  aluminum hydroxide/magnesium hydroxide/simethicone Suspension 30 milliLiter(s) Oral every 4 hours PRN Dyspepsia  melatonin 3 milliGRAM(s) Oral at bedtime PRN Insomnia  ondansetron Injectable 4 milliGRAM(s) IV Push every 8 hours PRN Nausea and/or Vomiting      __________________ Pertinent Labs__________________   09-14 Na147 mmol/L<H> Glu 77 mg/dL K+ 4.2 mmol/L Cr  0.66 mg/dL BUN 26 mg/dL<H> 09-14 Phos 2.7 mg/dL 09-13 Alb 3.2 g/dL<L> 08-31 Chol 54 mg/dL LDL --    HDL 21 mg/dL<L> Trig 30 mg/dL        POCT Blood Glucose.: 266 mg/dL (09-14-22 @ 12:55)  POCT Blood Glucose.: 83 mg/dL (09-14-22 @ 07:00)  POCT Blood Glucose.: 84 mg/dL (09-14-22 @ 02:34)  POCT Blood Glucose.: 131 mg/dL (09-13-22 @ 18:21)  POCT Blood Glucose.: 85 mg/dL (09-13-22 @ 12:02)  POCT Blood Glucose.: 87 mg/dL (09-13-22 @ 06:49)  POCT Blood Glucose.: 92 mg/dL (09-13-22 @ 00:52)  POCT Blood Glucose.: 149 mg/dL (09-12-22 @ 18:27)  POCT Blood Glucose.: 132 mg/dL (09-12-22 @ 12:00)  POCT Blood Glucose.: 112 mg/dL (09-12-22 @ 08:38)  POCT Blood Glucose.: 85 mg/dL (09-12-22 @ 06:22)  POCT Blood Glucose.: 84 mg/dL (09-12-22 @ 00:34)  POCT Blood Glucose.: 99 mg/dL (09-11-22 @ 21:25)  POCT Blood Glucose.: 89 mg/dL (09-11-22 @ 16:43)          Estimated Needs:   8933-2333 lesly/d based on 25-30kcals/kg actual body weight  72-84 gm pro/d based on 1.2-1.4g/kg actual body weight    [x ] no change since previous assessment      Previous Nutrition Diagnosis: Severe protein calorie malnutrition      Nutrition Diagnosis is [ x] ongoing  [ ] resolved [ ] not applicable      Recommendations:  1) Continue DASH/TLC diet   2) Continue Ensure Enlive 2x daily and MVI daily   3) Continue Mirtzapine to stimulate appetite  4) RD will add magic cup 2x daily (provide 290 cals and 9g of protein per container) to optimize nutrition intake  5) RD to follow up PRN        Monitoring and Evaluation:      [ x] Tolerance to diet prescription [x ] weights [x ] follow up per protocol  [ ] other:

## 2022-09-14 NOTE — PROGRESS NOTE ADULT - ATTENDING COMMENTS
Ms. Abernathy is a 78 yo F with PMhx of lymphoma (dx 2005, s/p CARRIE lobectomy, relapsed 2019, currently off chemo/RT) BPD, asthma admitted for metabolic encephalopathy 2/2 UTI/hypercalcemia. Pulmonary initially consulted for c/f pleural effusion which was actually 2/2 known lymphoma, with course now c/b hypoxic/hypercapnic respiratory failure now on nocturnal NIPPV in setting of hyperviscosity syndrome/multifactorial metabolic encephalopathy treated with plasmapharesis x 3. IgM levels improved post recent PLEX as is her mental status.  Patient more alert today.  HFNC  35%/30Lpm with O2Sat still maintaining 96% - had been on 5L NC earlier in AM but desaturated.  Suspect hypoxia/hypercapnea due to combination of volume overload and hypoventilation in setting of metabolic encephalopathy.  Volume status improved - would continue diuresis x at least one more day.    Recommend:  - Continue AVAPS qhs for hypercapnea  - wean HFNC to maintain O2Sat 92-95% (please use lowest possible settings to achieve appropriate titration)  - Administer Diamox x 1 again  - continue current bronchodilator regimen    Katt Posey MD .

## 2022-09-14 NOTE — PROGRESS NOTE ADULT - ASSESSMENT
77F with hx of lymphoma (dx 2005, s/p CARRIE lobectomy, relapsed 2019, currently off chemo/RT) BPD, asthma admitted for metabolic encephalopathy 2/2 UTI/hypercalcemia. Pulmonary initially consulted for c/f pleural effusion which was actually 2/2 known lymphoma, with course now c/b hypoxic/hypercapnic respiratory failure now on nocturnal NIPPV in setting of hyperviscosity syndrome/multifactorial metabolic encephalopathy treated with plasmapharesis    #Hypoxic/Hypercapnic respiratory failure  #Metabolic Encephalopathy  #Lymphoma  #MGUS   #Trace pleural Effusion  #Asthma  #Hyperviscosity Syndrome     Worsening hypoxic respiratory failure is likely in setting of decreased reserve/continue multifactorial metabolic encephalopathy with worsening atelectasis    Recommendations:  - continue AVAPs at night; while of AVAPS can trial HFNC. Please wean as tolerated for sats 92-95%  - s/p Diamox on 9/13, will monitor for response and repeat bloodwork   - maintain strict Is/Os, daily weights, would diurese to goal net even-net negative   - continue w/current bronchodilator regimen  - goals of care discussion  - Out of bed to chair as tolerated, trial nebulizer treatments for airway clearance and opening. Head of bed up, physical therapy as able

## 2022-09-14 NOTE — PROGRESS NOTE ADULT - PROBLEM SELECTOR PLAN 2
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

## 2022-09-14 NOTE — PROGRESS NOTE ADULT - SUBJECTIVE AND OBJECTIVE BOX
Patient is a 77y old  Female who presents with a chief complaint of Hypoxic respiratory failure (14 Sep 2022 07:30)    Patient seen this afternoon. Appears much more comfortable, awake, and alert and states she feels a lot better, though somewhat delusional: states she could jump off right now and asking why someone hates her so much. Pt on close supervision.     MEDICATIONS  (STANDING):  albumin human  5% IVPB 2500 milliLiter(s) IV Intermittent once  albumin human  5% IVPB 2500 milliLiter(s) IV Intermittent once  albumin human  5% IVPB 2500 milliLiter(s) IV Intermittent once  albuterol/ipratropium for Nebulization 3 milliLiter(s) Nebulizer every 12 hours  budesonide  80 MICROgram(s)/formoterol 4.5 MICROgram(s) Inhaler 2 Puff(s) Inhalation two times a day  calcium gluconate IVPB 2 Gram(s) IV Intermittent once  calcium gluconate IVPB 2 Gram(s) IV Intermittent once  chlorhexidine 2% Cloths 1 Application(s) Topical daily  cholecalciferol 1000 Unit(s) Oral daily  dextrose 5% + sodium chloride 0.45%. 1000 milliLiter(s) (75 mL/Hr) IV Continuous <Continuous>  heparin   Injectable 5000 Unit(s) SubCutaneous every 12 hours  mirtazapine 7.5 milliGRAM(s) Oral <User Schedule>  multivitamin 1 Tablet(s) Oral daily    MEDICATIONS  (PRN):  acetaminophen     Tablet .. 650 milliGRAM(s) Oral every 6 hours PRN Temp greater or equal to 38C (100.4F), Mild Pain (1 - 3)  ALBUTerol    90 MICROgram(s) HFA Inhaler 2 Puff(s) Inhalation every 6 hours PRN Shortness of Breath and/or Wheezing  aluminum hydroxide/magnesium hydroxide/simethicone Suspension 30 milliLiter(s) Oral every 4 hours PRN Dyspepsia  melatonin 3 milliGRAM(s) Oral at bedtime PRN Insomnia  ondansetron Injectable 4 milliGRAM(s) IV Push every 8 hours PRN Nausea and/or Vomiting        Vital Signs Last 24 Hrs  T(C): 36.8 (14 Sep 2022 10:24), Max: 36.8 (14 Sep 2022 10:24)  T(F): 98.3 (14 Sep 2022 10:24), Max: 98.3 (14 Sep 2022 10:24)  HR: 92 (14 Sep 2022 11:16) (67 - 778)  BP: 133/61 (14 Sep 2022 10:24) (92/52 - 133/61)  BP(mean): --  RR: 18 (14 Sep 2022 11:16) (17 - 18)  SpO2: 97% (14 Sep 2022 11:16) (95% - 98%)    Parameters below as of 14 Sep 2022 11:16  Patient On (Oxygen Delivery Method): nasal cannula, high flow  O2 Flow (L/min): 50  O2 Concentration (%): 50    PE  NAD. Comfortably sitting in chair   Awake, alert. ?Confused, delusional  Anicteric, MMM  No rash grossly  FROM                          9.3    7.34  )-----------( 137      ( 14 Sep 2022 07:10 )             32.1       09-14    147<H>  |  102  |  26<H>  ----------------------------<  77  4.2   |  35<H>  |  0.66    Ca    9.7      14 Sep 2022 07:10  Phos  2.7     09-14  Mg     1.70     09-14    TPro  x   /  Alb  3.2<L>  /  TBili  x   /  DBili  x   /  AST  x   /  ALT  x   /  AlkPhos  x   09-13

## 2022-09-14 NOTE — PROGRESS NOTE ADULT - NSPROGADDITIONALINFOA_GEN_ALL_CORE
Spoke with daughter and updated about plan. She will be coming to visit the pt this evening.   Code status: made DNR/DNI by daughter today with Palliative Medicine  Best number to call Alissa at [phone].

## 2022-09-14 NOTE — PROGRESS NOTE ADULT - CONVERSATION DETAILS
I spoke with patient's daughter again about patient's condition. She was updated that patient's condition has continued to decline, now on HFNC. We discussed code status again. Daughter initially states she would want resuscitation if it would help her, but not want it if only leads to more suffering. I explained that in patient's case, CPR and intubation would place more burden and suffering on patient, would have poor outcomes. Daughter also states that she wants to respect her mother's wish that she would not want to live a vegetative existence. Daughter requested a few hours to think about this. Emotional support provided as daughter feels that's she should have intervened on patient's life before, however patient has struggled with mental health issues for some time now.     Later called daughter, who eventually and with difficulty agreed to DNR/DNI, wanting to respect her mother's wishes. She was told however that all available medical interventions would be continued given daughter wants more time for patient to possibly recover from her illness.

## 2022-09-15 NOTE — PROGRESS NOTE ADULT - SUBJECTIVE AND OBJECTIVE BOX
Chief Complaint: Hypercalcemia, hypoglycemia    History: sitting in chair  high flow O2  did eat today, reports eating ice cream   D5 1/2NS@ 75 cc/hour  Low glucose 50s last night, given 1/2 amp D50 yesterday with improvement, low po intake was noted yesterday. Patient denied hypoglycemia symptoms last night.    MEDICATIONS  (STANDING):  albumin human  5% IVPB 2500 milliLiter(s) IV Intermittent once  albumin human  5% IVPB 2500 milliLiter(s) IV Intermittent once  albumin human  5% IVPB 2500 milliLiter(s) IV Intermittent once  albuterol/ipratropium for Nebulization 3 milliLiter(s) Nebulizer every 12 hours  budesonide  80 MICROgram(s)/formoterol 4.5 MICROgram(s) Inhaler 2 Puff(s) Inhalation two times a day  calcium gluconate IVPB 2 Gram(s) IV Intermittent once  calcium gluconate IVPB 2 Gram(s) IV Intermittent once  chlorhexidine 2% Cloths 1 Application(s) Topical daily  cholecalciferol 1000 Unit(s) Oral daily  dextrose 5% + sodium chloride 0.45%. 1000 milliLiter(s) (75 mL/Hr) IV Continuous <Continuous>  dextrose 50% Injectable 25 Gram(s) IV Push once  dextrose 50% Injectable 12.5 Gram(s) IV Push once  dextrose 50% Injectable 25 Gram(s) IV Push once  dextrose Oral Gel 15 Gram(s) Oral once  glucagon  Injectable 1 milliGRAM(s) IntraMuscular once  heparin   Injectable 5000 Unit(s) SubCutaneous every 12 hours  mirtazapine 7.5 milliGRAM(s) Oral <User Schedule>  multivitamin 1 Tablet(s) Oral daily    MEDICATIONS  (PRN):  acetaminophen     Tablet .. 650 milliGRAM(s) Oral every 6 hours PRN Temp greater or equal to 38C (100.4F), Mild Pain (1 - 3)  ALBUTerol    90 MICROgram(s) HFA Inhaler 2 Puff(s) Inhalation every 6 hours PRN Shortness of Breath and/or Wheezing  aluminum hydroxide/magnesium hydroxide/simethicone Suspension 30 milliLiter(s) Oral every 4 hours PRN Dyspepsia  melatonin 3 milliGRAM(s) Oral at bedtime PRN Insomnia  ondansetron Injectable 4 milliGRAM(s) IV Push every 8 hours PRN Nausea and/or Vomiting      Allergies    latex (Rash)  No Known Drug Allergies    Intolerances      Review of Systems:      ALL OTHER SYSTEMS REVIEWED AND NEGATIVE      PHYSICAL EXAM:  VITALS: T(C): 36.4 (09-15-22 @ 11:53)  T(F): 97.5 (09-15-22 @ 11:53), Max: 98.6 (09-14-22 @ 14:24)  HR: 92 (09-15-22 @ 11:53) (74 - 92)  BP: 78/46 (09-15-22 @ 12:10) (72/52 - 125/60)  RR:  (17 - 20)  SpO2:  (96% - 100%)  Wt(kg): --  GENERAL: NAD, well-groomed, well-developed  EYES: No proptosis, no lid lag, anicteric  HEENT:  Atraumatic, Normocephalic, moist mucous membranes  RESPIRATORY: nonlabored respirations, high flow O2 in place  SKIN: bruising L hand    CAPILLARY BLOOD GLUCOSE      POCT Blood Glucose.: 76 mg/dL (15 Sep 2022 08:00)  POCT Blood Glucose.: 158 mg/dL (15 Sep 2022 02:45)  POCT Blood Glucose.: 162 mg/dL (15 Sep 2022 02:44)  POCT Blood Glucose.: 58 mg/dL (15 Sep 2022 02:12)  POCT Blood Glucose.: 59 mg/dL (15 Sep 2022 02:07)  POCT Blood Glucose.: 106 mg/dL (14 Sep 2022 21:20)  POCT Blood Glucose.: 265 mg/dL (14 Sep 2022 16:37)      09-14    147<H>  |  102  |  26<H>  ----------------------------<  77  4.2   |  35<H>  |  0.66    eGFR: 91    Ca    9.7      09-14  Mg     1.70     09-14  Phos  2.7     09-14    TPro  x   /  Alb  3.2<L>  /  TBili  x   /  DBili  x   /  AST  x   /  ALT  x   /  AlkPhos  x   09-13      A1C with Estimated Average Glucose Result: 4.5 % (08-31-22 @ 03:48)      Thyroid Function Tests:  09-10 @ 11:45 TSH -- FreeT4 1.1 T3 -- Anti TPO -- Anti Thyroglobulin Ab -- TSI --  08-31 @ 03:48 TSH 1.42 FreeT4 -- T3 -- Anti TPO -- Anti Thyroglobulin Ab -- TSI --

## 2022-09-15 NOTE — CHART NOTE - NSCHARTNOTEFT_GEN_A_CORE
Medicine PA Episodic Note    Notified by RN of pt. having blood glucose of 59 and 58. Patient currently on AVAPS and unable to tolerate PO.     # Hypoglycemia   > Hypoglycemia protocol ordered  >1/2 Amp Dextrose given-> repeat  and 158  > Monitor FS closely  > Monitor for s/s of hypo/hyperglycemia   > Monitor VS  > F/u AM labs     Will continue to monitor patient closely    Deanne Aguila  Department of Medicine [id number]

## 2022-09-15 NOTE — PROGRESS NOTE ADULT - PROBLEM SELECTOR PLAN 2
-Likely multi-factorial: UTI, hypercapnia, hypercalcemia, psychiatric disorder,  and now with greater concern for hyperviscosity syndrome given inability to even obtain immunoglobulin panel due to degree of hyperviscosity    -serum viscosity level  pending after plasmapheresis on 9/13   -plasmapheresis 9/13 per heme/onc for hyperviscosity --via guero, needs IgM levels after each session   -IgM level downtrending  -daughter thinks patient stopped taking her psychiatric medication in february  - Poor PO intake- nutrition consulted previously   - monitor for side effects

## 2022-09-15 NOTE — PROGRESS NOTE ADULT - SUBJECTIVE AND OBJECTIVE BOX
CHIEF COMPLAINT  Respiratory Failure    HISTORY OF PRESENT ILLNESS  MANDI GASPAR is a 77y Female who presents with a chief complaint of respiratory failure    No acute events. No complaints.    REVIEW OF SYSTEMS  A complete review of systems was performed; negative except per HPI    PHYSICAL EXAM  T(C): 36.4 (09-15-22 @ 11:53), Max: 36.7 (09-14-22 @ 18:20)  HR: 75 (09-15-22 @ 15:43) (74 - 92)  BP: 78/46 (09-15-22 @ 12:10) (72/52 - 120/68)  RR: 18 (09-15-22 @ 15:43) (17 - 20)  SpO2: 96% (09-15-22 @ 15:43) (96% - 100%)  Constitutional: alert, awake, in no acute distress  Eyes: PERRL, EOMI  HEENT: normocephalic, atraumatic  Neck: supple, non-tender  Cardiovascular: normal perfusion, no peripheral edema  Respiratory: normal respiratory efforts; no increased use of accessory muscles  Gastrointestinal: soft, non-tender  Musculoskeletal: normal range of motion, no deformities noted  Neurological: alert, CN II to XI grossly intact  Skin: warm, dry    LABORATORY DATA                        9.8    7.01  )-----------( 145      ( 15 Sep 2022 11:03 )             32.9     09-14    147<H>  |  102  |  26<H>  ----------------------------<  77  4.2   |  35<H>  |  0.66    Ca    9.7      14 Sep 2022 07:10  Phos  2.7     09-14  Mg     1.70     09-14

## 2022-09-15 NOTE — PROGRESS NOTE ADULT - ATTENDING COMMENTS
Ms. Abernathy is a 78 yo F with PMhx of lymphoma (dx 2005, s/p CARRIE lobectomy, relapsed 2019, currently off chemo/RT) BPD, asthma admitted for metabolic encephalopathy 2/2 UTI/hypercalcemia. Pulmonary initially consulted for c/f pleural effusion which was actually 2/2 known lymphoma, with course now c/b hypoxic/hypercapnic respiratory failure now on nocturnal NIPPV in setting of hyperviscosity syndrome/multifactorial metabolic encephalopathy treated with plasmapharesis x 3. IgM levels improved post recent PLEX as is her mental status.  Patient more alert today.  HFNC  35%/30Lpm with O2Sat still maintaining 96% - had been on 5L NC earlier in AM but desaturated.  Suspect hypoxia/hypercapnea due to combination of volume overload and hypoventilation in setting of metabolic encephalopathy.  Mental status improved after 3rd PLEX.    Recommend:  - Continue AVAPS qhs for hypercapnea  - please implement ACTIVE weaning of HFNC to maintain O2Sat 92-95% (please use lowest possible settings to achieve appropriate titration); decreased to 30%/30Lpm, should be able to transition to 5L NC  - Administer Diamox x 1 again, then would likely maintain on Lasix 40mg PO daily  - encourage some free water intake for hypernatremia  - continue current bronchodilator regimen    DISPO: Palliative care discussions noted.  Patient is DNR as per discussion with daughter/HCP.    Katt Posey MD

## 2022-09-15 NOTE — PROGRESS NOTE ADULT - PROBLEM SELECTOR PLAN 7
-Hx of bipolar w/psych admission @ [place] many years ago, unclear if patient actually had Bipolar   -non-compliant with medications  -Zyprexa 2.5mg QHS increased to 3.75mg QHS on 9/8/22 (pt refused), if patient has respiratory depression- hold Zyprexa, may try low dose Mirtazapine (also sedating)  -Psych recommendations appreciated   -Supportive care

## 2022-09-15 NOTE — PROVIDER CONTACT NOTE (HYPOGLYCEMIA EVENT) - NS PROVIDER CONTACT BACKGROUND-HYPO
Age: 77y    Gender: Female    POCT Blood Glucose:  191 mg/dL (09-10-22 @ 17:22)  205 mg/dL (09-10-22 @ 17:02)  67 mg/dL (09-10-22 @ 16:26)  70 mg/dL (09-10-22 @ 16:24)  74 mg/dL (09-10-22 @ 11:49)  92 mg/dL (09-10-22 @ 07:54)  98 mg/dL (09-09-22 @ 21:15)      eMAR:  dextrose 50% Injectable   50 milliLiter(s) IV Push (09-10-22 @ 16:42)    dextrose 50% Injectable   25 milliLiter(s) IV Push (09-10-22 @ 12:06)    
Age: 77y    Gender: Female    POCT Blood Glucose:  56 mg/dL (09-09-22 @ 07:38)  59 mg/dL (09-09-22 @ 07:31)  78 mg/dL (09-08-22 @ 21:51)  81 mg/dL (09-08-22 @ 16:58)  77 mg/dL (09-08-22 @ 11:05)      eMAR:  
Age: 77y    Gender: Female    POCT Blood Glucose:  58 mg/dL (09-15-22 @ 02:12)  59 mg/dL (09-15-22 @ 02:07)  106 mg/dL (09-14-22 @ 21:20)  265 mg/dL (09-14-22 @ 16:37)  266 mg/dL (09-14-22 @ 12:55)  83 mg/dL (09-14-22 @ 07:00)  84 mg/dL (09-14-22 @ 02:34)      eMAR:  dextrose 50% Injectable   12.5 Gram(s) IV Push (09-15-22 @ 02:23)

## 2022-09-15 NOTE — PROGRESS NOTE ADULT - NSPROGADDITIONALINFOA_GEN_ALL_CORE
Spoke with daughter and updated about plan. She will be coming to visit the pt this evening.   Code status: made DNR/DNI by daughter today with Palliative Medicine  Best number to call Alissa at [phone]. Spoke with daughter and updated about plan. She will be coming to visit the pt this  weekend   Code status: made DNR/DNI by daughter today with Palliative Medicine  Best number to call Alissa at [phone].

## 2022-09-15 NOTE — PROGRESS NOTE ADULT - PROBLEM SELECTOR PLAN 9
-Heparin SQ    -Dispo issues: medically active   -Patient with APS case ongoing due to unsanitary conditions at home. Will require SW/CM for safe placement once medically stable for d/c. Patient currently without capacity and with poor insight into her medical conditions. -Heparin SQ    -Dispo issues: medically active and pending  -Patient with APS case ongoing due to unsanitary conditions at home. Will require SW/CM for safe placement once medically stable for d/c. Patient currently without capacity and with poor insight into her medical conditions.

## 2022-09-15 NOTE — PROVIDER CONTACT NOTE (HYPOGLYCEMIA EVENT) - NS PROVIDER CONTACT RECOMMEND-HYPO
hypoglycemic protocol initiated 
Patient with poor PO intake due to impaired mental status r/t to elevated pCO2. Pt on AVAPS to decrease pCO2
Keep FSQ6 with hypoglycemia protocol, add Dextrose IVF if pt continues to become hypoglycemic.

## 2022-09-15 NOTE — PROGRESS NOTE ADULT - SUBJECTIVE AND OBJECTIVE BOX
CHIEF COMPLAINT:    Interval Events:    Pt seen and examined at bedside. no acute events noted, currenty saturating well.     REVIEW OF SYSTEMS:  Constitutional: No fevers or chills. No weight loss. No fatigue or generalised malaise.  Eyes: No itching or discharge from the eyes  ENT: No ear pain. No ear discharge. No nasal congestion. No post nasal drip. No epistaxis. No throat pain. No sore throat. No difficulty swallowing.   CV: No chest pain. No palpitations. No lightheadedness or dizziness.   Resp: No dyspnea at rest. No dyspnea on exertion. No orthopnea. No wheezing. No cough. No stridor. No sputum production. No chest pain with respiration.  GI: No nausea. No vomiting. No diarrhea.  MSK: No joint pain or pain in any extremities  Integumentary: No skin lesions. No pedal edema.  Neurological: No gross motor weakness. No sensory changes.  [ ] All other systems negative  [ ] Unable to assess ROS because ________    OBJECTIVE:  ICU Vital Signs Last 24 Hrs  T(C): 36.3 (15 Sep 2022 04:27), Max: 37 (14 Sep 2022 14:24)  T(F): 97.3 (15 Sep 2022 04:27), Max: 98.6 (14 Sep 2022 14:24)  HR: 78 (15 Sep 2022 10:25) (74 - 92)  BP: 102/55 (15 Sep 2022 04:27) (102/55 - 125/60)  BP(mean): --  ABP: --  ABP(mean): --  RR: 18 (15 Sep 2022 10:25) (18 - 20)  SpO2: 97% (15 Sep 2022 10:25) (96% - 100%)    O2 Parameters below as of 15 Sep 2022 10:25  Patient On (Oxygen Delivery Method): nasal cannula, high flow  O2 Flow (L/min): 45  O2 Concentration (%): 50      Mode: NIV (Noninvasive Ventilation), RR (machine): 16, TV (machine): 500, FiO2: 40, PEEP: 5, ITime: 1, P-High: 30, P-Low: 10, PIP: 15    CAPILLARY BLOOD GLUCOSE      POCT Blood Glucose.: 76 mg/dL (15 Sep 2022 08:00)      HYSICAL EXAM:  General: Awake, alert, oriented X 0   HEENT: Atraumatic, normocephalic.                  No tonsillar or pharyngeal exudates.  Lymph Nodes: No palpable lymphadenopathy  Neck: No JVD. No carotid bruit.   Respiratory: decreased breath sounds L > R,   Cardiovascular: S1 S2 normal. No murmurs, rubs or gallops.   Abdomen: Soft, non-tender, non-distended. No organomegaly.  Extremities: Warm to touch. Peripheral pulse palpable. No pedal edema.   Neurological: Non-focal      HOSPITAL MEDICATIONS:  MEDICATIONS  (STANDING):  albumin human  5% IVPB 2500 milliLiter(s) IV Intermittent once  albumin human  5% IVPB 2500 milliLiter(s) IV Intermittent once  albumin human  5% IVPB 2500 milliLiter(s) IV Intermittent once  albuterol/ipratropium for Nebulization 3 milliLiter(s) Nebulizer every 12 hours  budesonide  80 MICROgram(s)/formoterol 4.5 MICROgram(s) Inhaler 2 Puff(s) Inhalation two times a day  calcium gluconate IVPB 2 Gram(s) IV Intermittent once  calcium gluconate IVPB 2 Gram(s) IV Intermittent once  chlorhexidine 2% Cloths 1 Application(s) Topical daily  cholecalciferol 1000 Unit(s) Oral daily  dextrose 5% + sodium chloride 0.45%. 1000 milliLiter(s) (75 mL/Hr) IV Continuous <Continuous>  dextrose 50% Injectable 25 Gram(s) IV Push once  dextrose 50% Injectable 12.5 Gram(s) IV Push once  dextrose 50% Injectable 25 Gram(s) IV Push once  dextrose Oral Gel 15 Gram(s) Oral once  glucagon  Injectable 1 milliGRAM(s) IntraMuscular once  heparin   Injectable 5000 Unit(s) SubCutaneous every 12 hours  mirtazapine 7.5 milliGRAM(s) Oral <User Schedule>  multivitamin 1 Tablet(s) Oral daily    MEDICATIONS  (PRN):  acetaminophen     Tablet .. 650 milliGRAM(s) Oral every 6 hours PRN Temp greater or equal to 38C (100.4F), Mild Pain (1 - 3)  ALBUTerol    90 MICROgram(s) HFA Inhaler 2 Puff(s) Inhalation every 6 hours PRN Shortness of Breath and/or Wheezing  aluminum hydroxide/magnesium hydroxide/simethicone Suspension 30 milliLiter(s) Oral every 4 hours PRN Dyspepsia  melatonin 3 milliGRAM(s) Oral at bedtime PRN Insomnia  ondansetron Injectable 4 milliGRAM(s) IV Push every 8 hours PRN Nausea and/or Vomiting      LABS:                        9.3    7.34  )-----------( 137      ( 14 Sep 2022 07:10 )             32.1     09-14    147<H>  |  102  |  26<H>  ----------------------------<  77  4.2   |  35<H>  |  0.66    Ca    9.7      14 Sep 2022 07:10  Phos  2.7     09-14  Mg     1.70     09-14            Venous Blood Gas:  09-14 @ 10:15  7.34/70/72/38/96.7  VBG Lactate: 1.3      MICROBIOLOGY:     RADIOLOGY:  [ ] Reviewed and interpreted by me    Point of Care Ultrasound Findings:    PFT:    EKG:

## 2022-09-15 NOTE — CHART NOTE - NSCHARTNOTEFT_GEN_A_CORE
Patient seen and examined at bedside for removal of right IJ Shiley. Area cleaned with alcohol swabs and suture removal kit used to remove sutures holding Shiley in place.   Patient placed in reverse Trendelenburg position, asked to hold breath/breathe out, and Shiley was removed. Pressure applied for 10 minutes with gauze. No signs of active bleeding noted. Tegaderm and gauze/tape used to create pressure dressing to maintain pressure to site. Patient tolerated well without complications. Will continue to follow. RN to call/page if any changes.    Gladis Bowers PA-C  [phone]

## 2022-09-15 NOTE — PROGRESS NOTE ADULT - ASSESSMENT
77 year old female with PMH of lymphoma (dx 2005, s/p CARRIE lobectomy, relapsed in 2019 but not currently on chemo/RT, [place] [facility] [place] disorder, asthma (on O2 PRN at home, unknown how many L) brought to ED by daughter who states pt has been hallucinating, being more depressed.  Patient unable to provide much history, daughter Alissa states that patient was able to care for herself, manage home finances and attend doctor appointments independently until recently, she states that she believes her mom stopped taking her psychiatric medications in February of 2022 and has not followed up with any of her physicians. She has noticed recent weight loss (unsure how much) and change in voice.   Admitted to medicine service for further evaluation & treatment. Course c/b acute hypercapnic and hypoxic respiratory failure requiring BIPAP and AVAPS.  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 77 year old female with PMH of lymphoma (dx 2005, s/p CARRIE lobectomy, relapsed in 2019 but not currently on chemo/RT, being followed by heme every 3-6months at [facility] [place] bipolar disorder, asthma (on O2 PRN at home, unknown how many L) brought to ED by daughter who states pt has been hallucinating, being more depressed.  Patient unable to provide much history, daughter Alissa states that patient was able to care for herself, manage home finances and attend doctor appointments independently until recently, she states that she believes her mom stopped taking her psychiatric medications in February of 2022 and has not followed up with any of her physicians. She has noticed recent weight loss (unsure how much) and change in voice.   Admitted to medicine service for further evaluation & treatment. Course c/b acute hypercapnic and hypoxic respiratory failure requiring BIPAP and AVAPS.  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

## 2022-09-15 NOTE — PROGRESS NOTE ADULT - SUBJECTIVE AND OBJECTIVE BOX
Patient is a 77y old  Female who presents with a chief complaint of Hypoxic respiratory failure (14 Sep 2022 16:21)      SUBJECTIVE / OVERNIGHT EVENTS:    No events overnight. This AM, patient without n/v/d/cp/sob.      MEDICATIONS  (STANDING):  albumin human  5% IVPB 2500 milliLiter(s) IV Intermittent once  albumin human  5% IVPB 2500 milliLiter(s) IV Intermittent once  albumin human  5% IVPB 2500 milliLiter(s) IV Intermittent once  albuterol/ipratropium for Nebulization 3 milliLiter(s) Nebulizer every 12 hours  budesonide  80 MICROgram(s)/formoterol 4.5 MICROgram(s) Inhaler 2 Puff(s) Inhalation two times a day  calcium gluconate IVPB 2 Gram(s) IV Intermittent once  calcium gluconate IVPB 2 Gram(s) IV Intermittent once  chlorhexidine 2% Cloths 1 Application(s) Topical daily  cholecalciferol 1000 Unit(s) Oral daily  dextrose 50% Injectable 25 Gram(s) IV Push once  dextrose 50% Injectable 12.5 Gram(s) IV Push once  dextrose 50% Injectable 25 Gram(s) IV Push once  dextrose Oral Gel 15 Gram(s) Oral once  glucagon  Injectable 1 milliGRAM(s) IntraMuscular once  heparin   Injectable 5000 Unit(s) SubCutaneous every 12 hours  mirtazapine 7.5 milliGRAM(s) Oral <User Schedule>  multivitamin 1 Tablet(s) Oral daily    MEDICATIONS  (PRN):  acetaminophen     Tablet .. 650 milliGRAM(s) Oral every 6 hours PRN Temp greater or equal to 38C (100.4F), Mild Pain (1 - 3)  ALBUTerol    90 MICROgram(s) HFA Inhaler 2 Puff(s) Inhalation every 6 hours PRN Shortness of Breath and/or Wheezing  aluminum hydroxide/magnesium hydroxide/simethicone Suspension 30 milliLiter(s) Oral every 4 hours PRN Dyspepsia  melatonin 3 milliGRAM(s) Oral at bedtime PRN Insomnia  ondansetron Injectable 4 milliGRAM(s) IV Push every 8 hours PRN Nausea and/or Vomiting      PHYSICAL EXAM:  T(C): 36.3 (09-15-22 @ 04:27), Max: 37 (09-14-22 @ 14:24)  HR: 75 (09-15-22 @ 07:53) (67 - 92)  BP: 102/55 (09-15-22 @ 04:27) (102/55 - 133/61)  RR: 18 (09-15-22 @ 04:27) (18 - 20)  SpO2: 96% (09-15-22 @ 07:53) (96% - 100%)  I&O's Summary    GENERAL: NAD, well-developed  HEAD:  Atraumatic, Normocephalic, MMM  CHEST/LUNG: No use of accessory muscles, CTAB, breathing non-labored  COR: RR, no mrcg  ABD: Soft, ND/NT, +BS  PSYCH: AAOx3  NEUROLOGY: CN II-XII grossly intact, moving all extremities  SKIN: No rashes or lesions  EXT: wwp, no cce    LABS:  CAPILLARY BLOOD GLUCOSE      POCT Blood Glucose.: 76 mg/dL (15 Sep 2022 08:00)  POCT Blood Glucose.: 158 mg/dL (15 Sep 2022 02:45)  POCT Blood Glucose.: 162 mg/dL (15 Sep 2022 02:44)  POCT Blood Glucose.: 58 mg/dL (15 Sep 2022 02:12)  POCT Blood Glucose.: 59 mg/dL (15 Sep 2022 02:07)  POCT Blood Glucose.: 106 mg/dL (14 Sep 2022 21:20)  POCT Blood Glucose.: 265 mg/dL (14 Sep 2022 16:37)  POCT Blood Glucose.: 266 mg/dL (14 Sep 2022 12:55)                          9.3    7.34  )-----------( 137      ( 14 Sep 2022 07:10 )             32.1     09-14    147<H>  |  102  |  26<H>  ----------------------------<  77  4.2   |  35<H>  |  0.66    Ca    9.7      14 Sep 2022 07:10  Phos  2.7     09-14  Mg     1.70     09-14                  RADIOLOGY & ADDITIONAL TESTS:    Consultant(s) Notes Reviewed -       Care Discussed with Consultants/Other Providers -  Patient is a 77y old  Female who presents with a chief complaint of Hypoxic respiratory failure (14 Sep 2022 16:21)      SUBJECTIVE / OVERNIGHT EVENTS:    No events overnight. This AM, patient without n/v/d/cp. Patient is more awake and answering qs more coherently. She reports feeling tired and states she was happy that his daughter visited yesterday. States she is not feeling very hungry today       MEDICATIONS  (STANDING):  albumin human  5% IVPB 2500 milliLiter(s) IV Intermittent once  albumin human  5% IVPB 2500 milliLiter(s) IV Intermittent once  albumin human  5% IVPB 2500 milliLiter(s) IV Intermittent once  albuterol/ipratropium for Nebulization 3 milliLiter(s) Nebulizer every 12 hours  budesonide  80 MICROgram(s)/formoterol 4.5 MICROgram(s) Inhaler 2 Puff(s) Inhalation two times a day  calcium gluconate IVPB 2 Gram(s) IV Intermittent once  calcium gluconate IVPB 2 Gram(s) IV Intermittent once  chlorhexidine 2% Cloths 1 Application(s) Topical daily  cholecalciferol 1000 Unit(s) Oral daily  dextrose 50% Injectable 25 Gram(s) IV Push once  dextrose 50% Injectable 12.5 Gram(s) IV Push once  dextrose 50% Injectable 25 Gram(s) IV Push once  dextrose Oral Gel 15 Gram(s) Oral once  glucagon  Injectable 1 milliGRAM(s) IntraMuscular once  heparin   Injectable 5000 Unit(s) SubCutaneous every 12 hours  mirtazapine 7.5 milliGRAM(s) Oral <User Schedule>  multivitamin 1 Tablet(s) Oral daily    MEDICATIONS  (PRN):  acetaminophen     Tablet .. 650 milliGRAM(s) Oral every 6 hours PRN Temp greater or equal to 38C (100.4F), Mild Pain (1 - 3)  ALBUTerol    90 MICROgram(s) HFA Inhaler 2 Puff(s) Inhalation every 6 hours PRN Shortness of Breath and/or Wheezing  aluminum hydroxide/magnesium hydroxide/simethicone Suspension 30 milliLiter(s) Oral every 4 hours PRN Dyspepsia  melatonin 3 milliGRAM(s) Oral at bedtime PRN Insomnia  ondansetron Injectable 4 milliGRAM(s) IV Push every 8 hours PRN Nausea and/or Vomiting      PHYSICAL EXAM:  T(C): 36.3 (09-15-22 @ 04:27), Max: 37 (09-14-22 @ 14:24)  HR: 75 (09-15-22 @ 07:53) (67 - 92)  BP: 102/55 (09-15-22 @ 04:27) (102/55 - 133/61)  RR: 18 (09-15-22 @ 04:27) (18 - 20)  SpO2: 96% (09-15-22 @ 07:53) (96% - 100%)  I&O's Summary    GENERAL: NAD, frail appearing, with HFNC   HEAD:  Atraumatic, Normocephalic, MMM  CHEST/LUNG: No use of accessory muscles, CTAB, breathing non-labored  COR: RR, no mrcg  ABD: Soft, ND/NT, +BS  PSYCH: alert and oriented to person and place  NEUROLOGY: CN II-XII grossly intact, moving all extremities  SKIN: No rashes or lesions  EXT: wwp, no cce    LABS:  CAPILLARY BLOOD GLUCOSE      POCT Blood Glucose.: 76 mg/dL (15 Sep 2022 08:00)  POCT Blood Glucose.: 158 mg/dL (15 Sep 2022 02:45)  POCT Blood Glucose.: 162 mg/dL (15 Sep 2022 02:44)  POCT Blood Glucose.: 58 mg/dL (15 Sep 2022 02:12)  POCT Blood Glucose.: 59 mg/dL (15 Sep 2022 02:07)  POCT Blood Glucose.: 106 mg/dL (14 Sep 2022 21:20)  POCT Blood Glucose.: 265 mg/dL (14 Sep 2022 16:37)  POCT Blood Glucose.: 266 mg/dL (14 Sep 2022 12:55)                          9.3    7.34  )-----------( 137      ( 14 Sep 2022 07:10 )             32.1     09-14    147<H>  |  102  |  26<H>  ----------------------------<  77  4.2   |  35<H>  |  0.66    Ca    9.7      14 Sep 2022 07:10  Phos  2.7     09-14  Mg     1.70     09-14                  RADIOLOGY & ADDITIONAL TESTS:    Consultant(s) Notes Reviewed -       Care Discussed with Consultants/Other Providers -

## 2022-09-15 NOTE — PROGRESS NOTE ADULT - ASSESSMENT
77F with hx of lymphoma (dx 2005, s/p CARRIE lobectomy, relapsed 2019, currently off chemo/RT) BPD, asthma admitted for metabolic encephalopathy 2/2 UTI/hypercalcemia. Pulmonary initially consulted for c/f pleural effusion which was actually 2/2 known lymphoma, with course now c/b hypoxic/hypercapnic respiratory failure now on nocturnal NIPPV in setting of hyperviscosity syndrome/multifactorial metabolic encephalopathy treated with plasmapharesis    #Hypoxic/Hypercapnic respiratory failure  #Metabolic Encephalopathy  #Lymphoma  #MGUS   #Trace pleural Effusion  #Asthma  #Hyperviscosity Syndrome     Recommendations:  - continue AVAPs at night; while of AVAPS  - Saturating well on lowest HFNC setting, can trial back on nasal cannula during day.   - hold off further diuretics   - continue w/current bronchodilator regimen  - goals of care discussion  - Out of bed to chair as tolerated, trial nebulizer treatments for airway clearance and opening. Head of bed up, physical therapy as able

## 2022-09-15 NOTE — PROGRESS NOTE ADULT - PROBLEM SELECTOR PLAN 1
hypercapnic and hypoxemic respiratory failure  worsening resp status on 9/10, pt lethargic, was not using AVAPS the night prior, was on NRB instead  - VBG this am and pCO2 improving, placed on HFNC in am- continue to wean off oxygen as tolerated  -C/w intermittent diuresis for pleural effusions as BP allows, last given IV Lasix 40mg on 9/10.   -Seen by pulm and recommend Diamox s/p 9/13 in setting of worsening bicarb  -Palliative following  -Continue Duoneb BID, Symbicort BID, Albuterol PRN

## 2022-09-15 NOTE — PROGRESS NOTE ADULT - PROBLEM SELECTOR PLAN 5
-Follows w/Dr. Real Cano @ [facility] [place]  -received treatment recently per daughter however she doesn't know specifics  -with progression of disease  -hematology to speak to [facility] regarding if further treatment is suggested      -Hem recs appreciated- plasmapheresis  09/02, 09/09, 09/13 - IgM significantly improved to 2848  and trend IgM downtrending. Serum Viscosity still pending    - If still high, will need more plasmapheresis before further treatment     - Heme/Onc concerned about IgM flare with Rituxan. Recommend can wait for IgM to decrease further and then treat with Rituxan or would opt to given cytoxan 100-200mg daily x 5 days to debulk then given rituxan in a few week. -[place] w[place]. Real [place] [facility] [place]  -received treatment recently per daughter however she doesn't know specifics  -with progression of disease  -hematology to speak to [facility] regarding if further treatment is suggested      -Hem recs appreciated- plasmapheresis  09/02, 09/09, 09/13 - IgM significantly improved to 2848  and trend IgM downtrending. Serum Viscosity still pending    - If still high, will need more plasmapheresis before further treatment   - Norbert removed 9/15 and if needs repeat plasmapheresis will have to insert another cath    - Heme/Onc concerned about IgM flare with Rituxan. Recommend can wait for IgM to decrease further and then treat with Rituxan or would opt to given cytoxan 100-200mg daily x 5 days to debulk then given rituxan in a few week.

## 2022-09-15 NOTE — PROGRESS NOTE ADULT - PROBLEM SELECTOR PLAN 4
-likely due to underlying malignancy, lymphoma vs new MM.  PTH is inappropriately normal. vitamin D 1,25 not elevated/ PTHrP not elevated   -s/p pamidronate on 8/30; Ca improved.  -calcitonin x 4 doses  - hold off fluids for now given pulmonary edema that developed with IVF upon admission, which later required need for Lasix IV and AVAPS  -Apprec Endocrine recs  - continue to encourage PO intake  - Monitor Ca levels

## 2022-09-15 NOTE — PROGRESS NOTE ADULT - ASSESSMENT
MANDI GASPAR is a 77y Female who presents with a chief complaint of hypoxic respiratory failure.    Marginal Zone Lymphoma  - Patient treated previously at [facility]; was on rituximab + bendamustine last in August 2020, and since then has been on surveillance, but lost to follow-up since July 2021  - CT chest concerning for slight progression of disease. May need bone marrow biopsy once respiratory status is more stable.     Lymphoplasmacytic Lymphoma  - Noted from bone marrow biopsy done during prior admission here.  - Patient underwent three plasmapheresis sessions; last on September 13th with improvement in mental status and IgM.  - Pending serum viscosity level.  - May need further plasmapheresis depending on above results, and will likely need treatment modifying therapy with rituximab or cyclophosphamide while inpatient.  - Will continue communication with [facility] [place].  - Noted goals of care discussion.     Altered Mental Status  - Likely multifactorial with hyperviscosity of blood, hypercalcemia, urinary tract infection  - S/p Shiley placement  - Completed antibiotics  - Endocrinology following  - Psychiatry appreciated    Hypoxia  - Management per pulmonary    Will continue to follow.    Alexander Peraza MD  Hematology/Oncology  O: [phone]

## 2022-09-15 NOTE — PROGRESS NOTE ADULT - ASSESSMENT
77 year old woman with history of HLD, lymphoma (dx 2005, s/p CARRIE lobectomy, relapsed in 2019 but not currently on chemo/RT, [place] [facility] [place] bipolar disorder, asthma, who presented to the hospital for AMS and hypoxic respiratory failure, found to have hypercalcemia.     #Hypercalcemia - malignancy-related vs medication induced (lithium induced parathyroidism) vs in the setting of potential multiple myeloma transformation   - s/p 4 doses of IV Calcitonin, s/p pamidronate 60 mg IV x 1 8/30/22   - Corrected calcium upper normal range (10.3) continue to monitor  - 1,25 vitamin D not elevated (37.1)  -PTH-related peptide negative  -Etiology of hypercalcemia seems most likely related to multiple myeloma/monoclonal gammopathy. Management per heme/onc.  -Also of note is receiving calcium gluconate as part of plasma exchange therapy which may be contributing to higher calcium level.  -Trend calcium and albumin routinely while inpatient.      #history of fracture in the setting of likely osteoporosis;.   - Imaging findings of Old right humeral neck fracture. Left proximal humeral fracture s/p ORIF  .- f/u outpatient bone-density testing.   - 25-hydroxy Vitamin D level of 18.4 (low),   -continue cholecalciferol 1000 units daily.    #Hyperlipidemia  - history of hyperlipidemia   - Cholesterol of 54, Triglycerides of 30, Serum HDL of 21, LDL of 27. A1C of 4.5   - can hold on statin therapy at this time due to age and risk factors at this time   - home medication history of zyprexa.     #Hypoglycemia -   Ongoing poor po intake, no further serum hypoglycemia  encourage po intake/assistance - eating better today and glucose better today  FS 50s last night patient denies hypoglycemia symptoms, not meeting Whipple's triad.  If able, can send labs with serum glucose at time of low glucose on point of care testing to confirm.  6 AM cortisol 8.6, repeated AM cortisol at 8AM today resulted at 12.4 acceptable level and not indicative of Adrenal insufficiency. Will defer need for stim test at this time.    Complex patient high level decision making.    Thais Tovar MD  Division of Endocrinology  Pager: [phone]    If after 6PM or before 9AM, or on weekends/holidays, please call endocrine answering service for assistance ([phone]).  For nonurgent matters email [email or web address] for assistance.

## 2022-09-16 NOTE — PROGRESS NOTE ADULT - SUBJECTIVE AND OBJECTIVE BOX
Patient is a 77y old  Female who presents with a chief complaint of Hypoxic respiratory failure (15 Sep 2022 17:27)      SUBJECTIVE / OVERNIGHT EVENTS:    No events overnight. This AM, patient without n/v/d/cp/sob.      MEDICATIONS  (STANDING):  albumin human  5% IVPB 2500 milliLiter(s) IV Intermittent once  albumin human  5% IVPB 2500 milliLiter(s) IV Intermittent once  albumin human  5% IVPB 2500 milliLiter(s) IV Intermittent once  albuterol/ipratropium for Nebulization 3 milliLiter(s) Nebulizer every 12 hours  budesonide  80 MICROgram(s)/formoterol 4.5 MICROgram(s) Inhaler 2 Puff(s) Inhalation two times a day  calcium gluconate IVPB 2 Gram(s) IV Intermittent once  calcium gluconate IVPB 2 Gram(s) IV Intermittent once  chlorhexidine 2% Cloths 1 Application(s) Topical daily  cholecalciferol 1000 Unit(s) Oral daily  dextrose 5% + sodium chloride 0.45%. 1000 milliLiter(s) (75 mL/Hr) IV Continuous <Continuous>  dextrose 50% Injectable 25 Gram(s) IV Push once  dextrose 50% Injectable 12.5 Gram(s) IV Push once  dextrose 50% Injectable 25 Gram(s) IV Push once  dextrose Oral Gel 15 Gram(s) Oral once  glucagon  Injectable 1 milliGRAM(s) IntraMuscular once  heparin   Injectable 5000 Unit(s) SubCutaneous every 12 hours  mirtazapine 7.5 milliGRAM(s) Oral <User Schedule>  multivitamin 1 Tablet(s) Oral daily  sodium chloride 0.9% Bolus 1000 milliLiter(s) IV Bolus once    MEDICATIONS  (PRN):  acetaminophen     Tablet .. 650 milliGRAM(s) Oral every 6 hours PRN Temp greater or equal to 38C (100.4F), Mild Pain (1 - 3)  ALBUTerol    90 MICROgram(s) HFA Inhaler 2 Puff(s) Inhalation every 6 hours PRN Shortness of Breath and/or Wheezing  aluminum hydroxide/magnesium hydroxide/simethicone Suspension 30 milliLiter(s) Oral every 4 hours PRN Dyspepsia  melatonin 3 milliGRAM(s) Oral at bedtime PRN Insomnia  ondansetron Injectable 4 milliGRAM(s) IV Push every 8 hours PRN Nausea and/or Vomiting      PHYSICAL EXAM:  T(C): 36.6 (09-16-22 @ 09:00), Max: 36.8 (09-15-22 @ 21:45)  HR: 100 (09-16-22 @ 09:00) (75 - 100)  BP: 94/47 (09-16-22 @ 09:00) (72/52 - 99/50)  RR: 18 (09-16-22 @ 09:00) (17 - 19)  SpO2: 97% (09-16-22 @ 09:00) (94% - 99%)  I&O's Summary    GENERAL: NAD, well-developed  HEAD:  Atraumatic, Normocephalic, MMM  CHEST/LUNG: No use of accessory muscles, CTAB, breathing non-labored  COR: RR, no mrcg  ABD: Soft, ND/NT, +BS  PSYCH: AAOx3  NEUROLOGY: CN II-XII grossly intact, moving all extremities  SKIN: No rashes or lesions  EXT: wwp, no cce    LABS:  CAPILLARY BLOOD GLUCOSE      POCT Blood Glucose.: 82 mg/dL (16 Sep 2022 07:29)  POCT Blood Glucose.: 97 mg/dL (16 Sep 2022 04:58)  POCT Blood Glucose.: 90 mg/dL (15 Sep 2022 22:35)  POCT Blood Glucose.: 192 mg/dL (15 Sep 2022 16:35)                          8.7    6.68  )-----------( 147      ( 16 Sep 2022 07:17 )             29.2                       RADIOLOGY & ADDITIONAL TESTS:    Consultant(s) Notes Reviewed -       Care Discussed with Consultants/Other Providers -  Patient is a 77y old  Female who presents with a chief complaint of Hypoxic respiratory failure (15 Sep 2022 17:27)      SUBJECTIVE / OVERNIGHT EVENTS:    overnight had an episode of low BP and received a bolus. This AM, patient without n/v/d/cp/sob.  Pt more awake and reports feeling tired.     MEDICATIONS  (STANDING):  albumin human  5% IVPB 2500 milliLiter(s) IV Intermittent once  albumin human  5% IVPB 2500 milliLiter(s) IV Intermittent once  albumin human  5% IVPB 2500 milliLiter(s) IV Intermittent once  albuterol/ipratropium for Nebulization 3 milliLiter(s) Nebulizer every 12 hours  budesonide  80 MICROgram(s)/formoterol 4.5 MICROgram(s) Inhaler 2 Puff(s) Inhalation two times a day  calcium gluconate IVPB 2 Gram(s) IV Intermittent once  calcium gluconate IVPB 2 Gram(s) IV Intermittent once  chlorhexidine 2% Cloths 1 Application(s) Topical daily  cholecalciferol 1000 Unit(s) Oral daily  dextrose 5% + sodium chloride 0.45%. 1000 milliLiter(s) (75 mL/Hr) IV Continuous <Continuous>  dextrose 50% Injectable 25 Gram(s) IV Push once  dextrose 50% Injectable 12.5 Gram(s) IV Push once  dextrose 50% Injectable 25 Gram(s) IV Push once  dextrose Oral Gel 15 Gram(s) Oral once  glucagon  Injectable 1 milliGRAM(s) IntraMuscular once  heparin   Injectable 5000 Unit(s) SubCutaneous every 12 hours  mirtazapine 7.5 milliGRAM(s) Oral <User Schedule>  multivitamin 1 Tablet(s) Oral daily  sodium chloride 0.9% Bolus 1000 milliLiter(s) IV Bolus once    MEDICATIONS  (PRN):  acetaminophen     Tablet .. 650 milliGRAM(s) Oral every 6 hours PRN Temp greater or equal to 38C (100.4F), Mild Pain (1 - 3)  ALBUTerol    90 MICROgram(s) HFA Inhaler 2 Puff(s) Inhalation every 6 hours PRN Shortness of Breath and/or Wheezing  aluminum hydroxide/magnesium hydroxide/simethicone Suspension 30 milliLiter(s) Oral every 4 hours PRN Dyspepsia  melatonin 3 milliGRAM(s) Oral at bedtime PRN Insomnia  ondansetron Injectable 4 milliGRAM(s) IV Push every 8 hours PRN Nausea and/or Vomiting      PHYSICAL EXAM:  T(C): 36.6 (09-16-22 @ 09:00), Max: 36.8 (09-15-22 @ 21:45)  HR: 100 (09-16-22 @ 09:00) (75 - 100)  BP: 94/47 (09-16-22 @ 09:00) (72/52 - 99/50)  RR: 18 (09-16-22 @ 09:00) (17 - 19)  SpO2: 97% (09-16-22 @ 09:00) (94% - 99%)  I&O's Summary    GENERAL: NAD, well-developed  HEAD:  Atraumatic, Normocephalic, MMM  CHEST/LUNG: No use of accessory muscles, CTAB, breathing non-labored  COR: RR, no mrcg  ABD: Soft, ND/NT, +BS  PSYCH: AAOx3  NEUROLOGY: CN II-XII grossly intact, moving all extremities  SKIN: No rashes or lesions  EXT: wwp, no cce    LABS:  CAPILLARY BLOOD GLUCOSE      POCT Blood Glucose.: 82 mg/dL (16 Sep 2022 07:29)  POCT Blood Glucose.: 97 mg/dL (16 Sep 2022 04:58)  POCT Blood Glucose.: 90 mg/dL (15 Sep 2022 22:35)  POCT Blood Glucose.: 192 mg/dL (15 Sep 2022 16:35)                          8.7    6.68  )-----------( 147      ( 16 Sep 2022 07:17 )             29.2                       RADIOLOGY & ADDITIONAL TESTS:    Telemetry reviewed  Consultant(s) Notes Reviewed -       Care Discussed with Consultants/Other Providers -

## 2022-09-16 NOTE — PROGRESS NOTE ADULT - SUBJECTIVE AND OBJECTIVE BOX
Chief Complaint: Hypercalcemia, hypoglycemia    History: resting in bed, high flow O2  no hypoglycemia events noted  D51/2NS@75 cc/hour    MEDICATIONS  (STANDING):  albumin human  5% IVPB 2500 milliLiter(s) IV Intermittent once  albumin human  5% IVPB 2500 milliLiter(s) IV Intermittent once  albumin human  5% IVPB 2500 milliLiter(s) IV Intermittent once  albuterol/ipratropium for Nebulization 3 milliLiter(s) Nebulizer every 12 hours  budesonide  80 MICROgram(s)/formoterol 4.5 MICROgram(s) Inhaler 2 Puff(s) Inhalation two times a day  calcium gluconate IVPB 2 Gram(s) IV Intermittent once  calcium gluconate IVPB 2 Gram(s) IV Intermittent once  chlorhexidine 2% Cloths 1 Application(s) Topical daily  cholecalciferol 1000 Unit(s) Oral daily  dextrose 5% + sodium chloride 0.45%. 1000 milliLiter(s) (75 mL/Hr) IV Continuous <Continuous>  dextrose 50% Injectable 25 Gram(s) IV Push once  dextrose 50% Injectable 12.5 Gram(s) IV Push once  dextrose 50% Injectable 25 Gram(s) IV Push once  dextrose Oral Gel 15 Gram(s) Oral once  furosemide    Tablet 40 milliGRAM(s) Oral daily  glucagon  Injectable 1 milliGRAM(s) IntraMuscular once  heparin   Injectable 5000 Unit(s) SubCutaneous every 12 hours  mirtazapine 7.5 milliGRAM(s) Oral <User Schedule>  multivitamin 1 Tablet(s) Oral daily    MEDICATIONS  (PRN):  acetaminophen     Tablet .. 650 milliGRAM(s) Oral every 6 hours PRN Temp greater or equal to 38C (100.4F), Mild Pain (1 - 3)  ALBUTerol    90 MICROgram(s) HFA Inhaler 2 Puff(s) Inhalation every 6 hours PRN Shortness of Breath and/or Wheezing  aluminum hydroxide/magnesium hydroxide/simethicone Suspension 30 milliLiter(s) Oral every 4 hours PRN Dyspepsia  melatonin 3 milliGRAM(s) Oral at bedtime PRN Insomnia  ondansetron Injectable 4 milliGRAM(s) IV Push every 8 hours PRN Nausea and/or Vomiting      Allergies    latex (Rash)  No Known Drug Allergies    Intolerances      Review of Systems:  ALL OTHER SYSTEMS REVIEWED AND NEGATIVE      PHYSICAL EXAM:  VITALS: T(C): 36.6 (09-16-22 @ 09:00)  T(F): 97.8 (09-16-22 @ 09:00), Max: 98.2 (09-15-22 @ 21:45)  HR: 78 (09-16-22 @ 10:02) (75 - 100)  BP: 94/47 (09-16-22 @ 09:00) (75/39 - 99/50)  RR:  (18 - 19)  SpO2:  (94% - 99%)  Wt(kg): --  GENERAL: NAD, resting  EYES: No proptosis, anicteric  HEENT:  Atraumatic, Normocephalic, moist mucous membranes  RESPIRATORY: high flow O2 nasal cannula    CAPILLARY BLOOD GLUCOSE      POCT Blood Glucose.: 82 mg/dL (16 Sep 2022 07:29)  POCT Blood Glucose.: 97 mg/dL (16 Sep 2022 04:58)  POCT Blood Glucose.: 90 mg/dL (15 Sep 2022 22:35)  POCT Blood Glucose.: 192 mg/dL (15 Sep 2022 16:35)      09-14    147<H>  |  102  |  26<H>  ----------------------------<  77  4.2   |  35<H>  |  0.66    eGFR: 91    Ca    9.7      09-14  Mg     1.70     09-14  Phos  2.7     09-14        A1C with Estimated Average Glucose Result: 4.5 % (08-31-22 @ 03:48)      Thyroid Function Tests:  09-10 @ 11:45 TSH -- FreeT4 1.1 T3 -- Anti TPO -- Anti Thyroglobulin Ab -- TSI --  08-31 @ 03:48 TSH 1.42 FreeT4 -- T3 -- Anti TPO -- Anti Thyroglobulin Ab -- TSI --

## 2022-09-16 NOTE — PROGRESS NOTE ADULT - ASSESSMENT
MANDI GASPAR is a 77y Female who presents with a chief complaint of hypoxic respiratory failure.    Marginal Zone Lymphoma  - Patient treated previously at [facility]; was on rituximab + bendamustine last in August 2020, and since then has been on surveillance, but lost to follow-up since July 2021  - CT chest concerning for slight progression of disease. May need bone marrow biopsy once respiratory status is more stable.     Lymphoplasmacytic Lymphoma  - Noted from bone marrow biopsy done during prior admission here.  - Patient underwent three plasmapheresis sessions; last on September 13th with improvement in mental status and IgM.  - IgM rising rapidly again, now 4781  - Will initiate bendamustine to debulk. Consent obtained from daughter Alissa.   - Noted goals of care discussion.     Altered Mental Status  - Likely multifactorial with hyperviscosity of blood, hypercalcemia, urinary tract infection  - Completed antibiotics  - Endocrinology following  - Psychiatry appreciated    Hypoxia  - Management per pulmonary    Will continue to follow.    Giovana Duarte PA-C  Hematology/Oncology  [facility]  [phone] (office)  [phone] (alt office)  Evenings and weekends please call MD on call or office

## 2022-09-16 NOTE — CHART NOTE - NSCHARTNOTEFT_GEN_A_CORE
Pt with= 82/36, has been hypotensive, received  ml IV x 1 earlier in the days.  Pt mentating well with no complaints.  - Starting IVF with Albumin 5% in 250ml over 120 minutes. Pt with= 82/36, has been hypotensive. Pt mentating well with no complaints.  received  ml IV x 1 earlier in the days.  - Starting IVF with Albumin 5% in 250ml over 120 minutes.

## 2022-09-16 NOTE — PROGRESS NOTE ADULT - PROBLEM SELECTOR PLAN 2
-Likely multi-factorial: UTI, hypercapnia, hypercalcemia, psychiatric disorder,  and now with greater concern for hyperviscosity syndrome given inability to even obtain immunoglobulin panel due to degree of hyperviscosity    -serum viscosity level  pending after plasmapheresis on 9/13   -plasmapheresis 9/13 per heme/onc for hyperviscosity --via guero, needs IgM levels after each session   -IgM level downtrending  -daughter thinks patient stopped taking her psychiatric medication in february  - Poor PO intake- nutrition consulted previously   - monitor for side effects -Likely multi-factorial: UTI, hypercapnia, hypercalcemia, psychiatric disorder,  and now with greater concern for hyperviscosity syndrome given inability to even obtain immunoglobulin panel due to degree of hyperviscosity    -serum viscosity level  pending after plasmapheresis on 9/13   -plasmapheresis 9/13 per heme/onc for hyperviscosity --via guero, needs IgM levels after each session   -IgM level downtrending and now uptrending  - Heme/Onc recommends- will initiate bendamustine to debulk.    -daughter thinks patient stopped taking her psychiatric medication in february  - Poor PO intake- nutrition consulted previously   - monitor for side effects

## 2022-09-16 NOTE — CHART NOTE - NSCHARTNOTEFT_GEN_A_CORE
[facility] NIGHT MEDICINE COVERAGE    Notified by RN that pt's BP is 79/42, pt asymptomatic, remainder of vital signs are stable.  Ordered 250cc bolus of NS, w/ repeat manual BP to be checked 1 hour after bolus completion.  Pt assessed at bedside, she offers no complaints, denies chest pain, difficulty breathing, leg swelling, or being in any pain.    Vital Signs Last 24 Hrs  T(C): 36.8 (16 Sep 2022 20:17), Max: 36.8 (16 Sep 2022 20:17)  T(F): 98.3 (16 Sep 2022 20:17), Max: 98.3 (16 Sep 2022 20:17)  HR: 95 (16 Sep 2022 20:50) (78 - 100)  BP: 88/46 (16 Sep 2022 23:04) (79/42 - 99/50)  RR: 18 (16 Sep 2022 23:04) (18 - 20)  SpO2: 95% (16 Sep 2022 20:50) (94% - 99%)    O2 Parameters below as of 16 Sep 2022 20:50  Patient On (Oxygen Delivery Method): nasal cannula, high flow    Physical Exam:  GS: A&Ox2 (knows name, place, daugter's name, unsure of month, knows year), in NAD, frail, cachetic appearing  Lungs: Diminished breath sounds at bases b/l, no rales noted b/l  Heart: RRR, +S1S2, w/o m/r/g  Abd: Soft, NT/ND, +BS  Extremities: no LE edema noted b/l    Plan:  -Pt is asymptmatic, VS trend reviewed, BP is noted to be soft and fluctuates   -NS 250cc bolus as above, consider Albumin if needed, consider Midodrine if needed - BP improved to 88/46 after bolus, will monitor  -C/w HFNC and AVAPS as ordered  -Monitor pt's BP overnight  -Pt hemodynamically stable at this time    Plan d/w RN and pt, all questions answered.  Pt stable at this time, will continue to monitor.    Juan Miguel Acuña PA-C  Department of Medicine - [facility]  In-House Pager: #[phone]

## 2022-09-16 NOTE — PROGRESS NOTE ADULT - PROBLEM SELECTOR PLAN 7
-Hx of bipolar w/psych admission @ [facility] many years ago, unclear if patient actually had Bipolar   -non-compliant with medications  -Zyprexa 2.5mg QHS increased to 3.75mg QHS on 9/8/22 (pt refused), if patient has respiratory depression- hold Zyprexa, may try low dose Mirtazapine (also sedating)  -Psych recommendations appreciated   -Supportive care

## 2022-09-16 NOTE — PROGRESS NOTE ADULT - PROBLEM SELECTOR PLAN 1
hypercapnic and hypoxemic respiratory failure  worsening resp status on 9/10, pt lethargic, was not using AVAPS the night prior, was on NRB instead  - VBG this am and pCO2 improving, placed on HFNC in am- continue to wean off oxygen as tolerated  -C/w intermittent diuresis for pleural effusions as BP allows, last given IV Lasix 40mg on 9/10.   -Seen by pulm and recommend Diamox s/p 9/13 in setting of worsening bicarb  -Palliative following  -Continue Duoneb BID, Symbicort BID, Albuterol PRN hypercapnic and hypoxemic respiratory failure  worsening resp status on 9/10, pt lethargic, was not using AVAPS the night prior, was on NRB instead  - VBG this am and pCO2 improving, placed on HFNC in am- continue to wean off oxygen as tolerated  -C/w intermittent diuresis for pleural effusions as BP allows, last given IV Lasix 40mg on 9/10.   -Seen by pulm and recommend Diamox one dose 9/13 in setting of worsening bicarb  -Palliative following  -Continue Duoneb BID, Symbicort BID, Albuterol PRN

## 2022-09-16 NOTE — PROGRESS NOTE ADULT - PROBLEM SELECTOR PLAN 5
-[place]. [place] [facility] [place]  -received treatment recently per daughter however she doesn't know specifics  -with progression of disease  -hematology to speak to [facility] regarding if further treatment is suggested      -Hem recs appreciated- plasmapheresis  09/02, 09/09, 09/13 - IgM significantly improved to 2848  and trend IgM downtrending. Serum Viscosity still pending    - If still high, will need more plasmapheresis before further treatment   - Norbert removed 9/15 and if needs repeat plasmapheresis will have to insert another cath    - Heme/Onc concerned about IgM flare with Rituxan. Recommend can wait for IgM to decrease further and then treat with Rituxan or would opt to given cytoxan 100-200mg daily x 5 days to debulk then given rituxan in a few week. -[place] [facility] [place] treatment recently per daughter however she doesn't know specifics  -with progression of disease  -hematology to speak to [facility] regarding if further treatment is suggested    -Hem recs appreciated- plasmapheresis  09/02, 09/09, 09/13 - IgM significantly improved to 2848  and trend IgM downtrending, now increasing. . Serum Viscosity still pending    -plasmapheresis 9/13 per heme/onc for hyperviscosity --via guero, needs IgM levels after each session   -IgM level downtrending and now up trending  - Heme/Onc recommends- will initiate bendamustine to debulk.  - Guero removed 9/15 and if needs repeat plasmapheresis will have to insert another cath

## 2022-09-16 NOTE — PROVIDER CONTACT NOTE (OTHER) - ACTION/TREATMENT ORDERED:
Yogesh Momin, made aware. Albumin human 5% IVPB ordered and is being administered. Will continue to monitor.

## 2022-09-16 NOTE — PROGRESS NOTE ADULT - ATTENDING COMMENTS
Ms. Abernathy is a 76 yo F with PMhx of lymphoma (dx 2005, s/p CARRIE lobectomy, relapsed 2019, currently off chemo/RT) BPD, asthma admitted for metabolic encephalopathy 2/2 UTI/hypercalcemia. Pulmonary initially consulted for c/f pleural effusion which was actually 2/2 known lymphoma, with course now c/b hypoxic/hypercapnic respiratory failure now on nocturnal NIPPV in setting of hyperviscosity syndrome/multifactorial metabolic encephalopathy treated with plasmapharesis x 3. IgM levels improved post recent PLEX as is her mental status.  Patient more alert today.  HFNC  35%/30Lpm with O2Sat still maintaining 96% - had been on 5L NC earlier in AM but desaturated.  Suspect hypoxia/hypercapnea due to combination of volume overload and hypoventilation in setting of metabolic encephalopathy.  Mental status improved after 3rd PLEX.    Recommend:  - Continue AVAPS qhs for hypercapnea  - please implement ACTIVE weaning of HFNC to maintain O2Sat 92-95% (please use lowest possible settings to achieve appropriate titration), might be best to transition to "comfort flow" 10-15Lpm (with no titratable FiO2 if available)  - Lasix 20mg PO BID as BP tolerates  - encourage some free water intake for hypernatremia  - continue current bronchodilator regimen  - will repeat POCUS for effusions if patient remains on HFNC after weekened, that being said doubt she would be amenable to any invasive procedures depending on findings but would discuss with her daughter    DISPO: Palliative care discussions noted.  Patient is DNR as per discussion with daughter/HCP.    Katt Posey MD . Ms. Abernathy is a 76 yo F with PMhx of lymphoma (dx 2005, s/p CARRIE lobectomy, relapsed 2019, currently off chemo/RT) BPD, asthma admitted for metabolic encephalopathy 2/2 UTI/hypercalcemia. Pulmonary initially consulted for c/f pleural effusion which was actually 2/2 known lymphoma, with course now c/b hypoxic/hypercapnic respiratory failure now on nocturnal NIPPV in setting of hyperviscosity syndrome/multifactorial metabolic encephalopathy treated with plasmapharesis x 3. IgM levels improved post recent PLEX as is her mental status.  Patient more alert today.  HFNC  35%/30Lpm with O2Sat still maintaining 96% - had been on 5L NC earlier in AM but desaturated.  Suspect hypoxia/hypercapnea due to combination of volume overload and hypoventilation in setting of metabolic encephalopathy.  Mental status improved after 3rd PLEX. IgM rising.  H/O plans to start Bendamustine.    Recommend:  - Continue AVAPS qhs for hypercapnea  - please implement ACTIVE weaning of HFNC to maintain O2Sat 92-95% (please use lowest possible settings to achieve appropriate titration), might be best to transition to "comfort flow" 10-15Lpm (with no titratable FiO2 if available)  - Lasix 20mg PO BID as BP tolerates  - continue current bronchodilator regimen  - will repeat POCUS for effusions if patient remains on HFNC after weekend, that being said doubt she would be amenable to any invasive procedures depending on findings but would discuss with her daughter    DISPO: Palliative care discussions noted.  Patient is DNR as per discussion with daughter/HCP.    Katt Posey MD .

## 2022-09-16 NOTE — PROGRESS NOTE ADULT - NSPROGADDITIONALINFOA_GEN_ALL_CORE
Spoke with daughter and updated about plan. She will be coming to visit the pt this  weekend   Code status: made DNR/DNI by daughter today with Palliative Medicine  Best number to call Alissa at [phone].

## 2022-09-16 NOTE — CHART NOTE - NSCHARTNOTEFT_GEN_A_CORE
Consult - Assessment     Source: Patient A&Ox 2[ x]    Family [ ]     other [ x] Chart, CNA    77 year old female with PMH of lymphoma (dx 2005, s/p CARRIE lobectomy, relapsed in 2019 but not currently on chemo/RT, [place] [facility] [place] disorder, asthma (on O2 PRN at home, unknown how many L) brought to ED by daughter who states pt has been hallucinating, being more depressed.  Patient unable to provide much history, daughter Alissa states that patient was able to care for herself, independently until recently, she states that she believes her mom stopped taking her psychiatric medications in February of 2022 and has not followed up with any of her physicians. She has noticed recent weight loss (unsure how much) and change in voice.   Admitted to medicine service for further evaluation & treatment. Course c/b acute hypercapnic and hypoxic respiratory failure requiring BIPAP and AVAPS.  Now with concern for hyperviscosity syndrome given inability to even obtain immunoglobulin panel due to serum viscosity, s/p plasmapheresis. Eventual dispo to [facility]. Palliative following.     Pt continues to present with poor intake. Requires total feeding assistance. Observed untouched lunch tray by bedside. As per RN flowsheets, pt consuming >50% of all meals. Observed CNA feeding pt ensure this morning. CNA states pt will drink ensure 2x a day if provided adequate encouragement, but will not touch food on tray except ice cream and orange juice. Pt on remeron 7.5mg for appetite stimulating effect. Pt went hypoglycemia last night due to low PO intake.     Diet, Pureed:   DASH/TLC {Sodium & Cholesterol Restricted} (DASH)  Supplement Feeding Modality:  Oral  Ensure Enlive Cans or Servings Per Day:  1       Frequency:  Two Times a day (09-06-22 @ 10:01)      GI: WDL. Last documented BM 9/9. Fecal incontinence noted.     PO intake:  < 50% [x ] 50-75% [ ]   % [ ]  other :    Anthropometrics: Height (cm): 160 (09-02)  Weight (kg): 60 (09-02) - no new weights at this time.   BMI (kg/m2): 23.4 (09-02)    Edema: 1+ left arm, right arm edema noted.   Pressure Injuries: As per RN flowsheet, skin remains intact.     __________________ Pertinent Medications__________________   MEDICATIONS  (STANDING):  albumin human  5% IVPB 2500 milliLiter(s) IV Intermittent once  albumin human  5% IVPB 2500 milliLiter(s) IV Intermittent once  albumin human  5% IVPB 2500 milliLiter(s) IV Intermittent once  albuterol/ipratropium for Nebulization 3 milliLiter(s) Nebulizer every 12 hours  budesonide  80 MICROgram(s)/formoterol 4.5 MICROgram(s) Inhaler 2 Puff(s) Inhalation two times a day  calcium gluconate IVPB 2 Gram(s) IV Intermittent once  calcium gluconate IVPB 2 Gram(s) IV Intermittent once  chlorhexidine 2% Cloths 1 Application(s) Topical daily  cholecalciferol 1000 Unit(s) Oral daily  dextrose 5% + sodium chloride 0.45%. 1000 milliLiter(s) (75 mL/Hr) IV Continuous <Continuous>  dextrose 50% Injectable 25 Gram(s) IV Push once  dextrose 50% Injectable 12.5 Gram(s) IV Push once  dextrose 50% Injectable 25 Gram(s) IV Push once  dextrose Oral Gel 15 Gram(s) Oral once  furosemide    Tablet 40 milliGRAM(s) Oral daily  glucagon  Injectable 1 milliGRAM(s) IntraMuscular once  heparin   Injectable 5000 Unit(s) SubCutaneous every 12 hours  mirtazapine 7.5 milliGRAM(s) Oral <User Schedule>  multivitamin 1 Tablet(s) Oral daily    MEDICATIONS  (PRN):  acetaminophen     Tablet .. 650 milliGRAM(s) Oral every 6 hours PRN Temp greater or equal to 38C (100.4F), Mild Pain (1 - 3)  ALBUTerol    90 MICROgram(s) HFA Inhaler 2 Puff(s) Inhalation every 6 hours PRN Shortness of Breath and/or Wheezing  aluminum hydroxide/magnesium hydroxide/simethicone Suspension 30 milliLiter(s) Oral every 4 hours PRN Dyspepsia  melatonin 3 milliGRAM(s) Oral at bedtime PRN Insomnia  ondansetron Injectable 4 milliGRAM(s) IV Push every 8 hours PRN Nausea and/or Vomiting      __________________ Pertinent Labs__________________   09-14 Na147 mmol/L<H> Glu 77 mg/dL K+ 4.2 mmol/L Cr  0.66 mg/dL BUN 26 mg/dL<H> 09-14 Phos 2.7 mg/dL 09-13 Alb 3.2 g/dL<L> 08-31 Chol 54 mg/dL LDL --    HDL 21 mg/dL<L> Trig 30 mg/dL        POCT Blood Glucose.: 108 mg/dL (09-16-22 @ 13:32)  POCT Blood Glucose.: 82 mg/dL (09-16-22 @ 07:29)  POCT Blood Glucose.: 97 mg/dL (09-16-22 @ 04:58)  POCT Blood Glucose.: 90 mg/dL (09-15-22 @ 22:35)  POCT Blood Glucose.: 192 mg/dL (09-15-22 @ 16:35)  POCT Blood Glucose.: 76 mg/dL (09-15-22 @ 08:00)  POCT Blood Glucose.: 158 mg/dL (09-15-22 @ 02:45)  POCT Blood Glucose.: 162 mg/dL (09-15-22 @ 02:44)  POCT Blood Glucose.: 58 mg/dL (09-15-22 @ 02:12)  POCT Blood Glucose.: 59 mg/dL (09-15-22 @ 02:07)  POCT Blood Glucose.: 106 mg/dL (09-14-22 @ 21:20)  POCT Blood Glucose.: 265 mg/dL (09-14-22 @ 16:37)  POCT Blood Glucose.: 266 mg/dL (09-14-22 @ 12:55)  POCT Blood Glucose.: 83 mg/dL (09-14-22 @ 07:00)  POCT Blood Glucose.: 84 mg/dL (09-14-22 @ 02:34)  POCT Blood Glucose.: 131 mg/dL (09-13-22 @ 18:21)          Estimated Needs:   4015-1645 lesly/d based on 25-30kcals/ 60kg  72-84 gm pro/d based on 1.2-1.4g/ 60kg    [ x] no change since previous assessment      Previous Nutrition Diagnosis: Severe protein-calorie malnutrition     Nutrition Diagnosis is [ x] ongoing  [ ] resolved [ ] not applicable       Recommendations:  1) Consider liberalizing diet from DASH/TLC to no therapeutic diet restrictions as patient continues to present with poor PO intake  2) Continue Ensure Plus HP 2x daily and magic cup 2x daily   3) Consider increasing mirtazapine (remeron) to stimulate appetite.   4) If poor intake remains, consider alternate means of nutrition and hydration   5) Continue to honor pt/family wishes   6) Monitor weights, labs, PO intake  7) RD to follow up PRN       Monitoring and Evaluation:      [ x] Tolerance to diet prescription [x ] weights [x ] follow up per protocol  [ ] other: Consult - Assessment     Source: Patient A&Ox 2[ x]    Family [ ]     other [ x] Chart, CNA    77 year old female with PMH of lymphoma (dx 2005, s/p CARRIE lobectomy, relapsed in 2019 but not currently on chemo/RT, being [place] by [place] 3[place] [facility] [place] bipolar disorder, asthma (on O2 PRN at home, unknown how many L) brought to ED by daughter who states pt has been hallucinating, being more depressed.  Patient unable to provide much history, daughter Alissa states that patient was able to care for herself, independently until recently, she states that she believes her mom stopped taking her psychiatric medications in February of 2022 and has not followed up with any of her physicians. She has noticed recent weight loss (unsure how much) and change in voice.   Admitted to medicine service for further evaluation & treatment. Course c/b acute hypercapnic and hypoxic respiratory failure requiring BIPAP and AVAPS.  Now with concern for hyperviscosity syndrome given inability to even obtain immunoglobulin panel due to serum viscosity, s/p plasmapheresis. Eventual dispo to [facility]. Palliative following.     Pt continues to present with poor intake. Requires total feeding assistance. Observed untouched lunch tray by bedside. As per RN flowsheets, pt consuming >50% of all meals. Observed CNA feeding pt ensure this morning. CNA states pt will drink ensure 2x a day if provided adequate encouragement, but will not touch food on tray except ice cream and orange juice. Pt on remeron 7.5mg for appetite stimulating effect. Pt went hypoglycemia last night due to low PO intake.     Diet, Pureed:   DASH/TLC {Sodium & Cholesterol Restricted} (DASH)  Supplement Feeding Modality:  Oral  Ensure Enlive Cans or Servings Per Day:  1       Frequency:  Two Times a day (09-06-22 @ 10:01)      GI: WDL. Last documented BM 9/9. Fecal incontinence noted.     PO intake:  < 50% [x ] 50-75% [ ]   % [ ]  other :    Anthropometrics: Height (cm): 160 (09-02)  Weight (kg): 60 (09-02) - no new weights at this time.   BMI (kg/m2): 23.4 (09-02)    Edema: 1+ left arm, right arm edema noted.   Pressure Injuries: As per RN flowsheet, skin remains intact.     __________________ Pertinent Medications__________________   MEDICATIONS  (STANDING):  albumin human  5% IVPB 2500 milliLiter(s) IV Intermittent once  albumin human  5% IVPB 2500 milliLiter(s) IV Intermittent once  albumin human  5% IVPB 2500 milliLiter(s) IV Intermittent once  albuterol/ipratropium for Nebulization 3 milliLiter(s) Nebulizer every 12 hours  budesonide  80 MICROgram(s)/formoterol 4.5 MICROgram(s) Inhaler 2 Puff(s) Inhalation two times a day  calcium gluconate IVPB 2 Gram(s) IV Intermittent once  calcium gluconate IVPB 2 Gram(s) IV Intermittent once  chlorhexidine 2% Cloths 1 Application(s) Topical daily  cholecalciferol 1000 Unit(s) Oral daily  dextrose 5% + sodium chloride 0.45%. 1000 milliLiter(s) (75 mL/Hr) IV Continuous <Continuous>  dextrose 50% Injectable 25 Gram(s) IV Push once  dextrose 50% Injectable 12.5 Gram(s) IV Push once  dextrose 50% Injectable 25 Gram(s) IV Push once  dextrose Oral Gel 15 Gram(s) Oral once  furosemide    Tablet 40 milliGRAM(s) Oral daily  glucagon  Injectable 1 milliGRAM(s) IntraMuscular once  heparin   Injectable 5000 Unit(s) SubCutaneous every 12 hours  mirtazapine 7.5 milliGRAM(s) Oral <User Schedule>  multivitamin 1 Tablet(s) Oral daily    MEDICATIONS  (PRN):  acetaminophen     Tablet .. 650 milliGRAM(s) Oral every 6 hours PRN Temp greater or equal to 38C (100.4F), Mild Pain (1 - 3)  ALBUTerol    90 MICROgram(s) HFA Inhaler 2 Puff(s) Inhalation every 6 hours PRN Shortness of Breath and/or Wheezing  aluminum hydroxide/magnesium hydroxide/simethicone Suspension 30 milliLiter(s) Oral every 4 hours PRN Dyspepsia  melatonin 3 milliGRAM(s) Oral at bedtime PRN Insomnia  ondansetron Injectable 4 milliGRAM(s) IV Push every 8 hours PRN Nausea and/or Vomiting      __________________ Pertinent Labs__________________   09-14 Na147 mmol/L<H> Glu 77 mg/dL K+ 4.2 mmol/L Cr  0.66 mg/dL BUN 26 mg/dL<H> 09-14 Phos 2.7 mg/dL 09-13 Alb 3.2 g/dL<L> 08-31 Chol 54 mg/dL LDL --    HDL 21 mg/dL<L> Trig 30 mg/dL        POCT Blood Glucose.: 108 mg/dL (09-16-22 @ 13:32)  POCT Blood Glucose.: 82 mg/dL (09-16-22 @ 07:29)  POCT Blood Glucose.: 97 mg/dL (09-16-22 @ 04:58)  POCT Blood Glucose.: 90 mg/dL (09-15-22 @ 22:35)  POCT Blood Glucose.: 192 mg/dL (09-15-22 @ 16:35)  POCT Blood Glucose.: 76 mg/dL (09-15-22 @ 08:00)  POCT Blood Glucose.: 158 mg/dL (09-15-22 @ 02:45)  POCT Blood Glucose.: 162 mg/dL (09-15-22 @ 02:44)  POCT Blood Glucose.: 58 mg/dL (09-15-22 @ 02:12)  POCT Blood Glucose.: 59 mg/dL (09-15-22 @ 02:07)  POCT Blood Glucose.: 106 mg/dL (09-14-22 @ 21:20)  POCT Blood Glucose.: 265 mg/dL (09-14-22 @ 16:37)  POCT Blood Glucose.: 266 mg/dL (09-14-22 @ 12:55)  POCT Blood Glucose.: 83 mg/dL (09-14-22 @ 07:00)  POCT Blood Glucose.: 84 mg/dL (09-14-22 @ 02:34)  POCT Blood Glucose.: 131 mg/dL (09-13-22 @ 18:21)          Estimated Needs:   6391-4398 lesly/d based on 25-30kcals/ 60kg  72-84 gm pro/d based on 1.2-1.4g/ 60kg    [ x] no change since previous assessment      Previous Nutrition Diagnosis: Severe protein-calorie malnutrition     Nutrition Diagnosis is [ x] ongoing  [ ] resolved [ ] not applicable       Recommendations:  1) Continue DASH/TLC diet   2) Continue Ensure Plus HP 2x daily and magic cup 2x daily   3) Consider increasing mirtazapine (remeron) to stimulate appetite.   4) If poor intake remains, consider alternate means of nutrition and hydration   5) Continue to honor pt/family wishes   6) Monitor weights, labs, PO intake  7) RD to follow up PRN       Monitoring and Evaluation:      [ x] Tolerance to diet prescription [x ] weights [x ] follow up per protocol  [ ] other: Consult - Assessment     Source: Patient A&Ox 2[ x]    Family [ ]     other [ x] Chart, CNA    77 year old female with PMH of lymphoma (dx 2005, s/p CARRIE lobectomy, relapsed in 2019 but not currently on chemo/RT, being [place] by [place] every 3-6[place] [facility] [place] bipolar disorder, asthma (on O2 PRN at home, unknown how many L) brought to ED by daughter who states pt has been hallucinating, being more depressed.  Patient unable to provide much history, daughter Alissa states that patient was able to care for herself, independently until recently, she states that she believes her mom stopped taking her psychiatric medications in February of 2022 and has not followed up with any of her physicians. She has noticed recent weight loss (unsure how much) and change in voice.   Admitted to medicine service for further evaluation & treatment. Course c/b acute hypercapnic and hypoxic respiratory failure requiring BIPAP and AVAPS.  Now with concern for hyperviscosity syndrome given inability to even obtain immunoglobulin panel due to serum viscosity, s/p plasmapheresis. Eventual dispo to [facility]. Palliative following.     Pt continues to present with poor intake. Requires total feeding assistance. Observed untouched lunch tray by bedside. As per RN flowsheets, pt consuming >50% of all meals. Observed CNA feeding pt ensure this morning. CNA states pt will drink ensure 2x a day if provided adequate encouragement, but will not touch food on tray except ice cream and orange juice. Pt on remeron 7.5mg for appetite stimulating effect. Pt went hypoglycemia last night due to low PO intake.     Diet, Pureed:   DASH/TLC {Sodium & Cholesterol Restricted} (DASH)  Supplement Feeding Modality:  Oral  Ensure Enlive Cans or Servings Per Day:  1       Frequency:  Two Times a day (09-06-22 @ 10:01)      GI: WDL. Last documented BM 9/9. Fecal incontinence noted.     PO intake:  < 50% [x ] 50-75% [ ]   % [ ]  other :    Anthropometrics: Height (cm): 160 (09-02)  Weight (kg): 60 (09-02) - no new weights at this time.   BMI (kg/m2): 23.4 (09-02)    Edema: 1+ left arm, right arm edema noted.   Pressure Injuries: As per RN flowsheet, skin remains intact.     __________________ Pertinent Medications__________________   MEDICATIONS  (STANDING):  albumin human  5% IVPB 2500 milliLiter(s) IV Intermittent once  albumin human  5% IVPB 2500 milliLiter(s) IV Intermittent once  albumin human  5% IVPB 2500 milliLiter(s) IV Intermittent once  albuterol/ipratropium for Nebulization 3 milliLiter(s) Nebulizer every 12 hours  budesonide  80 MICROgram(s)/formoterol 4.5 MICROgram(s) Inhaler 2 Puff(s) Inhalation two times a day  calcium gluconate IVPB 2 Gram(s) IV Intermittent once  calcium gluconate IVPB 2 Gram(s) IV Intermittent once  chlorhexidine 2% Cloths 1 Application(s) Topical daily  cholecalciferol 1000 Unit(s) Oral daily  dextrose 5% + sodium chloride 0.45%. 1000 milliLiter(s) (75 mL/Hr) IV Continuous <Continuous>  dextrose 50% Injectable 25 Gram(s) IV Push once  dextrose 50% Injectable 12.5 Gram(s) IV Push once  dextrose 50% Injectable 25 Gram(s) IV Push once  dextrose Oral Gel 15 Gram(s) Oral once  furosemide    Tablet 40 milliGRAM(s) Oral daily  glucagon  Injectable 1 milliGRAM(s) IntraMuscular once  heparin   Injectable 5000 Unit(s) SubCutaneous every 12 hours  mirtazapine 7.5 milliGRAM(s) Oral <User Schedule>  multivitamin 1 Tablet(s) Oral daily    MEDICATIONS  (PRN):  acetaminophen     Tablet .. 650 milliGRAM(s) Oral every 6 hours PRN Temp greater or equal to 38C (100.4F), Mild Pain (1 - 3)  ALBUTerol    90 MICROgram(s) HFA Inhaler 2 Puff(s) Inhalation every 6 hours PRN Shortness of Breath and/or Wheezing  aluminum hydroxide/magnesium hydroxide/simethicone Suspension 30 milliLiter(s) Oral every 4 hours PRN Dyspepsia  melatonin 3 milliGRAM(s) Oral at bedtime PRN Insomnia  ondansetron Injectable 4 milliGRAM(s) IV Push every 8 hours PRN Nausea and/or Vomiting      __________________ Pertinent Labs__________________   09-14 Na147 mmol/L<H> Glu 77 mg/dL K+ 4.2 mmol/L Cr  0.66 mg/dL BUN 26 mg/dL<H> 09-14 Phos 2.7 mg/dL 09-13 Alb 3.2 g/dL<L> 08-31 Chol 54 mg/dL LDL --    HDL 21 mg/dL<L> Trig 30 mg/dL        POCT Blood Glucose.: 108 mg/dL (09-16-22 @ 13:32)  POCT Blood Glucose.: 82 mg/dL (09-16-22 @ 07:29)  POCT Blood Glucose.: 97 mg/dL (09-16-22 @ 04:58)  POCT Blood Glucose.: 90 mg/dL (09-15-22 @ 22:35)  POCT Blood Glucose.: 192 mg/dL (09-15-22 @ 16:35)  POCT Blood Glucose.: 76 mg/dL (09-15-22 @ 08:00)  POCT Blood Glucose.: 158 mg/dL (09-15-22 @ 02:45)  POCT Blood Glucose.: 162 mg/dL (09-15-22 @ 02:44)  POCT Blood Glucose.: 58 mg/dL (09-15-22 @ 02:12)  POCT Blood Glucose.: 59 mg/dL (09-15-22 @ 02:07)  POCT Blood Glucose.: 106 mg/dL (09-14-22 @ 21:20)  POCT Blood Glucose.: 265 mg/dL (09-14-22 @ 16:37)  POCT Blood Glucose.: 266 mg/dL (09-14-22 @ 12:55)  POCT Blood Glucose.: 83 mg/dL (09-14-22 @ 07:00)  POCT Blood Glucose.: 84 mg/dL (09-14-22 @ 02:34)  POCT Blood Glucose.: 131 mg/dL (09-13-22 @ 18:21)          Estimated Needs:   7241-1568 lesly/d based on 25-30kcals/ 60kg  72-84 gm pro/d based on 1.2-1.4g/ 60kg    [ x] no change since previous assessment      Previous Nutrition Diagnosis: Severe protein-calorie malnutrition     Nutrition Diagnosis is [ x] ongoing  [ ] resolved [ ] not applicable       Recommendations:  1) Continue DASH/TLC puree diet   2) Continue Ensure Plus HP 2x daily and magic cup 2x daily   3) Consider increasing mirtazapine (remeron) to stimulate appetite.   4) If poor intake remains, consider alternate means of nutrition and hydration   5) Continue to honor pt/family wishes   6) Monitor weights, labs, PO intake  7) RD to follow up PRN       Monitoring and Evaluation:      [ x] Tolerance to diet prescription [x ] weights [x ] follow up per protocol  [ ] other:

## 2022-09-16 NOTE — PROGRESS NOTE ADULT - PROBLEM SELECTOR PLAN 9
-Heparin SQ    -Dispo issues: medically active and pending  -Patient with APS case ongoing due to unsanitary conditions at home. Will require SW/CM for safe placement once medically stable for d/c. Patient currently without capacity and with poor insight into her medical conditions.

## 2022-09-16 NOTE — PROGRESS NOTE ADULT - ASSESSMENT
77 year old female with PMH of lymphoma (dx 2005, s/p CARRIE lobectomy, relapsed in 2019 but not currently on chemo/RT, being followed by heme every 3-6months at [facility] [place] bipolar disorder, asthma (on O2 PRN at home, unknown how many L) brought to ED by daughter who states pt has been hallucinating, being more depressed.  Patient unable to provide much history, daughter Alissa states that patient was able to care for herself, manage home finances and attend doctor appointments independently until recently, she states that she believes her mom stopped taking her psychiatric medications in February of 2022 and has not followed up with any of her physicians. She has noticed recent weight loss (unsure how much) and change in voice.   Admitted to medicine service for further evaluation & treatment. Course c/b acute hypercapnic and hypoxic respiratory failure requiring BIPAP and AVAPS.  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

## 2022-09-16 NOTE — PROGRESS NOTE ADULT - SUBJECTIVE AND OBJECTIVE BOX
Patient is a 77y old  Female who presents with a chief complaint of Hypoxic respiratory failure (16 Sep 2022 13:29)    Patient feels a bit worse today, states she feels cold. No other new complaints.     MEDICATIONS  (STANDING):  acyclovir   Oral Tab/Cap 400 milliGRAM(s) Oral two times a day  albumin human  5% IVPB 2500 milliLiter(s) IV Intermittent once  albumin human  5% IVPB 2500 milliLiter(s) IV Intermittent once  albumin human  5% IVPB 2500 milliLiter(s) IV Intermittent once  albuterol/ipratropium for Nebulization 3 milliLiter(s) Nebulizer every 12 hours  budesonide  80 MICROgram(s)/formoterol 4.5 MICROgram(s) Inhaler 2 Puff(s) Inhalation two times a day  calcium gluconate IVPB 2 Gram(s) IV Intermittent once  calcium gluconate IVPB 2 Gram(s) IV Intermittent once  chlorhexidine 2% Cloths 1 Application(s) Topical daily  cholecalciferol 1000 Unit(s) Oral daily  dextrose 5% + sodium chloride 0.45%. 1000 milliLiter(s) (75 mL/Hr) IV Continuous <Continuous>  dextrose 50% Injectable 25 Gram(s) IV Push once  dextrose 50% Injectable 12.5 Gram(s) IV Push once  dextrose 50% Injectable 25 Gram(s) IV Push once  dextrose Oral Gel 15 Gram(s) Oral once  furosemide    Tablet 40 milliGRAM(s) Oral daily  glucagon  Injectable 1 milliGRAM(s) IntraMuscular once  heparin   Injectable 5000 Unit(s) SubCutaneous every 12 hours  mirtazapine 7.5 milliGRAM(s) Oral <User Schedule>  multivitamin 1 Tablet(s) Oral daily    MEDICATIONS  (PRN):  acetaminophen     Tablet .. 650 milliGRAM(s) Oral every 6 hours PRN Temp greater or equal to 38C (100.4F), Mild Pain (1 - 3)  ALBUTerol    90 MICROgram(s) HFA Inhaler 2 Puff(s) Inhalation every 6 hours PRN Shortness of Breath and/or Wheezing  aluminum hydroxide/magnesium hydroxide/simethicone Suspension 30 milliLiter(s) Oral every 4 hours PRN Dyspepsia  melatonin 3 milliGRAM(s) Oral at bedtime PRN Insomnia  ondansetron Injectable 4 milliGRAM(s) IV Push every 8 hours PRN Nausea and/or Vomiting        Vital Signs Last 24 Hrs  T(C): 36.6 (16 Sep 2022 13:00), Max: 36.8 (15 Sep 2022 21:45)  T(F): 97.9 (16 Sep 2022 13:00), Max: 98.2 (15 Sep 2022 21:45)  HR: 88 (16 Sep 2022 13:00) (78 - 100)  BP: 91/49 (16 Sep 2022 13:00) (75/39 - 99/50)  BP(mean): --  RR: 19 (16 Sep 2022 13:00) (18 - 19)  SpO2: 97% (16 Sep 2022 13:00) (94% - 99%)    Parameters below as of 16 Sep 2022 13:00  Patient On (Oxygen Delivery Method): nasal cannula, high flow        PE  NAD  Awake, alert  Anicteric, MMM  RRR  CTAB  Abd soft, NT, ND  No c/c/e                            8.7    6.68  )-----------( 147      ( 16 Sep 2022 07:17 )             29.2

## 2022-09-16 NOTE — PROGRESS NOTE ADULT - ASSESSMENT
77F with hx of lymphoma (dx 2005, s/p CARRIE lobectomy, relapsed 2019, currently off chemo/RT) BPD, asthma admitted for metabolic encephalopathy 2/2 UTI/hypercalcemia. Pulmonary initially consulted for c/f pleural effusion which was actually 2/2 known lymphoma, with course now c/b hypoxic/hypercapnic respiratory failure now on nocturnal NIPPV in setting of hyperviscosity syndrome/multifactorial metabolic encephalopathy treated with plasmapharesis. Now improving mental status and decreasing oxygen requirements.     #Hypoxic/Hypercapnic respiratory failure  #Metabolic Encephalopathy  #Lymphoma  #MGUS   #Trace pleural Effusion  #Asthma  #Hyperviscosity Syndrome     Recommendations:  - continue AVAPs at night;   - Trial on nasal cannula, wean as tolerated   - would put on standing lasix 40mg daily    - continue w/current bronchodilator regimen  - goals of care discussion  - Out of bed to chair as tolerated, trial nebulizer treatments for airway clearance and opening. Head of bed up, physical therapy as able

## 2022-09-16 NOTE — PROGRESS NOTE ADULT - ASSESSMENT
77 year old woman with history of HLD, lymphoma (dx 2005, s/p CARRIE lobectomy, relapsed in 2019 but not currently on chemo/RT, being followed by heme every 3-6months at [facility] [place]), bipolar disorder, asthma, who presented to the hospital for AMS and hypoxic respiratory failure, found to have hypercalcemia.     #Hypercalcemia - malignancy-related vs medication induced (lithium induced parathyroidism) vs in the setting of potential multiple myeloma transformation   - s/p 4 doses of IV Calcitonin, s/p pamidronate 60 mg IV x 1 8/30/22   - Corrected calcium upper normal range (10.3) on labs yesterday 9/15, continue to monitor.  Today ionized calcium 1.1 low? BMP not sent today. Can follow up BMP with albumin tomorrow.  - 1,25 vitamin D not elevated (37.1)  -PTH-related peptide negative  -Etiology of hypercalcemia seems most likely related to multiple myeloma/monoclonal gammopathy. Management per heme/onc.  -Also of note is receiving calcium gluconate as part of plasma exchange therapy which may be contributing to higher calcium level.    #history of fracture in the setting of likely osteoporosis;.   - Imaging findings of Old right humeral neck fracture. Left proximal humeral fracture s/p ORIF  .- f/u outpatient bone-density testing.   - 25-hydroxy Vitamin D level of 18.4 (low),   -continue cholecalciferol 1000 units daily.    #Hyperlipidemia  - history of hyperlipidemia   - Cholesterol of 54, Triglycerides of 30, Serum HDL of 21, LDL of 27. A1C of 4.5   - can hold on statin therapy at this time due to age and risk factors at this time   - home medication history of zyprexa.     #Hypoglycemia -   Ongoing poor po intake, no further serum hypoglycemia, now on D5 fluids  encourage po intake/assistance  FS 50s last night patient denies hypoglycemia symptoms, not meeting Whipple's triad.  If able, can send labs with serum glucose at time of low glucose on point of care testing to confirm.  6 AM cortisol 8.6, repeated AM cortisol at 8AM today resulted at 12.4 acceptable level and not indicative of Adrenal insufficiency. Will defer need for stim test at this time.    Complex patient high level decision making.    Thais Tovar MD  Division of Endocrinology  Pager: [phone]    If after 6PM or before 9AM, or on weekends/holidays, please call endocrine answering service for assistance ([phone]).  For nonurgent matters email [email or web address] for assistance.

## 2022-09-16 NOTE — PROGRESS NOTE ADULT - SUBJECTIVE AND OBJECTIVE BOX
CHIEF COMPLAINT:    Interval Events:    Pt seen and examined at bedside. States she is feeling okay this morning with no shortness of breath.     REVIEW OF SYSTEMS:  unable to assess     OBJECTIVE:  ICU Vital Signs Last 24 Hrs  T(C): 36.6 (16 Sep 2022 09:00), Max: 36.8 (15 Sep 2022 21:45)  T(F): 97.8 (16 Sep 2022 09:00), Max: 98.2 (15 Sep 2022 21:45)  HR: 100 (16 Sep 2022 09:00) (75 - 100)  BP: 94/47 (16 Sep 2022 09:00) (72/52 - 99/50)  BP(mean): --  ABP: --  ABP(mean): --  RR: 18 (16 Sep 2022 09:00) (17 - 19)  SpO2: 97% (16 Sep 2022 09:00) (94% - 99%)    O2 Parameters below as of 16 Sep 2022 09:11  Patient On (Oxygen Delivery Method): high flow nasal cannula          Mode: standby,patient refused to wear     CAPILLARY BLOOD GLUCOSE      POCT Blood Glucose.: 82 mg/dL (16 Sep 2022 07:29)      PHYSICAL EXAM:  General: Awake, alert, oriented X 0   HEENT: Atraumatic, normocephalic.                  No tonsillar or pharyngeal exudates.  Lymph Nodes: No palpable lymphadenopathy  Neck: No JVD. No carotid bruit.   Respiratory: decreased breath sounds L > R,   Cardiovascular: S1 S2 normal. No murmurs, rubs or gallops.   Abdomen: Soft, non-tender, non-distended. No organomegaly.  Extremities: Warm to touch. Peripheral pulse palpable. No pedal edema.   Neurological: Non-focal    HOSPITAL MEDICATIONS:  MEDICATIONS  (STANDING):  albumin human  5% IVPB 2500 milliLiter(s) IV Intermittent once  albumin human  5% IVPB 2500 milliLiter(s) IV Intermittent once  albumin human  5% IVPB 2500 milliLiter(s) IV Intermittent once  albuterol/ipratropium for Nebulization 3 milliLiter(s) Nebulizer every 12 hours  budesonide  80 MICROgram(s)/formoterol 4.5 MICROgram(s) Inhaler 2 Puff(s) Inhalation two times a day  calcium gluconate IVPB 2 Gram(s) IV Intermittent once  calcium gluconate IVPB 2 Gram(s) IV Intermittent once  chlorhexidine 2% Cloths 1 Application(s) Topical daily  cholecalciferol 1000 Unit(s) Oral daily  dextrose 5% + sodium chloride 0.45%. 1000 milliLiter(s) (75 mL/Hr) IV Continuous <Continuous>  dextrose 50% Injectable 25 Gram(s) IV Push once  dextrose 50% Injectable 12.5 Gram(s) IV Push once  dextrose 50% Injectable 25 Gram(s) IV Push once  dextrose Oral Gel 15 Gram(s) Oral once  glucagon  Injectable 1 milliGRAM(s) IntraMuscular once  heparin   Injectable 5000 Unit(s) SubCutaneous every 12 hours  mirtazapine 7.5 milliGRAM(s) Oral <User Schedule>  multivitamin 1 Tablet(s) Oral daily    MEDICATIONS  (PRN):  acetaminophen     Tablet .. 650 milliGRAM(s) Oral every 6 hours PRN Temp greater or equal to 38C (100.4F), Mild Pain (1 - 3)  ALBUTerol    90 MICROgram(s) HFA Inhaler 2 Puff(s) Inhalation every 6 hours PRN Shortness of Breath and/or Wheezing  aluminum hydroxide/magnesium hydroxide/simethicone Suspension 30 milliLiter(s) Oral every 4 hours PRN Dyspepsia  melatonin 3 milliGRAM(s) Oral at bedtime PRN Insomnia  ondansetron Injectable 4 milliGRAM(s) IV Push every 8 hours PRN Nausea and/or Vomiting      LABS:                        8.7    6.68  )-----------( 147      ( 16 Sep 2022 07:17 )             29.2                 Venous Blood Gas:  09-16 @ 07:17  7.27/66/149/30/99.2  VBG Lactate: 1.2  Venous Blood Gas:  09-15 @ 12:51  7.34/52/32/28/52.6  VBG Lactate: 1.6  Venous Blood Gas:  09-14 @ 10:15  7.34/70/72/38/96.7  VBG Lactate: 1.3      MICROBIOLOGY:     RADIOLOGY:  [ ] Reviewed and interpreted by me    Point of Care Ultrasound Findings:    PFT:    EKG:

## 2022-09-17 NOTE — PROGRESS NOTE ADULT - SUBJECTIVE AND OBJECTIVE BOX
Admitted for: Altered mental status        Following for: hypercalcemia    Subjective:   patient reports she is feeling well has no complaints. Initial labs taken from IV line this morning    MEDICATIONS  (STANDING):  acyclovir   Oral Tab/Cap 400 milliGRAM(s) Oral two times a day  albumin human  5% IVPB 2500 milliLiter(s) IV Intermittent once  albumin human  5% IVPB 2500 milliLiter(s) IV Intermittent once  albumin human  5% IVPB 2500 milliLiter(s) IV Intermittent once  albuterol/ipratropium for Nebulization 3 milliLiter(s) Nebulizer every 12 hours  bendamustine IVPB (TREANDA) (eMAR) 113 milliGRAM(s) IV Intermittent once  budesonide  80 MICROgram(s)/formoterol 4.5 MICROgram(s) Inhaler 2 Puff(s) Inhalation two times a day  calcium gluconate IVPB 2 Gram(s) IV Intermittent once  calcium gluconate IVPB 2 Gram(s) IV Intermittent once  chlorhexidine 2% Cloths 1 Application(s) Topical daily  cholecalciferol 1000 Unit(s) Oral daily  dexAMETHasone     Tablet 8 milliGRAM(s) Oral once  dextrose 50% Injectable 25 Gram(s) IV Push once  dextrose 50% Injectable 12.5 Gram(s) IV Push once  dextrose 50% Injectable 25 Gram(s) IV Push once  dextrose Oral Gel 15 Gram(s) Oral once  diphenhydrAMINE 25 milliGRAM(s) Oral once  famotidine    Tablet 40 milliGRAM(s) Oral once  furosemide    Tablet 40 milliGRAM(s) Oral daily  glucagon  Injectable 1 milliGRAM(s) IntraMuscular once  heparin   Injectable 5000 Unit(s) SubCutaneous every 12 hours  mirtazapine 7.5 milliGRAM(s) Oral <User Schedule>  multivitamin 1 Tablet(s) Oral daily  ondansetron    Tablet 8 milliGRAM(s) Oral once    MEDICATIONS  (PRN):  acetaminophen     Tablet .. 650 milliGRAM(s) Oral every 6 hours PRN Temp greater or equal to 38C (100.4F), Mild Pain (1 - 3)  ALBUTerol    90 MICROgram(s) HFA Inhaler 2 Puff(s) Inhalation every 6 hours PRN Shortness of Breath and/or Wheezing  aluminum hydroxide/magnesium hydroxide/simethicone Suspension 30 milliLiter(s) Oral every 4 hours PRN Dyspepsia  melatonin 3 milliGRAM(s) Oral at bedtime PRN Insomnia  methylPREDNISolone sodium succinate Injectable 125 milliGRAM(s) IV Push once PRN REACTION  ondansetron Injectable 4 milliGRAM(s) IV Push every 8 hours PRN Nausea and/or Vomiting      Allergies    latex (Rash)  No Known Drug Allergies    Intolerances          PHYSICAL EXAM:  VITALS: T(C): 36.7 (09-17-22 @ 13:10)  T(F): 98.1 (09-17-22 @ 13:10), Max: 98.3 (09-16-22 @ 20:17)  HR: 111 (09-17-22 @ 13:10) (91 - 111)  BP: 96/42 (09-17-22 @ 13:10) (79/42 - 96/42)  RR:  (17 - 20)  SpO2:  (95% - 99%)  Wt(kg): --  GENERAL: NAD  EYES: No proptosis, no lid lag, anicteric  RESPIRATORY: Clear to auscultation bilaterally  CARDIOVASCULAR: Regular rate and rhythm  GI: Soft, nontender, non distended  EXT: b/l feet without wounds, 2+ pulses  PSYCH: Alert and oriented x 3, reactive affect      A1C with Estimated Average Glucose Result: 4.5 % (08-31-22 @ 03:48)      POCT Blood Glucose.: 112 mg/dL (09-17-22 @ 11:46)  POCT Blood Glucose.: 109 mg/dL (09-17-22 @ 08:33)  POCT Blood Glucose.: 96 mg/dL (09-16-22 @ 21:05)  POCT Blood Glucose.: 147 mg/dL (09-16-22 @ 17:02)  POCT Blood Glucose.: 108 mg/dL (09-16-22 @ 13:32)  POCT Blood Glucose.: 82 mg/dL (09-16-22 @ 07:29)  POCT Blood Glucose.: 97 mg/dL (09-16-22 @ 04:58)  POCT Blood Glucose.: 90 mg/dL (09-15-22 @ 22:35)  POCT Blood Glucose.: 192 mg/dL (09-15-22 @ 16:35)  POCT Blood Glucose.: 76 mg/dL (09-15-22 @ 08:00)  POCT Blood Glucose.: 158 mg/dL (09-15-22 @ 02:45)  POCT Blood Glucose.: 162 mg/dL (09-15-22 @ 02:44)  POCT Blood Glucose.: 58 mg/dL (09-15-22 @ 02:12)  POCT Blood Glucose.: 59 mg/dL (09-15-22 @ 02:07)  POCT Blood Glucose.: 106 mg/dL (09-14-22 @ 21:20)  POCT Blood Glucose.: 265 mg/dL (09-14-22 @ 16:37)      09-17    139  |  103  |  18  ----------------------------<  105<H>  4.0   |  25  |  0.56    eGFR: 95    Ca    9.4      09-17  Mg     1.80     09-17  Phos  3.2     09-17    TPro  8.5<H>  /  Alb  3.5  /  TBili  0.9  /  DBili  0.2  /  AST  19  /  ALT  5   /  AlkPhos  39<L>  09-17      Thyroid Function Tests:  09-10 @ 11:45 TSH -- FreeT4 1.1 T3 -- Anti TPO -- Anti Thyroglobulin Ab -- TSI --  08-31 @ 03:48 TSH 1.42 FreeT4 -- T3 -- Anti TPO -- Anti Thyroglobulin Ab -- TSI --

## 2022-09-17 NOTE — PROGRESS NOTE ADULT - PROBLEM SELECTOR PLAN 7
-Hx of bipolar w/psych admission @ [facility] many years ago, unclear if patient actually had Bipolar   -non-compliant with medications  -Zyprexa 2.5mg QHS increased to 3.75mg QHS on 9/8/22 (pt refused), if patient has respiratory depression- hold Zyprexa, may try low dose Mirtazapine (also sedating)  -Psych recommendations appreciated   -Supportive care -Resolved w/IVF, now discontinued due to pulmonary edema   -Continue to monitor, avoid nephrotoxins

## 2022-09-17 NOTE — PROGRESS NOTE ADULT - SUBJECTIVE AND OBJECTIVE BOX
Patient is a 77y old  Female who presents with a chief complaint of Hypoxic respiratory failure (17 Sep 2022 09:25)    Patient seen and examined at bedside.    MEDICATIONS  (STANDING):  acyclovir   Oral Tab/Cap 400 milliGRAM(s) Oral two times a day  albumin human  5% IVPB 2500 milliLiter(s) IV Intermittent once  albumin human  5% IVPB 2500 milliLiter(s) IV Intermittent once  albumin human  5% IVPB 2500 milliLiter(s) IV Intermittent once  albuterol/ipratropium for Nebulization 3 milliLiter(s) Nebulizer every 12 hours  bendamustine IVPB (TREANDA) (eMAR) 113 milliGRAM(s) IV Intermittent once  budesonide  80 MICROgram(s)/formoterol 4.5 MICROgram(s) Inhaler 2 Puff(s) Inhalation two times a day  calcium gluconate IVPB 2 Gram(s) IV Intermittent once  calcium gluconate IVPB 2 Gram(s) IV Intermittent once  chlorhexidine 2% Cloths 1 Application(s) Topical daily  cholecalciferol 1000 Unit(s) Oral daily  dexAMETHasone     Tablet 8 milliGRAM(s) Oral once  dextrose 50% Injectable 25 Gram(s) IV Push once  dextrose 50% Injectable 12.5 Gram(s) IV Push once  dextrose 50% Injectable 25 Gram(s) IV Push once  dextrose Oral Gel 15 Gram(s) Oral once  diphenhydrAMINE 25 milliGRAM(s) Oral once  ergocalciferol 05903 Unit(s) Oral every week  famotidine    Tablet 40 milliGRAM(s) Oral once  furosemide    Tablet 40 milliGRAM(s) Oral daily  glucagon  Injectable 1 milliGRAM(s) IntraMuscular once  heparin   Injectable 5000 Unit(s) SubCutaneous every 12 hours  mirtazapine 7.5 milliGRAM(s) Oral <User Schedule>  multivitamin 1 Tablet(s) Oral daily  ondansetron    Tablet 8 milliGRAM(s) Oral once    MEDICATIONS  (PRN):  acetaminophen     Tablet .. 650 milliGRAM(s) Oral every 6 hours PRN Temp greater or equal to 38C (100.4F), Mild Pain (1 - 3)  ALBUTerol    90 MICROgram(s) HFA Inhaler 2 Puff(s) Inhalation every 6 hours PRN Shortness of Breath and/or Wheezing  aluminum hydroxide/magnesium hydroxide/simethicone Suspension 30 milliLiter(s) Oral every 4 hours PRN Dyspepsia  melatonin 3 milliGRAM(s) Oral at bedtime PRN Insomnia  methylPREDNISolone sodium succinate Injectable 125 milliGRAM(s) IV Push once PRN REACTION  ondansetron Injectable 4 milliGRAM(s) IV Push every 8 hours PRN Nausea and/or Vomiting      ROS  No fever, sweats, chills  No epistaxis, HA, sore throat  No CP, SOB, cough, sputum  No n/v/d, abd pain, melena, hematochezia  No edema  No rash  No anxiety  No back pain, joint pain  No bleeding, bruising  No dysuria, hematuria    Vital Signs Last 24 Hrs  T(C): 36.7 (17 Sep 2022 13:10), Max: 36.8 (16 Sep 2022 20:17)  T(F): 98.1 (17 Sep 2022 13:10), Max: 98.3 (16 Sep 2022 20:17)  HR: 111 (17 Sep 2022 13:10) (91 - 111)  BP: 96/42 (17 Sep 2022 13:10) (79/42 - 96/42)  BP(mean): --  RR: 18 (17 Sep 2022 13:10) (17 - 20)  SpO2: 96% (17 Sep 2022 13:10) (95% - 99%)    Parameters below as of 17 Sep 2022 13:10  Patient On (Oxygen Delivery Method): nasal cannula, high flow        PE  NAD  Awake, alert  Anicteric, MMM  On high flow nasal cannula  No c/c/e  No rash grossly  FROM                          9.2    5.97  )-----------( 143      ( 17 Sep 2022 09:30 )             31.8       09-17    127<L>  |  97<L>  |  16  ----------------------------<  939<HH>  3.4<L>   |  21<L>  |  0.46<L>    Ca    7.2<L>      17 Sep 2022 06:00  Phos  2.5     09-17  Mg     1.40     09-17    TPro  7.8  /  Alb  3.4  /  TBili  0.8  /  DBili  x   /  AST  14  /  ALT  8   /  AlkPhos  35<L>  09-16       Patient is a 77y old  Female who presents with a chief complaint of Hypoxic respiratory failure (17 Sep 2022 09:25)    Patient seen and examined at bedside.    MEDICATIONS  (STANDING):  acyclovir   Oral Tab/Cap 400 milliGRAM(s) Oral two times a day  albumin human  5% IVPB 2500 milliLiter(s) IV Intermittent once  albumin human  5% IVPB 2500 milliLiter(s) IV Intermittent once  albumin human  5% IVPB 2500 milliLiter(s) IV Intermittent once  albuterol/ipratropium for Nebulization 3 milliLiter(s) Nebulizer every 12 hours  bendamustine IVPB (TREANDA) (eMAR) 113 milliGRAM(s) IV Intermittent once  budesonide  80 MICROgram(s)/formoterol 4.5 MICROgram(s) Inhaler 2 Puff(s) Inhalation two times a day  calcium gluconate IVPB 2 Gram(s) IV Intermittent once  calcium gluconate IVPB 2 Gram(s) IV Intermittent once  chlorhexidine 2% Cloths 1 Application(s) Topical daily  cholecalciferol 1000 Unit(s) Oral daily  dexAMETHasone     Tablet 8 milliGRAM(s) Oral once  dextrose 50% Injectable 25 Gram(s) IV Push once  dextrose 50% Injectable 12.5 Gram(s) IV Push once  dextrose 50% Injectable 25 Gram(s) IV Push once  dextrose Oral Gel 15 Gram(s) Oral once  diphenhydrAMINE 25 milliGRAM(s) Oral once  ergocalciferol 13824 Unit(s) Oral every week  famotidine    Tablet 40 milliGRAM(s) Oral once  furosemide    Tablet 40 milliGRAM(s) Oral daily  glucagon  Injectable 1 milliGRAM(s) IntraMuscular once  heparin   Injectable 5000 Unit(s) SubCutaneous every 12 hours  mirtazapine 7.5 milliGRAM(s) Oral <User Schedule>  multivitamin 1 Tablet(s) Oral daily  ondansetron    Tablet 8 milliGRAM(s) Oral once    MEDICATIONS  (PRN):  acetaminophen     Tablet .. 650 milliGRAM(s) Oral every 6 hours PRN Temp greater or equal to 38C (100.4F), Mild Pain (1 - 3)  ALBUTerol    90 MICROgram(s) HFA Inhaler 2 Puff(s) Inhalation every 6 hours PRN Shortness of Breath and/or Wheezing  aluminum hydroxide/magnesium hydroxide/simethicone Suspension 30 milliLiter(s) Oral every 4 hours PRN Dyspepsia  melatonin 3 milliGRAM(s) Oral at bedtime PRN Insomnia  methylPREDNISolone sodium succinate Injectable 125 milliGRAM(s) IV Push once PRN REACTION  ondansetron Injectable 4 milliGRAM(s) IV Push every 8 hours PRN Nausea and/or Vomiting      ROS  No fever, sweats, chills  No epistaxis, HA, sore throat  No CP, SOB, cough, sputum  No n/v/d, abd pain, melena, hematochezia  No edema  No rash  No anxiety  No back pain, joint pain  No bleeding, bruising  No dysuria, hematuria    Vital Signs Last 24 Hrs  T(C): 36.7 (17 Sep 2022 13:10), Max: 36.8 (16 Sep 2022 20:17)  T(F): 98.1 (17 Sep 2022 13:10), Max: 98.3 (16 Sep 2022 20:17)  HR: 111 (17 Sep 2022 13:10) (91 - 111)  BP: 96/42 (17 Sep 2022 13:10) (79/42 - 96/42)  BP(mean): --  RR: 18 (17 Sep 2022 13:10) (17 - 20)  SpO2: 96% (17 Sep 2022 13:10) (95% - 99%)    Parameters below as of 17 Sep 2022 13:10  Patient On (Oxygen Delivery Method): nasal cannula, high flow        PE  NAD  Awake, alert  Anicteric, MMM  On high flow nasal cannula  No c/c/e  No rash grossly  FROM                          9.2    5.97  )-----------( 143      ( 17 Sep 2022 09:30 )             31.8       09-17    127<L>  |  97<L>  |  16  ----------------------------<  939<HH>  3.4<L>   |  21<L>  |  0.46<L>    Ca    7.2<L>      17 Sep 2022 06:00  Phos  2.5     09-17  Mg     1.40     09-17    TPro  7.8  /  Alb  3.4  /  TBili  0.8  /  DBili  x   /  AST  14  /  ALT  8   /  AlkPhos  35<L>  09-16      ACC: [id number] EXAM:  CT ANGIO CHEST PULM ART WAWIC                          PROCEDURE DATE:  09/17/2022          INTERPRETATION:  Clinical information: Dyspnea. Evaluate for pulmonary   embolus. Exam is compared to previous study of 8/30/2022.    CT angiogram of the chest was obtained following administration of   intravenous contrast. Approximately 70 cc of Omnipaque 350 was   administered. Coronal, sagittal and MIP images were submitted for review.    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 An additional large   ford mass is present in the posterior mediastinum, more so in the lower   left paraspinal location. The appearance of the above findings are   unchanged when compared to previous exam.    Heart is enlarged in size. Calcification the coronary arteries is noted.   No pericardial effusion is noted. Pulmonary arteries are normal in   caliber. No filling defects are noted within the main, right and left   main, lobar and proximal segmental pulmonary arteries bilaterally.   Evaluation of the mid to distal segmental and subsegmental pulmonary   arteries bilaterally is limited due to motion artifact.    No endobronchial lesions are noted. Patient is status post left upper   lobectomy. Compressive atelectasis is noted involving portions of both   lower lobes. This is secondary to small to moderate size right pleural   effusion and small left pleural effusion.    Below the diaphragm, visualized portions of the abdomen are unremarkable.    Degenerative changes of the spine are noted.    IMPRESSION: No pulmonary embolus is noted within the main, right and left   main, lobar and proximal segmental pulmonary arteries bilaterally.   Evaluation of the mid to distal segmental and subsegmental pulmonary   arteries bilaterally is limited.    Status post left upper lobectomy.    Compared to the previous examination, the large ford mass in the AP   window extending into the prevascular space/subcarinal region/left hilum   as well as the large posterior mediastinal mass have not significantly   changed when compared to previous exam.    Bilateral pleural effusions, right more than left.    --- End of Report ---            MATT SUTHERLAND MD; Attending Radiologist  This document has been electronically signed. Sep 17 2022  4:07PM

## 2022-09-17 NOTE — CHART NOTE - NSCHARTNOTEFT_GEN_A_CORE
[facility] NIGHT MEDICINE COVERAGE    Daughter updated at bedside, made her aware pt's CTA Chest was negative for PE, and we are monitoring her left upper extremity closely - all questions answered at bedside.  Pt noted to be in sinus tachycardia to 130 during bedside visit, and SpO2 reading 86%, SpO2 sensor noted to be on pt's foot.  Pt asymptomatic, denies difficulty breathing, chest pain, L arm pain or weakness.    Requested Respiratory Therapist place pt on AVAPS, d/john albuterol and switched pt to Xopenex nebulizer q12h given tachycardia.  SpO2 checked using pt's hand, pt now satting 100% on AVAPS, and HR improved to 110.  Pt's BP noted to be stable on most recent set of vitals.  Lung exam reveals diminished breath sound b/l, no rales noted.  2+ radial and ulnar pulses on LUE, some ecchymoses noted on L forearm.  L hand strength and sensation intact.  Will maintain AVAPS overnight.  Plan d/w RN and daughter.    Pt stable at this time, will continue to monitor.    Vital Signs Last 24 Hrs  T(C): 36.4 (17 Sep 2022 21:20), Max: 37.1 (17 Sep 2022 19:45)  T(F): 97.5 (17 Sep 2022 21:20), Max: 98.8 (17 Sep 2022 19:45)  HR: 131 (17 Sep 2022 21:20) (105 - 134)  BP: 126/64 (17 Sep 2022 21:20) (88/39 - 126/64)  RR: 17 (17 Sep 2022 21:20) (17 - 18)  SpO2: 91% (17 Sep 2022 21:20) (91% - 98%)    O2 Parameters below as of 17 Sep 2022 21:20  Patient On (Oxygen Delivery Method): nasal cannula  O2 Flow (L/min): 4    Juan Miguel Acuña PA-C  Department of Medicine - [facility]  In-House Pager: #[phone]

## 2022-09-17 NOTE — PROGRESS NOTE ADULT - ASSESSMENT
77 year old female with PMH of lymphoma (dx 2005, s/p CARRIE lobectomy, relapsed in 2019 but not currently on chemo/RT, [place] [facility] [place] bipolar disorder, asthma (on O2 PRN at home, unknown how many L) brought to ED by daughter who states pt has been hallucinating, being more depressed.  Patient unable to provide much history, daughter Alissa states that patient was able to care for herself, manage home finances and attend doctor appointments independently until recently, she states that she believes her mom stopped taking her psychiatric medications in February of 2022 and has not followed up with any of her physicians. She has noticed recent weight loss (unsure how much) and change in voice.   Admitted to medicine service for further evaluation & treatment. Course c/b acute hypercapnic and hypoxic respiratory failure requiring BIPAP and AVAPS.  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

## 2022-09-17 NOTE — CONSULT NOTE ADULT - SUBJECTIVE AND OBJECTIVE BOX
VASCULAR SURGERY CONSULT NOTE  Consulting attending: Dr. Santillan    HPI:  77 year old female with PMH of lymphoma (dx 2005, s/p CARRIE lobectomy, relapsed in 2019 but not currently on chemo/RT, being followed by heme every 3-6months at [facility]), bipolar disorder, asthma (on O2 PRN at home, unknown how many L) brought to ED by daughter who states pt has been hallucinating, being more depressed.  Patient unable to provide much history, daughter Alissa states that patient was able to care for herself, manage home finances and attend doctor appointments independently until recently, she states that she believes her mom stopped taking her psychiatric medications in 2022 and has not followed up with any of her physicians. She has noticed recent weight loss (unsure how much) and change in voice.  Patient denies chest pain, shortness of breath or any discomfort, she is pleasantly confused. Daughter states that within the last year the patient has received treatment for her lymphoma however is unsure of the specifics, the patient was supposed to follow up in February however has not. (30 Aug 2022 10:47)    Vascular surgery consulted due to concern for discoloration of the L hand for the past day.    PAST MEDICAL HISTORY:  GERD (gastroesophageal reflux disease)  Hyperlipidemia  Manic depression  Asthma  Lymphoma    PAST SURGICAL HISTORY:  History of lobectomy of lung  Injury of left shoulder, initial encounter    ALLERGIES:  latex (Rash)  No Known Drug Allergies    VITALS & I/Os:  Vital Signs Last 24 Hrs  T(C): 37.1 (17 Sep 2022 19:45), Max: 37.1 (17 Sep 2022 19:45)  T(F): 98.8 (17 Sep 2022 19:45), Max: 98.8 (17 Sep 2022 19:45)  HR: 134 (17 Sep 2022 19:45) (95 - 134)  BP: 115/62 (17 Sep 2022 19:45) (88/39 - 115/62)  BP(mean): --  RR: 18 (17 Sep 2022 19:45) (17 - 18)  SpO2: 94% (17 Sep 2022 19:45) (94% - 98%)    Parameters below as of 17 Sep 2022 19:45  Patient On (Oxygen Delivery Method): nasal cannula  O2 Flow (L/min): 4    Mode: standby,patient refuses    I&O's Summary      PHYSICAL EXAM:  GEN: resting comfortably in bed, in NAD  CHEST: no increased WOB  LUE: superficial bruising noted over entirety of L arm. Superficial hematoma approx 4cm in diameter palpated over LUE, mobile, nontender. Motor and sensory grossly intact  L radial and ulnar pulses palpable. Strong palmar arch doppler signal  NEURO: A&Ox3    LABS:                        9.2    5.97  )-----------( 143      ( 17 Sep 2022 09:30 )             31.8         139  |  103  |  18  ----------------------------<  105<H>  4.0   |  25  |  0.56    Ca    9.4      17 Sep 2022 09:30  Phos  3.2       Mg     1.80         TPro  8.5<H>  /  Alb  3.5  /  TBili  0.9  /  DBili  0.2  /  AST  19  /  ALT  5   /  AlkPhos  39<L>      Lactate:   @ 09:30  1.2    PT/INR - ( 17 Sep 2022 09:30 )   PT: 15.7 sec;   INR: 1.35 ratio         PTT - ( 17 Sep 2022 09:30 )  PTT:49.5 sec      Urinalysis Basic - ( 17 Sep 2022 11:30 )    Color: Yellow / Appearance: Clear / S.012 / pH: x  Gluc: x / Ketone: Negative  / Bili: Negative / Urobili: <2 mg/dL   Blood: x / Protein: 30 mg/dL / Nitrite: Negative   Leuk Esterase: Negative / RBC: 3 /HPF / WBC 0 /HPF   Sq Epi: x / Non Sq Epi: 1 /HPF / Bacteria: Negative        IMAGING:  VA Duplex Ext Veins Upper Comp, Left. (22 @ 14:36)  Summary/Impressions:  No evidence of deep vein thrombosis in the left upper  extremity.  Hematoma measuring up to 3.0 cm noted in the left upper  extremity.    VA Duplex Arterial Upper Ext, Left. (22 @ 14:35)  Summary/Impressions:  No evidence of occlusive arterial disease in the  visualized right left extremity.  Left upper extremity hematoma measuring up to about 3.0 cm  noted.

## 2022-09-17 NOTE — PROGRESS NOTE ADULT - PROBLEM SELECTOR PLAN 8
-S/P fall at home   -LLE small area that is open, area non-erythematous, non-tender, no warmth noted  -x-ray without fracture or dislocation, soft tissue swelling present  -S/P tetanus vaccine in ED -Hx of bipolar w/psych admission @ [place] many years ago, unclear if patient actually had Bipolar   -non-compliant with medications  -Zyprexa 2.5mg QHS increased to 3.75mg QHS on 9/8/22 (pt refused), if patient has respiratory depression- hold Zyprexa, may try low dose Mirtazapine (also sedating)  -Psych recommendations appreciated   -Supportive care

## 2022-09-17 NOTE — PROGRESS NOTE ADULT - NSPROGADDITIONALINFOA_GEN_ALL_CORE
Spoke with daughter and updated about plan. She will be coming to visit the pt this  weekend   Code status: made DNR/DNI by daughter with Palliative Medicine  Best number to call Alissa at [phone].

## 2022-09-17 NOTE — CONSULT NOTE ADULT - ASSESSMENT
77 year old female with PMH of lymphoma (dx 2005, s/p CARRIE lobectomy, relapsed in 2019 but not currently on chemo/RT, being followed by heme every 3-6months at [facility]), bipolar disorder, asthma. Vascular surgery consulted for L hand discoloration and concern for flow problem.    Impression/plan:  - No evidence of flow limitation on venous or arterial duplex. Patient with strongly palpable radial and ulnar pulse. Hand warm, sensation and motor intact.  - Hand discoloration most consistent with superficial ecchymosis related to multiple IVs, blood draws, etc - as can be seen on entirety of b/l upper extremities  - If concern persists for underlying pathology, recommend hand surgery consult  - Discussed with fellow on behalf of attending    C Team Vascular Surgery  [id number]

## 2022-09-17 NOTE — PROGRESS NOTE ADULT - PROBLEM SELECTOR PLAN 3
-Patient with +UA. Urine cx growing E coli, pan-sensitive  - CTX x3 days (8/31-9/2) for treatment of acute cystitis -Likely multi-factorial: UTI, hypercapnia, hypercalcemia, psychiatric disorder,  and now with greater concern for hyperviscosity syndrome given inability to even obtain immunoglobulin panel due to degree of hyperviscosity    -serum viscosity level  pending after plasmapheresis on 9/13   -plasmapheresis 9/13 per heme/onc for hyperviscosity --via guero, needs IgM levels after each session   -IgM level downtrending and now uptrending  - Heme/Onc recommends- will initiate bendamustine to debulk.    -daughter thinks patient stopped taking her psychiatric medication in february  - Poor PO intake- nutrition consulted previously     - Noted to be hypotensive yesterday and not improving- stat labs/CXR/ECG/UA ordered. will follow up  - monitor for side effects

## 2022-09-17 NOTE — PROGRESS NOTE ADULT - ASSESSMENT
This is a 77 year old woman with history of HLD, lymphoma (dx 2005, s/p CARRIE lobectomy, relapsed in 2019 but not currently on chemo/RT, [place] [facility] [place] bipolar disorder, asthma, who presented to the hospital for AMS and hypoxic respiratory failure, found to have hypercalcemia.     #Hypercalcemia - malignancy-related vs medication induced (lithium induced parathyroidism) vs in the setting of potential multiple myeloma transformation   - s/p 4 doses of IV Calcitonin, s/p pamidronate 60 mg IV x 1 8/30/22   - Corrected calcium today 9.9 on labs done correctly and not done form IV line  - 1,25 vitamin D not elevated (37.1)  -PTH-related peptide negative  -Etiology of hypercalcemia seems most likely related to multiple myeloma/monoclonal gammopathy. Management per heme/onc.  -Also of note is receiving calcium gluconate as part of plasma exchange therapy which may be contributing to higher calcium level.    #history of fracture in the setting of likely osteoporosis;.   - Imaging findings of Old right humeral neck fracture. Left proximal humeral fracture s/p ORIF  .- f/u outpatient bone-density testing.   - 25-hydroxy Vitamin D level of 18.4 (low),   -continue cholecalciferol 1000 units daily.    #Hyperlipidemia  - history of hyperlipidemia   - Cholesterol of 54, Triglycerides of 30, Serum HDL of 21, LDL of 27. A1C of 4.5   - can hold on statin therapy at this time due to age and risk factors at this time   - home medication history of zyprexa.     #Hypoglycemia -   Ongoing poor po intake  encourage po intake/assistance  If able, can send labs with serum glucose at time of low glucose on point of care testing to confirm.  6 AM cortisol 8.6, repeated AM cortisol at 8AM today resulted at 12.4 acceptable level and not indicative of Adrenal insufficiency. Will defer need for stim test at this time.  please discontinue D5W drip and encourage oral intake      Complex patient high level decision making.    Chava Pettit MD  Endocrine Fellow  Can be reached via teams. For follow up questions, discharge recommendations, or new consults, please call answering service at [phone] (weekdays); [phone] (nights/weekends)

## 2022-09-17 NOTE — PROGRESS NOTE ADULT - PROBLEM SELECTOR PLAN 1
hypercapnic and hypoxemic respiratory failure  worsening resp status on 9/10, pt lethargic, was not using AVAPS the night prior, was on NRB instead  - VBG this am and pCO2 improving, placed on HFNC in am- continue to wean off oxygen as tolerated  -C/w intermittent diuresis for pleural effusions as BP allows, last given IV Lasix 40mg on 9/10.   -Seen by pulm and recommend Diamox one dose 9/13 in setting of worsening bicarb  -Palliative following  -Continue Duoneb BID, Symbicort BID, Albuterol PRN Has been hypotensive and improves with IVF  R/O sepsis- procal pending  No leukocytosis/UA neg and CXR negative for infectious etiology     Continue IVF for now   will give one dose of Zosyn and continue for now   ordered BCx and Urine Cx  Legionella/ S.Pneumo and MRSA pending

## 2022-09-17 NOTE — PROGRESS NOTE ADULT - PROBLEM SELECTOR PLAN 2
-Likely multi-factorial: UTI, hypercapnia, hypercalcemia, psychiatric disorder,  and now with greater concern for hyperviscosity syndrome given inability to even obtain immunoglobulin panel due to degree of hyperviscosity    -serum viscosity level  pending after plasmapheresis on 9/13   -plasmapheresis 9/13 per heme/onc for hyperviscosity --via guero, needs IgM levels after each session   -IgM level downtrending and now uptrending  - Heme/Onc recommends- will initiate bendamustine to debulk.    -daughter thinks patient stopped taking her psychiatric medication in february  - Poor PO intake- nutrition consulted previously     - Noted to be hypotensive yesterday and not improving- stat labs/CXR/ECG/UA ordered. will follow up  - monitor for side effects hypercapnic and hypoxemic respiratory failure  worsening resp status on 9/10, pt lethargic, was not using AVAPS the night prior, was on NRB instead  - VBG this am and pCO2 improving, placed on HFNC in am- continue to wean off oxygen as tolerated  -C/w intermittent diuresis for pleural effusions as BP allows, last given IV Lasix 40mg on 9/10.   -Seen by pulm and recommend Diamox one dose 9/13 in setting of worsening bicarb  -Palliative following  -Continue Duoneb BID, Symbicort BID, Albuterol PRN

## 2022-09-17 NOTE — CONSULT NOTE ADULT - ATTENDING COMMENTS
76 yo female with history lymphoma 2005, relapse 2019, on CTx/RT, asthma on home O2 prn, admitted with metabolic encephalopathy 2/2 hypercalcemia, UTI, course c/b acute hypoxic and hypercapnic resp failure 2/2 vol O/L requiring NIV and diuresis, newly discovered likely hyperviscosity synd which may be contributing to her encephalopathy.  Labs to confirm are pending. MICU called for concern of need for apheresis for hyperviscosity   She is currently off NIV, on NRB O2 121/63  HR 93  afeb   She is somnolent but arousable with name, attends and said she feels like she cannot breath and asked for her inhaler treatment.  She appears in no resp distress, exp phase not prolonged RR 20 and unlabored, lungs without wheeze, O2 sat 100%    Poss hyperviscosity Synd  - After d/w pt's hematologist, she will not have urgent pheresis; therefore, she does not require MICU admission for this purpose  Acute (on chronic) hypoxic and hypercapnic resp failure requiring NIV/AVAPS.  Pulm team following  Currently having NIV break on NRB mask  - MS appears similar to prior on NIV (if not improved - compared to Dr Hartman description this am), can use NIV at night as recommended by pulm.  When off NIV please wean FiO2 to maintain O2 sat >89%.  would try venti mask or NC  - Standing duonebs ordered yesterday expires tonight - after dose she will receive at 9pm.  Note that she did not receive any bronchodilator tx when ordered prn.  Consider greater interval standing neb - qd or bid.  D/W pulm team.  She does not require MICU level of care at this time
agree with above
77 year old female with history of lymphoma with acute on chronic hypoxic/hypercapnic respiratory failure.   Pulmonary consulted for pleural effusion.  On review of CT chest, pleural effusion is small and the large left hemithorax opacification seen on CXR is related to the soft tissue lesion due to her known lymphoma. No plan for thoracentesis at this time.  However, patient is profoundly encephalopathic and with increase work of breathing. Placed back on bilevel. Check repeat ABG.  Discussed GOC with daughter who states she is full code.   Patient is high risk for clinical decompensation.
77F with hx of lymphoma (dx 2005), asthma admitted for metabolic encephalopathy 2/2 UTI/hypercalcemia with course now complicated by acute hypercapnic and  hypoxic in setting of volume overload  bipap, repeat blood gas while on bipap  d/c IVF, lasix, strict I and O  consider CTPA  GOC to be addressed by primary team  reconsult as needed
77F with hypercalcemia, history of lithium use, lymphoma, history of humeral fracture on x ray, very low albumin, corrected calcium 13.6 today. Send workup as outlined differential includes elevated 1,25D (lymphoma), multiple myeloma, primary hyperparathyroid from lithium, other malignancy mechanisms. Agree with dose o pamidronate 60mg IV x1, would also give calcitonin total 4 doses. Will follow.    Thais Tovar MD  Division of Endocrinology  Pager: [phone]    If after 6PM or before 9AM, or on weekends/holidays, please call endocrine answering service for assistance ([phone]).  For nonurgent matters email [email or web address] for assistance.

## 2022-09-17 NOTE — PROGRESS NOTE ADULT - PROBLEM SELECTOR PLAN 6
-Resolved w/IVF, now discontinued due to pulmonary edema   -Continue to monitor, avoid nephrotoxins -[place] w/Dr. Real Cano [place] [facility] [place]  -received treatment recently per daughter however she doesn't know specifics  -with progression of disease  -hematology to speak to [facility] regarding if further treatment is suggested    -Hem recs appreciated- plasmapheresis  09/02, 09/09, 09/13 - IgM significantly improved to 2848  and trend IgM downtrending, now increasing. . Serum Viscosity still pending    -IgM level downtrending and now up trending  - Heme/Onc recommends- will initiate bendamustine to debulk.  - Norbert removed 9/15 and if needs repeat plasmapheresis will have to insert another cath

## 2022-09-17 NOTE — PROGRESS NOTE ADULT - PROBLEM SELECTOR PLAN 9
-Heparin SQ    -Dispo issues: medically active and pending  -Patient with APS case ongoing due to unsanitary conditions at home. Will require SW/CM for safe placement once medically stable for d/c. Patient currently without capacity and with poor insight into her medical conditions. -S/P fall at home   -LLE small area that is open, area non-erythematous, non-tender, no warmth noted  -x-ray without fracture or dislocation, soft tissue swelling present  -S/P tetanus vaccine in ED

## 2022-09-17 NOTE — PROGRESS NOTE ADULT - PROBLEM SELECTOR PLAN 4
-likely due to underlying malignancy, lymphoma vs new MM.  PTH is inappropriately normal. vitamin D 1,25 not elevated/ PTHrP not elevated   -s/p pamidronate on 8/30; Ca improved.  -calcitonin x 4 doses  - hold off fluids for now given pulmonary edema that developed with IVF upon admission, which later required need for Lasix IV and AVAPS  -Apprec Endocrine recs  - continue to encourage PO intake  - Monitor Ca levels -Patient with +UA. Urine cx growing E coli, pan-sensitive  - CTX x3 days (8/31-9/2) for treatment of acute cystitis

## 2022-09-17 NOTE — PROGRESS NOTE ADULT - PROBLEM SELECTOR PLAN 5
-Follows w/Dr. Real Cano @ [facility] [place]  -received treatment recently per daughter however she doesn't know specifics  -with progression of disease  -hematology to speak to [facility] regarding if further treatment is suggested    -Hem recs appreciated- plasmapheresis  09/02, 09/09, 09/13 - IgM significantly improved to 2848  and trend IgM downtrending, now increasing. . Serum Viscosity still pending    -IgM level downtrending and now up trending  - Heme/Onc recommends- will initiate bendamustine to debulk.  - Norbert removed 9/15 and if needs repeat plasmapheresis will have to insert another cath -likely due to underlying malignancy, lymphoma vs new MM.  PTH is inappropriately normal. vitamin D 1,25 not elevated/ PTHrP not elevated   -s/p pamidronate on 8/30; Ca improved.  -calcitonin x 4 doses  - hold off fluids for now given pulmonary edema that developed with IVF upon admission, which later required need for Lasix IV and AVAPS  -Apprec Endocrine recs  - continue to encourage PO intake  - Monitor Ca levels

## 2022-09-17 NOTE — PROGRESS NOTE ADULT - ASSESSMENT
MANDI GASPAR is a 77y Female who presents with a chief complaint of hypoxic respiratory failure.    Marginal Zone Lymphoma  - Patient treated previously at [facility]; was on rituximab + bendamustine last in August 2020, and since then has been on surveillance, but lost to follow-up since July 2021  - CT chest concerning for slight progression of disease. May need bone marrow biopsy once respiratory status is more stable.     Lymphoplasmacytic Lymphoma  - Noted from bone marrow biopsy done during prior admission here.  - Patient underwent three plasmapheresis sessions; last on September 13th with improvement in mental status and IgM.  - IgM rising rapidly again, 4781 on 9/15 with no real decrease in protein burden after multiple PLEX treatments  - flow cytometry confirms small b cell population present; MYD88 testing pending  - Will initiate bendamustine to debulk. Patient to receive single agent Bendamustine 70mg/m2 for Day 1 and will hold Day 2 at this time.  - will hold rituxan with first cycle due to likelihood of IgM flare occurrence.  - Started Acyclovir; recommend starting bactrim for prevention of opportunistic infections  - please obtain CBC CMP prior to administration  - do not give allopurinol with bendamustine  - Noted goals of care discussion.     Altered Mental Status  - Likely multifactorial with hyperviscosity of blood, hypercalcemia, urinary tract infection  - Completed antibiotics  - Endocrinology following  - Psychiatry input appreciated    Hypoxia  - Management per pulmonary    Will continue to follow.    Bari Landry PA-C  Hematology/Oncology  [facility]  [phone] (office)  [phone] (alt office)  Evenings and weekends please call MD on call or office MANDI GASPAR is a 77y Female who presents with a chief complaint of hypoxic respiratory failure.    Marginal Zone Lymphoma  - Patient treated previously at [facility]; was on rituximab + bendamustine last in August 2020, and since then has been on surveillance, but lost to follow-up since July 2021  - CT chest concerning for slight progression of disease. May need bone marrow biopsy once respiratory status is more stable.     Lymphoplasmacytic Lymphoma  - Noted from bone marrow biopsy done during prior admission here.  - Patient underwent three plasmapheresis sessions; last on September 13th with improvement in mental status and IgM.  - IgM rising rapidly again, 4781 on 9/15 with no real decrease in protein burden after multiple PLEX treatments  - flow cytometry confirms small b cell population present; MYD88 testing pending  - Will hold off on Bendamustine due to hypotension; she was planned for Bendamustine 70mg/m2 for Day 1 and will hold Day 2 at this time.  - will hold rituxan with first cycle due to likelihood of IgM flare occurrence.  - Started Acyclovir; recommend starting bactrim for prevention of opportunistic infections  - please obtain CBC CMP prior to administration  - do not give allopurinol with bendamustine  - Noted goals of care discussion.     Altered Mental Status  - Likely multifactorial with hyperviscosity of blood, hypercalcemia, urinary tract infection  - Completed antibiotics  - Endocrinology following  - Psychiatry input appreciated    Hypoxia  - Management per pulmonary    Will continue to follow.    Bari Landry PA-C  Hematology/Oncology  [facility]  [phone] (office)  [phone] (alt office)  Evenings and weekends please call MD on call or office MANDI GASPAR is a 77y Female who presents with a chief complaint of hypoxic respiratory failure.    Marginal Zone Lymphoma  - Patient treated previously at [facility]; was on rituximab + bendamustine last in August 2020, and since then has been on surveillance, but lost to follow-up since July 2021  - CT chest concerning for slight progression of disease. May need bone marrow biopsy once respiratory status is more stable.     Lymphoplasmacytic Lymphoma  - Noted from bone marrow biopsy done during prior admission here.  - Patient underwent three plasmapheresis sessions; last on September 13th with improvement in mental status and IgM.  - IgM rising rapidly again, 4781 on 9/15 with no real decrease in protein burden after multiple PLEX treatments  - flow cytometry confirms small b cell population present; MYD88 testing pending  - Will hold off on Bendamustine due to hypotension, confirmed plan with [facility]  - Echo from 8/30 normal, recommend IV hydration  - CT chest shows  No pulmonary embolus is noted within the main, right and left   main, lobar and proximal segmental pulmonary arteries bilaterally.   Evaluation of the mid to distal segmental and subsegmental pulmonary   arteries bilaterally is limited.  Status post left upper lobectomy.  Compared to the previous examination, the large ford mass in the AP   window extending into the prevascular space/subcarinal region/left hilum   as well as the large posterior mediastinal mass have not significantly   changed when compared to previous exam.  Bilateral pleural effusions, right more than left.    Altered Mental Status  - Likely multifactorial with hyperviscosity of blood, hypercalcemia, urinary tract infection  - Completed antibiotics  - Endocrinology following  - Psychiatry input appreciated    Hypoxia  - Management per pulmonary    Hypotension  - Recommend cardiology consult  - Bendamustine on hold    Will continue to follow.    Bari Landry PA-C  Hematology/Oncology  [facility]  [phone] (office)  [phone] (alt office)  Evenings and weekends please call MD on call or office

## 2022-09-17 NOTE — PROGRESS NOTE ADULT - SUBJECTIVE AND OBJECTIVE BOX
Patient is a 77y old  Female who presents with a chief complaint of Hypoxic respiratory failure (16 Sep 2022 16:47)      SUBJECTIVE / OVERNIGHT EVENTS:    Overnight, noted to be hypotensive and give 250cc bolus which improved BP, pt remained asymptomatic.  This AM, patient without n/v/d/cp/sob.      MEDICATIONS  (STANDING):  acyclovir   Oral Tab/Cap 400 milliGRAM(s) Oral two times a day  albumin human  5% IVPB 2500 milliLiter(s) IV Intermittent once  albumin human  5% IVPB 2500 milliLiter(s) IV Intermittent once  albumin human  5% IVPB 2500 milliLiter(s) IV Intermittent once  albuterol/ipratropium for Nebulization 3 milliLiter(s) Nebulizer every 12 hours  bendamustine IVPB (TREANDA) (eMAR) 113 milliGRAM(s) IV Intermittent once  budesonide  80 MICROgram(s)/formoterol 4.5 MICROgram(s) Inhaler 2 Puff(s) Inhalation two times a day  calcium gluconate IVPB 2 Gram(s) IV Intermittent once  calcium gluconate IVPB 2 Gram(s) IV Intermittent once  chlorhexidine 2% Cloths 1 Application(s) Topical daily  cholecalciferol 1000 Unit(s) Oral daily  dexAMETHasone     Tablet 8 milliGRAM(s) Oral once  dextrose 5% + sodium chloride 0.45%. 1000 milliLiter(s) (80 mL/Hr) IV Continuous <Continuous>  dextrose 50% Injectable 25 Gram(s) IV Push once  dextrose 50% Injectable 12.5 Gram(s) IV Push once  dextrose 50% Injectable 25 Gram(s) IV Push once  dextrose Oral Gel 15 Gram(s) Oral once  diphenhydrAMINE 25 milliGRAM(s) Oral once  famotidine    Tablet 40 milliGRAM(s) Oral once  furosemide    Tablet 40 milliGRAM(s) Oral daily  glucagon  Injectable 1 milliGRAM(s) IntraMuscular once  heparin   Injectable 5000 Unit(s) SubCutaneous every 12 hours  mirtazapine 7.5 milliGRAM(s) Oral <User Schedule>  multivitamin 1 Tablet(s) Oral daily  ondansetron    Tablet 8 milliGRAM(s) Oral once    MEDICATIONS  (PRN):  acetaminophen     Tablet .. 650 milliGRAM(s) Oral every 6 hours PRN Temp greater or equal to 38C (100.4F), Mild Pain (1 - 3)  ALBUTerol    90 MICROgram(s) HFA Inhaler 2 Puff(s) Inhalation every 6 hours PRN Shortness of Breath and/or Wheezing  aluminum hydroxide/magnesium hydroxide/simethicone Suspension 30 milliLiter(s) Oral every 4 hours PRN Dyspepsia  melatonin 3 milliGRAM(s) Oral at bedtime PRN Insomnia  methylPREDNISolone sodium succinate Injectable 125 milliGRAM(s) IV Push once PRN REACTION  ondansetron Injectable 4 milliGRAM(s) IV Push every 8 hours PRN Nausea and/or Vomiting      PHYSICAL EXAM:  T(C): 36.7 (09-17-22 @ 06:46), Max: 36.8 (09-16-22 @ 20:17)  HR: 105 (09-17-22 @ 08:07) (78 - 109)  BP: 88/39 (09-17-22 @ 06:46) (79/42 - 94/42)  RR: 18 (09-17-22 @ 08:07) (17 - 20)  SpO2: 98% (09-17-22 @ 08:07) (95% - 99%)  I&O's Summary    GENERAL: NAD, well-developed  HEAD:  Atraumatic, Normocephalic, MMM  CHEST/LUNG: No use of accessory muscles, CTAB, breathing non-labored  COR: RR, no mrcg  ABD: Soft, ND/NT, +BS  PSYCH: AAOx3  NEUROLOGY: CN II-XII grossly intact, moving all extremities  SKIN: No rashes or lesions  EXT: wwp, no cce    LABS:  CAPILLARY BLOOD GLUCOSE      POCT Blood Glucose.: 109 mg/dL (17 Sep 2022 08:33)  POCT Blood Glucose.: 96 mg/dL (16 Sep 2022 21:05)  POCT Blood Glucose.: 147 mg/dL (16 Sep 2022 17:02)  POCT Blood Glucose.: 108 mg/dL (16 Sep 2022 13:32)                          7.0    4.89  )-----------( x        ( 17 Sep 2022 06:00 )             25.6     09-17    127<L>  |  97<L>  |  16  ----------------------------<  939<HH>  3.4<L>   |  21<L>  |  0.46<L>    Ca    7.2<L>      17 Sep 2022 06:00  Phos  2.5     09-17  Mg     1.40     09-17    TPro  7.8  /  Alb  3.4  /  TBili  0.8  /  DBili  x   /  AST  14  /  ALT  8   /  AlkPhos  35<L>  09-16                RADIOLOGY & ADDITIONAL TESTS:    Telemetry Personally Reviewed -     Imaging Personally Reviewed -     Imaging Reviewed -     Consultant(s) Notes Reviewed -       Care Discussed with Consultants/Other Providers -  Patient is a 77y old  Female who presents with a chief complaint of Hypoxic respiratory failure (16 Sep 2022 16:47)      SUBJECTIVE / OVERNIGHT EVENTS:    Overnight, noted to be hypotensive and give 250cc bolus which improved BP, pt remained asymptomatic.  This AM, patient without n/v/d/cp/sob.  Daughter at bedside and reports she had trouble sleeping overnight.     MEDICATIONS  (STANDING):  acyclovir   Oral Tab/Cap 400 milliGRAM(s) Oral two times a day  albumin human  5% IVPB 2500 milliLiter(s) IV Intermittent once  albumin human  5% IVPB 2500 milliLiter(s) IV Intermittent once  albumin human  5% IVPB 2500 milliLiter(s) IV Intermittent once  albuterol/ipratropium for Nebulization 3 milliLiter(s) Nebulizer every 12 hours  bendamustine IVPB (TREANDA) (eMAR) 113 milliGRAM(s) IV Intermittent once  budesonide  80 MICROgram(s)/formoterol 4.5 MICROgram(s) Inhaler 2 Puff(s) Inhalation two times a day  calcium gluconate IVPB 2 Gram(s) IV Intermittent once  calcium gluconate IVPB 2 Gram(s) IV Intermittent once  chlorhexidine 2% Cloths 1 Application(s) Topical daily  cholecalciferol 1000 Unit(s) Oral daily  dexAMETHasone     Tablet 8 milliGRAM(s) Oral once  dextrose 5% + sodium chloride 0.45%. 1000 milliLiter(s) (80 mL/Hr) IV Continuous <Continuous>  dextrose 50% Injectable 25 Gram(s) IV Push once  dextrose 50% Injectable 12.5 Gram(s) IV Push once  dextrose 50% Injectable 25 Gram(s) IV Push once  dextrose Oral Gel 15 Gram(s) Oral once  diphenhydrAMINE 25 milliGRAM(s) Oral once  famotidine    Tablet 40 milliGRAM(s) Oral once  furosemide    Tablet 40 milliGRAM(s) Oral daily  glucagon  Injectable 1 milliGRAM(s) IntraMuscular once  heparin   Injectable 5000 Unit(s) SubCutaneous every 12 hours  mirtazapine 7.5 milliGRAM(s) Oral <User Schedule>  multivitamin 1 Tablet(s) Oral daily  ondansetron    Tablet 8 milliGRAM(s) Oral once    MEDICATIONS  (PRN):  acetaminophen     Tablet .. 650 milliGRAM(s) Oral every 6 hours PRN Temp greater or equal to 38C (100.4F), Mild Pain (1 - 3)  ALBUTerol    90 MICROgram(s) HFA Inhaler 2 Puff(s) Inhalation every 6 hours PRN Shortness of Breath and/or Wheezing  aluminum hydroxide/magnesium hydroxide/simethicone Suspension 30 milliLiter(s) Oral every 4 hours PRN Dyspepsia  melatonin 3 milliGRAM(s) Oral at bedtime PRN Insomnia  methylPREDNISolone sodium succinate Injectable 125 milliGRAM(s) IV Push once PRN REACTION  ondansetron Injectable 4 milliGRAM(s) IV Push every 8 hours PRN Nausea and/or Vomiting      PHYSICAL EXAM:  T(C): 36.7 (09-17-22 @ 06:46), Max: 36.8 (09-16-22 @ 20:17)  HR: 105 (09-17-22 @ 08:07) (78 - 109)  BP: 88/39 (09-17-22 @ 06:46) (79/42 - 94/42)  RR: 18 (09-17-22 @ 08:07) (17 - 20)  SpO2: 98% (09-17-22 @ 08:07) (95% - 99%)  I&O's Summary    GENERAL: NAD, frail appearing  HEAD:  Atraumatic, Normocephalic, MMM  CHEST/LUNG: No use of accessory muscles, decreased breath sounds B/L, breathing non-labored  COR: RR, no mrcg  ABD: Soft, ND/NT, +BS  PSYCH: AAOx3  NEUROLOGY: CN II-XII grossly intact, moving all extremities  SKIN: left arm swelling and hematoma noted on left hand  EXT: no edema noted     LABS:  CAPILLARY BLOOD GLUCOSE      POCT Blood Glucose.: 109 mg/dL (17 Sep 2022 08:33)  POCT Blood Glucose.: 96 mg/dL (16 Sep 2022 21:05)  POCT Blood Glucose.: 147 mg/dL (16 Sep 2022 17:02)  POCT Blood Glucose.: 108 mg/dL (16 Sep 2022 13:32)                          7.0    4.89  )-----------( x        ( 17 Sep 2022 06:00 )             25.6     09-17    127<L>  |  97<L>  |  16  ----------------------------<  939<HH>  3.4<L>   |  21<L>  |  0.46<L>    Ca    7.2<L>      17 Sep 2022 06:00  Phos  2.5     09-17  Mg     1.40     09-17    TPro  7.8  /  Alb  3.4  /  TBili  0.8  /  DBili  x   /  AST  14  /  ALT  8   /  AlkPhos  35<L>  09-16                RADIOLOGY & ADDITIONAL TESTS:    Imaging Reviewed -     Consultant(s) Notes Reviewed -       Care Discussed with Consultants/Other Providers -

## 2022-09-18 NOTE — PROGRESS NOTE ADULT - ASSESSMENT
MANDI GASPAR is a 77y Female who presents with a chief complaint of hypoxic respiratory failure.    Marginal Zone Lymphoma  - Patient treated previously at [facility]; was on rituximab + bendamustine last in August 2020, and since then has been on surveillance, but lost to follow-up since July 2021  - CT chest concerning for slight progression of disease. May need bone marrow biopsy once respiratory status is more stable.     Lymphoplasmacytic Lymphoma  - Noted from bone marrow biopsy done during prior admission here.  - Patient underwent three plasmapheresis sessions; last on September 13th with improvement in mental status and IgM.  - IgM rising rapidly again, 4781 on 9/15 with no real decrease in protein burden after multiple PLEX treatments  - flow cytometry confirms small b cell population present; MYD88 testing pending  - Will continue to hold off on Bendamustine due to hypotension  - Echo from 8/30 normal, continue IV hydration  - CT chest shows  No pulmonary embolus is noted within the main, right and left   main, lobar and proximal segmental pulmonary arteries bilaterally.   Evaluation of the mid to distal segmental and subsegmental pulmonary   arteries bilaterally is limited.  Status post left upper lobectomy.  Compared to the previous examination, the large ford mass in the AP   window extending into the prevascular space/subcarinal region/left hilum   as well as the large posterior mediastinal mass have not significantly   changed when compared to previous exam.  Bilateral pleural effusions, right more than left.    Altered Mental Status  - Likely multifactorial with hyperviscosity of blood, hypercalcemia, urinary tract infection  - Completed antibiotics  - Endocrinology following  - Psychiatry input appreciated    Hypoxia  - Management per pulmonary    Hypotension  - Recommend cardiology consult  - Bendamustine on hold    L hand/arm ecchymosis  - No DVT. Hematoma measuring up to 3.0 cm noted in the left upper extremity.  - Vascular consulted, no acute intervention  - Checking coags, fibrinogen, D-dimer    Will continue to follow.    Bari Landry PA-C  Hematology/Oncology  [facility]  [phone] (office)  [phone] (alt office)  Evenings and weekends please call MD on call or office

## 2022-09-18 NOTE — PROGRESS NOTE ADULT - ASSESSMENT
77 year old female with PMH of lymphoma (dx 2005, s/p CARRIE lobectomy, relapsed in 2019 but not currently on chemo/RT, being followed by heme every 3-6months at [facility] [place]), bipolar disorder, asthma. Vascular surgery consulted for L hand discoloration and concern for flow problem. Improved this AM.    Impression/plan:  - No evidence of flow limitation on venous or arterial duplex. Patient with strongly palpable radial and ulnar pulse. Hand warm, sensation and motor intact.  - Hand discoloration most consistent with superficial ecchymosis related to multiple IVs, blood draws, etc - as can be seen on entirety of b/l upper extremities. Improved this AM  - If concern persists for underlying pathology, recommend hand surgery consult  - Please call us for any additional questions    C Team  [id number] 77 year old female with PMH of lymphoma (dx 2005, s/p CARRIE lobectomy, relapsed in 2019 but not currently on chemo/RT, being followed by heme every 3-6months at [facility] [place]), bipolar disorder, asthma. Vascular surgery consulted for L hand discoloration and concern for flow problem. Improved this AM.    Impression/plan:  - No evidence of flow limitation on venous or arterial duplex. Patient with strongly palpable radial and ulnar pulse. Hand warm, sensation and motor intact.  - Hand discoloration most consistent with superficial ecchymosis related to multiple IVs, blood draws, etc - as can be seen on entirety of b/l upper extremities. Improved this AM  - Please call us for any additional questions    C Team  [id number]

## 2022-09-18 NOTE — PROGRESS NOTE ADULT - SUBJECTIVE AND OBJECTIVE BOX
Patient is a 77y old  Female who presents with a chief complaint of Hypoxic respiratory failure (18 Sep 2022 09:09)    Patient seen and examined at bedside. On high flow O2.     MEDICATIONS  (STANDING):  acyclovir   Oral Tab/Cap 400 milliGRAM(s) Oral two times a day  albumin human  5% IVPB 2500 milliLiter(s) IV Intermittent once  albumin human  5% IVPB 2500 milliLiter(s) IV Intermittent once  albumin human  5% IVPB 2500 milliLiter(s) IV Intermittent once  bendamustine IVPB (TREANDA) (eMAR) 113 milliGRAM(s) IV Intermittent once  budesonide  80 MICROgram(s)/formoterol 4.5 MICROgram(s) Inhaler 2 Puff(s) Inhalation two times a day  calcium gluconate IVPB 2 Gram(s) IV Intermittent once  calcium gluconate IVPB 2 Gram(s) IV Intermittent once  chlorhexidine 2% Cloths 1 Application(s) Topical daily  cholecalciferol 1000 Unit(s) Oral daily  dexAMETHasone     Tablet 8 milliGRAM(s) Oral once  dextrose 50% Injectable 25 Gram(s) IV Push once  dextrose 50% Injectable 12.5 Gram(s) IV Push once  dextrose 50% Injectable 25 Gram(s) IV Push once  dextrose Oral Gel 15 Gram(s) Oral once  diphenhydrAMINE 25 milliGRAM(s) Oral once  famotidine    Tablet 40 milliGRAM(s) Oral once  glucagon  Injectable 1 milliGRAM(s) IntraMuscular once  heparin   Injectable 5000 Unit(s) SubCutaneous every 12 hours  lactated ringers. 1000 milliLiter(s) (80 mL/Hr) IV Continuous <Continuous>  levalbuterol Inhalation 0.63 milliGRAM(s) Inhalation every 12 hours  mirtazapine 7.5 milliGRAM(s) Oral <User Schedule>  multivitamin 1 Tablet(s) Oral daily  ondansetron    Tablet 8 milliGRAM(s) Oral once  piperacillin/tazobactam IVPB.. 3.375 Gram(s) IV Intermittent every 8 hours    MEDICATIONS  (PRN):  acetaminophen     Tablet .. 650 milliGRAM(s) Oral every 6 hours PRN Temp greater or equal to 38C (100.4F), Mild Pain (1 - 3)  aluminum hydroxide/magnesium hydroxide/simethicone Suspension 30 milliLiter(s) Oral every 4 hours PRN Dyspepsia  melatonin 3 milliGRAM(s) Oral at bedtime PRN Insomnia  methylPREDNISolone sodium succinate Injectable 125 milliGRAM(s) IV Push once PRN REACTION  ondansetron Injectable 4 milliGRAM(s) IV Push every 8 hours PRN Nausea and/or Vomiting      ROS  No fever, sweats, chills  No epistaxis, HA, sore throat  No CP, cough, sputum  No n/v/d, abd pain, melena, hematochezia  L hand/arm bruising  No anxiety  No back pain, joint pain  No bleeding, bruising  No dysuria, hematuria    Vital Signs Last 24 Hrs  T(C): 36.4 (17 Sep 2022 21:20), Max: 37.1 (17 Sep 2022 19:45)  T(F): 97.5 (17 Sep 2022 21:20), Max: 98.8 (17 Sep 2022 19:45)  HR: 91 (18 Sep 2022 11:32) (72 - 134)  BP: 84/51 (18 Sep 2022 06:20) (84/51 - 126/64)  BP(mean): --  RR: 23 (18 Sep 2022 11:31) (17 - 23)  SpO2: 93% (18 Sep 2022 11:32) (91% - 100%)    Parameters below as of 18 Sep 2022 11:31  Patient On (Oxygen Delivery Method): nasal cannula, high flow  O2 Flow (L/min): 50  O2 Concentration (%): 30    PE  NAD  Awake, alert  Anicteric, MMM  On high flow O2  RRR  CTAB  Abd soft, NT, ND  L arm and hand ecchymoses                          9.2    5.97  )-----------( 143      ( 17 Sep 2022 09:30 )             31.8       09-17    139  |  103  |  18  ----------------------------<  105<H>  4.0   |  25  |  0.56    Ca    9.4      17 Sep 2022 09:30  Phos  3.2     09-17  Mg     1.80     09-17    TPro  8.5<H>  /  Alb  3.5  /  TBili  0.9  /  DBili  0.2  /  AST  19  /  ALT  5   /  AlkPhos  39<L>  09-17

## 2022-09-18 NOTE — PROGRESS NOTE ADULT - PROBLEM SELECTOR PLAN 3
-Likely multi-factorial: UTI, hypercapnia, hypercalcemia, psychiatric disorder,  and now with greater concern for hyperviscosity syndrome given inability to even obtain immunoglobulin panel due to degree of hyperviscosity    -serum viscosity level  pending after plasmapheresis on 9/13   -plasmapheresis 9/13 per heme/onc for hyperviscosity --via guero, needs IgM levels after each session   -IgM level downtrending and now uptrending  - Heme/Onc recommends- will initiate bendamustine to debulk.    -daughter thinks patient stopped taking her psychiatric medication in february  - Poor PO intake- nutrition consulted previously     - Noted to be hypotensive yesterday and not improving- stat labs/CXR/ECG/UA ordered. will follow up  - monitor for side effects -Likely multi-factorial: UTI, hypercapnia, hypercalcemia, psychiatric disorder,  and now with greater concern for hyperviscosity syndrome given inability to even obtain immunoglobulin panel due to degree of hyperviscosity    -serum viscosity level  pending after plasmapheresis on 9/13   -plasmapheresis 9/13 per heme/onc for hyperviscosity --via guero, needs IgM levels after each session   -IgM level downtrending and now uptrending  - Heme/Onc recommends- will initiate bendamustine to debulk. held since pt is hypotensive    -daughter thinks patient stopped taking her psychiatric medication in february  - Poor PO intake- nutrition consulted previously     - Noted to be hypotensive yesterday and not improving- stat labs/CXR/ECG/UA ordered. will follow up  - monitor for side effects

## 2022-09-18 NOTE — PROGRESS NOTE ADULT - PROBLEM SELECTOR PLAN 11
noted to have left hand swelling and discoloration noted   Arterial and venous doppler negative for venous thrombus or arterial occlusion    Vascular consulted and no acute intervention  Discussed with hand Surgeon, , and no urgent intervention needed  will continue to monitor and elevated LUE. will avoid left arm for IV

## 2022-09-18 NOTE — PROGRESS NOTE ADULT - ASSESSMENT
77 year old female with PMH of lymphoma (dx 2005, s/p CARRIE lobectomy, relapsed in 2019 but not currently on chemo/RT, [place] [facility] [place] bipolar disorder, asthma (on O2 PRN at home, unknown how many L) brought to ED by daughter who states pt has been hallucinating, being more depressed.  Patient unable to provide much history, daughter Alissa states that patient was able to care for herself, manage home finances and attend doctor appointments independently until recently, she states that she believes her mom stopped taking her psychiatric medications in February of 2022 and has not followed up with any of her physicians. She has noticed recent weight loss (unsure how much) and change in voice.   Admitted to medicine service for further evaluation & treatment. Course c/b acute hypercapnic and hypoxic respiratory failure requiring BIPAP and AVAPS.  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

## 2022-09-18 NOTE — PROGRESS NOTE ADULT - PROBLEM SELECTOR PLAN 6
-[place] [facility] [place]received treatment recently per daughter however she doesn't know specifics  -with progression of disease  -hematology to speak to [facility] regarding if further treatment is suggested    -Hem recs appreciated- plasmapheresis  09/02, 09/09, 09/13 - IgM significantly improved to 2848  and trend IgM downtrending, now increasing. . Serum Viscosity still pending    -IgM level downtrending and now up trending  - Heme/Onc recommends- will initiate bendamustine to debulk.  - Norbert removed 9/15 and if needs repeat plasmapheresis will have to insert another cath -[place] [facility] [place] treatment recently per daughter however she doesn't know specifics  -with progression of disease  -hematology to speak to [facility] regarding if further treatment is suggested    -Hem recs appreciated- plasmapheresis  09/02, 09/09, 09/13 - IgM significantly improved to 2848  and trend IgM downtrending, now increasing. . Serum Viscosity still pending    -IgM level downtrending and now up trending  - Heme/Onc recommends- will initiate bendamustine to debulk. on hold since pt is hypotensive  - Norbert removed 9/15 and if needs repeat plasmapheresis will have to insert another cath

## 2022-09-18 NOTE — PROGRESS NOTE ADULT - PROBLEM SELECTOR PLAN 5
-likely due to underlying malignancy, lymphoma vs new MM.  PTH is inappropriately normal. vitamin D 1,25 not elevated/ PTHrP not elevated   -s/p pamidronate on 8/30; Ca improved.  -calcitonin x 4 doses  - hold off fluids for now given pulmonary edema that developed with IVF upon admission, which later required need for Lasix IV and AVAPS  -Apprec Endocrine recs  - continue to encourage PO intake  - Monitor Ca levels -likely due to underlying malignancy, lymphoma vs new MM.  PTH is inappropriately normal. vitamin D 1,25 not elevated/ PTHrP not elevated   -s/p pamidronate on 8/30; Ca improved.  -calcitonin x 4 doses  - hold off fluids for now given pulmonary edema that developed with IVF upon admission, which later   -Apprec Endocrine recs  - continue to encourage PO intake  - Monitor Ca levels

## 2022-09-18 NOTE — PROGRESS NOTE ADULT - PROBLEM SELECTOR PLAN 8
-Hx of bipolar w/psych admission @ [place] many years ago, unclear if patient actually had Bipolar   -non-compliant with medications  -Zyprexa 2.5mg QHS increased to 3.75mg QHS on 9/8/22 (pt refused), if patient has respiratory depression- hold Zyprexa, may try low dose Mirtazapine (also sedating)  -Psych recommendations appreciated   -Supportive care

## 2022-09-18 NOTE — PROGRESS NOTE ADULT - SUBJECTIVE AND OBJECTIVE BOX
Patient is a 77y old  Female who presents with a chief complaint of Hypoxic respiratory failure (17 Sep 2022 20:48)      SUBJECTIVE / OVERNIGHT EVENTS:    No events overnight. This AM, patient without n/v/d/cp/sob.      MEDICATIONS  (STANDING):  acyclovir   Oral Tab/Cap 400 milliGRAM(s) Oral two times a day  albumin human  5% IVPB 2500 milliLiter(s) IV Intermittent once  albumin human  5% IVPB 2500 milliLiter(s) IV Intermittent once  albumin human  5% IVPB 2500 milliLiter(s) IV Intermittent once  bendamustine IVPB (TREANDA) (eMAR) 113 milliGRAM(s) IV Intermittent once  budesonide  80 MICROgram(s)/formoterol 4.5 MICROgram(s) Inhaler 2 Puff(s) Inhalation two times a day  calcium gluconate IVPB 2 Gram(s) IV Intermittent once  calcium gluconate IVPB 2 Gram(s) IV Intermittent once  chlorhexidine 2% Cloths 1 Application(s) Topical daily  cholecalciferol 1000 Unit(s) Oral daily  dexAMETHasone     Tablet 8 milliGRAM(s) Oral once  dextrose 50% Injectable 25 Gram(s) IV Push once  dextrose 50% Injectable 12.5 Gram(s) IV Push once  dextrose 50% Injectable 25 Gram(s) IV Push once  dextrose Oral Gel 15 Gram(s) Oral once  diphenhydrAMINE 25 milliGRAM(s) Oral once  famotidine    Tablet 40 milliGRAM(s) Oral once  furosemide    Tablet 40 milliGRAM(s) Oral daily  glucagon  Injectable 1 milliGRAM(s) IntraMuscular once  heparin   Injectable 5000 Unit(s) SubCutaneous every 12 hours  lactated ringers. 1000 milliLiter(s) (80 mL/Hr) IV Continuous <Continuous>  levalbuterol Inhalation 0.63 milliGRAM(s) Inhalation every 12 hours  mirtazapine 7.5 milliGRAM(s) Oral <User Schedule>  multivitamin 1 Tablet(s) Oral daily  ondansetron    Tablet 8 milliGRAM(s) Oral once  piperacillin/tazobactam IVPB.. 3.375 Gram(s) IV Intermittent every 8 hours    MEDICATIONS  (PRN):  acetaminophen     Tablet .. 650 milliGRAM(s) Oral every 6 hours PRN Temp greater or equal to 38C (100.4F), Mild Pain (1 - 3)  aluminum hydroxide/magnesium hydroxide/simethicone Suspension 30 milliLiter(s) Oral every 4 hours PRN Dyspepsia  melatonin 3 milliGRAM(s) Oral at bedtime PRN Insomnia  methylPREDNISolone sodium succinate Injectable 125 milliGRAM(s) IV Push once PRN REACTION  ondansetron Injectable 4 milliGRAM(s) IV Push every 8 hours PRN Nausea and/or Vomiting      PHYSICAL EXAM:  T(C): 36.4 (22 @ 21:20), Max: 37.1 (22 @ 19:45)  HR: 72 (22 @ 08:04) (72 - 134)  BP: 84/51 (22 @ 06:20) (84/51 - 126/64)  RR: 20 (22 @ 06:33) (17 - 20)  SpO2: 97% (22 @ 08:04) (91% - 100%)  I&O's Summary    GENERAL: NAD, well-developed  HEAD:  Atraumatic, Normocephalic, MMM  CHEST/LUNG: No use of accessory muscles, CTAB, breathing non-labored  COR: RR, no mrcg  ABD: Soft, ND/NT, +BS  PSYCH: AAOx3  NEUROLOGY: CN II-XII grossly intact, moving all extremities  SKIN: No rashes or lesions  EXT: wwp, no cce    LABS:  CAPILLARY BLOOD GLUCOSE      POCT Blood Glucose.: 83 mg/dL (18 Sep 2022 05:48)  POCT Blood Glucose.: 136 mg/dL (17 Sep 2022 21:27)  POCT Blood Glucose.: 92 mg/dL (17 Sep 2022 16:59)  POCT Blood Glucose.: 112 mg/dL (17 Sep 2022 11:46)                          9.2    5.97  )-----------( 143      ( 17 Sep 2022 09:30 )             31.8         139  |  103  |  18  ----------------------------<  105<H>  4.0   |  25  |  0.56    Ca    9.4      17 Sep 2022 09:30  Phos  3.2       Mg     1.80         TPro  8.5<H>  /  Alb  3.5  /  TBili  0.9  /  DBili  0.2  /  AST  19  /  ALT  5   /  AlkPhos  39<L>  -    PT/INR - ( 17 Sep 2022 09:30 )   PT: 15.7 sec;   INR: 1.35 ratio         PTT - ( 17 Sep 2022 09:30 )  PTT:49.5 sec      Urinalysis Basic - ( 17 Sep 2022 11:30 )    Color: Yellow / Appearance: Clear / S.012 / pH: x  Gluc: x / Ketone: Negative  / Bili: Negative / Urobili: <2 mg/dL   Blood: x / Protein: 30 mg/dL / Nitrite: Negative   Leuk Esterase: Negative / RBC: 3 /HPF / WBC 0 /HPF   Sq Epi: x / Non Sq Epi: 1 /HPF / Bacteria: Negative          RADIOLOGY & ADDITIONAL TESTS:    Consultant(s) Notes Reviewed -       Care Discussed with Consultants/Other Providers -  Patient is a 77y old  Female who presents with a chief complaint of Hypoxic respiratory failure (17 Sep 2022 20:48)      SUBJECTIVE / OVERNIGHT EVENTS:    No events overnight. This AM, patient without n/v/d/cp/sob.  She reports feeling the same and has no acute complaints. She drank some orange juice while I was at bedside. States she is not hungry and does not want to eat.       MEDICATIONS  (STANDING):  acyclovir   Oral Tab/Cap 400 milliGRAM(s) Oral two times a day  albumin human  5% IVPB 2500 milliLiter(s) IV Intermittent once  albumin human  5% IVPB 2500 milliLiter(s) IV Intermittent once  albumin human  5% IVPB 2500 milliLiter(s) IV Intermittent once  bendamustine IVPB (TREANDA) (eMAR) 113 milliGRAM(s) IV Intermittent once  budesonide  80 MICROgram(s)/formoterol 4.5 MICROgram(s) Inhaler 2 Puff(s) Inhalation two times a day  calcium gluconate IVPB 2 Gram(s) IV Intermittent once  calcium gluconate IVPB 2 Gram(s) IV Intermittent once  chlorhexidine 2% Cloths 1 Application(s) Topical daily  cholecalciferol 1000 Unit(s) Oral daily  dexAMETHasone     Tablet 8 milliGRAM(s) Oral once  dextrose 50% Injectable 25 Gram(s) IV Push once  dextrose 50% Injectable 12.5 Gram(s) IV Push once  dextrose 50% Injectable 25 Gram(s) IV Push once  dextrose Oral Gel 15 Gram(s) Oral once  diphenhydrAMINE 25 milliGRAM(s) Oral once  famotidine    Tablet 40 milliGRAM(s) Oral once  furosemide    Tablet 40 milliGRAM(s) Oral daily  glucagon  Injectable 1 milliGRAM(s) IntraMuscular once  heparin   Injectable 5000 Unit(s) SubCutaneous every 12 hours  lactated ringers. 1000 milliLiter(s) (80 mL/Hr) IV Continuous <Continuous>  levalbuterol Inhalation 0.63 milliGRAM(s) Inhalation every 12 hours  mirtazapine 7.5 milliGRAM(s) Oral <User Schedule>  multivitamin 1 Tablet(s) Oral daily  ondansetron    Tablet 8 milliGRAM(s) Oral once  piperacillin/tazobactam IVPB.. 3.375 Gram(s) IV Intermittent every 8 hours    MEDICATIONS  (PRN):  acetaminophen     Tablet .. 650 milliGRAM(s) Oral every 6 hours PRN Temp greater or equal to 38C (100.4F), Mild Pain (1 - 3)  aluminum hydroxide/magnesium hydroxide/simethicone Suspension 30 milliLiter(s) Oral every 4 hours PRN Dyspepsia  melatonin 3 milliGRAM(s) Oral at bedtime PRN Insomnia  methylPREDNISolone sodium succinate Injectable 125 milliGRAM(s) IV Push once PRN REACTION  ondansetron Injectable 4 milliGRAM(s) IV Push every 8 hours PRN Nausea and/or Vomiting      PHYSICAL EXAM:  T(C): 36.4 (22 @ 21:20), Max: 37.1 (22 @ 19:45)  HR: 72 (22 @ 08:04) (72 - 134)  BP: 84/51 (22 @ 06:20) (84/51 - 126/64)  RR: 20 (22 @ 06:33) (17 - 20)  SpO2: 97% (22 @ 08:04) (91% - 100%)  I&O's Summary    GENERAL: NAD, frail appearing  HEAD:  Atraumatic, Normocephalic, MMM  CHEST/LUNG: No use of accessory muscles, CTA B/L, breathing non-labored  COR: RR, normal S1/S2   ABD: Soft, ND /NT, +BS  PSYCH: AOx3   NEUROLOGY: CN II-XII grossly intact, moving all extremities  SKIN: No rashes. multiple ecchymotic lesions on LUE and on left hand with discoloration, peripheral pulses palpable   EXT: no edema/cyanosis/clubbing    LABS:  CAPILLARY BLOOD GLUCOSE      POCT Blood Glucose.: 83 mg/dL (18 Sep 2022 05:48)  POCT Blood Glucose.: 136 mg/dL (17 Sep 2022 21:27)  POCT Blood Glucose.: 92 mg/dL (17 Sep 2022 16:59)  POCT Blood Glucose.: 112 mg/dL (17 Sep 2022 11:46)                          9.2    5.97  )-----------( 143      ( 17 Sep 2022 09:30 )             31.8         139  |  103  |  18  ----------------------------<  105<H>  4.0   |  25  |  0.56    Ca    9.4      17 Sep 2022 09:30  Phos  3.2       Mg     1.80         TPro  8.5<H>  /  Alb  3.5  /  TBili  0.9  /  DBili  0.2  /  AST  19  /  ALT  5   /  AlkPhos  39<L>      PT/INR - ( 17 Sep 2022 09:30 )   PT: 15.7 sec;   INR: 1.35 ratio         PTT - ( 17 Sep 2022 09:30 )  PTT:49.5 sec      Urinalysis Basic - ( 17 Sep 2022 11:30 )    Color: Yellow / Appearance: Clear / S.012 / pH: x  Gluc: x / Ketone: Negative  / Bili: Negative / Urobili: <2 mg/dL   Blood: x / Protein: 30 mg/dL / Nitrite: Negative   Leuk Esterase: Negative / RBC: 3 /HPF / WBC 0 /HPF   Sq Epi: x / Non Sq Epi: 1 /HPF / Bacteria: Negative          RADIOLOGY & ADDITIONAL TESTS:    Consultant(s) Notes Reviewed -       Care Discussed with Consultants/Other Providers -

## 2022-09-18 NOTE — PROGRESS NOTE ADULT - NSPROGADDITIONALINFOA_GEN_ALL_CORE
Spoke with daughter and updated about plan. She will be coming to visit the pt this  weekend   Code status: made DNR/DNI by daughter with Palliative Medicine  Best number to call Alissa at [phone].

## 2022-09-18 NOTE — PROGRESS NOTE ADULT - PROBLEM SELECTOR PLAN 1
Has been hypotensive and improves with IVF  R/O sepsis- procal pending  No leukocytosis/UA neg and CXR negative for infectious etiology     Continue IVF for now   will give one dose of Zosyn and continue for now   ordered BCx and Urine Cx  Legionella/ S.Pneumo and MRSA pending Has been hypotensive and improves with IVF  R/O sepsis- procal wnl, No leukocytosis/UA neg and CXR negative for infectious etiology     Continue IVF for now and added midodrine 10mg TID for now. Consulted cardio, pt remains asymptomatic   will give one dose of Zosyn and stopped  ordered BCx and Urine Cx  Legionella/ S.Pneumo and MRSA pending    Follow up cards consult. Monitor VS

## 2022-09-18 NOTE — PROGRESS NOTE ADULT - SUBJECTIVE AND OBJECTIVE BOX
Surgery Progress Note    SUBJECTIVE:   Pt seen and examined at bedside. Patient with hospitalist bedside and on NC. Hand discoloration improved this AM      OBJECTIVE:  Vital Signs Last 24 Hrs  T(C): 36.4 (17 Sep 2022 21:20), Max: 37.1 (17 Sep 2022 19:45)  T(F): 97.5 (17 Sep 2022 21:20), Max: 98.8 (17 Sep 2022 19:45)  HR: 91 (18 Sep 2022 11:32) (72 - 134)  BP: 84/51 (18 Sep 2022 06:20) (84/51 - 126/64)  BP(mean): --  RR: 23 (18 Sep 2022 11:31) (17 - 23)  SpO2: 93% (18 Sep 2022 11:32) (91% - 100%)    Parameters below as of 18 Sep 2022 11:31  Patient On (Oxygen Delivery Method): nasal cannula, high flow  O2 Flow (L/min): 50  O2 Concentration (%): 30    Physical Exam:  General Appearance: Appears well, NAD  Respiratory: No labored breathing  CV: Pulse regularly present  Abdomen: Soft, nontender, ***incision c/d/i    LABS:                        9.2    5.97  )-----------( 143      ( 17 Sep 2022 09:30 )             31.8         139  |  103  |  18  ----------------------------<  105<H>  4.0   |  25  |  0.56    Ca    9.4      17 Sep 2022 09:30  Phos  3.2       Mg     1.80         TPro  8.5<H>  /  Alb  3.5  /  TBili  0.9  /  DBili  0.2  /  AST  19  /  ALT  5   /  AlkPhos  39<L>      PT/INR - ( 17 Sep 2022 09:30 )   PT: 15.7 sec;   INR: 1.35 ratio         PTT - ( 17 Sep 2022 09:30 )  PTT:49.5 sec  Urinalysis Basic - ( 17 Sep 2022 11:30 )    Color: Yellow / Appearance: Clear / S.012 / pH: x  Gluc: x / Ketone: Negative  / Bili: Negative / Urobili: <2 mg/dL   Blood: x / Protein: 30 mg/dL / Nitrite: Negative   Leuk Esterase: Negative / RBC: 3 /HPF / WBC 0 /HPF   Sq Epi: x / Non Sq Epi: 1 /HPF / Bacteria: Negative          RADIOLOGY & ADDITIONAL STUDIES:      INs and OUTs:      MEDICATIONS  (STANDING):  acyclovir   Oral Tab/Cap 400 milliGRAM(s) Oral two times a day  albumin human  5% IVPB 2500 milliLiter(s) IV Intermittent once  albumin human  5% IVPB 2500 milliLiter(s) IV Intermittent once  albumin human  5% IVPB 2500 milliLiter(s) IV Intermittent once  bendamustine IVPB (TREANDA) (eMAR) 113 milliGRAM(s) IV Intermittent once  budesonide  80 MICROgram(s)/formoterol 4.5 MICROgram(s) Inhaler 2 Puff(s) Inhalation two times a day  calcium gluconate IVPB 2 Gram(s) IV Intermittent once  calcium gluconate IVPB 2 Gram(s) IV Intermittent once  chlorhexidine 2% Cloths 1 Application(s) Topical daily  cholecalciferol 1000 Unit(s) Oral daily  dexAMETHasone     Tablet 8 milliGRAM(s) Oral once  dextrose 50% Injectable 25 Gram(s) IV Push once  dextrose 50% Injectable 12.5 Gram(s) IV Push once  dextrose 50% Injectable 25 Gram(s) IV Push once  dextrose Oral Gel 15 Gram(s) Oral once  diphenhydrAMINE 25 milliGRAM(s) Oral once  famotidine    Tablet 40 milliGRAM(s) Oral once  glucagon  Injectable 1 milliGRAM(s) IntraMuscular once  heparin   Injectable 5000 Unit(s) SubCutaneous every 12 hours  lactated ringers. 1000 milliLiter(s) (80 mL/Hr) IV Continuous <Continuous>  levalbuterol Inhalation 0.63 milliGRAM(s) Inhalation every 12 hours  midodrine 10 milliGRAM(s) Oral every 8 hours  mirtazapine 7.5 milliGRAM(s) Oral <User Schedule>  multivitamin 1 Tablet(s) Oral daily  ondansetron    Tablet 8 milliGRAM(s) Oral once  piperacillin/tazobactam IVPB.. 3.375 Gram(s) IV Intermittent every 8 hours    MEDICATIONS  (PRN):  acetaminophen     Tablet .. 650 milliGRAM(s) Oral every 6 hours PRN Temp greater or equal to 38C (100.4F), Mild Pain (1 - 3)  aluminum hydroxide/magnesium hydroxide/simethicone Suspension 30 milliLiter(s) Oral every 4 hours PRN Dyspepsia  melatonin 3 milliGRAM(s) Oral at bedtime PRN Insomnia  methylPREDNISolone sodium succinate Injectable 125 milliGRAM(s) IV Push once PRN REACTION  ondansetron Injectable 4 milliGRAM(s) IV Push every 8 hours PRN Nausea and/or Vomiting   Surgery Progress Note    SUBJECTIVE:   Pt seen and examined at bedside. Patient with hospitalist bedside and on NC. Hand discoloration improved this AM      OBJECTIVE:  Vital Signs Last 24 Hrs  T(C): 36.4 (17 Sep 2022 21:20), Max: 37.1 (17 Sep 2022 19:45)  T(F): 97.5 (17 Sep 2022 21:20), Max: 98.8 (17 Sep 2022 19:45)  HR: 91 (18 Sep 2022 11:32) (72 - 134)  BP: 84/51 (18 Sep 2022 06:20) (84/51 - 126/64)  BP(mean): --  RR: 23 (18 Sep 2022 11:31) (17 - 23)  SpO2: 93% (18 Sep 2022 11:32) (91% - 100%)    Parameters below as of 18 Sep 2022 11:31  Patient On (Oxygen Delivery Method): nasal cannula, high flow  O2 Flow (L/min): 50  O2 Concentration (%): 30    Physical Exam:  GEN: NAD  CHEST: no increased WOB  LUE: superficial bruising noted over entirety of L arm. Superficial hematoma approx 3cm in diameter palpated over LUE, mobile, nontender. Motor and sensory grossly intact  L radial and ulnar pulses palpable. Strong palmar arch doppler signal  NEURO: A&Ox3    LABS:                        9.2    5.97  )-----------( 143      ( 17 Sep 2022 09:30 )             31.8     -17    139  |  103  |  18  ----------------------------<  105<H>  4.0   |  25  |  0.56    Ca    9.4      17 Sep 2022 09:30  Phos  3.2       Mg     1.80     -    TPro  8.5<H>  /  Alb  3.5  /  TBili  0.9  /  DBili  0.2  /  AST  19  /  ALT  5   /  AlkPhos  39<L>  -17    PT/INR - ( 17 Sep 2022 09:30 )   PT: 15.7 sec;   INR: 1.35 ratio         PTT - ( 17 Sep 2022 09:30 )  PTT:49.5 sec  Urinalysis Basic - ( 17 Sep 2022 11:30 )    Color: Yellow / Appearance: Clear / S.012 / pH: x  Gluc: x / Ketone: Negative  / Bili: Negative / Urobili: <2 mg/dL   Blood: x / Protein: 30 mg/dL / Nitrite: Negative   Leuk Esterase: Negative / RBC: 3 /HPF / WBC 0 /HPF   Sq Epi: x / Non Sq Epi: 1 /HPF / Bacteria: Negative          RADIOLOGY & ADDITIONAL STUDIES:      INs and OUTs:      MEDICATIONS  (STANDING):  acyclovir   Oral Tab/Cap 400 milliGRAM(s) Oral two times a day  albumin human  5% IVPB 2500 milliLiter(s) IV Intermittent once  albumin human  5% IVPB 2500 milliLiter(s) IV Intermittent once  albumin human  5% IVPB 2500 milliLiter(s) IV Intermittent once  bendamustine IVPB (TREANDA) (eMAR) 113 milliGRAM(s) IV Intermittent once  budesonide  80 MICROgram(s)/formoterol 4.5 MICROgram(s) Inhaler 2 Puff(s) Inhalation two times a day  calcium gluconate IVPB 2 Gram(s) IV Intermittent once  calcium gluconate IVPB 2 Gram(s) IV Intermittent once  chlorhexidine 2% Cloths 1 Application(s) Topical daily  cholecalciferol 1000 Unit(s) Oral daily  dexAMETHasone     Tablet 8 milliGRAM(s) Oral once  dextrose 50% Injectable 25 Gram(s) IV Push once  dextrose 50% Injectable 12.5 Gram(s) IV Push once  dextrose 50% Injectable 25 Gram(s) IV Push once  dextrose Oral Gel 15 Gram(s) Oral once  diphenhydrAMINE 25 milliGRAM(s) Oral once  famotidine    Tablet 40 milliGRAM(s) Oral once  glucagon  Injectable 1 milliGRAM(s) IntraMuscular once  heparin   Injectable 5000 Unit(s) SubCutaneous every 12 hours  lactated ringers. 1000 milliLiter(s) (80 mL/Hr) IV Continuous <Continuous>  levalbuterol Inhalation 0.63 milliGRAM(s) Inhalation every 12 hours  midodrine 10 milliGRAM(s) Oral every 8 hours  mirtazapine 7.5 milliGRAM(s) Oral <User Schedule>  multivitamin 1 Tablet(s) Oral daily  ondansetron    Tablet 8 milliGRAM(s) Oral once  piperacillin/tazobactam IVPB.. 3.375 Gram(s) IV Intermittent every 8 hours    MEDICATIONS  (PRN):  acetaminophen     Tablet .. 650 milliGRAM(s) Oral every 6 hours PRN Temp greater or equal to 38C (100.4F), Mild Pain (1 - 3)  aluminum hydroxide/magnesium hydroxide/simethicone Suspension 30 milliLiter(s) Oral every 4 hours PRN Dyspepsia  melatonin 3 milliGRAM(s) Oral at bedtime PRN Insomnia  methylPREDNISolone sodium succinate Injectable 125 milliGRAM(s) IV Push once PRN REACTION  ondansetron Injectable 4 milliGRAM(s) IV Push every 8 hours PRN Nausea and/or Vomiting

## 2022-09-18 NOTE — PROGRESS NOTE ADULT - PROBLEM SELECTOR PLAN 2
hypercapnic and hypoxemic respiratory failure  worsening resp status on 9/10, pt lethargic, was not using AVAPS the night prior, was on NRB instead  - VBG this am and pCO2 improving, placed on HFNC in am- continue to wean off oxygen as tolerated  -C/w intermittent diuresis for pleural effusions as BP allows, last given IV Lasix 40mg on 9/10.   -Seen by pulm and recommend Diamox one dose 9/13 in setting of worsening bicarb  -Palliative following  -Continue Duoneb BID, Symbicort BID, Albuterol PRN hypercapnic and hypoxemic respiratory failure  worsening resp status on 9/10, pt lethargic, was not using AVAPS the night prior, was on NRB instead   placed on HFNC in am- continue to wean off oxygen as tolerated  -C/w intermittent diuresis for pleural effusions as BP allows, last given IV Lasix 40mg on 9/10.   -Seen by pulm and recommend Diamox one dose 9/13 in setting of worsening bicarb  -Palliative following  -Continue Duoneb BID, Symbicort BID, Albuterol PRN

## 2022-09-19 NOTE — PROGRESS NOTE ADULT - ASSESSMENT
77 year old female with PMH of lymphoma (dx 2005, s/p CARRIE lobectomy, relapsed in 2019 but not currently on chemo/RT, being followed by heme every 3-6months at [facility] [place]), bipolar disorder, asthma (on O2 PRN at home, unknown how many L) brought to ED by daughter who states pt has been hallucinating, being more depressed.  Patient unable to provide much history, daughter Alissa states that patient was able to care for herself, manage home finances and attend doctor appointments independently until recently, she states that she believes her mom stopped taking her psychiatric medications in February of 2022 and has not followed up with any of her physicians. She has noticed recent weight loss (unsure how much) and change in voice.   Admitted to medicine service for further evaluation & treatment. Course c/b acute hypercapnic and hypoxic respiratory failure requiring BIPAP and AVAPS. Now with concern for hyperviscosity syndrome given inability to even obtain immunoglobulin panel due to serum viscosity, s/p plasmapheresis x3.

## 2022-09-19 NOTE — PROGRESS NOTE ADULT - PROBLEM SELECTOR PLAN 3
-Likely multi-factorial: UTI, hypercapnia, hypercalcemia, psychiatric disorder, and now with greater concern for hyperviscosity syndrome  -serum viscosity level improved to 2.1 after plasmapheresis on 9/13 (peak of 7.6)  -IgM level downtrending and now uptrending  -Heme/Onc recommends- will initiate bendamustine to debulk. holding currently since pt is hypotensive  -daughter thinks patient stopped taking her psychiatric medication in february  -Poor PO intake- nutrition consulted previously   -BPs remaining stable on Midodrine 10mg TID

## 2022-09-19 NOTE — PROGRESS NOTE ADULT - PROBLEM SELECTOR PLAN 2
hypercapnic and hypoxemic respiratory failure  worsening resp status on 9/10, pt lethargic, was not using AVAPS the night prior, was on NRB instead   placed on HFNC in am- continue to wean off oxygen as tolerated  -C/w intermittent diuresis for pleural effusions as BP allows, last given IV Lasix 40mg on 9/10.   -Seen by pulm and recommend Diamox one dose 9/13 in setting of worsening bicarb  -Palliative following  -Continue Duoneb BID, Symbicort BID, Albuterol PRN  -c/f NC or HFNC in daytime and AVAPS at bedtime (repeat ABG on 9/19 showing continued pCO2 elevation to 70)

## 2022-09-19 NOTE — PROGRESS NOTE ADULT - ASSESSMENT
This is a 77 year old woman with history of HLD, lymphoma (dx 2005, s/p CARRIE lobectomy, relapsed in 2019 but not currently on chemo/RT, being followed by heme every 3-6months at [facility]), bipolar disorder, asthma, who presented to the hospital for AMS and hypoxic respiratory failure, found to have hypercalcemia.     #Hypercalcemia - malignancy-related vs medication induced (lithium induced parathyroidism) vs in the setting of potential multiple myeloma transformation   - s/p 4 doses of IV Calcitonin, s/p pamidronate 60 mg IV x 1 8/30/22   - Corrected calcium today 9.9 on labs done correctly and not done form IV line  - 1,25 vitamin D not elevated (37.1)  -PTH-related peptide negative  -Etiology of hypercalcemia seems most likely related to multiple myeloma/monoclonal gammopathy. Management per heme/onc.  -Also of note is receiving calcium gluconate as part of plasma exchange therapy which may be contributing to higher calcium level.  At this time calcium is stable, no additional endocrine workup is needed.    #history of fracture in the setting of likely osteoporosis;.   - Imaging findings of Old right humeral neck fracture. Left proximal humeral fracture s/p ORIF  .- f/u outpatient bone-density testing.   - 25-hydroxy Vitamin D level of 18.4 (low),   -continue cholecalciferol 1000 units daily.    #Hyperlipidemia  - history of hyperlipidemia   - Cholesterol of 54, Triglycerides of 30, Serum HDL of 21, LDL of 27. A1C of 4.5   - can hold on statin therapy at this time due to age and risk factors at this time   - home medication history of zyprexa.     #Hypoglycemia -   Ongoing poor po intake  encourage po intake/assistance  6 AM cortisol 8.6, repeated AM cortisol at 8AM today resulted at 12.4 acceptable level and not indicative of Adrenal insufficiency. Will defer need for stim test at this time.    Will sign off at this time  Please call if any further questions.    Thais Tovar MD  Division of Endocrinology  Pager: [phone]    If after 6PM or before 9AM, or on weekends/holidays, please call endocrine answering service for assistance ([phone]).  For nonurgent matters email [email or web address] for assistance. This is a 77 year old woman with history of HLD, lymphoma (dx 2005, s/p CARRIE lobectomy, relapsed in 2019 but not currently on chemo/RT, being followed by heme every 3-6months at [facility] [place]), bipolar disorder, asthma, who presented to the hospital for AMS and hypoxic respiratory failure, found to have hypercalcemia.     #Hypercalcemia - malignancy-related vs medication induced (lithium induced parathyroidism) vs in the setting of potential multiple myeloma transformation   - s/p 4 doses of IV Calcitonin, s/p pamidronate 60 mg IV x 1 8/30/22   - Corrected calcium today 9.9 on labs done correctly and not done form IV line  - 1,25 vitamin D not elevated (37.1)  -PTH-related peptide negative  -Etiology of hypercalcemia seems most likely related to multiple myeloma/monoclonal gammopathy. Management per heme/onc.  -Also of note is receiving calcium gluconate as part of plasma exchange therapy which may be contributing to higher calcium level.  At this time calcium is stable, no additional endocrine workup is needed.    #history of fracture in the setting of likely osteoporosis;.   - Imaging findings of Old right humeral neck fracture. Left proximal humeral fracture s/p ORIF  .- f/u outpatient bone-density testing.   - 25-hydroxy Vitamin D level of 18.4 (low),   -continue cholecalciferol 1000 units daily.    #Hyperlipidemia  - history of hyperlipidemia   - Cholesterol of 54, Triglycerides of 30, Serum HDL of 21, LDL of 27. A1C of 4.5   - can hold on statin therapy at this time due to age and risk factors at this time   - home medication history of zyprexa.     #Hypoglycemia - not meeting whipples triad during prior events, although limited by poor historian. Glucose now improved.  Ongoing poor po intake  encourage po intake/assistance  6 AM cortisol 8.6, repeated AM cortisol at 8AM today resulted at 12.4 acceptable level and not indicative of Adrenal insufficiency. Will defer need for stim test at this time.    Will sign off at this time  Please call if any further questions.    Thais Tovar MD  Division of Endocrinology  Pager: [phone]    If after 6PM or before 9AM, or on weekends/holidays, please call endocrine answering service for assistance ([phone]).  For nonurgent matters email [email or web address] for assistance.

## 2022-09-19 NOTE — CONSULT NOTE ADULT - CONSULT REASON
Non-tunneled central catheter
Symptom management
marginal zone lymphoma
hypotension
Evaluation for hyperviscosity syndrome
L hand bruising
Pleural Effusion
PLEX
hypercalcemia
hypoxia

## 2022-09-19 NOTE — PROGRESS NOTE ADULT - PROBLEM SELECTOR PLAN 8
-Hx of bipolar w/psych admission @ [facility] many years ago, unclear if patient actually had Bipolar   -non-compliant with medications  -Zyprexa 2.5mg QHS increased to 3.75mg QHS on 9/8/22 (pt refused), if patient has respiratory depression- hold Zyprexa, may try low dose Mirtazapine (also sedating)  -Psych recommendations appreciated   -Supportive care

## 2022-09-19 NOTE — PROGRESS NOTE ADULT - PROBLEM SELECTOR PLAN 1
Has been hypotensive and improves with IVF  Initially with c/f sepsis, however no infectious source identified thus far  Continue IVF for now and added midodrine 10mg TID for now. Consulted cardio, pt remains asymptomatic   s/p one dose of zosyn  Bcx from 9/17 with NGTD, UA from 9/17 unremarkable  c/w close vital sign monitoring

## 2022-09-19 NOTE — CONSULT NOTE ADULT - ASSESSMENT
77 year old female with PMH of lymphoma (dx 2005, s/p CARRIE lobectomy, relapsed in 2019 but not currently on chemo/RT, being followed by heme every 3-6months at [facility]), bipolar disorder, asthma adm w FTT, course c/b acute hypercapnic and hypoxic respiratory failure requiring BIPAP and AVAPS. Now with concern for hyperviscosity syndrome given inability to even obtain immunoglobulin panel due to serum viscosity, s/p plasmapheresis x3.     #Hypotension  -likely hypovolemic versus obstructive mediastinal mass  -responsive to IVF and midodrine  -cont midodrine as ordered  -no concern for pericardial process(no significant effusion seen on recent CT)  -however if pt develops recurrent resistant hypotension would repeat limited TTE    #Pleural Effusions  -may be malignant versus CHF  -hold off on diuresis now given recent hypotension    mgmt per medicine    70 minutes spent on total encounter; more than 50% of the visit was spent counseling and/or coordinating care by the attending physician.

## 2022-09-19 NOTE — PROGRESS NOTE ADULT - SUBJECTIVE AND OBJECTIVE BOX
[facility] Department of Hospital Medicine  Albania Del Real MD  Available on MS Teams  Pager: [phone]    Patient is a 77y old  Female who presents with a chief complaint of Hypoxic respiratory failure (19 Sep 2022 15:14)    OVERNIGHT EVENTS: No acute events overnight.    SUBJECTIVE: Pt seen and examined at bedside this morning. Alert and conversant. Patient being given medications with applesauce, tolerating well. Endorses some shortness of breath but otherwise denying any other complaints this morning. Overall endorsing poor appetite. Denies any extremity pain. Endorsing regular urination and BMs.    ADDITIONAL REVIEW OF SYSTEMS:    MEDICATIONS  (STANDING):  acyclovir   Oral Tab/Cap 400 milliGRAM(s) Oral two times a day  albumin human  5% IVPB 2500 milliLiter(s) IV Intermittent once  albumin human  5% IVPB 2500 milliLiter(s) IV Intermittent once  albumin human  5% IVPB 2500 milliLiter(s) IV Intermittent once  bendamustine IVPB (TREANDA) (eMAR) 113 milliGRAM(s) IV Intermittent once  budesonide  80 MICROgram(s)/formoterol 4.5 MICROgram(s) Inhaler 2 Puff(s) Inhalation two times a day  calcium gluconate IVPB 2 Gram(s) IV Intermittent once  calcium gluconate IVPB 2 Gram(s) IV Intermittent once  chlorhexidine 2% Cloths 1 Application(s) Topical daily  cholecalciferol 1000 Unit(s) Oral daily  dexAMETHasone     Tablet 8 milliGRAM(s) Oral once  dextrose 50% Injectable 25 Gram(s) IV Push once  dextrose 50% Injectable 12.5 Gram(s) IV Push once  dextrose 50% Injectable 25 Gram(s) IV Push once  dextrose Oral Gel 15 Gram(s) Oral once  diphenhydrAMINE 25 milliGRAM(s) Oral once  famotidine    Tablet 40 milliGRAM(s) Oral once  glucagon  Injectable 1 milliGRAM(s) IntraMuscular once  heparin   Injectable 5000 Unit(s) SubCutaneous every 12 hours  lactated ringers. 1000 milliLiter(s) (80 mL/Hr) IV Continuous <Continuous>  levalbuterol Inhalation 0.63 milliGRAM(s) Inhalation every 12 hours  midodrine 10 milliGRAM(s) Oral every 8 hours  mirtazapine 7.5 milliGRAM(s) Oral <User Schedule>  multivitamin 1 Tablet(s) Oral daily  ondansetron    Tablet 8 milliGRAM(s) Oral once    MEDICATIONS  (PRN):  acetaminophen     Tablet .. 650 milliGRAM(s) Oral every 6 hours PRN Temp greater or equal to 38C (100.4F), Mild Pain (1 - 3)  aluminum hydroxide/magnesium hydroxide/simethicone Suspension 30 milliLiter(s) Oral every 4 hours PRN Dyspepsia  melatonin 3 milliGRAM(s) Oral at bedtime PRN Insomnia  methylPREDNISolone sodium succinate Injectable 125 milliGRAM(s) IV Push once PRN REACTION  ondansetron Injectable 4 milliGRAM(s) IV Push every 8 hours PRN Nausea and/or Vomiting    CAPILLARY BLOOD GLUCOSE    POCT Blood Glucose.: 82 mg/dL (19 Sep 2022 11:39)  POCT Blood Glucose.: 76 mg/dL (19 Sep 2022 07:31)    I&O's Summary    PHYSICAL EXAM:  Vital Signs Last 24 Hrs  T(C): 36.3 (19 Sep 2022 12:56), Max: 36.4 (18 Sep 2022 22:11)  T(F): 97.4 (19 Sep 2022 12:56), Max: 97.6 (19 Sep 2022 02:00)  HR: 76 (19 Sep 2022 12:56) (58 - 106)  BP: 96/42 (19 Sep 2022 12:56) (92/39 - 125/60)  BP(mean): --  RR: 20 (19 Sep 2022 14:12) (19 - 20)  SpO2: 100% (19 Sep 2022 14:12) (92% - 100%)    Parameters below as of 19 Sep 2022 14:12  Patient On (Oxygen Delivery Method): nasal cannula  O2 Flow (L/min): 2    CONSTITUTIONAL: NAD, well-developed  HEAD: Normocephalic, atraumatic  EYES: EOMI, PERRL  ENT: no rhinorrhea, no pharyngeal erythema, HFNC in place  RESPIRATORY: No increased work of breathing, CTAB, no wheezes or crackles appreciated  CARDIOVASCULAR: RRR, S1 and S2 present, no m/r/g  ABDOMEN: soft, NT, ND, bowel sounds present  EXTREMITIES: LUE with some mottling noted, however equal and strong radial pulses b/l and skin warm to touch  MUSCULOSKELETAL: no joint swelling, no tenderness to palpation  NEURO: A&Ox2-3, moving all extremities    LABS:                        8.3    6.53  )-----------( 160      ( 19 Sep 2022 06:13 )             28.0     09-19    140  |  104  |  21  ----------------------------<  73  4.4   |  29  |  0.61    Ca    9.5      19 Sep 2022 06:13  Phos  2.7     09-19  Mg     1.60     09-19    PT/INR - ( 19 Sep 2022 06:13 )   PT: 18.1 sec;   INR: 1.55 ratio       PTT - ( 19 Sep 2022 06:13 )  PTT:46.1 sec    Culture - Blood (collected 17 Sep 2022 18:25)  Source: .Blood Blood-Peripheral  Preliminary Report (19 Sep 2022 01:02):    No growth to date.    RADIOLOGY & ADDITIONAL TESTS:    Results Reviewed:   Imaging Personally Reviewed:  Electrocardiogram Personally Reviewed:    COORDINATION OF CARE:  Care Discussed with Consultants/Other Providers [Y/N]:  Prior or Outpatient Records Reviewed [Y/N]:

## 2022-09-19 NOTE — PROGRESS NOTE ADULT - SUBJECTIVE AND OBJECTIVE BOX
Chief Complaint: Hypercalcemia, hypoglycemia    History: high flow O2, desat per RN  some juice intake but minimal solid  receiving IVF with LR@ 80 cc/hour  poor historian    MEDICATIONS  (STANDING):  acyclovir   Oral Tab/Cap 400 milliGRAM(s) Oral two times a day  albumin human  5% IVPB 2500 milliLiter(s) IV Intermittent once  albumin human  5% IVPB 2500 milliLiter(s) IV Intermittent once  albumin human  5% IVPB 2500 milliLiter(s) IV Intermittent once  bendamustine IVPB (TREANDA) (eMAR) 113 milliGRAM(s) IV Intermittent once  budesonide  80 MICROgram(s)/formoterol 4.5 MICROgram(s) Inhaler 2 Puff(s) Inhalation two times a day  calcium gluconate IVPB 2 Gram(s) IV Intermittent once  calcium gluconate IVPB 2 Gram(s) IV Intermittent once  chlorhexidine 2% Cloths 1 Application(s) Topical daily  cholecalciferol 1000 Unit(s) Oral daily  dexAMETHasone     Tablet 8 milliGRAM(s) Oral once  dextrose 50% Injectable 25 Gram(s) IV Push once  dextrose 50% Injectable 12.5 Gram(s) IV Push once  dextrose 50% Injectable 25 Gram(s) IV Push once  dextrose Oral Gel 15 Gram(s) Oral once  diphenhydrAMINE 25 milliGRAM(s) Oral once  famotidine    Tablet 40 milliGRAM(s) Oral once  glucagon  Injectable 1 milliGRAM(s) IntraMuscular once  heparin   Injectable 5000 Unit(s) SubCutaneous every 12 hours  lactated ringers. 1000 milliLiter(s) (80 mL/Hr) IV Continuous <Continuous>  levalbuterol Inhalation 0.63 milliGRAM(s) Inhalation every 12 hours  midodrine 10 milliGRAM(s) Oral every 8 hours  mirtazapine 7.5 milliGRAM(s) Oral <User Schedule>  multivitamin 1 Tablet(s) Oral daily  ondansetron    Tablet 8 milliGRAM(s) Oral once    MEDICATIONS  (PRN):  acetaminophen     Tablet .. 650 milliGRAM(s) Oral every 6 hours PRN Temp greater or equal to 38C (100.4F), Mild Pain (1 - 3)  aluminum hydroxide/magnesium hydroxide/simethicone Suspension 30 milliLiter(s) Oral every 4 hours PRN Dyspepsia  melatonin 3 milliGRAM(s) Oral at bedtime PRN Insomnia  methylPREDNISolone sodium succinate Injectable 125 milliGRAM(s) IV Push once PRN REACTION  ondansetron Injectable 4 milliGRAM(s) IV Push every 8 hours PRN Nausea and/or Vomiting      Allergies    latex (Rash)  No Known Drug Allergies    Intolerances      Review of Systems:    UNABLE TO OBTAIN    PHYSICAL EXAM:  VITALS: T(C): 36.3 (09-19-22 @ 12:56)  T(F): 97.4 (09-19-22 @ 12:56), Max: 97.6 (09-19-22 @ 02:00)  HR: 76 (09-19-22 @ 12:56) (58 - 106)  BP: 96/42 (09-19-22 @ 12:56) (92/39 - 125/60)  RR:  (19 - 20)  SpO2:  (92% - 100%)  Wt(kg): --  GENERAL: NAD, lethargic  EYES: No proptosis, anicteric  HEENT:  Atraumatic, Normocephalic  RESPIRATORY: high flow O2 in place  PSYCH: poor historian    CAPILLARY BLOOD GLUCOSE      POCT Blood Glucose.: 82 mg/dL (19 Sep 2022 11:39)  POCT Blood Glucose.: 76 mg/dL (19 Sep 2022 07:31)      09-19    140  |  104  |  21  ----------------------------<  73  4.4   |  29  |  0.61    eGFR: 93    Ca    9.5      09-19  Mg     1.60     09-19  Phos  2.7     09-19    TPro  8.5<H>  /  Alb  3.5  /  TBili  0.9  /  DBili  0.2  /  AST  19  /  ALT  5   /  AlkPhos  39<L>  09-17      A1C with Estimated Average Glucose Result: 4.5 % (08-31-22 @ 03:48)      Thyroid Function Tests:  09-10 @ 11:45 TSH -- FreeT4 1.1 T3 -- Anti TPO -- Anti Thyroglobulin Ab -- TSI --  08-31 @ 03:48 TSH 1.42 FreeT4 -- T3 -- Anti TPO -- Anti Thyroglobulin Ab -- TSI --

## 2022-09-19 NOTE — PROGRESS NOTE ADULT - PROBLEM SELECTOR PLAN 5
-likely due to underlying malignancy, lymphoma vs new MM.  PTH is inappropriately normal. vitamin D 1,25 not elevated/ PTHrP not elevated   -s/p pamidronate on 8/30; Ca improved and now remaining stable  -calcitonin x 4 doses  -hold off fluids for now given pulmonary edema that developed with IVF upon admission  -Apprec Endocrine recs, no further workup indicated for HyperCa  -continue to encourage PO intake  -Monitor Ca levels

## 2022-09-19 NOTE — CHART NOTE - NSCHARTNOTEFT_GEN_A_CORE
Pulm called and requested ABG on RA with patient to assist with dc planning and insurance issues/planning for AVAPS  Patient placed on RA and rapidly desaturated to 70's on RA.  ABG drawn with patient on NC2L as per pulms recs   Results of ABG reviewed with pulm fellow and advised to repeat ABG in AM after patient is taken off QHS AVAPS   Pulm will continue to follow     ABG - ( 19 Sep 2022 15:32 )  pH, Arterial: 7.28  pH, Blood: x     /  pCO2: 70    /  pO2: 67    / HCO3: 33    / Base Excess: 3.9   /  SaO2: 94.2 Pulm called and requested ABG on RA with patient to assist with dc planning and insurance issues/planning for AVAPS  Patient placed on RA and rapidly desaturated to 70's on RA, in no distress   ABG drawn with patient on NC2L as per pulms recs   Results of ABG reviewed with pulm fellow and advised to repeat ABG in AM after patient is taken off QHS AVAPS   Pulm advised to continue with patient on nasal cannula at 2 lpm, patient is tolerating well at this time, alert, speaking and able to follow commands in no respiratory distress   Pulm will continue to follow     ABG - ( 19 Sep 2022 15:32 )  pH, Arterial: 7.28  pH, Blood: x     /  pCO2: 70    /  pO2: 67    / HCO3: 33    / Base Excess: 3.9   /  SaO2: 94.2

## 2022-09-19 NOTE — CONSULT NOTE ADULT - REASON FOR ADMISSION
Hypoxic respiratory failure

## 2022-09-19 NOTE — PROGRESS NOTE ADULT - PROBLEM SELECTOR PLAN 9
noted to have left hand swelling and discoloration noted   Arterial and venous doppler negative for venous thrombus or arterial occlusion  Vascular consulted and no acute intervention planned  Discussed with hand Surgeon, , and no urgent intervention needed  will continue to monitor and elevated LUE. will avoid left arm for IV

## 2022-09-19 NOTE — PROGRESS NOTE ADULT - PROBLEM SELECTOR PLAN 4
-Patient with +UA. Urine cx growing E coli, pan-sensitive  - CTX x3 days (8/31-9/2) for treatment of acute cystitis  - now RESOLVED

## 2022-09-19 NOTE — PROGRESS NOTE ADULT - SUBJECTIVE AND OBJECTIVE BOX
Patient is a 77y old  Female who presents with a chief complaint of Hypoxic respiratory failure (19 Sep 2022 16:22)    Patient seen this afternoon. She feels okay. Spoke with daughter who states that patient has not been eating much and is more depressed than last week.     MEDICATIONS  (STANDING):  acyclovir   Oral Tab/Cap 400 milliGRAM(s) Oral two times a day  albumin human  5% IVPB 2500 milliLiter(s) IV Intermittent once  albumin human  5% IVPB 2500 milliLiter(s) IV Intermittent once  albumin human  5% IVPB 2500 milliLiter(s) IV Intermittent once  bendamustine IVPB (TREANDA) (eMAR) 113 milliGRAM(s) IV Intermittent once  budesonide  80 MICROgram(s)/formoterol 4.5 MICROgram(s) Inhaler 2 Puff(s) Inhalation two times a day  calcium gluconate IVPB 2 Gram(s) IV Intermittent once  calcium gluconate IVPB 2 Gram(s) IV Intermittent once  chlorhexidine 2% Cloths 1 Application(s) Topical daily  cholecalciferol 1000 Unit(s) Oral daily  dexAMETHasone     Tablet 8 milliGRAM(s) Oral once  dextrose 50% Injectable 25 Gram(s) IV Push once  dextrose 50% Injectable 12.5 Gram(s) IV Push once  dextrose 50% Injectable 25 Gram(s) IV Push once  dextrose Oral Gel 15 Gram(s) Oral once  diphenhydrAMINE 25 milliGRAM(s) Oral once  famotidine    Tablet 40 milliGRAM(s) Oral once  glucagon  Injectable 1 milliGRAM(s) IntraMuscular once  heparin   Injectable 5000 Unit(s) SubCutaneous every 12 hours  lactated ringers. 1000 milliLiter(s) (80 mL/Hr) IV Continuous <Continuous>  levalbuterol Inhalation 0.63 milliGRAM(s) Inhalation every 12 hours  midodrine 10 milliGRAM(s) Oral every 8 hours  mirtazapine 7.5 milliGRAM(s) Oral <User Schedule>  multivitamin 1 Tablet(s) Oral daily  ondansetron    Tablet 8 milliGRAM(s) Oral once    MEDICATIONS  (PRN):  acetaminophen     Tablet .. 650 milliGRAM(s) Oral every 6 hours PRN Temp greater or equal to 38C (100.4F), Mild Pain (1 - 3)  aluminum hydroxide/magnesium hydroxide/simethicone Suspension 30 milliLiter(s) Oral every 4 hours PRN Dyspepsia  melatonin 3 milliGRAM(s) Oral at bedtime PRN Insomnia  methylPREDNISolone sodium succinate Injectable 125 milliGRAM(s) IV Push once PRN REACTION  ondansetron Injectable 4 milliGRAM(s) IV Push every 8 hours PRN Nausea and/or Vomiting        Vital Signs Last 24 Hrs  T(C): 36.9 (19 Sep 2022 16:56), Max: 36.9 (19 Sep 2022 16:56)  T(F): 98.5 (19 Sep 2022 16:56), Max: 98.5 (19 Sep 2022 16:56)  HR: 88 (19 Sep 2022 17:17) (58 - 106)  BP: 108/48 (19 Sep 2022 16:56) (96/42 - 125/60)  BP(mean): --  RR: 20 (19 Sep 2022 17:17) (19 - 20)  SpO2: 98% (19 Sep 2022 17:17) (92% - 100%)    Parameters below as of 19 Sep 2022 17:17  Patient On (Oxygen Delivery Method): nasal cannula  O2 Flow (L/min): 3      PE  NAD  Awake, alert  Anicteric, MMM  RRR  CTAB  Abd soft, NT, ND  No c/c/e  No rash grossly                          8.3    6.53  )-----------( 160      ( 19 Sep 2022 06:13 )             28.0       09-19    140  |  104  |  21  ----------------------------<  73  4.4   |  29  |  0.61    Ca    9.5      19 Sep 2022 06:13  Phos  2.7     09-19  Mg     1.60     09-19

## 2022-09-19 NOTE — PROGRESS NOTE ADULT - ATTENDING COMMENTS
Hypoxemia significantly improved. Taken off high flow nasal cannula and placed on 2 liters NC with SpO2>94%. However, ABG with significant hypercapnia and minimal A-a gradient, likely hypoventilation due to extrathoracic restrictive lung disease (kyphosis) vs. hypoventilation in the setting of metabolic encephalopathy. Continue nocturnal AVAPS (Vt 500, EPAP 5, IPAP 10-30, FiO2 30-40%, backup rate 16) along with NC@2-4 LPM during the day. Diuresis of the bilateral pleural effusions, although likely due to known lymphoma. She is s/p plasmapheresis x3 for hyperviscosity syndrome. Continue current bronchodilator regimen, including Symbicort and levalbuterol. Overall prognosis guarded, she is DNR

## 2022-09-19 NOTE — CONSULT NOTE ADULT - SUBJECTIVE AND OBJECTIVE BOX
CARDIOLOGY CONSULT - Dr. Kingston  Date of Service: 9/19/2022      HPI:  Interventional Radiology  Pre-Procedure Note    This is a 77y  Female  presenting for central venous catheter insertion    HPI:  77 year old female with PMH of lymphoma (dx 2005, s/p CARRIE lobectomy, relapsed in 2019 but not currently on chemo/RT, being followed by heme every 3-6months at [facility] [place]), bipolar disorder, asthma (on O2 PRN at home, unknown how many L) brought to ED by daughter who states pt has been hallucinating, being more depressed.  Patient unable to provide much history, daughter Alissa states that patient was able to care for herself, manage home finances and attend doctor appointments independently until recently, she states that she believes her mom stopped taking her psychiatric medications in February of 2022 and has not followed up with any of her physicians. She has noticed recent weight loss (unsure how much) and change in voice.  Patient denies chest pain, shortness of breath or any discomfort, she is pleasantly confused. Daughter states that within the last year the patient has received treatment for her lymphoma however is unsure of the specifics, the patient was supposed to follow up in February however has not.       PAST MEDICAL & SURGICAL HISTORY:  GERD (gastroesophageal reflux disease)      Hyperlipidemia      Manic depression      Asthma      Lymphoma      History of lobectomy of lung  L  lung      Injury of left shoulder, initial encounter  Surgical repair with plates          Social History:     FAMILY HISTORY:  Family history of gastric cancer        Allergies: latex (Rash)  No Known Drug Allergies      Current Medications: acetaminophen     Tablet .. 650 milliGRAM(s) Oral every 6 hours PRN  ALBUTerol    90 MICROgram(s) HFA Inhaler 2 Puff(s) Inhalation every 6 hours PRN  albuterol/ipratropium for Nebulization 3 milliLiter(s) Nebulizer every 12 hours  aluminum hydroxide/magnesium hydroxide/simethicone Suspension 30 milliLiter(s) Oral every 4 hours PRN  budesonide  80 MICROgram(s)/formoterol 4.5 MICROgram(s) Inhaler 2 Puff(s) Inhalation two times a day  cefTRIAXone   IVPB 1000 milliGRAM(s) IV Intermittent every 24 hours  heparin   Injectable 5000 Unit(s) SubCutaneous every 12 hours  melatonin 3 milliGRAM(s) Oral at bedtime PRN  OLANZapine 2.5 milliGRAM(s) Oral at bedtime  ondansetron Injectable 4 milliGRAM(s) IV Push every 8 hours PRN  thiamine IVPB 500 milliGRAM(s) IV Intermittent three times a day      Labs:                         8.7    5.93  )-----------( 166      ( 02 Sep 2022 07:01 )             31.3       09-02    141  |  104  |  16.4  ----------------------------<  80  4.5   |  34<H>  |  0.8    Ca    10.2      02 Sep 2022 07:01  Phos  1.6     09-02  Mg     1.8     09-02    TPro  12  /  Alb  2.0  /  TBili  0.54  /  DBili  <0.2  /  AST  22  /  ALT  20  /  AlkPhos  40  09-02      HCG:       Assessment/Plan:   This is a 77y Female  presents with hyperviscosity  Patient presents to IR for non tunneled central venous catheter insertion for plasmaphoresis.  Procedure/ risks/ benefits/ goals/ alternatives were explained. All questions answered. Informed content obtained from patient. Consent placed in chart.      (02 Sep 2022 11:39)      PAST MEDICAL & SURGICAL HISTORY:  GERD (gastroesophageal reflux disease)      Hyperlipidemia      Manic depression      Asthma      Lymphoma      History of lobectomy of lung  L  lung      Injury of left shoulder, initial encounter  Surgical repair with plates              PREVIOUS DIAGNOSTIC TESTING:    [ ] Echocardiogram:  [ ]  Catheterization:  [ ] Stress Test:  	    MEDICATIONS:  MEDICATIONS  (STANDING):  acyclovir   Oral Tab/Cap 400 milliGRAM(s) Oral two times a day  albumin human  5% IVPB 2500 milliLiter(s) IV Intermittent once  albumin human  5% IVPB 2500 milliLiter(s) IV Intermittent once  albumin human  5% IVPB 2500 milliLiter(s) IV Intermittent once  bendamustine IVPB (TREANDA) (eMAR) 113 milliGRAM(s) IV Intermittent once  budesonide  80 MICROgram(s)/formoterol 4.5 MICROgram(s) Inhaler 2 Puff(s) Inhalation two times a day  calcium gluconate IVPB 2 Gram(s) IV Intermittent once  calcium gluconate IVPB 2 Gram(s) IV Intermittent once  chlorhexidine 2% Cloths 1 Application(s) Topical daily  cholecalciferol 1000 Unit(s) Oral daily  dexAMETHasone     Tablet 8 milliGRAM(s) Oral once  dextrose 50% Injectable 25 Gram(s) IV Push once  dextrose 50% Injectable 12.5 Gram(s) IV Push once  dextrose 50% Injectable 25 Gram(s) IV Push once  dextrose Oral Gel 15 Gram(s) Oral once  diphenhydrAMINE 25 milliGRAM(s) Oral once  famotidine    Tablet 40 milliGRAM(s) Oral once  glucagon  Injectable 1 milliGRAM(s) IntraMuscular once  heparin   Injectable 5000 Unit(s) SubCutaneous every 12 hours  lactated ringers. 1000 milliLiter(s) (80 mL/Hr) IV Continuous <Continuous>  levalbuterol Inhalation 0.63 milliGRAM(s) Inhalation every 12 hours  midodrine 10 milliGRAM(s) Oral every 8 hours  mirtazapine 7.5 milliGRAM(s) Oral <User Schedule>  multivitamin 1 Tablet(s) Oral daily  ondansetron    Tablet 8 milliGRAM(s) Oral once      FAMILY HISTORY:  Family history of gastric cancer        SOCIAL HISTORY:    [ ] Non-smoker  [ ] Smoker  [ ] Alcohol    Allergies    latex (Rash)  No Known Drug Allergies    Intolerances    	    REVIEW OF SYSTEMS:  CONSTITUTIONAL: No fever, weight loss, or fatigue  EYES: No eye pain, visual disturbances, or discharge  ENMT:  No difficulty hearing, tinnitus, vertigo; No sinus or throat pain  NECK: No pain or stiffness  RESPIRATORY: No cough, wheezing, chills or hemoptysis; No Shortness of Breath  CARDIOVASCULAR: No chest pain, palpitations, passing out, dizziness, or leg swelling  GASTROINTESTINAL: No abdominal or epigastric pain. No nausea, vomiting, or hematemesis; No diarrhea or constipation. No melena or hematochezia.  GENITOURINARY: No dysuria, frequency, hematuria, or incontinence  NEUROLOGICAL: No headaches, memory loss, loss of strength, numbness, or tremors  SKIN: No itching, burning, rashes, or lesions   	    [ ] All others negative	  [ ] Unable to obtain    PHYSICAL EXAM:  T(C): 36.9 (09-19-22 @ 16:56), Max: 36.9 (09-19-22 @ 16:56)  HR: 88 (09-19-22 @ 17:17) (58 - 106)  BP: 108/48 (09-19-22 @ 16:56) (96/42 - 125/60)  RR: 20 (09-19-22 @ 17:17) (19 - 20)  SpO2: 98% (09-19-22 @ 17:17) (92% - 100%)  Wt(kg): --  I&O's Summary      Appearance: Normal	  Psychiatry: A & O x 3, Mood & affect appropriate  HEENT:   Normal oral mucosa, PERRL, EOMI	  Lymphatic: No lymphadenopathy  Cardiovascular: Normal S1 S2,RRR, No JVD, No murmurs  Respiratory: Lungs clear to auscultation	  Gastrointestinal:  Soft, Non-tender, + BS	  Skin: No rashes, No ecchymoses, No cyanosis	  Neurologic: Non-focal  Extremities: Normal range of motion, No clubbing, cyanosis or edema  Vascular: Peripheral pulses palpable 2+ bilaterally    TELEMETRY: 	    ECG:  	  RADIOLOGY:  OTHER: 	  	  LABS:	 	    CARDIAC MARKERS:                                  8.3    6.53  )-----------( 160      ( 19 Sep 2022 06:13 )             28.0     09-19    140  |  104  |  21  ----------------------------<  73  4.4   |  29  |  0.61    Ca    9.5      19 Sep 2022 06:13  Phos  2.7     09-19  Mg     1.60     09-19      PT/INR - ( 19 Sep 2022 06:13 )   PT: 18.1 sec;   INR: 1.55 ratio         PTT - ( 19 Sep 2022 06:13 )  PTT:46.1 sec  proBNP:   Lipid Profile:   HgA1c:   TSH:     ASSESSMENT/PLAN: 	              70 minutes spent on total encounter; more than 50% of the visit was spent counseling and/or coordinating care by the attending physician.

## 2022-09-19 NOTE — PROGRESS NOTE ADULT - PROBLEM SELECTOR PLAN 6
-[place] [facility] [place]received treatment recently per daughter however she doesn't know specifics  -with progression of disease  -hematology to speak to [facility] regarding if further treatment is suggested  -Hem recs appreciated- plasmapheresis  09/02, 09/09, 09/13, serum viscosity now improved  -IgM level downtrending and now up trending  -Heme/Onc recommends- will initiate bendamustine to debulk. currently holding since pt is hypotensive  -Norbert removed 9/15 and if needs repeat plasmapheresis will have to insert another cath

## 2022-09-19 NOTE — CONSULT NOTE ADULT - PROVIDER SPECIALTY LIST ADULT
Endocrinology
Vascular Surgery
Intervent Radiology
MICU
Transfusion Medicine
Cardiology
Heme/Onc
MICU
Palliative Care
Pulmonology

## 2022-09-19 NOTE — PROGRESS NOTE ADULT - SUBJECTIVE AND OBJECTIVE BOX
CHIEF COMPLAINT:Patient is a 77y old  Female who presents with a chief complaint of Hypoxic respiratory failure (19 Sep 2022 17:51)      Interval Events: no interval events, continues to require O2 supplementation, denies fevers, chills, chest pain, wheezing    REVIEW OF SYSTEMS:  [x] All other systems negative except per HPI   [ ] Unable to assess ROS because ________    OBJECTIVE:  ICU Vital Signs Last 24 Hrs  T(C): 36.9 (19 Sep 2022 16:56), Max: 36.9 (19 Sep 2022 16:56)  T(F): 98.5 (19 Sep 2022 16:56), Max: 98.5 (19 Sep 2022 16:56)  HR: 88 (19 Sep 2022 17:17) (58 - 106)  BP: 108/48 (19 Sep 2022 16:56) (96/42 - 125/60)  BP(mean): --  ABP: --  ABP(mean): --  RR: 20 (19 Sep 2022 17:17) (19 - 20)  SpO2: 98% (19 Sep 2022 17:17) (92% - 100%)    O2 Parameters below as of 19 Sep 2022 17:17  Patient On (Oxygen Delivery Method): nasal cannula  O2 Flow (L/min): 3        Mode: NIV (Noninvasive Ventilation), RR (machine): 16, TV (machine): 500, FiO2: 40, PS: 5, ITime: 1, P-High: 30, P-Low: 10, PC: , PIP: 14      PHYSICAL EXAM:  GENERAL: NAD, well-groomed, well-developed  HEAD:  Atraumatic, Normocephalic  EYES: EOMI, PERRLA, conjunctiva and sclera clear  ENMT: No tonsillar erythema, exudates, or enlargement; Moist mucous membranes, Good dentition, No lesions  NECK: Supple, No JVD, Normal thyroid  CHEST/LUNG: Clear to auscultation bilaterally; No rales, rhonchi, wheezing, or rubs  HEART: Regular rate and rhythm; No murmurs, rubs, or gallops  ABDOMEN: Soft, Nontender, Nondistended; Bowel sounds present  VASCULAR:  2+ Peripheral Pulses, No clubbing, cyanosis, or edema  LYMPH: No lymphadenopathy noted  SKIN: No rashes or lesions  NERVOUS SYSTEM:  Alert & Oriented X3, Good concentration; Motor Strength 5/5 B/L upper and lower extremities; DTRs 2+ intact and symmetric    HOSPITAL MEDICATIONS:  MEDICATIONS  (STANDING):  acyclovir   Oral Tab/Cap 400 milliGRAM(s) Oral two times a day  albumin human  5% IVPB 2500 milliLiter(s) IV Intermittent once  albumin human  5% IVPB 2500 milliLiter(s) IV Intermittent once  albumin human  5% IVPB 2500 milliLiter(s) IV Intermittent once  bendamustine IVPB (TREANDA) (eMAR) 113 milliGRAM(s) IV Intermittent once  budesonide  80 MICROgram(s)/formoterol 4.5 MICROgram(s) Inhaler 2 Puff(s) Inhalation two times a day  calcium gluconate IVPB 2 Gram(s) IV Intermittent once  calcium gluconate IVPB 2 Gram(s) IV Intermittent once  chlorhexidine 2% Cloths 1 Application(s) Topical daily  cholecalciferol 1000 Unit(s) Oral daily  dexAMETHasone     Tablet 8 milliGRAM(s) Oral once  dextrose 50% Injectable 25 Gram(s) IV Push once  dextrose 50% Injectable 12.5 Gram(s) IV Push once  dextrose 50% Injectable 25 Gram(s) IV Push once  dextrose Oral Gel 15 Gram(s) Oral once  diphenhydrAMINE 25 milliGRAM(s) Oral once  famotidine    Tablet 40 milliGRAM(s) Oral once  glucagon  Injectable 1 milliGRAM(s) IntraMuscular once  heparin   Injectable 5000 Unit(s) SubCutaneous every 12 hours  lactated ringers. 1000 milliLiter(s) (80 mL/Hr) IV Continuous <Continuous>  levalbuterol Inhalation 0.63 milliGRAM(s) Inhalation every 12 hours  midodrine 10 milliGRAM(s) Oral every 8 hours  mirtazapine 7.5 milliGRAM(s) Oral <User Schedule>  multivitamin 1 Tablet(s) Oral daily  ondansetron    Tablet 8 milliGRAM(s) Oral once    MEDICATIONS  (PRN):  acetaminophen     Tablet .. 650 milliGRAM(s) Oral every 6 hours PRN Temp greater or equal to 38C (100.4F), Mild Pain (1 - 3)  aluminum hydroxide/magnesium hydroxide/simethicone Suspension 30 milliLiter(s) Oral every 4 hours PRN Dyspepsia  melatonin 3 milliGRAM(s) Oral at bedtime PRN Insomnia  methylPREDNISolone sodium succinate Injectable 125 milliGRAM(s) IV Push once PRN REACTION  ondansetron Injectable 4 milliGRAM(s) IV Push every 8 hours PRN Nausea and/or Vomiting      LABS:    The Labs were reviewed by me   The Radiology was reviewed by me    EKG tracing reviewed by me    09-19    140  |  104  |  21  ----------------------------<  73  4.4   |  29  |  0.61  09-18    140  |  103  |  18  ----------------------------<  89  4.0   |  31  |  0.63  09-17    139  |  103  |  18  ----------------------------<  105<H>  4.0   |  25  |  0.56    Ca    9.5      19 Sep 2022 06:13  Ca    9.9      18 Sep 2022 13:45  Ca    9.4      17 Sep 2022 09:30  Phos  2.7     09-19  Mg     1.60     09-19    TPro  8.5<H>  /  Alb  3.5  /  TBili  0.9  /  DBili  0.2  /  AST  19  /  ALT  5   /  AlkPhos  39<L>  09-17    Magnesium, Serum: 1.60 mg/dL (09-19-22 @ 06:13)  Magnesium, Serum: 1.80 mg/dL (09-18-22 @ 13:45)  Magnesium, Serum: 1.80 mg/dL (09-17-22 @ 09:30)  Magnesium, Serum: 1.40 mg/dL (09-17-22 @ 06:00)    Phosphorus Level, Serum: 2.7 mg/dL (09-19-22 @ 06:13)  Phosphorus Level, Serum: 2.7 mg/dL (09-18-22 @ 13:45)  Phosphorus Level, Serum: 3.2 mg/dL (09-17-22 @ 09:30)  Phosphorus Level, Serum: 2.5 mg/dL (09-17-22 @ 06:00)      PT/INR - ( 19 Sep 2022 06:13 )   PT: 18.1 sec;   INR: 1.55 ratio         PTT - ( 19 Sep 2022 06:13 )  PTT:46.1 sec                ABG - ( 19 Sep 2022 15:32 )  pH, Arterial: 7.28  pH, Blood: x     /  pCO2: 70    /  pO2: 67    / HCO3: 33    / Base Excess: 3.9   /  SaO2: 94.2                                    8.3    6.53  )-----------( 160      ( 19 Sep 2022 06:13 )             28.0                         10.2   8.29  )-----------( 165      ( 18 Sep 2022 13:45 )             34.8                         9.2    5.97  )-----------( 143      ( 17 Sep 2022 09:30 )             31.8     CAPILLARY BLOOD GLUCOSE      POCT Blood Glucose.: 83 mg/dL (19 Sep 2022 16:33)  POCT Blood Glucose.: 82 mg/dL (19 Sep 2022 11:39)  POCT Blood Glucose.: 76 mg/dL (19 Sep 2022 07:31)        MICROBIOLOGY:     RADIOLOGY:  [ ] Reviewed and interpreted by me    Point of Care Ultrasound Findings:    PFT:    EKG: CHIEF COMPLAINT:Patient is a 77y old  Female who presents with a chief complaint of Hypoxic respiratory failure (19 Sep 2022 17:51)      Interval Events: no interval events, continues to require O2 supplementation, denies fevers, chills, chest pain, wheezing    REVIEW OF SYSTEMS:  [x] All other systems negative except per HPI   [ ] Unable to assess ROS because ________    OBJECTIVE:  ICU Vital Signs Last 24 Hrs  T(C): 36.9 (19 Sep 2022 16:56), Max: 36.9 (19 Sep 2022 16:56)  T(F): 98.5 (19 Sep 2022 16:56), Max: 98.5 (19 Sep 2022 16:56)  HR: 88 (19 Sep 2022 17:17) (58 - 106)  BP: 108/48 (19 Sep 2022 16:56) (96/42 - 125/60)  BP(mean): --  ABP: --  ABP(mean): --  RR: 20 (19 Sep 2022 17:17) (19 - 20)  SpO2: 98% (19 Sep 2022 17:17) (92% - 100%)    O2 Parameters below as of 19 Sep 2022 17:17  Patient On (Oxygen Delivery Method): nasal cannula  O2 Flow (L/min): 3        Mode: NIV (Noninvasive Ventilation), RR (machine): 16, TV (machine): 500, FiO2: 40, PS: 5, ITime: 1, P-High: 30, P-Low: 10, PC: , PIP: 14      PHYSICAL EXAM:  GENERAL: NAD kyphotic  HEAD:  Atraumatic, Normocephalic  EYES: EOMI, PERRLA, conjunctiva and sclera clear  ENMT: No tonsillar erythema, exudates, or enlargement; Moist mucous membranes, Good dentition, No lesions  NECK: Supple, No JVD, Normal thyroid  CHEST/LUNG: Clear to auscultation bilaterally; No rales, rhonchi, wheezing, or rubs  HEART: Regular rate and rhythm; No murmurs, rubs, or gallops  ABDOMEN: Soft, Nontender, Nondistended; Bowel sounds present  VASCULAR:  2+ Peripheral Pulses, No clubbing, cyanosis, or edema  LYMPH: No lymphadenopathy noted  SKIN: No rashes or lesions  NERVOUS SYSTEM:  Alert & Oriented X2-3, Good concentration; Motor Strength 5/5 B/L upper and lower extremities; DTRs 2+ intact and symmetric    HOSPITAL MEDICATIONS:  MEDICATIONS  (STANDING):  acyclovir   Oral Tab/Cap 400 milliGRAM(s) Oral two times a day  albumin human  5% IVPB 2500 milliLiter(s) IV Intermittent once  albumin human  5% IVPB 2500 milliLiter(s) IV Intermittent once  albumin human  5% IVPB 2500 milliLiter(s) IV Intermittent once  bendamustine IVPB (TREANDA) (eMAR) 113 milliGRAM(s) IV Intermittent once  budesonide  80 MICROgram(s)/formoterol 4.5 MICROgram(s) Inhaler 2 Puff(s) Inhalation two times a day  calcium gluconate IVPB 2 Gram(s) IV Intermittent once  calcium gluconate IVPB 2 Gram(s) IV Intermittent once  chlorhexidine 2% Cloths 1 Application(s) Topical daily  cholecalciferol 1000 Unit(s) Oral daily  dexAMETHasone     Tablet 8 milliGRAM(s) Oral once  dextrose 50% Injectable 25 Gram(s) IV Push once  dextrose 50% Injectable 12.5 Gram(s) IV Push once  dextrose 50% Injectable 25 Gram(s) IV Push once  dextrose Oral Gel 15 Gram(s) Oral once  diphenhydrAMINE 25 milliGRAM(s) Oral once  famotidine    Tablet 40 milliGRAM(s) Oral once  glucagon  Injectable 1 milliGRAM(s) IntraMuscular once  heparin   Injectable 5000 Unit(s) SubCutaneous every 12 hours  lactated ringers. 1000 milliLiter(s) (80 mL/Hr) IV Continuous <Continuous>  levalbuterol Inhalation 0.63 milliGRAM(s) Inhalation every 12 hours  midodrine 10 milliGRAM(s) Oral every 8 hours  mirtazapine 7.5 milliGRAM(s) Oral <User Schedule>  multivitamin 1 Tablet(s) Oral daily  ondansetron    Tablet 8 milliGRAM(s) Oral once    MEDICATIONS  (PRN):  acetaminophen     Tablet .. 650 milliGRAM(s) Oral every 6 hours PRN Temp greater or equal to 38C (100.4F), Mild Pain (1 - 3)  aluminum hydroxide/magnesium hydroxide/simethicone Suspension 30 milliLiter(s) Oral every 4 hours PRN Dyspepsia  melatonin 3 milliGRAM(s) Oral at bedtime PRN Insomnia  methylPREDNISolone sodium succinate Injectable 125 milliGRAM(s) IV Push once PRN REACTION  ondansetron Injectable 4 milliGRAM(s) IV Push every 8 hours PRN Nausea and/or Vomiting      LABS:    The Labs were reviewed by me   The Radiology was reviewed by me    EKG tracing reviewed by me    09-19    140  |  104  |  21  ----------------------------<  73  4.4   |  29  |  0.61  09-18    140  |  103  |  18  ----------------------------<  89  4.0   |  31  |  0.63  09-17    139  |  103  |  18  ----------------------------<  105<H>  4.0   |  25  |  0.56    Ca    9.5      19 Sep 2022 06:13  Ca    9.9      18 Sep 2022 13:45  Ca    9.4      17 Sep 2022 09:30  Phos  2.7     09-19  Mg     1.60     09-19    TPro  8.5<H>  /  Alb  3.5  /  TBili  0.9  /  DBili  0.2  /  AST  19  /  ALT  5   /  AlkPhos  39<L>  09-17    Magnesium, Serum: 1.60 mg/dL (09-19-22 @ 06:13)  Magnesium, Serum: 1.80 mg/dL (09-18-22 @ 13:45)  Magnesium, Serum: 1.80 mg/dL (09-17-22 @ 09:30)  Magnesium, Serum: 1.40 mg/dL (09-17-22 @ 06:00)    Phosphorus Level, Serum: 2.7 mg/dL (09-19-22 @ 06:13)  Phosphorus Level, Serum: 2.7 mg/dL (09-18-22 @ 13:45)  Phosphorus Level, Serum: 3.2 mg/dL (09-17-22 @ 09:30)  Phosphorus Level, Serum: 2.5 mg/dL (09-17-22 @ 06:00)      PT/INR - ( 19 Sep 2022 06:13 )   PT: 18.1 sec;   INR: 1.55 ratio         PTT - ( 19 Sep 2022 06:13 )  PTT:46.1 sec                ABG - ( 19 Sep 2022 15:32 )  pH, Arterial: 7.28  pH, Blood: x     /  pCO2: 70    /  pO2: 67    / HCO3: 33    / Base Excess: 3.9   /  SaO2: 94.2                                    8.3    6.53  )-----------( 160      ( 19 Sep 2022 06:13 )             28.0                         10.2   8.29  )-----------( 165      ( 18 Sep 2022 13:45 )             34.8                         9.2    5.97  )-----------( 143      ( 17 Sep 2022 09:30 )             31.8     CAPILLARY BLOOD GLUCOSE      POCT Blood Glucose.: 83 mg/dL (19 Sep 2022 16:33)  POCT Blood Glucose.: 82 mg/dL (19 Sep 2022 11:39)  POCT Blood Glucose.: 76 mg/dL (19 Sep 2022 07:31)        MICROBIOLOGY:     RADIOLOGY:  [ ] Reviewed and interpreted by me    Point of Care Ultrasound Findings:    PFT:    EKG:

## 2022-09-19 NOTE — CONSULT NOTE ADULT - CONSULT REQUESTED DATE/TIME
02-Sep-2022 14:00
12-Sep-2022 09:31
30-Aug-2022 14:09
17-Sep-2022 20:49
31-Aug-2022 16:46
01-Sep-2022 21:05
30-Aug-2022 14:45
31-Aug-2022 05:53
19-Sep-2022 17:36
02-Sep-2022 09:19

## 2022-09-19 NOTE — PROGRESS NOTE ADULT - ASSESSMENT
MANDI GASPAR is a 77y Female who presents with a chief complaint of hypoxic respiratory failure.    Marginal Zone Lymphoma  - Patient treated previously at [facility]; was on rituximab + bendamustine last in August 2020, and since then has been on surveillance, but lost to follow-up since July 2021  - CT chest concerning for slight progression of disease. May need bone marrow biopsy once respiratory status is more stable.     Lymphoplasmacytic Lymphoma  - Noted from bone marrow biopsy done during prior admission here.  - Patient underwent three plasmapheresis sessions; last on September 13th with improvement in mental status and IgM.  - IgM rising rapidly again, 4781 on 9/15 with no real decrease in protein burden after multiple PLEX treatments  - flow cytometry confirms small b cell population present; MYD88 testing pending  - Plan to give bendamustine tomorrow (09/20/22) if BP stable   - Echo from 8/30 normal, continue IV hydration  - CT chest shows  No pulmonary embolus is noted within the main, right and left   main, lobar and proximal segmental pulmonary arteries bilaterally.   Evaluation of the mid to distal segmental and subsegmental pulmonary   arteries bilaterally is limited.  Status post left upper lobectomy.  Compared to the previous examination, the large ford mass in the AP   window extending into the prevascular space/subcarinal region/left hilum   as well as the large posterior mediastinal mass have not significantly   changed when compared to previous exam.  Bilateral pleural effusions, right more than left.    Altered Mental Status  - Likely multifactorial with hyperviscosity of blood, hypercalcemia, urinary tract infection  - Completed antibiotics  - Endocrinology following  - Psychiatry input appreciated - please have psychiatry follow up     Hypoxia  - Management per pulmonary    Hypotension  - Cardio recs appreciated- continue IVF, midodrine       Will continue to follow.    Giovana Duarte PA-C  Hematology/Oncology  [facility]  [phone] (office)  [phone] (alt office)  Evenings and weekends please call MD on call or office

## 2022-09-19 NOTE — CONSULT NOTE ADULT - CONSULT REQUESTED BY NAME
medicine
medicine team
Madison Lange
Primary team
Seth
Dr. Hartman
IM
Medicine
Hospitalist
Primary team

## 2022-09-19 NOTE — PROGRESS NOTE ADULT - ASSESSMENT
77F with hx of lymphoma (dx 2005, s/p CARRIE lobectomy, relapsed 2019, currently off chemo/RT) BPD, asthma admitted for metabolic encephalopathy 2/2 UTI/hypercalcemia. Pulmonary initially consulted for c/f pleural effusion which was actually 2/2 known lymphoma, with course now c/b hypoxic/hypercapnic respiratory failure now on nocturnal NIPPV in setting of hyperviscosity syndrome/multifactorial metabolic encephalopathy treated with plasmapharesis. Now improving mental status and decreasing oxygen requirements.     #Hypoxic/Hypercapnic respiratory failure  #Metabolic Encephalopathy  #Lymphoma  #MGUS   #Trace pleural Effusion  #Asthma  #Hyperviscosity Syndrome     Recommendations:  - continue AVAPs at night; and obtain ABG in the AM to document improvement with NIV   - trial on room air, abg revealing chronic hypercapnia  - continue w/current bronchodilator regimen  - goals of care discussion  - Out of bed to chair as tolerated, trial nebulizer treatments for airway clearance and opening. Head of bed up, physical therapy as able   - pulm will continue to follow    Biju Wu MD  [facility] PGY4  [facility] [id number], [facility] [phone]

## 2022-09-20 NOTE — PROGRESS NOTE ADULT - ASSESSMENT
MANDI GASPAR is a 77y Female who presents with a chief complaint of hypoxic respiratory failure.    Marginal Zone Lymphoma  - Patient treated previously at [facility]; was on rituximab + bendamustine last in August 2020, and since then has been on surveillance, but lost to follow-up since July 2021  - CT chest concerning for slight progression of disease. May need bone marrow biopsy once respiratory status is more stable.     Lymphoplasmacytic Lymphoma  - Noted from bone marrow biopsy done during prior admission here.  - Patient underwent three plasmapheresis sessions; last on September 13th with improvement in mental status and IgM.  - IgM rising rapidly again, 4781 on 9/15 with no real decrease in protein burden after multiple PLEX treatments  - flow cytometry confirms small b cell population present; MYD88 testing pending  - Please give bendamustine today   - Echo from 8/30 normal, continue IV hydration    Altered Mental Status  - Likely multifactorial with hyperviscosity of blood, hypercalcemia, urinary tract infection  - Completed antibiotics  - Endocrinology following  - Psychiatry input appreciated - please have psychiatry follow up     Hypoxia  - Management per pulmonary    Hypotension  - Cardio recs appreciated- continue IVF, midodrine   - BP improved, give bendamustine       Will continue to follow.    Giovana Duarte PA-C  Hematology/Oncology  [facility]  [phone] (office)  [phone] (alt office)  Evenings and weekends please call MD on call or office

## 2022-09-20 NOTE — PROVIDER CONTACT NOTE (CRITICAL VALUE NOTIFICATION) - PERSON GIVING RESULT:
CLEM Haile
Laboratory
laboratory Caridad Pérez
Sae WELLS
Laboratory
XANDER Russo
toxicology REYNA Haile
Serology

## 2022-09-20 NOTE — PROGRESS NOTE ADULT - SUBJECTIVE AND OBJECTIVE BOX
[facility] Department of Hospital Medicine  Albania Del Real MD  Available on MS Teams  Pager: [phone]    Patient is a 77y old  Female who presents with a chief complaint of Hypoxic respiratory failure (20 Sep 2022 14:03)    OVERNIGHT EVENTS: No acute events overnight.    SUBJECTIVE: Pt seen and examined at bedside this morning. Alert and conversant. Continues to endorse poor appetite but states that she feels somewhat better today than she did yesterday. Otherwise denies any shortness of breath, dizziness, fever, CP, nausea or abdominal pain.    ADDITIONAL REVIEW OF SYSTEMS:    MEDICATIONS  (STANDING):  acyclovir   Oral Tab/Cap 400 milliGRAM(s) Oral two times a day  albumin human  5% IVPB 2500 milliLiter(s) IV Intermittent once  albumin human  5% IVPB 2500 milliLiter(s) IV Intermittent once  albumin human  5% IVPB 2500 milliLiter(s) IV Intermittent once  bendamustine IVPB (TREANDA) (eMAR) 113 milliGRAM(s) IV Intermittent once  budesonide  80 MICROgram(s)/formoterol 4.5 MICROgram(s) Inhaler 2 Puff(s) Inhalation two times a day  calcium gluconate IVPB 2 Gram(s) IV Intermittent once  calcium gluconate IVPB 2 Gram(s) IV Intermittent once  chlorhexidine 2% Cloths 1 Application(s) Topical daily  cholecalciferol 1000 Unit(s) Oral daily  dexAMETHasone     Tablet 8 milliGRAM(s) Oral once  dextrose 5% + lactated ringers. 1000 milliLiter(s) (60 mL/Hr) IV Continuous <Continuous>  dextrose 50% Injectable 25 Gram(s) IV Push once  dextrose 50% Injectable 12.5 Gram(s) IV Push once  dextrose 50% Injectable 25 Gram(s) IV Push once  dextrose Oral Gel 15 Gram(s) Oral once  diphenhydrAMINE 25 milliGRAM(s) Oral once  famotidine    Tablet 40 milliGRAM(s) Oral once  glucagon  Injectable 1 milliGRAM(s) IntraMuscular once  heparin   Injectable 5000 Unit(s) SubCutaneous every 12 hours  levalbuterol Inhalation 0.63 milliGRAM(s) Inhalation every 12 hours  midodrine 10 milliGRAM(s) Oral every 8 hours  mirtazapine 7.5 milliGRAM(s) Oral <User Schedule>  multivitamin 1 Tablet(s) Oral daily  ondansetron    Tablet 8 milliGRAM(s) Oral once    MEDICATIONS  (PRN):  acetaminophen     Tablet .. 650 milliGRAM(s) Oral every 6 hours PRN Temp greater or equal to 38C (100.4F), Mild Pain (1 - 3)  aluminum hydroxide/magnesium hydroxide/simethicone Suspension 30 milliLiter(s) Oral every 4 hours PRN Dyspepsia  melatonin 3 milliGRAM(s) Oral at bedtime PRN Insomnia  methylPREDNISolone sodium succinate Injectable 125 milliGRAM(s) IV Push once PRN REACTION  ondansetron Injectable 4 milliGRAM(s) IV Push every 8 hours PRN Nausea and/or Vomiting    CAPILLARY BLOOD GLUCOSE    POCT Blood Glucose.: 125 mg/dL (20 Sep 2022 11:44)  POCT Blood Glucose.: 96 mg/dL (20 Sep 2022 07:36)  POCT Blood Glucose.: 86 mg/dL (20 Sep 2022 06:00)  POCT Blood Glucose.: 72 mg/dL (20 Sep 2022 02:44)  POCT Blood Glucose.: 83 mg/dL (19 Sep 2022 16:33)    I&O's Summary    PHYSICAL EXAM:  Vital Signs Last 24 Hrs  T(C): 36.4 (20 Sep 2022 13:01), Max: 36.9 (19 Sep 2022 16:56)  T(F): 97.6 (20 Sep 2022 13:01), Max: 98.5 (19 Sep 2022 16:56)  HR: 96 (20 Sep 2022 13:01) (74 - 100)  BP: 94/51 (20 Sep 2022 13:01) (92/53 - 108/48)  BP(mean): --  RR: 18 (20 Sep 2022 13:01) (18 - 20)  SpO2: 95% (20 Sep 2022 13:01) (95% - 100%)    Parameters below as of 20 Sep 2022 13:01  Patient On (Oxygen Delivery Method): nasal cannula  O2 Flow (L/min): 2    CONSTITUTIONAL: NAD, well-developed  HEAD: Normocephalic, atraumatic  EYES: EOMI, PERRL  ENT: no rhinorrhea, no pharyngeal erythema, HFNC in place  RESPIRATORY: No increased work of breathing, CTAB, no wheezes or crackles appreciated  CARDIOVASCULAR: RRR, S1 and S2 present, no m/r/g  ABDOMEN: soft, NT, ND, bowel sounds present  EXTREMITIES: LUE with some mottling noted, however equal and strong radial pulses b/l and skin warm to touch  MUSCULOSKELETAL: no joint swelling, no tenderness to palpation  NEURO: A&Ox2-3, moving all extremities    LABS:                        8.4    6.07  )-----------( 162      ( 20 Sep 2022 05:31 )             28.2     09-20    140  |  101  |  20  ----------------------------<  84  4.2   |  33<H>  |  0.55    Ca    9.7      20 Sep 2022 05:31  Phos  2.9     09-20  Mg     1.70     09-20    PT/INR - ( 19 Sep 2022 06:13 )   PT: 18.1 sec;   INR: 1.55 ratio      PTT - ( 19 Sep 2022 06:13 )  PTT:46.1 sec    Culture - Blood (collected 17 Sep 2022 18:25)  Source: .Blood Blood-Peripheral  Preliminary Report (19 Sep 2022 01:02):    No growth to date.    RADIOLOGY & ADDITIONAL TESTS:    Results Reviewed:   Imaging Personally Reviewed:  Electrocardiogram Personally Reviewed:    COORDINATION OF CARE:  Care Discussed with Consultants/Other Providers [Y/N]:  Prior or Outpatient Records Reviewed [Y/N]:   [facility] Department of Hospital Medicine  Albania Del Real MD  Available on MS Teams  Pager: [phone]    Patient is a 77y old  Female who presents with a chief complaint of Hypoxic respiratory failure (20 Sep 2022 14:03)    OVERNIGHT EVENTS: No acute events overnight.    SUBJECTIVE: Pt seen and examined at bedside this morning. Alert and conversant. Continues to endorse poor appetite but states that she feels somewhat better today than she did yesterday. Otherwise denies any shortness of breath, dizziness, fever, CP, nausea or abdominal pain.    ADDITIONAL REVIEW OF SYSTEMS:    MEDICATIONS  (STANDING):  acyclovir   Oral Tab/Cap 400 milliGRAM(s) Oral two times a day  albumin human  5% IVPB 2500 milliLiter(s) IV Intermittent once  albumin human  5% IVPB 2500 milliLiter(s) IV Intermittent once  albumin human  5% IVPB 2500 milliLiter(s) IV Intermittent once  bendamustine IVPB (TREANDA) (eMAR) 113 milliGRAM(s) IV Intermittent once  budesonide  80 MICROgram(s)/formoterol 4.5 MICROgram(s) Inhaler 2 Puff(s) Inhalation two times a day  calcium gluconate IVPB 2 Gram(s) IV Intermittent once  calcium gluconate IVPB 2 Gram(s) IV Intermittent once  chlorhexidine 2% Cloths 1 Application(s) Topical daily  cholecalciferol 1000 Unit(s) Oral daily  dexAMETHasone     Tablet 8 milliGRAM(s) Oral once  dextrose 5% + lactated ringers. 1000 milliLiter(s) (60 mL/Hr) IV Continuous <Continuous>  dextrose 50% Injectable 25 Gram(s) IV Push once  dextrose 50% Injectable 12.5 Gram(s) IV Push once  dextrose 50% Injectable 25 Gram(s) IV Push once  dextrose Oral Gel 15 Gram(s) Oral once  diphenhydrAMINE 25 milliGRAM(s) Oral once  famotidine    Tablet 40 milliGRAM(s) Oral once  glucagon  Injectable 1 milliGRAM(s) IntraMuscular once  heparin   Injectable 5000 Unit(s) SubCutaneous every 12 hours  levalbuterol Inhalation 0.63 milliGRAM(s) Inhalation every 12 hours  midodrine 10 milliGRAM(s) Oral every 8 hours  mirtazapine 7.5 milliGRAM(s) Oral <User Schedule>  multivitamin 1 Tablet(s) Oral daily  ondansetron    Tablet 8 milliGRAM(s) Oral once    MEDICATIONS  (PRN):  acetaminophen     Tablet .. 650 milliGRAM(s) Oral every 6 hours PRN Temp greater or equal to 38C (100.4F), Mild Pain (1 - 3)  aluminum hydroxide/magnesium hydroxide/simethicone Suspension 30 milliLiter(s) Oral every 4 hours PRN Dyspepsia  melatonin 3 milliGRAM(s) Oral at bedtime PRN Insomnia  methylPREDNISolone sodium succinate Injectable 125 milliGRAM(s) IV Push once PRN REACTION  ondansetron Injectable 4 milliGRAM(s) IV Push every 8 hours PRN Nausea and/or Vomiting    CAPILLARY BLOOD GLUCOSE    POCT Blood Glucose.: 125 mg/dL (20 Sep 2022 11:44)  POCT Blood Glucose.: 96 mg/dL (20 Sep 2022 07:36)  POCT Blood Glucose.: 86 mg/dL (20 Sep 2022 06:00)  POCT Blood Glucose.: 72 mg/dL (20 Sep 2022 02:44)  POCT Blood Glucose.: 83 mg/dL (19 Sep 2022 16:33)    I&O's Summary    PHYSICAL EXAM:  Vital Signs Last 24 Hrs  T(C): 36.4 (20 Sep 2022 13:01), Max: 36.9 (19 Sep 2022 16:56)  T(F): 97.6 (20 Sep 2022 13:01), Max: 98.5 (19 Sep 2022 16:56)  HR: 96 (20 Sep 2022 13:01) (74 - 100)  BP: 94/51 (20 Sep 2022 13:01) (92/53 - 108/48)  BP(mean): --  RR: 18 (20 Sep 2022 13:01) (18 - 20)  SpO2: 95% (20 Sep 2022 13:01) (95% - 100%)    Parameters below as of 20 Sep 2022 13:01  Patient On (Oxygen Delivery Method): nasal cannula  O2 Flow (L/min): 2    CONSTITUTIONAL: NAD, well-developed  HEAD: Normocephalic, atraumatic  EYES: EOMI, PERRL  ENT: no rhinorrhea, no pharyngeal erythema, NC in place  RESPIRATORY: No increased work of breathing, CTAB, no wheezes or crackles appreciated  CARDIOVASCULAR: RRR, S1 and S2 present, no m/r/g  ABDOMEN: soft, NT, ND, bowel sounds present  EXTREMITIES: LUE with some mottling noted, however equal and strong radial pulses b/l and skin warm to touch  MUSCULOSKELETAL: no joint swelling, no tenderness to palpation  NEURO: A&Ox2-3, moving all extremities    LABS:                        8.4    6.07  )-----------( 162      ( 20 Sep 2022 05:31 )             28.2     09-20    140  |  101  |  20  ----------------------------<  84  4.2   |  33<H>  |  0.55    Ca    9.7      20 Sep 2022 05:31  Phos  2.9     09-20  Mg     1.70     09-20    PT/INR - ( 19 Sep 2022 06:13 )   PT: 18.1 sec;   INR: 1.55 ratio      PTT - ( 19 Sep 2022 06:13 )  PTT:46.1 sec    Culture - Blood (collected 17 Sep 2022 18:25)  Source: .Blood Blood-Peripheral  Preliminary Report (19 Sep 2022 01:02):    No growth to date.    RADIOLOGY & ADDITIONAL TESTS:    Results Reviewed:   Imaging Personally Reviewed:  Electrocardiogram Personally Reviewed:    COORDINATION OF CARE:  Care Discussed with Consultants/Other Providers [Y/N]:  Prior or Outpatient Records Reviewed [Y/N]:

## 2022-09-20 NOTE — PROVIDER CONTACT NOTE (CRITICAL VALUE NOTIFICATION) - ACTION/TREATMENT ORDERED:
recheck labs to verify
awaiting response
monitor patient's oxygen on monitor
monitor patient's oxygen on monitor
Made Jenny LYNCH and Nessa FUENTES MD aware. patient non-rebreather in place. Will continue to monitor.
awaiting response
recheck labs to verify
MENDEZ LYNCH made aware. will continue to monitor. pending orders if needed

## 2022-09-20 NOTE — PROVIDER CONTACT NOTE (CRITICAL VALUE NOTIFICATION) - BACKGROUND
77F PMH of lymphoma (dx 2005, s/p CARRIE lobectomy, relapsed in 2019 but not currently on chemo/RT, being followed by heme every 3-6months at [facility] [place] bipolar disorder
77F PMH of lymphoma (dx 2005, s/p CARRIE lobectomy, relapsed in 2019 but not currently on chemo/RT, being followed by heme every 3-6months at [facility] [place] bipolar disorder
77F PMH of lymphoma (dx 2005, s/p CARRIE lobectomy, relapsed in 2019 but not currently on chemo/RT, being followed by heme every 3-6months at [facility] [place] bipolar disorder
Admit, altered mental status, Acute hypercapnic respiratory failure, PMH-bipolar disorder, EMILY, lymphoma
admitted for AMS
77F PMH of lymphoma (dx 2005, s/p CARRIE lobectomy, relapsed in 2019 but not currently on chemo/RT, being followed by heme every 3-6months at [facility] [place]), bipolar disorder

## 2022-09-20 NOTE — PROGRESS NOTE ADULT - PROBLEM SELECTOR PLAN 6
-[place] w/Dr. Real Cano @ [facility] [place]  -received treatment recently per daughter however she doesn't know specifics  -with progression of disease  -hematology to speak to [facility] regarding if further treatment is suggested  -Hem recs appreciated- plasmapheresis  09/02, 09/09, 09/13, serum viscosity now improved  -IgM level downtrending and now up trending  -Heme/Onc recommends- will initiate bendamustine to debulk. currently holding since pt is hypotensive  -Norbert removed 9/15 and if needs repeat plasmapheresis will have to insert another cath

## 2022-09-20 NOTE — PROVIDER CONTACT NOTE (CRITICAL VALUE NOTIFICATION) - SITUATION
pCO2 arterial: 83
Critical pCO2 74. pt on AVAPS
Blood glucose 939
Critical pCO2 70 on ABG. pt currently on 6L nasal cannula
bicarb crital level of 45
pCO2 arterial: 70
Po2-39
hemoglobin 7.0

## 2022-09-20 NOTE — PROGRESS NOTE ADULT - SUBJECTIVE AND OBJECTIVE BOX
Patient is a 77y old  Female who presents with a chief complaint of Hypoxic respiratory failure (19 Sep 2022 18:01)    Patient seen and examined this morning. She is satting fine on 2L NC now. No new complaints. BP improved. Pls give bendamustine today     MEDICATIONS  (STANDING):  acyclovir   Oral Tab/Cap 400 milliGRAM(s) Oral two times a day  albumin human  5% IVPB 2500 milliLiter(s) IV Intermittent once  albumin human  5% IVPB 2500 milliLiter(s) IV Intermittent once  albumin human  5% IVPB 2500 milliLiter(s) IV Intermittent once  bendamustine IVPB (TREANDA) (eMAR) 113 milliGRAM(s) IV Intermittent once  budesonide  80 MICROgram(s)/formoterol 4.5 MICROgram(s) Inhaler 2 Puff(s) Inhalation two times a day  calcium gluconate IVPB 2 Gram(s) IV Intermittent once  calcium gluconate IVPB 2 Gram(s) IV Intermittent once  chlorhexidine 2% Cloths 1 Application(s) Topical daily  cholecalciferol 1000 Unit(s) Oral daily  dexAMETHasone     Tablet 8 milliGRAM(s) Oral once  dextrose 5% + lactated ringers. 1000 milliLiter(s) (60 mL/Hr) IV Continuous <Continuous>  dextrose 50% Injectable 25 Gram(s) IV Push once  dextrose 50% Injectable 12.5 Gram(s) IV Push once  dextrose 50% Injectable 25 Gram(s) IV Push once  dextrose Oral Gel 15 Gram(s) Oral once  diphenhydrAMINE 25 milliGRAM(s) Oral once  famotidine    Tablet 40 milliGRAM(s) Oral once  glucagon  Injectable 1 milliGRAM(s) IntraMuscular once  heparin   Injectable 5000 Unit(s) SubCutaneous every 12 hours  levalbuterol Inhalation 0.63 milliGRAM(s) Inhalation every 12 hours  midodrine 10 milliGRAM(s) Oral every 8 hours  mirtazapine 7.5 milliGRAM(s) Oral <User Schedule>  multivitamin 1 Tablet(s) Oral daily  ondansetron    Tablet 8 milliGRAM(s) Oral once    MEDICATIONS  (PRN):  acetaminophen     Tablet .. 650 milliGRAM(s) Oral every 6 hours PRN Temp greater or equal to 38C (100.4F), Mild Pain (1 - 3)  aluminum hydroxide/magnesium hydroxide/simethicone Suspension 30 milliLiter(s) Oral every 4 hours PRN Dyspepsia  melatonin 3 milliGRAM(s) Oral at bedtime PRN Insomnia  methylPREDNISolone sodium succinate Injectable 125 milliGRAM(s) IV Push once PRN REACTION  ondansetron Injectable 4 milliGRAM(s) IV Push every 8 hours PRN Nausea and/or Vomiting        Vital Signs Last 24 Hrs  T(C): 36.4 (20 Sep 2022 13:01), Max: 36.9 (19 Sep 2022 16:56)  T(F): 97.6 (20 Sep 2022 13:01), Max: 98.5 (19 Sep 2022 16:56)  HR: 96 (20 Sep 2022 13:01) (74 - 100)  BP: 94/51 (20 Sep 2022 13:01) (92/53 - 108/48)  BP(mean): --  RR: 18 (20 Sep 2022 13:01) (18 - 20)  SpO2: 95% (20 Sep 2022 13:01) (95% - 100%)    Parameters below as of 20 Sep 2022 13:01  Patient On (Oxygen Delivery Method): nasal cannula  O2 Flow (L/min): 2      PE  NAD  Awake, alert  Anicteric, MMM  RRR  CTAB  Abd soft, NT, ND  Ecchymoses on bilateral upper extremities                           8.4    6.07  )-----------( 162      ( 20 Sep 2022 05:31 )             28.2       09-20    140  |  101  |  20  ----------------------------<  84  4.2   |  33<H>  |  0.55    Ca    9.7      20 Sep 2022 05:31  Phos  2.9     09-20  Mg     1.70     09-20

## 2022-09-20 NOTE — PROVIDER CONTACT NOTE (CRITICAL VALUE NOTIFICATION) - TEST AND RESULT REPORTED:
Bicarb 45
pCO2 70
pCO2 arterial: 83
Po2-39
pCO2 74
pCO2 arterial: 70
Blood glucose 939
hemoglobin 7.0

## 2022-09-20 NOTE — PROGRESS NOTE ADULT - SUBJECTIVE AND OBJECTIVE BOX
CHIEF COMPLAINT:Patient is a 77y old  Female who presents with a chief complaint of Hypoxic respiratory failure (20 Sep 2022 13:42)      Interval Events: no interval events, this morning feeling improved, no increase in SOB, or cough/ mucous, no CP, fevers, chills, wheezing, new leg edema    REVIEW OF SYSTEMS:  [x] All other systems negative except per HPI   [ ] Unable to assess ROS because ________    OBJECTIVE:  ICU Vital Signs Last 24 Hrs  T(C): 36.4 (20 Sep 2022 13:01), Max: 36.9 (19 Sep 2022 16:56)  T(F): 97.6 (20 Sep 2022 13:01), Max: 98.5 (19 Sep 2022 16:56)  HR: 96 (20 Sep 2022 13:01) (74 - 100)  BP: 94/51 (20 Sep 2022 13:01) (92/53 - 108/48)  BP(mean): --  ABP: --  ABP(mean): --  RR: 18 (20 Sep 2022 13:01) (18 - 20)  SpO2: 95% (20 Sep 2022 13:01) (95% - 100%)    O2 Parameters below as of 20 Sep 2022 13:01  Patient On (Oxygen Delivery Method): nasal cannula  O2 Flow (L/min): 2        Mode: standby      PHYSICAL EXAM:  GENERAL: NAD, thin kyphotic  HEAD:  Atraumatic, Normocephalic  EYES: EOMI, PERRLA, conjunctiva and sclera clear  ENMT: No tonsillar erythema, exudates, or enlargement; Moist mucous membranes, Good dentition, No lesions  NECK: Supple, No JVD, Normal thyroid  CHEST/LUNG: poor air movement; No rales, rhonchi, wheezing, or rubs  HEART: Regular rate and rhythm; No murmurs, rubs, or gallops  ABDOMEN: Soft, Nontender, Nondistended; Bowel sounds present  VASCULAR:  2+ Peripheral Pulses, No clubbing, cyanosis, or edema  LYMPH: No lymphadenopathy noted  SKIN: No rashes or lesions  NERVOUS SYSTEM:  Alert & Oriented X2-3, Good concentration; Motor Strength 5/5 B/L upper and lower extremities; DTRs 2+ intact and symmetric    HOSPITAL MEDICATIONS:  MEDICATIONS  (STANDING):  acyclovir   Oral Tab/Cap 400 milliGRAM(s) Oral two times a day  albumin human  5% IVPB 2500 milliLiter(s) IV Intermittent once  albumin human  5% IVPB 2500 milliLiter(s) IV Intermittent once  albumin human  5% IVPB 2500 milliLiter(s) IV Intermittent once  bendamustine IVPB (TREANDA) (eMAR) 113 milliGRAM(s) IV Intermittent once  budesonide  80 MICROgram(s)/formoterol 4.5 MICROgram(s) Inhaler 2 Puff(s) Inhalation two times a day  calcium gluconate IVPB 2 Gram(s) IV Intermittent once  calcium gluconate IVPB 2 Gram(s) IV Intermittent once  chlorhexidine 2% Cloths 1 Application(s) Topical daily  cholecalciferol 1000 Unit(s) Oral daily  dexAMETHasone     Tablet 8 milliGRAM(s) Oral once  dextrose 5% + lactated ringers. 1000 milliLiter(s) (60 mL/Hr) IV Continuous <Continuous>  dextrose 50% Injectable 25 Gram(s) IV Push once  dextrose 50% Injectable 12.5 Gram(s) IV Push once  dextrose 50% Injectable 25 Gram(s) IV Push once  dextrose Oral Gel 15 Gram(s) Oral once  diphenhydrAMINE 25 milliGRAM(s) Oral once  famotidine    Tablet 40 milliGRAM(s) Oral once  glucagon  Injectable 1 milliGRAM(s) IntraMuscular once  heparin   Injectable 5000 Unit(s) SubCutaneous every 12 hours  levalbuterol Inhalation 0.63 milliGRAM(s) Inhalation every 12 hours  midodrine 10 milliGRAM(s) Oral every 8 hours  mirtazapine 7.5 milliGRAM(s) Oral <User Schedule>  multivitamin 1 Tablet(s) Oral daily  ondansetron    Tablet 8 milliGRAM(s) Oral once    MEDICATIONS  (PRN):  acetaminophen     Tablet .. 650 milliGRAM(s) Oral every 6 hours PRN Temp greater or equal to 38C (100.4F), Mild Pain (1 - 3)  aluminum hydroxide/magnesium hydroxide/simethicone Suspension 30 milliLiter(s) Oral every 4 hours PRN Dyspepsia  melatonin 3 milliGRAM(s) Oral at bedtime PRN Insomnia  methylPREDNISolone sodium succinate Injectable 125 milliGRAM(s) IV Push once PRN REACTION  ondansetron Injectable 4 milliGRAM(s) IV Push every 8 hours PRN Nausea and/or Vomiting      LABS:    The Labs were reviewed by me   The Radiology was reviewed by me    EKG tracing reviewed by me    09-20    140  |  101  |  20  ----------------------------<  84  4.2   |  33<H>  |  0.55  09-19    140  |  104  |  21  ----------------------------<  73  4.4   |  29  |  0.61  09-18    140  |  103  |  18  ----------------------------<  89  4.0   |  31  |  0.63    Ca    9.7      20 Sep 2022 05:31  Ca    9.5      19 Sep 2022 06:13  Ca    9.9      18 Sep 2022 13:45  Phos  2.9     09-20  Mg     1.70     09-20      Magnesium, Serum: 1.70 mg/dL (09-20-22 @ 05:31)  Magnesium, Serum: 1.60 mg/dL (09-19-22 @ 06:13)  Magnesium, Serum: 1.80 mg/dL (09-18-22 @ 13:45)    Phosphorus Level, Serum: 2.9 mg/dL (09-20-22 @ 05:31)  Phosphorus Level, Serum: 2.7 mg/dL (09-19-22 @ 06:13)  Phosphorus Level, Serum: 2.7 mg/dL (09-18-22 @ 13:45)      PT/INR - ( 19 Sep 2022 06:13 )   PT: 18.1 sec;   INR: 1.55 ratio         PTT - ( 19 Sep 2022 06:13 )  PTT:46.1 sec                ABG - ( 20 Sep 2022 09:45 )  pH, Arterial: 7.24  pH, Blood: x     /  pCO2: 83    /  pO2: 80    / HCO3: 36    / Base Excess: 5.2   /  SaO2: 96.6                                    8.4    6.07  )-----------( 162      ( 20 Sep 2022 05:31 )             28.2                         8.3    6.53  )-----------( 160      ( 19 Sep 2022 06:13 )             28.0                         10.2   8.29  )-----------( 165      ( 18 Sep 2022 13:45 )             34.8     CAPILLARY BLOOD GLUCOSE      POCT Blood Glucose.: 125 mg/dL (20 Sep 2022 11:44)  POCT Blood Glucose.: 96 mg/dL (20 Sep 2022 07:36)  POCT Blood Glucose.: 86 mg/dL (20 Sep 2022 06:00)  POCT Blood Glucose.: 72 mg/dL (20 Sep 2022 02:44)  POCT Blood Glucose.: 83 mg/dL (19 Sep 2022 16:33)        MICROBIOLOGY:     RADIOLOGY:  [ ] Reviewed and interpreted by me    Point of Care Ultrasound Findings:    PFT:    EKG:

## 2022-09-20 NOTE — PROGRESS NOTE ADULT - PROBLEM SELECTOR PLAN 2
hypercapnic and hypoxemic respiratory failure  worsening resp status on 9/10, pt lethargic, was not using AVAPS the night prior, was on NRB instead   placed on HFNC in am- continue to wean off oxygen as tolerated  -C/w intermittent diuresis for pleural effusions as BP allows, last given IV Lasix 40mg on 9/10.   -Seen by pulm and recommend Diamox one dose 9/13 in setting of worsening bicarb  -Palliative following  -Continue Duoneb BID, Symbicort BID, Albuterol PRN  -c/w NC or HFNC in daytime and AVAPS at bedtime (repeat ABG on 9/19 showing continued pCO2 elevation to 70)

## 2022-09-20 NOTE — PROGRESS NOTE ADULT - PROBLEM SELECTOR PLAN 1
Has been hypotensive and improves with IVF  Initially with c/f sepsis, however no infectious source identified thus far  Most likely 2/2 poor PO intake and hypovolemia  Continue IVF for now and added midodrine 10mg TID for now. Pt remains asymptomatic   s/p one dose of zosyn  Bcx from 9/17 with NGTD, UA from 9/17 unremarkable  c/w close vital sign monitoring  c/w midodrine

## 2022-09-20 NOTE — PROVIDER CONTACT NOTE (CRITICAL VALUE NOTIFICATION) - ASSESSMENT
Po2-39
pt alert asymptomatic
pCO2 arterial: 70, patient is on 2 liters nasal cannula at 99% O2 on monitor
patient is asymptomatic of hyperglycemia, patient is stable and alert
pCO2 arterial: 83, patient is on 2 liters nasal cannula at 99% O2 on monitor
patient is asymptomatic of signs of bleeding

## 2022-09-20 NOTE — PROGRESS NOTE ADULT - ASSESSMENT
77F with hx of lymphoma (dx 2005, s/p CARRIE lobectomy, relapsed 2019, currently off chemo/RT) BPD, asthma admitted for metabolic encephalopathy 2/2 UTI/hypercalcemia. Pulmonary initially consulted for c/f pleural effusion which was actually 2/2 known lymphoma, with course now c/b hypoxic/hypercapnic respiratory failure now on nocturnal NIPPV in setting of hyperviscosity syndrome/multifactorial metabolic encephalopathy treated with plasmapharesis. Now improving mental status and decreasing oxygen requirements.     #Hypoxic/Hypercapnic respiratory failure  #Metabolic Encephalopathy  #Lymphoma  #MGUS   #Trace pleural Effusion  #Asthma  #Hyperviscosity Syndrome     Recommendations:  - continue AVAPs at night: patient with severe kyphosis leading to restrictive thoracic disease, ABG revealing hypercapnia with CO2 >45 so should qualify for AVAPs overnight, pleasfollow up CM  - trial on room air, abg revealing chronic hypercapnia  - continue w/current bronchodilator regimen  - goals of care discussion  - Out of bed to chair as tolerated, trial nebulizer treatments for airway clearance and opening. Head of bed up, physical therapy as able   - pulm will continue to follow    Biju Wu MD  [facility] PGY4  [facility] [id number], [facility] [phone]     77F with hx of lymphoma (dx 2005, s/p CARRIE lobectomy, relapsed 2019, currently off chemo/RT) BPD, asthma admitted for metabolic encephalopathy 2/2 UTI/hypercalcemia. Pulmonary initially consulted for c/f pleural effusion which was actually 2/2 known lymphoma, with course now c/b hypoxic/hypercapnic respiratory failure now on nocturnal NIPPV in setting of hyperviscosity syndrome/multifactorial metabolic encephalopathy treated with plasmapharesis. Now improving mental status and decreasing oxygen requirements.     #Hypoxic/Hypercapnic respiratory failure  #Metabolic Encephalopathy  #Lymphoma  #MGUS   #Trace pleural Effusion  #Asthma  #Hyperviscosity Syndrome     Recommendations:  - continue AVAPs at night: patient with severe kyphosis leading to restrictive thoracic disease, ABG revealing hypercapnia with CO2 >45 so should qualify for AVAPs overnight, pleasfollow up CM  - trialed on room air, abg revealing chronic hypercapnia and persistent hypoxemia so should be able to qualify for NC during the day  - continue w/current bronchodilator regimen  - goals of care discussion appreciated  - Out of bed to chair as tolerated, trial nebulizer treatments for airway clearance and opening. Head of bed up, physical therapy as able   - pulm will continue to follow  - no further inpatient pulmonary recommendations  - please reconsult as necessary    Biju Wu MD  [facility] PGY4  [facility] [address], [facility] [phone]

## 2022-09-20 NOTE — PROVIDER CONTACT NOTE (CRITICAL VALUE NOTIFICATION) - NAME OF MD/NP/PA/DO NOTIFIED:
EMILY Pizarro
Federica Sauceda NP
Cate Medley
Nessa Harrell MD.
Cate Medley
EMILY Pizarro
EMILY LYNCH
Mena LYNCH

## 2022-09-20 NOTE — PROGRESS NOTE ADULT - ASSESSMENT
77 year old female with PMH of lymphoma (dx 2005, s/p CARRIE lobectomy, relapsed in 2019 but not currently on chemo/RT, being followed by heme every 3-6months at [facility]), bipolar disorder, asthma (on O2 PRN at home, unknown how many L) brought to ED by daughter who states pt has been hallucinating, being more depressed.  Patient unable to provide much history, daughter Alissa states that patient was able to care for herself, manage home finances and attend doctor appointments independently until recently, she states that she believes her mom stopped taking her psychiatric medications in February of 2022 and has not followed up with any of her physicians. She has noticed recent weight loss (unsure how much) and change in voice.   Admitted to medicine service for further evaluation & treatment. Course c/b acute hypercapnic and hypoxic respiratory failure requiring BIPAP and AVAPS. 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

## 2022-09-20 NOTE — PROGRESS NOTE ADULT - SUBJECTIVE AND OBJECTIVE BOX
CARDIOLOGY FOLLOW UP - Dr. Kingston  Date of Service: 9/20/2022  CC: no events    Review of Systems:  Constitutional: No fever, weight loss, or fatigue  Respiratory: No cough, wheezing, or hemoptysis, no shortness of breath  Cardiovascular: No chest pain, palpitations, passing out, dizziness, or leg swelling  Gastrointestinal: No abd or epigastric pain. No nausea, vomiting, or hematemesis; no diarrhea or consiptaiton, no melena or hematochezia  Vascular: No edema     TELEMETRY:    PHYSICAL EXAM:  T(C): 36.4 (09-20-22 @ 13:01), Max: 36.9 (09-20-22 @ 00:00)  HR: 96 (09-20-22 @ 13:01) (74 - 100)  BP: 94/51 (09-20-22 @ 13:01) (92/53 - 100/56)  RR: 18 (09-20-22 @ 13:01) (18 - 20)  SpO2: 95% (09-20-22 @ 13:01) (95% - 100%)  Wt(kg): --  I&O's Summary      Appearance: Normal	  Cardiovascular: Normal S1 S2,RRR, No JVD, No murmurs  Respiratory: Lungs clear to auscultation	  Gastrointestinal:  Soft, Non-tender, + BS	  Extremities: Normal range of motion, No clubbing, cyanosis or edema  Vascular: Peripheral pulses palpable 2+ bilaterally       Home Medications:  Breo Ellipta 100 mcg-25 mcg/inh inhalation powder: 1 puff(s) inhaled once a day (30 Aug 2022 16:01)  Depakote 500 mg oral delayed release tablet: 2 tab(s) orally once a day (at bedtime) (Dispensed on 8/18/22 x 90 days) (30 Aug 2022 16:02)  lithium 300 mg oral tablet: 1 tab(s) orally 3 times a day (Dispensed on 8/18/22 x 90 days) (30 Aug 2022 16:02)  Ventolin HFA 90 mcg/inh inhalation aerosol: 2 puff(s) inhaled 4 times a day, As Needed (30 Aug 2022 16:01)  ZyPREXA 2.5 mg oral tablet: 1 tab(s) orally once a day (at bedtime) (30 Aug 2022 16:01)        MEDICATIONS  (STANDING):  acyclovir   Oral Tab/Cap 400 milliGRAM(s) Oral two times a day  albumin human  5% IVPB 2500 milliLiter(s) IV Intermittent once  albumin human  5% IVPB 2500 milliLiter(s) IV Intermittent once  albumin human  5% IVPB 2500 milliLiter(s) IV Intermittent once  bendamustine IVPB (TREANDA) (eMAR) 113 milliGRAM(s) IV Intermittent once  budesonide  80 MICROgram(s)/formoterol 4.5 MICROgram(s) Inhaler 2 Puff(s) Inhalation two times a day  calcium gluconate IVPB 2 Gram(s) IV Intermittent once  calcium gluconate IVPB 2 Gram(s) IV Intermittent once  chlorhexidine 2% Cloths 1 Application(s) Topical daily  cholecalciferol 1000 Unit(s) Oral daily  dexAMETHasone     Tablet 8 milliGRAM(s) Oral once  dextrose 5% + lactated ringers. 1000 milliLiter(s) (60 mL/Hr) IV Continuous <Continuous>  dextrose 50% Injectable 25 Gram(s) IV Push once  dextrose 50% Injectable 12.5 Gram(s) IV Push once  dextrose 50% Injectable 25 Gram(s) IV Push once  dextrose Oral Gel 15 Gram(s) Oral once  diphenhydrAMINE 25 milliGRAM(s) Oral once  famotidine    Tablet 40 milliGRAM(s) Oral once  glucagon  Injectable 1 milliGRAM(s) IntraMuscular once  heparin   Injectable 5000 Unit(s) SubCutaneous every 12 hours  levalbuterol Inhalation 0.63 milliGRAM(s) Inhalation every 12 hours  midodrine 10 milliGRAM(s) Oral every 8 hours  mirtazapine 7.5 milliGRAM(s) Oral <User Schedule>  multivitamin 1 Tablet(s) Oral daily  ondansetron    Tablet 8 milliGRAM(s) Oral once        EKG:  RADIOLOGY:  DIAGNOSTIC TESTING:  [ ] Echocardiogram:  [ ] Catherterization:  [ ] Stress Test:  OTHER:     LABS:	 	                          8.4    6.07  )-----------( 162      ( 20 Sep 2022 05:31 )             28.2     09-20    140  |  101  |  20  ----------------------------<  84  4.2   |  33<H>  |  0.55    Ca    9.7      20 Sep 2022 05:31  Phos  2.9     09-20  Mg     1.70     09-20        PT/INR - ( 19 Sep 2022 06:13 )   PT: 18.1 sec;   INR: 1.55 ratio         PTT - ( 19 Sep 2022 06:13 )  PTT:46.1 sec    CARDIAC MARKERS:

## 2022-09-20 NOTE — PROGRESS NOTE ADULT - ATTENDING SUPERVISION STATEMENT
Fellow
Resident
Fellow
Fellow
Resident

## 2022-09-20 NOTE — PROGRESS NOTE ADULT - ASSESSMENT
77 year old female with PMH of lymphoma (dx 2005, s/p CARRIE lobectomy, relapsed in 2019 but not currently on chemo/RT, being followed by heme every 3-6months at [facility]), bipolar disorder, asthma adm w FTT, course c/b acute hypercapnic and hypoxic respiratory failure requiring BIPAP and AVAPS. Now with concern for hyperviscosity syndrome given inability to even obtain immunoglobulin panel due to serum viscosity, s/p plasmapheresis x3.     #Hypotension  -likely hypovolemic versus obstructive mediastinal mass  -responsive to IVF and midodrine  -cont midodrine as ordered  -no concern for pericardial process(no significant effusion seen on recent CT)  -however if pt develops recurrent resistant hypotension would repeat limited TTE    #Pleural Effusions  -may be malignant versus CHF  -hold off on diuresis now given recent hypotension    mgmt per medicine    35 minutes spent on total encounter; more than 50% of the visit was spent counseling and/or coordinating care by the attending physician.

## 2022-09-20 NOTE — PROGRESS NOTE ADULT - PROBLEM SELECTOR PLAN 8
-Hx of bipolar w/psych admission @ [facility] many years ago, unclear if patient actually had Bipolar   -non-compliant with medications  -Zyprexa 2.5mg QHS increased to 3.75mg QHS on 9/8/22 (pt refused), if patient has respiratory depression- hold Zyprexa, may try low dose Mirtazapine (also sedating)  -Psych recommendations appreciated   -Supportive care

## 2022-09-20 NOTE — PROVIDER CONTACT NOTE (CRITICAL VALUE NOTIFICATION) - RECOMMENDATIONS
recheck labs to verify
recheck labs to verify
MENDEZ LYNCH made aware
monitor patient's oxygen on monitor
monitor patient's oxygen on monitor
Make Jenny LYNCH and Nessa FUENTES MD aware

## 2022-09-20 NOTE — PROGRESS NOTE ADULT - ATTENDING COMMENTS
Stable hypoxemia, remains on NC@2LPM, goal SpO2>90%. Has significant hypercapnia, likely multifactorial due to extrathoracic restrictive lung disease from kyphosis vs. hypoventilation in the setting of metabolic encephalopathy. Continue nocturnal AVAPS (Vt 500, EPAP 5, IPAP 10-30, FiO2 30-40%, backup rate 16) along with NC@2-4 LPM during the day. Diuresis of the bilateral pleural effusions, although likely due to known lymphoma. She is s/p plasmapheresis x3 for hyperviscosity syndrome. Continue current bronchodilator regimen, including Symbicort and levalbuterol. Overall prognosis guarded, she is DNR

## 2022-09-21 NOTE — PROGRESS NOTE ADULT - PROBLEM SELECTOR PLAN 9
noted to have left hand swelling and discoloration noted   Arterial and venous doppler negative for venous thrombus or arterial occlusion  Vascular consulted and no acute intervention planned  Discussed with hand Surgeon, , and no urgent intervention needed  will continue to monitor and elevate LUE. will avoid left arm for IV

## 2022-09-21 NOTE — PROGRESS NOTE ADULT - SUBJECTIVE AND OBJECTIVE BOX
Patient is a 77y old  Female who presents with a chief complaint of Hypoxic respiratory failure (20 Sep 2022 17:34)    Patient seen this morning. She states she feels fine, though is not eating much. Dietician at bedside. Encouraged for patient to increase oral intake. No new complaints.     MEDICATIONS  (STANDING):  acyclovir   Oral Tab/Cap 400 milliGRAM(s) Oral two times a day  albumin human  5% IVPB 2500 milliLiter(s) IV Intermittent once  albumin human  5% IVPB 2500 milliLiter(s) IV Intermittent once  albumin human  5% IVPB 2500 milliLiter(s) IV Intermittent once  bendamustine IVPB (TREANDA) (eMAR) 113 milliGRAM(s) IV Intermittent once  budesonide  80 MICROgram(s)/formoterol 4.5 MICROgram(s) Inhaler 2 Puff(s) Inhalation two times a day  calcium gluconate IVPB 2 Gram(s) IV Intermittent once  calcium gluconate IVPB 2 Gram(s) IV Intermittent once  chlorhexidine 2% Cloths 1 Application(s) Topical daily  cholecalciferol 1000 Unit(s) Oral daily  dexAMETHasone     Tablet 8 milliGRAM(s) Oral once  dextrose 5% + lactated ringers. 1000 milliLiter(s) (60 mL/Hr) IV Continuous <Continuous>  dextrose 50% Injectable 25 Gram(s) IV Push once  dextrose 50% Injectable 12.5 Gram(s) IV Push once  dextrose 50% Injectable 25 Gram(s) IV Push once  dextrose Oral Gel 15 Gram(s) Oral once  diphenhydrAMINE 25 milliGRAM(s) Oral once  famotidine    Tablet 40 milliGRAM(s) Oral once  glucagon  Injectable 1 milliGRAM(s) IntraMuscular once  heparin   Injectable 5000 Unit(s) SubCutaneous every 12 hours  levalbuterol Inhalation 0.63 milliGRAM(s) Inhalation every 12 hours  midodrine 10 milliGRAM(s) Oral every 8 hours  mirtazapine 7.5 milliGRAM(s) Oral <User Schedule>  multivitamin 1 Tablet(s) Oral daily  ondansetron    Tablet 8 milliGRAM(s) Oral once    MEDICATIONS  (PRN):  acetaminophen     Tablet .. 650 milliGRAM(s) Oral every 6 hours PRN Temp greater or equal to 38C (100.4F), Mild Pain (1 - 3)  aluminum hydroxide/magnesium hydroxide/simethicone Suspension 30 milliLiter(s) Oral every 4 hours PRN Dyspepsia  melatonin 3 milliGRAM(s) Oral at bedtime PRN Insomnia  methylPREDNISolone sodium succinate Injectable 125 milliGRAM(s) IV Push once PRN REACTION  ondansetron Injectable 4 milliGRAM(s) IV Push every 8 hours PRN Nausea and/or Vomiting        Vital Signs Last 24 Hrs  T(C): 36.3 (21 Sep 2022 02:15), Max: 36.4 (20 Sep 2022 22:29)  T(F): 97.3 (21 Sep 2022 02:15), Max: 97.6 (20 Sep 2022 22:29)  HR: 113 (21 Sep 2022 10:24) (88 - 117)  BP: 111/55 (21 Sep 2022 02:15) (111/55 - 111/64)  BP(mean): --  RR: 17 (21 Sep 2022 02:15) (17 - 17)  SpO2: 96% (21 Sep 2022 10:24) (94% - 96%)    Parameters below as of 21 Sep 2022 10:24  Patient On (Oxygen Delivery Method): nasal cannula        PE  NAD  Awake, alert  Anicteric, MMM  RRR  CTAB  Abd soft, NT, ND  No c/c/e                          8.6    5.94  )-----------( 159      ( 21 Sep 2022 06:55 )             29.8       09-21    140  |  99  |  19  ----------------------------<  106<H>  4.9   |  33<H>  |  0.58    Ca    10.0      21 Sep 2022 06:55  Phos  2.9     09-21  Mg     1.60     09-21    TPro  8.9<H>  /  Alb  2.9<L>  /  TBili  0.8  /  DBili  x   /  AST  21  /  ALT  12  /  AlkPhos  50  09-21       Patient is a 77y old  Female who presents with a chief complaint of Hypoxic respiratory failure (20 Sep 2022 17:34)    Patient seen this morning. She states she feels fine, though is not eating much. Dietician at bedside. Encouraged for patient to increase oral intake. No new complaints.   Planning for bendamustine tomorrow 09/22, d/w chemo nurses    MEDICATIONS  (STANDING):  acyclovir   Oral Tab/Cap 400 milliGRAM(s) Oral two times a day  albumin human  5% IVPB 2500 milliLiter(s) IV Intermittent once  albumin human  5% IVPB 2500 milliLiter(s) IV Intermittent once  albumin human  5% IVPB 2500 milliLiter(s) IV Intermittent once  bendamustine IVPB (TREANDA) (eMAR) 113 milliGRAM(s) IV Intermittent once  budesonide  80 MICROgram(s)/formoterol 4.5 MICROgram(s) Inhaler 2 Puff(s) Inhalation two times a day  calcium gluconate IVPB 2 Gram(s) IV Intermittent once  calcium gluconate IVPB 2 Gram(s) IV Intermittent once  chlorhexidine 2% Cloths 1 Application(s) Topical daily  cholecalciferol 1000 Unit(s) Oral daily  dexAMETHasone     Tablet 8 milliGRAM(s) Oral once  dextrose 5% + lactated ringers. 1000 milliLiter(s) (60 mL/Hr) IV Continuous <Continuous>  dextrose 50% Injectable 25 Gram(s) IV Push once  dextrose 50% Injectable 12.5 Gram(s) IV Push once  dextrose 50% Injectable 25 Gram(s) IV Push once  dextrose Oral Gel 15 Gram(s) Oral once  diphenhydrAMINE 25 milliGRAM(s) Oral once  famotidine    Tablet 40 milliGRAM(s) Oral once  glucagon  Injectable 1 milliGRAM(s) IntraMuscular once  heparin   Injectable 5000 Unit(s) SubCutaneous every 12 hours  levalbuterol Inhalation 0.63 milliGRAM(s) Inhalation every 12 hours  midodrine 10 milliGRAM(s) Oral every 8 hours  mirtazapine 7.5 milliGRAM(s) Oral <User Schedule>  multivitamin 1 Tablet(s) Oral daily  ondansetron    Tablet 8 milliGRAM(s) Oral once    MEDICATIONS  (PRN):  acetaminophen     Tablet .. 650 milliGRAM(s) Oral every 6 hours PRN Temp greater or equal to 38C (100.4F), Mild Pain (1 - 3)  aluminum hydroxide/magnesium hydroxide/simethicone Suspension 30 milliLiter(s) Oral every 4 hours PRN Dyspepsia  melatonin 3 milliGRAM(s) Oral at bedtime PRN Insomnia  methylPREDNISolone sodium succinate Injectable 125 milliGRAM(s) IV Push once PRN REACTION  ondansetron Injectable 4 milliGRAM(s) IV Push every 8 hours PRN Nausea and/or Vomiting        Vital Signs Last 24 Hrs  T(C): 36.3 (21 Sep 2022 02:15), Max: 36.4 (20 Sep 2022 22:29)  T(F): 97.3 (21 Sep 2022 02:15), Max: 97.6 (20 Sep 2022 22:29)  HR: 113 (21 Sep 2022 10:24) (88 - 117)  BP: 111/55 (21 Sep 2022 02:15) (111/55 - 111/64)  BP(mean): --  RR: 17 (21 Sep 2022 02:15) (17 - 17)  SpO2: 96% (21 Sep 2022 10:24) (94% - 96%)    Parameters below as of 21 Sep 2022 10:24  Patient On (Oxygen Delivery Method): nasal cannula        PE  NAD  Awake, alert  Anicteric, MMM  RRR  CTAB  Abd soft, NT, ND  No c/c/e                          8.6    5.94  )-----------( 159      ( 21 Sep 2022 06:55 )             29.8       09-21    140  |  99  |  19  ----------------------------<  106<H>  4.9   |  33<H>  |  0.58    Ca    10.0      21 Sep 2022 06:55  Phos  2.9     09-21  Mg     1.60     09-21    TPro  8.9<H>  /  Alb  2.9<L>  /  TBili  0.8  /  DBili  x   /  AST  21  /  ALT  12  /  AlkPhos  50  09-21

## 2022-09-21 NOTE — PROGRESS NOTE ADULT - ASSESSMENT
MANDI GASPAR is a 77y Female who presents with a chief complaint of hypoxic respiratory failure.    Marginal Zone Lymphoma  - Patient treated previously at [facility]; was on rituximab + bendamustine last in August 2020, and since then has been on surveillance, but lost to follow-up since July 2021  - CT chest concerning for slight progression of disease. May need bone marrow biopsy once respiratory status is more stable.     Lymphoplasmacytic Lymphoma  - Noted from bone marrow biopsy done during prior admission here.  - Patient underwent three plasmapheresis sessions; last on September 13th with improvement in mental status and IgM.  - IgM rising rapidly again, 4781 on 9/15 with no real decrease in protein burden after multiple PLEX treatments  - f/u repeat IgM levels   - flow cytometry confirms small b cell population present; MYD88 testing pending  - Requested for bendamustine to be given yesterday but not given, please give bendamustine today if schedule allows  - Echo from 8/30 normal, continue IV hydration    Altered Mental Status  - Likely multifactorial with hyperviscosity of blood, hypercalcemia, urinary tract infection  - Completed antibiotics  - Endocrinology following  - Psychiatry input appreciated  - Overall improving     Hypoxia  - Management per pulmonary  - Respiratory status improved, pt now on 2L NC     Hypotension  - Cardio recs appreciated- continue IVF, midodrine   - BP improved, give bendamustine     Will continue to follow.    Giovana Duarte PA-C  Hematology/Oncology  [facility]  [phone] (office)  [phone] (alt office)  Evenings and weekends please call MD on call or office   MANDI GASPAR is a 77y Female who presents with a chief complaint of hypoxic respiratory failure.    Marginal Zone Lymphoma  - Patient treated previously at [facility]; was on rituximab + bendamustine last in August 2020, and since then has been on surveillance, but lost to follow-up since July 2021  - CT chest concerning for slight progression of disease. May need bone marrow biopsy once respiratory status is more stable.     Lymphoplasmacytic Lymphoma  - Noted from bone marrow biopsy done during prior admission here.  - Patient underwent three plasmapheresis sessions; last on September 13th with improvement in mental status and IgM.  - IgM rising rapidly again, 4781 on 9/15 with no real decrease in protein burden after multiple PLEX treatments  - f/u repeat IgM levels   - flow cytometry confirms small b cell population present; MYD88 testing pending  - Plan to give bendamustine tomorrow 09/22 - d/w chemo nurses   - Echo from 8/30 normal, continue IV hydration    Altered Mental Status  - Likely multifactorial with hyperviscosity of blood, hypercalcemia, urinary tract infection  - Completed antibiotics  - Endocrinology following  - Psychiatry input appreciated  - Overall improving     Hypoxia  - Management per pulmonary  - Respiratory status improved, pt now on 2L NC     Hypotension  - Cardio recs appreciated- continue IVF, midodrine   - BP improved, give bendamustine     Will continue to follow.    Giovana Duarte PA-C  Hematology/Oncology  [facility]  [phone] (office)  [phone] (alt office)  Evenings and weekends please call MD on call or office

## 2022-09-21 NOTE — PROGRESS NOTE ADULT - ASSESSMENT
77 year old female with PMH of lymphoma (dx 2005, s/p CARRIE lobectomy, relapsed in 2019 but not currently on chemo/RT, being followed by heme every 3-6months at [facility] [place]), bipolar disorder, asthma (on O2 PRN at home, unknown how many L) brought to ED by daughter who states pt has been hallucinating, being more depressed.  Patient unable to provide much history, daughter Alissa states that patient was able to care for herself, manage home finances and attend doctor appointments independently until recently, she states that she believes her mom stopped taking her psychiatric medications in February of 2022 and has not followed up with any of her physicians. She has noticed recent weight loss (unsure how much) and change in voice.   Admitted to medicine service for further evaluation & treatment. Course c/b acute hypercapnic and hypoxic respiratory failure requiring BIPAP and AVAPS. 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

## 2022-09-21 NOTE — PROGRESS NOTE ADULT - PROBLEM SELECTOR PLAN 6
-Follows w/Dr. Real Cano @ [facility] [place]  -received treatment recently per daughter however she doesn't know specifics  -with progression of disease  -hematology to speak to [facility] regarding if further treatment is suggested  -Hem recs appreciated- plasmapheresis  09/02, 09/09, 09/13, serum viscosity now improved  -Shiley removed 9/15 and if needs repeat plasmapheresis will have to insert another cath  -IgM level downtrending and now up trending  -Heme/Onc recommends bendamustine to debulk  -As BPs improved, plan for bendamustine therapy on 9/22

## 2022-09-21 NOTE — CHART NOTE - NSCHARTNOTEFT_GEN_A_CORE
Source: other [x] nurse, medical chart   Diet rx: Pureed: DASH/TLC {Sodium & Cholesterol Restricted} (DASH)  Supplement Feeding Modality:  Oral Ensure Enlive Cans or Servings Per Day:  1       Frequency:  Two Times a day  Ensure Pudding Cans or Servings Per Day:  1       Frequency:  Three Times a day (09-18-22 @ 10:11) [Active]           Pt's height: 63" (8/29)      IBW: 115#+/-10%       Pt's weight: 60 Kg (9/2)   Pertinent Medications: Pepcid, Heparin, Solu-Medrol, Zofran (PRN), Remeron, Vit D3, Multivitamin,   Pertinent Labs: (9/21) H/H 8.6/29.8 L, Albumin 2.9 L, Glu 105 H;    (8/31) HbA1c 4.5%, HDL 21 L    Skin:     Estimated Needs:   [ ] no change since previous assessment  [ ] recalculated:       Previous Nutrition Diagnosis:     [ ] Inadequate Energy Intake [ ]Inadequate Oral Intake [ ] Excessive Energy Intake     [ ] Underweight [ ] Increased Nutrient Needs [ ] Overweight/Obesity     [ ] Altered GI Function [ ] Unintended Weight Loss [ ] Food & Nutrition Related Knowledge Deficit [ ] Malnutrition      Nutrition Diagnosis is [ ] ongoing  [ ] resolved [ ] not applicable          New Nutrition Diagnosis: [ ] not applicable    [ ] Inadequate Protein Energy Intake   [ ]Inadequate Oral Intake   [ ] Excessive Energy Intake   [ ] Underweight   [ ] Increased Nutrient Needs   [ ] Overweight/Obesity   [ ] Altered GI Function   [ ] Unintended Weight Loss   [ ] Food & Nutrition Related Knowledge Deficit  [ ] Limited Adherence to nutrition related recommendations   [ ] Malnutrition    [ ] other:        Related to:     As evidenced by:     Interventions:     Recommend    [ ] Change Diet To:    [ ] Nutrition Supplement    [ ] Nutrition Support    [ ] Other:        Monitoring and Evaluation:     [ ] PO intake [ ] Tolerance to diet prescription [ ] weights [ ] follow up per protocol    [ ] other: Source: other [x] nurse, medical chart   Diet rx: Pureed: DASH/TLC {Sodium & Cholesterol Restricted} (DASH)  Supplement Feeding Modality:  Oral Ensure Enlive Cans or Servings Per Day:  1       Frequency:  Two Times a day  Ensure Pudding Cans or Servings Per Day:  1       Frequency:  Three Times a day (09-18-22 @ 10:11) [Active]    Pt 78 yo female with PMHx of lymphoma (dx 2005, s/p CARRIE lobectomy, [place] [facility] [place] disorder, asthma, brought in by daughter 2/2 hallucination.     At time of visit, Pt awake, appears weak. Per Pt her appetite not well. Per flow sheet records, Pt's PO intake ~0-50% of meals/PO supplements. Per Pt she does not like Ensure but she likes Pudding. Rec to add appetite stimulant to boost Pt's PO intake/appetite. Per nurse, Pt needs to be fed. S/P Swallow Bedside Assessment Adult, SLP rec: Puree (per Pt's preferences) with thin liquids (8/30/22). No report of nausea, vomiting or diarrhea @ this time. Of note Pt DNR/DNI. Case discussed with nurse. RDN remains available, nurse made aware.      Pt's height: 63" (8/29)      IBW: 115#+/-10%       Pt's weight: 60 Kg (9/2)   Pertinent Medications: Pepcid, Heparin, Solu-Medrol, Zofran (PRN), Remeron, Vit D3, Multivitamin,   Pertinent Labs: (9/21) H/H 8.6/29.8 L, Albumin 2.9 L, Glu 105 H;    (8/31) HbA1c 4.5%, HDL 21 L    Skin: No report of pressure injuries at present   per flow sheet, 1+edea to L hand     Estimated Needs: [x] no change since previous assessment  Previous Nutrition Diagnosis: [x] Malnutrition, severe   Nutrition Diagnosis is [x] ongoing     Nutrition Interventions/Recommendations:   1. Encourage & assist Pt with meals; Monitor PO diet tolerance;   2. Honor food preferences;   3. Add appetite stimulant to boost Pt's PO intake/appetite;   4. Monitor labs, weekly weights, hydration status; Source: other [x] nurse, medical chart   Diet rx: Pureed: DASH/TLC {Sodium & Cholesterol Restricted} (DASH)  Supplement Feeding Modality:  Oral Ensure Enlive Cans or Servings Per Day:  1       Frequency:  Two Times a day  Ensure Pudding Cans or Servings Per Day:  1       Frequency:  Three Times a day (09-18-22 @ 10:11) [Active]    Pt 78 yo female with PMHx of lymphoma (dx 2005, s/p CARRIE lobectomy, Pt being followed by heme every 3-6months at [facility] [place]), bipolar disorder, asthma, brought in by daughter 2/2 hallucination.     At time of visit, Pt awake, appears weak. Per Pt her appetite not well. Per flow sheet records, Pt's PO intake ~0-50% of meals/PO supplements. Per Pt she does not like Ensure but she likes Pudding. Rec to add appetite stimulant to boost Pt's PO intake/appetite. Per nurse, Pt needs to be fed. S/P Swallow Bedside Assessment Adult, SLP rec: Puree (per Pt's preferences) with thin liquids (8/30/22). No report of nausea, vomiting or diarrhea @ this time. Of note Pt DNR/DNI. Case discussed with nurse. RDN remains available, nurse made aware.      Pt's height: 63" (8/29)      IBW: 115#+/-10%       Pt's weight: 60 Kg (9/2)   Pertinent Medications: Pepcid, Heparin, Solu-Medrol, Zofran (PRN), Remeron, Vit D3, Multivitamin,   Pertinent Labs: (9/21) H/H 8.6/29.8 L, Albumin 2.9 L, Glu 105 H;    (8/31) HbA1c 4.5%, HDL 21 L    Skin: No report of pressure injuries at present   per flow sheet, 1+edema to L hand     Estimated Needs: [x] no change since previous assessment  Previous Nutrition Diagnosis: [x] Malnutrition, severe   Nutrition Diagnosis is [x] ongoing     Nutrition Interventions/Recommendations:   1. Encourage & assist Pt with meals; Monitor PO diet tolerance;   2. Honor food preferences;   3. Add appetite stimulant to boost Pt's PO intake/appetite;   4. Monitor labs, weekly weights, hydration status;

## 2022-09-21 NOTE — PROGRESS NOTE ADULT - PROBLEM SELECTOR PLAN 11
-Heparin SQ  -Dispo issues: medically active and pending    -Patient with APS case ongoing due to unsanitary conditions at home. Will require SW/CM for safe placement once medically stable for d/c. Patient currently without capacity and with poor insight into her medical conditions.    - Update on clinical status and plan provided to patient's daughter, Alissa, on 9/21 @0117

## 2022-09-21 NOTE — PROGRESS NOTE ADULT - PROBLEM SELECTOR PLAN 8
-Hx of bipolar w/psych admission @ [facility] many years ago, unclear if patient actually had Bipolar   -non-compliant with medications  -Zyprexa 2.5mg QHS increased to 3.75mg QHS on 9/8/22 (pt refused), if patient has respiratory depression- hold Zyprexa, may try low dose Mirtazapine (also sedating)  -Psych recommendations appreciated   -Supportive care

## 2022-09-21 NOTE — PROGRESS NOTE ADULT - SUBJECTIVE AND OBJECTIVE BOX
[facility] Department of Hospital Medicine  Albania Del Real MD  Available on MS Teams  Pager: [phone]    Patient is a 77y old  Female who presents with a chief complaint of Hypoxic respiratory failure (21 Sep 2022 13:41)    OVERNIGHT EVENTS: No acute events overnight.    SUBJECTIVE: Pt seen and examined at bedside this morning. Patient alert and easily arousable. States she feels "Ok." Continues to endorse poor appetite. Otherwise denies any acute complaints.    ADDITIONAL REVIEW OF SYSTEMS:    MEDICATIONS  (STANDING):  acyclovir   Oral Tab/Cap 400 milliGRAM(s) Oral two times a day  albumin human  5% IVPB 2500 milliLiter(s) IV Intermittent once  albumin human  5% IVPB 2500 milliLiter(s) IV Intermittent once  albumin human  5% IVPB 2500 milliLiter(s) IV Intermittent once  bendamustine IVPB (TREANDA) (eMAR) 113 milliGRAM(s) IV Intermittent once  budesonide  80 MICROgram(s)/formoterol 4.5 MICROgram(s) Inhaler 2 Puff(s) Inhalation two times a day  calcium gluconate IVPB 2 Gram(s) IV Intermittent once  calcium gluconate IVPB 2 Gram(s) IV Intermittent once  chlorhexidine 2% Cloths 1 Application(s) Topical daily  cholecalciferol 1000 Unit(s) Oral daily  dexAMETHasone     Tablet 8 milliGRAM(s) Oral once  dextrose 5% + lactated ringers. 1000 milliLiter(s) (60 mL/Hr) IV Continuous <Continuous>  dextrose 50% Injectable 25 Gram(s) IV Push once  dextrose 50% Injectable 12.5 Gram(s) IV Push once  dextrose 50% Injectable 25 Gram(s) IV Push once  dextrose Oral Gel 15 Gram(s) Oral once  diphenhydrAMINE 25 milliGRAM(s) Oral once  famotidine    Tablet 40 milliGRAM(s) Oral once  glucagon  Injectable 1 milliGRAM(s) IntraMuscular once  heparin   Injectable 5000 Unit(s) SubCutaneous every 12 hours  levalbuterol Inhalation 0.63 milliGRAM(s) Inhalation every 12 hours  midodrine 10 milliGRAM(s) Oral every 8 hours  mirtazapine 7.5 milliGRAM(s) Oral <User Schedule>  multivitamin 1 Tablet(s) Oral daily  ondansetron    Tablet 8 milliGRAM(s) Oral once    MEDICATIONS  (PRN):  acetaminophen     Tablet .. 650 milliGRAM(s) Oral every 6 hours PRN Temp greater or equal to 38C (100.4F), Mild Pain (1 - 3)  aluminum hydroxide/magnesium hydroxide/simethicone Suspension 30 milliLiter(s) Oral every 4 hours PRN Dyspepsia  melatonin 3 milliGRAM(s) Oral at bedtime PRN Insomnia  methylPREDNISolone sodium succinate Injectable 125 milliGRAM(s) IV Push once PRN REACTION  ondansetron Injectable 4 milliGRAM(s) IV Push every 8 hours PRN Nausea and/or Vomiting    CAPILLARY BLOOD GLUCOSE    POCT Blood Glucose.: 109 mg/dL (21 Sep 2022 17:17)  POCT Blood Glucose.: 106 mg/dL (21 Sep 2022 08:08)  POCT Blood Glucose.: 127 mg/dL (21 Sep 2022 04:06)  POCT Blood Glucose.: 140 mg/dL (20 Sep 2022 22:21)    I&O's Summary    20 Sep 2022 07:01  -  21 Sep 2022 07:00  --------------------------------------------------------  IN: 150 mL / OUT: 0 mL / NET: 150 mL    PHYSICAL EXAM:  Vital Signs Last 24 Hrs  T(C): 36.3 (21 Sep 2022 12:00), Max: 36.6 (21 Sep 2022 08:00)  T(F): 97.4 (21 Sep 2022 12:00), Max: 97.8 (21 Sep 2022 08:00)  HR: 108 (21 Sep 2022 16:51) (88 - 117)  BP: 109/62 (21 Sep 2022 12:00) (107/59 - 111/64)  BP(mean): --  RR: 19 (21 Sep 2022 12:00) (17 - 19)  SpO2: 96% (21 Sep 2022 16:51) (94% - 99%)    Parameters below as of 21 Sep 2022 12:00  Patient On (Oxygen Delivery Method): nasal cannula  O2 Flow (L/min): 2    CONSTITUTIONAL: NAD, well-developed  HEAD: Normocephalic, atraumatic  EYES: EOMI, PERRL  ENT: no rhinorrhea, no pharyngeal erythema, NC in place  RESPIRATORY: No increased work of breathing, CTAB, no wheezes or crackles appreciated  CARDIOVASCULAR: RRR, S1 and S2 present, no m/r/g  ABDOMEN: soft, NT, ND, bowel sounds present  EXTREMITIES: LUE with some mottling noted, however equal and strong radial pulses b/l and skin warm to touch  MUSCULOSKELETAL: no joint swelling, no tenderness to palpation  NEURO: A&Ox2-3, moving all extremities    LABS:                        8.6    5.94  )-----------( 159      ( 21 Sep 2022 06:55 )             29.8     09-21    140  |  99  |  19  ----------------------------<  106<H>  4.9   |  33<H>  |  0.58    Ca    10.0      21 Sep 2022 06:55  Phos  2.9     09-21  Mg     1.60     09-21    TPro  8.9<H>  /  Alb  2.9<L>  /  TBili  0.8  /  DBili  x   /  AST  21  /  ALT  12  /  AlkPhos  50  09-21    RADIOLOGY & ADDITIONAL TESTS:    Results Reviewed:   Imaging Personally Reviewed:  Electrocardiogram Personally Reviewed:    COORDINATION OF CARE:  Care Discussed with Consultants/Other Providers [Y/N]:  Prior or Outpatient Records Reviewed [Y/N]:

## 2022-09-21 NOTE — PROGRESS NOTE ADULT - PROBLEM SELECTOR PLAN 1
Has been hypotensive and improves with IVF  Initially with c/f sepsis, however no infectious source identified thus far  Most likely 2/2 poor PO intake and hypovolemia  Continue IVF for now and added midodrine 10mg TID for now. Pt remains asymptomatic   s/p one dose of zosyn  Bcx from 9/17 with NGTD, UA from 9/17 unremarkable  c/w close vital sign monitoring  c/w midodrine  BPs thus far remaining stable, will cont to encourage PO intake

## 2022-09-22 NOTE — PROGRESS NOTE ADULT - PROBLEM SELECTOR PROBLEM 6
Jessem for pt to make apt.   ----- Message from Ana Cristina Collazo M.D. sent at 12/15/2020  8:06 AM PST -----  Please call patient and schedule a virtual or in-person appointment with Dr. Collazo to review lab.    Thanks!     Lymphoma

## 2022-09-22 NOTE — PROGRESS NOTE ADULT - PROBLEM SELECTOR PLAN 1
Has been hypotensive and improves with IVF  Initially with c/f sepsis, however no infectious source identified thus far  Most likely 2/2 poor PO intake and hypovolemia  Bcx from 9/17 with NGTD, UA from 9/17 unremarkable  c/w midodrine 10mg q8h  BPs thus far remaining stable, will cont to encourage PO intake

## 2022-09-22 NOTE — PROGRESS NOTE ADULT - PROBLEM SELECTOR PROBLEM 11
DVT prophylaxis
Swelling of left hand
DVT prophylaxis
DVT prophylaxis

## 2022-09-22 NOTE — PROGRESS NOTE ADULT - SUBJECTIVE AND OBJECTIVE BOX
Ongoing SW/CM Assessment/Plan of Care Note     See SW/CM flowsheets for goals and other objective data.    Patient/Family discharge goal (s):  Goal #1: Psychosocial needs assessed  Goal #2: Extended Care Facility discharge arranged       PT Recommendation:  Recommendation for Discharge: PT WI: SNF, Sub-acute nursing home, Less intensive rehab       OT Recommendation:  Recommendations for Discharge: OT WI: Sub-acute nursing home       SLP Recommendation:       Disposition:  Planned Discharge Destination: Rehabilitation/Skilled Care    Progress note:   Following for discharge planning.  Update provided by Critical Care MD that pt is anticipated to be ready for discharge tomorrow from a medical standpoint.  Met with pt to review status.  Pt identified that he would like to stay in the hospital as long as possible since he is having continued neck pain, but understands that he will need to be discharged when no longer requiring this level of care.  Would be appropriate for wheelchair van transport ride arrangement per direction from unit RN (Disha) and Physical Therapist (Mak).  Message left for Lotus in Admissions at [facility] (Phone:  [phone]) to identify that pt will be ready tomorrow to return to facility and to inquire if wheelchair van ride arrangement was still a courtesy service available there.  Await return call reception.           [facility] Department of Hospital Medicine  Albania Del Real MD  Available on MS Teams  Pager: [phone]    Patient is a 77y old  Female who presents with a chief complaint of Hypoxic respiratory failure (22 Sep 2022 13:16)    OVERNIGHT EVENTS: No acute events overnight.    SUBJECTIVE: Pt seen and examined at bedside this morning. Seen receiving chemotherapy this AM. Alert and pleasant. States that she overall feels well. Still endorses poor appetite but states that she has been trying to eat. Denies any complaints.    ADDITIONAL REVIEW OF SYSTEMS: As above.    MEDICATIONS  (STANDING):  acyclovir   Oral Tab/Cap 400 milliGRAM(s) Oral two times a day  albumin human  5% IVPB 2500 milliLiter(s) IV Intermittent once  albumin human  5% IVPB 2500 milliLiter(s) IV Intermittent once  albumin human  5% IVPB 2500 milliLiter(s) IV Intermittent once  bendamustine IVPB (TREANDA) (eMAR) 113 milliGRAM(s) IV Intermittent once  budesonide  80 MICROgram(s)/formoterol 4.5 MICROgram(s) Inhaler 2 Puff(s) Inhalation two times a day  calcium gluconate IVPB 2 Gram(s) IV Intermittent once  calcium gluconate IVPB 2 Gram(s) IV Intermittent once  chlorhexidine 2% Cloths 1 Application(s) Topical daily  cholecalciferol 1000 Unit(s) Oral daily  dexAMETHasone     Tablet 8 milliGRAM(s) Oral once  dextrose 5% + lactated ringers. 1000 milliLiter(s) (60 mL/Hr) IV Continuous <Continuous>  dextrose 50% Injectable 25 Gram(s) IV Push once  dextrose 50% Injectable 12.5 Gram(s) IV Push once  dextrose 50% Injectable 25 Gram(s) IV Push once  dextrose Oral Gel 15 Gram(s) Oral once  diphenhydrAMINE 25 milliGRAM(s) Oral once  famotidine    Tablet 40 milliGRAM(s) Oral once  glucagon  Injectable 1 milliGRAM(s) IntraMuscular once  heparin   Injectable 5000 Unit(s) SubCutaneous every 12 hours  levalbuterol Inhalation 0.63 milliGRAM(s) Inhalation every 12 hours  midodrine 10 milliGRAM(s) Oral every 8 hours  mirtazapine 7.5 milliGRAM(s) Oral <User Schedule>  multivitamin 1 Tablet(s) Oral daily  ondansetron    Tablet 8 milliGRAM(s) Oral once    MEDICATIONS  (PRN):  acetaminophen     Tablet .. 650 milliGRAM(s) Oral every 6 hours PRN Temp greater or equal to 38C (100.4F), Mild Pain (1 - 3)  aluminum hydroxide/magnesium hydroxide/simethicone Suspension 30 milliLiter(s) Oral every 4 hours PRN Dyspepsia  melatonin 3 milliGRAM(s) Oral at bedtime PRN Insomnia  methylPREDNISolone sodium succinate Injectable 125 milliGRAM(s) IV Push once PRN REACTION  methylPREDNISolone sodium succinate Injectable 125 milliGRAM(s) IV Push once PRN REACTION  ondansetron Injectable 4 milliGRAM(s) IV Push every 8 hours PRN Nausea and/or Vomiting    CAPILLARY BLOOD GLUCOSE  107 (22 Sep 2022 05:54)    POCT Blood Glucose.: 95 mg/dL (21 Sep 2022 22:31)  POCT Blood Glucose.: 109 mg/dL (21 Sep 2022 17:17)    I&O's Summary    PHYSICAL EXAM:  Vital Signs Last 24 Hrs  T(C): 36.8 (22 Sep 2022 07:26), Max: 37 (22 Sep 2022 02:30)  T(F): 98.2 (22 Sep 2022 07:26), Max: 98.6 (22 Sep 2022 02:30)  HR: 98 (22 Sep 2022 15:11) (80 - 110)  BP: 109/59 (22 Sep 2022 12:57) (100/67 - 119/71)  BP(mean): --  RR: 20 (22 Sep 2022 12:57) (18 - 20)  SpO2: 95% (22 Sep 2022 15:11) (94% - 99%)    Parameters below as of 22 Sep 2022 12:57  Patient On (Oxygen Delivery Method): nasal cannula  O2 Flow (L/min): 3    CONSTITUTIONAL: NAD, well-developed  HEAD: Normocephalic, atraumatic  EYES: EOMI, PERRL  ENT: no rhinorrhea, no pharyngeal erythema, NC in place  RESPIRATORY: No increased work of breathing, CTAB, no wheezes or crackles appreciated  CARDIOVASCULAR: RRR, S1 and S2 present, no m/r/g  ABDOMEN: soft, NT, ND, bowel sounds present  EXTREMITIES: LUE with some mottling noted, however equal and strong radial pulses b/l and skin warm to touch  MUSCULOSKELETAL: no joint swelling, no tenderness to palpation  NEURO: A&Ox2-3, moving all extremities    LABS:                        8.6    5.94  )-----------( 159      ( 21 Sep 2022 06:55 )             29.8     09-21    140  |  99  |  19  ----------------------------<  106<H>  4.9   |  33<H>  |  0.58    Ca    10.0      21 Sep 2022 06:55  Phos  2.9     09-21  Mg     1.60     09-21    TPro  8.9<H>  /  Alb  2.9<L>  /  TBili  0.8  /  DBili  x   /  AST  21  /  ALT  12  /  AlkPhos  50  09-21    RADIOLOGY & ADDITIONAL TESTS:    Results Reviewed:   Imaging Personally Reviewed:  Electrocardiogram Personally Reviewed:    COORDINATION OF CARE:  Care Discussed with Consultants/Other Providers [Y/N]:  Prior or Outpatient Records Reviewed [Y/N]:

## 2022-09-22 NOTE — PROGRESS NOTE ADULT - PROBLEM SELECTOR PLAN 8
-Hx of bipolar w/psych admission @ [place] many years ago, unclear if patient actually had Bipolar   -non-compliant with medications  -Zyprexa 2.5mg QHS increased to 3.75mg QHS on 9/8/22 (pt refused), if patient has respiratory depression- hold Zyprexa, may try low dose Mirtazapine (also sedating)  -Psych recommendations appreciated   -Supportive care

## 2022-09-22 NOTE — PROGRESS NOTE ADULT - ASSESSMENT
77 year old female with PMH of lymphoma (dx 2005, s/p CARRIE lobectomy, relapsed in 2019 but not currently on chemo/RT, being followed by heme every 3[place]6months [place] [facility] [place]), bipolar disorder, asthma (on O2 PRN at home, unknown how many L) brought to ED by daughter who states pt has been hallucinating, being more depressed.  Patient unable to provide much history, daughter Alissa states that patient was able to care for herself, manage home finances and attend doctor appointments independently until recently, she states that she believes her mom stopped taking her psychiatric medications in February of 2022 and has not followed up with any of her physicians. She has noticed recent weight loss (unsure how much) and change in voice.   Admitted to medicine service for further evaluation & treatment. Course c/b acute hypercapnic and hypoxic respiratory failure requiring BIPAP and AVAPS. 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

## 2022-09-22 NOTE — PROGRESS NOTE ADULT - PROBLEM SELECTOR PLAN 10
-Heparin SQ    -Dispo issues: medically active and pending  -Patient with APS case ongoing due to unsanitary conditions at home. Will require SW/CM for safe placement once medically stable for d/c. Patient currently without capacity and with poor insight into her medical conditions.
-S/P fall at home   -LLE small area that is open, area non-erythematous, non-tender, no warmth noted  -x-ray without fracture or dislocation, soft tissue swelling present  -S/P tetanus vaccine in ED
-S/P fall at home   -LLE small area that is open, area non-erythematous, non-tender, no warmth noted  -x-ray without fracture or dislocation, soft tissue swelling present  -S/P tetanus vaccine in ED
-unclear outpatient medication regimen  -will continue with previous regimen of breo ellipta inhaler and albuterol inhaler PRN
-S/P fall at home   -LLE small area that is open, area non-erythematous, non-tender, no warmth noted  -x-ray without fracture or dislocation, soft tissue swelling present  -S/P tetanus vaccine in ED
-S/P fall at home   -LLE small area that is open, area non-erythematous, non-tender, no warmth noted  -x-ray without fracture or dislocation, soft tissue swelling present  -S/P tetanus vaccine in ED
-Heparin SQ    -Dispo issues: medically active and pending  -Patient with APS case ongoing due to unsanitary conditions at home. Will require SW/CM for safe placement once medically stable for d/c. Patient currently without capacity and with poor insight into her medical conditions.

## 2022-09-22 NOTE — PROGRESS NOTE ADULT - SUBJECTIVE AND OBJECTIVE BOX
Patient is a 77y old  Female who presents with a chief complaint of Hypoxic respiratory failure (21 Sep 2022 17:35)    Patient seen this morning. Currently receiving pre-medications for chemotherapy. She feels ok, no new complaints.     MEDICATIONS  (STANDING):  acyclovir   Oral Tab/Cap 400 milliGRAM(s) Oral two times a day  albumin human  5% IVPB 2500 milliLiter(s) IV Intermittent once  albumin human  5% IVPB 2500 milliLiter(s) IV Intermittent once  albumin human  5% IVPB 2500 milliLiter(s) IV Intermittent once  bendamustine IVPB (TREANDA) (eMAR) 113 milliGRAM(s) IV Intermittent once  budesonide  80 MICROgram(s)/formoterol 4.5 MICROgram(s) Inhaler 2 Puff(s) Inhalation two times a day  calcium gluconate IVPB 2 Gram(s) IV Intermittent once  calcium gluconate IVPB 2 Gram(s) IV Intermittent once  chlorhexidine 2% Cloths 1 Application(s) Topical daily  cholecalciferol 1000 Unit(s) Oral daily  dexAMETHasone     Tablet 8 milliGRAM(s) Oral once  dextrose 5% + lactated ringers. 1000 milliLiter(s) (60 mL/Hr) IV Continuous <Continuous>  dextrose 50% Injectable 25 Gram(s) IV Push once  dextrose 50% Injectable 12.5 Gram(s) IV Push once  dextrose 50% Injectable 25 Gram(s) IV Push once  dextrose Oral Gel 15 Gram(s) Oral once  diphenhydrAMINE 25 milliGRAM(s) Oral once  famotidine    Tablet 40 milliGRAM(s) Oral once  glucagon  Injectable 1 milliGRAM(s) IntraMuscular once  heparin   Injectable 5000 Unit(s) SubCutaneous every 12 hours  levalbuterol Inhalation 0.63 milliGRAM(s) Inhalation every 12 hours  midodrine 10 milliGRAM(s) Oral every 8 hours  mirtazapine 7.5 milliGRAM(s) Oral <User Schedule>  multivitamin 1 Tablet(s) Oral daily  ondansetron    Tablet 8 milliGRAM(s) Oral once    MEDICATIONS  (PRN):  acetaminophen     Tablet .. 650 milliGRAM(s) Oral every 6 hours PRN Temp greater or equal to 38C (100.4F), Mild Pain (1 - 3)  aluminum hydroxide/magnesium hydroxide/simethicone Suspension 30 milliLiter(s) Oral every 4 hours PRN Dyspepsia  melatonin 3 milliGRAM(s) Oral at bedtime PRN Insomnia  methylPREDNISolone sodium succinate Injectable 125 milliGRAM(s) IV Push once PRN REACTION  methylPREDNISolone sodium succinate Injectable 125 milliGRAM(s) IV Push once PRN REACTION  ondansetron Injectable 4 milliGRAM(s) IV Push every 8 hours PRN Nausea and/or Vomiting        Vital Signs Last 24 Hrs  T(C): 36.8 (22 Sep 2022 07:26), Max: 37 (22 Sep 2022 02:30)  T(F): 98.2 (22 Sep 2022 07:26), Max: 98.6 (22 Sep 2022 02:30)  HR: 97 (22 Sep 2022 12:57) (80 - 110)  BP: 109/59 (22 Sep 2022 12:57) (100/67 - 119/71)  BP(mean): --  RR: 20 (22 Sep 2022 12:57) (18 - 20)  SpO2: 98% (22 Sep 2022 12:57) (94% - 99%)    Parameters below as of 22 Sep 2022 12:57  Patient On (Oxygen Delivery Method): nasal cannula  O2 Flow (L/min): 3      PE  NAD  Awake, alert  Anicteric, MMM  RRR  CTAB  Abd soft, NT, ND  No c/c/e  No rash grossly  FROM                          8.6    5.94  )-----------( 159      ( 21 Sep 2022 06:55 )             29.8       09-21    140  |  99  |  19  ----------------------------<  106<H>  4.9   |  33<H>  |  0.58    Ca    10.0      21 Sep 2022 06:55  Phos  2.9     09-21  Mg     1.60     09-21    TPro  8.9<H>  /  Alb  2.9<L>  /  TBili  0.8  /  DBili  x   /  AST  21  /  ALT  12  /  AlkPhos  50  09-21

## 2022-09-22 NOTE — PROGRESS NOTE ADULT - PROBLEM SELECTOR PLAN 6
-[place] w/Dr. Real Cano @ [facility] [place]  -received treatment recently per daughter however she doesn't know specifics  -with progression of disease  -hematology to speak to [facility] regarding if further treatment is suggested  -Hem recs appreciated- plasmapheresis  09/02, 09/09, 09/13, serum viscosity now improved  -Shiley removed 9/15 and if needs repeat plasmapheresis will have to insert another cath  -IgM level downtrending and now up trending  -Heme/Onc recommends bendamustine to debulk  -As BPs improved, plan for bendamustine therapy on 9/22

## 2022-09-22 NOTE — PROGRESS NOTE ADULT - ASSESSMENT
MANDI GASPAR is a 77y Female who presents with a chief complaint of hypoxic respiratory failure.    Marginal Zone Lymphoma  - Patient treated previously at [facility]; was on rituximab + bendamustine last in August 2020, and since then has been on surveillance, but lost to follow-up since July 2021  - CT chest concerning for slight progression of disease. May need bone marrow biopsy once respiratory status is more stable.     Lymphoplasmacytic Lymphoma  - Noted from bone marrow biopsy done during prior admission here.  - Patient underwent three plasmapheresis sessions; last on September 13th with improvement in mental status and IgM.  - IgM rising rapidly again, 4781 on 9/15 with no real decrease in protein burden after multiple PLEX treatments  - f/u repeat IgM levels   - flow cytometry confirms small b cell population present; MYD88 testing pending  - Pt to receive bendamustine today to debulk     Altered Mental Status  - Likely multifactorial with hyperviscosity of blood, hypercalcemia, urinary tract infection  - Completed antibiotics  - Endocrinology following  - Psychiatry input appreciated  - Overall improving     Hypoxia  - Management per pulmonary  - Respiratory status improved, pt now on 2L NC     Hypotension  - Cardio recs appreciated- continue IVF, midodrine   - BP improved, give bendamustine     Will continue to follow.    Giovana Duarte PA-C  Hematology/Oncology  [facility]  [phone] (office)  [phone] (alt office)  Evenings and weekends please call MD on call or office

## 2022-09-22 NOTE — PROGRESS NOTE ADULT - PROBLEM SELECTOR PLAN 3
-Likely multi-factorial: UTI, hypercapnia, hypercalcemia, psychiatric disorder, and now with greater concern for hyperviscosity syndrome  -serum viscosity level improved to 2.1 after plasmapheresis on 9/13 (peak of 7.6)  -IgM level downtrending and now uptrending  -Heme/Onc recommends- will initiate bendamustine to debulk. holding currently since pt is hypotensive  -daughter thinks patient stopped taking her psychiatric medication in february  -Poor PO intake- nutrition consulted previously   -BPs remaining stable on Midodrine 10mg TID  -mental status overall remaining stable

## 2022-09-22 NOTE — PROGRESS NOTE ADULT - PROBLEM SELECTOR PLAN 9
-noted to have left hand swelling and discoloration noted   -Arterial and venous doppler negative for venous thrombus or arterial occlusion  -Vascular consulted and no acute intervention planned  -Discussed with hand Surgeon, , and no urgent intervention needed  -will continue to monitor and elevate LUE. will avoid left arm for IV

## 2022-09-23 NOTE — PROGRESS NOTE ADULT - SUBJECTIVE AND OBJECTIVE BOX
Patient is a 77y old  Female who presents with a chief complaint of Hypoxic respiratory failure (22 Sep 2022 15:12)    Patient seen and examined this morning. She reports feeling tired, but no other complaints. Received bendamustine yesterday.     MEDICATIONS  (STANDING):  acyclovir   Oral Tab/Cap 400 milliGRAM(s) Oral two times a day  albumin human  5% IVPB 2500 milliLiter(s) IV Intermittent once  albumin human  5% IVPB 2500 milliLiter(s) IV Intermittent once  albumin human  5% IVPB 2500 milliLiter(s) IV Intermittent once  bendamustine IVPB (TREANDA) (eMAR) 113 milliGRAM(s) IV Intermittent once  budesonide  80 MICROgram(s)/formoterol 4.5 MICROgram(s) Inhaler 2 Puff(s) Inhalation two times a day  calcium gluconate IVPB 2 Gram(s) IV Intermittent once  calcium gluconate IVPB 2 Gram(s) IV Intermittent once  cholecalciferol 1000 Unit(s) Oral daily  dexAMETHasone     Tablet 8 milliGRAM(s) Oral once  dextrose 5% + lactated ringers. 1000 milliLiter(s) (60 mL/Hr) IV Continuous <Continuous>  dextrose 50% Injectable 25 Gram(s) IV Push once  dextrose 50% Injectable 12.5 Gram(s) IV Push once  dextrose 50% Injectable 25 Gram(s) IV Push once  dextrose Oral Gel 15 Gram(s) Oral once  diphenhydrAMINE 25 milliGRAM(s) Oral once  famotidine    Tablet 40 milliGRAM(s) Oral once  glucagon  Injectable 1 milliGRAM(s) IntraMuscular once  heparin   Injectable 5000 Unit(s) SubCutaneous every 12 hours  levalbuterol Inhalation 0.63 milliGRAM(s) Inhalation every 12 hours  midodrine 10 milliGRAM(s) Oral every 8 hours  mirtazapine 7.5 milliGRAM(s) Oral <User Schedule>  multivitamin 1 Tablet(s) Oral daily  ondansetron    Tablet 8 milliGRAM(s) Oral once    MEDICATIONS  (PRN):  acetaminophen     Tablet .. 650 milliGRAM(s) Oral every 6 hours PRN Temp greater or equal to 38C (100.4F), Mild Pain (1 - 3)  aluminum hydroxide/magnesium hydroxide/simethicone Suspension 30 milliLiter(s) Oral every 4 hours PRN Dyspepsia  melatonin 3 milliGRAM(s) Oral at bedtime PRN Insomnia  methylPREDNISolone sodium succinate Injectable 125 milliGRAM(s) IV Push once PRN REACTION  methylPREDNISolone sodium succinate Injectable 125 milliGRAM(s) IV Push once PRN REACTION  ondansetron Injectable 4 milliGRAM(s) IV Push every 8 hours PRN Nausea and/or Vomiting        Vital Signs Last 24 Hrs  T(C): 36.7 (23 Sep 2022 13:17), Max: 37 (22 Sep 2022 17:00)  T(F): 98 (23 Sep 2022 13:17), Max: 98.6 (22 Sep 2022 17:00)  HR: 86 (23 Sep 2022 13:17) (74 - 104)  BP: 94/80 (23 Sep 2022 13:17) (94/55 - 111/65)  BP(mean): --  RR: 18 (23 Sep 2022 13:17) (18 - 20)  SpO2: 100% (23 Sep 2022 13:17) (95% - 100%)    Parameters below as of 23 Sep 2022 13:17  Patient On (Oxygen Delivery Method): nasal cannula  O2 Flow (L/min): 6      PE  NAD  Awake, alert  Anicteric, MMM  RRR  CTAB  Abd soft, NT, ND  No c/c/e  Ecchymoses noted to b/l upper extremities                             8.4    5.63  )-----------( 165      ( 23 Sep 2022 06:00 )             29.2       09-23    145  |  100  |  16  ----------------------------<  92  4.2   |  35<H>  |  0.49<L>    Ca    9.8      23 Sep 2022 06:00  Phos  3.1     09-23  Mg     1.70     09-23    TPro  9.5<H>  /  Alb  3.1<L>  /  TBili  0.9  /  DBili  x   /  AST  20  /  ALT  12  /  AlkPhos  55  09-23       motor intact

## 2022-09-23 NOTE — PROGRESS NOTE ADULT - PROBLEM SELECTOR PLAN 2
hypercapnic and hypoxemic respiratory failure  worsening resp status on 9/10, pt lethargic, was not using AVAPS the night prior, was on NRB instead   placed on HFNC in am- continue to wean off oxygen as tolerated  -C/w intermittent diuresis for pleural effusions as BP allows, last given IV Lasix 40mg on 9/10.   -Palliative following  -Continue Duoneb BID, Symbicort BID, Albuterol PRN  -c/w NC or HFNC in daytime and AVAPS at bedtime (repeat ABG on 9/19 showing continued pCO2 elevation to 70)

## 2022-09-23 NOTE — PROGRESS NOTE ADULT - PROBLEM SELECTOR PLAN 8
-Heparin SQ  -Dispo issues: medically active and pending    -Patient with APS case ongoing due to unsanitary conditions at home. Will require SW/CM for safe placement once medically stable for d/c. Patient currently without capacity and with poor insight into her medical conditions.    Update provided to patient's daughter, Alissa, on 9/23 at 1530. She is planning to visit her mother on 9/24.

## 2022-09-23 NOTE — PROGRESS NOTE ADULT - PROBLEM SELECTOR PLAN 4
-Likely multi-factorial: UTI, hypercapnia, hypercalcemia, psychiatric disorder, and now with greater concern for hyperviscosity syndrome  -serum viscosity level improved to 2.1 after plasmapheresis on 9/13 (peak of 7.6)  -daughter thinks patient stopped taking her psychiatric medication in february  -Poor PO intake- nutrition consulted previously   -BPs remaining stable on Midodrine 10mg TID  -mental status overall remaining stable

## 2022-09-23 NOTE — PROGRESS NOTE ADULT - SUBJECTIVE AND OBJECTIVE BOX
[facility] Department of Hospital Medicine  Albania Del Real MD  Available on MS Teams  Pager: [phone]    Patient is a 77y old  Female who presents with a chief complaint of Hypoxic respiratory failure (23 Sep 2022 15:19)    OVERNIGHT EVENTS: No acute events overnight.    SUBJECTIVE: Pt seen and examined at bedside this morning.     ADDITIONAL REVIEW OF SYSTEMS:    MEDICATIONS  (STANDING):  acyclovir   Oral Tab/Cap 400 milliGRAM(s) Oral two times a day  albumin human  5% IVPB 2500 milliLiter(s) IV Intermittent once  albumin human  5% IVPB 2500 milliLiter(s) IV Intermittent once  albumin human  5% IVPB 2500 milliLiter(s) IV Intermittent once  bendamustine IVPB (BENDEKA) (eMAR) 113 milliGRAM(s) IV Intermittent once  bendamustine IVPB (TREANDA) (eMAR) 113 milliGRAM(s) IV Intermittent once  budesonide  80 MICROgram(s)/formoterol 4.5 MICROgram(s) Inhaler 2 Puff(s) Inhalation two times a day  calcium gluconate IVPB 2 Gram(s) IV Intermittent once  calcium gluconate IVPB 2 Gram(s) IV Intermittent once  cholecalciferol 1000 Unit(s) Oral daily  dexAMETHasone     Tablet 8 milliGRAM(s) Oral once  dexAMETHasone     Tablet 8 milliGRAM(s) Oral once  dextrose 5% + lactated ringers. 1000 milliLiter(s) (60 mL/Hr) IV Continuous <Continuous>  dextrose 50% Injectable 25 Gram(s) IV Push once  dextrose 50% Injectable 12.5 Gram(s) IV Push once  dextrose 50% Injectable 25 Gram(s) IV Push once  dextrose Oral Gel 15 Gram(s) Oral once  diphenhydrAMINE 25 milliGRAM(s) Oral once  diphenhydrAMINE 50 milliGRAM(s) Oral once  famotidine    Tablet 40 milliGRAM(s) Oral once  famotidine    Tablet 40 milliGRAM(s) Oral once  glucagon  Injectable 1 milliGRAM(s) IntraMuscular once  heparin   Injectable 5000 Unit(s) SubCutaneous every 12 hours  levalbuterol Inhalation 0.63 milliGRAM(s) Inhalation every 12 hours  midodrine 10 milliGRAM(s) Oral every 8 hours  mirtazapine 7.5 milliGRAM(s) Oral <User Schedule>  multivitamin 1 Tablet(s) Oral daily  ondansetron    Tablet 8 milliGRAM(s) Oral once  ondansetron    Tablet 8 milliGRAM(s) Oral once    MEDICATIONS  (PRN):  acetaminophen     Tablet .. 650 milliGRAM(s) Oral every 6 hours PRN Temp greater or equal to 38C (100.4F), Mild Pain (1 - 3)  aluminum hydroxide/magnesium hydroxide/simethicone Suspension 30 milliLiter(s) Oral every 4 hours PRN Dyspepsia  melatonin 3 milliGRAM(s) Oral at bedtime PRN Insomnia  methylPREDNISolone sodium succinate Injectable 125 milliGRAM(s) IV Push once PRN REACTION  methylPREDNISolone sodium succinate Injectable 125 milliGRAM(s) IV Push once PRN REACTION  ondansetron Injectable 4 milliGRAM(s) IV Push every 8 hours PRN Nausea and/or Vomiting    CAPILLARY BLOOD GLUCOSE    POCT Blood Glucose.: 104 mg/dL (23 Sep 2022 11:19)  POCT Blood Glucose.: 108 mg/dL (23 Sep 2022 05:35)  POCT Blood Glucose.: 126 mg/dL (22 Sep 2022 22:48)  POCT Blood Glucose.: 115 mg/dL (22 Sep 2022 16:54)    I&O's Summary    PHYSICAL EXAM:  Vital Signs Last 24 Hrs  T(C): 36.7 (23 Sep 2022 13:17), Max: 37 (22 Sep 2022 17:00)  T(F): 98 (23 Sep 2022 13:17), Max: 98.6 (22 Sep 2022 17:00)  HR: 86 (23 Sep 2022 13:17) (74 - 104)  BP: 94/80 (23 Sep 2022 13:17) (94/55 - 111/65)  BP(mean): --  RR: 18 (23 Sep 2022 13:17) (18 - 20)  SpO2: 100% (23 Sep 2022 13:17) (95% - 100%)    Parameters below as of 23 Sep 2022 13:17  Patient On (Oxygen Delivery Method): nasal cannula  O2 Flow (L/min): 6    CONSTITUTIONAL: NAD, well-developed  HEAD: Normocephalic, atraumatic  EYES: EOMI, PERRL  ENT: no rhinorrhea, no pharyngeal erythema  RESPIRATORY: No increased work of breathing, CTAB, no wheezes or crackles appreciated  CARDIOVASCULAR: RRR, S1 and S2 present, no m/r/g  ABDOMEN: soft, NT, ND, bowel sounds present  EXTREMITIES: No LE edema  MUSCULOSKELETAL: no joint swelling, no tenderness to palpation  NEURO: A&Ox3, moving all extremities    LABS:                        8.4    5.63  )-----------( 165      ( 23 Sep 2022 06:00 )             29.2     09-23    145  |  100  |  16  ----------------------------<  92  4.2   |  35<H>  |  0.49<L>    Ca    9.8      23 Sep 2022 06:00  Phos  3.1     09-23  Mg     1.70     09-23    TPro  9.5<H>  /  Alb  3.1<L>  /  TBili  0.9  /  DBili  x   /  AST  20  /  ALT  12  /  AlkPhos  55  09-23                RADIOLOGY & ADDITIONAL TESTS:    Results Reviewed:   Imaging Personally Reviewed:  Electrocardiogram Personally Reviewed:    COORDINATION OF CARE:  Care Discussed with Consultants/Other Providers [Y/N]:  Prior or Outpatient Records Reviewed [Y/N]:   [facility] Department of Hospital Medicine  [place] Del Real MD  Available on MS Teams  Pager: [phone]    Patient is a 77y old  Female who presents with a chief complaint of Hypoxic respiratory failure (23 Sep 2022 15:19)    OVERNIGHT EVENTS: No acute events overnight.    SUBJECTIVE: Pt seen and examined at bedside this morning. Appearing more fatigued compared to prior but otherwise easily arousable. States that she overall does not feel too well. Endorses feeling of nausea but no vomiting. Denies any fevers, chills, CP or shortness of breath while on NC.     ADDITIONAL REVIEW OF SYSTEMS:    MEDICATIONS  (STANDING):  acyclovir   Oral Tab/Cap 400 milliGRAM(s) Oral two times a day  albumin human  5% IVPB 2500 milliLiter(s) IV Intermittent once  albumin human  5% IVPB 2500 milliLiter(s) IV Intermittent once  albumin human  5% IVPB 2500 milliLiter(s) IV Intermittent once  bendamustine IVPB (BENDEKA) (eMAR) 113 milliGRAM(s) IV Intermittent once  bendamustine IVPB (TREANDA) (eMAR) 113 milliGRAM(s) IV Intermittent once  budesonide  80 MICROgram(s)/formoterol 4.5 MICROgram(s) Inhaler 2 Puff(s) Inhalation two times a day  calcium gluconate IVPB 2 Gram(s) IV Intermittent once  calcium gluconate IVPB 2 Gram(s) IV Intermittent once  cholecalciferol 1000 Unit(s) Oral daily  dexAMETHasone     Tablet 8 milliGRAM(s) Oral once  dexAMETHasone     Tablet 8 milliGRAM(s) Oral once  dextrose 5% + lactated ringers. 1000 milliLiter(s) (60 mL/Hr) IV Continuous <Continuous>  dextrose 50% Injectable 25 Gram(s) IV Push once  dextrose 50% Injectable 12.5 Gram(s) IV Push once  dextrose 50% Injectable 25 Gram(s) IV Push once  dextrose Oral Gel 15 Gram(s) Oral once  diphenhydrAMINE 25 milliGRAM(s) Oral once  diphenhydrAMINE 50 milliGRAM(s) Oral once  famotidine    Tablet 40 milliGRAM(s) Oral once  famotidine    Tablet 40 milliGRAM(s) Oral once  glucagon  Injectable 1 milliGRAM(s) IntraMuscular once  heparin   Injectable 5000 Unit(s) SubCutaneous every 12 hours  levalbuterol Inhalation 0.63 milliGRAM(s) Inhalation every 12 hours  midodrine 10 milliGRAM(s) Oral every 8 hours  mirtazapine 7.5 milliGRAM(s) Oral <User Schedule>  multivitamin 1 Tablet(s) Oral daily  ondansetron    Tablet 8 milliGRAM(s) Oral once  ondansetron    Tablet 8 milliGRAM(s) Oral once    MEDICATIONS  (PRN):  acetaminophen     Tablet .. 650 milliGRAM(s) Oral every 6 hours PRN Temp greater or equal to 38C (100.4F), Mild Pain (1 - 3)  aluminum hydroxide/magnesium hydroxide/simethicone Suspension 30 milliLiter(s) Oral every 4 hours PRN Dyspepsia  melatonin 3 milliGRAM(s) Oral at bedtime PRN Insomnia  methylPREDNISolone sodium succinate Injectable 125 milliGRAM(s) IV Push once PRN REACTION  methylPREDNISolone sodium succinate Injectable 125 milliGRAM(s) IV Push once PRN REACTION  ondansetron Injectable 4 milliGRAM(s) IV Push every 8 hours PRN Nausea and/or Vomiting    CAPILLARY BLOOD GLUCOSE    POCT Blood Glucose.: 104 mg/dL (23 Sep 2022 11:19)  POCT Blood Glucose.: 108 mg/dL (23 Sep 2022 05:35)  POCT Blood Glucose.: 126 mg/dL (22 Sep 2022 22:48)  POCT Blood Glucose.: 115 mg/dL (22 Sep 2022 16:54)    I&O's Summary    PHYSICAL EXAM:  Vital Signs Last 24 Hrs  T(C): 36.7 (23 Sep 2022 13:17), Max: 37 (22 Sep 2022 17:00)  T(F): 98 (23 Sep 2022 13:17), Max: 98.6 (22 Sep 2022 17:00)  HR: 86 (23 Sep 2022 13:17) (74 - 104)  BP: 94/80 (23 Sep 2022 13:17) (94/55 - 111/65)  BP(mean): --  RR: 18 (23 Sep 2022 13:17) (18 - 20)  SpO2: 100% (23 Sep 2022 13:17) (95% - 100%)    Parameters below as of 23 Sep 2022 13:17  Patient On (Oxygen Delivery Method): nasal cannula  O2 Flow (L/min): 6    CONSTITUTIONAL: NAD, well-developed  HEAD: Normocephalic, atraumatic  EYES: EOMI, PERRL  ENT: no rhinorrhea, no pharyngeal erythema, NC in place  RESPIRATORY: No increased work of breathing, CTAB, no wheezes or crackles appreciated  CARDIOVASCULAR: RRR, S1 and S2 present, no m/r/g  ABDOMEN: soft, NT, ND, bowel sounds present  EXTREMITIES: LUE with some mottling noted, equal and strong radial pulses b/l and skin warm to touch  MUSCULOSKELETAL: no joint swelling, no tenderness to palpation  NEURO: A&Ox2-3, moving all extremities    LABS:                        8.4    5.63  )-----------( 165      ( 23 Sep 2022 06:00 )             29.2     09-23    145  |  100  |  16  ----------------------------<  92  4.2   |  35<H>  |  0.49<L>    Ca    9.8      23 Sep 2022 06:00  Phos  3.1     09-23  Mg     1.70     09-23    TPro  9.5<H>  /  Alb  3.1<L>  /  TBili  0.9  /  DBili  x   /  AST  20  /  ALT  12  /  AlkPhos  55  09-23    RADIOLOGY & ADDITIONAL TESTS:    Results Reviewed:   Imaging Personally Reviewed:  Electrocardiogram Personally Reviewed:    COORDINATION OF CARE:  Care Discussed with Consultants/Other Providers [Y/N]:  Prior or Outpatient Records Reviewed [Y/N]:

## 2022-09-23 NOTE — PROGRESS NOTE ADULT - ASSESSMENT
MANDI GASPAR is a 77y Female who presents with a chief complaint of hypoxic respiratory failure.    Marginal Zone Lymphoma  - Patient treated previously at [facility]; was on rituximab + bendamustine last in August 2020, and since then has been on surveillance, but lost to follow-up since July 2021  - CT chest concerning for slight progression of disease. May need bone marrow biopsy once respiratory status is more stable.     Lymphoplasmacytic Lymphoma  - Noted from bone marrow biopsy done during prior admission here.  - Patient underwent three plasmapheresis sessions; last on September 13th with improvement in mental status and IgM.  - IgM rising rapidly again, with no real decrease in protein burden after multiple PLEX treatments  - IgM  - flow cytometry confirms small b cell population present; MYD88 testing pending  - Pt to receive bendamustine today to debulk     Altered Mental Status  - Likely multifactorial with hyperviscosity of blood, hypercalcemia, urinary tract infection  - Completed antibiotics  - Endocrinology following  - Psychiatry input appreciated  - Overall improving     Hypoxia  - Management per pulmonary  - Respiratory status improved, pt now on 2L NC     Hypotension  - Cardio recs appreciated- continue IVF, midodrine   - BP improved, give bendamustine     Will continue to follow.    Giovana Duarte PA-C  Hematology/Oncology  [facility]  [phone] (office)  [phone] (alt office)  Evenings and weekends please call MD on call or office   MANDI GASPAR is a 77y Female who presents with a chief complaint of hypoxic respiratory failure.    Marginal Zone Lymphoma  - Patient treated previously at [facility]; was on rituximab + bendamustine last in August 2020, and since then has been on surveillance, but lost to follow-up since July 2021  - CT chest concerning for slight progression of disease. May need bone marrow biopsy once respiratory status is more stable.     Lymphoplasmacytic Lymphoma  - Noted from bone marrow biopsy done during prior admission here.  - Patient underwent three plasmapheresis sessions; last on September 13th with improvement in mental status and IgM.  - IgM rising rapidly again, with no real decrease in protein burden after multiple PLEX treatments  - Pt received bendamustine 09/22, plan to give another today 09/23 for debulking   - Monitor IgM   - Zarxio 480mcg daily, starting at least 24H after bendamustine infusion   - flow cytometry confirms small b cell population present; MYD88 testing pending    Altered Mental Status  - Likely multifactorial with hyperviscosity of blood, hypercalcemia, urinary tract infection  - Completed antibiotics  - Endocrinology following  - Psychiatry input appreciated  - Overall improving     Hypoxia  - Management per pulmonary    Will continue to follow.    Giovana Duarte PA-C  Hematology/Oncology  [facility]  [phone] (office)  [phone] (alt office)  Evenings and weekends please call MD on call or office

## 2022-09-23 NOTE — PROGRESS NOTE ADULT - PROBLEM SELECTOR PLAN 1
-Follows w/Dr. Real Cano @ [facility] [place]  -received treatment recently per daughter however she doesn't know specifics  -with progression of disease  -Hem recs appreciated- plasmapheresis  09/02, 09/09, 09/13, serum viscosity now improved  -Shiley removed 9/15 and if needs repeat plasmapheresis will have to insert another cath  -IgM level downtrending and now up trending  -s/p Bendamustine therapy on 9/22  -despite first dose of bendamustine on 9/22, IgM levels noted to be uptrending, plan for second dose of bendamustine on 9/23 per heme/onc team

## 2022-09-23 NOTE — PROVIDER CONTACT NOTE (OTHER) - RECOMMENDATIONS
Provider made aware
Stop the IV fluids
MENDEZ LYNCH paged and made aware
ACP notified, will continue to monitor vitals.
MD placed an order for 250 NS Bolus, and to reassess BP 1 hour after bolus was given.
Provider made aware
continue to put nasal cannula back on patient
Provider made aware
n/a
ACP notified, will continue to monitor vitals.
monitor nasal cannula back on patient and monitor on telemetry monitor
ACP notified, will continue to monitor vitals.
Provider made aware
speech and swallow consult ,PA made aware

## 2022-09-23 NOTE — PROGRESS NOTE ADULT - PROBLEM SELECTOR PLAN 5
-Hx of bipolar w/psych admission @ [place] many years ago, unclear if patient actually had Bipolar   -non-compliant with medications  -Zyprexa 2.5mg QHS increased to 3.75mg QHS on 9/8/22 (pt refused), if patient has respiratory depression- hold Zyprexa, may try low dose Mirtazapine (also sedating)  -Psych recommendations appreciated   -Supportive care

## 2022-09-23 NOTE — PROGRESS NOTE ADULT - ASSESSMENT
77 year old female with PMH of lymphoma (dx 2005, s/p CARRIE lobectomy, relapsed in 2019 but not currently on chemo/RT, being followed by heme every 3-6months at [facility] [place]), bipolar disorder, asthma (on O2 PRN at home, unknown how many L) brought to ED by daughter who states pt has been hallucinating, being more depressed.  Patient unable to provide much history, daughter Alissa states that patient was able to care for herself, manage home finances and attend doctor appointments independently until recently, she states that she believes her mom stopped taking her psychiatric medications in February of 2022 and has not followed up with any of her physicians. She has noticed recent weight loss (unsure how much) and change in voice.   Admitted to medicine service for further evaluation & treatment. Course c/b acute hypercapnic and hypoxic respiratory failure requiring BIPAP and AVAPS. 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

## 2022-09-23 NOTE — PROGRESS NOTE ADULT - SUBJECTIVE AND OBJECTIVE BOX
CARDIOLOGY FOLLOW UP - Dr. Kingston  Date of Service: 9/23/2022  CC: no events    Review of Systems:  Constitutional: No fever, weight loss, or fatigue  Respiratory: No cough, wheezing, or hemoptysis, no shortness of breath  Cardiovascular: No chest pain, palpitations, passing out, dizziness, or leg swelling  Gastrointestinal: No abd or epigastric pain. No nausea, vomiting, or hematemesis; no diarrhea or consiptaiton, no melena or hematochezia  Vascular: No edema     TELEMETRY:    PHYSICAL EXAM:  T(C): 36.8 (09-23-22 @ 16:27), Max: 36.9 (09-22-22 @ 23:22)  HR: 88 (09-23-22 @ 16:27) (74 - 104)  BP: 109/49 (09-23-22 @ 16:27) (94/55 - 111/65)  RR: 18 (09-23-22 @ 16:27) (18 - 20)  SpO2: 100% (09-23-22 @ 16:27) (95% - 100%)  Wt(kg): --  I&O's Summary      Appearance: Normal	  Cardiovascular: Normal S1 S2,RRR, No JVD, No murmurs  Respiratory: Lungs clear to auscultation	  Gastrointestinal:  Soft, Non-tender, + BS	  Extremities: Normal range of motion, No clubbing, cyanosis or edema  Vascular: Peripheral pulses palpable 2+ bilaterally       Home Medications:  Breo Ellipta 100 mcg-25 mcg/inh inhalation powder: 1 puff(s) inhaled once a day (30 Aug 2022 16:01)  Depakote 500 mg oral delayed release tablet: 2 tab(s) orally once a day (at bedtime) (Dispensed on 8/18/22 x 90 days) (30 Aug 2022 16:02)  lithium 300 mg oral tablet: 1 tab(s) orally 3 times a day (Dispensed on 8/18/22 x 90 days) (30 Aug 2022 16:02)  Ventolin HFA 90 mcg/inh inhalation aerosol: 2 puff(s) inhaled 4 times a day, As Needed (30 Aug 2022 16:01)  ZyPREXA 2.5 mg oral tablet: 1 tab(s) orally once a day (at bedtime) (30 Aug 2022 16:01)        MEDICATIONS  (STANDING):  acyclovir   Oral Tab/Cap 400 milliGRAM(s) Oral two times a day  albumin human  5% IVPB 2500 milliLiter(s) IV Intermittent once  albumin human  5% IVPB 2500 milliLiter(s) IV Intermittent once  albumin human  5% IVPB 2500 milliLiter(s) IV Intermittent once  bendamustine IVPB (BENDEKA) (eMAR) 113 milliGRAM(s) IV Intermittent once  bendamustine IVPB (TREANDA) (eMAR) 113 milliGRAM(s) IV Intermittent once  budesonide  80 MICROgram(s)/formoterol 4.5 MICROgram(s) Inhaler 2 Puff(s) Inhalation two times a day  calcium gluconate IVPB 2 Gram(s) IV Intermittent once  calcium gluconate IVPB 2 Gram(s) IV Intermittent once  cholecalciferol 1000 Unit(s) Oral daily  dexAMETHasone     Tablet 8 milliGRAM(s) Oral once  dexAMETHasone     Tablet 8 milliGRAM(s) Oral once  dextrose 5% + lactated ringers. 1000 milliLiter(s) (60 mL/Hr) IV Continuous <Continuous>  dextrose 50% Injectable 25 Gram(s) IV Push once  dextrose 50% Injectable 12.5 Gram(s) IV Push once  dextrose 50% Injectable 25 Gram(s) IV Push once  dextrose Oral Gel 15 Gram(s) Oral once  diphenhydrAMINE 25 milliGRAM(s) Oral once  diphenhydrAMINE 50 milliGRAM(s) Oral once  famotidine    Tablet 40 milliGRAM(s) Oral once  famotidine    Tablet 40 milliGRAM(s) Oral once  filgrastim-sndz (ZARXIO) Injectable 480 MICROGram(s) SubCutaneous every 24 hours  glucagon  Injectable 1 milliGRAM(s) IntraMuscular once  heparin   Injectable 5000 Unit(s) SubCutaneous every 12 hours  levalbuterol Inhalation 0.63 milliGRAM(s) Inhalation every 12 hours  midodrine 10 milliGRAM(s) Oral every 8 hours  mirtazapine 7.5 milliGRAM(s) Oral <User Schedule>  multivitamin 1 Tablet(s) Oral daily  ondansetron    Tablet 8 milliGRAM(s) Oral once  ondansetron    Tablet 8 milliGRAM(s) Oral once        EKG:  RADIOLOGY:  DIAGNOSTIC TESTING:  [ ] Echocardiogram:  [ ] Catherterization:  [ ] Stress Test:  OTHER:     LABS:	 	                          8.4    5.63  )-----------( 165      ( 23 Sep 2022 06:00 )             29.2     09-23    145  |  100  |  16  ----------------------------<  92  4.2   |  35<H>  |  0.49<L>    Ca    9.8      23 Sep 2022 06:00  Phos  3.1     09-23  Mg     1.70     09-23    TPro  9.5<H>  /  Alb  3.1<L>  /  TBili  0.9  /  DBili  x   /  AST  20  /  ALT  12  /  AlkPhos  55  09-23          CARDIAC MARKERS:

## 2022-09-23 NOTE — PROVIDER CONTACT NOTE (OTHER) - ASSESSMENT
110/62 99 bpm
88/50 85 bpm
decreased blood pressure
no s/s of hypoglycemia  V/S as per flowsheet
patient fail dysphagia screening as after giving sip of water ,pt start coughing
82/50 94 bpm
Vitals:  T 97.9 , HR 87, BP 80/45, RR 18, O2 SAT 97%. Manual bp re-assessment is 81/43. Pt remains at baseline, AOx2, asymptomatic.
not actively trying to hurt self, just stating she wants to hurt self
80/42 95 bpm
90/52 94 bpm
Asymptomatic with hypotension
patient frequently takes off nasal cannula throughout the day, ACP is aware. Patient is alert and talking. blood pressure 109/49, HR 88
patient occasionally falls asleep and desats to the 70s/80s or removed nasal cannula and desats to the 70s/80s. Patient refused AVAPS
Vitals:  T 98.2, HR 81, BP 75/39, RR 19, O2 SAT 98%. Manual bp re-assessment is 78/45. Pt remains at baseline, AOx2, asymptomatic.
94/46 96 bpm
Patients have episodes of anxiety, increased HR, and hypotesive
Blood pressure low, and asymptomatic. Patient laying supine in bed, HOB 30 degrees
Right arm is cool to touch, fluid draining out of IV. RN d/c IV fluids.
Vitals:  T 97.4 , HR 92, BP 82/36, RR 18, O2 SAT 99%.  Pt remains at baseline, AOx2, asymptomatic.

## 2022-09-23 NOTE — PROGRESS NOTE ADULT - ASSESSMENT
77 year old female with PMH of lymphoma (dx 2005, s/p CARRIE lobectomy, relapsed in 2019 but not currently on chemo/RT, being followed by heme every 3[facility]), bipolar disorder, asthma adm w FTT, course c/b acute hypercapnic and hypoxic respiratory failure requiring BIPAP and AVAPS. Now with concern for hyperviscosity syndrome given inability to even obtain immunoglobulin panel due to serum viscosity, s/p plasmapheresis x3.     #Hypotension  -likely hypovolemic versus obstructive mediastinal mass  -responsive to IVF and midodrine  -cont midodrine as ordered  -no concern for pericardial process(no significant effusion seen on recent CT)  -however if pt develops recurrent resistant hypotension would repeat limited TTE    #Pleural Effusions  -may be malignant versus CHF  -hold off on diuresis now given recent hypotension    mgmt per medicine    35 minutes spent on total encounter; more than 50% of the visit was spent counseling and/or coordinating care by the attending physician.

## 2022-09-23 NOTE — PROVIDER CONTACT NOTE (OTHER) - BACKGROUND
77 year old female with PMHX of lymphoma ,CARRIE lobectomy and admitted with altered mental status .
77 year old female with PMHX of lymphoma ,CARRIE lobectomy and admitted with altered mental status .
Altered mental status, Lymphoma, ARHF, GERD
Patient adm for AMS, and has been trending hypotensively.
Patient admitted for AMS, patients daughter states worsening hallucinations and more depressed
Patient was hypotensive, previously 79/42. Admitted with AMS and possible hallucinations
77F w/ PMHx of lymphoma (dx 2005, s/p CARRIE lobectomy, relapsed in 2019 but not currently on chemo/RT, being followed by heme every 3-6months at [facility] [place]), bipolar disorder
Patient has been hypotensive and elevated HR
Pt is 77F, admitted for altered mental status. Pt has acute hypercapnic respiratory failure, hypoglycemia, acute kidney injury.  Pt is AOx2 at baseline.
Pt is 77F, admitted for altered mental status. Pt has acute hypercapnic respiratory failure, hypoglycemia, acute kidney injury.  Pt is AOx2 at baseline.
77 year old female with PMHX of lymphoma ,CARRIE lobectomy and admitted with altered mental status .
Patient has blood clot in left arm, arm restriction. This is the second time IV is infiltrated.
Pt is 77F, admitted for altered mental status. Pt has acute hypercapnic respiratory failure, hypoglycemia, acute kidney injury.  Pt is AOx2 at baseline.
77 year old female with PMHX of lymphoma ,CARRIE lobectomy and admitted with altered mental status .
admitted for AMS
77 year old female with PMHX of lymphoma ,CARRIE lobectomy and admitted with altered mental status .
77F w/ PMHx of lymphoma (dx 2005, s/p CARRIE lobectomy, relapsed in 2019 but not currently on chemo/RT, [place] [facility] [place] disorder,

## 2022-09-24 NOTE — PROVIDER CONTACT NOTE (OTHER) - SITUATION
Called to notify ACP Pt BP trending down  95/44
Patient desaturated to 45% on continuous pulse oximetry on telemetry monitor after taking off Nasal cannula
Patient had low bp upon vs assessment
Patient has continued to stay in the 80s systolically.
Pt agitated, pulling off Nasal cannula, clothing, pulling at IV. Pt desat in the 70's.
patient frequently desaturating on continuous pulse oximetry on telemetry
BP increased to 115/62, .
Patient had low BP upon assessment
Patient had low bp upon vs assessment
2nd time both IV's are infiltrated. Unable to give IV medication
Pt BP low
patient fail dysphagia screening as after giving sip of water ,pt start coughing .
Pt BP low
Patient blood pressure is 79/42m however patient is asymptomatic resting in bed awake and talking
Pt BP low
Reassessed BP manually - 88/46
pt stating she wants knife to hurt herself
Patient FS is 72. Patient in on continuous AVAPS.
Patient had low bp upon vs assessment
Patient hypotensive upon assessment
Patient bp back WNL upon vs assessment after bolus

## 2022-09-24 NOTE — PROGRESS NOTE ADULT - ASSESSMENT
MANDI GASPAR is a 77y Female who presents with a chief complaint of hypoxic respiratory failure.    Marginal Zone Lymphoma  - Patient treated previously at [facility]; was on rituximab + bendamustine last in August 2020, and since then has been on surveillance, but lost to follow-up since July 2021  - CT chest concerning for slight progression of disease. May need bone marrow biopsy once respiratory status is more stable.     Lymphoplasmacytic Lymphoma  - Noted from bone marrow biopsy done during prior admission here.  - Patient underwent three plasmapheresis sessions; last on September 13th with improvement in mental status and IgM.  - IgM rising rapidly again, with no real decrease in protein burden after multiple PLEX treatments  - Pt received bendamustine 09/22, plan to give another today 09/23 for debulking   - Monitor IgM   - Zarxio 480mcg daily, starting at least 24H after bendamustine infusion   - flow cytometry confirms small b cell population present; MYD88 testing pending    Thrombocytopenia  - no visible signs of bleeding noted  - platelets today 43K,   - will check HIT, LDH,  Hapto, platelets citrate top  - clumping noted on smear  - will continue to monitor    Altered Mental Status  - Likely multifactorial with hyperviscosity of blood, hypercalcemia, urinary tract infection  - Completed antibiotics  - Endocrinology following  - Psychiatry input appreciated    Hypoxia  - Management per pulmonary    Will continue to follow.    Mar Marcos NP  Hematology/ Oncology  [facility]  [phone] (office)  [phone] (alt office)  Evenings and weekends please call MD on call or office

## 2022-09-24 NOTE — PROVIDER CONTACT NOTE (OTHER) - ACTION/TREATMENT ORDERED:
Pt given Zyprexa per Provider order  Pt placed on Nonbreather mask, because her saturation level was decreasing in the 70's, When placed on nonrebreather pt sats increased WNL Provider made aware

## 2022-09-24 NOTE — PROGRESS NOTE ADULT - PROBLEM SELECTOR PLAN 2
-hypercapnic and hypoxemic respiratory failure  -Continue Duoneb BID, Symbicort BID, Albuterol PRN  -c/w NC or HFNC/NRB. Avoid AVAPS as patient refusing to wear mask

## 2022-09-24 NOTE — PROVIDER CONTACT NOTE (OTHER) - REASON
IV infiltration
Patient desaturated to 45% on continuous pulse oximetry on telemetry monitor after taking off Nasal cannula
Pt BP low
Pt BP low
patient frequently desaturating on continuous pulse oximetry on telemetry
Patient FS 72, on continous AVAPS
Reassess BP d/t previous hypotension
PT BP  trending down
Pt agitated, pulling off NC, clothing, pulling at IV. Pt desat in the 70's.
fail dysphagia screening
low bp
Low BP
Sustained HR in 130's
pt stating she wants knife to hurt herself
BP WNL after bolus
Inform of consistent low BP
low bp
Patient hypotensive
Pt BP low
BP WNL after bolus
low bp

## 2022-09-24 NOTE — PROGRESS NOTE ADULT - CONVERSATION DETAILS
Patient seen at bedside with daughter, Alissa, and patient's son, Giuseppe, present. Discussed patient's overall course with patient's children. Explained to Alissa and Giuseppe that Ms. Abernathy has had overall decline overnight. States that her oxygen requirements have increased and that she is currently  requiring non-rebreather. Patient saturating 88-90% despite non-rebreather therapy. Informed Alissa and Giuseppe that this current decompensation could likely be due to progression of disease and that Ms. Abernathy is generally approaching end of life. Explained that Ms. Abernathy likely has hours to days to live. Patient also with overall episodes of agitation not responsive to redirection, requiring PRNs and intermittent restraints (as she is pulling off her O2 mask). Giuseppe initially with difficulty accepting that his mother was approaching end of life, however eventually agreed with his sister that the optimal approach would be to assure that if Ms. Abernathy were to pass, she should do so comfortably. Discussed a comfort centric approach with both Alissa and Giuseppe and they are in agreement. Will hold off on aggressive medical interventions at this time. Will c/w medications for symptom management, especially dyspnea. Patient to remain DNR/DNI. Family expressed interest in potentially feeding patient. Explained that we could trial HFNC in lieu of NRB if patient alert and willing to eat (although she has frequently been refusing PO intake this week). Will place orders for dilaudid PRN, Ativan PRN for anxiety as well as glycopyrrolate. Family also expressed interest in bringing patient home if possible. Explained that as patient is currently unstable and requiring PRNs for agitation as well as large amounts of oxygen she would not be appropriate for home hospice however if stabilized it could be considered in the future. Patient seen at bedside with daughter, Alissa, and patient's son, Giuseppe, present. Discussed patient's overall course with patient's children. Explained to Alissa and Giuseppe that Ms. Abernathy has had overall decline overnight. States that her oxygen requirements have increased and that she is currently  requiring non-rebreather. Patient saturating 88-90% despite non-rebreather therapy. Informed Alissa and Giuseppe that this current decompensation could likely be due to progression of disease and that Ms. Abernathy is generally appears to be approaching end of life. Explained that Ms. Abernathy likely has hours to days to live. Patient also with overall episodes of agitation not responsive to redirection, requiring PRNs and intermittent restraints (as she is pulling off her O2 mask). Giuseppe initially with some difficulty accepting that his mother is approaching end of life, however, he eventually agreed with his sister that the optimal approach would be to assure that if Ms. Abernathy were to pass, she should do so comfortably. Discussed a comfort centric approach with both Alissa and Giuseppe and they are in agreement. Will hold off on aggressive medical interventions at this time. Will c/w medications for symptom management, especially dyspnea. Patient to remain DNR/DNI. Family expressed interest in potentially feeding patient. Explained that we could trial HFNC in lieu of NRB if patient alert and willing to eat (although she has frequently been refusing all means of PO intake this week). Will place orders for dilaudid PRN, Ativan PRN for anxiety as well as glycopyrrolate PRN for secretions. Family also expressed interest in bringing patient home if possible. Explained that as patient is currently unstable and requiring PRNs for agitation as well as large amounts of oxygen she would not be appropriate for home hospice however if stabilized it could be considered in the future.    Later notified by patient's daughter, Alissa, that her brother, Giuseppe, made several concerning comments while visiting their mother. Alissa reported that Giuseppe asked that she look after his pets because if their mother passed, "he would have nothing to live for." Alissa stated that he did not seem to have any plan and has made comments such as the above in the past. She denies any history of self harm or suicide attempts in her brother. Giuseppe did not make any such comments directly to myself or any other staff members, and while he was distraught at the delivery of bad news, he was otherwise calm and engaged in the discussion about his mother's current condition and plan of care. Briefly discussed current situation with  on call. As the family member is otherwise not currently a threat to the patient, family or staff members at the moment, he may continue to visit his mother as appropriate. Spoke with Alissa at length about continuing to support her brother through his grief and encouraging him to seek help. Informed her that if she believes he is in imminent danger to himself or others she should encourage Giuseppe to go to the ED for further evaluation or call EMS/Police if they are outside of the hospital. Patient seen at bedside with daughter, Alissa, and patient's son, Giuseppe, present. Discussed patient's overall course with patient's children. Explained to Alissa and Giuseppe that Ms. Abernathy has had overall decline overnight. States that her oxygen requirements have increased and that she is currently  requiring non-rebreather. Patient saturating 88-90% despite non-rebreather therapy. Informed Alissa and Giuseppe that this current decompensation could likely be due to progression of disease and that Ms. Abernathy is generally appears to be approaching end of life. Explained that Ms. Abernathy likely has hours to days to live. Patient also with overall episodes of agitation not responsive to redirection, requiring PRNs and intermittent restraints (as she is pulling off her O2 mask). Giuseppe initially with some difficulty accepting that his mother is approaching end of life, however, he eventually agreed with his sister that the optimal approach would be to assure that if Ms. Abernathy were to pass, she should do so comfortably. Discussed a comfort centric approach with both Alissa and Giuseppe and they are in agreement. Will hold off on aggressive medical interventions at this time. Will c/w medications for symptom management, especially dyspnea. Patient to remain DNR/DNI. Family expressed interest in potentially feeding patient. Explained that we could trial HFNC in lieu of NRB if patient alert and willing to eat (although she has frequently been refusing all means of PO intake this week). Will place orders for dilaudid PRN, Ativan PRN for anxiety as well as glycopyrrolate PRN for secretions. Family also expressed interest in bringing patient home if possible. Explained that as patient is currently unstable and requiring PRNs for agitation as well as large amounts of oxygen she would not be appropriate for home hospice however if stabilized it could be considered in the future.

## 2022-09-24 NOTE — PROGRESS NOTE ADULT - PROBLEM SELECTOR PLAN 4
-Likely multi-factorial: UTI, hypercapnia, hypercalcemia, psychiatric disorder, and now with greater concern for hyperviscosity syndrome  -serum viscosity level improved to 2.1 after plasmapheresis on 9/13 (peak of 7.6)  -daughter thinks patient stopped taking her psychiatric medication in february  -Poor PO intake- nutrition consulted previously   -mental status currently fluctuating, PRNs ordered for agitation

## 2022-09-24 NOTE — PROGRESS NOTE ADULT - PROBLEM SELECTOR PLAN 5
-Hx of bipolar w/psych admission @ [place] many years ago, unclear if patient actually had Bipolar   -non-compliant with medications  -c/w low dose Mirtazapine if patient able to tolerate  -Psych recommendations appreciated  -Supportive care

## 2022-09-24 NOTE — PROGRESS NOTE ADULT - PROBLEM SELECTOR PLAN 1
-Follows w/Dr. Real Cano @ [facility] [place]  -received treatment recently per daughter however she doesn't know specifics  -with progression of disease  -Hem recs appreciated- plasmapheresis  09/02, 09/09, 09/13, serum viscosity now improved  -Shiley removed 9/15 and if needs repeat plasmapheresis will have to insert another cath  -IgM level downtrending and now up trending  -s/p Bendamustine therapy on 9/22  -now s/p 2 rounds of bendamustine on 9/22, 9/23 with no improvement in symptoms  -family wishing to hold off on any further treatments and patient currently with worsening respiratory and functional status. Patient overall with clinical deterioration. Per family wishes, will switch to comfort measures.

## 2022-09-24 NOTE — PROGRESS NOTE ADULT - SUBJECTIVE AND OBJECTIVE BOX
Patient is a 77y old  Female who presents with a chief complaint of Hypoxic respiratory failure (23 Sep 2022 16:33)    Patient seen and examined at bedside this morning. Patient noted resting comfortably in bed w/ NRB.    MEDICATIONS  (STANDING):  acyclovir   Oral Tab/Cap 400 milliGRAM(s) Oral two times a day  albumin human  5% IVPB 2500 milliLiter(s) IV Intermittent once  albumin human  5% IVPB 2500 milliLiter(s) IV Intermittent once  albumin human  5% IVPB 2500 milliLiter(s) IV Intermittent once  bendamustine IVPB (TREANDA) (eMAR) 113 milliGRAM(s) IV Intermittent once  budesonide  80 MICROgram(s)/formoterol 4.5 MICROgram(s) Inhaler 2 Puff(s) Inhalation two times a day  calcium gluconate IVPB 2 Gram(s) IV Intermittent once  calcium gluconate IVPB 2 Gram(s) IV Intermittent once  cholecalciferol 1000 Unit(s) Oral daily  dexAMETHasone     Tablet 8 milliGRAM(s) Oral once  dextrose 5% + lactated ringers. 1000 milliLiter(s) (60 mL/Hr) IV Continuous <Continuous>  dextrose 50% Injectable 25 Gram(s) IV Push once  dextrose 50% Injectable 12.5 Gram(s) IV Push once  dextrose 50% Injectable 25 Gram(s) IV Push once  dextrose Oral Gel 15 Gram(s) Oral once  diphenhydrAMINE 25 milliGRAM(s) Oral once  famotidine    Tablet 40 milliGRAM(s) Oral once  filgrastim-sndz (ZARXIO) Injectable 300 MICROGram(s) SubCutaneous daily  glucagon  Injectable 1 milliGRAM(s) IntraMuscular once  haloperidol     Tablet 1 milliGRAM(s) Oral once  heparin   Injectable 5000 Unit(s) SubCutaneous every 12 hours  levalbuterol Inhalation 0.63 milliGRAM(s) Inhalation every 12 hours  midodrine 10 milliGRAM(s) Oral every 8 hours  mirtazapine 7.5 milliGRAM(s) Oral <User Schedule>  multivitamin 1 Tablet(s) Oral daily  ondansetron    Tablet 8 milliGRAM(s) Oral once    MEDICATIONS  (PRN):  acetaminophen     Tablet .. 650 milliGRAM(s) Oral every 6 hours PRN Temp greater or equal to 38C (100.4F), Mild Pain (1 - 3)  aluminum hydroxide/magnesium hydroxide/simethicone Suspension 30 milliLiter(s) Oral every 4 hours PRN Dyspepsia  melatonin 3 milliGRAM(s) Oral at bedtime PRN Insomnia  methylPREDNISolone sodium succinate Injectable 125 milliGRAM(s) IV Push once PRN REACTION  methylPREDNISolone sodium succinate Injectable 125 milliGRAM(s) IV Push once PRN REACTION  ondansetron Injectable 4 milliGRAM(s) IV Push every 8 hours PRN Nausea and/or Vomiting      ROS  unable to assess patient asleep     Vital Signs Last 24 Hrs  T(C): 36.6 (24 Sep 2022 04:55), Max: 36.8 (23 Sep 2022 16:27)  T(F): 97.8 (24 Sep 2022 04:55), Max: 98.2 (23 Sep 2022 16:27)  HR: 120 (24 Sep 2022 04:55) (86 - 120)  BP: 128/59 (24 Sep 2022 04:55) (94/80 - 128/59)  BP(mean): --  RR: 18 (24 Sep 2022 04:55) (18 - 18)  SpO2: 86% (24 Sep 2022 04:55) (86% - 100%)    Parameters below as of 24 Sep 2022 04:55  Patient On (Oxygen Delivery Method): nasal cannula  O2 Flow (L/min): 6      PE  NAD  asleep, resting comfortably  Anicteric, MMM  on NRB  CTAB  Abd soft, NT, ND  No c/c  No rash grossly                            9.3    13.74 )-----------( 42       ( 24 Sep 2022 06:23 )             32.9       09-24    144  |  100  |  16  ----------------------------<  119<H>  5.4<H>   |  35<H>  |  0.53    Ca    10.3      24 Sep 2022 06:23  Phos  3.8     09-24  Mg     1.80     09-24    TPro  9.8<H>  /  Alb  3.1<L>  /  TBili  1.1  /  DBili  x   /  AST  28  /  ALT  16  /  AlkPhos  60  09-24

## 2022-09-24 NOTE — PROGRESS NOTE ADULT - ASSESSMENT
77 year old female with PMH of lymphoma (dx 2005, s/p CARRIE lobectomy, relapsed in 2019 but not currently on chemo/RT, being followed by heme every 3-6months at [facility] [place]), bipolar disorder, asthma (on O2 PRN at home, unknown how many L) brought to ED by daughter who states pt has been hallucinating, being more depressed.  Patient unable to provide much history, daughter Alissa states that patient was able to care for herself, manage home finances and attend doctor appointments independently until recently, she states that she believes her mom stopped taking her psychiatric medications in February of 2022 and has not followed up with any of her physicians. She has noticed recent weight loss (unsure how much) and change in voice.   Admitted to medicine service for further evaluation & treatment. Course c/b acute hypercapnic and hypoxic respiratory failure requiring BIPAP and AVAPS. 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 77 year old female with PMH of lymphoma (dx 2005, s/p CARRIE lobectomy, relapsed in 2019 but not currently on chemo/RT, being followed by heme every 3-6months at [facility] [place]), bipolar disorder, asthma (on O2 PRN at home, unknown how many L) brought to ED by daughter who states pt has been hallucinating, being more depressed. Admitted to medicine service for further evaluation & treatment. Course c/b acute hypercapnic and hypoxic respiratory failure requiring BIPAP and AVAPS. Now with concern for hyperviscosity syndrome s/p plasmapheresis x3 and bendamustine x2. Patient now with clinical deterioration and currently DNR/DNI with focus on comfort.

## 2022-09-24 NOTE — PROGRESS NOTE ADULT - SUBJECTIVE AND OBJECTIVE BOX
[facility] Department of Hospital Medicine  Albania Del Real MD  Available on MS Teams  Pager: [phone]    Patient is a 77y old  Female who presents with a chief complaint of Hypoxic respiratory failure (24 Sep 2022 12:25)    OVERNIGHT EVENTS: No acute events overnight    SUBJECTIVE: Pt seen and examined at bedside this morning.     ADDITIONAL REVIEW OF SYSTEMS:    MEDICATIONS  (STANDING):  acyclovir   Oral Tab/Cap 400 milliGRAM(s) Oral two times a day  budesonide  80 MICROgram(s)/formoterol 4.5 MICROgram(s) Inhaler 2 Puff(s) Inhalation two times a day  calcium gluconate IVPB 2 Gram(s) IV Intermittent once  calcium gluconate IVPB 2 Gram(s) IV Intermittent once  cholecalciferol 1000 Unit(s) Oral daily  dextrose 5% + lactated ringers. 1000 milliLiter(s) (60 mL/Hr) IV Continuous <Continuous>  dextrose 50% Injectable 25 Gram(s) IV Push once  dextrose 50% Injectable 12.5 Gram(s) IV Push once  dextrose 50% Injectable 25 Gram(s) IV Push once  dextrose Oral Gel 15 Gram(s) Oral once  filgrastim-sndz (ZARXIO) Injectable 300 MICROGram(s) SubCutaneous daily  glucagon  Injectable 1 milliGRAM(s) IntraMuscular once  heparin   Injectable 5000 Unit(s) SubCutaneous every 12 hours  levalbuterol Inhalation 0.63 milliGRAM(s) Inhalation every 12 hours  midodrine 10 milliGRAM(s) Oral every 8 hours  mirtazapine 7.5 milliGRAM(s) Oral <User Schedule>  multivitamin 1 Tablet(s) Oral daily    MEDICATIONS  (PRN):  acetaminophen     Tablet .. 650 milliGRAM(s) Oral every 6 hours PRN Temp greater or equal to 38C (100.4F), Mild Pain (1 - 3)  aluminum hydroxide/magnesium hydroxide/simethicone Suspension 30 milliLiter(s) Oral every 4 hours PRN Dyspepsia  HYDROmorphone  Injectable 0.2 milliGRAM(s) IV Push every 4 hours PRN Dyspnea  melatonin 3 milliGRAM(s) Oral at bedtime PRN Insomnia  methylPREDNISolone sodium succinate Injectable 125 milliGRAM(s) IV Push once PRN REACTION  ondansetron Injectable 4 milliGRAM(s) IV Push every 8 hours PRN Nausea and/or Vomiting    CAPILLARY BLOOD GLUCOSE    POCT Blood Glucose.: 103 mg/dL (24 Sep 2022 16:49)  POCT Blood Glucose.: 104 mg/dL (24 Sep 2022 11:22)  POCT Blood Glucose.: 119 mg/dL (24 Sep 2022 06:09)  POCT Blood Glucose.: 123 mg/dL (23 Sep 2022 20:39)    I&O's Summary    PHYSICAL EXAM:  Vital Signs Last 24 Hrs  T(C): 36.6 (24 Sep 2022 11:47), Max: 36.6 (24 Sep 2022 04:55)  T(F): 97.9 (24 Sep 2022 11:47), Max: 97.9 (24 Sep 2022 11:47)  HR: 109 (24 Sep 2022 11:47) (92 - 120)  BP: 117/61 (24 Sep 2022 11:47) (101/42 - 128/59)  BP(mean): --  RR: 18 (24 Sep 2022 16:54) (18 - 24)  SpO2: 91% (24 Sep 2022 16:54) (86% - 100%)    Parameters below as of 24 Sep 2022 16:54  Patient On (Oxygen Delivery Method): mask, nonrebreather    CONSTITUTIONAL: NAD, well-developed  HEAD: Normocephalic, atraumatic  EYES: EOMI, PERRL  ENT: no rhinorrhea, no pharyngeal erythema  RESPIRATORY: No increased work of breathing, CTAB, no wheezes or crackles appreciated  CARDIOVASCULAR: RRR, S1 and S2 present, no m/r/g  ABDOMEN: soft, NT, ND, bowel sounds present  EXTREMITIES: No LE edema  MUSCULOSKELETAL: no joint swelling, no tenderness to palpation  NEURO: A&Ox3, moving all extremities    LABS:                        9.3    13.74 )-----------( 42       ( 24 Sep 2022 06:23 )             32.9     09-24    144  |  100  |  16  ----------------------------<  119<H>  5.4<H>   |  35<H>  |  0.53    Ca    10.3      24 Sep 2022 06:23  Phos  3.8     09-24  Mg     1.80     09-24    TPro  9.8<H>  /  Alb  3.1<L>  /  TBili  1.1  /  DBili  x   /  AST  28  /  ALT  16  /  AlkPhos  60  09-24    RADIOLOGY & ADDITIONAL TESTS:    Results Reviewed:   Imaging Personally Reviewed:  Electrocardiogram Personally Reviewed:    COORDINATION OF CARE:  Care Discussed with Consultants/Other Providers [Y/N]:  Prior or Outpatient Records Reviewed [Y/N]:   [facility] Department of Hospital Medicine  Albania Del Real MD  Available on MS Teams  Pager: [phone]    Patient is a 77y old  Female who presents with a chief complaint of Hypoxic respiratory failure (24 Sep 2022 12:25)    OVERNIGHT EVENTS: No acute events overnight.    SUBJECTIVE: Pt seen and examined at bedside this morning. Patient appearing agitated this morning. Pulling at NRB mask and at IV lines. Patient denies any pain or discomfort however unable to elaborate further.    ADDITIONAL REVIEW OF SYSTEMS: Unable to obtain 2/2 mental status    MEDICATIONS  (STANDING):  acyclovir   Oral Tab/Cap 400 milliGRAM(s) Oral two times a day  budesonide  80 MICROgram(s)/formoterol 4.5 MICROgram(s) Inhaler 2 Puff(s) Inhalation two times a day  calcium gluconate IVPB 2 Gram(s) IV Intermittent once  calcium gluconate IVPB 2 Gram(s) IV Intermittent once  cholecalciferol 1000 Unit(s) Oral daily  dextrose 5% + lactated ringers. 1000 milliLiter(s) (60 mL/Hr) IV Continuous <Continuous>  dextrose 50% Injectable 25 Gram(s) IV Push once  dextrose 50% Injectable 12.5 Gram(s) IV Push once  dextrose 50% Injectable 25 Gram(s) IV Push once  dextrose Oral Gel 15 Gram(s) Oral once  filgrastim-sndz (ZARXIO) Injectable 300 MICROGram(s) SubCutaneous daily  glucagon  Injectable 1 milliGRAM(s) IntraMuscular once  heparin   Injectable 5000 Unit(s) SubCutaneous every 12 hours  levalbuterol Inhalation 0.63 milliGRAM(s) Inhalation every 12 hours  midodrine 10 milliGRAM(s) Oral every 8 hours  mirtazapine 7.5 milliGRAM(s) Oral <User Schedule>  multivitamin 1 Tablet(s) Oral daily    MEDICATIONS  (PRN):  acetaminophen     Tablet .. 650 milliGRAM(s) Oral every 6 hours PRN Temp greater or equal to 38C (100.4F), Mild Pain (1 - 3)  aluminum hydroxide/magnesium hydroxide/simethicone Suspension 30 milliLiter(s) Oral every 4 hours PRN Dyspepsia  HYDROmorphone  Injectable 0.2 milliGRAM(s) IV Push every 4 hours PRN Dyspnea  melatonin 3 milliGRAM(s) Oral at bedtime PRN Insomnia  methylPREDNISolone sodium succinate Injectable 125 milliGRAM(s) IV Push once PRN REACTION  ondansetron Injectable 4 milliGRAM(s) IV Push every 8 hours PRN Nausea and/or Vomiting    CAPILLARY BLOOD GLUCOSE    POCT Blood Glucose.: 103 mg/dL (24 Sep 2022 16:49)  POCT Blood Glucose.: 104 mg/dL (24 Sep 2022 11:22)  POCT Blood Glucose.: 119 mg/dL (24 Sep 2022 06:09)  POCT Blood Glucose.: 123 mg/dL (23 Sep 2022 20:39)    I&O's Summary    PHYSICAL EXAM:  Vital Signs Last 24 Hrs  T(C): 36.6 (24 Sep 2022 11:47), Max: 36.6 (24 Sep 2022 04:55)  T(F): 97.9 (24 Sep 2022 11:47), Max: 97.9 (24 Sep 2022 11:47)  HR: 109 (24 Sep 2022 11:47) (92 - 120)  BP: 117/61 (24 Sep 2022 11:47) (101/42 - 128/59)  BP(mean): --  RR: 18 (24 Sep 2022 16:54) (18 - 24)  SpO2: 91% (24 Sep 2022 16:54) (86% - 100%)    Parameters below as of 24 Sep 2022 16:54  Patient On (Oxygen Delivery Method): mask, nonrebreather    CONSTITUTIONAL: NAD, well-developed  HEAD: Normocephalic, atraumatic  EYES: EOMI, PERRL  ENT: NRB in place  RESPIRATORY: No increased work of breathing, CTAB, no wheezes or crackles appreciated  CARDIOVASCULAR: RRR, S1 and S2 present, no m/r/g  ABDOMEN: soft, NT, ND, bowel sounds present  EXTREMITIES: LUE with some mottling noted, equal and strong radial pulses b/l and skin warm to touch  MUSCULOSKELETAL: no joint swelling, no tenderness to palpation  NEURO: A&Ox1, moving all extremities    LABS:                        9.3    13.74 )-----------( 42       ( 24 Sep 2022 06:23 )             32.9     09-24    144  |  100  |  16  ----------------------------<  119<H>  5.4<H>   |  35<H>  |  0.53    Ca    10.3      24 Sep 2022 06:23  Phos  3.8     09-24  Mg     1.80     09-24    TPro  9.8<H>  /  Alb  3.1<L>  /  TBili  1.1  /  DBili  x   /  AST  28  /  ALT  16  /  AlkPhos  60  09-24    RADIOLOGY & ADDITIONAL TESTS:    Results Reviewed:   Imaging Personally Reviewed:  Electrocardiogram Personally Reviewed:    COORDINATION OF CARE:  Care Discussed with Consultants/Other Providers [Y/N]:  Prior or Outpatient Records Reviewed [Y/N]:

## 2022-09-24 NOTE — PROVIDER CONTACT NOTE (OTHER) - ACTION/TREATMENT ORDERED:
Ordered 250 LR bolus to be given if BP drops in the 80's.  2311 -  PT BP dropped in the 80's , 250 LR Bolus administers  2343 - BP 84/41  2345 Page out to ACP to notify..  Another Bolus ordered by ACP ACP ord 250 LR bolus  ACP stated if BP drops in the 80's.  2311 -  PT BP dropped in the 80's , 250 LR Bolus administered  2343 - BP 84/41  2345 Page out to ACP to notify. 1000 LR Bolus ordered by ACP

## 2022-09-24 NOTE — PROGRESS NOTE ADULT - SUBJECTIVE AND OBJECTIVE BOX
Called by nurse that patient has been agitated and pulling off equipment and oxygen. Oxygen levels had dropped as the patient is pulling off the oxygen.  Patient given Haldol 1mg IM x 1 which was not effective as per the nurse. Will offer mittens for patient. Continue to monitor.

## 2022-09-24 NOTE — PROVIDER CONTACT NOTE (OTHER) - DATE AND TIME:
15-Sep-2022 05:05
15-Sep-2022 22:30
17-Sep-2022 20:08
02-Sep-2022 22:15
04-Sep-2022 05:10
10-Sep-2022 05:25
16-Sep-2022 20:33
24-Sep-2022 04:40
24-Sep-2022 21:57
17-Sep-2022 08:22
02-Sep-2022 02:00
10-Sep-2022 21:40
15-Sep-2022 21:50
18-Sep-2022 06:50
02-Sep-2022 00:45
30-Aug-2022 10:15
02-Sep-2022 20:05
16-Sep-2022 23:06
23-Sep-2022 16:23
13-Sep-2022 18:03
23-Sep-2022 12:00

## 2022-09-24 NOTE — PROGRESS NOTE ADULT - PROBLEM SELECTOR PLAN 3
-Has been hypotensive and improves with IVF  -Initially with c/f sepsis, however no infectious source identified thus far  -Most likely 2/2 poor PO intake and hypovolemia  -Bcx from 9/17 with NGTD, UA from 9/17 unremarkable  -c/w midodrine 10mg q8h, will plan to discontinue if not taking PO  -BPs thus far remaining stable, will cont to encourage PO intake

## 2022-09-24 NOTE — PROGRESS NOTE ADULT - ASSESSMENT
77 year old female with PMH of lymphoma (dx 2005, s/p CARRIE lobectomy, relapsed in 2019 but not currently on chemo/RT, being followed by heme every 3-6months at [facility] [place]), bipolar disorder, asthma adm w FTT, course c/b acute hypercapnic and hypoxic respiratory failure requiring BIPAP and AVAPS. Now with concern for hyperviscosity syndrome given inability to even obtain immunoglobulin panel due to serum viscosity, s/p plasmapheresis x3.     #Hypotension  -likely hypovolemic versus obstructive mediastinal mass  -responsive to IVF and midodrine  -cont midodrine as ordered  -no concern for pericardial process(no significant effusion seen on recent CT)  -however if pt develops recurrent resistant hypotension would repeat limited TTE    #Pleural Effusions  -may be malignant versus CHF  -hold off on diuresis now given recent hypotension  -cont supplem 02    mgmt per medicine    35 minutes spent on total encounter; more than 50% of the visit was spent counseling and/or coordinating care by the attending physician.

## 2022-09-24 NOTE — CHART NOTE - NSCHARTNOTEFT_GEN_A_CORE
Notified by patient's daughter, Alissa, that her brother, Giuseppe, made several concerning comments while visiting their mother. Alissa reported that Giuseppe asked that she look after his pets because if their mother passed, "he would have nothing to live for." Alissa stated that he did not seem to have any plan and has made comments such as the above in the past. She denies any history of self harm or suicide attempts in her brother. Giuseppe did not make any such comments directly to myself or any other staff members, and while he was distraught at the delivery of bad news, he was otherwise calm and engaged in the discussion about his mother's current condition and plan of care. Briefly discussed current situation with  on call. As Giuseppe is otherwise not currently a threat to the patient, family or staff members at the moment, he may continue to visit his mother as appropriate. Spoke with Alissa at length about continuing to support her brother through his grief and encouraging him to seek help if he needs it. Informed her that if she believes he is in imminent danger to himself or others she should encourage Giuseppe to go to the ED for further evaluation or call EMS/Police if they are outside of the hospital.

## 2022-09-24 NOTE — PROVIDER CONTACT NOTE (OTHER) - NAME OF MD/NP/PA/DO NOTIFIED:
Cheng Varela
MENDEZ LYNCH ACP
EMILY Espinoza
Fahad Oakley
Laurie Dickens
ACP Hesham LYNCH
Fahad Oakley
Juan Miguel Acuña
Susan Lyn
Yogesh Momin,
ACP Hesham Marie
Juan Miguel Acuña
Juan Miguel Acuña
Fahad Oakley
Juan Miguel Acuña
SYED Wallace
Yogesh Momin,
Yogesh Momin,

## 2022-09-24 NOTE — PROGRESS NOTE ADULT - SUBJECTIVE AND OBJECTIVE BOX
CARDIOLOGY FOLLOW UP - Dr. Kingston  Date of Service: 9/24/22  CC    Review of Systems:  Constitutional: No fever, weight loss, or fatigue  Respiratory: No cough, wheezing, or hemoptysis, no shortness of breath  Cardiovascular: No chest pain, palpitations, passing out, dizziness, or leg swelling  Gastrointestinal: No abd or epigastric pain. No nausea, vomiting, or hematemesis; no diarrhea or consiptaiton, no melena or hematochezia  Vascular: No edema     TELEMETRY:    PHYSICAL EXAM:  T(C): 36.6 (09-24-22 @ 04:55), Max: 36.8 (09-23-22 @ 16:27)  HR: 120 (09-24-22 @ 04:55) (86 - 120)  BP: 128/59 (09-24-22 @ 04:55) (94/80 - 128/59)  RR: 18 (09-24-22 @ 04:55) (18 - 18)  SpO2: 86% (09-24-22 @ 04:55) (86% - 100%)  Wt(kg): --  I&O's Summary      Appearance: Normal	  Cardiovascular: Normal S1 S2,RRR, No JVD, No murmurs  Respiratory: Lungs clear to auscultation	  Gastrointestinal:  Soft, Non-tender, + BS	  Extremities: Normal range of motion, No clubbing, cyanosis or edema  Vascular: Peripheral pulses palpable 2+ bilaterally       Home Medications:  Breo Ellipta 100 mcg-25 mcg/inh inhalation powder: 1 puff(s) inhaled once a day (30 Aug 2022 16:01)  Depakote 500 mg oral delayed release tablet: 2 tab(s) orally once a day (at bedtime) (Dispensed on 8/18/22 x 90 days) (30 Aug 2022 16:02)  lithium 300 mg oral tablet: 1 tab(s) orally 3 times a day (Dispensed on 8/18/22 x 90 days) (30 Aug 2022 16:02)  Ventolin HFA 90 mcg/inh inhalation aerosol: 2 puff(s) inhaled 4 times a day, As Needed (30 Aug 2022 16:01)  ZyPREXA 2.5 mg oral tablet: 1 tab(s) orally once a day (at bedtime) (30 Aug 2022 16:01)        MEDICATIONS  (STANDING):  acyclovir   Oral Tab/Cap 400 milliGRAM(s) Oral two times a day  albumin human  5% IVPB 2500 milliLiter(s) IV Intermittent once  albumin human  5% IVPB 2500 milliLiter(s) IV Intermittent once  albumin human  5% IVPB 2500 milliLiter(s) IV Intermittent once  bendamustine IVPB (TREANDA) (eMAR) 113 milliGRAM(s) IV Intermittent once  budesonide  80 MICROgram(s)/formoterol 4.5 MICROgram(s) Inhaler 2 Puff(s) Inhalation two times a day  calcium gluconate IVPB 2 Gram(s) IV Intermittent once  calcium gluconate IVPB 2 Gram(s) IV Intermittent once  cholecalciferol 1000 Unit(s) Oral daily  dexAMETHasone     Tablet 8 milliGRAM(s) Oral once  dextrose 5% + lactated ringers. 1000 milliLiter(s) (60 mL/Hr) IV Continuous <Continuous>  dextrose 50% Injectable 25 Gram(s) IV Push once  dextrose 50% Injectable 12.5 Gram(s) IV Push once  dextrose 50% Injectable 25 Gram(s) IV Push once  dextrose Oral Gel 15 Gram(s) Oral once  diphenhydrAMINE 25 milliGRAM(s) Oral once  famotidine    Tablet 40 milliGRAM(s) Oral once  filgrastim-sndz (ZARXIO) Injectable 300 MICROGram(s) SubCutaneous daily  glucagon  Injectable 1 milliGRAM(s) IntraMuscular once  haloperidol    Injectable 1 milliGRAM(s) IntraMuscular once  heparin   Injectable 5000 Unit(s) SubCutaneous every 12 hours  levalbuterol Inhalation 0.63 milliGRAM(s) Inhalation every 12 hours  midodrine 10 milliGRAM(s) Oral every 8 hours  mirtazapine 7.5 milliGRAM(s) Oral <User Schedule>  multivitamin 1 Tablet(s) Oral daily  ondansetron    Tablet 8 milliGRAM(s) Oral once        EKG:  RADIOLOGY:  DIAGNOSTIC TESTING:  [ ] Echocardiogram:  [ ] Catherterization:  [ ] Stress Test:  OTHER:     LABS:	 	                          9.3    13.74 )-----------( 42       ( 24 Sep 2022 06:23 )             32.9     09-24    144  |  100  |  16  ----------------------------<  119<H>  5.4<H>   |  35<H>  |  0.53    Ca    10.3      24 Sep 2022 06:23  Phos  3.8     09-24  Mg     1.80     09-24    TPro  9.8<H>  /  Alb  3.1<L>  /  TBili  1.1  /  DBili  x   /  AST  28  /  ALT  16  /  AlkPhos  60  09-24          CARDIAC MARKERS:

## 2022-09-25 NOTE — PROGRESS NOTE ADULT - PROBLEM SELECTOR PLAN 5
-Hx of bipolar w/psych admission @ [facility] many years ago, unclear if patient actually had Bipolar   -non-compliant with medications  -c/w low dose Mirtazapine if patient able to tolerate  -Psych recommendations appreciated  -Supportive care

## 2022-09-25 NOTE — PROGRESS NOTE ADULT - SUBJECTIVE AND OBJECTIVE BOX
[facility] Department of Hospital Medicine  Al[place]ia Del Real MD  Available on MS Teams  Pager: [phone]    Patient is a 77y old  Female who presents with a chief complaint of Hypoxic respiratory failure (25 Sep 2022 11:05)    OVERNIGHT EVENTS: No acute events overnight.    SUBJECTIVE: Pt seen and examined at bedside this morning. Comfort care measures initiated on 9/24. Patient currently appearing comfortable on HFNC. No labored breathing noted. Patient otherwise lethargic, unable to participate in meaningful conversation. Daughter, Alissa, at bedside.    ADDITIONAL REVIEW OF SYSTEMS: Unable to obtain.    MEDICATIONS  (STANDING):  budesonide  80 MICROgram(s)/formoterol 4.5 MICROgram(s) Inhaler 2 Puff(s) Inhalation two times a day  dextrose 5% + lactated ringers. 1000 milliLiter(s) (60 mL/Hr) IV Continuous <Continuous>  dextrose 50% Injectable 25 Gram(s) IV Push once  dextrose 50% Injectable 12.5 Gram(s) IV Push once  dextrose 50% Injectable 25 Gram(s) IV Push once  dextrose Oral Gel 15 Gram(s) Oral once  filgrastim-sndz (ZARXIO) Injectable 300 MICROGram(s) SubCutaneous daily  glucagon  Injectable 1 milliGRAM(s) IntraMuscular once  levalbuterol Inhalation 0.63 milliGRAM(s) Inhalation every 12 hours  midodrine 10 milliGRAM(s) Oral every 8 hours    MEDICATIONS  (PRN):  acetaminophen     Tablet .. 650 milliGRAM(s) Oral every 6 hours PRN Temp greater or equal to 38C (100.4F), Mild Pain (1 - 3)  aluminum hydroxide/magnesium hydroxide/simethicone Suspension 30 milliLiter(s) Oral every 4 hours PRN Dyspepsia  glycopyrrolate Injectable 0.4 milliGRAM(s) IV Push four times a day PRN Secretions  HYDROmorphone  Injectable 0.2 milliGRAM(s) IV Push every 4 hours PRN Dyspnea  LORazepam   Injectable 0.5 milliGRAM(s) IV Push every 4 hours PRN Anxiety  melatonin 3 milliGRAM(s) Oral at bedtime PRN Insomnia  OLANZapine Injectable 2.5 milliGRAM(s) IntraMuscular every 6 hours PRN severe agitation  ondansetron Injectable 4 milliGRAM(s) IV Push every 8 hours PRN Nausea and/or Vomiting    CAPILLARY BLOOD GLUCOSE    POCT Blood Glucose.: 124 mg/dL (24 Sep 2022 21:08)  POCT Blood Glucose.: 103 mg/dL (24 Sep 2022 16:49)    I&O's Summary    PHYSICAL EXAM:  Vital Signs Last 24 Hrs  T(C): 36.4 (25 Sep 2022 10:20), Max: 36.4 (24 Sep 2022 21:18)  T(F): 97.5 (25 Sep 2022 10:20), Max: 97.5 (24 Sep 2022 21:18)  HR: 112 (25 Sep 2022 10:20) (108 - 124)  BP: 91/52 (25 Sep 2022 10:20) (81/39 - 100/63)  BP(mean): --  RR: 24 (25 Sep 2022 12:39) (18 - 24)  SpO2: 97% (25 Sep 2022 12:39) (91% - 100%)    Parameters below as of 25 Sep 2022 12:39  Patient On (Oxygen Delivery Method): nasal cannula, high flow    CONSTITUTIONAL: NAD, well-developed  HEAD: Normocephalic, atraumatic  EYES: EOMI, PERRL  ENT: no rhinorrhea, no pharyngeal erythema, HFNC in place  RESPIRATORY: No increased work of breathing, CTAB, no wheezes or crackles appreciated  CARDIOVASCULAR: RRR, S1 and S2 present, no m/r/g  ABDOMEN: soft, NT, ND, bowel sounds present  EXTREMITIES: LUE with some mottling noted, equal and strong radial pulses b/l and skin warm to touch  MUSCULOSKELETAL: no joint swelling, no tenderness to palpation  NEURO: A&Ox0    LABS:                        9.3    13.74 )-----------( 42       ( 24 Sep 2022 06:23 )             32.9     09-24    144  |  100  |  16  ----------------------------<  119<H>  5.4<H>   |  35<H>  |  0.53    Ca    10.3      24 Sep 2022 06:23  Phos  3.8     09-24  Mg     1.80     09-24    TPro  9.8<H>  /  Alb  3.1<L>  /  TBili  1.1  /  DBili  x   /  AST  28  /  ALT  16  /  AlkPhos  60  09-24    RADIOLOGY & ADDITIONAL TESTS:    Results Reviewed:   Imaging Personally Reviewed:  Electrocardiogram Personally Reviewed:    COORDINATION OF CARE:  Care Discussed with Consultants/Other Providers [Y/N]:  Prior or Outpatient Records Reviewed [Y/N]:

## 2022-09-25 NOTE — PROGRESS NOTE ADULT - PROBLEM SELECTOR PLAN 3
-Has been hypotensive and improves with IVF  -Initially with c/f sepsis, however no infectious source identified thus far  -Most likely 2/2 poor PO intake and hypovolemia  -Bcx from 9/17 with NGTD, UA from 9/17 unremarkable  -patient currently unable to tolerate PO, d/c midodrine  -BPs thus far remaining stable, will cont to encourage PO intake

## 2022-09-25 NOTE — PROGRESS NOTE ADULT - PROBLEM SELECTOR PLAN 8
-DVT: Comfort care  -Diet: pleasure feeds if able to tolerate  -Dispo issues: medically active and pending, can consider d/c to hospice if medically stabilized

## 2022-09-25 NOTE — PROGRESS NOTE ADULT - SUBJECTIVE AND OBJECTIVE BOX
Patient is a 77y old  Female who presents with a chief complaint of Hypoxic respiratory failure (24 Sep 2022 17:09)    Patient seen and examined this morning. Patient noted resting comfortably in bed, now on high flow NC.  No family at bedside at time of assessment    MEDICATIONS  (STANDING):  acyclovir   Oral Tab/Cap 400 milliGRAM(s) Oral two times a day  budesonide  80 MICROgram(s)/formoterol 4.5 MICROgram(s) Inhaler 2 Puff(s) Inhalation two times a day  dextrose 5% + lactated ringers. 1000 milliLiter(s) (60 mL/Hr) IV Continuous <Continuous>  dextrose 50% Injectable 25 Gram(s) IV Push once  dextrose 50% Injectable 12.5 Gram(s) IV Push once  dextrose 50% Injectable 25 Gram(s) IV Push once  dextrose Oral Gel 15 Gram(s) Oral once  filgrastim-sndz (ZARXIO) Injectable 300 MICROGram(s) SubCutaneous daily  glucagon  Injectable 1 milliGRAM(s) IntraMuscular once  levalbuterol Inhalation 0.63 milliGRAM(s) Inhalation every 12 hours  midodrine 10 milliGRAM(s) Oral every 8 hours    MEDICATIONS  (PRN):  acetaminophen     Tablet .. 650 milliGRAM(s) Oral every 6 hours PRN Temp greater or equal to 38C (100.4F), Mild Pain (1 - 3)  aluminum hydroxide/magnesium hydroxide/simethicone Suspension 30 milliLiter(s) Oral every 4 hours PRN Dyspepsia  glycopyrrolate Injectable 0.4 milliGRAM(s) IV Push four times a day PRN Secretions  HYDROmorphone  Injectable 0.2 milliGRAM(s) IV Push every 4 hours PRN Dyspnea  LORazepam   Injectable 0.5 milliGRAM(s) IV Push every 4 hours PRN Anxiety  melatonin 3 milliGRAM(s) Oral at bedtime PRN Insomnia  OLANZapine Injectable 2.5 milliGRAM(s) IntraMuscular every 6 hours PRN severe agitation  ondansetron Injectable 4 milliGRAM(s) IV Push every 8 hours PRN Nausea and/or Vomiting      ROS  unable to assess patient asleep    Vital Signs Last 24 Hrs  T(C): 36.4 (25 Sep 2022 06:25), Max: 36.6 (24 Sep 2022 11:47)  T(F): 97.5 (25 Sep 2022 06:25), Max: 97.9 (24 Sep 2022 11:47)  HR: 113 (25 Sep 2022 09:02) (108 - 124)  BP: 100/63 (25 Sep 2022 06:25) (81/39 - 117/61)  BP(mean): --  RR: 20 (25 Sep 2022 09:04) (18 - 24)  SpO2: 100% (25 Sep 2022 09:04) (90% - 100%)    Parameters below as of 25 Sep 2022 09:04  Patient On (Oxygen Delivery Method): nasal cannula, high flow  O2 Flow (L/min): 55  O2 Concentration (%): 100    PE  NAD  asleep  Anicteric, MMM  on high flow %, 50L  CTAB  Abd soft, NT, ND  No c/c/e  No rash grossly                            9.3    13.74 )-----------( 42       ( 24 Sep 2022 06:23 )             32.9       09-24    144  |  100  |  16  ----------------------------<  119<H>  5.4<H>   |  35<H>  |  0.53    Ca    10.3      24 Sep 2022 06:23  Phos  3.8     09-24  Mg     1.80     09-24    TPro  9.8<H>  /  Alb  3.1<L>  /  TBili  1.1  /  DBili  x   /  AST  28  /  ALT  16  /  AlkPhos  60  09-24

## 2022-09-25 NOTE — PROGRESS NOTE ADULT - ASSESSMENT
77 year old female with PMH of lymphoma (dx 2005, s/p CARRIE lobectomy, relapsed in 2019 but not currently on chemo/RT, being followed by heme every 3-6months at [facility] [place]), bipolar disorder, asthma (on O2 PRN at home, unknown how many L) brought to ED by daughter who states pt has been hallucinating, being more depressed. Admitted to medicine service for further evaluation & treatment. Course c/b acute hypercapnic and hypoxic respiratory failure requiring BIPAP and AVAPS. Now with concern for hyperviscosity syndrome s/p plasmapheresis x3 and bendamustine x2. Patient now with clinical deterioration and currently DNR/DNI with focus on comfort.

## 2022-09-25 NOTE — PROGRESS NOTE ADULT - PROBLEM SELECTOR PLAN 1
-Follows w/Dr. Real Cano @ [facility] [place]  -received treatment recently per daughter however she doesn't know specifics  -with progression of disease  -Hem recs appreciated- plasmapheresis  09/02, 09/09, 09/13, serum viscosity now improved  -Shiley removed 9/15 and if needs repeat plasmapheresis will have to insert another cath  -IgM level downtrending and now up trending  -s/p Bendamustine therapy on 9/22  -now s/p 2 rounds of bendamustine on 9/22, 9/23 with no improvement in symptoms  -family wishing to hold off on any further treatments and patient currently with worsening respiratory and functional status. Patient overall with clinical deterioration. Per family wishes, will switch to comfort measures.  -dilaudid, ativan and glycopyrrolate PRNs ordered for comfort -Follows w/Dr. Real Cano @ [facility] [place]  -now s/p 3 rounds of PLEX and 2 rounds of bendamustine on 9/22, 9/23 with no improvement in symptoms  -family wishing to hold off on any further treatments and patient currently with worsening respiratory and functional status. Patient overall with clinical deterioration. Per family wishes, will switch to comfort measures.  -dilaudid, ativan and glycopyrrolate PRNs ordered for comfort

## 2022-09-25 NOTE — PROGRESS NOTE ADULT - ASSESSMENT
MANDI GASPAR is a 77y Female who presents with a chief complaint of hypoxic respiratory failure.    Marginal Zone Lymphoma  - Patient treated previously at [facility]; was on rituximab + bendamustine last in August 2020, and since then has been on surveillance, but lost to follow-up since July 2021  - CT chest concerning for slight progression of disease. May need bone marrow biopsy once respiratory status is more stable.     Lymphoplasmacytic Lymphoma  - Noted from bone marrow biopsy done during prior admission here.  - Patient underwent three plasmapheresis sessions; last on September 13th with improvement in mental status and IgM.  - IgM rising rapidly again, with no real decrease in protein burden after multiple PLEX treatments  - Pt received bendamustine 09/22, plan to give another today 09/23 for debulking   - Monitor IgM   - Zarxio 480mcg daily, starting at least 24H after bendamustine infusion   - flow cytometry confirms small b cell population present; MYD88 testing pending    Thrombocytopenia  - likely spurious, clumping noted on smear  - no visible signs of bleeding noted  - platelets 126K (citrate top)  - HIT (neg)    - will continue to monitor    Altered Mental Status  - Likely multifactorial with hyperviscosity of blood, hypercalcemia, urinary tract infection  - Completed antibiotics  - Endocrinology following  - Psychiatry input appreciated    Hypoxia  - Management per pulmonary    Will continue to follow.    Mar Marcos NP  Hematology/ Oncology  [facility]  [phone] (office)  [phone] (alt office)  Evenings and weekends please call MD on call or office

## 2022-09-25 NOTE — PROGRESS NOTE ADULT - PROBLEM SELECTOR PLAN 2
-hypercapnic and hypoxemic respiratory failure  -Continue Duoneb BID, Symbicort BID, Albuterol PRN  -HFNC for comfort

## 2022-09-26 NOTE — PROGRESS NOTE ADULT - PROBLEM SELECTOR PLAN 5
hx of bipolar w/psych admission @ [place] many years ago, unclear if patient actually had Bipolar   - non-compliant with medications  - c/w low dose Mirtazapine if patient able to tolerate  - Psych recommendations appreciated hx of bipolar w/psych admission @ [place] many years ago, unclear if patient actually had Bipolar   - non-compliant with medications  - Psych recommendations appreciated

## 2022-09-26 NOTE — PROGRESS NOTE ADULT - PROBLEM SELECTOR PLAN 6
Noted to have left hand swelling and discoloration noted   - Arterial and venous doppler negative for venous thrombus or arterial occlusion  - Vascular consulted and no acute intervention planned  - Discussed with hand Surgeon, , and no urgent intervention needed  - Elevate LUE Noted to have left hand swelling and discoloration noted   - Arterial and venous doppler negative for venous thrombus or arterial occlusion  - Vascular consulted and no acute intervention planned  - Discussed with hand Surgeon, Dr. Malone, and no urgent intervention needed  - Elevate LUE

## 2022-09-26 NOTE — CHART NOTE - NSCHARTNOTEFT_GEN_A_CORE
Source: Patient [ ]    Family [ ]     other [ ]    Diet :   77F with hx of lymphoma (dx 2005, s/p CARRIE lobectomy, relapsed in 2019 but not currently on chemo/RT, [place] [facility] bipolar disorder, asthma (on O2 PRN at home, unknown how many L) brought in for hallucinations and depression. Course c/b acute hypercapnic and hypoxic respiratory failure requiring BIPAP/AVAPS. Now with concern for hyperviscosity syndrome s/p plasmapheresis x3 and bendamustine x2. Patient now with clinical deterioration and currently DNR/DNI with focus on comfort.      symptoms  - family wishing to hold off on any further treatments and patient currently with worsening respiratory and functional status, patient now on comfort measures  - dilaudid, ativan and glycopyrrolate PRNs ordered for comfort.     Problem/Plan - 2:  ·  Problem: Acute hypercapnic respiratory failure.   ·  Plan: Hypercapnic and hypoxemic respiratory failure  - HFNC for comfort.    ·  Problem: DVT prophylaxis.   ·  Plan: DVT: holding, comfort care  DIET: pleasure feeds if able to tolerate.      Patient reports [ ] nausea  [ ] vomiting [ ] diarrhea [ ] constipation  [ ]chewing problems [ ] swallowing issues  [ ] other:      PO intake:  < 50% [ ] 50-75% [ ]   % [ ]  other :     Source for PO intake [ ] Patient [ ] family [ ] chart [ ] staff [ ] other     Enteral /Parenteral Nutrition:       Current Weight:   % Weight Change    Pertinent Medications: MEDICATIONS  (STANDING):    MEDICATIONS  (PRN):  acetaminophen     Tablet .. 650 milliGRAM(s) Oral every 6 hours PRN Temp greater or equal to 38C (100.4F), Mild Pain (1 - 3)  aluminum hydroxide/magnesium hydroxide/simethicone Suspension 30 milliLiter(s) Oral every 4 hours PRN Dyspepsia  glycopyrrolate Injectable 0.4 milliGRAM(s) IV Push four times a day PRN Secretions  HYDROmorphone  Injectable 0.2 milliGRAM(s) IV Push every 4 hours PRN Dyspnea  LORazepam   Injectable 0.5 milliGRAM(s) IV Push every 4 hours PRN Anxiety  melatonin 3 milliGRAM(s) Oral at bedtime PRN Insomnia  OLANZapine Injectable 2.5 milliGRAM(s) IntraMuscular every 6 hours PRN severe agitation  ondansetron Injectable 4 milliGRAM(s) IV Push every 8 hours PRN Nausea and/or Vomiting    Pertinent Labs:  09-24 Na144 mmol/L Glu 119 mg/dL<H> K+ 5.4 mmol/L<H> Cr  0.53 mg/dL BUN 16 mg/dL 09-24 Phos 3.8 mg/dL 09-24 Alb 3.1 g/dL<L> 08-31 Chol 54 mg/dL LDL --    HDL 21 mg/dL<L> Trig 30 mg/dL      Skin:     Estimated Needs:   [ ] no change since previous assessment  [ ] recalculated:       Previous Nutrition Diagnosis:     [ ] Inadequate Energy Intake [ ]Inadequate Oral Intake [ ] Excessive Energy Intake     [ ] Underweight [ ] Increased Nutrient Needs [ ] Overweight/Obesity     [ ] Altered GI Function [ ] Unintended Weight Loss [ ] Food & Nutrition Related Knowledge Deficit [ ] Malnutrition      Nutrition Diagnosis is [ ] ongoing  [ ] resolved [ ] not applicable          New Nutrition Diagnosis: [ ] not applicable    [ ] Inadequate Protein Energy Intake   [ ]Inadequate Oral Intake   [ ] Excessive Energy Intake   [ ] Underweight   [ ] Increased Nutrient Needs   [ ] Overweight/Obesity   [ ] Altered GI Function   [ ] Unintended Weight Loss   [ ] Food & Nutrition Related Knowledge Deficit  [ ] Limited Adherence to nutrition related recommendations   [ ] Malnutrition    [ ] other:        Related to:     As evidenced by:     Interventions:     Recommend    [ ] Change Diet To:    [ ] Nutrition Supplement    [ ] Nutrition Support    [ ] Other:        Monitoring and Evaluation:     [ ] PO intake [ ] Tolerance to diet prescription [ ] weights [ ] follow up per protocol    [ ] other: Source: other [x] nurse, medical chart   Diet rx: Pureed: DASH/TLC {Sodium & Cholesterol Restricted} (DASH)  Supplement Feeding Modality:  Oral Ensure Enlive Cans or Servings Per Day:  1       Frequency:  Two Times a day  Ensure Pudding Cans or Servings Per Day:  1       Frequency:  Three Times a day (09-18-22 @ 10:11) [Active]    Pt 78 yo female with PMHx of lymphoma (dx 2005, s/p CARRIE lobectomy, relapsed in 2019 but not currently on chemo/R), bipolar disorder, asthma presented 2/2 hallucinations and depression - per chart review. Of note, Pt with acute hypercapnic respiratory failure; Pt on HFNC for comfort.    At time of visit, Pt lethargic. Per nurse, Pt with "no PO intake" at present. Of note, Pt's family wishing to hold off on any further treatments; Pt now with clinical deterioration; Pt DNR/DNI with focus on comfort; pleasure feeds if able to tolerate. Aggressive nutrition intervention may not be appropriate at present. Consult nutrition if warranted. RDN remains available, nurse made aware.     Pt's height: 63" (8/29)    Pt's weight: 60 Kg (9/2)    Pertinent Medications: reviewed   Pertinent Labs:  09-24 Na144 mmol/L Glu 119 mg/dL<H> K+ 5.4 mmol/L<H> Cr  0.53 mg/dL BUN 16 mg/dL 09-24 Phos 3.8 mg/dL 09-24 Alb 3.1 g/dL<L> 08-31 Chol 54 mg/dL LDL --    HDL 21 mg/dL<L> Trig 30 mg/dL  Skin: +dryness/ecchymosis     Estimated Needs: [x] no change since previous assessment  Previous Nutrition Diagnosis: [x] Malnutrition   New Nutrition Diagnosis: [x] not applicable    Recommendations:   1. Aggressive nutrition intervention may not be appropriate at present;  2. Consult nutrition if warranted; RDN remains available

## 2022-09-26 NOTE — PROGRESS NOTE ADULT - PROBLEM SELECTOR PLAN 8
DVT: holding, comfort care  DIET: pleasure feeds if able to tolerat DVT: holding, comfort care  DIET: pleasure feeds if able to tolerate

## 2022-09-26 NOTE — PROGRESS NOTE ADULT - NS ATTEND OPT1 GEN_ALL_CORE

## 2022-09-26 NOTE — PROGRESS NOTE ADULT - SUBJECTIVE AND OBJECTIVE BOX
Patient is a 77y old  Female who presents with a chief complaint of Hypoxic respiratory failure (25 Sep 2022 16:35)    Patient seen this morning. She is somewhat difficult to arouse, on HFNC.     MEDICATIONS  (STANDING):  budesonide  80 MICROgram(s)/formoterol 4.5 MICROgram(s) Inhaler 2 Puff(s) Inhalation two times a day  dextrose 5% + lactated ringers. 1000 milliLiter(s) (60 mL/Hr) IV Continuous <Continuous>  dextrose 50% Injectable 25 Gram(s) IV Push once  dextrose 50% Injectable 12.5 Gram(s) IV Push once  dextrose 50% Injectable 25 Gram(s) IV Push once  dextrose Oral Gel 15 Gram(s) Oral once  filgrastim-sndz (ZARXIO) Injectable 300 MICROGram(s) SubCutaneous daily  glucagon  Injectable 1 milliGRAM(s) IntraMuscular once  levalbuterol Inhalation 0.63 milliGRAM(s) Inhalation every 12 hours  midodrine 10 milliGRAM(s) Oral every 8 hours    MEDICATIONS  (PRN):  acetaminophen     Tablet .. 650 milliGRAM(s) Oral every 6 hours PRN Temp greater or equal to 38C (100.4F), Mild Pain (1 - 3)  aluminum hydroxide/magnesium hydroxide/simethicone Suspension 30 milliLiter(s) Oral every 4 hours PRN Dyspepsia  glycopyrrolate Injectable 0.4 milliGRAM(s) IV Push four times a day PRN Secretions  HYDROmorphone  Injectable 0.2 milliGRAM(s) IV Push every 4 hours PRN Dyspnea  LORazepam   Injectable 0.5 milliGRAM(s) IV Push every 4 hours PRN Anxiety  melatonin 3 milliGRAM(s) Oral at bedtime PRN Insomnia  OLANZapine Injectable 2.5 milliGRAM(s) IntraMuscular every 6 hours PRN severe agitation  ondansetron Injectable 4 milliGRAM(s) IV Push every 8 hours PRN Nausea and/or Vomiting        Vital Signs Last 24 Hrs  T(C): 36.2 (26 Sep 2022 12:59), Max: 36.3 (26 Sep 2022 05:00)  T(F): 97.1 (26 Sep 2022 12:59), Max: 97.4 (26 Sep 2022 05:00)  HR: 125 (26 Sep 2022 12:59) (102 - 125)  BP: 70/31 (26 Sep 2022 12:59) (70/31 - 94/56)  BP(mean): 55 (26 Sep 2022 12:59) (55 - 55)  RR: 20 (26 Sep 2022 12:59) (20 - 25)  SpO2: 88% (26 Sep 2022 12:59) (88% - 94%)    Parameters below as of 26 Sep 2022 12:59  Patient On (Oxygen Delivery Method): nasal cannula, high flow  O2 Flow (L/min): 55  O2 Concentration (%): 98    PE  NAD  Lethargic  on HFNC   RRR  CTAB  Abd soft, NT, ND  No c/c/e  No rash grossly

## 2022-09-26 NOTE — PROGRESS NOTE ADULT - PROBLEM SELECTOR PLAN 4
Likely multi-factorial: UTI, hypercapnia, hypercalcemia, psychiatric disorder, and now with greater concern for hyperviscosity syndrome  - serum viscosity level improved to 2.1 after plasmapheresis on 9/13 (peak of 7.6)  - mental status declining, comfort measures per family wishes

## 2022-09-26 NOTE — PROGRESS NOTE ADULT - PROBLEM SELECTOR PLAN 7
s/p  fall at home   - LLE small area that is open, area non-erythematous, non-tender, no warmth noted  - x-ray without fracture or dislocation, soft tissue swelling present  - s/p tetanus vaccine in ED

## 2022-09-26 NOTE — PROGRESS NOTE ADULT - PROBLEM SELECTOR PLAN 2
Hypercapnic and hypoxemic respiratory failure  - Continue Duoneb BID, Symbicort BID, Albuterol PRN  - HFNC for comfort Hypercapnic and hypoxemic respiratory failure  - HFNC for comfort

## 2022-09-26 NOTE — PROGRESS NOTE ADULT - SUBJECTIVE AND OBJECTIVE BOX
Yamilka Rodrigues MD  [facility] Division of Hospital Medicine  Pager [phone] (M-F 8AM-5PM)  Other Times: [phone]    Patient is a 77y old  Female who presents with a chief complaint of Hypoxic respiratory failure (26 Sep 2022 13:10)    SUBJECTIVE / OVERNIGHT EVENTS: unable to obtain ROS due to mental status    MEDICATIONS  (STANDING):  budesonide  80 MICROgram(s)/formoterol 4.5 MICROgram(s) Inhaler 2 Puff(s) Inhalation two times a day  dextrose 5% + lactated ringers. 1000 milliLiter(s) (60 mL/Hr) IV Continuous <Continuous>  dextrose 50% Injectable 25 Gram(s) IV Push once  dextrose 50% Injectable 12.5 Gram(s) IV Push once  dextrose 50% Injectable 25 Gram(s) IV Push once  dextrose Oral Gel 15 Gram(s) Oral once  glucagon  Injectable 1 milliGRAM(s) IntraMuscular once  levalbuterol Inhalation 0.63 milliGRAM(s) Inhalation every 12 hours  midodrine 10 milliGRAM(s) Oral every 8 hours    MEDICATIONS  (PRN):  acetaminophen     Tablet .. 650 milliGRAM(s) Oral every 6 hours PRN Temp greater or equal to 38C (100.4F), Mild Pain (1 - 3)  aluminum hydroxide/magnesium hydroxide/simethicone Suspension 30 milliLiter(s) Oral every 4 hours PRN Dyspepsia  glycopyrrolate Injectable 0.4 milliGRAM(s) IV Push four times a day PRN Secretions  HYDROmorphone  Injectable 0.2 milliGRAM(s) IV Push every 4 hours PRN Dyspnea  LORazepam   Injectable 0.5 milliGRAM(s) IV Push every 4 hours PRN Anxiety  melatonin 3 milliGRAM(s) Oral at bedtime PRN Insomnia  OLANZapine Injectable 2.5 milliGRAM(s) IntraMuscular every 6 hours PRN severe agitation  ondansetron Injectable 4 milliGRAM(s) IV Push every 8 hours PRN Nausea and/or Vomiting      PHYSICAL EXAM:  Vital Signs Last 24 Hrs  T(C): 36.2 (26 Sep 2022 12:59), Max: 36.3 (26 Sep 2022 05:00)  T(F): 97.1 (26 Sep 2022 12:59), Max: 97.4 (26 Sep 2022 05:00)  HR: 125 (26 Sep 2022 12:59) (102 - 125)  BP: 70/31 (26 Sep 2022 12:59) (70/31 - 94/56)  BP(mean): 55 (26 Sep 2022 12:59) (55 - 55)  RR: 20 (26 Sep 2022 12:59) (20 - 25)  SpO2: 88% (26 Sep 2022 12:59) (88% - 94%)    Parameters below as of 26 Sep 2022 12:59  Patient On (Oxygen Delivery Method): nasal cannula, high flow  O2 Flow (L/min): 55  O2 Concentration (%): 98    CONSTITUTIONAL: NAD, well-developed  HEAD: Normocephalic, atraumatic  EYES: EOMI, PERRL  ENT: no rhinorrhea, no pharyngeal erythema, HFNC in place  RESPIRATORY: No increased work of breathing, CTAB, no wheezes or crackles appreciated  CARDIOVASCULAR: RRR, S1 and S2 present, no m/r/g  ABDOMEN: soft, NT, ND, bowel sounds present  EXTREMITIES: LUE with some mottling noted, equal and strong radial pulses b/l and skin warm to touch  MUSCULOSKELETAL: no joint swelling, no tenderness to palpation  NEURO: A&Ox0    LABS:                      RADIOLOGY & ADDITIONAL TESTS:  Results Reviewed:   Imaging Personally Reviewed:  Electrocardiogram Personally Reviewed:    COORDINATION OF CARE:  Care Discussed with Consultants/Other Providers [Y/N]:  Prior or Outpatient Records Reviewed [Y/N]:

## 2022-09-26 NOTE — PROGRESS NOTE ADULT - SUBJECTIVE AND OBJECTIVE BOX
CARDIOLOGY FOLLOW UP - Dr. Kingston  Date of Service: 9/26/2022  CC: lethargic    Review of Systems:  Constitutional: No fever, weight loss, or fatigue  Respiratory: No cough, wheezing, or hemoptysis, no shortness of breath  Cardiovascular: No chest pain, palpitations, passing out, dizziness, or leg swelling  Gastrointestinal: No abd or epigastric pain. No nausea, vomiting, or hematemesis; no diarrhea or consiptaiton, no melena or hematochezia  Vascular: No edema     TELEMETRY:    PHYSICAL EXAM:  T(C): 36.2 (09-26-22 @ 12:59), Max: 36.3 (09-26-22 @ 05:00)  HR: 125 (09-26-22 @ 12:59) (102 - 125)  BP: 70/31 (09-26-22 @ 12:59) (70/31 - 94/56)  RR: 20 (09-26-22 @ 12:59) (20 - 25)  SpO2: 88% (09-26-22 @ 12:59) (88% - 94%)  Wt(kg): --  I&O's Summary      Appearance: Normal	  Cardiovascular: Normal S1 S2,RRR, No JVD, No murmurs  Respiratory: Lungs clear to auscultation	  Gastrointestinal:  Soft, Non-tender, + BS	  Extremities: Normal range of motion, No clubbing, cyanosis or edema  Vascular: Peripheral pulses palpable 2+ bilaterally       Home Medications:  Breo Ellipta 100 mcg-25 mcg/inh inhalation powder: 1 puff(s) inhaled once a day (30 Aug 2022 16:01)  Depakote 500 mg oral delayed release tablet: 2 tab(s) orally once a day (at bedtime) (Dispensed on 8/18/22 x 90 days) (30 Aug 2022 16:02)  lithium 300 mg oral tablet: 1 tab(s) orally 3 times a day (Dispensed on 8/18/22 x 90 days) (30 Aug 2022 16:02)  Ventolin HFA 90 mcg/inh inhalation aerosol: 2 puff(s) inhaled 4 times a day, As Needed (30 Aug 2022 16:01)  ZyPREXA 2.5 mg oral tablet: 1 tab(s) orally once a day (at bedtime) (30 Aug 2022 16:01)        MEDICATIONS  (STANDING):  budesonide  80 MICROgram(s)/formoterol 4.5 MICROgram(s) Inhaler 2 Puff(s) Inhalation two times a day  dextrose 5% + lactated ringers. 1000 milliLiter(s) (60 mL/Hr) IV Continuous <Continuous>  dextrose 50% Injectable 25 Gram(s) IV Push once  dextrose 50% Injectable 12.5 Gram(s) IV Push once  dextrose 50% Injectable 25 Gram(s) IV Push once  dextrose Oral Gel 15 Gram(s) Oral once  glucagon  Injectable 1 milliGRAM(s) IntraMuscular once  levalbuterol Inhalation 0.63 milliGRAM(s) Inhalation every 12 hours  midodrine 10 milliGRAM(s) Oral every 8 hours        EKG:  RADIOLOGY:  DIAGNOSTIC TESTING:  [ ] Echocardiogram:  [ ] Catherterization:  [ ] Stress Test:  OTHER:     LABS:	 	                  CARDIAC MARKERS:

## 2022-09-26 NOTE — PROGRESS NOTE ADULT - ASSESSMENT
77 year old female with PMH of lymphoma (dx 2005, s/p CARRIE lobectomy, relapsed in 2019 but not currently on chemo/RT, being followed by heme every 3-6months at [facility] [place] bipolar disorder, asthma adm w FTT, course c/b acute hypercapnic and hypoxic respiratory failure requiring BIPAP and AVAPS. Now with concern for hyperviscosity syndrome given inability to even obtain immunoglobulin panel due to serum viscosity, s/p plasmapheresis x3.     #Hypotension  -likely hypovolemic versus obstructive mediastinal mass  -responsive to IVF and midodrine  -cont midodrine as ordered    #Pleural Effusions  -may be malignant versus CHF  -hold off on diuresis now given recent hypotension    mgmt per medicine    pt not comfort care, will sign off, please call back w any questions    35 minutes spent on total encounter; more than 50% of the visit was spent counseling and/or coordinating care by the attending physician.

## 2022-09-26 NOTE — PROGRESS NOTE ADULT - PROBLEM SELECTOR PLAN 1
dx 2005, s/p CARRIE lobectomy, relapsed in 2019 but not currently on chemo/RT, [place] [facility] [place] now s/p 3 rounds of PLEX and 2 rounds of bendamustine on 9/22, 9/23 with no improvement in symptoms  - family wishing to hold off on any further treatments and patient currently with worsening respiratory and functional status. Patient overall with clinical deterioration. Per family wishes, will switch to comfort measures.  - dilaudid, ativan and glycopyrrolate PRNs ordered for comfort dx 2005, s/p CARRIE lobectomy, relapsed in 2019 but not currently on chemo/RT, being followed by heme every 3-6months at [facility] [place]  - now s/p 3 rounds of PLEX and 2 rounds of bendamustine on 9/22, 9/23 with no improvement in symptoms  - family wishing to hold off on any further treatments and patient currently with worsening respiratory and functional status, patient now on comfort measures  - dilaudid, ativan and glycopyrrolate PRNs ordered for comfort

## 2022-09-26 NOTE — PROGRESS NOTE ADULT - ASSESSMENT
77F with hx of lymphoma (dx 2005, s/p CARRIE lobectomy, relapsed in 2019 but not currently on chemo/RT, being followed by [place] every 3-6months at [facility] [place] bipolar disorder, asthma (on O2 PRN at home, unknown how many L) brought in for hallucinations and depression. Course c/b acute hypercapnic and hypoxic respiratory failure requiring BIPAP/AVAPS. Now with concern for hyperviscosity syndrome s/p plasmapheresis x3 and bendamustine x2. Patient now with clinical deterioration and currently DNR/DNI with focus on comfort.

## 2022-09-26 NOTE — PROGRESS NOTE ADULT - PROBLEM SELECTOR PLAN 3
BPs have been soft   - Initially with c/f sepsis, however no infectious source identified thus far  - Most likely 2/2 poor PO intake and hypovolemia  - Bcx from 9/17 with NGTD, UA from 9/17 unremarkable  - patient currently unable to tolerate PO, d/c midodrine  - per family wishes, comfort measures

## 2022-09-26 NOTE — PROGRESS NOTE ADULT - NS ATTEND AMEND GEN_ALL_CORE FT
Clinically worsening, hypoxic respiratory failure, more confused.   No further treatment, family has requested comfort/palliative measures.
I have fully participated in the care of this patient. have made amendments to the documentation where necessary, and agree with the history, physical exam, and plan as documented by the ACP.     1. patient to receive bendamustine today if chemo nursing schedule allows    2. hypoxemia  improvement of acute respiratory symptoms  now on 2L NC.    Thank you for the consultation    Gino Isidro MD  Hematology/Oncology  [phone]
Much improvement with plex. resp status better. will cont to monitor IGM and serum viscosity. may need more plex. consider repeat bone marrow bx. will discuss further with [facility] [place]
at time of today examination patient HD stable  no acute signs of HD instability; therefore day2 of Bendamustine was ordered    thank you    juana deleon md  hematology/oncology
I  have fully participated in the care of this patient. I have made amendments to the documentation where necessary, and agree with the history, physical exam, and plan as documented by the ACP.    1. Increase IgM level, flow cytometry confirms small b cell population present; MYD88 testing pending  IgM levels > 4000 with no real decrease in protein burden after multiple PEX treatments.  patient previously treated for LPL? or MZL? with BR and attained CR over 2 years ago    recommended to start same treatment;   patient to receive single agent Bendamustine 70mg/m2 for Day 1  will hold Day 2 at this time.  will hold rituxan with first cycle due to likelihood of IgM flare occurrence.  IgM post PEX went from 2000 - > 7000? further warranting not using rituximab at this time.  please obtain CBC CMP prior to administration  do not give allopurinol with bendamustine  recommend acyclovir and bactrim for prevention of opportunistic infections    Thank you for the consultation  Gino Isidro MD  Hematology/Oncology
pt with clinical improvement with plex. planning for bendumustine for tomorrow for her lymphoma. Dr deleon discussed chemo with patient and family
worsening resp status. recommend pulm consult. [place] [facility] [place]
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    Overall improving respiratory status and mental status. Bendamustine planned for today. Defer additional plasmapheresis at this time.    Will need outpatient close follow-up to continue therapy to prevent worsening of IgM levels and hyperviscosity syndrome.    Continue close clinical and laboratory monitoring.
pt s/p PLEX on 9/2 for hyperviscosity syndrom, IgM very elevated. will repeat IgM levels and viscoxity level. may need plex again   [place] [facility] [place]
Pt seen and examined. Daughter at bedside. Medical decision made to hold Bendamustine today sec to low BP. Pt lethargic but easy to arouse and A+OX 3. She has no signs/sx of infection including fevers or leukocytosis. Volume depletion may be an issue. D/W Medicine PA starting IV Fluids x 24 hrs w NS 75cc/hr. Reviewed Echo from 8/30 showing mild diastolic dysfunction but otherwise NL. Will f/u CT LUE and Chest . If imaging unremarkable and bp improves, will proceed with bendamustine tomorrow

## 2022-09-26 NOTE — PROGRESS NOTE ADULT - ASSESSMENT
MANDI GASPAR is a 77y Female who presents with a chief complaint of hypoxic respiratory failure.    Marginal Zone Lymphoma  - Patient treated previously at [facility]; was on rituximab + bendamustine last in August 2020, and since then has been on surveillance, but lost to follow-up since July 2021  - CT chest concerning for slight progression of disease. May need bone marrow biopsy once respiratory status is more stable.     Lymphoplasmacytic Lymphoma  - Noted from bone marrow biopsy done during prior admission here.  - Patient underwent three plasmapheresis sessions; last on September 13th with improvement in mental status and IgM.  - IgM rising rapidly again, with no real decrease in protein burden after multiple PLEX treatments  - Pt received bendamustine 09/22 and 09/23 without improvement in symptoms or IgM  - Zarxio ordered  - flow cytometry confirms small b cell population present; MYD88 testing pending  - Per family wishes, patient is now comfort measures only. Would hold off on any further aggressive treatment     Thrombocytopenia  - likely spurious, clumping noted on smear  - no visible signs of bleeding noted  - HIT (neg)    Altered Mental Status  - Likely multifactorial with hyperviscosity of blood, hypercalcemia, urinary tract infection  - Completed antibiotics  - Endocrinology following  - Psychiatry input appreciated    Hypoxia  - Management per pulmonary    Will continue to follow.    Giovana Duarte PA-C  Hematology/Oncology  [facility]  [phone] (office)  [phone] (alt office)  Evenings and weekends please call MD on call or office

## 2022-09-27 NOTE — PROGRESS NOTE ADULT - SUBJECTIVE AND OBJECTIVE BOX
Yamilka Rodrigues MD  [facility] Division of Hospital Medicine  Pager [phone] (M-F 8AM-5PM)  Other Times: [phone]    Patient is a 77y old  Female who presents with a chief complaint of Hypoxic respiratory failure (27 Sep 2022 12:54)    SUBJECTIVE / OVERNIGHT EVENTS: BPs downtrending    MEDICATIONS  (STANDING):    MEDICATIONS  (PRN):  acetaminophen     Tablet .. 650 milliGRAM(s) Oral every 6 hours PRN Temp greater or equal to 38C (100.4F), Mild Pain (1 - 3)  aluminum hydroxide/magnesium hydroxide/simethicone Suspension 30 milliLiter(s) Oral every 4 hours PRN Dyspepsia  glycopyrrolate Injectable 0.4 milliGRAM(s) IV Push four times a day PRN Secretions  HYDROmorphone  Injectable 0.2 milliGRAM(s) IV Push every 4 hours PRN Dyspnea  LORazepam   Injectable 0.5 milliGRAM(s) IV Push every 4 hours PRN Anxiety  melatonin 3 milliGRAM(s) Oral at bedtime PRN Insomnia  OLANZapine Injectable 2.5 milliGRAM(s) IntraMuscular every 6 hours PRN severe agitation  ondansetron Injectable 4 milliGRAM(s) IV Push every 8 hours PRN Nausea and/or Vomiting      PHYSICAL EXAM:  Vital Signs Last 24 Hrs  T(C): 37.1 (27 Sep 2022 14:18), Max: 37.1 (27 Sep 2022 14:18)  T(F): 98.8 (27 Sep 2022 14:18), Max: 98.8 (27 Sep 2022 14:18)  HR: 125 (27 Sep 2022 14:18) (112 - 125)  BP: 73/40 (27 Sep 2022 14:18) (66/41 - 73/40)  BP(mean): --  RR: 22 (27 Sep 2022 15:35) (20 - 23)  SpO2: 89% (26 Sep 2022 20:40) (89% - 89%)    Parameters below as of 27 Sep 2022 15:35  Patient On (Oxygen Delivery Method): nasal cannula, high flow  O2 Flow (L/min): 55  O2 Concentration (%): 96    CONSTITUTIONAL: NAD  HEAD: Normocephalic, atraumatic  EYES: EOMI, PERRL  ENT: no rhinorrhea, no pharyngeal erythema, HFNC in place  RESPIRATORY: No increased work of breathing, CTAB, no wheezes or crackles appreciated  CARDIOVASCULAR: RRR, S1 and S2 present, no m/r/g  ABDOMEN: soft, NT, ND, bowel sounds present  EXTREMITIES: LUE with some mottling noted, equal and strong radial pulses b/l and skin warm to touch  MUSCULOSKELETAL: no joint swelling, no tenderness to palpation  NEURO: A&Ox0    LABS:                      RADIOLOGY & ADDITIONAL TESTS:  Results Reviewed:   Imaging Personally Reviewed:  Electrocardiogram Personally Reviewed:    COORDINATION OF CARE:  Care Discussed with Consultants/Other Providers [Y/N]:  Prior or Outpatient Records Reviewed [Y/N]:

## 2022-09-27 NOTE — PROGRESS NOTE ADULT - PROBLEM SELECTOR PLAN 6
Noted to have left hand swelling and discoloration noted   - Arterial and venous doppler negative for venous thrombus or arterial occlusion  - Vascular consulted and no acute intervention planned  - Discussed with hand Surgeon, Dr. Malone, and no urgent intervention needed  - Elevate LUE

## 2022-09-27 NOTE — PROGRESS NOTE ADULT - ASSESSMENT
MANDI GASPAR is a 77y Female who presents with a chief complaint of hypoxic respiratory failure.    Marginal Zone Lymphoma  - Patient treated previously at [facility]; was on rituximab + bendamustine last in August 2020, and since then has been on surveillance, but lost to follow-up since July 2021  - CT chest concerning for slight progression of disease  - No further workup or treatments desired at this time.     Lymphoplasmacytic Lymphoma  - Noted from bone marrow biopsy done during prior admission here.  - Patient underwent three plasmapheresis sessions; last on September 13th with improvement in mental status and IgM.  - IgM rising rapidly again, with no real decrease in protein burden after multiple PLEX treatments  - Pt received bendamustine 09/22 and 09/23 without improvement in symptoms or IgM. Zarxio ordered.   - flow cytometry confirms small b cell population present; MYD88 test negative for mutations   - Per family wishes, patient is now comfort measures only. Would hold off on any further aggressive treatment     Thrombocytopenia  - likely spurious, clumping noted on smear  - no visible signs of bleeding noted  - HIT (neg)    Altered Mental Status  - Likely multifactorial with hyperviscosity of blood, hypercalcemia, urinary tract infection  - Completed antibiotics  - Endocrinology following  - Psychiatry input appreciated    Hypoxia  - Management per pulmonary    Please have  connect with patient's daughter, Yolie [phone]  Will continue to follow.    Giovana Duarte PA-C  Hematology/Oncology  [facility]  [phone] (office)  [phone] (alt office)  Evenings and weekends please call MD on call or office

## 2022-09-27 NOTE — PROGRESS NOTE ADULT - ASSESSMENT
77F with hx of lymphoma (dx 2005, s/p CARRIE lobectomy, relapsed in 2019 but not currently on chemo/RT, being followed by heme every 3-6months at [facility] [place]), bipolar disorder, asthma (on O2 PRN at home, unknown how many L) brought in for hallucinations and depression. Course c/b acute hypercapnic and hypoxic respiratory failure requiring BIPAP/AVAPS. Now with concern for hyperviscosity syndrome s/p plasmapheresis x3 and bendamustine x2. Patient now with clinical deterioration and currently DNR/DNI with focus on comfort.

## 2022-09-27 NOTE — PROGRESS NOTE ADULT - PROBLEM SELECTOR PLAN 1
dx 2005, s/p CARRIE lobectomy, relapsed in 2019 but not currently on chemo/RT, [place] [facility] [place]  - now s/p 3 rounds of PLEX and 2 rounds of bendamustine on 9/22, 9/23 with no improvement in symptoms  - family wishing to hold off on any further treatments and patient currently with worsening respiratory and functional status, patient now on comfort measures  - dilaudid, ativan and glycopyrrolate PRNs ordered for comfort

## 2022-09-27 NOTE — PROGRESS NOTE ADULT - NUTRITIONAL ASSESSMENT
This patient has been assessed with a concern for Malnutrition and has been determined to have a diagnosis/diagnoses of Severe protein-calorie malnutrition.    This patient is being managed with:   Diet Pureed-  DASH/TLC {Sodium & Cholesterol Restricted} (DASH)  Supplement Feeding Modality:  Oral  Ensure Enlive Cans or Servings Per Day:  1       Frequency:  Two Times a day  Entered: Sep  6 2022 10:01AM    
This patient has been assessed with a concern for Malnutrition and has been determined to have a diagnosis/diagnoses of Severe protein-calorie malnutrition.    This patient is being managed with:   Diet Pureed-  DASH/TLC {Sodium & Cholesterol Restricted} (DASH)  Supplement Feeding Modality:  Oral  Ensure Enlive Cans or Servings Per Day:  1       Frequency:  Two Times a day  Entered: Sep  6 2022 10:01AM    
This patient has been assessed with a concern for Malnutrition and has been determined to have a diagnosis/diagnoses of Severe protein-calorie malnutrition.    This patient is being managed with:   Diet Pureed-  DASH/TLC {Sodium & Cholesterol Restricted} (DASH)  Supplement Feeding Modality:  Oral  Ensure Enlive Cans or Servings Per Day:  1       Frequency:  Two Times a day  Ensure Pudding Cans or Servings Per Day:  1       Frequency:  Three Times a day  Entered: Sep 18 2022 10:10AM    
This patient has been assessed with a concern for Malnutrition and has been determined to have a diagnosis/diagnoses of Severe protein-calorie malnutrition.    This patient is being managed with:   Diet Pureed-  DASH/TLC {Sodium & Cholesterol Restricted} (DASH)  Supplement Feeding Modality:  Oral  Ensure Enlive Cans or Servings Per Day:  1       Frequency:  Two Times a day  Ensure Pudding Cans or Servings Per Day:  1       Frequency:  Three Times a day  Entered: Sep 18 2022 10:10AM    
This patient has been assessed with a concern for Malnutrition and has been determined to have a diagnosis/diagnoses of Severe protein-calorie malnutrition.    This patient is being managed with:   Diet Pureed-  DASH/TLC {Sodium & Cholesterol Restricted} (DASH)  Supplement Feeding Modality:  Oral  Ensure Enlive Cans or Servings Per Day:  1       Frequency:  Two Times a day  Entered: Sep  6 2022 10:01AM    
This patient has been assessed with a concern for Malnutrition and has been determined to have a diagnosis/diagnoses of Severe protein-calorie malnutrition.    This patient is being managed with:   Diet Pureed-  DASH/TLC {Sodium & Cholesterol Restricted} (DASH)  Supplement Feeding Modality:  Oral  Ensure Enlive Cans or Servings Per Day:  1       Frequency:  Two Times a day  Entered: Sep  6 2022 10:01AM    
This patient has been assessed with a concern for Malnutrition and has been determined to have a diagnosis/diagnoses of Severe protein-calorie malnutrition.    This patient is being managed with:   Diet Pureed-  DASH/TLC {Sodium & Cholesterol Restricted} (DASH)  Supplement Feeding Modality:  Oral  Ensure Enlive Cans or Servings Per Day:  1       Frequency:  Two Times a day  Ensure Pudding Cans or Servings Per Day:  1       Frequency:  Three Times a day  Entered: Sep 18 2022 10:10AM    
This patient has been assessed with a concern for Malnutrition and has been determined to have a diagnosis/diagnoses of Severe protein-calorie malnutrition.    This patient is being managed with:   Diet Pureed-  DASH/TLC {Sodium & Cholesterol Restricted} (DASH)  Supplement Feeding Modality:  Oral  Ensure Enlive Cans or Servings Per Day:  1       Frequency:  Two Times a day  Entered: Sep  6 2022 10:01AM    
This patient has been assessed with a concern for Malnutrition and has been determined to have a diagnosis/diagnoses of Severe protein-calorie malnutrition.    This patient is being managed with:   Diet Pureed-  DASH/TLC {Sodium & Cholesterol Restricted} (DASH)  Supplement Feeding Modality:  Oral  Ensure Enlive Cans or Servings Per Day:  1       Frequency:  Two Times a day  Ensure Pudding Cans or Servings Per Day:  1       Frequency:  Three Times a day  Entered: Sep 18 2022 10:10AM    
This patient has been assessed with a concern for Malnutrition and has been determined to have a diagnosis/diagnoses of Severe protein-calorie malnutrition.    This patient is being managed with:   Diet Pureed-  DASH/TLC {Sodium & Cholesterol Restricted} (DASH)  Supplement Feeding Modality:  Oral  Ensure Enlive Cans or Servings Per Day:  1       Frequency:  Two Times a day  Entered: Sep  6 2022 10:01AM    
This patient has been assessed with a concern for Malnutrition and has been determined to have a diagnosis/diagnoses of Severe protein-calorie malnutrition.    This patient is being managed with:   Diet Pureed-  DASH/TLC {Sodium & Cholesterol Restricted} (DASH)  Supplement Feeding Modality:  Oral  Ensure Enlive Cans or Servings Per Day:  1       Frequency:  Two Times a day  Entered: Sep  6 2022 10:01AM    
This patient has been assessed with a concern for Malnutrition and has been determined to have a diagnosis/diagnoses of Severe protein-calorie malnutrition.    This patient is being managed with:   Diet Pureed-  DASH/TLC {Sodium & Cholesterol Restricted} (DASH)  Supplement Feeding Modality:  Oral  Ensure Enlive Cans or Servings Per Day:  1       Frequency:  Two Times a day  Ensure Pudding Cans or Servings Per Day:  1       Frequency:  Three Times a day  Entered: Sep 18 2022 10:10AM    

## 2022-09-27 NOTE — PROGRESS NOTE ADULT - PROBLEM SELECTOR PLAN 5
hx of bipolar w/psych admission @ [facility] many years ago, unclear if patient actually had Bipolar   - non-compliant with medications  - Psych recommendations appreciated

## 2022-09-27 NOTE — PROGRESS NOTE ADULT - REASON FOR ADMISSION
Hypoxic respiratory failure

## 2022-09-27 NOTE — PROGRESS NOTE ADULT - SUBJECTIVE AND OBJECTIVE BOX
Patient is a 77y old  Female who presents with a chief complaint of Hypoxic respiratory failure (26 Sep 2022 16:09)    Patient seen this morning. She is lethargic. Remains on HFNC.    MEDICATIONS  (STANDING):    MEDICATIONS  (PRN):  acetaminophen     Tablet .. 650 milliGRAM(s) Oral every 6 hours PRN Temp greater or equal to 38C (100.4F), Mild Pain (1 - 3)  aluminum hydroxide/magnesium hydroxide/simethicone Suspension 30 milliLiter(s) Oral every 4 hours PRN Dyspepsia  glycopyrrolate Injectable 0.4 milliGRAM(s) IV Push four times a day PRN Secretions  HYDROmorphone  Injectable 0.2 milliGRAM(s) IV Push every 4 hours PRN Dyspnea  LORazepam   Injectable 0.5 milliGRAM(s) IV Push every 4 hours PRN Anxiety  melatonin 3 milliGRAM(s) Oral at bedtime PRN Insomnia  OLANZapine Injectable 2.5 milliGRAM(s) IntraMuscular every 6 hours PRN severe agitation  ondansetron Injectable 4 milliGRAM(s) IV Push every 8 hours PRN Nausea and/or Vomiting          Vital Signs Last 24 Hrs  T(C): 36.3 (26 Sep 2022 20:40), Max: 36.3 (26 Sep 2022 20:40)  T(F): 97.4 (26 Sep 2022 20:40), Max: 97.4 (26 Sep 2022 20:40)  HR: 112 (26 Sep 2022 20:40) (112 - 125)  BP: 66/41 (26 Sep 2022 20:40) (66/41 - 70/31)  BP(mean): 55 (26 Sep 2022 12:59) (55 - 55)  RR: 23 (27 Sep 2022 10:38) (20 - 23)  SpO2: 89% (26 Sep 2022 20:40) (88% - 89%)    Parameters below as of 27 Sep 2022 10:38  Patient On (Oxygen Delivery Method): nasal cannula, high flow  O2 Flow (L/min): 55  O2 Concentration (%): 96    PE  NAD  Lethargic  Anicteric, MMM  RRR  CTAB  Abd soft, NT, ND  No c/c/e  No rash grossly

## 2022-09-27 NOTE — PROGRESS NOTE ADULT - PROVIDER SPECIALTY LIST ADULT
Cardiology
Heme/Onc
Hospitalist
Pulmonology
Pulmonology
Cardiology
Cardiology
Endocrinology
Endocrinology
Heme/Onc
Hospitalist
Pulmonology
Vascular Surgery
Endocrinology
Heme/Onc
Pulmonology
Pulmonology
Cardiology
Endocrinology
Heme/Onc
Internal Medicine
Internal Medicine
Pulmonology
Pulmonology
Endocrinology
Hospitalist
Hospitalist
Pulmonology
Pulmonology
Endocrinology
Palliative Care
Endocrinology
Hospitalist
Internal Medicine
Hospitalist
Internal Medicine
Hospitalist
Internal Medicine
Hospitalist

## 2022-09-28 NOTE — CHART NOTE - NSCHARTNOTEFT_GEN_A_CORE
Notified by PA - patient with no spontaneous respirations. Agree with SYED Marie's assessment below.     On physical exam, patient did not respond to verbal or noxious stimuli.  No spontaneous respirations.  Absent heart and breath sounds.  Absent radial and carotid pulses.   Pupils are fixed and dilated, no corneal reflex.  EKG rhythm strip shows asystole.   Patient pronounced dead at 1:25 PM by cardiopulmonary criteria.     Family declining autopsy. Daughter Alissa Abernathy made aware by calling [phone].    PRAKASH Rodrigues MD

## 2022-09-28 NOTE — CHART NOTE - NSCHARTNOTEFT_GEN_A_CORE
Called to bedside to evaluate the patient for no spontaneous respirations.     On physical exam, patient did not respond to verbal or noxious stimuli.  No spontaneous respirations.  Absent heart and breath sounds.  Absent radial and carotid pulses.   Pupils are fixed and dilated, no corneal reflex.  EKG rhythm strip shows asystole.   Patient pronounced dead at 1:25 PM.  Attending notified.  Family declining autopsy. Called to bedside to evaluate the patient for no spontaneous respirations.     On physical exam, patient did not respond to verbal or noxious stimuli.  No spontaneous respirations.  Absent heart and breath sounds.  Absent radial and carotid pulses.   Pupils are fixed and dilated, no corneal reflex.  EKG rhythm strip shows asystole.   Patient pronounced dead at 1:25 PM.  Attending Dr. Zaki López notified.  Family declining autopsy. Daughter Alissa Abernathy made aware by calling [phone].

## 2022-09-28 NOTE — DISCHARGE NOTE FOR THE EXPIRED PATIENT - HOSPITAL COURSE
77F with hx of lymphoma (dx 2005, s/p CARRIE lobectomy, relapsed in 2019 but not currently on chemo/RT, being followed by heme every 3-6months at [facility]), bipolar disorder, asthma (on O2 PRN at home, unknown how many L) brought in for hallucinations and depression. Course c/b acute hypercapnic and hypoxic respiratory failure requiring BIPAP/AVAPS. Now with concern for hyperviscosity syndrome s/p plasmapheresis x3 and bendamustine x2. Patient now with clinical deterioration and currently DNR/DNI with focus on comfort.     Problem/Plan - 1:  ·  Problem: Lymphoma.   ·  Plan: dx 2005, s/p CARRIE lobectomy, relapsed in 2019 but not currently on chemo/RT, being followed by heme every 3-6months at [facility]  - now s/p 3 rounds of PLEX and 2 rounds of bendamustine on 9/22, 9/23 with no improvement in symptoms  - family wishing to hold off on any further treatments and patient currently with worsening respiratory and functional status, patient now on comfort measures  - dilaudid, ativan and glycopyrrolate PRNs ordered for comfort.     Problem/Plan - 2:  ·  Problem: Acute hypercapnic respiratory failure.   ·  Plan: Hypercapnic and hypoxemic respiratory failure  - HFNC for comfort.     Problem/Plan - 3:  ·  Problem: Acute hypotension.   ·  Plan: BPs have been soft   - Initially with c/f sepsis, however no infectious source identified thus far  - Most likely 2/2 poor PO intake and hypovolemia  - Bcx from 9/17 with NGTD, UA from 9/17 unremarkable  - patient currently unable to tolerate PO, d/c midodrine  - per family wishes, comfort measures.     Problem/Plan - 4:  ·  Problem: Metabolic encephalopathy.   ·  Plan: Likely multi-factorial: UTI, hypercapnia, hypercalcemia, psychiatric disorder, and now with greater concern for hyperviscosity syndrome  - serum viscosity level improved to 2.1 after plasmapheresis on 9/13 (peak of 7.6)  - mental status declining, comfort measures per family wishes.     Problem/Plan - 5:  ·  Problem: Bipolar disorder.   ·  Plan: hx of bipolar w/psych admission @ [place] many years ago, unclear if patient actually had Bipolar   - non-compliant with medications  - Psych recommendations appreciated.     Problem/Plan - 6:  ·  Problem: Swelling of left hand.   ·  Plan: Noted to have left hand swelling and discoloration noted   - Arterial and venous doppler negative for venous thrombus or arterial occlusion  - Vascular consulted and no acute intervention planned  - Discussed with hand Surgeon, Dr. Malone, and no urgent intervention needed  - Elevate LUE.     Problem/Plan - 7:  ·  Problem: Wound of skin.   ·  Plan: s/p  fall at home   - LLE small area that is open, area non-erythematous, non-tender, no warmth noted  - x-ray without fracture or dislocation, soft tissue swelling present  - s/p tetanus vaccine in ED.     Problem/Plan - 8:  ·  Problem: DVT prophylaxis.   ·  Plan: DVT: holding, comfort care  DIET: pleasure feeds if able to tolerate.   77F with hx of lymphoma (dx 2005, s/p CARRIE lobectomy, relapsed in 2019 but not currently on chemo/RT, being followed by heme every 3-6months at [facility]), bipolar disorder, asthma (on O2 PRN at home, unknown how many L) brought in for hallucinations and depression. Course c/b acute hypercapnic and hypoxic respiratory failure requiring BIPAP/AVAPS. Concern for hypercoagulable state secondary to malignancy, specifically hyperviscosity syndrome s/p plasmapheresis x3 and bendamustine x2. Patient with clinical deterioration, functional quadriplegia. GOC discussion with family - wants to focus on comfort measures and DNR/DNI. On 9/28 called to bedside by RN to evaluate the patient for no spontaneous respirations. On physical exam, patient did not respond to verbal or noxious stimuli.  No spontaneous respirations.  Absent heart and breath sounds.  Absent radial and carotid pulses.   Pupils are fixed and dilated, no corneal reflex.  EKG rhythm strip shows asystole. Patient pronounced dead at 1:25 PM.  Attending Dr. Zaki López notified.  Family declining autopsy. Daughter Alissa Abernathy made aware by calling [phone].

## 2022-11-07 NOTE — PROGRESS NOTE ADULT - PROBLEM SELECTOR PLAN 4
-Corrected calcium 13.6 upon admission  -likely due to underlying malignancy, lymphoma vs new MM. F/u vitamin D 25 and 1, 25, PTHRP, MM labs. PTH is inappropriately normal  -s/p pamidronate on 8/30; now s/p 72 hours with Ca improved to approximately 11.8.   -calcitonin x 4 doses  -unfortunately, will hold off fluids for now given pulmonary edema that developed with IVF upon admission, which later required need for Lasix IV and AVAPS  -will speak to endocrine regarding need for Xgeva given ongoing hypercalcemia  -Apprec Endocrine recs-corrected calcium 9.6 today  - continue to encourage PO intake  - f/u vitamin D 1,25 and PTHrP glasses

## 2023-10-02 NOTE — PROGRESS NOTE ADULT - PROBLEM SELECTOR PLAN 11
-Heparin SQ  -Dispo issues: medically active and pending  -Patient with APS case ongoing due to unsanitary conditions at home. Will require SW/CM for safe placement once medically stable for d/c. Patient currently without capacity and with poor insight into her medical conditions. -Heparin SQ  -Dispo issues: medically active and pending    -Patient with APS case ongoing due to unsanitary conditions at home. Will require SW/CM for safe placement once medically stable for d/c. Patient currently without capacity and with poor insight into her medical conditions.    - Update on clinical status and plan provided to patient's daughter, Alissa, on 9/20 @[address] Pl[address] Text: The lesion was extirpated to the level of the fat with a #15 scalpel blade.  Given the location of the defect, shape of the defect and the proximity to free margins a W-plasty was deemed most appropriate for repair.  Using a sterile surgical marker, the appropriate transposition arms of the W-plasty were drawn incorporating the defect and placing the expected incisions within the relaxed skin tension lines where possible.    The area thus outlined was incised deep to adipose tissue with a #15 scalpel blade.  The skin margins were undermined to an appropriate distance in all directions utilizing iris scissors.  The opposing transposition arms were then transposed into place in opposite direction and anchored with interrupted buried subcutaneous sutures.

## 2024-02-13 NOTE — DIETITIAN INITIAL EVALUATION ADULT - PROBLEM/PLAN-9
Patient is given standard instructions regarding living kidney donor follow up:    Patient is advised to have a healthy lifestyle and arrange Follow up appointment with primary care physician in 6 months, one year and 2 years with these visits to include:    blood pressure, height, weight  urinalysis   Urine for microalbumin to creatinine ratio   Basic metabolic panel   lipid panel DISPLAY PLAN FREE TEXT

## 2024-08-27 NOTE — ED PROVIDER NOTE - DIAGNOSIS COUNSELING, MDM
mammogram conducted a detailed discussion... I had a detailed discussion with the patient and/or guardian regarding the historical points, exam findings, and any diagnostic results supporting the discharge/admit diagnosis.

## 2024-08-29 NOTE — ED ADULT NURSE NOTE - CHIEF COMPLAINT
[address]  [address]    NAME: KENDRA CASAS DOB/AGE/SEX: 1945 - 78 - M  PHYSICIAN: Alonzo Taveras MD                                      ADMIT DATE: 24  UNIT #: [id number]                                                ACCT #: [id number]                                                                    LOC//BED: [id number]                                             PHYSICIAN REPORT                                        NEUROLOGY CONSULTATION                                         REPORT # : [id number]  Consultation  Patient Examined By  Natalee BUCIO/ESTEPHANIA Chacko    24 12:04    Reason for Consultation  syncope  Requested By  hospitalist    History of Present Illness  78 year old with a PMH of diastolic CHF s/p Cardio MEMS implant 5/3/21, Severe Aortic stenosis  S/P  TAVR 26 mm Mayda on 2022, chronic afib s/p ROD/DCCV 2017   , on eliquis , HLD, HTN,  COVID 19 PNA 2021, COPD on  2 LITRES oxygen, chronic lower back pain s/p LESI, ED, iron def  iciency anemia requiring blood transfusions, DAYANNA, depression , Anemia, CKD, and h/o tobacco use.    In the ER blood pressure was 99/60 pulse 78-90.  Patient placed on 5 L nasal cannula from baseline  of 2 L.  Patient was 91% on 2 L.  Increased to 94% on 5 L.  Blood pressure did drop down to 85/63.  White count 9.6, hemoglobin 15.8 platelets 206 sodium 138 potassium 4 creatinine 2.5 BUN 36.  BNP  60 AST ALT 78 and 94 stroke code CT shows no acute findings.  Head and neck CTA and CT brain  perfusion shows no intracranial or ICA stenosis or perfusion abnormality.  Chest x-ray shows some  mild pulmonary vascular congestion.    Past Medical History  diastolic CHF s/p Cardio MEMS implant 5/3/21  Severe Aortic stenosis  S/P TAVR 26 mm  Mayda on 2022  chronic afib s/p ROD/DCCV 2017, on eliquis  HLD  HTN  COVID 19 PNA 2021  COPD on  2 LITRES oxygen  chronic lower back pain s/p LESI  iron deficiency anemia requiring blood transfusions  DAYANNA  depression  Anemia  CKD  h/o tobacco use.    Past Surgical History  As above    Family History    FH: stroke    FATHER,   Stroke    MOTHER,     Social History  Smoking Status:  Former smoker  Hx Smoking Exposure:  No  Current Smoke Exposure:  No  Have You Smoked in the Last 12:  No  If you are a former smoker, wh:  35 years ago  Hx Alcohol Use:  No  Recreational Drug Use:  No  Living Arrangements:  Alone    Allergies  Coded Allergies:       Niacin (Verified  Allergy, Intermediate, PRURITUS, 24)    Active Medications  Active Medications    Active Medications  Albuterol/ Ipratropium 3 ml RT  Q4HWA NEB  Last administered on 24at 21:23; Admin Dose 3 ML;  Start 24 at 20:00  Amiodarone HCl 200 mg DAILY  PC PO  Last administered on 24at 08:45; Admin Dose 200 MG;  Start  24 at 09:00  Apixaban 5 mg BID PO  Last administered on 24at 08:46; Admin Dose 5 MG;  Start 24 at  21:00  Aspirin 81 mg DAILY PO  Last administered on 24at 08:47; Admin Dose 81 MG;  Start 24 at  09:00  Atorvastatin Calcium 40 mg NIGHTLY PO  Last administered on 24at 21:11; Admin Dose 40 MG;  Start 24 at 22:00  Bupropion HCl 200 mg DAILY PO  Last administered on 24at 08:48; Admin Dose 200 MG;  Start  24 at 09:00  Carvedilol 12.5 mg BIDMEALS PO  Last administered on 24at 08:46; Admin Dose 12.5 MG;  Start  24 at 08:00  Duloxetine HCl 60 mg BID PO  Last administered on 24at 08:46; Admin Dose 60 MG;  Start 24  at 21:00  Fluticasone Propionate 1 spr DAILY BOTH NARES  Last administered on 24at 08:48; Admin Dose 1  SPR;  Start 24 at 09:00  Gabapentin 600 mg BID PO  Last administered on 24at 08:46; Admin Dose 600 MG;  Start 24   The patient is a 72y Female complaining of shortness of breath. at 21:00  Multivitamins 1 tab DAILY PO  Last administered on 8/28/24at 08:45; Admin Dose 1 TAB;  Start  8/28/24 at 09:00  Pantoprazole 40 mg DAILY PO  Last administered on 8/28/24at 08:45; Admin Dose 40 MG;  Start 8/28/24  at 09:00  Sodium Chloride 1,000 ml @  75 mls/hr CONT IV  Last administered on 8/28/24at 08:37; Admin Dose 75  MLS/HR;  Start 8/27/24 at 18:00  Tamsulosin HCl 0.4 mg DAILY  DINNER PO ;  Start 8/28/24 at 17:00  Trazodone HCl 100 mg NIGHTLY PO  Last administered on 8/27/24at 21:11; Admin Dose 100 MG;  Start  8/27/24 at 22:00    Review of Systems  Review of Systems  negative other than what is stated in HPI    Height/Weight/BMI  Height (Feet):  5  Height (Inches):  66  Weight (Kilograms):  95.70  Body Mass Index:  34.06    Physical Exam    Vital Signs        Date Time  Temp Pulse Resp B/P (MAP) Pulse Ox O2 Delivery O2 Flow Rate FiO2    8/28/24 09:24    105/70 (82)    8/28/24 09:20  82   91 Nasal cannula 2.00    8/28/24 08:30 97.6  16      Physical Exam    gen: nad, sitting in chair, alert  heent: eomi, no sinus tenderness, no lad, no scleral icterus, moist mucous membranes  heme/lymhhnodes: no supraclavicular or submandiublar lad  Cardiovascular: S1 and S2 normal, regular rate and rhythm, no mumurs/gallops/rubs  Respiratory:  ctab  GI:  Bowel sounds normal x 4 quadrants, Soft, no tenderness to palpation  : no tenderness in suprapubic area  msk: no spinal tenderness, no paraspinal tenderness, no CVA tenderness,  no lower extremity edema  Neurological:  A Ox3, Awake Grossly within normal limits, CNII-XII intact, moves upper and lower  extremities at will, lateral sensation to light touch equal ,handgrip 5/5 bilaterally and dorsi and  plantar flexion 5/5 bilaterally  Skin/Hair/Nails:  No obvious rashes  Behavioral Health/Psych:  Affect normal, Mood normal, Speech normal    Diagnostic Studies  Diagnostic Studies  CT head without contrast  IMPRESSION: No acute intracranial findings are suspected.    CTA  head/ neck with without contrast  Vessel lumen contour appears normal.  No intracranial aneurysm. No AVM.No abnormal intra or extra-axial enhancements.    CTA NECK FINDINGS:  IMPRESSION:  1. No evidence of intracranial large vessel occlusion.  2. No evidence of perfusion abnormality in the sampled regions of the brain.  3. No evidence of significant ICA stenosis by NASCET criteria.    MRI brain with and without contrast  IMPRESSION:  No acute/subacute ischemia.  White matter changes described above are likely due to chronic small vessel ischemic gliosis.      Assessment and Plan  ASSESSMENT AND PLAN:    Recurrent episodes of dizziness with syncope and collapse  – Recurrent over the last several months.  – Patient's blood pressure low in the 80s.  – CT head negative, CTA head and neck negative.  – Echo with ejection fraction is 55-60%.  – CT shows no acute findings.  Head and neck CTA and CT brain perfusion shows no intracranial or  ICA stenosis or perfusion abnormality.  – Orthostatics being done.  - Brain MRI shows chronic small vessel disease.  - Blood tests for folate, ammonia, Mg, TSH, Vitamin B12.    Case and management discussed with Dr. Vargas    Laboratory Data                                             Laboratory Tests      Test   8/28/24  05:53 8/28/24  05:44 8/28/24  00:12 8/27/24  14:22    White Blood Count 7.9    Red Blood Count 5.15    Hemoglobin 14.4    Hematocrit 44.9    Mean Corpuscular Volume 87.4    Mean Corpuscular Hemoglobin 28.0    Mean Corpuscular Hemoglobin  Concent 32.1  L            Red Cell Distribution Width 17.3  H    Platelet Count 173    Mean Platelet Volume 7.8    Neutrophils (%) (Auto) 63.3    Lymphocytes (%) (Auto) 20.8    Monocytes (%) (Auto) 10.7    Eosinophils (%) (Auto) 4.1    Basophils (%) (Auto) 1.1    Neutrophils # (Auto) 5.0    Lymphocytes # (Auto) 1.6    Monocytes # (Auto) 0.8    Eosinophils # (Auto) 0.3    Basophils # (Auto) 0.1    Bedside Glucose  70   85   91    Test    8/27/24  13:30 8/27/24  12:25 8/27/24  12:12      SARS-CoV-2 Rapid RNA  (RT-PCR)(LAB) Not detected            B-Type Natriuretic Peptide  60.0    White Blood Count   9.6    Red Blood Count   5.52    Hemoglobin   15.8    Hematocrit   48.3    Mean Corpuscular Volume   87.4    Mean Corpuscular Hemoglobin   28.6    Mean Corpuscular Hemoglobin  Concent     32.7        Red Cell Distribution Width   17.5  H    Platelet Count   206    Mean Platelet Volume   7.9    Neutrophils (%) (Auto)   63.6    Lymphocytes (%) (Auto)   19.6    Monocytes (%) (Auto)   12.1    Eosinophils (%) (Auto)   3.7    Basophils (%) (Auto)   1.0    Neutrophils # (Auto)   6.1    Lymphocytes # (Auto)   1.9    Monocytes # (Auto)   1.2  H    Eosinophils # (Auto)   0.4    Basophils # (Auto)   0.1    Prothrombin Time   23.8  H    Prothromb Time International  Ratio     2.1  H        Activated Partial  Thromboplast Time     42  H        Sodium Level   138    Potassium Level   4.0    Chloride Level   101    Carbon Dioxide Level   28    Anion Gap   9    Blood Urea Nitrogen   36.0  H    Creatinine   2.51  H    Estimat Glomerular Filtration  Rate     26  L        Kidney Disease Stage (GFR)   Stage 4    BUN/Creatinine Ratio   14.3    Glucose Level   94    Calcium Level   9.1    Total Bilirubin   1.0    Aspartate Amino Transf  (AST/SGOT)     78  H        Alanine Aminotransferase  (ALT/SGPT)     94  H        Alkaline Phosphatase   87    Troponin I High Sensitivity   14    Total Protein   7.3    Albumin   4.1    Albumin/Globulin Ratio   1.28            Natalee Chacko/ESTEPHANIA                   Aug 28, 2024 12:06    <Electronically signed by Natalee BUICO/CNP> 08/28/24 1648  <Electronically signed by Dank Vargas M.D.> 08/28/24 2238      ______________________________________________  DRAFT UNTIL SIGNED      CC: Dank Vargas M.D.; Natalee Chacko CNP;

## 2024-09-06 NOTE — PATIENT PROFILE ADULT - BILL PAYMENT
[FreeTextEntry1] : Patient states she has a painful calluses on right foot that that have worsened in the past 6 months. Patient recalls her children having the warts in March 2024. Patient has tried to shave them with no relief. 
no

## 2025-03-06 NOTE — ED ADULT NURSE NOTE - NS ED NOTE ABUSE SUSPICION NEGLECT YN
PAST SURGICAL HISTORY:  History of left hip replacement     S/P ACL repair     Status post open reduction and internal fixation (ORIF) of fracture     
No

## 2025-06-12 NOTE — PROGRESS NOTE ADULT - PROBLEM SELECTOR PROBLEM 8
Endocrine Progress Note Outpatient     Patient Care Team:  Ryann Simon MD as PCP - General  Ryann Simon MD as PCP - Family Medicine  Thuan Hickman MD as Consulting Physician (Cardiology)  Eddie Meeks MD as Consulting Physician (Otolaryngology)  Emi Leary APRN as Nurse Practitioner (Nurse Practitioner)  Cecily Santa APRN as Nurse Practitioner (Cardiology)  Sheila Wolf DNP, APRN as Nurse Practitioner (Thoracic Surgery)      Chief Complaint: Thyroid nodule/abnormal thyroid function test    HPI: This is a 84-year-old female with history of multiple thyroid nodules is here for follow-up.  She had an ultrasound thyroid done back in July 2023 which did show a right dominant thyroid nodule which was biopsied and was found to be benign. She is not taking any thyroid medications at this time.      There is no family history of thyroid cancer or history of radiation exposure.  She denies any trouble swallowing or choking or change in the voice or hoarseness.      Hyperthyroidism: She is currently on methimazole 5 mg p.o. daily.  She tells me that she feels like her tiredness have improved but she she does not have motivation to do anything significant.      Past Medical History:   Diagnosis Date    Adenocarcinoma, lung, right 2020    Allergic 2007    Bisoprolol    Anemia 12/13/2022    Back pain     LOW RADICULAR PAIN LEFT LEG    Bursitis ????    Cataract 2006    Cataract surgery    Cholelithiasis 1996    Had stones    Chronic insomnia     Claustrophobia 1969    Fatty liver     Ganglion cyst     GERD (gastroesophageal reflux disease) 07/08/2021    Glaucoma     Elevated eye pressure but no glaucoma at this point in time.    H/O degenerative disc disease     Hiatal hernia     Hiatal hernia     History of medical problems 2006    Cataract surgery.    HL (hearing loss)     Treble loss    Hyperlipidemia 12/07/2021    Hypertension     DR HICKMAN    Hyperthyroidism 04/17/2023    Hypothyroidism 
23    Blood work from 23 showed low TSH level.    OAB (overactive bladder)     Obesity     I'm now losing weight on HMR program    Osteoarthritis 2002    Pneumonia 1945    Scoliosis 200w    Discovered with my 2nd back surgeryy.    Sleep apnea     uses BiPAP    Spondylolisthesis     Surgery    Thyroid nodule 2016    Removed nodules. Now i have more.    Urinary tract infection     Have had 2-3 in my lifetime.    Uterine cancer     Visual impairment 23    Fuch's corneal disease    Vitamin D deficiency        Social History     Socioeconomic History    Marital status:     Number of children: 2    Years of education: 12   Tobacco Use    Smoking status: Former     Current packs/day: 0.00     Average packs/day: 1 pack/day for 46.0 years (46.0 ttl pk-yrs)     Types: Cigarettes     Start date: 1960     Quit date: 2006     Years since quittin.0     Passive exposure: Past    Smokeless tobacco: Never   Vaping Use    Vaping status: Never Used   Substance and Sexual Activity    Alcohol use: Not Currently     Comment: Seldom or if ever these days.    Drug use: Never    Sexual activity: Not Currently     Partners: Male     Birth control/protection: Tubal ligation, Birth control pill, Hysterectomy     Comment: Tubes tied in        Family History   Problem Relation Age of Onset    Cancer Mother         Don't know where cancer began.    Kidney disease Mother         Her cancer may have started in her kidneys.    Stroke Sister         brain bleed    Thyroid disease Sister         Removed thyroid.    Allergies Sister     Atrial fibrillation Sister     Allergies Brother     Cancer Daughter         Ewings Sarcoma    Cancer Maternal Aunt         Breast cancer    Arthritis Maternal Grandmother     Alcohol abuse Maternal Grandfather     Cancer Other     Cancer Maternal Aunt         Breast cancer       Allergies   Allergen Reactions    Beta Adrenergic Blockers Other (See Comments)     
Couldn't breathe or move    Bisoprolol Unknown - High Severity    Molds & Smuts Unknown - High Severity       ROS:   Constitutional:  Admit fatigue, tiredness.    Eyes:  Denies change in visual acuity   HENT:  Denies nasal congestion or sore throat   Respiratory: denies cough, Admit shortness of breath.   Cardiovascular:  denies chest pain, edema   GI:  Denies abdominal pain, nausea, vomiting.   Musculoskeletal:  Denies back pain or joint pain   Integument:  Admit dry skin and brittle nails.   Neurologic:  Denies headache, focal weakness or sensory changes   Endocrine:  Denies polyuria or polydipsia   Psychiatric:  Denies depression or anxiety      Vitals:    06/12/25 1159   BP: 120/70   Pulse: 87   SpO2: 98%     Body mass index is 41.08 kg/m².     Physical Exam:  GEN: NAD, conversant  EYES: EOMI, PERRL,  NECK: no thyromegaly,   CV: RRR  LUNG: CTA  PSYCH: Awake and coherent      Results Review:     I reviewed the patient's new clinical results.    Lab Results   Component Value Date    HGBA1C 6.00 (H) 12/17/2024    HGBA1C 6.10 (H) 06/14/2024    HGBA1C 5.6 12/13/2022      Lab Results   Component Value Date    GLUCOSE 118 (H) 04/24/2025    BUN 20 04/24/2025    CREATININE 1.07 (H) 04/24/2025    EGFRIFNONA 65 07/31/2020    EGFRIFAFRI 57 (L) 08/26/2019    BCR 19 04/24/2025    K 4.5 04/24/2025    CO2 19 (L) 04/24/2025    CALCIUM 10.0 04/24/2025    ALBUMIN 4.2 04/24/2025    AST 18 04/24/2025    ALT 24 04/24/2025    CHOL 135 12/17/2024    TRIG 121 12/17/2024    LDL 60 12/17/2024    HDL 54 12/17/2024     Lab Results   Component Value Date    TSH 1.840 04/24/2025    FREET4 0.90 04/24/2025    THYROIDAB <9 06/30/2023     TSH (12/15/2023 13:36)    Fine Needle Aspiration (08/01/2023 13:00)    US Thyroid (02/01/2024 11:15)    NM Thyroid Uptake & Scan (07/14/2023 13:32)    US Thyroid (07/13/2023 13:17)    Medication Review: Reviewed.       Current Outpatient Medications:     atorvastatin (LIPITOR) 10 MG tablet, TAKE 1 TABLET BY 
MOUTH DAILY, Disp: 90 tablet, Rfl: 3    dilTIAZem CD (CARDIZEM CD) 180 MG 24 hr capsule, TAKE 1 CAPSULE BY MOUTH DAILY, Disp: 90 capsule, Rfl: 1    famotidine (PEPCID) 40 MG tablet, TAKE 1 TABLET BY MOUTH TWICE DAILY AS NEEDED FOR HEARTBURN OR INDIGESTION, Disp: 180 tablet, Rfl: 3    lisinopril-hydrochlorothiazide (PRINZIDE,ZESTORETIC) 20-12.5 MG per tablet, Take 2 tablets by mouth Daily., Disp: 180 tablet, Rfl: 3    methIMAzole (TAPAZOLE) 5 MG tablet, Take 1 tablet by mouth Daily., Disp: 30 tablet, Rfl: 5    Nasacort Allergy 24HR 55 MCG/ACT nasal inhaler, 2 sprays Daily., Disp: , Rfl:     sertraline (Zoloft) 50 MG tablet, Take 1 tablet by mouth Daily., Disp: 90 tablet, Rfl: 1    temazepam (RESTORIL) 30 MG capsule, Take 1 capsule by mouth At Night As Needed for Sleep., Disp: 30 capsule, Rfl: 5    timolol (TIMOPTIC) 0.5 % ophthalmic solution, Administer 1 drop to both eyes 2 (Two) Times a Day., Disp: , Rfl:     aspirin 81 MG chewable tablet, Chew 1 tablet Daily., Disp: , Rfl:           Assessment and plan:  Multiple thyroid nodules with a dominant nodule in the right side which has been biopsied and benign.  Most recent thyroid ultrasound done in March 2025 showed stable thyroid nodules.    Hyperthyroidism: On methimazole 5 mg p.o. daily.  No recent labs show will check TSH and free T4.    Assessment and plan from March 11, 2025 reviewed and updated.           Di Donaldson MD FACE.                      
Asthma
Constipation, unspecified constipation type